# Patient Record
Sex: FEMALE | Race: WHITE | NOT HISPANIC OR LATINO | Employment: UNEMPLOYED | URBAN - METROPOLITAN AREA
[De-identification: names, ages, dates, MRNs, and addresses within clinical notes are randomized per-mention and may not be internally consistent; named-entity substitution may affect disease eponyms.]

---

## 2017-07-26 LAB
EXT 24 HR UR METANEPHRINE: ABNORMAL
EXT 24 HR UR NORMETANEPHRINE: ABNORMAL
EXT 24 HR UR NORMETANEPHRINE: ABNORMAL
EXT 25 HYDROXY VIT D2: ABNORMAL
EXT 25 HYDROXY VIT D3: ABNORMAL
EXT 5 HIAA 24 HR URINE: ABNORMAL
EXT 5 HIAA BLOOD: ABNORMAL
EXT ACTH: ABNORMAL
EXT AFP: ABNORMAL
EXT ALBUMIN: 3.9 G/DL (ref 3.5–5.7)
EXT ALKALINE PHOSPHATASE: 111 U/L (ref 34–104)
EXT ALT: 10 U/L (ref 7–52)
EXT AMYLASE: ABNORMAL
EXT ANTI ISLET CELL AB: ABNORMAL
EXT ANTI PARIETAL CELL AB: ABNORMAL
EXT ANTI THYROID AB: ABNORMAL
EXT AST: 17 U/L (ref 13–39)
EXT BILIRUBIN DIRECT: ABNORMAL MG/DL
EXT BILIRUBIN TOTAL: 0.5 MG/DL (ref 0.3–1)
EXT BK VIRUS DNA QN PCR: ABNORMAL
EXT BUN: 20 MG/DL (ref 7–25)
EXT C PEPTIDE: ABNORMAL
EXT CA 125: ABNORMAL
EXT CA 19-9: ABNORMAL
EXT CA 27-29: ABNORMAL
EXT CALCITONIN: ABNORMAL
EXT CALCIUM: 9.4 MG/DL (ref 8.6–10.3)
EXT CEA: ABNORMAL
EXT CHLORIDE: 97 MEQ/L (ref 98–107)
EXT CHOLESTEROL: ABNORMAL
EXT CHROMOGRANIN A: 11 NG/ML (ref 0–15)
EXT CO2: 30 MEQ/L (ref 21–31)
EXT CREATININE UA: ABNORMAL
EXT CREATININE: 1.02 MG/DL (ref 0.8–1.2)
EXT CYCLOSPORONE LEVEL: ABNORMAL
EXT DOPAMINE: ABNORMAL
EXT EBV DNA BY PCR: ABNORMAL
EXT EPINEPHRINE: ABNORMAL
EXT FOLATE: ABNORMAL
EXT FREE T3: ABNORMAL
EXT FREE T4: ABNORMAL
EXT FSH: ABNORMAL
EXT GASTRIN RELEASING PEPTIDE: ABNORMAL
EXT GASTRIN RELEASING PEPTIDE: ABNORMAL
EXT GASTRIN: ABNORMAL
EXT GGT: ABNORMAL
EXT GHRELIN: ABNORMAL
EXT GLUCAGON: ABNORMAL
EXT GLUCOSE: 249 MG/DL (ref 70–105)
EXT GROWTH HORMONE: ABNORMAL
EXT HCV RNA QUANT PCR: ABNORMAL
EXT HDL: ABNORMAL
EXT HEMATOCRIT: 39.3 % (ref 37–52)
EXT HEMOGLOBIN A1C: ABNORMAL
EXT HEMOGLOBIN: 12.8 G/DL (ref 12–18)
EXT HISTAMINE 24 HR URINE: ABNORMAL
EXT HISTAMINE: ABNORMAL
EXT IGF-1: ABNORMAL
EXT IMMUNKNOW (NON-STIMULATED): ABNORMAL
EXT IMMUNKNOW (STIMULATED): ABNORMAL
EXT INR: ABNORMAL
EXT INSULIN: ABNORMAL
EXT LANREOTIDE LEVEL: ABNORMAL
EXT LDH, TOTAL: ABNORMAL
EXT LDL CHOLESTEROL: ABNORMAL
EXT LIPASE: ABNORMAL
EXT MAGNESIUM: ABNORMAL
EXT METANEPHRINE FREE PLASMA: ABNORMAL
EXT MOTILIN: ABNORMAL
EXT NEUROKININ A CAMB: ABNORMAL
EXT NEUROKININ A ISI: ABNORMAL
EXT NEUROTENSIN: ABNORMAL
EXT NOREPINEPHRINE: ABNORMAL
EXT NORMETANEPHRINE: ABNORMAL
EXT NSE: ABNORMAL
EXT OCTREOTIDE LEVEL: ABNORMAL
EXT PANCREASTATIN CAMB: ABNORMAL
EXT PANCREASTATIN ISI: ABNORMAL
EXT PANCREATIC POLYPEPTIDE: ABNORMAL
EXT PHOSPHORUS: ABNORMAL
EXT PLATELETS: 220 K/UL (ref 150–500)
EXT POTASSIUM: 4.6 MEQ/L (ref 3.5–5.1)
EXT PROGRAF LEVEL: ABNORMAL
EXT PROLACTIN: ABNORMAL
EXT PROTEIN TOTAL: 7 G/DL (ref 6.4–8.9)
EXT PROTEIN UA: ABNORMAL
EXT PT: ABNORMAL
EXT PTH, INTACT: ABNORMAL
EXT PTT: ABNORMAL
EXT RAPAMUNE LEVEL: ABNORMAL
EXT SEROTONIN: ABNORMAL
EXT SODIUM: 136 MEQ/L (ref 136–145)
EXT SOMATOSTATIN: ABNORMAL
EXT SUBSTANCE P: ABNORMAL
EXT TRIGLYCERIDES: ABNORMAL
EXT TRYPTASE: ABNORMAL
EXT TSH: ABNORMAL
EXT URIC ACID: ABNORMAL
EXT URINE AMYLASE U/HR: ABNORMAL
EXT URINE AMYLASE U/L: ABNORMAL
EXT VASOACTIVE INTESTINAL POLYPEPTIDE: ABNORMAL
EXT VITAMIN B12: ABNORMAL
EXT VMA 24 HR URINE: ABNORMAL
EXT WBC: 4.4 K/UL (ref 4.8–10.8)
NEURON SPECIFIC ENOLASE: ABNORMAL

## 2017-08-21 LAB
EXT 24 HR UR METANEPHRINE: ABNORMAL
EXT 24 HR UR NORMETANEPHRINE: ABNORMAL
EXT 24 HR UR NORMETANEPHRINE: ABNORMAL
EXT 25 HYDROXY VIT D2: ABNORMAL
EXT 25 HYDROXY VIT D3: ABNORMAL
EXT 5 HIAA 24 HR URINE: ABNORMAL
EXT 5 HIAA BLOOD: ABNORMAL
EXT ACTH: ABNORMAL
EXT AFP: ABNORMAL
EXT ALBUMIN: 3.87 G/DL (ref 3.5–5.7)
EXT ALKALINE PHOSPHATASE: 94 U/L (ref 34–104)
EXT ALT: 7 U/L (ref 7–52)
EXT AMYLASE: ABNORMAL
EXT ANTI ISLET CELL AB: ABNORMAL
EXT ANTI PARIETAL CELL AB: ABNORMAL
EXT ANTI THYROID AB: ABNORMAL
EXT AST: 11 U/L (ref 13–39)
EXT BILIRUBIN DIRECT: ABNORMAL
EXT BILIRUBIN TOTAL: 0.4 MG/DL (ref 0.3–1)
EXT BK VIRUS DNA QN PCR: ABNORMAL
EXT BUN: ABNORMAL MG/DL (ref 7–25)
EXT C PEPTIDE: ABNORMAL
EXT CA 125: ABNORMAL
EXT CA 19-9: ABNORMAL
EXT CA 27-29: ABNORMAL
EXT CALCITONIN: ABNORMAL
EXT CALCIUM: 9.3 MG/DL (ref 8.6–10.3)
EXT CEA: ABNORMAL
EXT CHLORIDE: 103 MEQ/L (ref 98–107)
EXT CHOLESTEROL: ABNORMAL
EXT CHROMOGRANIN A: ABNORMAL
EXT CO2: 30 MEQ/L (ref 21–31)
EXT CREATININE UA: ABNORMAL
EXT CREATININE: 1.03 MG/DL (ref 0.6–1.2)
EXT CYCLOSPORONE LEVEL: ABNORMAL
EXT DOPAMINE: ABNORMAL
EXT EBV DNA BY PCR: ABNORMAL
EXT EPINEPHRINE: ABNORMAL
EXT FOLATE: ABNORMAL
EXT FREE T3: ABNORMAL
EXT FREE T4: ABNORMAL
EXT FSH: ABNORMAL
EXT GASTRIN RELEASING PEPTIDE: ABNORMAL
EXT GASTRIN RELEASING PEPTIDE: ABNORMAL
EXT GASTRIN: ABNORMAL
EXT GGT: ABNORMAL
EXT GHRELIN: ABNORMAL
EXT GLUCAGON: ABNORMAL
EXT GLUCOSE: 196 MG/DL (ref 70–105)
EXT GROWTH HORMONE: ABNORMAL
EXT HCV RNA QUANT PCR: ABNORMAL
EXT HDL: ABNORMAL
EXT HEMATOCRIT: 36.9 % (ref 37–52)
EXT HEMOGLOBIN A1C: ABNORMAL
EXT HEMOGLOBIN: 11.9 G/DL (ref 12–18)
EXT HISTAMINE 24 HR URINE: ABNORMAL
EXT HISTAMINE: ABNORMAL
EXT IGF-1: ABNORMAL
EXT IMMUNKNOW (NON-STIMULATED): ABNORMAL
EXT IMMUNKNOW (STIMULATED): ABNORMAL
EXT INR: ABNORMAL
EXT INSULIN: ABNORMAL
EXT LANREOTIDE LEVEL: ABNORMAL
EXT LDH, TOTAL: ABNORMAL
EXT LDL CHOLESTEROL: ABNORMAL
EXT LIPASE: ABNORMAL
EXT MAGNESIUM: ABNORMAL
EXT METANEPHRINE FREE PLASMA: ABNORMAL
EXT MOTILIN: ABNORMAL
EXT NEUROKININ A CAMB: ABNORMAL
EXT NEUROKININ A ISI: ABNORMAL
EXT NEUROTENSIN: ABNORMAL
EXT NOREPINEPHRINE: ABNORMAL
EXT NORMETANEPHRINE: ABNORMAL
EXT NSE: ABNORMAL
EXT OCTREOTIDE LEVEL: ABNORMAL
EXT PANCREASTATIN CAMB: ABNORMAL
EXT PANCREASTATIN ISI: ABNORMAL
EXT PANCREATIC POLYPEPTIDE: ABNORMAL
EXT PHOSPHORUS: ABNORMAL
EXT PLATELETS: 303 K/UL (ref 150–500)
EXT POTASSIUM: 4.5 MEQ/L (ref 3.5–5.1)
EXT PROGRAF LEVEL: ABNORMAL
EXT PROLACTIN: ABNORMAL
EXT PROTEIN TOTAL: 6.7 G/DL (ref 6.4–8.9)
EXT PROTEIN UA: ABNORMAL
EXT PT: ABNORMAL
EXT PTH, INTACT: ABNORMAL
EXT PTT: ABNORMAL
EXT RAPAMUNE LEVEL: ABNORMAL
EXT SEROTONIN: ABNORMAL
EXT SODIUM: 139 MEQ/L (ref 136–145)
EXT SOMATOSTATIN: ABNORMAL
EXT SUBSTANCE P: ABNORMAL
EXT TRIGLYCERIDES: ABNORMAL
EXT TRYPTASE: ABNORMAL
EXT TSH: ABNORMAL
EXT URIC ACID: ABNORMAL
EXT URINE AMYLASE U/HR: ABNORMAL
EXT URINE AMYLASE U/L: ABNORMAL
EXT VASOACTIVE INTESTINAL POLYPEPTIDE: ABNORMAL
EXT VITAMIN B12: ABNORMAL
EXT VMA 24 HR URINE: ABNORMAL
EXT WBC: 6.7 K/UL (ref 4.8–10.8)
NEURON SPECIFIC ENOLASE: ABNORMAL

## 2017-10-30 LAB
EXT 24 HR UR METANEPHRINE: ABNORMAL
EXT 24 HR UR NORMETANEPHRINE: ABNORMAL
EXT 24 HR UR NORMETANEPHRINE: ABNORMAL
EXT 25 HYDROXY VIT D2: ABNORMAL
EXT 25 HYDROXY VIT D3: ABNORMAL
EXT 5 HIAA 24 HR URINE: ABNORMAL
EXT 5 HIAA BLOOD: ABNORMAL
EXT ACTH: ABNORMAL
EXT AFP: ABNORMAL
EXT ALBUMIN: 3.5 G/DL (ref 3.5–5.7)
EXT ALKALINE PHOSPHATASE: 88 U/L (ref 34–104)
EXT ALT: 13 U/L (ref 7–52)
EXT AMYLASE: ABNORMAL
EXT ANTI ISLET CELL AB: ABNORMAL
EXT ANTI PARIETAL CELL AB: ABNORMAL
EXT ANTI THYROID AB: ABNORMAL
EXT AST: 15 U/L (ref 13–39)
EXT BILIRUBIN DIRECT: ABNORMAL
EXT BILIRUBIN TOTAL: 0.4 MG/DL (ref 0.3–1)
EXT BK VIRUS DNA QN PCR: ABNORMAL
EXT BUN: 16 MG/DL (ref 7–25)
EXT C PEPTIDE: ABNORMAL
EXT CA 125: ABNORMAL
EXT CA 19-9: ABNORMAL
EXT CA 27-29: ABNORMAL
EXT CALCITONIN: ABNORMAL
EXT CALCIUM: 8.8 MG/DL (ref 8.6–10.3)
EXT CEA: ABNORMAL
EXT CHLORIDE: 99 MEQ/L (ref 98–107)
EXT CHOLESTEROL: ABNORMAL
EXT CHROMOGRANIN A: ABNORMAL
EXT CO2: 31 MEQ/L (ref 21–31)
EXT CREATININE UA: ABNORMAL
EXT CREATININE: 0.96 MG/DL (ref 0.6–1.2)
EXT CYCLOSPORONE LEVEL: ABNORMAL
EXT DOPAMINE: ABNORMAL
EXT EBV DNA BY PCR: ABNORMAL
EXT EPINEPHRINE: ABNORMAL
EXT FOLATE: 17.4 NG/ML (ref 7–20)
EXT FREE T3: ABNORMAL
EXT FREE T4: ABNORMAL
EXT FSH: ABNORMAL
EXT GASTRIN RELEASING PEPTIDE: ABNORMAL
EXT GASTRIN RELEASING PEPTIDE: ABNORMAL
EXT GASTRIN: ABNORMAL
EXT GGT: ABNORMAL
EXT GHRELIN: ABNORMAL
EXT GLUCAGON: ABNORMAL
EXT GLUCOSE: 256 MG/DL (ref 70–105)
EXT GROWTH HORMONE: ABNORMAL
EXT HCV RNA QUANT PCR: ABNORMAL
EXT HDL: ABNORMAL
EXT HEMATOCRIT: 34.9 % (ref 37–52)
EXT HEMOGLOBIN A1C: ABNORMAL
EXT HEMOGLOBIN: 11 G/DL (ref 12–18)
EXT HISTAMINE 24 HR URINE: ABNORMAL
EXT HISTAMINE: ABNORMAL
EXT IGF-1: ABNORMAL
EXT IMMUNKNOW (NON-STIMULATED): ABNORMAL
EXT IMMUNKNOW (STIMULATED): ABNORMAL
EXT INR: ABNORMAL
EXT INSULIN: ABNORMAL
EXT LANREOTIDE LEVEL: ABNORMAL
EXT LDH, TOTAL: ABNORMAL
EXT LDL CHOLESTEROL: ABNORMAL
EXT LIPASE: ABNORMAL
EXT MAGNESIUM: ABNORMAL
EXT METANEPHRINE FREE PLASMA: ABNORMAL
EXT MOTILIN: ABNORMAL
EXT NEUROKININ A CAMB: ABNORMAL
EXT NEUROKININ A ISI: ABNORMAL
EXT NEUROTENSIN: ABNORMAL
EXT NOREPINEPHRINE: ABNORMAL
EXT NORMETANEPHRINE: ABNORMAL
EXT NSE: ABNORMAL
EXT OCTREOTIDE LEVEL: ABNORMAL
EXT PANCREASTATIN CAMB: ABNORMAL
EXT PANCREASTATIN ISI: ABNORMAL
EXT PANCREATIC POLYPEPTIDE: ABNORMAL
EXT PHOSPHORUS: ABNORMAL
EXT PLATELETS: 302 K/UL (ref 150–500)
EXT POTASSIUM: 4.3 MEQ/L (ref 3.5–5.1)
EXT PROGRAF LEVEL: ABNORMAL
EXT PROLACTIN: ABNORMAL
EXT PROTEIN TOTAL: 6.7 G/DL (ref 6.4–8.9)
EXT PROTEIN UA: ABNORMAL
EXT PT: ABNORMAL
EXT PTH, INTACT: ABNORMAL
EXT PTT: ABNORMAL
EXT RAPAMUNE LEVEL: ABNORMAL
EXT SEROTONIN: ABNORMAL
EXT SODIUM: 138 MG/DL (ref 136–145)
EXT SOMATOSTATIN: ABNORMAL
EXT SUBSTANCE P: ABNORMAL
EXT TRIGLYCERIDES: ABNORMAL
EXT TRYPTASE: ABNORMAL
EXT TSH: 0.63 MIU/ML (ref 0.35–4.94)
EXT URIC ACID: ABNORMAL
EXT URINE AMYLASE U/HR: ABNORMAL
EXT URINE AMYLASE U/L: ABNORMAL
EXT VASOACTIVE INTESTINAL POLYPEPTIDE: ABNORMAL
EXT VITAMIN B12: 661 PG/ML (ref 213–816)
EXT VMA 24 HR URINE: ABNORMAL
EXT WBC: 4.6 K/UL (ref 4.8–10.8)
NEURON SPECIFIC ENOLASE: ABNORMAL

## 2017-11-06 ENCOUNTER — TELEPHONE (OUTPATIENT)
Dept: NEUROLOGY | Facility: HOSPITAL | Age: 62
End: 2017-11-06

## 2017-11-06 DIAGNOSIS — C7A.8 NEUROENDOCRINE CARCINOMA OF SMALL BOWEL: Primary | ICD-10-CM

## 2017-11-06 RX ORDER — EVEROLIMUS 10 MG/1
10 TABLET ORAL DAILY
COMMUNITY
End: 2018-04-09

## 2017-11-06 RX ORDER — INSULIN DEGLUDEC 100 U/ML
15 INJECTION, SOLUTION SUBCUTANEOUS DAILY
Status: ON HOLD | COMMUNITY
End: 2019-04-16 | Stop reason: SDUPTHER

## 2017-11-06 RX ORDER — CYANOCOBALAMIN 1000 UG/ML
1000 INJECTION, SOLUTION INTRAMUSCULAR; SUBCUTANEOUS
COMMUNITY

## 2017-11-06 RX ORDER — DULOXETIN HYDROCHLORIDE 60 MG/1
60 CAPSULE, DELAYED RELEASE ORAL DAILY
Status: ON HOLD | COMMUNITY
End: 2019-04-16 | Stop reason: SDUPTHER

## 2017-11-06 RX ORDER — OMEPRAZOLE 40 MG/1
40 CAPSULE, DELAYED RELEASE ORAL
COMMUNITY

## 2017-11-06 RX ORDER — COLESEVELAM 180 1/1
1875 TABLET ORAL 2 TIMES DAILY WITH MEALS
COMMUNITY
End: 2018-04-09

## 2017-11-06 RX ORDER — ATORVASTATIN CALCIUM 40 MG/1
40 TABLET, FILM COATED ORAL DAILY
Status: ON HOLD | COMMUNITY
End: 2018-03-09 | Stop reason: CLARIF

## 2017-11-06 RX ORDER — CALCIUM CARBONATE 600 MG
600 TABLET ORAL ONCE
COMMUNITY

## 2017-11-06 RX ORDER — LOSARTAN POTASSIUM 50 MG/1
50 TABLET ORAL DAILY
Status: ON HOLD | COMMUNITY
End: 2018-03-27 | Stop reason: HOSPADM

## 2017-11-06 RX ORDER — CLONAZEPAM 0.5 MG/1
0.5 TABLET ORAL 2 TIMES DAILY PRN
Status: ON HOLD | COMMUNITY
End: 2019-04-16 | Stop reason: SDUPTHER

## 2017-11-06 RX ORDER — METOPROLOL SUCCINATE 100 MG/1
100 TABLET, EXTENDED RELEASE ORAL 2 TIMES DAILY
Status: ON HOLD | COMMUNITY
End: 2019-04-16 | Stop reason: SDUPTHER

## 2017-11-06 RX ORDER — CHOLECALCIFEROL (VITAMIN D3) 25 MCG
1000 TABLET ORAL DAILY
COMMUNITY

## 2017-11-06 RX ORDER — HYDROXYZINE PAMOATE 25 MG/1
25 CAPSULE ORAL 3 TIMES DAILY
Status: ON HOLD | COMMUNITY
End: 2019-04-16 | Stop reason: SDUPTHER

## 2017-11-06 RX ORDER — AMITRIPTYLINE HYDROCHLORIDE 50 MG/1
50 TABLET, FILM COATED ORAL NIGHTLY
Status: ON HOLD | COMMUNITY
End: 2019-04-16 | Stop reason: SDUPTHER

## 2017-11-06 RX ORDER — POTASSIUM CHLORIDE 1.5 G/1.58G
20 POWDER, FOR SOLUTION ORAL ONCE
Status: ON HOLD | COMMUNITY
End: 2019-04-16 | Stop reason: SDUPTHER

## 2017-11-06 RX ORDER — HYDROCODONE BITARTRATE AND ACETAMINOPHEN 7.5; 325 MG/1; MG/1
1 TABLET ORAL EVERY 6 HOURS PRN
COMMUNITY
End: 2017-12-13

## 2017-11-06 NOTE — TELEPHONE ENCOUNTER
New SB NET pt, ref from Dr Patel.  Pt dx in 1/2017, on lanreotide and afinitor.  Ref here for eval.  Spoke with pt, she requests that I contact  Her sister to coordinate testing.  Called Emily, pts sister, and left msg to return call.

## 2017-11-06 NOTE — LETTER
November 7, 2017    Kaylee Smither  No address on file.             [facility]  Tumor Program  [address]  [address]  [place] [address]  Phone: [phone]  Fax: [phone] Dear Ms. Ngo:    Thank you for your interest in our program. It was my pleasure speaking with you today about your upcoming appointment.     You have a scheduled appointment with Dr. Serjio Winkler on 12/13/17. Our office is located at :    [facility]  Medical Office [facility]  Neuroendocrine Tumor Clinic  [address]  [place], [address]    You are scheduled for a consultation only with the physicians unless otherwise planned. Plan to be here for your visit for 2-3 hrs. If flight arrangements are made, plan to make the return flight after 6 pm if possible. The [place] 10 minutes from [facility].    In preparation for your appointment, we ask that you gather the information below and fax them to us ASAP. This will enable us to review all pertinent information at the time of your visit so that recommendations can be made and a plan of care developed for you.     Please return forms along with all paper records via fax asap.    Please fax  1. Insurance cards (front and back)-enlarged if possible  2. Drivers licenses  3. Current medicine list  4. Name, address & phone # of your physician for correspondence  5. Authorization for Release of Information-complete and return to clinic  6. Medical Records-send as soon as you have them together (see guidelines below)    Lab Work: If requested, the special lab markers do require special tubes that have to be ordered by the ordering facility. The lab work should be within 3 months.. The labs take 2-3 weeks to get results so please get labs drawn asap. The name and phone # of the send out lab that does the special labs tests is on the order. You can have the labs drawn at [facility], a [facility], or  your doctors office (whichever works). If these lab tests need to be done, I will attach an Outpatient Order form. If you are doing your lab work at a facility other than [facility], call our office to notify us the date you have them drawn and the location and phone number of the lab for easy follow up.    Scans: Please mail copies of CD's of your last scans to the office asap. I would recommend sending them overnight with a tracking number in case of any problems. If you need updated scans, I will attach an Outpatient Order form.    Tissue Biopsy/Pathology: If you had a tissue biopsy or surgery, we will request to have your slides and/or tissue blocks sent to us to perform some special testing on them. Please provide us with a pathology report asa. This testing will be billed to your insurance company.     Operative or Procedure reports: All surgery or procedure reports related to your neuroendocrine tumor should be sent to us.    Insurance Company: You should contact your insurance company to inquire about your insurance coverage and benefits. Ask about co-payments and deductibles when seeing a specialist. Ask if this visit will be in Network or Out of Network. We may be able to work with you if this is out of network for you.    Lodging: Attached is lodging information from the [facility] and a list of local hotels. The [facility] is run by the [employer] and is free of charge. If you would like to stay at [facility], you must call my office and talk to [facility]. You will need to complete the application and send it to my office for a physician signature. We will forward it to the [facility] and they will check availability. If you wish to stay at the [facility], apply EARLY, they fill up quickly. You may contact the [facility] a week later to confirm your reservation and ask any questions regarding the facility. You  May only stay at the [facility] 24 hrs prior to your appointment and up to 24 hrs after your  appointment. (THIS CAN ONLY BE USED IF YOU LIVE MORE THAN 40 MILES FROM OUR FACILITY).    Call me if you have any questions, email is not the best way to communicate with our office.    We are looking forward to meeting and taking care of you. If you have any questions or concerns, please don't hesitate to call.     Sincerely,        Annie Garcia, RN, BSN  Nurse Manager, Neuroendocrine Tumor Program

## 2017-11-06 NOTE — LETTER
November 7, 2017        Martita Patel MD  [address]  [place] [address]             [facility]  [address]  [place] [address]  Phone: [phone]  Fax: [phone]   Patient: Kaylee Ngo   MR Number: [id number]   YOB: 1955   Date of Visit: 11/6/2017     Dear Dr. Patel,     We contacted Ms. Ngo regarding setting up an appointment for an evaluation at our center.  We scheduled an octreoscan, MRI, blood tumor markers and a pathology re read over the next couple of weeks.  We also scheduled an appointment with Dr. Serjio Winkler on 12/13/17 for recommendations.  We will forward you a copy of the clinic note after the visit.      Thank you for considering our program and for referring this patient.  If you have any questions, please do not hesitate to contact us.      Sincerely,      Annie Garcia RN

## 2017-11-06 NOTE — TELEPHONE ENCOUNTER
----- Message from Nkechi Aguirre RN sent at 11/6/2017 10:30 AM CST -----  Good morning,    We rec'd a fax referral for an endocrine dx for above listed pt. I am about to scan info into chart. The cover sheet indicates to contact Cathy at [phone] with appt.    Thanks,  Nkechi ANTOINE RN

## 2017-11-08 ENCOUNTER — TELEPHONE (OUTPATIENT)
Dept: NEUROLOGY | Facility: HOSPITAL | Age: 62
End: 2017-11-08

## 2017-11-08 NOTE — TELEPHONE ENCOUNTER
Returned call to patient and informed her that she would have to get the sedatives for the MRI from her local physician until after we had seen her as a patient.  She verbalized understanding.

## 2017-11-08 NOTE — TELEPHONE ENCOUNTER
----- Message from Vaishnavi Tejeda sent at 11/7/2017  8:29 AM CST -----  Contact: Maday with Dr.Young Nassar calling from Dr. Patel office would like to speak with you regarding pt    Maday call back number [phone]

## 2017-11-08 NOTE — TELEPHONE ENCOUNTER
New NET pt with SB carcinoid.  Pt currently on lanreotide and afinitor. Pt report weight loss from 240 lbs to 111 lbs over the last year. Pt had gastric bypass approx 10 yrs ago.  Pt having some abd pain and nausea and unable to eat everyday.

## 2017-11-08 NOTE — TELEPHONE ENCOUNTER
----- Message from Ale Sherwood sent at 11/7/2017 10:56 AM CST -----  Contact: Patient  EAW/NEW Patients sister, Elisabeth Espinosa is returning the nurses call in regards to setting up an appointment. Elisabeth's call back number is [phone].

## 2017-11-08 NOTE — TELEPHONE ENCOUNTER
----- Message from Ale Sherwood sent at 11/7/2017  4:01 PM CST -----  Contact: Patients sister Emily Espinosa  EAW/NEW- patients sister Emily Espinosa is returning the nurses call. Emily's call back number is  [phone].

## 2017-11-08 NOTE — TELEPHONE ENCOUNTER
Spoke with Maday. Informed of appointment and planned testing.  She will fax over PET scan, CT, bx report and echo.

## 2017-11-15 LAB
EXT 24 HR UR METANEPHRINE: ABNORMAL
EXT 24 HR UR NORMETANEPHRINE: ABNORMAL
EXT 24 HR UR NORMETANEPHRINE: ABNORMAL
EXT 25 HYDROXY VIT D2: ABNORMAL
EXT 25 HYDROXY VIT D3: ABNORMAL
EXT 5 HIAA 24 HR URINE: ABNORMAL
EXT 5 HIAA BLOOD: 9.4 NG/ML (ref 0–22)
EXT ACTH: ABNORMAL
EXT AFP: ABNORMAL
EXT ALBUMIN: ABNORMAL
EXT ALKALINE PHOSPHATASE: ABNORMAL
EXT ALT: ABNORMAL
EXT AMYLASE: ABNORMAL
EXT ANTI ISLET CELL AB: ABNORMAL
EXT ANTI PARIETAL CELL AB: ABNORMAL
EXT ANTI THYROID AB: ABNORMAL
EXT AST: ABNORMAL
EXT BILIRUBIN DIRECT: ABNORMAL MG/DL
EXT BILIRUBIN TOTAL: ABNORMAL
EXT BK VIRUS DNA QN PCR: ABNORMAL
EXT BUN: ABNORMAL
EXT C PEPTIDE: ABNORMAL
EXT CA 125: ABNORMAL
EXT CA 19-9: ABNORMAL
EXT CA 27-29: ABNORMAL
EXT CALCITONIN: 563 PG/ML (ref 0–5)
EXT CALCIUM: 8.7 MG/DL (ref 8.7–10.3)
EXT CEA: ABNORMAL
EXT CHLORIDE: ABNORMAL
EXT CHOLESTEROL: ABNORMAL
EXT CHROMOGRANIN A: 3 NMOL/L (ref 0–5)
EXT CO2: ABNORMAL
EXT CREATININE UA: ABNORMAL
EXT CREATININE: ABNORMAL MG/DL
EXT CYCLOSPORONE LEVEL: ABNORMAL
EXT DOPAMINE: ABNORMAL
EXT EBV DNA BY PCR: ABNORMAL
EXT EPINEPHRINE: ABNORMAL
EXT FOLATE: ABNORMAL
EXT FREE T3: ABNORMAL
EXT FREE T4: ABNORMAL
EXT FSH: ABNORMAL
EXT GASTRIN RELEASING PEPTIDE: ABNORMAL
EXT GASTRIN RELEASING PEPTIDE: ABNORMAL
EXT GASTRIN: 124 PG/ML (ref 0–115)
EXT GGT: ABNORMAL
EXT GHRELIN: ABNORMAL
EXT GLUCAGON: ABNORMAL
EXT GLUCOSE: ABNORMAL
EXT GROWTH HORMONE: ABNORMAL
EXT HCV RNA QUANT PCR: ABNORMAL
EXT HDL: ABNORMAL
EXT HEMATOCRIT: ABNORMAL
EXT HEMOGLOBIN A1C: ABNORMAL
EXT HEMOGLOBIN: ABNORMAL
EXT HISTAMINE 24 HR URINE: ABNORMAL
EXT HISTAMINE: ABNORMAL
EXT IGF-1: ABNORMAL
EXT IMMUNKNOW (NON-STIMULATED): ABNORMAL
EXT IMMUNKNOW (STIMULATED): ABNORMAL
EXT INR: ABNORMAL
EXT INSULIN: ABNORMAL
EXT LANREOTIDE LEVEL: ABNORMAL
EXT LDH, TOTAL: ABNORMAL
EXT LDL CHOLESTEROL: ABNORMAL
EXT LIPASE: ABNORMAL
EXT MAGNESIUM: ABNORMAL
EXT METANEPHRINE FREE PLASMA: ABNORMAL
EXT MOTILIN: ABNORMAL
EXT NEUROKININ A CAMB: ABNORMAL
EXT NEUROKININ A ISI: <10 PG/ML (ref 0–40)
EXT NEUROTENSIN: ABNORMAL
EXT NOREPINEPHRINE: ABNORMAL
EXT NORMETANEPHRINE: ABNORMAL
EXT NSE: ABNORMAL
EXT OCTREOTIDE LEVEL: ABNORMAL
EXT PANCREASTATIN CAMB: ABNORMAL
EXT PANCREASTATIN ISI: 686 PG/ML (ref 10–135)
EXT PANCREATIC POLYPEPTIDE: ABNORMAL
EXT PHOSPHORUS: ABNORMAL
EXT PLATELETS: ABNORMAL
EXT POTASSIUM: ABNORMAL
EXT PROGRAF LEVEL: ABNORMAL
EXT PROLACTIN: ABNORMAL
EXT PROTEIN TOTAL: ABNORMAL
EXT PROTEIN UA: ABNORMAL
EXT PT: ABNORMAL
EXT PTH, INTACT: ABNORMAL
EXT PTT: ABNORMAL
EXT RAPAMUNE LEVEL: ABNORMAL
EXT SEROTONIN: 9 NG/ML (ref 0–420)
EXT SODIUM: ABNORMAL MMOL/L
EXT SOMATOSTATIN: >225 PG/ML (ref 0–30)
EXT SUBSTANCE P: ABNORMAL
EXT TRIGLYCERIDES: ABNORMAL
EXT TRYPTASE: ABNORMAL
EXT TSH: ABNORMAL
EXT URIC ACID: ABNORMAL
EXT URINE AMYLASE U/HR: ABNORMAL
EXT URINE AMYLASE U/L: ABNORMAL
EXT VASOACTIVE INTESTINAL POLYPEPTIDE: ABNORMAL
EXT VITAMIN B12: ABNORMAL
EXT VMA 24 HR URINE: ABNORMAL
EXT WBC: ABNORMAL
NEURON SPECIFIC ENOLASE: ABNORMAL

## 2017-11-17 LAB

## 2017-11-22 ENCOUNTER — TELEPHONE (OUTPATIENT)
Dept: NEUROLOGY | Facility: HOSPITAL | Age: 62
End: 2017-11-22

## 2017-11-22 NOTE — TELEPHONE ENCOUNTER
----- Message from Serjio Winkler MD sent at 11/21/2017  7:29 AM CST -----  Repeat calcitonin and check normals

## 2017-11-22 NOTE — TELEPHONE ENCOUNTER
Called and notified pts sister/caregiver, Emily of increased calcitonin level. Will need repeat ASAP. If continues to be elevated, will need ENT referral per Dr. Winkler. Sister verbalizes understanding. Orders faxed to [phone] per sister request.

## 2017-11-27 LAB
EXT 24 HR UR METANEPHRINE: ABNORMAL
EXT 24 HR UR NORMETANEPHRINE: ABNORMAL
EXT 24 HR UR NORMETANEPHRINE: ABNORMAL
EXT 25 HYDROXY VIT D2: ABNORMAL
EXT 25 HYDROXY VIT D3: ABNORMAL
EXT 5 HIAA 24 HR URINE: ABNORMAL
EXT 5 HIAA BLOOD: ABNORMAL
EXT ACTH: ABNORMAL
EXT AFP: ABNORMAL
EXT ALBUMIN: ABNORMAL
EXT ALKALINE PHOSPHATASE: ABNORMAL
EXT ALT: ABNORMAL
EXT AMYLASE: ABNORMAL
EXT ANTI ISLET CELL AB: ABNORMAL
EXT ANTI PARIETAL CELL AB: ABNORMAL
EXT ANTI THYROID AB: ABNORMAL
EXT AST: ABNORMAL
EXT BILIRUBIN DIRECT: ABNORMAL MG/DL
EXT BILIRUBIN TOTAL: ABNORMAL
EXT BK VIRUS DNA QN PCR: ABNORMAL
EXT BUN: ABNORMAL
EXT C PEPTIDE: ABNORMAL
EXT CA 125: ABNORMAL
EXT CA 19-9: ABNORMAL
EXT CA 27-29: ABNORMAL
EXT CALCITONIN: 140 PG/ML (ref 0–5)
EXT CALCIUM: ABNORMAL
EXT CEA: ABNORMAL
EXT CHLORIDE: ABNORMAL
EXT CHOLESTEROL: ABNORMAL
EXT CHROMOGRANIN A: ABNORMAL
EXT CO2: ABNORMAL
EXT CREATININE UA: ABNORMAL
EXT CREATININE: ABNORMAL MG/DL
EXT CYCLOSPORONE LEVEL: ABNORMAL
EXT DOPAMINE: ABNORMAL
EXT EBV DNA BY PCR: ABNORMAL
EXT EPINEPHRINE: ABNORMAL
EXT FOLATE: ABNORMAL
EXT FREE T3: ABNORMAL
EXT FREE T4: ABNORMAL
EXT FSH: ABNORMAL
EXT GASTRIN RELEASING PEPTIDE: ABNORMAL
EXT GASTRIN RELEASING PEPTIDE: ABNORMAL
EXT GASTRIN: ABNORMAL
EXT GGT: ABNORMAL
EXT GHRELIN: ABNORMAL
EXT GLUCAGON: ABNORMAL
EXT GLUCOSE: ABNORMAL
EXT GROWTH HORMONE: ABNORMAL
EXT HCV RNA QUANT PCR: ABNORMAL
EXT HDL: ABNORMAL
EXT HEMATOCRIT: ABNORMAL
EXT HEMOGLOBIN A1C: ABNORMAL
EXT HEMOGLOBIN: ABNORMAL
EXT HISTAMINE 24 HR URINE: ABNORMAL
EXT HISTAMINE: ABNORMAL
EXT IGF-1: ABNORMAL
EXT IMMUNKNOW (NON-STIMULATED): ABNORMAL
EXT IMMUNKNOW (STIMULATED): ABNORMAL
EXT INR: ABNORMAL
EXT INSULIN: ABNORMAL
EXT LANREOTIDE LEVEL: ABNORMAL
EXT LDH, TOTAL: ABNORMAL
EXT LDL CHOLESTEROL: ABNORMAL
EXT LIPASE: ABNORMAL
EXT MAGNESIUM: ABNORMAL
EXT METANEPHRINE FREE PLASMA: ABNORMAL
EXT MOTILIN: ABNORMAL
EXT NEUROKININ A CAMB: ABNORMAL
EXT NEUROKININ A ISI: ABNORMAL
EXT NEUROTENSIN: ABNORMAL
EXT NOREPINEPHRINE: ABNORMAL
EXT NORMETANEPHRINE: ABNORMAL
EXT NSE: ABNORMAL
EXT OCTREOTIDE LEVEL: ABNORMAL
EXT PANCREASTATIN CAMB: ABNORMAL
EXT PANCREASTATIN ISI: ABNORMAL
EXT PANCREATIC POLYPEPTIDE: ABNORMAL
EXT PHOSPHORUS: ABNORMAL
EXT PLATELETS: ABNORMAL
EXT POTASSIUM: ABNORMAL
EXT PROGRAF LEVEL: ABNORMAL
EXT PROLACTIN: ABNORMAL
EXT PROTEIN TOTAL: ABNORMAL
EXT PROTEIN UA: ABNORMAL
EXT PT: ABNORMAL
EXT PTH, INTACT: ABNORMAL
EXT PTT: ABNORMAL
EXT RAPAMUNE LEVEL: ABNORMAL
EXT SEROTONIN: ABNORMAL
EXT SODIUM: ABNORMAL MMOL/L
EXT SOMATOSTATIN: ABNORMAL
EXT SUBSTANCE P: ABNORMAL
EXT TRIGLYCERIDES: ABNORMAL
EXT TRYPTASE: ABNORMAL
EXT TSH: ABNORMAL
EXT URIC ACID: ABNORMAL
EXT URINE AMYLASE U/HR: ABNORMAL
EXT URINE AMYLASE U/L: ABNORMAL
EXT VASOACTIVE INTESTINAL POLYPEPTIDE: ABNORMAL
EXT VITAMIN B12: ABNORMAL
EXT VMA 24 HR URINE: ABNORMAL
EXT WBC: ABNORMAL
NEURON SPECIFIC ENOLASE: ABNORMAL

## 2017-12-06 ENCOUNTER — TELEPHONE (OUTPATIENT)
Dept: NEUROLOGY | Facility: HOSPITAL | Age: 62
End: 2017-12-06

## 2017-12-06 NOTE — TELEPHONE ENCOUNTER
Spoke with pt, she will work on getting echo report.  She also verbalized that she send the cd of the ct scan with medical records.

## 2017-12-06 NOTE — TELEPHONE ENCOUNTER
----- Message from Emily Scott sent at 12/6/2017  4:05 PM CST -----  Contact: sister  EAW/New- Sister called returning nurses call. Call back number is [phone]

## 2017-12-06 NOTE — TELEPHONE ENCOUNTER
----- Message from Ale Sherwood sent at 12/5/2017 11:30 AM CST -----  Contact: Patient sister Emily Espinosa  EAW/NEW-Patient sister Emily Espinosa would like a call back to see if all information needed was received. Call back number is  [phone]

## 2017-12-06 NOTE — TELEPHONE ENCOUNTER
Returned call to patient;s sister after reviewing the records in the chart.  LVM that we had everything we needed but were missing an echo for 2017 we had the one from 8/2016. And the O scan is scheduled for December 11 and 12 and she sees Dr Winkler on Wednesday December 13.  Call us back if you still have questions.

## 2017-12-06 NOTE — TELEPHONE ENCOUNTER
----- Message from Emily Scott sent at 12/6/2017  3:09 PM CST -----  Contact: sister  EAW/NEW- sister was returning nurses call. Call back number is [phone]

## 2017-12-07 ENCOUNTER — TELEPHONE (OUTPATIENT)
Dept: NEUROLOGY | Facility: HOSPITAL | Age: 62
End: 2017-12-07

## 2017-12-07 DIAGNOSIS — E83.52 HYPERCALCEMIA: Primary | ICD-10-CM

## 2017-12-07 DIAGNOSIS — R79.89 OTHER SPECIFIED ABNORMAL FINDINGS OF BLOOD CHEMISTRY: ICD-10-CM

## 2017-12-07 NOTE — TELEPHONE ENCOUNTER
----- Message from Serjio Winkler MD sent at 12/7/2017  7:55 AM CST -----  Can we set up the cea and thyroid ultrasound before then?  ----- Message -----  From: Annie Garcia, RN  Sent: 12/6/2017   5:41 PM  To: Serjio Winkler MD    You will be seeing her next week.  I put it on the top of Snapshot.      ----- Message -----  From: Serjio Winkler MD  Sent: 11/30/2017   4:11 PM  To: [facility]ett Staff    She needs CEA and referral for thyroid ultrasound and FNA and referral to [facility] in [facility]

## 2017-12-11 ENCOUNTER — HOSPITAL ENCOUNTER (OUTPATIENT)
Dept: RADIOLOGY | Facility: HOSPITAL | Age: 62
Discharge: HOME | End: 2017-12-11
Attending: SURGERY
Payer: MEDICARE

## 2017-12-11 DIAGNOSIS — C7A.8 NEUROENDOCRINE CARCINOMA OF SMALL BOWEL: ICD-10-CM

## 2017-12-11 PROCEDURE — 78804 RP LOCLZJ TUM WHBDY 2+D IMG: CPT | Mod: 26,,, | Performed by: RADIOLOGY

## 2017-12-11 PROCEDURE — 74183 MRI ABD W/O CNTR FLWD CNTR: CPT | Mod: 26,,, | Performed by: RADIOLOGY

## 2017-12-11 PROCEDURE — 74183 MRI ABD W/O CNTR FLWD CNTR: CPT | Mod: TC

## 2017-12-11 PROCEDURE — 25500020 PHARM REV CODE 255: Performed by: SURGERY

## 2017-12-11 PROCEDURE — 78803 RP LOCLZJ TUM SPECT 1 AREA: CPT | Mod: TC

## 2017-12-11 PROCEDURE — A9585 GADOBUTROL INJECTION: HCPCS | Performed by: SURGERY

## 2017-12-11 PROCEDURE — 78803 RP LOCLZJ TUM SPECT 1 AREA: CPT | Mod: 26,,, | Performed by: RADIOLOGY

## 2017-12-11 RX ORDER — GADOBUTROL 604.72 MG/ML
10 INJECTION INTRAVENOUS
Status: COMPLETED | OUTPATIENT
Start: 2017-12-11 | End: 2017-12-11

## 2017-12-11 RX ADMIN — GADOBUTROL 10 ML: 604.72 INJECTION INTRAVENOUS at 04:12

## 2017-12-12 ENCOUNTER — HOSPITAL ENCOUNTER (OUTPATIENT)
Dept: RADIOLOGY | Facility: HOSPITAL | Age: 62
Discharge: HOME | End: 2017-12-12
Attending: SURGERY
Payer: MEDICARE

## 2017-12-12 DIAGNOSIS — C7A.8 NEUROENDOCRINE CARCINOMA OF SMALL BOWEL: ICD-10-CM

## 2017-12-12 DIAGNOSIS — E83.52 HYPERCALCEMIA: ICD-10-CM

## 2017-12-12 PROCEDURE — 78804 RP LOCLZJ TUM WHBDY 2+D IMG: CPT | Mod: TC

## 2017-12-12 PROCEDURE — 76536 US EXAM OF HEAD AND NECK: CPT | Mod: TC

## 2017-12-12 PROCEDURE — 78803 RP LOCLZJ TUM SPECT 1 AREA: CPT | Mod: TC

## 2017-12-12 PROCEDURE — 78803 RP LOCLZJ TUM SPECT 1 AREA: CPT | Mod: 26,,, | Performed by: RADIOLOGY

## 2017-12-12 PROCEDURE — A9572 INDIUM IN-111 PENTETREOTIDE: HCPCS

## 2017-12-12 PROCEDURE — 76536 US EXAM OF HEAD AND NECK: CPT | Mod: 26,,, | Performed by: RADIOLOGY

## 2017-12-13 ENCOUNTER — OFFICE VISIT (OUTPATIENT)
Dept: NEUROLOGY | Facility: HOSPITAL | Age: 62
End: 2017-12-13
Attending: SURGERY
Payer: MEDICARE

## 2017-12-13 VITALS
TEMPERATURE: 99 F | HEIGHT: 65 IN | BODY MASS INDEX: 19.72 KG/M2 | SYSTOLIC BLOOD PRESSURE: 157 MMHG | DIASTOLIC BLOOD PRESSURE: 73 MMHG | WEIGHT: 118.38 LBS | HEART RATE: 68 BPM

## 2017-12-13 VITALS
WEIGHT: 118.38 LBS | BODY MASS INDEX: 19.72 KG/M2 | HEIGHT: 65 IN | HEART RATE: 68 BPM | SYSTOLIC BLOOD PRESSURE: 157 MMHG | DIASTOLIC BLOOD PRESSURE: 73 MMHG | TEMPERATURE: 99 F

## 2017-12-13 DIAGNOSIS — C7B.8 NEUROENDOCRINE CARCINOMA METASTATIC TO INTRA-ABDOMINAL LYMPH NODE: ICD-10-CM

## 2017-12-13 DIAGNOSIS — C7A.00 MALIGNANT CARCINOID TUMOR OF UNKNOWN PRIMARY SITE: Primary | ICD-10-CM

## 2017-12-13 DIAGNOSIS — E04.1 THYROID NODULE: ICD-10-CM

## 2017-12-13 DIAGNOSIS — Z98.84 GASTRIC BYPASS STATUS FOR OBESITY: Primary | ICD-10-CM

## 2017-12-13 DIAGNOSIS — C7A.8 NEUROENDOCRINE CARCINOMA METASTATIC TO INTRA-ABDOMINAL LYMPH NODE: ICD-10-CM

## 2017-12-13 PROBLEM — C77.2 SECONDARY AND UNSPECIFIED MALIGNANT NEOPLASM OF INTRA-ABDOMINAL LYMPH NODES: Status: ACTIVE | Noted: 2017-12-13

## 2017-12-13 PROCEDURE — 99215 OFFICE O/P EST HI 40 MIN: CPT | Mod: 27 | Performed by: SURGERY

## 2017-12-13 PROCEDURE — 99214 OFFICE O/P EST MOD 30 MIN: CPT | Performed by: SURGERY

## 2017-12-13 RX ORDER — SCOLOPAMINE TRANSDERMAL SYSTEM 1 MG/1
PATCH, EXTENDED RELEASE TRANSDERMAL
Status: ON HOLD | COMMUNITY
Start: 2017-11-01 | End: 2018-03-27 | Stop reason: HOSPADM

## 2017-12-13 RX ORDER — LAMOTRIGINE 150 MG/1
TABLET ORAL
Status: ON HOLD | COMMUNITY
Start: 2017-11-14 | End: 2019-04-16 | Stop reason: SDUPTHER

## 2017-12-13 RX ORDER — BLOOD-GLUCOSE CONTROL, NORMAL
EACH MISCELLANEOUS
COMMUNITY
Start: 2017-10-17

## 2017-12-13 RX ORDER — PEN NEEDLE, DIABETIC 32GX 5/32"
NEEDLE, DISPOSABLE MISCELLANEOUS
COMMUNITY
Start: 2017-10-17

## 2017-12-13 RX ORDER — LANCING DEVICE
EACH MISCELLANEOUS
COMMUNITY
Start: 2017-10-17

## 2017-12-13 RX ORDER — TIZANIDINE 4 MG/1
TABLET ORAL
Status: ON HOLD | COMMUNITY
Start: 2017-09-11 | End: 2019-04-16 | Stop reason: SDUPTHER

## 2017-12-13 RX ORDER — BLOOD SUGAR DIAGNOSTIC
STRIP MISCELLANEOUS
COMMUNITY
Start: 2017-10-17

## 2017-12-13 RX ORDER — BLOOD GLUCOSE CONTROL HIGH,LOW
EACH MISCELLANEOUS
COMMUNITY
Start: 2017-10-17

## 2017-12-13 RX ORDER — ONDANSETRON HYDROCHLORIDE 8 MG/1
8 TABLET, FILM COATED ORAL EVERY 8 HOURS PRN
Status: ON HOLD | COMMUNITY
End: 2019-04-16 | Stop reason: SDUPTHER

## 2017-12-13 RX ORDER — DICLOFENAC POTASSIUM 50 MG/1
TABLET, FILM COATED ORAL
Status: ON HOLD | COMMUNITY
Start: 2017-09-11 | End: 2019-04-16 | Stop reason: SDUPTHER

## 2017-12-13 RX ORDER — GLUCAGON 1 MG
VIAL (EA) INJECTION
COMMUNITY
Start: 2017-11-14

## 2017-12-13 RX ORDER — DIPHENOXYLATE HYDROCHLORIDE AND ATROPINE SULFATE 2.5; .025 MG/1; MG/1
1 TABLET ORAL 4 TIMES DAILY PRN
COMMUNITY

## 2017-12-13 NOTE — LETTER
December 13, 2017      Martita Patel MD  [address]  [place] [address]           [facility]  [place] [address]  Phone: [phone]  Fax: [phone]          Patient: Kaylee Ngo   MR Number: [id number]   YOB: 1955   Date of Visit: 12/13/2017       Dear Dr. Martita Patel:    Thank you for referring Kaylee Ngo to me for evaluation. Attached you will find relevant portions of my assessment and plan of care.    If you have questions, please do not hesitate to call me. I look forward to following Kaylee Ngo along with you.    Sincerely,    Serjio Winkler MD    Enclosure  CC:  No Recipients    If you would like to receive this communication electronically, please contact [email or web address] or [phone] to request more information on [employer] access.    For providers and/or their staff who would like to refer a patient to [facility], please contact us through our one-stop-shop provider referral line, [facility] , at [phone].    If you feel you have received this communication in error or would no longer like to receive these types of communications, please e-mail [email or web address]

## 2017-12-13 NOTE — PATIENT INSTRUCTIONS
Labs every 3 months-orders given to patient    CT, MRI prior to returning to clinic-call [phone] to schedule    Echocardiogram yearly in July due July 2018    Return to clinic in 6 months-appointment scheduled    Referral sent to Dr Kermit Sandoval for evaluation for medullary carcinoma of the thyroid and elevated calcitonin level    Message sent to GI to contact you for the upper EUS    We will send your tissue to [facility] for diagnosis of primary tumor site    See Dr Levy today at 11:00

## 2017-12-13 NOTE — PROGRESS NOTES
"NOLANETS:  [place] [facility]  A collaboration between [facility] and [facility]    PATIENT: Kaylee Ngo  MRN: [id number]  DATE: 12/13/2017    Vitals:    12/13/17 0851   BP: (!) 157/73   Pulse: 68   Temp: 98.7 °F (37.1 °C)   TempSrc: Oral   Weight: 53.7 kg (118 lb 6.2 oz)   Height: 5' 5" (1.651 m)      Last 2 Weight Measurements:   Wt Readings from Last 2 Encounters:   12/13/17 53.7 kg (118 lb 6.2 oz)     BMI: Body mass index is 19.7 kg/m².    Karnofsky Score    Karnofsky Score:  60% - Requires Occasional Assistance but is Able to Care for Needs       Diagnosis:   1. Malignant carcinoid tumor of unknown primary site      Interval History: Ms. 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    Obsessive compulsive disorder     Sleep apnea        Past Surgical History:   Procedure Laterality Date    APPENDECTOMY      CHOLECYSTECTOMY      GASTRIC BYPASS      TOTAL KNEE ARTHROPLASTY Left        Review of patient's allergies indicates:   Allergen Reactions    Codeine     Contrast media      Oral and IV    Darvocet a500 [propoxyphene n-acetaminophen]     Epinephrine      Neuroendocrine Tumor patient      Erythromycin     Iodinated contrast- oral and iv dye     Pcn [penicillins]     Sulfa (sulfonamide antibiotics)        Current Outpatient Prescriptions   Medication Sig Dispense Refill    ACCU-CHEK SHU CONTROL SOLN Soln       ACCU-CHEK " "SHU PLUS METER [facility]       ACCU-CHEK SHU PLUS TEST STRP Strp       amitriptyline (ELAVIL) 50 MG tablet Take 50 mg by mouth every evening.      atorvastatin (LIPITOR) 40 MG tablet Take 40 mg by mouth once daily.      calcium carbonate (OS-BEST) 600 mg calcium (1,500 mg) Tab Take 600 mg by mouth once.      clonazePAM (KLONOPIN) 0.5 MG tablet Take 0.5 mg by mouth 2 (two) times daily as needed for Anxiety.      colesevelam (WELCHOL) 625 mg tablet Take 1,875 mg by mouth 2 (two) times daily with meals.      cyanocobalamin 1,000 mcg/mL injection 1,000 mcg every 28 days.      diclofenac (CATAFLAM) 50 MG tablet       diphenoxylate-atropine 2.5-0.025 mg (LOMOTIL) 2.5-0.025 mg per tablet Take 1 tablet by mouth 4 (four) times daily as needed for Diarrhea.      DULoxetine (CYMBALTA) 60 MG capsule Take 60 mg by mouth once daily.      everolimus (AFINITOR) 10 mg Tab Take 10 mg by mouth once daily.      GLUCAGON EMERGENCY KIT, HUMAN, 1 mg injection       hydrOXYzine pamoate (VISTARIL) 25 MG Cap Take 25 mg by mouth 3 (three) times daily.       insulin degludec (TRESIBA FLEXTOUCH U-100) 100 unit/mL (3 mL) InPn Inject 10 Units into the skin once daily.       lamoTRIgine (LAMICTAL) 150 MG Tab       lancets 30 gauge Misc       lancing device Misc       losartan (COZAAR) 50 MG tablet Take 50 mg by mouth once daily.      metoprolol succinate (TOPROL-XL) 100 MG 24 hr tablet Take 100 mg by mouth 2 (two) times daily.      multivitamin capsule Take 1 capsule by mouth once daily.      omeprazole (PRILOSEC) 40 MG capsule Take 40 mg by mouth 2 (two) times daily before meals.       ondansetron (ZOFRAN) 8 MG tablet Take 8 mg by mouth every 8 (eight) hours.      potassium chloride (KLOR-CON) 20 mEq Pack Take 20 mEq by mouth once.       scopolamine (TRANSDERM-SCOP) 1.3-1.5 mg (1 mg over 3 days)       SURE COMFORT PEN NEEDLE 32 gauge x 5/32" Ndle       tiZANidine (ZANAFLEX) 4 MG tablet       vitamin D 1000 units Tab Take " 1,000 Units by mouth once daily.       No current facility-administered medications for this visit.        Review of Systems     Number of Days per Week Number per Day   DIARRHEA 3 Over 5-6   BRISTOL STOOL SCALE RATING Type 7    FLUSHING 2-3 6----last for 30-60 minutes   WHEEZING 0      WEIGHT GAIN/LOSS 240  -----Now 117 today   ENERGY RATING (0-10) 8      Physical Exam deferred.     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 %      Laboratory Data:  Neuroendocrine Labs Latest Ref Rng & Units 12/11/2017 11/27/2017   EXT 5 HIAA BLOOD 0 - 22 ng/ml     EXT GASTRIN 0 - 115 pg/ml     EXT SEROTONIN 0 - 420 ng/ml     EXT CHROMOGRANIN A 0 - 5 nmol/l     EXT PANCREASTATIN RANJITH 10 - 135 pg/ml     EXT NEUROKININ A RANJITH 0 - 40.0 pg/ml     EXT FOLATE 7.00 - 20.00 ng/ml     EXT VITAMIN B12 213.00 - 816.00 pg/ml     EXT SOMATOSTATIN 0 - 30 pg/ml     EXT CALCITONIN 0.0 - 5.0 pg/ml  140.0 (A)   EXT GHRELIN pg/ml     EXT TSH 0.35 - 4.94 miu/ml     WBC 3.90 - 12.70 K/uL 6.58    EXT WBC 4.8 - 10.8 k/ul     HGB 12.0 - 16.0 g/dL 10.6 (L)    EXT HGB 12.0 - 18.0 g/dl     HCT 37.0 - 48.5 % 35.4 (L)    EXT HCT 37.0 - 52.0 %     PLATLETS 150 - 350 K/uL 370 (H)    EXT PLATLETS 150 - 500 k/ul     GLUCOSE 70 - 110 mg/dL 196 (H)    EXT GLUCOSE 70 - 105 mg/dl     BUN 8 - 23 mg/dL 7 (L)    EXT BUN 7 - 25 mg/dl     CREATININE 0.5 - 1.4 mg/dL 0.8    EXT CREATININE 0.60 - 1.20 mg/dl      - 145 mmol/L 140    EXT  - 145 mg/dl     K 3.5 - 5.1 mmol/L 4.1    EXT K 3.5 - 5.1 meq/l     CHLORIDE 95 - 110 mmol/L 103    EXT CHLORIDE 98 - 107 meq/l     CO2 23 - 29 mmol/L 31 (H)    EXT CO2 21.0 - 31.0 meq/l     CALCIUM 8.7 - 10.5 mg/dL 8.9    EXT CALCIUM 8.7 - 10.3 mg/dl     PROTEIN, TOTAL 6.0 - 8.4 g/dL 6.5    EXT PROTEIN, TOTAL 6.4 - 8.9 g/dl     ALBUMIN 3.5 - 5.2 g/dL 3.0 (L)    EXT ALBUMIN 3.5 - 5.7 g/dl     TOTAL BILIRUBIN  0.1 - 1.0 mg/dL 0.4    EXT TOTAL BILIRUBIN 0.3 - 1.0 mg/dl     ALK PHOSPHATASE 55 - 135 U/L 104    EXT ALK PHOSPHATASE 34 - 104 u/l     SGOT (AST) 10 - 40 U/L 12    EXT SGOT (AST) 13 - 39 u/l     SGPT (ALT) 10 - 44 U/L 11    EXT ALT 7 - 52 u/l     Weight        Neuroendocrine Labs Latest Ref Rng & Units 11/17/2017 11/15/2017   EXT 5 HIAA BLOOD 0 - 22 ng/ml  9.4   EXT GASTRIN 0 - 115 pg/ml  124 (A)   EXT SEROTONIN 0 - 420 ng/ml  9   EXT CHROMOGRANIN A 0 - 5 nmol/l  3   EXT PANCREASTATIN RANJITH 10 - 135 pg/ml  686 (A)   EXT NEUROKININ A RANJITH 0 - 40.0 pg/ml  <10   EXT FOLATE 7.00 - 20.00 ng/ml     EXT VITAMIN B12 213.00 - 816.00 pg/ml     EXT SOMATOSTATIN 0 - 30 pg/ml  >225   EXT CALCITONIN 0.0 - 5.0 pg/ml  563.0 (A)   EXT GHRELIN pg/ml 323    EXT TSH 0.35 - 4.94 miu/ml     WBC 3.90 - 12.70 K/uL     EXT WBC 4.8 - 10.8 k/ul     HGB 12.0 - 16.0 g/dL     EXT HGB 12.0 - 18.0 g/dl     HCT 37.0 - 48.5 %     EXT HCT 37.0 - 52.0 %     PLATLETS 150 - 350 K/uL     EXT PLATLETS 150 - 500 k/ul     GLUCOSE 70 - 110 mg/dL     EXT GLUCOSE 70 - 105 mg/dl     BUN 8 - 23 mg/dL     EXT BUN 7 - 25 mg/dl     CREATININE 0.5 - 1.4 mg/dL     EXT CREATININE 0.60 - 1.20 mg/dl      - 145 mmol/L     EXT  - 145 mg/dl     K 3.5 - 5.1 mmol/L     EXT K 3.5 - 5.1 meq/l     CHLORIDE 95 - 110 mmol/L     EXT CHLORIDE 98 - 107 meq/l     CO2 23 - 29 mmol/L     EXT CO2 21.0 - 31.0 meq/l     CALCIUM 8.7 - 10.5 mg/dL     EXT CALCIUM 8.7 - 10.3 mg/dl  8.7   PROTEIN, TOTAL 6.0 - 8.4 g/dL     EXT PROTEIN, TOTAL 6.4 - 8.9 g/dl     ALBUMIN 3.5 - 5.2 g/dL     EXT ALBUMIN 3.5 - 5.7 g/dl     TOTAL BILIRUBIN 0.1 - 1.0 mg/dL     EXT TOTAL BILIRUBIN 0.3 - 1.0 mg/dl     ALK PHOSPHATASE 55 - 135 U/L     EXT ALK PHOSPHATASE 34 - 104 u/l     SGOT (AST) 10 - 40 U/L     EXT SGOT (AST) 13 - 39 u/l     SGPT (ALT) 10 - 44 U/L     EXT ALT 7 - 52 u/l     Weight              Echo report from July 2017      Radiology Data:  Finding:Right lobe is normal in size measuring1.3 x  2.0 x 5.0 CM in AP by TV by CC dimensions respectively.     The left lobe is normal in size measuring1.5 x 1.9 x 5.3 CM in AP by TV by CC dimensions respectively.    The thyroid isthmus measures approximately 1-2 mm  in thickness.    There are numerous scattered variable sized heterogeneous nodules within the thyroid gland bilaterally large shallow right this complex solid and cystic components in the lower pole posteriorly measuring 1.0 x 0.9 x 1.4 CM which is overall indeterminate in clinical correlation and consideration for biopsy is advised.(tr4)      Additional lesions throughout the right lobe are primarily anechoic or subcentimeter in caliber and continued followup is advised specifically a solid and cystic lesion in the mid right thyroid measuring 8-9 mm    Multiple solid and cystic lesions at the left lobe of the thyroid largest in the mid left thyroid laterally which is solid with small anechoic foci measuring 0.9 x 0.6 x 5 .0 CM which is indeterminate (TR3)in followup is recommended   Impression      Numerous scattered nodules of the thyroid gland bilaterally    Largest is on the right with solid and cystic components measuring approximately 1.4 CM which is indeterminate and biopsy is advised    Additional lesions throughout the left lobe largest in the mid aspect also with solid and cystic components and serial followup recommended...         Findings:  Interpretation of this exam is limited by patient breathing motion artifacts.    Lower chest is unremarkable.    No suspicious liver lesion. In the right hepatic lobe there is a nonenhancing subcentimeter T2 hyperintense lesion, probably cyst or hemangioma.    The spleen, pancreas, and adrenals are unremarkable.    Bulky retroperitoneal adenopathy. For example left periaortic vito mass measures 3.1 x 2.0 cm. Aortocaval vito mass measures 2.7 x 1.5 cm.    Kidneys are unremarkable. Visualized bowel is unremarkable.    There are a couple of T2  hyperintense lesions in the lumbar spine, indeterminate on this limited exam.   Impression       1. 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    2. No suspicious liver lesion, but exam is limited.    3. A few indeterminate T2 hyperintense lumbar spine lesions, possibly benign hemangiomas but evaluation is limited. Recommend correlation with upcoming OctreoScan and CT scan for additional evaluation.    Limited exam by motion artifacts.     This exam has been activated in the EPIC notification system.         Impression:  1. 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       25 Minutes Face-to-Face; Counseling/Coordination of Care > 50 percent of Visit     Serjio Winkler MD, FACS  The [employer] Professor of Surgery, [facility]  [address]  [place]  [address]  Cell [phone]  ph. [phone]; [phone]  fax. [phone]  [email or web address]

## 2017-12-13 NOTE — LETTER
December 13, 2017        Martita Patel MD  [address]  [address]  [facility] [place] [address]       NOLANETS: [facility]  A collaboration between [facility] and [facility] [address]  [address]  [place] [address]  Phone: [phone]  Fax: [phone]        LUZ Prieto M.D., FACS  Al Yanes M.D.  Kermit Reddy M.D.   Noe Levy M.D., FACS  DO Serjio Nuñez M.D., FACS   Patient: Kaylee Ngo   MR Number: [id number]   YOB: 1955   Date of Visit: 12/13/2017     Dear Dr. Patel    Thank you for the kind referral of Kaylee Ngo. We saw this patient on 12/13/2017, and a copy of our clinic note is enclosed. We certainly appreciate the opportunity to participate in your patient's care.     If you have any questions or wish to discuss your patient further, please do not hesitate to contact us at [phone].       Kindest personal regards,          Serjio Winkler MD, FACS  The [employer] Professor of Surgery & Neurosciences  [facility], Dept. Of Surgery  [address], [address]  [place] [address] [phone]

## 2017-12-13 NOTE — PROGRESS NOTES
"NOLANETS:  [facility]  A collaboration between [facility] and [facility]      PATIENT: Kaylee Ngo  MRN: [id number]  DATE: 12/13/2017    Subjective:      Chief Complaint: Follow-up  referered by Dr. Winkler  S/p gastric bypass in 2009  3 yrs back, was found on floor with UTI and pneumonia. Was in ICU and hospital for a while. Developed nausea and vomiting. Weight loss from 240 lbs to 111 lbs during that hospitalization. Underwent cholecystectomy few months later. Nausea and vomiting did not improve.   Had hip surgery for fracture in jan 2017. During CT scan for fall underwent CT abd and detected lesion in retroperitoneum. Biopsy done showed NET    Vitals:   Vitals:    12/13/17 1055   BP: (!) 157/73   Pulse: 68   Temp: 98.7 °F (37.1 °C)   TempSrc: Oral   Weight: 53.7 kg (118 lb 6.2 oz)   Height: 5' 5" (1.651 m)        Karnofsky Score:     Diagnosis: No diagnosis found.     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    Allergies:  Review of patient's allergies indicates:   Allergen Reactions    Codeine     Contrast media      Oral and IV    Darvocet a500 [propoxyphene n-acetaminophen]     Epinephrine      Neuroendocrine Tumor patient   "    Erythromycin     Iodinated contrast- oral and iv dye     Pcn [penicillins]     Sulfa (sulfonamide antibiotics)        Medications:  Current Outpatient Prescriptions   Medication Sig Dispense Refill    ACCU-CHEK SHU CONTROL SOLN Soln       ACCU-CHEK SHU PLUS METER Misc       ACCU-CHEK SHU PLUS TEST STRP Strp       amitriptyline (ELAVIL) 50 MG tablet Take 50 mg by mouth every evening.      atorvastatin (LIPITOR) 40 MG tablet Take 40 mg by mouth once daily.      calcium carbonate (OS-BEST) 600 mg calcium (1,500 mg) Tab Take 600 mg by mouth once.      clonazePAM (KLONOPIN) 0.5 MG tablet Take 0.5 mg by mouth 2 (two) times daily as needed for Anxiety.      colesevelam (WELCHOL) 625 mg tablet Take 1,875 mg by mouth 2 (two) times daily with meals.      cyanocobalamin 1,000 mcg/mL injection 1,000 mcg every 28 days.      diclofenac (CATAFLAM) 50 MG tablet       diphenoxylate-atropine 2.5-0.025 mg (LOMOTIL) 2.5-0.025 mg per tablet Take 1 tablet by mouth 4 (four) times daily as needed for Diarrhea.      DULoxetine (CYMBALTA) 60 MG capsule Take 60 mg by mouth once daily.      everolimus (AFINITOR) 10 mg Tab Take 10 mg by mouth once daily.      GLUCAGON EMERGENCY KIT, HUMAN, 1 mg injection       hydrOXYzine pamoate (VISTARIL) 25 MG Cap Take 25 mg by mouth 3 (three) times daily.       insulin degludec (TRESIBA FLEXTOUCH U-100) 100 unit/mL (3 mL) InPn Inject 10 Units into the skin once daily.       lamoTRIgine (LAMICTAL) 150 MG Tab       lancets 30 gauge Misc       lancing device Misc       losartan (COZAAR) 50 MG tablet Take 50 mg by mouth once daily.      metoprolol succinate (TOPROL-XL) 100 MG 24 hr tablet Take 100 mg by mouth 2 (two) times daily.      multivitamin capsule Take 1 capsule by mouth once daily.      omeprazole (PRILOSEC) 40 MG capsule Take 40 mg by mouth 2 (two) times daily before meals.       ondansetron (ZOFRAN) 8 MG tablet Take 8 mg by mouth every 8 (eight) hours.       "potassium chloride (KLOR-CON) 20 mEq Pack Take 20 mEq by mouth once.       scopolamine (TRANSDERM-SCOP) 1.3-1.5 mg (1 mg over 3 days)       SURE COMFORT PEN NEEDLE 32 gauge x 5/32" Ndle       tiZANidine (ZANAFLEX) 4 MG tablet       vitamin D 1000 units Tab Take 1,000 Units by mouth once daily.       No current facility-administered medications for this visit.        Review of Systems   Objective:      Physical Exam   Assessment:       No diagnosis found.    Laboratory Data:      Impression:  Plan:            Number of Days per Week Number per Day   DIARRHEA 3 Over 5-6   BRISTOL STOOL SCALE RATING Type 7     FLUSHING 2-3 6----last for 30-60 minutes   WHEEZING 0        WEIGHT GAIN/LOSS 240  -----Now 117 today   ENERGY RATING (0-10) 8      Physical Exam deferred.      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 %        Laboratory Data:  Neuroendocrine Labs Latest Ref Rng & Units 12/11/2017 11/27/2017   EXT 5 HIAA BLOOD 0 - 22 ng/ml       EXT GASTRIN 0 - 115 pg/ml       EXT SEROTONIN 0 - 420 ng/ml       EXT CHROMOGRANIN A 0 - 5 nmol/l       EXT PANCREASTATIN RANJITH 10 - 135 pg/ml       EXT NEUROKININ A RANJITH 0 - 40.0 pg/ml       EXT FOLATE 7.00 - 20.00 ng/ml       EXT VITAMIN B12 213.00 - 816.00 pg/ml       EXT SOMATOSTATIN 0 - 30 pg/ml       EXT CALCITONIN 0.0 - 5.0 pg/ml   140.0 (A)   EXT GHRELIN pg/ml       EXT TSH 0.35 - 4.94 miu/ml       WBC 3.90 - 12.70 K/uL 6.58     EXT WBC 4.8 - 10.8 k/ul       HGB 12.0 - 16.0 g/dL 10.6 (L)     EXT HGB 12.0 - 18.0 g/dl       HCT 37.0 - 48.5 % 35.4 (L)     EXT HCT 37.0 - 52.0 %       PLATLETS 150 - 350 K/uL 370 (H)     EXT PLATLETS 150 - 500 k/ul       GLUCOSE 70 - 110 mg/dL 196 (H)     EXT GLUCOSE 70 - 105 mg/dl       BUN 8 - 23 mg/dL 7 (L)     EXT BUN 7 - 25 mg/dl       CREATININE 0.5 - 1.4 mg/dL 0.8     EXT CREATININE 0.60 - 1.20 mg/dl        - 145 " mmol/L 140     EXT  - 145 mg/dl       K 3.5 - 5.1 mmol/L 4.1     EXT K 3.5 - 5.1 meq/l       CHLORIDE 95 - 110 mmol/L 103     EXT CHLORIDE 98 - 107 meq/l       CO2 23 - 29 mmol/L 31 (H)     EXT CO2 21.0 - 31.0 meq/l       CALCIUM 8.7 - 10.5 mg/dL 8.9     EXT CALCIUM 8.7 - 10.3 mg/dl       PROTEIN, TOTAL 6.0 - 8.4 g/dL 6.5     EXT PROTEIN, TOTAL 6.4 - 8.9 g/dl       ALBUMIN 3.5 - 5.2 g/dL 3.0 (L)     EXT ALBUMIN 3.5 - 5.7 g/dl       TOTAL BILIRUBIN 0.1 - 1.0 mg/dL 0.4     EXT TOTAL BILIRUBIN 0.3 - 1.0 mg/dl       ALK PHOSPHATASE 55 - 135 U/L 104     EXT ALK PHOSPHATASE 34 - 104 u/l       SGOT (AST) 10 - 40 U/L 12     EXT SGOT (AST) 13 - 39 u/l       SGPT (ALT) 10 - 44 U/L 11     EXT ALT 7 - 52 u/l       Weight            Neuroendocrine Labs Latest Ref Rng & Units 11/17/2017 11/15/2017   EXT 5 HIAA BLOOD 0 - 22 ng/ml   9.4   EXT GASTRIN 0 - 115 pg/ml   124 (A)   EXT SEROTONIN 0 - 420 ng/ml   9   EXT CHROMOGRANIN A 0 - 5 nmol/l   3   EXT PANCREASTATIN RANJITH 10 - 135 pg/ml   686 (A)   EXT NEUROKININ A RANJITH 0 - 40.0 pg/ml   <10   EXT FOLATE 7.00 - 20.00 ng/ml       EXT VITAMIN B12 213.00 - 816.00 pg/ml       EXT SOMATOSTATIN 0 - 30 pg/ml   >225   EXT CALCITONIN 0.0 - 5.0 pg/ml   563.0 (A)   EXT GHRELIN pg/ml 323     EXT TSH 0.35 - 4.94 miu/ml       WBC 3.90 - 12.70 K/uL       EXT WBC 4.8 - 10.8 k/ul       HGB 12.0 - 16.0 g/dL       EXT HGB 12.0 - 18.0 g/dl       HCT 37.0 - 48.5 %       EXT HCT 37.0 - 52.0 %       PLATLETS 150 - 350 K/uL       EXT PLATLETS 150 - 500 k/ul       GLUCOSE 70 - 110 mg/dL       EXT GLUCOSE 70 - 105 mg/dl       BUN 8 - 23 mg/dL       EXT BUN 7 - 25 mg/dl       CREATININE 0.5 - 1.4 mg/dL       EXT CREATININE 0.60 - 1.20 mg/dl        - 145 mmol/L       EXT  - 145 mg/dl       K 3.5 - 5.1 mmol/L       EXT K 3.5 - 5.1 meq/l       CHLORIDE 95 - 110 mmol/L       EXT CHLORIDE 98 - 107 meq/l       CO2 23 - 29 mmol/L       EXT CO2 21.0 - 31.0 meq/l       CALCIUM 8.7 - 10.5 mg/dL       EXT  CALCIUM 8.7 - 10.3 mg/dl   8.7   PROTEIN, TOTAL 6.0 - 8.4 g/dL       EXT PROTEIN, TOTAL 6.4 - 8.9 g/dl       ALBUMIN 3.5 - 5.2 g/dL       EXT ALBUMIN 3.5 - 5.7 g/dl       TOTAL BILIRUBIN 0.1 - 1.0 mg/dL       EXT TOTAL BILIRUBIN 0.3 - 1.0 mg/dl       ALK PHOSPHATASE 55 - 135 U/L       EXT ALK PHOSPHATASE 34 - 104 u/l       SGOT (AST) 10 - 40 U/L       EXT SGOT (AST) 13 - 39 u/l       SGPT (ALT) 10 - 44 U/L       EXT ALT 7 - 52 u/l       Weight                     Echo report from July 2017       Radiology Data:  Finding:Right lobe is normal in size measuring1.3 x 2.0 x 5.0 CM in AP by TV by CC dimensions respectively.     The left lobe is normal in size measuring1.5 x 1.9 x 5.3 CM in AP by TV by CC dimensions respectively.    The thyroid isthmus measures approximately 1-2 mm  in thickness.    There are numerous scattered variable sized heterogeneous nodules within the thyroid gland bilaterally large shallow right this complex solid and cystic components in the lower pole posteriorly measuring 1.0 x 0.9 x 1.4 CM which is overall indeterminate in clinical correlation and consideration for biopsy is advised.(tr4)      Additional lesions throughout the right lobe are primarily anechoic or subcentimeter in caliber and continued followup is advised specifically a solid and cystic lesion in the mid right thyroid measuring 8-9 mm    Multiple solid and cystic lesions at the left lobe of the thyroid largest in the mid left thyroid laterally which is solid with small anechoic foci measuring 0.9 x 0.6 x 5 .0 CM which is indeterminate (TR3)in followup is recommended   Impression       Numerous scattered nodules of the thyroid gland bilaterally    Largest is on the right with solid and cystic components measuring approximately 1.4 CM which is indeterminate and biopsy is advised    Additional lesions throughout the left lobe largest in the mid aspect also with solid and cystic components and serial followup recommended...            Findings:  Interpretation of this exam is limited by patient breathing motion artifacts.    Lower chest is unremarkable.    No suspicious liver lesion. In the right hepatic lobe there is a nonenhancing subcentimeter T2 hyperintense lesion, probably cyst or hemangioma.    The spleen, pancreas, and adrenals are unremarkable.    Bulky retroperitoneal adenopathy. For example left periaortic vito mass measures 3.1 x 2.0 cm. Aortocaval vito mass measures 2.7 x 1.5 cm.    Kidneys are unremarkable. Visualized bowel is unremarkable.    There are a couple of T2 hyperintense lesions in the lumbar spine, indeterminate on this limited exam.   Impression        1. 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    2. No suspicious liver lesion, but exam is limited.    3. A few indeterminate T2 hyperintense lumbar spine lesions, possibly benign hemangiomas but evaluation is limited. Recommend correlation with upcoming OctreoScan and CT scan for additional evaluation.    Limited exam by motion artifacts.     This exam has been activated in the EPIC notification system.            Impression:    1.  Significant retroperitoneal adenopathy from neuroendocrine tumor.  Biopsy of mesenteric node is positive for neuroendocrine low-grade.  Primary is unknown. Biotheranostics pending.  Her serum biomarkers are not significantly elevated.  2.  Elevated calcium and calcitonin.  She could have a parathyroid hyperplasia from post gastric bypass status.  Her ultrasound of thyroid is consistent with multinodular goiter with some cystic structures.  Doubt thyroid to be etiology for elevated calcitonin.  She had a long duration of diarrhea nausea vomiting with neuroendocrine tumor which could be a reason for elevated calcitonin.  3.  She is post gastric bypass status.  She is not been followed up by the bariatric surgery center.  She could have significant nutritional deficiencies.  She could have vitamin deficiencies  causing her dysarthria motor incoordination and unstable gait.  We need to measure her vitamins and she needs nutritional supplementation even though she take some oral vitamin D and multivitamins.  4. Her echocardiogram looks good.  However has a concentric hypertrophy rule out cardiomyopathy.    5. I saw her MRI it has got a lot of motion artifacts.  Would require a good CT scan with contrast her previous CAT scans were not loaded   up.  Need to evaluate retroperitoneum see whether there is any celiac or SMA stenosis causing her nausea vomiting diarrhea consistent with abdominal angina  6.  She cannot have endoscopic ultrasound of pancreas as she is post-gastric bypass  7.  She has no anatomical J obstruction.  However as dysmotility which could be from retroperitoneal adenopathy causing pseudoobstruction back symptoms are mild absorption symptoms.  Needed good GI evaluation and absorption studies  8.  She is needs to keep of the oncology appointment next week with her oncologist and her monthly lanreotide injection  9.  Reviewed her labs her albumin is 3.0.  Prealbumin level is not known.  Needs to do an upper nutritional status before thinking of any procedure.  10.  We'll discuss of the tumor board and get back to her                  CAROL Levy MD, FACS   Associate Professor of Surgery, [facility]   Neuroendocrine Surgery, Hepatic/Pancreatic & General Surgery   [address]   [place] [address]   ph. [phone]; [phone]   fax. [phone]

## 2017-12-14 ENCOUNTER — TELEPHONE (OUTPATIENT)
Dept: NEUROLOGY | Facility: HOSPITAL | Age: 62
End: 2017-12-14

## 2017-12-14 NOTE — TELEPHONE ENCOUNTER
Called pts sister per request of Dr. Levy. Per Dr. Christine, pt not able to get EUS d/t history of gastric bypass. Would like for pt to overnight CT scan to our office to review in tumor board on Tuesday. Verbalizes understanding. F/u appt scheduled.

## 2017-12-19 ENCOUNTER — CONFERENCE (OUTPATIENT)
Dept: NEUROLOGY | Facility: HOSPITAL | Age: 62
End: 2017-12-19

## 2017-12-19 NOTE — TELEPHONE ENCOUNTER
[facility]      NEUROENDOCRINE TUMOR MULTIDISCIPLINARY TUMOR BOARD  _____________________________________________________________________    PRESENTER:   RUDY Ashton MD    REASON FOR PRESENTATION:  Treatment Plan and Scan Review, type of testing due to gastric bypass    ATTENDEES:   Surgery:              MD LUZ Haas MD T. Ramcharan, MD  Interventional Radiology - Jagdish Luque MD  Pathology - Nia Garcia MD  Oncology - Jeff Nicole DO, Jose Diggs MD  Gastroenterology - Not present   Nursing  Research    PATIENT STATUS:  Established Patient    TUMOR SITE (Primary & Mets):  See below    PATIENT SUMMARY:  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 Malignant carcinoid tumor of unknown primary site       Interval History: Ms. Ngo is here to evaluate a  NET --had biopsy in January 2017--retroperitoneal node with unknown primary- bio-Theranostics ordered but not done           BOARD RECOMMENDATIONS:     CT Scan not available. Only MRI with motion and negative O scan. She has bulky retroperitoneal lymph node enlargement.

## 2018-01-08 ENCOUNTER — HOSPITAL ENCOUNTER (OUTPATIENT)
Dept: RADIOLOGY | Facility: HOSPITAL | Age: 63
Discharge: HOME | End: 2018-01-08
Attending: SURGERY
Payer: MEDICARE

## 2018-01-08 ENCOUNTER — OFFICE VISIT (OUTPATIENT)
Dept: NEUROLOGY | Facility: HOSPITAL | Age: 63
End: 2018-01-08
Attending: INTERNAL MEDICINE
Payer: MEDICARE

## 2018-01-08 ENCOUNTER — OFFICE VISIT (OUTPATIENT)
Dept: NEUROLOGY | Facility: HOSPITAL | Age: 63
End: 2018-01-08
Attending: SURGERY
Payer: MEDICARE

## 2018-01-08 VITALS
BODY MASS INDEX: 18.55 KG/M2 | WEIGHT: 118.19 LBS | SYSTOLIC BLOOD PRESSURE: 136 MMHG | HEART RATE: 63 BPM | TEMPERATURE: 98 F | RESPIRATION RATE: 16 BRPM | HEIGHT: 67 IN | DIASTOLIC BLOOD PRESSURE: 75 MMHG

## 2018-01-08 VITALS
WEIGHT: 117.06 LBS | SYSTOLIC BLOOD PRESSURE: 152 MMHG | TEMPERATURE: 100 F | DIASTOLIC BLOOD PRESSURE: 82 MMHG | HEART RATE: 70 BPM | BODY MASS INDEX: 19.99 KG/M2 | HEIGHT: 64 IN

## 2018-01-08 DIAGNOSIS — C7A.00 CARCINOID TUMOR, MALIGNANT: Primary | ICD-10-CM

## 2018-01-08 DIAGNOSIS — R11.14 BILIOUS VOMITING WITH NAUSEA: Primary | ICD-10-CM

## 2018-01-08 DIAGNOSIS — C7A.00 CARCINOID TUMOR, MALIGNANT: ICD-10-CM

## 2018-01-08 DIAGNOSIS — C78.7 SECONDARY MALIGNANT NEOPLASM OF LIVER: ICD-10-CM

## 2018-01-08 PROCEDURE — 99215 OFFICE O/P EST HI 40 MIN: CPT | Mod: 25,27 | Performed by: INTERNAL MEDICINE

## 2018-01-08 PROCEDURE — 71250 CT THORAX DX C-: CPT | Mod: 26,,, | Performed by: RADIOLOGY

## 2018-01-08 PROCEDURE — 74176 CT ABD & PELVIS W/O CONTRAST: CPT | Mod: 26,,, | Performed by: RADIOLOGY

## 2018-01-08 PROCEDURE — 71250 CT THORAX DX C-: CPT | Mod: TC

## 2018-01-08 PROCEDURE — 74176 CT ABD & PELVIS W/O CONTRAST: CPT | Mod: TC

## 2018-01-08 PROCEDURE — 99215 OFFICE O/P EST HI 40 MIN: CPT | Performed by: SURGERY

## 2018-01-08 RX ORDER — METOCLOPRAMIDE HYDROCHLORIDE 5 MG/5ML
10 SOLUTION ORAL 3 TIMES DAILY
Qty: 420 ML | Refills: 0 | Status: SHIPPED | OUTPATIENT
Start: 2018-01-08 | End: 2018-01-22 | Stop reason: SDUPTHER

## 2018-01-08 RX ORDER — DOCUSATE SODIUM 100 MG/1
100 CAPSULE, LIQUID FILLED ORAL
Status: ON HOLD | COMMUNITY
End: 2018-03-27 | Stop reason: HOSPADM

## 2018-01-08 NOTE — PATIENT INSTRUCTIONS
You are scheduled for an EGD on ___Monday, January 29, 2018______________________________    You should eat light meals the day before the procedure and nothing to eat or drink after midnight the night before your procedure.    You will need to be at the 1st floor admission desk at the hospital on _endoscopy staff will contact with arrival time._______________________

## 2018-01-08 NOTE — PROGRESS NOTES
[facility]    CC: nausea    HPI 62 y.o. 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 She states the nausea and vomiting episodes come in waves, occasionally are associated with episodes of non-bloody watery diarrhea. Her symptoms have improved on Zofran and scopolamine patch.  Occasionally she feels nausea with meals. She had gastric bypass in 2009.     She reports a history of endoscopy in the past with EGD/Colonoscopy/VCE in April 2017 in [place], [place]. She does not report significant findings of VCE. She reports a history of BC powders. She takes ibuprofen regularly. Denies marijuana use. Denies dark tarry stools. She does endorse extremely poor appetite    PMH  Bipolar Disease  Depression  DMII  Gastric ulcer  HLD  Neuroendocrine Tumor  GERD  OSIRIS    PSH  Appendectomy   Cholecystectomy  Gastric Bypass  Knee Replacement  Hip arthroplasty    Social History  No smoking, no alcohol use, no illicit drug use    FH  Pancreatic cancer in mother at age 74  Colon cancer in Father age 64    Review of Systems  General ROS: negative for chills, fever or weight loss  Psychological ROS: negative for hallucination, depression or suicidal ideation  Ophthalmic ROS: negative for blurry vision, photophobia or eye pain  ENT ROS: negative for epistaxis, sore throat or rhinorrhea  Respiratory ROS: no cough, shortness of breath, or wheezing  Cardiovascular ROS: no chest pain or dyspnea on exertion  Gastrointestinal ROS: +nausea, vomiting, no abdominal pain, change in bowel habits, or black/ bloody stools  Genito-Urinary ROS: no dysuria, trouble voiding, or hematuria  Musculoskeletal ROS: negative for gait disturbance or muscular weakness  Neurological ROS: no syncope or seizures; no ataxia  Dermatological ROS:  "negative for pruritis, rash and jaundice    Physical Examination  /75 (BP Location: Left arm)   Pulse 63   Temp 98.3 °F (36.8 °C) (Oral)   Resp 16   Ht 5' 6.5" (1.689 m)   Wt 53.6 kg (118 lb 2.7 oz)   BMI 18.79 kg/m²   General appearance: alert, cooperative, no distress, thin, cachectic female, cooperative  HENT: bruising over left face, normocephalic, neck symmetrical, no nasal discharge   Eyes: conjunctivae/corneas clear, PERRL, EOM's intact  Lungs: clear to auscultation bilaterally, no dullness to percussion bilaterally  Heart: regular rate and rhythm without rub; no displacement of the PMI   Abdomen: soft, non-tender; bowel sounds normoactive; no organomegaly  Extremities: extremities symmetric; no clubbing, cyanosis, or edema  Integument: Skin color, texture, turgor normal; no rashes; hair distrubution normal  Neurologic: Alert and oriented X 3, normal strength, normal coordination and gait  Psychiatric: no pressured speech; normal affect; no evidence of impaired cognition     Labs:  H/H 10.6/35.4  Plt 370    Imaging:  MRI Abd:   1. 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  2. No suspicious liver lesion, but exam is limited.  3. A few indeterminate T2 hyperintense lumbar spine lesions, possibly benign hemangiomas but evaluation is limited. Recommend correlation with upcoming OctreoScan and CT scan for additional evaluation.    Additional history obtained from sister     Assessment:   62 y.o. 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 Patient has a history of chronic NSAID use and may have a component of anastomotic ulcer causing stenosis. She may also have decreased motility from bulky retroperitoneal LAD from NET (most likely). SBBO could also be a contributing cause and she may benefit from antibiotics in the future. She had " a gastric bypass surgery in 2009 but ate very well until ~18 months ago (likely due to progressive NET involvement of the mesentery.     Plan:     Nausea/Vomiting  -Trial of prokinetics with Reglan liquid 10 mg PO TID x 2 weeks (she is not a candidate for erythromycin due to allergy)  -Will plan EGD for evaluation of stenosis of anastomosis, and if present will plan for dilation at that time (low threshold for dilation). Will consider for PEG placement in the gastric pouch if a window for placement can be found and if there is no anastomotic stricture. Consent for PEG at endoscopy in addition to EGD and give Ancef   -Will ask patient if she has relief from Reglan on day of endoscopy  -Encouraged cessation of NSAIDs due to her history of gastric ulcers  -Continue scopolamine patch and Zofran as needed for nausea   - encourage oral intake of nutritional supplements   - consider a trial of megace in the future     Diarrhea - relatively mild and episodic  -Most likely related to underlying NET, but if persists will plan trial of Creon at next visit    This patient may be considered for debulking surgery in the future for NET with mesenteric involvement (but unknown primary)   A trial of xifaxan may be next 550mg tid x 21 days       Kermit Reddy MD   [address], [address]   [place] [address] [phone]

## 2018-01-08 NOTE — PROGRESS NOTES
"NOLANETS:  [facility]  A collaboration between [facility] and [facility]      PATIENT: Kaylee Ngo  MRN: [id number]  DATE: 1/8/2018    Subjective:      Chief Complaint: Follow-up (follow up from last visit)    Feeling same. Same extent of nausea, vomiting and diarrhea  Ankle swelling worse    Vitals:   Vitals:    01/08/18 1113   BP: (!) 152/82   Pulse: 70   Temp: 99.9 °F (37.7 °C)   TempSrc: Oral   Weight: 53.1 kg (117 lb 1 oz)   Height: 5' 4" (1.626 m)        Karnofsky Score:     Diagnosis: No diagnosis found.     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    Allergies:  Review of patient's allergies indicates:   Allergen Reactions    Codeine     Contrast media      Oral and IV    Darvocet a500 [propoxyphene n-acetaminophen]     Epinephrine      Neuroendocrine Tumor patient      Erythromycin     Iodinated contrast- oral and iv dye     Pcn [penicillins]     Sulfa (sulfonamide antibiotics)        Medications:  Current Outpatient Prescriptions   Medication Sig Dispense Refill    ACCU-CHEK SHU CONTROL SOLN Soln       ACCU-CHEK SHU PLUS METER Misc       ACCU-CHEK SHU PLUS TEST STRP Strp       amitriptyline (ELAVIL) 50 MG tablet " "Take 50 mg by mouth every evening.      atorvastatin (LIPITOR) 40 MG tablet Take 40 mg by mouth once daily.      BRAN/GUM/FIB/LATISHA/PSYL/KELP/PEC (FIBER 6 ORAL) Take by mouth once daily.      calcium carbonate (OS-BEST) 600 mg calcium (1,500 mg) Tab Take 600 mg by mouth once.      clonazePAM (KLONOPIN) 0.5 MG tablet Take 0.5 mg by mouth 2 (two) times daily as needed for Anxiety.      colesevelam (WELCHOL) 625 mg tablet Take 1,875 mg by mouth 2 (two) times daily with meals.      cyanocobalamin 1,000 mcg/mL injection 1,000 mcg every 28 days.      diclofenac (CATAFLAM) 50 MG tablet as needed.       diphenoxylate-atropine 2.5-0.025 mg (LOMOTIL) 2.5-0.025 mg per tablet Take 1 tablet by mouth 4 (four) times daily as needed for Diarrhea.      docusate sodium (COLACE) 100 MG capsule Take 100 mg by mouth as needed for Constipation.      DULoxetine (CYMBALTA) 60 MG capsule Take 60 mg by mouth once daily.      everolimus (AFINITOR) 10 mg Tab Take 10 mg by mouth once daily.      GLUCAGON EMERGENCY KIT, HUMAN, 1 mg injection       hydrOXYzine pamoate (VISTARIL) 25 MG Cap Take 25 mg by mouth 3 (three) times daily.       insulin degludec (TRESIBA FLEXTOUCH U-100) 100 unit/mL (3 mL) InPn Inject 10 Units into the skin once daily.       lamoTRIgine (LAMICTAL) 150 MG Tab       lancets 30 gauge Misc       lancing device Misc       losartan (COZAAR) 50 MG tablet Take 50 mg by mouth once daily.      metoprolol succinate (TOPROL-XL) 100 MG 24 hr tablet Take 100 mg by mouth 2 (two) times daily.      multivitamin capsule Take 1 capsule by mouth once daily.      omeprazole (PRILOSEC) 40 MG capsule Take 40 mg by mouth 2 (two) times daily before meals.       ondansetron (ZOFRAN) 8 MG tablet Take 8 mg by mouth every 8 (eight) hours.      potassium chloride (KLOR-CON) 20 mEq Pack Take 20 mEq by mouth once.       scopolamine (TRANSDERM-SCOP) 1.3-1.5 mg (1 mg over 3 days)       SURE COMFORT PEN NEEDLE 32 gauge x 5/32" Ndle    "    tiZANidine (ZANAFLEX) 4 MG tablet as needed.       vitamin D 1000 units Tab Take 1,000 Units by mouth once daily.       No current facility-administered medications for this visit.        Review of Systems   Constitutional: Positive for activity change, appetite change, fatigue and unexpected weight change. Negative for chills, diaphoresis and fever.   HENT: Positive for trouble swallowing. Negative for congestion, ear discharge, ear pain, facial swelling, hearing loss, mouth sores, nosebleeds, rhinorrhea, sinus pain, sinus pressure, sneezing and sore throat.    Eyes: Negative.    Respiratory: Negative.    Cardiovascular: Positive for leg swelling. Negative for chest pain and palpitations.   Gastrointestinal: Positive for diarrhea, nausea and vomiting. Negative for abdominal distention, abdominal pain, anal bleeding, blood in stool, constipation and rectal pain.   Endocrine: Negative.    Genitourinary: Negative.    Musculoskeletal: Positive for arthralgias, back pain and gait problem. Negative for myalgias, neck pain and neck stiffness.   Skin: Negative.    Allergic/Immunologic: Negative.    Neurological: Positive for dizziness, weakness and light-headedness. Negative for tremors, seizures, syncope, facial asymmetry, numbness and headaches.   Hematological: Negative.    Psychiatric/Behavioral: Negative.       Objective:      Physical Exam   Constitutional: No distress.   HENT:   Right Ear: External ear normal.   Left Ear: External ear normal.   Nose: Nose normal.   Mouth/Throat: Oropharynx is clear and moist.   Eyes: Conjunctivae are normal. Pupils are equal, round, and reactive to light. Left eye exhibits no discharge. No scleral icterus.   Neck: No JVD present. No thyromegaly present.   Cardiovascular: Normal rate and regular rhythm.    Pulmonary/Chest: Effort normal and breath sounds normal.   Abdominal: Soft. Bowel sounds are normal.   Musculoskeletal: Normal range of motion.   Bilateral moderate pedal  edema   Lymphadenopathy:     She has no cervical adenopathy.   Neurological: She is alert.   Skin: Skin is warm. She is not diaphoretic.      Assessment:       No diagnosis found.    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  With the symptoms of nausea vomiting diarrhea status post gastric bypass with weight loss.  Progression of pedal Edema  On Afinitor and monthly lanreotide    Biothernostics results are pending.  Her overall status is still the same.   Previously I discussed with Dr. Reddy about needing workup for her persistent nausea vomiting.  It could be a year from post gastric bypass scenario are from for malabsorption from the retroperitoneal neuroendocrine tumor causing a pseudoobstruction.    Regarding the bulky disease she's been on Afinitor and the size of the lesion apparently is increasing.  She does not appear to be a candidate first for debulking surgery at this point.  We will obtain a.m. ABG to see whether she would be a MIBG candidate if not she may require PRRT.  We will discuss of the tumor board once more reports are available  Plan:         1. GI evaluation whether nausea, vomiting, diarrhea is from post gastric bypass or from motility issue from mesenteric lymphadnopathy  2. Ct scan without contrast to assess retopertonral lymphadneopathy whether it is compresing IVC  3. Pending biotheranostics  4. MIBGscan    Spent a lot of time talking to her addressing all her issues and discussing the plan                CAROL Levy MD, FACS   Associate Professor of Surgery, [facility]   Neuroendocrine Surgery, Hepatic/Pancreatic & General Surgery   [address], [address]   [place] [address]   ph. [phone]; [phone]   fax. [phone]

## 2018-01-10 ENCOUNTER — TELEPHONE (OUTPATIENT)
Dept: NEUROLOGY | Facility: HOSPITAL | Age: 63
End: 2018-01-10

## 2018-01-10 NOTE — TELEPHONE ENCOUNTER
Emily, sister, notified original transmission failed on 1/8/19.  Reglan called in to [facility].  Emily acknowleged understanding.

## 2018-01-10 NOTE — TELEPHONE ENCOUNTER
----- Message from Ale Sherwood sent at 1/10/2018 12:46 PM CST -----  Contact: Patient  GI- Patient did not get prescription for Reglan called into [facility]. Please call medication into [phone]. Patient can be reached at [phone].

## 2018-01-10 NOTE — TELEPHONE ENCOUNTER
----- Message from Emily Scott sent at 1/10/2018  1:41 PM CST -----  Contact: sister  GI- The sister called and stated that [facility] did not get prescription. They would like to have it escribed again or call in again. Call back [phone] Emily

## 2018-01-10 NOTE — TELEPHONE ENCOUNTER
Pt notified Reglan escribed to [facility] on 1/8/18.  Pt instructed to contacted pharmacy and call back if there is a problem with the script.  Pt acknowledged understanding.

## 2018-01-22 ENCOUNTER — CONFERENCE (OUTPATIENT)
Dept: NEUROLOGY | Facility: HOSPITAL | Age: 63
End: 2018-01-22

## 2018-01-22 RX ORDER — METOCLOPRAMIDE HYDROCHLORIDE 5 MG/5ML
SOLUTION ORAL
Qty: 420 ML | Refills: 0 | Status: SHIPPED | OUTPATIENT
Start: 2018-01-22 | End: 2018-02-02 | Stop reason: SDUPTHER

## 2018-01-22 NOTE — TELEPHONE ENCOUNTER
[facility]      NEUROENDOCRINE TUMOR MULTIDISCIPLINARY TUMOR BOARD  _____________________________________________________________________    PRESENTER:   RUDY Ashton MD    REASON FOR PRESENTATION:  Treatment Plan, Scan Review and Pathology Review, unknown primary    ATTENDEES:   Surgery:              MD LUZ Haas, MD CAROL Levy MD  Interventional Radiology - Jagdish Luque MD  Pathology - Nia Garcia MD  Oncology - Jeff Nicole DO, Jose Diggs MD  Gastroenterology - Not present   Nursing  Research    PATIENT STATUS:  Established Patient    TUMOR SITE (Primary & Mets):  See below    PATIENT SUMMARY:  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 Malignant carcinoid tumor of unknown primary site       Interval History: Ms. 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 Seems that this was previously biopsied and sent for biotheranostics. For her ankle swelling, if unilateral, likely not related to IVC. I can see in clinic and discuss. Usually when evaluating for vessel compression we want studies with contrast (without contrast is not helpful at all). For the abdomen I'd rather get a triple phase abdomen and pelvis. She also needs eval of the legs with US to look for dvt and venous insufficiency and lymphosintigraphy to make sure she doesn't have lymphedema. Let me know if you want me to see her in clinic.  Persistant nausea, vomiting and diarrhea.       BOARD RECOMMENDATIONS:   Having a EGD on January 29, 2018  Not candidate for EUS due to gastric bypass  Send to ENT     Not a surgical candidate at this time  Waiting on biotheranostics at this time  Gastric emptying study  Follow up appointment with GI  Dr Levy to call patient to discuss plan

## 2018-01-23 ENCOUNTER — TELEPHONE (OUTPATIENT)
Dept: NEUROLOGY | Facility: HOSPITAL | Age: 63
End: 2018-01-23

## 2018-01-23 NOTE — TELEPHONE ENCOUNTER
----- Message from Ale Sherwood sent at 1/23/2018  2:27 PM CST -----  Contact: Win, with [facility]  ANDREW Walden, with [facility] would like the patient pathology report faxed to Dr Titus at [phone]. Win can be reached at [phone] ext [phone] if needed.

## 2018-01-23 NOTE — TELEPHONE ENCOUNTER
----- Message from Emily Scott sent at 1/23/2018  3:55 PM CST -----  Contact: [facility] Pathology Dept  EAW- The pathology dept would like a copy of the pathology report to be faxed to [phone], They stated that it was their slides that was reread.

## 2018-01-25 ENCOUNTER — TELEPHONE (OUTPATIENT)
Dept: NEUROLOGY | Facility: HOSPITAL | Age: 63
End: 2018-01-25

## 2018-01-25 DIAGNOSIS — R97.8 OTHER ABNORMAL TUMOR MARKERS: ICD-10-CM

## 2018-01-25 DIAGNOSIS — C80.1 GERM CELL TUMOR: Primary | ICD-10-CM

## 2018-01-25 NOTE — TELEPHONE ENCOUNTER
----- Message from Nanci Tejeda sent at 1/24/2018  2:37 PM CST -----  She is coming on 1-29-18 @ 9:00. She is aware and confirm the date and time    ----- Message -----  From: Nita Jolley LPN  Sent: 1/24/2018   1:53 PM  To: Nanci Tejeda    She is on the endo snap board for 10:30    ----- Message -----  From: Nanci Tejeda  Sent: 1/24/2018  12:34 PM  To: Nita Jolley LPN    What time is her procedure?    ----- Message -----  From: Nita Jolley LPN  Sent: 1/24/2018  11:11 AM  To: Tremaine Christine,   Dr. Christine spoke with Dr. Luque about this pt. She is having an endoscopy on Monday 1/29, and they would like to do a thyroid biopsy the same day. Can you please contact pts sister to arrange? Apparently due to transportation that is the only day they are able to do.     Thanks!  Whit

## 2018-01-25 NOTE — TELEPHONE ENCOUNTER
Called pts sister to inform that Dr. Christine would like pt to have labs as well the day of thyroid bx/endoscopy. Lab appt made. No answer. LVM with this information and to return call with any questions.

## 2018-01-25 NOTE — TELEPHONE ENCOUNTER
----- Message from Cam Ashton MD sent at 1/24/2018 10:39 AM CST -----  I talked to her sister.  She will do blood work on Monday here when she is here for endoscopy.  She will get thyroid biospy on Tuesday by dr. Luque

## 2018-01-29 ENCOUNTER — SURGERY (OUTPATIENT)
Age: 63
End: 2018-01-29

## 2018-01-29 ENCOUNTER — ANESTHESIA EVENT (OUTPATIENT)
Dept: SURGERY | Facility: HOSPITAL | Age: 63
End: 2018-01-29

## 2018-01-29 ENCOUNTER — ANESTHESIA EVENT (OUTPATIENT)
Dept: ENDOSCOPY | Facility: HOSPITAL | Age: 63
End: 2018-01-29

## 2018-01-29 ENCOUNTER — ANESTHESIA EVENT (OUTPATIENT)
Dept: ENDOSCOPY | Facility: HOSPITAL | Age: 63
End: 2018-01-29
Payer: MEDICARE

## 2018-01-29 ENCOUNTER — ANESTHESIA (OUTPATIENT)
Dept: ENDOSCOPY | Facility: HOSPITAL | Age: 63
End: 2018-01-29
Payer: MEDICARE

## 2018-01-29 ENCOUNTER — HOSPITAL ENCOUNTER (OUTPATIENT)
Facility: HOSPITAL | Age: 63
Discharge: HOME | End: 2018-01-29
Attending: SURGERY | Admitting: INTERNAL MEDICINE
Payer: MEDICARE

## 2018-01-29 VITALS
SYSTOLIC BLOOD PRESSURE: 190 MMHG | HEART RATE: 75 BPM | OXYGEN SATURATION: 98 % | DIASTOLIC BLOOD PRESSURE: 78 MMHG | RESPIRATION RATE: 17 BRPM | TEMPERATURE: 98 F

## 2018-01-29 DIAGNOSIS — C7A.00 MALIGNANT CARCINOID TUMOR OF UNKNOWN PRIMARY SITE: ICD-10-CM

## 2018-01-29 DIAGNOSIS — E04.1 THYROID NODULE: ICD-10-CM

## 2018-01-29 DIAGNOSIS — R11.14 BILIOUS VOMITING WITH NAUSEA: ICD-10-CM

## 2018-01-29 DIAGNOSIS — R11.2 NAUSEA AND VOMITING, INTRACTABILITY OF VOMITING NOT SPECIFIED, UNSPECIFIED VOMITING TYPE: Primary | ICD-10-CM

## 2018-01-29 PROBLEM — K31.1 ACQUIRED GASTRIC OUTLET STENOSIS: Status: ACTIVE | Noted: 2018-01-29

## 2018-01-29 PROCEDURE — 88172 CYTP DX EVAL FNA 1ST EA SITE: CPT | Performed by: PATHOLOGY

## 2018-01-29 PROCEDURE — 63600175 PHARM REV CODE 636 W HCPCS: Performed by: NURSE ANESTHETIST, CERTIFIED REGISTERED

## 2018-01-29 PROCEDURE — 25000003 PHARM REV CODE 250: Performed by: RADIOLOGY

## 2018-01-29 PROCEDURE — 88173 CYTOPATH EVAL FNA REPORT: CPT | Mod: 26,,, | Performed by: PATHOLOGY

## 2018-01-29 PROCEDURE — 25000003 PHARM REV CODE 250: Performed by: INTERNAL MEDICINE

## 2018-01-29 PROCEDURE — 88172 CYTP DX EVAL FNA 1ST EA SITE: CPT | Mod: 26,,, | Performed by: PATHOLOGY

## 2018-01-29 RX ORDER — PROPOFOL 10 MG/ML
VIAL (ML) INTRAVENOUS
Status: DISCONTINUED | OUTPATIENT
Start: 2018-01-29 | End: 2018-01-29

## 2018-01-29 RX ORDER — PROPOFOL 10 MG/ML
VIAL (ML) INTRAVENOUS CONTINUOUS PRN
Status: DISCONTINUED | OUTPATIENT
Start: 2018-01-29 | End: 2018-01-29

## 2018-01-29 RX ORDER — FENTANYL CITRATE 50 UG/ML
INJECTION, SOLUTION INTRAMUSCULAR; INTRAVENOUS
Status: DISCONTINUED | OUTPATIENT
Start: 2018-01-29 | End: 2018-01-29

## 2018-01-29 RX ORDER — METOPROLOL SUCCINATE 50 MG/1
100 TABLET, EXTENDED RELEASE ORAL ONCE
Status: COMPLETED | OUTPATIENT
Start: 2018-01-29 | End: 2018-01-29

## 2018-01-29 RX ORDER — LIDOCAINE HCL/PF 100 MG/5ML
SYRINGE (ML) INTRAVENOUS
Status: DISCONTINUED | OUTPATIENT
Start: 2018-01-29 | End: 2018-01-29

## 2018-01-29 RX ORDER — LOSARTAN POTASSIUM 50 MG/1
50 TABLET ORAL ONCE
Status: COMPLETED | OUTPATIENT
Start: 2018-01-29 | End: 2018-01-29

## 2018-01-29 RX ADMIN — FENTANYL CITRATE 50 MCG: 50 INJECTION, SOLUTION INTRAMUSCULAR; INTRAVENOUS at 01:01

## 2018-01-29 RX ADMIN — LIDOCAINE HYDROCHLORIDE 100 MG: 20 INJECTION, SOLUTION INTRAVENOUS at 01:01

## 2018-01-29 RX ADMIN — METOPROLOL SUCCINATE 100 MG: 50 TABLET, EXTENDED RELEASE ORAL at 09:01

## 2018-01-29 RX ADMIN — PROPOFOL 100 MG: 10 INJECTION, EMULSION INTRAVENOUS at 01:01

## 2018-01-29 RX ADMIN — PROPOFOL 150 MCG/KG/MIN: 10 INJECTION, EMULSION INTRAVENOUS at 01:01

## 2018-01-29 RX ADMIN — LOSARTAN POTASSIUM 50 MG: 50 TABLET, FILM COATED ORAL at 09:01

## 2018-01-29 RX ADMIN — FENTANYL CITRATE 50 MCG: 50 INJECTION, SOLUTION INTRAMUSCULAR; INTRAVENOUS at 02:01

## 2018-01-29 NOTE — PLAN OF CARE
Problem: Anxiety (Adult)  Goal: Identify Related Risk Factors and Signs and Symptoms  Related risk factors and signs and symptoms are identified upon initiation of Human Response Clinical Practice Guideline (CPG)  Outcome: Ongoing (interventions implemented as appropriate)  Procedure explained to patient by staff and MD. Reassured patient. Encouraged relaxation techniques. Will continue to monitor.

## 2018-01-29 NOTE — ANESTHESIA PREPROCEDURE EVALUATION
01/29/2018  Kaylee Ngo is a 62 y.o., female for EGD under MAC    Anesthesia Evaluation     I have reviewed the Nursing Notes.   I have reviewed the Medications.     Review of Systems  Cardiovascular:   Hypertension, poorly controlled hyperlipidemia   Hepatic/GI:   PUD, GERD    Neurological:   Headaches    Endocrine:   Diabetes unk thyroid d/o   Psych:   Psychiatric History (BPD) anxiety depression          Physical Exam  General:  Cachexia       Chest/Lungs:  Chest/Lungs Clear    Heart/Vascular:  Heart Findings: Normal            Anesthesia Plan  Type of Anesthesia, risks & benefits discussed:  Anesthesia Type:  MAC  Patient's Preference:   Intra-op Monitoring Plan:   Intra-op Monitoring Plan Comments:   Post Op Pain Control Plan:   Post Op Pain Control Plan Comments:   Induction:    Beta Blocker:  Patient is on a Beta-Blocker and has received one dose within the past 24 hours (No further documentation required).       Informed Consent: Patient understands risks and agrees with Anesthesia plan.  Questions answered. Anesthesia consent signed with patient.  ASA Score: 3     Day of Surgery Review of History & Physical:            Ready For Surgery From Anesthesia Perspective.

## 2018-01-29 NOTE — TRANSFER OF CARE
"Anesthesia Transfer of Care Note    Patient: Kaylee Ngo    Procedure(s) Performed: Procedure(s) (LRB):  ESOPHAGOGASTRODUODENOSCOPY (EGD) (N/A)    Patient location: GI    Anesthesia Type: MAC    Transport from OR: Transported from OR on room air with adequate spontaneous ventilation    Post pain: adequate analgesia    Post assessment: no apparent anesthetic complications and tolerated procedure well    Post vital signs: stable    Level of consciousness: awake, alert and oriented    Nausea/Vomiting: no nausea/vomiting    Complications: none    Transfer of care protocol was followed      Last vitals:   Visit Vitals  BP (!) 161/66 (BP Location: Right arm, Patient Position: Lying)   Pulse 71   Temp 37.1 °C (98.8 °F) (Oral)   Resp 14   Ht 5' 4.25" (1.632 m)   Wt 52.6 kg (116 lb)   SpO2 (!) 93%   Breastfeeding? No   BMI 19.76 kg/m²     "

## 2018-01-29 NOTE — ANESTHESIA POSTPROCEDURE EVALUATION
"Anesthesia Post Evaluation    Patient: Kaylee Ngo    Procedure(s) Performed: Procedure(s) (LRB):  ESOPHAGOGASTRODUODENOSCOPY (EGD) (N/A)    Final Anesthesia Type: MAC  Patient location during evaluation: GI PACU  Patient participation: Yes- Able to Participate  Level of consciousness: awake and alert  Post-procedure vital signs: reviewed and stable  Pain management: adequate  Airway patency: patent  PONV status at discharge: No PONV  Anesthetic complications: no      Cardiovascular status: blood pressure returned to baseline and hemodynamically stable  Respiratory status: unassisted, spontaneous ventilation and room air  Hydration status: euvolemic  Follow-up not needed.        Visit Vitals  BP (!) 161/66 (BP Location: Right arm, Patient Position: Lying)   Pulse 71   Temp 37.1 °C (98.8 °F) (Oral)   Resp 14   Ht 5' 4.25" (1.632 m)   Wt 52.6 kg (116 lb)   SpO2 (!) 93%   Breastfeeding? No   BMI 19.76 kg/m²       Pain/Geo Score: Pain Assessment Performed: Yes (1/29/2018  2:38 PM)  Presence of Pain: denies (1/29/2018  2:38 PM)  Pain Rating Prior to Med Admin: 0 (1/29/2018  8:55 AM)  Pain Rating Post Med Admin: 0 (1/29/2018  8:55 AM)  Geo Score: 10 (1/29/2018  2:38 PM)      "

## 2018-01-29 NOTE — PROCEDURES
Radiology Post-Procedure Note    Pre Op Diagnosis: Thyroid nodule    Post Op Diagnosis: same    Procedure: Ultrasound guided thyroid biopsy    Procedure performed by: Joshua Coley MD    Written Informed Consent Obtained: Yes    Specimen Removed: YES 4 x 25 gauge    Estimated Blood Loss: Minimal    Findings: Local anesthesia was used.    Fine needle aspiration was performed for evaluation of right sided thyroid nodule using ultrasound guidance.  Cytology was present during the examination to evaluate adequacy of the specimen which was sent to the laboratory for further analysis.    There were no complications and the patient tolerated the procedure well.  Please see Imaging report for further details.    Joshua Coley MD  Department of Radiology  Pager: [phone]

## 2018-01-29 NOTE — H&P
[facility]     CC: nausea     HPI 62 y.o. 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 She states the nausea and vomiting episodes come in waves, occasionally are associated with episodes of non-bloody watery diarrhea. Her symptoms have improved on Zofran and scopolamine patch.  Occasionally she feels nausea with meals. She had gastric bypass in 2009.      She reports a history of endoscopy in the past with EGD/Colonoscopy/VCE in April 2017 in [place], [place]. She does not report significant findings of VCE. She reports a history of BC powders. She takes ibuprofen regularly. Denies marijuana use. Denies dark tarry stools. She does endorse extremely poor appetite     PMH  Bipolar Disease  Depression  DMII  Gastric ulcer  HLD  Neuroendocrine Tumor  GERD  OSIRIS    PSH  Appendectomy   Cholecystectomy  Gastric Bypass  Knee Replacement  Hip arthroplasty     Social History  No smoking, no alcohol use, no illicit drug use     FH  Pancreatic cancer in mother at age 74  Colon cancer in Father age 64     Review of Systems  General ROS: negative for chills, fever or weight loss  Psychological ROS: negative for hallucination, depression or suicidal ideation  Ophthalmic ROS: negative for blurry vision, photophobia or eye pain  ENT ROS: negative for epistaxis, sore throat or rhinorrhea  Respiratory ROS: no cough, shortness of breath, or wheezing  Cardiovascular ROS: no chest pain or dyspnea on exertion  Gastrointestinal ROS: +nausea, vomiting, no abdominal pain, change in bowel habits, or black/ bloody stools  Genito-Urinary ROS: no dysuria, trouble voiding, or hematuria  Musculoskeletal ROS: negative for gait disturbance or muscular weakness  Neurological ROS: no syncope or seizures; no ataxia  Dermatological  "ROS: negative for pruritis, rash and jaundice     Physical Examination  /75 (BP Location: Left arm)   Pulse 63   Temp 98.3 °F (36.8 °C) (Oral)   Resp 16   Ht 5' 6.5" (1.689 m)   Wt 53.6 kg (118 lb 2.7 oz)   BMI 18.79 kg/m²   General appearance: alert, cooperative, no distress, thin, cachectic female, cooperative  HENT: bruising over left face, normocephalic, neck symmetrical, no nasal discharge   Eyes: conjunctivae/corneas clear, PERRL, EOM's intact  Lungs: clear to auscultation bilaterally, no dullness to percussion bilaterally  Heart: regular rate and rhythm without rub; no displacement of the PMI   Abdomen: soft, non-tender; bowel sounds normoactive; no organomegaly  Extremities: extremities symmetric; no clubbing, cyanosis, or edema  Integument: Skin color, texture, turgor normal; no rashes; hair distrubution normal  Neurologic: Alert and oriented X 3, normal strength, normal coordination and gait  Psychiatric: no pressured speech; normal affect; no evidence of impaired cognition      Labs:  H/H 10.6/35.4  Plt 370     Imaging:  MRI Abd:   1. 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  2. No suspicious liver lesion, but exam is limited.  3. A few indeterminate T2 hyperintense lumbar spine lesions, possibly benign hemangiomas but evaluation is limited. Recommend correlation with upcoming OctreoScan and CT scan for additional evaluation.     Additional history obtained from sister      Assessment:   62 y.o. 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 Patient has a history of chronic NSAID use and may have a component of anastomotic ulcer causing stenosis. She may also have decreased motility from bulky retroperitoneal LAD from NET (most likely). SBBO could also be a contributing cause and she may benefit from antibiotics in the " future. She had a gastric bypass surgery in 2009 but ate very well until ~18 months ago (likely due to progressive NET involvement of the mesentery.      Plan:      Nausea/Vomiting  -Trial of prokinetics with Reglan liquid 10 mg PO TID x 2 weeks (she is not a candidate for erythromycin due to allergy)  -Will plan EGD for evaluation of stenosis of anastomosis, and if present will plan for dilation at that time (low threshold for dilation). Will consider for PEG placement in the gastric pouch if a window for placement can be found and if there is no anastomotic stricture. Consent for PEG at endoscopy in addition to EGD and give Ancef   -Will ask patient if she has relief from Reglan on day of endoscopy  -Encouraged cessation of NSAIDs due to her history of gastric ulcers  -Continue scopolamine patch and Zofran as needed for nausea   - encourage oral intake of nutritional supplements   - consider a trial of megace in the future      Diarrhea - relatively mild and episodic  -Most likely related to underlying NET, but if persists will plan trial of Creon at next visit     This patient may be considered for debulking surgery in the future for NET with mesenteric involvement (but unknown primary)   A trial of xifaxan may be next 550mg tid x 21 days         Kermit Reddy MD   [address], [address]   [place] [address] [phone]

## 2018-01-29 NOTE — H&P
Radiology [facility] Procedure Note    Procedure Requested: Thyroid FNA    History of Present Illness:  Kaylee Ngo is a 62 y.o. female with history of thyroid nodules.  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    Obsessive compulsive disorder     Sleep apnea      Past Surgical History:   Procedure Laterality Date    APPENDECTOMY      CHOLECYSTECTOMY      GASTRIC BYPASS      TOTAL KNEE ARTHROPLASTY Left        Allergies:   Review of patient's allergies indicates:   Allergen Reactions    Codeine     Contrast media      Oral and IV    Darvocet a500 [propoxyphene n-acetaminophen]     Epinephrine      Neuroendocrine Tumor patient      Erythromycin     Iodinated contrast- oral and iv dye     Pcn [penicillins]     Sulfa (sulfonamide antibiotics)        Current Inpatient Meds:   Home Meds:   Prior to Admission medications    Medication Sig Start Date End Date Taking? Authorizing Provider   ACCU-CHEK SHU CONTROL SOLN Soln  10/17/17  Yes Historical Provider, MD   ACCU-CHEK SHU PLUS METER Misc  10/17/17  Yes Historical Provider, MD   ACCU-CHEK SHU PLUS TEST STRP Strp  10/17/17  Yes Historical Provider, MD   amitriptyline (ELAVIL) 50 MG tablet Take 50 mg by mouth every evening.   Yes Historical Provider, MD   atorvastatin (LIPITOR) 40 MG tablet Take 40 mg by mouth once daily.   Yes Historical Provider, MD   BRAN/GUM/FIB/LATISHA/PSYL/KELP/PEC (FIBER 6 ORAL) Take by mouth once daily.   Yes Historical Provider, MD   calcium carbonate (OS-BEST) 600 mg calcium (1,500 mg) Tab Take 600 mg by mouth once.   Yes Historical Provider, MD   clonazePAM (KLONOPIN) 0.5 MG tablet Take  0.5 mg by mouth 2 (two) times daily as needed for Anxiety.   Yes Historical Provider, MD   colesevelam (WELCHOL) 625 mg tablet Take 1,875 mg by mouth 2 (two) times daily with meals.   Yes Historical Provider, MD   cyanocobalamin 1,000 mcg/mL injection 1,000 mcg every 28 days.   Yes Historical Provider, MD   diclofenac (CATAFLAM) 50 MG tablet as needed.  9/11/17  Yes Historical Provider, MD   diphenoxylate-atropine 2.5-0.025 mg (LOMOTIL) 2.5-0.025 mg per tablet Take 1 tablet by mouth 4 (four) times daily as needed for Diarrhea.   Yes Historical Provider, MD   docusate sodium (COLACE) 100 MG capsule Take 100 mg by mouth as needed for Constipation.   Yes Historical Provider, MD   DULoxetine (CYMBALTA) 60 MG capsule Take 60 mg by mouth once daily.   Yes Historical Provider, MD   everolimus (AFINITOR) 10 mg Tab Take 10 mg by mouth once daily.   Yes Historical Provider, MD   GLUCAGON EMERGENCY KIT, HUMAN, 1 mg injection  11/14/17  Yes Historical Provider, MD   hydrOXYzine pamoate (VISTARIL) 25 MG Cap Take 25 mg by mouth 3 (three) times daily.    Yes Historical Provider, MD   insulin degludec (TRESIBA FLEXTOUCH U-100) 100 unit/mL (3 mL) InPn Inject 10 Units into the skin once daily.    Yes Historical Provider, MD   lamoTRIgine (LAMICTAL) 150 MG Tab  11/14/17  Yes Historical Provider, MD   lancets 30 gauge Misc  10/17/17  Yes Historical Provider, MD   lancing device Misc  10/17/17  Yes Historical Provider, MD   losartan (COZAAR) 50 MG tablet Take 50 mg by mouth once daily.   Yes Historical Provider, MD   metoclopramide HCl (REGLAN) 5 mg/5 mL Soln TAKE 10ML BY MOUTH THREE TIMES A DAY 1/22/18  Yes Kermit Reddy MD   metoprolol succinate (TOPROL-XL) 100 MG 24 hr tablet Take 100 mg by mouth 2 (two) times daily.   Yes Historical Provider, MD   multivitamin capsule Take 1 capsule by mouth once daily.   Yes Historical Provider, MD   omeprazole (PRILOSEC) 40 MG capsule Take 40 mg by mouth 2 (two) times daily before meals.     "Yes Historical Provider, MD   ondansetron (ZOFRAN) 8 MG tablet Take 8 mg by mouth every 8 (eight) hours.   Yes Historical Provider, MD   potassium chloride (KLOR-CON) 20 mEq Pack Take 20 mEq by mouth once.    Yes Historical Provider, MD   scopolamine (TRANSDERM-SCOP) 1.3-1.5 mg (1 mg over 3 days)  11/1/17  Yes Historical Provider, MD   tiZANidine (ZANAFLEX) 4 MG tablet as needed.  9/11/17  Yes Historical Provider, MD   vitamin D 1000 units Tab Take 1,000 Units by mouth once daily.   Yes Historical Provider, MD   SURE COMFORT PEN NEEDLE 32 gauge x 5/32" Ndle  10/17/17   Historical Provider, MD      Anticoagulants/Antiplatelets: no anticoagulation    Labs:  No results found for: INR    Lab Results   Component Value Date    WBC 6.58 12/11/2017    HGB 10.6 (L) 12/11/2017    HCT 35.4 (L) 12/11/2017    MCV 78 (L) 12/11/2017     (H) 12/11/2017      Lab Results   Component Value Date     (H) 12/11/2017     12/11/2017    K 4.1 12/11/2017     12/11/2017    CO2 31 (H) 12/11/2017    BUN 7 (L) 12/11/2017    CREATININE 0.8 12/11/2017    CALCIUM 8.9 12/11/2017    ALT 11 12/11/2017    AST 12 12/11/2017    ALBUMIN 3.0 (L) 12/11/2017       Objective:  Vitals: Temp: 98 °F (36.7 °C) (01/29/18 0830)  Pulse: 70 (01/29/18 0830)  Resp: 18 (01/29/18 0830)  BP: (!) 210/96 (01/29/18 0830)  SpO2: 98 % (01/29/18 0830)   PE:  Skin: no redness, tenderness, drainage    Plan: Thyroid FNA. Informed consent obtained. Local sedation.    Joshua Coley MD  Department of Radiology  Pager: [phone]  "

## 2018-01-30 NOTE — DISCHARGE SUMMARY
Radiology Discharge Summary      Hospital Course: No complications    Admit Date: 1/29/2018  Discharge Date: 01/30/2018     Instructions Given to Patient: Yes  Diet: Resume prior diet  Activity: activity as tolerated    Description of Condition on Discharge: Stable  Vital Signs (Most Recent): Temp: 98 °F (36.7 °C) (01/29/18 0830)  Pulse: 75 (01/29/18 1013)  Resp: 17 (01/29/18 1013)  BP: (!) 190/78 (01/29/18 1013)  SpO2: 98 % (01/29/18 1013)    Discharge Disposition: Home    Discharge Diagnosis:   Thyroid FNA     Follow-up:   Per referring physician    Joshua Coley MD  Department of Radiology  Pager: [phone]

## 2018-02-01 NOTE — TELEPHONE ENCOUNTER
Per Dr Levy...He already talked with the patient about the tumor board recommendations.  Awaiting reports from thryroid, scope and lab reports.

## 2018-02-04 RX ORDER — METOCLOPRAMIDE HYDROCHLORIDE 5 MG/5ML
SOLUTION ORAL
Qty: 420 ML | Refills: 2 | Status: SHIPPED | OUTPATIENT
Start: 2018-02-04 | End: 2018-07-16 | Stop reason: SDUPTHER

## 2018-02-12 ENCOUNTER — TELEPHONE (OUTPATIENT)
Dept: NEUROLOGY | Facility: HOSPITAL | Age: 63
End: 2018-02-12

## 2018-02-12 NOTE — TELEPHONE ENCOUNTER
----- Message from Colleen Fletcher sent at 2/12/2018 10:56 AM CST -----  Contact: Sister  DYLAN:  Patient's sister Emily Pickard [phone] - she is curious if the results from the thyroid biopsy were back yet . She is also concerned because Ms. Ngo's blood sugar was elevated last week up around 600, however she's not entirely sure that she was taking her insulin correctly.  But that still concerns her and she wanted to check with the doctor to see if there was concern.  Thank you  abc

## 2018-02-12 NOTE — TELEPHONE ENCOUNTER
Returned pts sister call to inform that Dr. Levy stated he would call them on Thursday. Discussed thyroid US biopsy. Awaiting Dr. Acharya call to discuss plan of care. Glucose has been out of control, will f/u  W/ diabetologist.

## 2018-02-26 ENCOUNTER — HOSPITAL ENCOUNTER (INPATIENT)
Facility: HOSPITAL | Age: 63
LOS: 1 days | Discharge: HOME | DRG: 982 | End: 2018-03-02
Attending: EMERGENCY MEDICINE | Admitting: SURGERY
Payer: MEDICARE

## 2018-02-26 ENCOUNTER — TELEPHONE (OUTPATIENT)
Dept: NEUROLOGY | Facility: HOSPITAL | Age: 63
End: 2018-02-26

## 2018-02-26 DIAGNOSIS — D49.89 NEOPLASM OF ABDOMEN: ICD-10-CM

## 2018-02-26 DIAGNOSIS — R11.2 NAUSEA, VOMITING AND DIARRHEA: ICD-10-CM

## 2018-02-26 DIAGNOSIS — D3A.00 CARCINOID TUMOR: ICD-10-CM

## 2018-02-26 DIAGNOSIS — M54.9 ACUTE LEFT-SIDED BACK PAIN, UNSPECIFIED BACK LOCATION: ICD-10-CM

## 2018-02-26 DIAGNOSIS — K31.1 ACQUIRED GASTRIC OUTLET STENOSIS: ICD-10-CM

## 2018-02-26 DIAGNOSIS — R11.2 NAUSEA AND VOMITING, INTRACTABILITY OF VOMITING NOT SPECIFIED, UNSPECIFIED VOMITING TYPE: ICD-10-CM

## 2018-02-26 DIAGNOSIS — C7A.00 MALIGNANT CARCINOID TUMOR OF UNKNOWN PRIMARY SITE: Primary | ICD-10-CM

## 2018-02-26 DIAGNOSIS — E46 MALNUTRITION COMPROMISING BODILY FUNCTION: ICD-10-CM

## 2018-02-26 DIAGNOSIS — R19.7 NAUSEA, VOMITING AND DIARRHEA: ICD-10-CM

## 2018-02-26 LAB
ALBUMIN SERPL BCP-MCNC: 3.3 G/DL
ALP SERPL-CCNC: 105 U/L
ALT SERPL W/O P-5'-P-CCNC: 12 U/L
ANION GAP SERPL CALC-SCNC: 12 MMOL/L
AST SERPL-CCNC: 16 U/L
BASOPHILS # BLD AUTO: 0.02 K/UL
BASOPHILS NFR BLD: 0.3 %
BILIRUB SERPL-MCNC: 0.4 MG/DL
BILIRUB UR QL STRIP: NEGATIVE
BUN SERPL-MCNC: 15 MG/DL
CALCIUM SERPL-MCNC: 9.6 MG/DL
CHLORIDE SERPL-SCNC: 102 MMOL/L
CLARITY UR: CLEAR
CO2 SERPL-SCNC: 25 MMOL/L
COLOR UR: YELLOW
CREAT SERPL-MCNC: 1 MG/DL
DIFFERENTIAL METHOD: ABNORMAL
EOSINOPHIL # BLD AUTO: 0 K/UL
EOSINOPHIL NFR BLD: 0.6 %
ERYTHROCYTE [DISTWIDTH] IN BLOOD BY AUTOMATED COUNT: 14.8 %
EST. GFR  (AFRICAN AMERICAN): >60 ML/MIN/1.73 M^2
EST. GFR  (NON AFRICAN AMERICAN): >60 ML/MIN/1.73 M^2
GLUCOSE SERPL-MCNC: 196 MG/DL
GLUCOSE UR QL STRIP: ABNORMAL
HCT VFR BLD AUTO: 35.5 %
HGB BLD-MCNC: 10.6 G/DL
HGB UR QL STRIP: NEGATIVE
KETONES UR QL STRIP: ABNORMAL
LEUKOCYTE ESTERASE UR QL STRIP: NEGATIVE
LYMPHOCYTES # BLD AUTO: 1.8 K/UL
LYMPHOCYTES NFR BLD: 28 %
MAGNESIUM SERPL-MCNC: 1.7 MG/DL
MCH RBC QN AUTO: 23.9 PG
MCHC RBC AUTO-ENTMCNC: 29.9 G/DL
MCV RBC AUTO: 80 FL
MONOCYTES # BLD AUTO: 0.5 K/UL
MONOCYTES NFR BLD: 8.1 %
NEUTROPHILS # BLD AUTO: 4 K/UL
NEUTROPHILS NFR BLD: 62.8 %
NITRITE UR QL STRIP: NEGATIVE
PH UR STRIP: 6 [PH] (ref 5–8)
PLATELET # BLD AUTO: 333 K/UL
PMV BLD AUTO: 9.1 FL
POTASSIUM SERPL-SCNC: 4.1 MMOL/L
PROT SERPL-MCNC: 6.7 G/DL
PROT UR QL STRIP: ABNORMAL
RBC # BLD AUTO: 4.44 M/UL
SODIUM SERPL-SCNC: 139 MMOL/L
SP GR UR STRIP: >=1.03 (ref 1–1.03)
URN SPEC COLLECT METH UR: ABNORMAL
UROBILINOGEN UR STRIP-ACNC: NEGATIVE EU/DL
WBC # BLD AUTO: 6.42 K/UL

## 2018-02-26 PROCEDURE — 25000003 PHARM REV CODE 250: Performed by: STUDENT IN AN ORGANIZED HEALTH CARE EDUCATION/TRAINING PROGRAM

## 2018-02-26 PROCEDURE — G0378 HOSPITAL OBSERVATION PER HR: HCPCS

## 2018-02-26 PROCEDURE — 25000003 PHARM REV CODE 250: Performed by: EMERGENCY MEDICINE

## 2018-02-26 PROCEDURE — 80053 COMPREHEN METABOLIC PANEL: CPT

## 2018-02-26 PROCEDURE — 85025 COMPLETE CBC W/AUTO DIFF WBC: CPT

## 2018-02-26 PROCEDURE — 81003 URINALYSIS AUTO W/O SCOPE: CPT

## 2018-02-26 PROCEDURE — 11000001 HC ACUTE MED/SURG PRIVATE ROOM

## 2018-02-26 PROCEDURE — 96374 THER/PROPH/DIAG INJ IV PUSH: CPT

## 2018-02-26 PROCEDURE — 99284 EMERGENCY DEPT VISIT MOD MDM: CPT | Mod: 25

## 2018-02-26 PROCEDURE — 63600175 PHARM REV CODE 636 W HCPCS: Performed by: EMERGENCY MEDICINE

## 2018-02-26 PROCEDURE — 83735 ASSAY OF MAGNESIUM: CPT

## 2018-02-26 RX ORDER — LOSARTAN POTASSIUM 50 MG/1
50 TABLET ORAL DAILY
Status: DISCONTINUED | OUTPATIENT
Start: 2018-02-27 | End: 2018-03-02 | Stop reason: HOSPADM

## 2018-02-26 RX ORDER — DEXTROSE MONOHYDRATE, SODIUM CHLORIDE, AND POTASSIUM CHLORIDE 50; 1.49; 4.5 G/1000ML; G/1000ML; G/1000ML
INJECTION, SOLUTION INTRAVENOUS CONTINUOUS
Status: DISCONTINUED | OUTPATIENT
Start: 2018-02-26 | End: 2018-03-01

## 2018-02-26 RX ORDER — ATORVASTATIN CALCIUM 40 MG/1
40 TABLET, FILM COATED ORAL DAILY
Status: DISCONTINUED | OUTPATIENT
Start: 2018-02-27 | End: 2018-03-02 | Stop reason: HOSPADM

## 2018-02-26 RX ORDER — METOPROLOL SUCCINATE 50 MG/1
100 TABLET, EXTENDED RELEASE ORAL 2 TIMES DAILY
Status: DISCONTINUED | OUTPATIENT
Start: 2018-02-26 | End: 2018-03-02 | Stop reason: HOSPADM

## 2018-02-26 RX ORDER — SODIUM CHLORIDE 9 MG/ML
INJECTION, SOLUTION INTRAVENOUS CONTINUOUS
Status: DISCONTINUED | OUTPATIENT
Start: 2018-02-26 | End: 2018-02-26

## 2018-02-26 RX ORDER — DULOXETIN HYDROCHLORIDE 30 MG/1
60 CAPSULE, DELAYED RELEASE ORAL 2 TIMES DAILY
Status: DISCONTINUED | OUTPATIENT
Start: 2018-02-27 | End: 2018-03-02 | Stop reason: HOSPADM

## 2018-02-26 RX ORDER — DULOXETIN HYDROCHLORIDE 30 MG/1
60 CAPSULE, DELAYED RELEASE ORAL DAILY
Status: DISCONTINUED | OUTPATIENT
Start: 2018-02-27 | End: 2018-02-26

## 2018-02-26 RX ORDER — CLONAZEPAM 0.5 MG/1
0.5 TABLET ORAL 2 TIMES DAILY PRN
Status: DISCONTINUED | OUTPATIENT
Start: 2018-02-27 | End: 2018-03-02 | Stop reason: HOSPADM

## 2018-02-26 RX ORDER — COLESEVELAM 180 1/1
1875 TABLET ORAL 2 TIMES DAILY WITH MEALS
Status: DISCONTINUED | OUTPATIENT
Start: 2018-02-27 | End: 2018-03-02 | Stop reason: HOSPADM

## 2018-02-26 RX ORDER — SODIUM CHLORIDE 0.9 % (FLUSH) 0.9 %
5 SYRINGE (ML) INJECTION
Status: DISCONTINUED | OUTPATIENT
Start: 2018-02-26 | End: 2018-03-02 | Stop reason: HOSPADM

## 2018-02-26 RX ORDER — CHOLECALCIFEROL (VITAMIN D3) 25 MCG
1000 TABLET ORAL DAILY
Status: DISCONTINUED | OUTPATIENT
Start: 2018-02-27 | End: 2018-03-02 | Stop reason: HOSPADM

## 2018-02-26 RX ORDER — HYDROMORPHONE HYDROCHLORIDE 2 MG/ML
1 INJECTION, SOLUTION INTRAMUSCULAR; INTRAVENOUS; SUBCUTANEOUS EVERY 4 HOURS PRN
Status: DISCONTINUED | OUTPATIENT
Start: 2018-02-26 | End: 2018-03-02 | Stop reason: HOSPADM

## 2018-02-26 RX ORDER — HYDROMORPHONE HYDROCHLORIDE 2 MG/ML
1 INJECTION, SOLUTION INTRAMUSCULAR; INTRAVENOUS; SUBCUTANEOUS
Status: COMPLETED | OUTPATIENT
Start: 2018-02-26 | End: 2018-02-26

## 2018-02-26 RX ORDER — AMITRIPTYLINE HYDROCHLORIDE 25 MG/1
50 TABLET, FILM COATED ORAL NIGHTLY
Status: DISCONTINUED | OUTPATIENT
Start: 2018-02-26 | End: 2018-03-02 | Stop reason: HOSPADM

## 2018-02-26 RX ORDER — PANTOPRAZOLE SODIUM 40 MG/1
40 TABLET, DELAYED RELEASE ORAL 2 TIMES DAILY
Status: DISCONTINUED | OUTPATIENT
Start: 2018-02-27 | End: 2018-03-02 | Stop reason: HOSPADM

## 2018-02-26 RX ORDER — ONDANSETRON 8 MG/1
8 TABLET, ORALLY DISINTEGRATING ORAL EVERY 8 HOURS PRN
Status: DISCONTINUED | OUTPATIENT
Start: 2018-02-26 | End: 2018-03-02 | Stop reason: HOSPADM

## 2018-02-26 RX ORDER — EVEROLIMUS 10 MG/1
10 TABLET ORAL DAILY
Status: DISCONTINUED | OUTPATIENT
Start: 2018-02-27 | End: 2018-03-02

## 2018-02-26 RX ORDER — HYDROMORPHONE HYDROCHLORIDE 2 MG/ML
1 INJECTION, SOLUTION INTRAMUSCULAR; INTRAVENOUS; SUBCUTANEOUS EVERY 4 HOURS PRN
Status: DISCONTINUED | OUTPATIENT
Start: 2018-02-26 | End: 2018-02-26

## 2018-02-26 RX ORDER — KETOROLAC TROMETHAMINE 30 MG/ML
15 INJECTION, SOLUTION INTRAMUSCULAR; INTRAVENOUS EVERY 6 HOURS PRN
Status: DISCONTINUED | OUTPATIENT
Start: 2018-02-26 | End: 2018-03-01

## 2018-02-26 RX ADMIN — SODIUM CHLORIDE: 0.9 INJECTION, SOLUTION INTRAVENOUS at 10:02

## 2018-02-26 RX ADMIN — METOPROLOL SUCCINATE 100 MG: 50 TABLET, EXTENDED RELEASE ORAL at 10:02

## 2018-02-26 RX ADMIN — HYDROMORPHONE HYDROCHLORIDE 1 MG: 2 INJECTION INTRAMUSCULAR; INTRAVENOUS; SUBCUTANEOUS at 06:02

## 2018-02-26 RX ADMIN — AMITRIPTYLINE HYDROCHLORIDE 50 MG: 25 TABLET, FILM COATED ORAL at 10:02

## 2018-02-26 NOTE — ED NOTES
2 pt identifiers for Kaylee Ngo verified and correct.    Pt presents to ED with c/o L lower back pain that started a few days ago after trying to cut bushes outside. Pt carcinoid pt of Dr Levy and states she has been N/V, diarrhea for several weeks and that she feels weak. Pt was unable to get in touch with his office over the weekend so they came in today for evaluation. Pt ambulatory without difficulty.

## 2018-02-26 NOTE — TELEPHONE ENCOUNTER
Received call from pts sister. Sister states that pt started to complain over the weekend of severe back pain. Dr. Patel had given pt some Morphine pills a while back and she has been taking those. Pt was trimming shrubs prior to pain beginning. Pt started to throw up Saturday and has continued to vomit until today. Vomiting x1 hour, will ease off and then begin the next day. Sister knows that pt has a tumor pressing on the kidneys and unsure if this is why. Advised to proceed to ER for evaluation. Sister verbalizes understanding.

## 2018-02-26 NOTE — TELEPHONE ENCOUNTER
----- Message from Ale Sherwood sent at 2/26/2018 11:54 AM CST -----  Contact: Patient  DYLAN- Patient is on her way to the Emergency room in [place]. They should be here in about 4 hours. If you need to reach Emily she is at [phone].

## 2018-02-27 ENCOUNTER — ANESTHESIA EVENT (OUTPATIENT)
Dept: SURGERY | Facility: HOSPITAL | Age: 63
DRG: 982 | End: 2018-02-27
Payer: MEDICARE

## 2018-02-27 LAB
ALBUMIN SERPL BCP-MCNC: 2.9 G/DL
ALP SERPL-CCNC: 96 U/L
ALT SERPL W/O P-5'-P-CCNC: 9 U/L
ANION GAP SERPL CALC-SCNC: 6 MMOL/L
AST SERPL-CCNC: 13 U/L
BASOPHILS # BLD AUTO: 0.02 K/UL
BASOPHILS NFR BLD: 0.3 %
BILIRUB SERPL-MCNC: 0.3 MG/DL
BUN SERPL-MCNC: 12 MG/DL
CALCIUM SERPL-MCNC: 8.9 MG/DL
CHLORIDE SERPL-SCNC: 103 MMOL/L
CO2 SERPL-SCNC: 30 MMOL/L
CREAT SERPL-MCNC: 0.8 MG/DL
DIFFERENTIAL METHOD: ABNORMAL
EOSINOPHIL # BLD AUTO: 0.1 K/UL
EOSINOPHIL NFR BLD: 1.2 %
ERYTHROCYTE [DISTWIDTH] IN BLOOD BY AUTOMATED COUNT: 14.9 %
EST. GFR  (AFRICAN AMERICAN): >60 ML/MIN/1.73 M^2
EST. GFR  (NON AFRICAN AMERICAN): >60 ML/MIN/1.73 M^2
GLUCOSE SERPL-MCNC: 210 MG/DL
HCT VFR BLD AUTO: 34.5 %
HGB BLD-MCNC: 10.5 G/DL
LYMPHOCYTES # BLD AUTO: 2.4 K/UL
LYMPHOCYTES NFR BLD: 40.4 %
MAGNESIUM SERPL-MCNC: 1.6 MG/DL
MCH RBC QN AUTO: 24.4 PG
MCHC RBC AUTO-ENTMCNC: 30.4 G/DL
MCV RBC AUTO: 80 FL
MONOCYTES # BLD AUTO: 0.5 K/UL
MONOCYTES NFR BLD: 7.7 %
NEUTROPHILS # BLD AUTO: 2.9 K/UL
NEUTROPHILS NFR BLD: 50.2 %
PHOSPHATE SERPL-MCNC: 3.5 MG/DL
PLATELET # BLD AUTO: 316 K/UL
PMV BLD AUTO: 8.8 FL
POCT GLUCOSE: 180 MG/DL (ref 70–110)
POCT GLUCOSE: 201 MG/DL (ref 70–110)
POCT GLUCOSE: 274 MG/DL (ref 70–110)
POCT GLUCOSE: 275 MG/DL (ref 70–110)
POCT GLUCOSE: 351 MG/DL (ref 70–110)
POCT GLUCOSE: 352 MG/DL (ref 70–110)
POCT GLUCOSE: 376 MG/DL (ref 70–110)
POTASSIUM SERPL-SCNC: 4.8 MMOL/L
PREALB SERPL-MCNC: 12 MG/DL
PROT SERPL-MCNC: 6 G/DL
RBC # BLD AUTO: 4.31 M/UL
SODIUM SERPL-SCNC: 139 MMOL/L
WBC # BLD AUTO: 5.81 K/UL

## 2018-02-27 PROCEDURE — 94761 N-INVAS EAR/PLS OXIMETRY MLT: CPT

## 2018-02-27 PROCEDURE — 84100 ASSAY OF PHOSPHORUS: CPT

## 2018-02-27 PROCEDURE — 36415 COLL VENOUS BLD VENIPUNCTURE: CPT

## 2018-02-27 PROCEDURE — 83735 ASSAY OF MAGNESIUM: CPT

## 2018-02-27 PROCEDURE — 11000001 HC ACUTE MED/SURG PRIVATE ROOM

## 2018-02-27 PROCEDURE — G0378 HOSPITAL OBSERVATION PER HR: HCPCS

## 2018-02-27 PROCEDURE — 63600175 PHARM REV CODE 636 W HCPCS: Performed by: STUDENT IN AN ORGANIZED HEALTH CARE EDUCATION/TRAINING PROGRAM

## 2018-02-27 PROCEDURE — 80053 COMPREHEN METABOLIC PANEL: CPT

## 2018-02-27 PROCEDURE — 84134 ASSAY OF PREALBUMIN: CPT

## 2018-02-27 PROCEDURE — 85025 COMPLETE CBC W/AUTO DIFF WBC: CPT

## 2018-02-27 PROCEDURE — 25000003 PHARM REV CODE 250: Performed by: STUDENT IN AN ORGANIZED HEALTH CARE EDUCATION/TRAINING PROGRAM

## 2018-02-27 RX ORDER — HYDROXYZINE PAMOATE 25 MG/1
25 CAPSULE ORAL 3 TIMES DAILY
Status: DISCONTINUED | OUTPATIENT
Start: 2018-02-27 | End: 2018-03-02 | Stop reason: HOSPADM

## 2018-02-27 RX ORDER — IBUPROFEN 200 MG
24 TABLET ORAL
Status: DISCONTINUED | OUTPATIENT
Start: 2018-02-27 | End: 2018-03-02 | Stop reason: HOSPADM

## 2018-02-27 RX ORDER — INSULIN ASPART 100 [IU]/ML
0-5 INJECTION, SOLUTION INTRAVENOUS; SUBCUTANEOUS
Status: DISCONTINUED | OUTPATIENT
Start: 2018-02-27 | End: 2018-03-02 | Stop reason: HOSPADM

## 2018-02-27 RX ORDER — FAMOTIDINE 10 MG/ML
20 INJECTION INTRAVENOUS ONCE AS NEEDED
Status: DISCONTINUED | OUTPATIENT
Start: 2018-02-27 | End: 2018-02-27

## 2018-02-27 RX ORDER — SCOLOPAMINE TRANSDERMAL SYSTEM 1 MG/1
1 PATCH, EXTENDED RELEASE TRANSDERMAL
Status: DISCONTINUED | OUTPATIENT
Start: 2018-02-27 | End: 2018-03-02 | Stop reason: HOSPADM

## 2018-02-27 RX ORDER — IBUPROFEN 200 MG
16 TABLET ORAL
Status: DISCONTINUED | OUTPATIENT
Start: 2018-02-27 | End: 2018-03-02 | Stop reason: HOSPADM

## 2018-02-27 RX ORDER — DIPHENHYDRAMINE HYDROCHLORIDE 50 MG/ML
25 INJECTION INTRAMUSCULAR; INTRAVENOUS ONCE
Status: DISCONTINUED | OUTPATIENT
Start: 2018-02-27 | End: 2018-02-27

## 2018-02-27 RX ORDER — GLUCAGON 1 MG
1 KIT INJECTION
Status: DISCONTINUED | OUTPATIENT
Start: 2018-02-27 | End: 2018-03-02 | Stop reason: HOSPADM

## 2018-02-27 RX ORDER — HYDRALAZINE HYDROCHLORIDE 20 MG/ML
10 INJECTION INTRAMUSCULAR; INTRAVENOUS EVERY 6 HOURS PRN
Status: DISCONTINUED | OUTPATIENT
Start: 2018-02-27 | End: 2018-03-02 | Stop reason: HOSPADM

## 2018-02-27 RX ORDER — DIPHENHYDRAMINE HCL 25 MG
50 CAPSULE ORAL ONCE
Status: COMPLETED | OUTPATIENT
Start: 2018-02-28 | End: 2018-02-28

## 2018-02-27 RX ADMIN — PANTOPRAZOLE SODIUM 40 MG: 40 TABLET, DELAYED RELEASE ORAL at 08:02

## 2018-02-27 RX ADMIN — DULOXETINE 60 MG: 30 CAPSULE, DELAYED RELEASE ORAL at 08:02

## 2018-02-27 RX ADMIN — INSULIN ASPART 5 UNITS: 100 INJECTION, SOLUTION INTRAVENOUS; SUBCUTANEOUS at 05:02

## 2018-02-27 RX ADMIN — DEXTROSE MONOHYDRATE, SODIUM CHLORIDE, AND POTASSIUM CHLORIDE: 50; 4.5; 1.49 INJECTION, SOLUTION INTRAVENOUS at 12:02

## 2018-02-27 RX ADMIN — LAMOTRIGINE 150 MG: 100 TABLET ORAL at 12:02

## 2018-02-27 RX ADMIN — EVEROLIMUS 10 MG: 10 TABLET ORAL at 09:02

## 2018-02-27 RX ADMIN — ONDANSETRON 8 MG: 8 TABLET, ORALLY DISINTEGRATING ORAL at 08:02

## 2018-02-27 RX ADMIN — ATORVASTATIN CALCIUM 40 MG: 40 TABLET, FILM COATED ORAL at 08:02

## 2018-02-27 RX ADMIN — DULOXETINE 60 MG: 30 CAPSULE, DELAYED RELEASE ORAL at 12:02

## 2018-02-27 RX ADMIN — COLESEVELAM HYDROCHLORIDE 1875 MG: 625 TABLET, FILM COATED ORAL at 07:02

## 2018-02-27 RX ADMIN — LAMOTRIGINE 150 MG: 100 TABLET ORAL at 08:02

## 2018-02-27 RX ADMIN — SCOPALAMINE 1 PATCH: 1 PATCH, EXTENDED RELEASE TRANSDERMAL at 11:02

## 2018-02-27 RX ADMIN — AMITRIPTYLINE HYDROCHLORIDE 50 MG: 25 TABLET, FILM COATED ORAL at 08:02

## 2018-02-27 RX ADMIN — HYDROCORTISONE SODIUM SUCCINATE 100 MG: 100 INJECTION, POWDER, FOR SOLUTION INTRAMUSCULAR; INTRAVENOUS at 12:02

## 2018-02-27 RX ADMIN — HYDROXYZINE PAMOATE 25 MG: 25 CAPSULE ORAL at 10:02

## 2018-02-27 RX ADMIN — PREDNISONE 50 MG: 10 TABLET ORAL at 08:02

## 2018-02-27 RX ADMIN — INSULIN DETEMIR 10 UNITS: 100 INJECTION, SOLUTION SUBCUTANEOUS at 08:02

## 2018-02-27 RX ADMIN — METOPROLOL SUCCINATE 100 MG: 50 TABLET, EXTENDED RELEASE ORAL at 08:02

## 2018-02-27 RX ADMIN — HYDROMORPHONE HYDROCHLORIDE 1 MG: 2 INJECTION INTRAMUSCULAR; INTRAVENOUS; SUBCUTANEOUS at 08:02

## 2018-02-27 RX ADMIN — INSULIN ASPART 1 UNITS: 100 INJECTION, SOLUTION INTRAVENOUS; SUBCUTANEOUS at 10:02

## 2018-02-27 RX ADMIN — DEXTROSE MONOHYDRATE, SODIUM CHLORIDE, AND POTASSIUM CHLORIDE: 50; 4.5; 1.49 INJECTION, SOLUTION INTRAVENOUS at 07:02

## 2018-02-27 RX ADMIN — COLESEVELAM HYDROCHLORIDE 1875 MG: 625 TABLET, FILM COATED ORAL at 04:02

## 2018-02-27 RX ADMIN — HYDROMORPHONE HYDROCHLORIDE 1 MG: 2 INJECTION INTRAMUSCULAR; INTRAVENOUS; SUBCUTANEOUS at 09:02

## 2018-02-27 RX ADMIN — DEXTROSE MONOHYDRATE, SODIUM CHLORIDE, AND POTASSIUM CHLORIDE: 50; 4.5; 1.49 INJECTION, SOLUTION INTRAVENOUS at 09:02

## 2018-02-27 RX ADMIN — HYDRALAZINE HYDROCHLORIDE 10 MG: 20 INJECTION INTRAMUSCULAR; INTRAVENOUS at 06:02

## 2018-02-27 RX ADMIN — HYDROMORPHONE HYDROCHLORIDE 1 MG: 2 INJECTION INTRAMUSCULAR; INTRAVENOUS; SUBCUTANEOUS at 05:02

## 2018-02-27 RX ADMIN — LOSARTAN POTASSIUM 50 MG: 50 TABLET, FILM COATED ORAL at 08:02

## 2018-02-27 RX ADMIN — HYDROXYZINE PAMOATE 25 MG: 25 CAPSULE ORAL at 03:02

## 2018-02-27 RX ADMIN — KETOROLAC TROMETHAMINE 15 MG: 30 INJECTION, SOLUTION INTRAMUSCULAR at 07:02

## 2018-02-27 RX ADMIN — VITAMIN D, TAB 1000IU (100/BT) 1000 UNITS: 25 TAB at 08:02

## 2018-02-27 RX ADMIN — HYDROMORPHONE HYDROCHLORIDE 1 MG: 2 INJECTION INTRAMUSCULAR; INTRAVENOUS; SUBCUTANEOUS at 12:02

## 2018-02-27 NOTE — PROGRESS NOTES
Notified Dr. Pham patient is an diabetic and she did not have blood glucose checks and insulin she takes at home ordered. Dr. Pham said she would check her chart and order medications and also check a blood glucose now.

## 2018-02-27 NOTE — PLAN OF CARE
Problem: Patient Care Overview  Goal: Plan of Care Review  Outcome: Ongoing (interventions implemented as appropriate)  IV fluids for hydration. Medicated for pain when requested, pain relief obtained. Medication for nausea and vomiting ordered. Monitor blood glucose.

## 2018-02-27 NOTE — ED PROVIDER NOTES
Encounter Date: 2/26/2018       History     Chief Complaint   Patient presents with    Back Pain     c/o left lower back pain since Friday night. Also c/o vomiting since Friday     Patient is a 62-year-old female with a history of neuroendocrine tumor who complains of severe pain to her left lower back.  This began 3 days ago after the patient said she trimmed bushes.  She also began vomiting after the onset of pain.  She has no known fevers but said she experienced chills.  No hematuria or dysuria.  No constipation.      The history is provided by the patient.     Review of patient's allergies indicates:   Allergen Reactions    Codeine     Contrast media      Oral and IV    Darvocet a500 [propoxyphene n-acetaminophen]     Epinephrine      Neuroendocrine Tumor patient      Erythromycin     Iodinated contrast- oral and iv dye     Pcn [penicillins]     Sulfa (sulfonamide antibiotics)      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 No pertinent family history.  Social History   Substance Use Topics    Smoking status: Never Smoker    Smokeless tobacco: Never Used    Alcohol use No     Review of Systems   Constitutional: Positive for chills.   Cardiovascular: Negative for chest pain.   Gastrointestinal: Positive for nausea and vomiting. Negative for abdominal pain, constipation and diarrhea.   Musculoskeletal: Positive for back pain. Negative for  neck pain.   Skin: Negative for color change.   Neurological: Negative for syncope.   All other systems reviewed and are negative.      Physical Exam     Initial Vitals [02/26/18 1644]   BP Pulse Resp Temp SpO2   (!) 178/82 86 20 98.7 °F (37.1 °C) 97 %      MAP       114         Physical Exam    Nursing note and vitals reviewed.  Constitutional: No distress.   HENT:   Head: Normocephalic and atraumatic.   Eyes: EOM are normal.   Neck: Neck supple.   Cardiovascular: Normal rate, regular rhythm and normal heart sounds.   Pulmonary/Chest: Breath sounds normal.   Abdominal: Soft. She exhibits no distension. There is no tenderness.   Musculoskeletal: Normal range of motion. She exhibits edema.   Tenderness of the left lower lumbar paraspinous muscle.   Neurological: She is alert.   Skin: Skin is warm and dry.   Psychiatric: Her behavior is normal. Thought content normal.         ED Course   Procedures  Labs Reviewed   URINALYSIS - Abnormal; Notable for the following:        Result Value    Specific Gravity, UA >=1.030 (*)     Protein, UA Trace (*)     Glucose, UA 1+ (*)     Ketones, UA Trace (*)     All other components within normal limits   CBC W/ AUTO DIFFERENTIAL - Abnormal; Notable for the following:     Hemoglobin 10.6 (*)     Hematocrit 35.5 (*)     MCV 80 (*)     MCH 23.9 (*)     MCHC 29.9 (*)     RDW 14.8 (*)     MPV 9.1 (*)     All other components within normal limits   COMPREHENSIVE METABOLIC PANEL - Abnormal; Notable for the following:     Glucose 196 (*)     Albumin 3.3 (*)     All other components within normal limits   MAGNESIUM             Medical Decision Making:   Clinical Tests:   Lab Tests: Ordered and Reviewed  ED Management:  62-year-old female with severe left lower back pain.  She has a history of carcinoid tumor and is followed by Dr. Levy.  She will be admitted for observation and further evaluation.                      Clinical Impression:   The primary encounter diagnosis was Acute  left-sided back pain, unspecified back location. A diagnosis of Carcinoid tumor was also pertinent to this visit.                           Darwin South MD  02/26/18 1922

## 2018-02-27 NOTE — PLAN OF CARE
Problem: Patient Care Overview  Goal: Plan of Care Review  Outcome: Ongoing (interventions implemented as appropriate)  Remain free from falls, bed alarm in use. Instructed patient to call for assistance to the bathroom.

## 2018-02-27 NOTE — H&P
"[facility]  History & Physical    SUBJECTIVE:     Chief Complaint/Reason for Admission: Hx of NET with intractable low back pain    History of Present Illness:  Patient is a 62 y.o. 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 Pt states that she has hx of back pain but this pain was more severe and did not resolve with NSAIDs or PO morphine.  Pain started after she trimmed and cut back some shrubs in her yard. Pain 8/10 left sided at worst and described as "just pain".  Somewhat better with pain medication and rest. Worse with movement or touch.   Pt states that n/v developed as pain was worsening. Denies numbness, tingling, saddle anesthesia or changes to bowel or bladder function.     Reports several episodes of non-bloody, non-bilious vomiting over the weekend. But was able to tolerate a hamburger on drive from [place] to [facility] and Tolerated diet in ED and with no more episode of vomiting.     On exam, patient reports left side low back pain but has no other complaints. Denies fever, chills, dysuria, n/v/d/c.     Of note.  - patient recently saw Dr. Reddy, GI, on 1/29/18 for persistent nausea/vomiting and intermittent diarrhea with recs to start reglan, c/w scopolamine patch and zofran, and plans for EGD with possible dilation /- PEG placement.   - Pt lives in [place]    PTA Medications   Medication Sig    clonazePAM (KLONOPIN) 0.5 MG tablet Take 0.5 mg by mouth 2 (two) times daily as needed for Anxiety.    diclofenac (CATAFLAM) 50 MG tablet as needed.     DULoxetine (CYMBALTA) 60 MG capsule Take 60 mg by mouth once daily.    ACCU-CHEK SHU CONTROL SOLN Soln     ACCU-CHEK SHU PLUS METER Misc     ACCU-CHEK SHU PLUS TEST STRP Strp     amitriptyline " "(ELAVIL) 50 MG tablet Take 50 mg by mouth every evening.    atorvastatin (LIPITOR) 40 MG tablet Take 40 mg by mouth once daily.    BRAN/GUM/FIB/LATISHA/PSYL/KELP/PEC (FIBER 6 ORAL) Take by mouth once daily.    calcium carbonate (OS-BEST) 600 mg calcium (1,500 mg) Tab Take 600 mg by mouth once.    colesevelam (WELCHOL) 625 mg tablet Take 1,875 mg by mouth 2 (two) times daily with meals.    cyanocobalamin 1,000 mcg/mL injection 1,000 mcg every 28 days.    diphenoxylate-atropine 2.5-0.025 mg (LOMOTIL) 2.5-0.025 mg per tablet Take 1 tablet by mouth 4 (four) times daily as needed for Diarrhea.    docusate sodium (COLACE) 100 MG capsule Take 100 mg by mouth as needed for Constipation.    everolimus (AFINITOR) 10 mg Tab Take 10 mg by mouth once daily.    GLUCAGON EMERGENCY KIT, HUMAN, 1 mg injection     hydrOXYzine pamoate (VISTARIL) 25 MG Cap Take 25 mg by mouth 3 (three) times daily.     insulin degludec (TRESIBA FLEXTOUCH U-100) 100 unit/mL (3 mL) InPn Inject 10 Units into the skin once daily.     lamoTRIgine (LAMICTAL) 150 MG Tab     lancets 30 gauge Misc     lancing device Misc     losartan (COZAAR) 50 MG tablet Take 50 mg by mouth once daily.    metoclopramide HCl (REGLAN) 5 mg/5 mL Soln TAKE 10 ML BY MOUTH THREE TIMES A DAY    metoprolol succinate (TOPROL-XL) 100 MG 24 hr tablet Take 100 mg by mouth 2 (two) times daily.    multivitamin capsule Take 1 capsule by mouth once daily.    omeprazole (PRILOSEC) 40 MG capsule Take 40 mg by mouth 2 (two) times daily before meals.     ondansetron (ZOFRAN) 8 MG tablet Take 8 mg by mouth every 8 (eight) hours.    potassium chloride (KLOR-CON) 20 mEq Pack Take 20 mEq by mouth once.     scopolamine (TRANSDERM-SCOP) 1.3-1.5 mg (1 mg over 3 days)     SURE COMFORT PEN NEEDLE 32 gauge x 5/32" Ndle     tiZANidine (ZANAFLEX) 4 MG tablet as needed.     vitamin D 1000 units Tab Take 1,000 Units by mouth once daily.       Review of patient's allergies indicates: "   Allergen Reactions    Codeine     Contrast media      Oral and IV    Darvocet a500 [propoxyphene n-acetaminophen]     Epinephrine      Neuroendocrine Tumor patient      Erythromycin     Iodinated contrast- oral and iv dye     Pcn [penicillins]     Sulfa (sulfonamide antibiotics)        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 No pertinent family history.  Social History   Substance Use Topics    Smoking status: Never Smoker    Smokeless tobacco: Never Used    Alcohol use No        Review of Systems:  Review of Systems   Constitutional: Negative for activity change, chills, fatigue and fever.   HENT: Negative for congestion, rhinorrhea and sore throat.    Eyes: Negative for visual disturbance.   Respiratory: Negative for cough, chest tightness and shortness of breath.    Cardiovascular: Negative for chest pain, palpitations and leg swelling.   Gastrointestinal: + n/v at home but since resolved   Genitourinary: Negative for dysuria and hematuria.   Musculoskeletal: + left low back pain   Skin: Negative for rash.   Neurological: Negative for dizziness, light-headedness and headaches.   Psychiatric/Behavioral:  Negative for agitation.    OBJECTIVE:     Vital Signs (Most Recent):  Temp: 97.3 °F (36.3 °C) (02/27/18 0446)  Pulse: 60 (02/27/18 0446)  Resp: 20 (02/27/18 0446)  BP: (!) 196/94 (02/27/18 0446)  SpO2: 96 % (02/26/18 2905)    Physical  Exam:  Constitutional:  Awake, alert and oriented x 3, NAD  HENT: ncat, mmm, perrla, eomi, neck normal rom and supple   Cardiovascular: RRR no mrg  Pulmonary/Chest: Effort normal, CTA b/l no wheezes, rales, rhonchi   Abdominal: Soft. +bs, ntnd  Musculoskeletal: Normal range of motion.  no edema. TTP left paraspinal muscles, no ttp over spinous process, negative CVA tenderness  Neurological:  AAOx3, no focal deficits noted, CN II-XII grossly intact,  normal reflexes.   Skin: Skin is warm and dry. not diaphoretic.   Psychiatric:  normal mood and affect.  behavior is normal. Judgment and thought content normal.   Nursing note and vitals reviewed.      Laboratory:  CBC    Recent Labs  Lab 02/26/18  1741 02/27/18  0506   WBC 6.42 5.81   RBC 4.44 4.31   HGB 10.6* 10.5*   HCT 35.5* 34.5*    316   MCV 80* 80*   MCH 23.9* 24.4*   MCHC 29.9* 30.4*     BMP    Recent Labs  Lab 02/26/18  1741 02/27/18  0505    139   K 4.1 4.8   CO2 25 30*    103   BUN 15 12   CREATININE 1.0 0.8   * 210*         Recent Labs  Lab 02/26/18  1741 02/27/18  0505   CALCIUM 9.6 8.9   MG 1.7 1.6   PHOS  --  3.5     LFT    Recent Labs  Lab 02/26/18  1741 02/27/18  0505   PROT 6.7 6.0   ALBUMIN 3.3* 2.9*   BILITOT 0.4 0.3   AST 16 13   ALKPHOS 105 96   ALT 12 9*     UA    Recent Labs  Lab 02/26/18  1708   COLORU Yellow   SPECGRAV >=1.030*   PHUR 6.0   PROTEINUA Trace*         Diagnostic Results:  - no new images.    ASSESSMENT/PLAN:   63 yo WF with extensive medical hx including hx of gastric bypass (2009) and NET presented with intractable low back pain and nausea/vomiting x 3 days.     - IVF  anti-emetics prn  diet as tolerated  - Pain regimen  Toradol and dilaudid prn  - will consider muscle relaxers like zanaflex if spasms develop or pain presists.   - Pt currently in middle of work up for NET with Dr. Levy, will review case in order to obtain any necessary labs/imagining while patient is in hospital as she lives  several hours away.     - hx of IDDM2  POCT glucose qid, SSI, glargine 10 units Qday, dm diet  - hx of HTN  c/w home metoprolol, hydralazine prn spb >180  - hx of bipolar c/w home medications     Plan: Will admit for observation to Surgery    Code: full  Diet: diabetic  Ppx: scd, protonix  Dispo: follow up n/v, low back pain. Review NET case for possible labs/images prior to discharge.     Case d/w staff, Dr. Levy.     Cata Pham D.O.  [facility] HO-2  02/27/2018

## 2018-02-27 NOTE — ANESTHESIA PREPROCEDURE EVALUATION
02/27/2018  Kaylee Ngo is a 62 y.o., female w/ a hx of Dm, GERD, HTN, and neuroendocrine carcinoma of the small bowel who is scheduled for gastrostomy tube under general.   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    I have reviewed the Nursing Notes.   I have reviewed the Medications.     Review of Systems  Anesthesia Hx:  No problems with previous Anesthesia Denies Hx of Anesthetic complications  History of prior surgery of interest to airway management or planning: Previous anesthesia: General, MAC  Denies Personal Hx of Anesthesia complications.   Hematology/Oncology:        Current/Recent Cancer. Oncology Comments: NET of small bowel   Cardiovascular:   Exercise tolerance: poor Hypertension, poorly controlled hyperlipidemia CHAVEZ States she is able to walk up two flights of stairs w/o stopping, but does describe exertional SOB   Pulmonary:   Sleep Apnea    Hepatic/GI:   PUD, GERD Chronic nausea/vomiting    s/p gastric bypass (2009)   Musculoskeletal:   Arthritis  Arthritis of the neck 2/2 fall from horse per patient    Neurological:   Headaches    Endocrine:   Diabetes unk thyroid d/o   Psych:   Psychiatric History (BPD) anxiety depression         Wt Readings from Last 3 Encounters:   02/26/18 53.1 kg (117 lb)   01/29/18 52.6 kg (116 lb)   01/08/18 53.6 kg (118 lb 2.7 oz)     Temp Readings from Last 3 Encounters:   02/27/18 35.8 °C (96.4 °F) (Oral)   01/29/18 36.8 °C (98.2 °F) (Oral)   01/29/18 36.7 °C (98 °F) (Oral)     BP Readings from Last 3 Encounters:   02/27/18 (!) 165/80   01/29/18 (!) 176/85   01/29/18 (!) 190/78     Pulse Readings from Last 3 Encounters:   02/27/18 64   01/29/18 68   01/29/18 75           Physical Exam  General:  Cachexia    Airway/Jaw/Neck:  Airway Findings: Mouth Opening: Normal Tongue: Normal  Mallampati: III  Improves to III with phonation.  TM Distance: Normal, at least 6 cm  Jaw/Neck Findings:  Neck ROM: Normal ROM      Dental:  Dental Findings: Periodontal disease, Mild   Chest/Lungs:  Chest/Lungs Clear    Heart/Vascular:  Heart Findings: Normal       Mental Status:  Mental Status Findings: Normal          Recent Labs  Lab 02/27/18  0506   WBC 5.81   RBC 4.31   HGB 10.5*   HCT 34.5*      MCV 80*   MCH 24.4*   MCHC 30.4*       Chemistry        Component Value Date/Time     02/27/2018 0505    K 4.8 02/27/2018 0505     02/27/2018 0505    CO2 30 (H) 02/27/2018 0505    BUN 12 02/27/2018 0505    CREATININE 0.8 02/27/2018 0505     (H) 02/27/2018 0505        Component Value Date/Time    CALCIUM 8.9 02/27/2018 0505    ALKPHOS 96 02/27/2018 0505    AST 13 02/27/2018 0505    ALT 9 (L) 02/27/2018 0505    BILITOT 0.3 02/27/2018 0505    ESTGFRAFRICA >60 02/27/2018 0505    EGFRNONAA >60 02/27/2018 0505        ECHO:  No ECHO on file    EKG:  No EKG on file      Anesthesia Plan  Type of Anesthesia, risks & benefits discussed:  Anesthesia Type:  general, MAC  Patient's Preference:   Intra-op Monitoring Plan:   Intra-op Monitoring Plan Comments:   Post Op Pain Control Plan:   Post Op Pain Control Plan Comments:   Induction:   IV  Beta Blocker:  Patient is on a Beta-Blocker and has received one dose within the past  24 hours (No further documentation required).       Informed Consent: Patient understands risks and agrees with Anesthesia plan.  Questions answered. Anesthesia consent signed with patient.  ASA Score: 3     Day of Surgery Review of History & Physical:    H&P update referred to the surgeon.     Anesthesia Plan Notes:           Ready For Surgery From Anesthesia Perspective.

## 2018-02-27 NOTE — PLAN OF CARE
TN met with pt   lives with sister Emily Pickard  [phone] and her adult son (pt's nephew)   they assist with transportation and adls as needed   in the past pt has had HH with [facility]     referral sent to [facility] in [place] and [facility] for enteral feeds per [facility] care   TN spoke with [facility] nurse on call [facility] services their area.       pt's physical address [address] [place]   [address]   pt has a st cane, bsc, rw and ttb.          02/27/18 0439   Discharge Assessment   Assessment Type Discharge Planning Assessment   Confirmed/corrected address and phone number on facesheet? Yes   Assessment information obtained from? Patient;Medical Record   Expected Length of Stay (days) 3   Communicated expected length of stay with patient/caregiver yes   Prior to hospitilization cognitive status: Alert/Oriented   Prior to hospitalization functional status: Independent   Current cognitive status: Alert/Oriented   Current Functional Status: Independent   Lives With sibling(s)   Able to Return to Prior Arrangements yes   Is patient able to care for self after discharge? Yes   Who are your caregiver(s) and their phone number(s)? (sister Emily Pickard   [phone])   Patient's perception of discharge disposition home or selfcare   Readmission Within The Last 30 Days no previous admission in last 30 days   Patient currently being followed by outpatient case management? No   Patient currently receives any other outside agency services? No   Equipment Currently Used at Home walker, rolling;bath bench;cane, straight;bedside commode   Do you have any problems affording any of your prescribed medications? No   Is the patient taking medications as prescribed? yes   Does the patient have transportation home? Yes   Transportation Available family or friend will provide   Does the patient receive services at the [facility]? No   Discharge Plan A Home;Home with family;Home Health  (has used [facility] in  the past. )   Patient/Family In Agreement With Plan yes

## 2018-02-27 NOTE — PROGRESS NOTES
Due to allergy to contrast, standard prep for CT with contrast is prednisone 50 mg 13 hours from CT, 7 hours from CT and then 1 hour from CT.  Benadryl to be given one hour before CT.  Clarified time with CT, stated they can take patient at 9 am tomorrow, someone will bring the oral contrast up at 7 am to the patient.  Order for medication will be placed.  Will continue to monitor.

## 2018-02-27 NOTE — PROGRESS NOTES
Notified Dr. Pham patient was in room 511 and that there are several medication she takes at home that were not ordered. Her sister was asking for them she assist the patient with her medications and I updated her home medication list in Epic. Dr. Pham said she would ok some of them but did not want her to take some of them tonight. She also said she would check the list and order what she wanted.

## 2018-02-27 NOTE — ED NOTES
Report received. Care assumed. Pt AAOx4. Respirations even and unlabored. Pt VSS. Pt reports pain medication is helping pain. Will continue to monitor.

## 2018-02-28 ENCOUNTER — SURGERY (OUTPATIENT)
Age: 63
End: 2018-02-28

## 2018-02-28 ENCOUNTER — ANESTHESIA (OUTPATIENT)
Dept: SURGERY | Facility: HOSPITAL | Age: 63
DRG: 982 | End: 2018-02-28
Payer: MEDICARE

## 2018-02-28 LAB
ALBUMIN SERPL BCP-MCNC: 3.3 G/DL
ALP SERPL-CCNC: 106 U/L
ALT SERPL W/O P-5'-P-CCNC: 10 U/L
ANION GAP SERPL CALC-SCNC: 9 MMOL/L
AST SERPL-CCNC: 14 U/L
BASOPHILS # BLD AUTO: 0 K/UL
BASOPHILS NFR BLD: 0 %
BILIRUB SERPL-MCNC: 0.4 MG/DL
BUN SERPL-MCNC: 11 MG/DL
CALCIUM SERPL-MCNC: 9.9 MG/DL
CHLORIDE SERPL-SCNC: 101 MMOL/L
CO2 SERPL-SCNC: 29 MMOL/L
CREAT SERPL-MCNC: 0.8 MG/DL
DIFFERENTIAL METHOD: ABNORMAL
EOSINOPHIL # BLD AUTO: 0 K/UL
EOSINOPHIL NFR BLD: 0 %
ERYTHROCYTE [DISTWIDTH] IN BLOOD BY AUTOMATED COUNT: 14.8 %
EST. GFR  (AFRICAN AMERICAN): >60 ML/MIN/1.73 M^2
EST. GFR  (NON AFRICAN AMERICAN): >60 ML/MIN/1.73 M^2
GLUCOSE SERPL-MCNC: 261 MG/DL
HCT VFR BLD AUTO: 38.3 %
HGB BLD-MCNC: 11.6 G/DL
LYMPHOCYTES # BLD AUTO: 0.8 K/UL
LYMPHOCYTES NFR BLD: 17.1 %
MAGNESIUM SERPL-MCNC: 1.7 MG/DL
MCH RBC QN AUTO: 24.3 PG
MCHC RBC AUTO-ENTMCNC: 30.3 G/DL
MCV RBC AUTO: 80 FL
MONOCYTES # BLD AUTO: 0 K/UL
MONOCYTES NFR BLD: 0.4 %
NEUTROPHILS # BLD AUTO: 3.8 K/UL
NEUTROPHILS NFR BLD: 82.5 %
PHOSPHATE SERPL-MCNC: 3.3 MG/DL
PLATELET # BLD AUTO: 387 K/UL
PMV BLD AUTO: 9.1 FL
POCT GLUCOSE: 251 MG/DL (ref 70–110)
POCT GLUCOSE: 294 MG/DL (ref 70–110)
POCT GLUCOSE: 313 MG/DL (ref 70–110)
POCT GLUCOSE: 319 MG/DL (ref 70–110)
POTASSIUM SERPL-SCNC: 4.8 MMOL/L
PROT SERPL-MCNC: 6.8 G/DL
RBC # BLD AUTO: 4.78 M/UL
SODIUM SERPL-SCNC: 139 MMOL/L
WBC # BLD AUTO: 4.57 K/UL

## 2018-02-28 PROCEDURE — C9290 INJ, BUPIVACAINE LIPOSOME: HCPCS | Performed by: SURGERY

## 2018-02-28 PROCEDURE — 36415 COLL VENOUS BLD VENIPUNCTURE: CPT

## 2018-02-28 PROCEDURE — 94761 N-INVAS EAR/PLS OXIMETRY MLT: CPT

## 2018-02-28 PROCEDURE — 63600175 PHARM REV CODE 636 W HCPCS: Performed by: ANESTHESIOLOGY

## 2018-02-28 PROCEDURE — 0FB04ZX EXCISION OF LIVER, PERCUTANEOUS ENDOSCOPIC APPROACH, DIAGNOSTIC: ICD-10-PCS | Performed by: SURGERY

## 2018-02-28 PROCEDURE — 63600175 PHARM REV CODE 636 W HCPCS: Performed by: STUDENT IN AN ORGANIZED HEALTH CARE EDUCATION/TRAINING PROGRAM

## 2018-02-28 PROCEDURE — 25000003 PHARM REV CODE 250: Performed by: STUDENT IN AN ORGANIZED HEALTH CARE EDUCATION/TRAINING PROGRAM

## 2018-02-28 PROCEDURE — 80053 COMPREHEN METABOLIC PANEL: CPT

## 2018-02-28 PROCEDURE — 63600175 PHARM REV CODE 636 W HCPCS: Performed by: NURSE ANESTHETIST, CERTIFIED REGISTERED

## 2018-02-28 PROCEDURE — 88307 TISSUE EXAM BY PATHOLOGIST: CPT | Mod: 26,,, | Performed by: PATHOLOGY

## 2018-02-28 PROCEDURE — 71000033 HC RECOVERY, INTIAL HOUR: Performed by: SURGERY

## 2018-02-28 PROCEDURE — 25000003 PHARM REV CODE 250: Performed by: SURGERY

## 2018-02-28 PROCEDURE — 71000039 HC RECOVERY, EACH ADD'L HOUR: Performed by: SURGERY

## 2018-02-28 PROCEDURE — 0DH64UZ INSERTION OF FEEDING DEVICE INTO STOMACH, PERCUTANEOUS ENDOSCOPIC APPROACH: ICD-10-PCS | Performed by: SURGERY

## 2018-02-28 PROCEDURE — 85025 COMPLETE CBC W/AUTO DIFF WBC: CPT

## 2018-02-28 PROCEDURE — 25000003 PHARM REV CODE 250: Performed by: NURSE ANESTHETIST, CERTIFIED REGISTERED

## 2018-02-28 PROCEDURE — 11000001 HC ACUTE MED/SURG PRIVATE ROOM

## 2018-02-28 PROCEDURE — 88307 TISSUE EXAM BY PATHOLOGIST: CPT | Performed by: PATHOLOGY

## 2018-02-28 PROCEDURE — 27201423 OPTIME MED/SURG SUP & DEVICES STERILE SUPPLY: Performed by: SURGERY

## 2018-02-28 PROCEDURE — 36000708 HC OR TIME LEV III 1ST 15 MIN: Performed by: SURGERY

## 2018-02-28 PROCEDURE — G0378 HOSPITAL OBSERVATION PER HR: HCPCS

## 2018-02-28 PROCEDURE — 37000009 HC ANESTHESIA EA ADD 15 MINS: Performed by: SURGERY

## 2018-02-28 PROCEDURE — 25500020 PHARM REV CODE 255: Performed by: SURGERY

## 2018-02-28 PROCEDURE — 36000709 HC OR TIME LEV III EA ADD 15 MIN: Performed by: SURGERY

## 2018-02-28 PROCEDURE — 84100 ASSAY OF PHOSPHORUS: CPT

## 2018-02-28 PROCEDURE — 88313 SPECIAL STAINS GROUP 2: CPT | Mod: 26,,, | Performed by: PATHOLOGY

## 2018-02-28 PROCEDURE — 83735 ASSAY OF MAGNESIUM: CPT

## 2018-02-28 PROCEDURE — S0077 INJECTION, CLINDAMYCIN PHOSP: HCPCS | Performed by: NURSE ANESTHETIST, CERTIFIED REGISTERED

## 2018-02-28 PROCEDURE — 37000008 HC ANESTHESIA 1ST 15 MINUTES: Performed by: SURGERY

## 2018-02-28 PROCEDURE — 63600175 PHARM REV CODE 636 W HCPCS: Performed by: SURGERY

## 2018-02-28 RX ORDER — HYDROMORPHONE HYDROCHLORIDE 2 MG/ML
0.5 INJECTION, SOLUTION INTRAMUSCULAR; INTRAVENOUS; SUBCUTANEOUS EVERY 5 MIN PRN
Status: DISCONTINUED | OUTPATIENT
Start: 2018-02-28 | End: 2018-02-28 | Stop reason: HOSPADM

## 2018-02-28 RX ORDER — PROPOFOL 10 MG/ML
VIAL (ML) INTRAVENOUS
Status: DISCONTINUED | OUTPATIENT
Start: 2018-02-28 | End: 2018-02-28

## 2018-02-28 RX ORDER — BUPIVACAINE HYDROCHLORIDE 2.5 MG/ML
INJECTION, SOLUTION EPIDURAL; INFILTRATION; INTRACAUDAL
Status: DISCONTINUED | OUTPATIENT
Start: 2018-02-28 | End: 2018-02-28 | Stop reason: HOSPADM

## 2018-02-28 RX ORDER — SODIUM CHLORIDE, SODIUM LACTATE, POTASSIUM CHLORIDE, CALCIUM CHLORIDE 600; 310; 30; 20 MG/100ML; MG/100ML; MG/100ML; MG/100ML
INJECTION, SOLUTION INTRAVENOUS CONTINUOUS PRN
Status: DISCONTINUED | OUTPATIENT
Start: 2018-02-28 | End: 2018-02-28

## 2018-02-28 RX ORDER — SODIUM CHLORIDE 0.9 % (FLUSH) 0.9 %
3 SYRINGE (ML) INJECTION
Status: DISCONTINUED | OUTPATIENT
Start: 2018-02-28 | End: 2018-02-28 | Stop reason: HOSPADM

## 2018-02-28 RX ORDER — FENTANYL CITRATE 50 UG/ML
INJECTION, SOLUTION INTRAMUSCULAR; INTRAVENOUS
Status: DISCONTINUED | OUTPATIENT
Start: 2018-02-28 | End: 2018-02-28

## 2018-02-28 RX ORDER — CLINDAMYCIN PHOSPHATE 900 MG/50ML
INJECTION, SOLUTION INTRAVENOUS
Status: DISCONTINUED | OUTPATIENT
Start: 2018-02-28 | End: 2018-02-28

## 2018-02-28 RX ORDER — ROCURONIUM BROMIDE 10 MG/ML
INJECTION, SOLUTION INTRAVENOUS
Status: DISCONTINUED | OUTPATIENT
Start: 2018-02-28 | End: 2018-02-28

## 2018-02-28 RX ORDER — ONDANSETRON 2 MG/ML
4 INJECTION INTRAMUSCULAR; INTRAVENOUS DAILY PRN
Status: DISCONTINUED | OUTPATIENT
Start: 2018-02-28 | End: 2018-02-28 | Stop reason: HOSPADM

## 2018-02-28 RX ORDER — ONDANSETRON 2 MG/ML
INJECTION INTRAMUSCULAR; INTRAVENOUS
Status: DISCONTINUED | OUTPATIENT
Start: 2018-02-28 | End: 2018-02-28

## 2018-02-28 RX ORDER — DIPHENHYDRAMINE HYDROCHLORIDE 50 MG/ML
12.5 INJECTION INTRAMUSCULAR; INTRAVENOUS EVERY 6 HOURS PRN
Status: DISCONTINUED | OUTPATIENT
Start: 2018-02-28 | End: 2018-02-28 | Stop reason: HOSPADM

## 2018-02-28 RX ORDER — LIDOCAINE HCL/PF 100 MG/5ML
SYRINGE (ML) INTRAVENOUS
Status: DISCONTINUED | OUTPATIENT
Start: 2018-02-28 | End: 2018-02-28

## 2018-02-28 RX ORDER — MIDAZOLAM HYDROCHLORIDE 1 MG/ML
INJECTION, SOLUTION INTRAMUSCULAR; INTRAVENOUS
Status: DISCONTINUED | OUTPATIENT
Start: 2018-02-28 | End: 2018-02-28

## 2018-02-28 RX ORDER — DIPHENHYDRAMINE HYDROCHLORIDE 50 MG/ML
INJECTION INTRAMUSCULAR; INTRAVENOUS
Status: DISCONTINUED | OUTPATIENT
Start: 2018-02-28 | End: 2018-02-28

## 2018-02-28 RX ORDER — SUCCINYLCHOLINE CHLORIDE 20 MG/ML
INJECTION INTRAMUSCULAR; INTRAVENOUS
Status: DISCONTINUED | OUTPATIENT
Start: 2018-02-28 | End: 2018-02-28

## 2018-02-28 RX ORDER — BUPIVACAINE HYDROCHLORIDE 5 MG/ML
INJECTION, SOLUTION PERINEURAL
Status: DISCONTINUED | OUTPATIENT
Start: 2018-02-28 | End: 2018-02-28 | Stop reason: HOSPADM

## 2018-02-28 RX ADMIN — VITAMIN D, TAB 1000IU (100/BT) 1000 UNITS: 25 TAB at 10:02

## 2018-02-28 RX ADMIN — BUPIVACAINE HYDROCHLORIDE 30 ML: 5 INJECTION, SOLUTION PERINEURAL at 01:02

## 2018-02-28 RX ADMIN — PANTOPRAZOLE SODIUM 40 MG: 40 TABLET, DELAYED RELEASE ORAL at 09:02

## 2018-02-28 RX ADMIN — FENTANYL CITRATE 50 MCG: 50 INJECTION, SOLUTION INTRAMUSCULAR; INTRAVENOUS at 02:02

## 2018-02-28 RX ADMIN — MIDAZOLAM 2 MG: 1 INJECTION INTRAMUSCULAR; INTRAVENOUS at 01:02

## 2018-02-28 RX ADMIN — LAMOTRIGINE 150 MG: 100 TABLET ORAL at 09:02

## 2018-02-28 RX ADMIN — INSULIN DETEMIR 10 UNITS: 100 INJECTION, SOLUTION SUBCUTANEOUS at 10:02

## 2018-02-28 RX ADMIN — DIPHENHYDRAMINE HYDROCHLORIDE 50 MG: 25 CAPSULE ORAL at 08:02

## 2018-02-28 RX ADMIN — BUPIVACAINE 20 ML: 13.3 INJECTION, SUSPENSION, LIPOSOMAL INFILTRATION at 03:02

## 2018-02-28 RX ADMIN — CLINDAMYCIN PHOSPHATE 900 MG: 18 INJECTION, SOLUTION INTRAVENOUS at 01:02

## 2018-02-28 RX ADMIN — IOHEXOL 75 ML: 350 INJECTION, SOLUTION INTRAVENOUS at 09:02

## 2018-02-28 RX ADMIN — LOSARTAN POTASSIUM 50 MG: 50 TABLET, FILM COATED ORAL at 10:02

## 2018-02-28 RX ADMIN — SUCCINYLCHOLINE CHLORIDE 100 MG: 20 INJECTION, SOLUTION INTRAMUSCULAR; INTRAVENOUS at 01:02

## 2018-02-28 RX ADMIN — FENTANYL CITRATE 50 MCG: 50 INJECTION, SOLUTION INTRAMUSCULAR; INTRAVENOUS at 03:02

## 2018-02-28 RX ADMIN — DIATRIZOATE MEGLUMINE AND DIATRIZOATE SODIUM 100 ML: 660; 100 LIQUID ORAL; RECTAL at 02:02

## 2018-02-28 RX ADMIN — HYDROMORPHONE HYDROCHLORIDE 1 MG: 2 INJECTION INTRAMUSCULAR; INTRAVENOUS; SUBCUTANEOUS at 06:02

## 2018-02-28 RX ADMIN — IOHEXOL 30 ML: 350 INJECTION, SOLUTION INTRAVENOUS at 07:02

## 2018-02-28 RX ADMIN — PREDNISONE 50 MG: 20 TABLET ORAL at 08:02

## 2018-02-28 RX ADMIN — DEXTROSE MONOHYDRATE, SODIUM CHLORIDE, AND POTASSIUM CHLORIDE: 50; 4.5; 1.49 INJECTION, SOLUTION INTRAVENOUS at 05:02

## 2018-02-28 RX ADMIN — METOPROLOL SUCCINATE 100 MG: 50 TABLET, EXTENDED RELEASE ORAL at 09:02

## 2018-02-28 RX ADMIN — HYDROCORTISONE SODIUM SUCCINATE 100 MG: 100 INJECTION, POWDER, FOR SOLUTION INTRAMUSCULAR; INTRAVENOUS at 02:02

## 2018-02-28 RX ADMIN — SODIUM CHLORIDE, SODIUM LACTATE, POTASSIUM CHLORIDE, AND CALCIUM CHLORIDE: .6; .31; .03; .02 INJECTION, SOLUTION INTRAVENOUS at 03:02

## 2018-02-28 RX ADMIN — FENTANYL CITRATE 100 MCG: 50 INJECTION, SOLUTION INTRAMUSCULAR; INTRAVENOUS at 01:02

## 2018-02-28 RX ADMIN — ROCURONIUM BROMIDE 5 MG: 10 INJECTION, SOLUTION INTRAVENOUS at 01:02

## 2018-02-28 RX ADMIN — INSULIN ASPART 1 UNITS: 100 INJECTION, SOLUTION INTRAVENOUS; SUBCUTANEOUS at 09:02

## 2018-02-28 RX ADMIN — PANTOPRAZOLE SODIUM 40 MG: 40 TABLET, DELAYED RELEASE ORAL at 10:02

## 2018-02-28 RX ADMIN — AMITRIPTYLINE HYDROCHLORIDE 50 MG: 25 TABLET, FILM COATED ORAL at 09:02

## 2018-02-28 RX ADMIN — FENTANYL CITRATE 50 MCG: 50 INJECTION, SOLUTION INTRAMUSCULAR; INTRAVENOUS at 01:02

## 2018-02-28 RX ADMIN — HYDROMORPHONE HYDROCHLORIDE 0.5 MG: 2 INJECTION INTRAMUSCULAR; INTRAVENOUS; SUBCUTANEOUS at 04:02

## 2018-02-28 RX ADMIN — KETOROLAC TROMETHAMINE 15 MG: 30 INJECTION, SOLUTION INTRAMUSCULAR at 02:02

## 2018-02-28 RX ADMIN — LIDOCAINE HYDROCHLORIDE 60 MG: 20 INJECTION, SOLUTION INTRAVENOUS at 01:02

## 2018-02-28 RX ADMIN — ATORVASTATIN CALCIUM 40 MG: 40 TABLET, FILM COATED ORAL at 09:02

## 2018-02-28 RX ADMIN — HYDRALAZINE HYDROCHLORIDE 10 MG: 20 INJECTION INTRAMUSCULAR; INTRAVENOUS at 03:02

## 2018-02-28 RX ADMIN — PREDNISONE 50 MG: 10 TABLET ORAL at 02:02

## 2018-02-28 RX ADMIN — INSULIN ASPART 3 UNITS: 100 INJECTION, SOLUTION INTRAVENOUS; SUBCUTANEOUS at 08:02

## 2018-02-28 RX ADMIN — HYDROXYZINE PAMOATE 25 MG: 25 CAPSULE ORAL at 05:02

## 2018-02-28 RX ADMIN — PROMETHAZINE HYDROCHLORIDE 6.25 MG: 25 INJECTION INTRAMUSCULAR; INTRAVENOUS at 08:02

## 2018-02-28 RX ADMIN — ONDANSETRON 8 MG: 8 TABLET, ORALLY DISINTEGRATING ORAL at 05:02

## 2018-02-28 RX ADMIN — SODIUM CHLORIDE, SODIUM LACTATE, POTASSIUM CHLORIDE, AND CALCIUM CHLORIDE: .6; .31; .03; .02 INJECTION, SOLUTION INTRAVENOUS at 01:02

## 2018-02-28 RX ADMIN — KETOROLAC TROMETHAMINE 15 MG: 30 INJECTION, SOLUTION INTRAMUSCULAR at 09:02

## 2018-02-28 RX ADMIN — PROPOFOL 100 MG: 10 INJECTION, EMULSION INTRAVENOUS at 01:02

## 2018-02-28 RX ADMIN — DEXTROSE MONOHYDRATE, SODIUM CHLORIDE, AND POTASSIUM CHLORIDE: 50; 4.5; 1.49 INJECTION, SOLUTION INTRAVENOUS at 09:02

## 2018-02-28 RX ADMIN — DULOXETINE 60 MG: 30 CAPSULE, DELAYED RELEASE ORAL at 10:02

## 2018-02-28 RX ADMIN — ONDANSETRON 4 MG: 2 INJECTION, SOLUTION INTRAMUSCULAR; INTRAVENOUS at 03:02

## 2018-02-28 RX ADMIN — PROPOFOL 50 MG: 10 INJECTION, EMULSION INTRAVENOUS at 02:02

## 2018-02-28 RX ADMIN — HYDROXYZINE PAMOATE 25 MG: 25 CAPSULE ORAL at 09:02

## 2018-02-28 RX ADMIN — ROCURONIUM BROMIDE 15 MG: 10 INJECTION, SOLUTION INTRAVENOUS at 01:02

## 2018-02-28 RX ADMIN — LAMOTRIGINE 150 MG: 100 TABLET ORAL at 10:02

## 2018-02-28 RX ADMIN — DULOXETINE 60 MG: 30 CAPSULE, DELAYED RELEASE ORAL at 09:02

## 2018-02-28 RX ADMIN — PROPOFOL 50 MG: 10 INJECTION, EMULSION INTRAVENOUS at 01:02

## 2018-02-28 RX ADMIN — BUPIVACAINE HYDROCHLORIDE 60 ML: 2.5 INJECTION, SOLUTION EPIDURAL; INFILTRATION; INTRACAUDAL; PERINEURAL at 03:02

## 2018-02-28 RX ADMIN — DIPHENHYDRAMINE HYDROCHLORIDE 25 MG: 50 INJECTION, SOLUTION INTRAMUSCULAR; INTRAVENOUS at 02:02

## 2018-02-28 NOTE — OP NOTE
Surgery Brief Operative Report    Date of Procedure: 2/28/2018    Surgeon: Dr. Levy    Resident: Dr. Newton    Preoperative Diagnosis: Small bowel NET of unknown origin; hx of Sandeep en-Y gastric bypass; failure to thrive    Postoperative Diagnosis: same    Procedure: Laparoscopic gastrostomy tube placement to gastric remnant and contrast study; Liver biopsy    Anesthesia: GETA    Findings: Gastrostomy tube successfully placed to gastric remnant confirmed on visualization w/ contrast study confirming contrast to the duodenum    Patient Condition: stable    Complications: none    Estimated Blood Loss: minimal    Specimen: liver biopsy    [id number]

## 2018-02-28 NOTE — ANESTHESIA POSTPROCEDURE EVALUATION
"Anesthesia Post Evaluation    Patient: Kaylee Ngo    Procedure(s) Performed: Procedure(s) (LRB):  INSERTION-TUBE-GASTROSTOMY-LAPAROSCOPIC  with gastrogram (N/A)  BIOPSY-LIVER    Final Anesthesia Type: general  Patient location during evaluation: PACU  Patient participation: Yes- Able to Participate  Level of consciousness: awake and alert  Post-procedure vital signs: reviewed and stable  Pain management: adequate  Airway patency: patent  PONV status at discharge: No PONV  Anesthetic complications: no      Cardiovascular status: hemodynamically stable  Respiratory status: spontaneous ventilation  Hydration status: euvolemic  Follow-up not needed.        Visit Vitals  BP (!) 152/69   Pulse 66   Temp 36.6 °C (97.9 °F) (Oral)   Resp 12   Ht 5' 5.5" (1.664 m)   Wt 53.1 kg (117 lb 1 oz)   SpO2 98%   Breastfeeding? No   BMI 19.18 kg/m²       Pain/Geo Score: Pain Assessment Performed: Yes (2/28/2018  3:00 AM)  Presence of Pain: denies (2/28/2018  3:00 AM)  Pain Rating Prior to Med Admin: 7 (2/28/2018  2:11 AM)  Pain Rating Post Med Admin: 3 (2/28/2018  3:00 AM)      "

## 2018-02-28 NOTE — ANESTHESIA RELEASE NOTE
"Anesthesia Release from PACU Note    Patient: Kaylee Ngo    Procedure(s) Performed: Procedure(s) (LRB):  INSERTION-TUBE-GASTROSTOMY-LAPAROSCOPIC  with gastrogram (N/A)  BIOPSY-LIVER    Anesthesia type: general    Post pain: Adequate analgesia    Post assessment: no apparent anesthetic complications    Last Vitals:   Visit Vitals  BP (!) 152/69   Pulse 66   Temp 36.6 °C (97.9 °F) (Oral)   Resp 12   Ht 5' 5.5" (1.664 m)   Wt 53.1 kg (117 lb 1 oz)   SpO2 98%   Breastfeeding? No   BMI 19.18 kg/m²       Post vital signs: stable    Level of consciousness: awake    Nausea/Vomiting: no nausea/no vomiting    Complications: none    Airway Patency: patent    Respiratory: unassisted, spontaneous ventilation    Cardiovascular: stable and blood pressure at baseline    Hydration: euvolemic  "

## 2018-02-28 NOTE — PROGRESS NOTES
per [facility] - pt accepted by [facility] -- Cuong to call TN to confirm.      Cuauhtemoc with [facility] awaiting orders for tube feedings --   pt is in surgery at this time.

## 2018-02-28 NOTE — PLAN OF CARE
Problem: Patient Care Overview  Goal: Plan of Care Review  Outcome: Ongoing (interventions implemented as appropriate)  Patient remains free from falls, bed alarm in use. Medicated for pain with good results. HS blood glucose 274, 1 unit SSI given as ordered. NPO after MN for CT with contrast and G tube placement today. Continuous IV fluids in progress. NO complaints of nausea or vomiting.

## 2018-02-28 NOTE — PROGRESS NOTES
Surgery Progress Note:     Overnight:  Patient reports some nausea/vomiting this AM. Abdominal and back pain well controlled. Good uop, ambulating to bathroom. NPO for CT and OR today.     Temp:  [94.6 °F (34.8 °C)-98 °F (36.7 °C)] 97.7 °F (36.5 °C)  Pulse:  [55-77] 77  Resp:  [16-21] 21  SpO2:  [96 %-99 %] 99 %  BP: (133-215)/() 215/118      Intake/Output Summary (Last 24 hours) at 02/28/18 0838  Last data filed at 02/28/18 0333   Gross per 24 hour   Intake             1910 ml   Output             1675 ml   Net              235 ml     Physical Exam   Constitutional:  Awake, alert and oriented x 3, NAD  HENT: ncat, mmm, perrla, eomi, neck normal rom and supple   Cardiovascular: RRR no mrg  Pulmonary/Chest: Effort normal, CTA b/l no wheezes, rales, rhonchi   Abdominal: Soft. +bs, ntnd  Musculoskeletal: Normal range of motion.  no edema, tenderness or deformity.   Neurological:  AAOx3, no focal deficits noted, CN II-XII grossly intact,  normal reflexes.   Skin: Skin is warm and dry. not diaphoretic.   Psychiatric:  normal mood and affect.  behavior is normal. Judgment and thought content normal.   Nursing note and vitals reviewed.    LABS  CBC    Recent Labs  Lab 02/26/18 1741 02/27/18  0506 02/28/18  0549   WBC 6.42 5.81 4.57   RBC 4.44 4.31 4.78   HGB 10.6* 10.5* 11.6*   HCT 35.5* 34.5* 38.3    316 387*   MCV 80* 80* 80*   MCH 23.9* 24.4* 24.3*   MCHC 29.9* 30.4* 30.3*     BMP    Recent Labs  Lab 02/26/18 1741 02/27/18  0505 02/28/18  0549    139 139   K 4.1 4.8 4.8   CO2 25 30* 29    103 101   BUN 15 12 11   CREATININE 1.0 0.8 0.8   * 210* 261*       Recent Labs  Lab 02/26/18 1741 02/27/18  0505 02/28/18  0549   CALCIUM 9.6 8.9 9.9   MG 1.7 1.6 1.7   PHOS  --  3.5 3.3     LFT    Recent Labs  Lab 02/26/18  1741 02/27/18  0505 02/28/18  0549   PROT 6.7 6.0 6.8   ALBUMIN 3.3* 2.9* 3.3*   BILITOT 0.4 0.3 0.4   AST 16 13 14   ALKPHOS 105 96 106   ALT 12 9* 10         A/P: 61 yo WF with  extensive medical hx including hx of gastric bypass (2009) and NET presented with intractable low back pain and nausea/vomiting.     - follow up CT ab/pelvis  - plan for OR today for PEG tube placement  - c/w IVF  - c/w anti-emetics prn  -  Pain regimen  Toradol and dilaudid prn    - hx of IDDM2  POCT glucose qid, SSI, glargine 10 units Qday  - hx of HTN  c/w home metoprolol, hydralazine prn spb >180  - hx of bipolar c/w home medications    Code: full  Diet: npo  Ppx: scd, protonix  Dispo:  Follow up CT ab/pelvis, Plan for OR today for peg tube     Case d/w staff, Dr. Levy.     Cata Pham D.O.  \[facility]\"" Family Medicine HO-2  02/28/2018

## 2018-02-28 NOTE — TRANSFER OF CARE
"Anesthesia Transfer of Care Note    Patient: Kaylee Ngo    Procedure(s) Performed: Procedure(s) (LRB):  INSERTION-TUBE-GASTROSTOMY-LAPAROSCOPIC  with gastrogram (N/A)  BIOPSY-LIVER    Patient location: PACU    Anesthesia Type: general    Transport from OR: Transported from OR on 6-10 L/min O2 by face mask with adequate spontaneous ventilation    Post pain: adequate analgesia    Post assessment: no apparent anesthetic complications and tolerated procedure well    Post vital signs: stable    Level of consciousness: awake, alert and oriented    Nausea/Vomiting: no nausea/vomiting    Complications: none    Transfer of care protocol was followed      Last vitals:   Visit Vitals  BP (!) 214/94 (Patient Position: Lying)   Pulse 60   Temp 36.6 °C (97.9 °F) (Oral)   Resp 18   Ht 5' 5.5" (1.664 m)   Wt 53.1 kg (117 lb 1 oz)   SpO2 97%   Breastfeeding? No   BMI 19.18 kg/m²     "

## 2018-03-01 PROBLEM — E46 MALNUTRITION COMPROMISING BODILY FUNCTION: Status: ACTIVE | Noted: 2018-03-01

## 2018-03-01 PROBLEM — C7B.8 SECONDARY NEUROENDOCRINE TUMOR OF DISTANT LYMPH NODES: Status: ACTIVE | Noted: 2018-03-01

## 2018-03-01 LAB
ALBUMIN SERPL BCP-MCNC: 2.8 G/DL
ALP SERPL-CCNC: 83 U/L
ALT SERPL W/O P-5'-P-CCNC: 12 U/L
ANION GAP SERPL CALC-SCNC: 7 MMOL/L
AST SERPL-CCNC: 14 U/L
BASOPHILS # BLD AUTO: 0 K/UL
BASOPHILS NFR BLD: 0 %
BILIRUB SERPL-MCNC: 0.5 MG/DL
BUN SERPL-MCNC: 15 MG/DL
CALCIUM SERPL-MCNC: 9.1 MG/DL
CHLORIDE SERPL-SCNC: 101 MMOL/L
CO2 SERPL-SCNC: 32 MMOL/L
CREAT SERPL-MCNC: 0.9 MG/DL
DIFFERENTIAL METHOD: ABNORMAL
EOSINOPHIL # BLD AUTO: 0 K/UL
EOSINOPHIL NFR BLD: 0 %
ERYTHROCYTE [DISTWIDTH] IN BLOOD BY AUTOMATED COUNT: 15.3 %
EST. GFR  (AFRICAN AMERICAN): >60 ML/MIN/1.73 M^2
EST. GFR  (NON AFRICAN AMERICAN): >60 ML/MIN/1.73 M^2
GLUCOSE SERPL-MCNC: 241 MG/DL
HCT VFR BLD AUTO: 34.5 %
HGB BLD-MCNC: 10.4 G/DL
LYMPHOCYTES # BLD AUTO: 0.6 K/UL
LYMPHOCYTES NFR BLD: 7.7 %
MAGNESIUM SERPL-MCNC: 1.4 MG/DL
MCH RBC QN AUTO: 24.2 PG
MCHC RBC AUTO-ENTMCNC: 30.1 G/DL
MCV RBC AUTO: 80 FL
MONOCYTES # BLD AUTO: 0.5 K/UL
MONOCYTES NFR BLD: 6 %
NEUTROPHILS # BLD AUTO: 7 K/UL
NEUTROPHILS NFR BLD: 86.2 %
PHOSPHATE SERPL-MCNC: 3.9 MG/DL
PLATELET # BLD AUTO: 329 K/UL
PMV BLD AUTO: 8.8 FL
POCT GLUCOSE: 192 MG/DL (ref 70–110)
POCT GLUCOSE: 216 MG/DL (ref 70–110)
POCT GLUCOSE: 231 MG/DL (ref 70–110)
POCT GLUCOSE: 262 MG/DL (ref 70–110)
POTASSIUM SERPL-SCNC: 4.7 MMOL/L
PROT SERPL-MCNC: 5.7 G/DL
RBC # BLD AUTO: 4.3 M/UL
SODIUM SERPL-SCNC: 140 MMOL/L
WBC # BLD AUTO: 8.17 K/UL

## 2018-03-01 PROCEDURE — 83735 ASSAY OF MAGNESIUM: CPT

## 2018-03-01 PROCEDURE — 27000221 HC OXYGEN, UP TO 24 HOURS

## 2018-03-01 PROCEDURE — 80053 COMPREHEN METABOLIC PANEL: CPT

## 2018-03-01 PROCEDURE — 11000001 HC ACUTE MED/SURG PRIVATE ROOM

## 2018-03-01 PROCEDURE — 84100 ASSAY OF PHOSPHORUS: CPT

## 2018-03-01 PROCEDURE — 63600175 PHARM REV CODE 636 W HCPCS: Performed by: SURGERY

## 2018-03-01 PROCEDURE — 85025 COMPLETE CBC W/AUTO DIFF WBC: CPT

## 2018-03-01 PROCEDURE — 94799 UNLISTED PULMONARY SVC/PX: CPT

## 2018-03-01 PROCEDURE — 25000003 PHARM REV CODE 250: Performed by: SURGERY

## 2018-03-01 PROCEDURE — 36415 COLL VENOUS BLD VENIPUNCTURE: CPT

## 2018-03-01 PROCEDURE — 63600175 PHARM REV CODE 636 W HCPCS: Performed by: STUDENT IN AN ORGANIZED HEALTH CARE EDUCATION/TRAINING PROGRAM

## 2018-03-01 PROCEDURE — 94761 N-INVAS EAR/PLS OXIMETRY MLT: CPT

## 2018-03-01 PROCEDURE — 25000003 PHARM REV CODE 250: Performed by: STUDENT IN AN ORGANIZED HEALTH CARE EDUCATION/TRAINING PROGRAM

## 2018-03-01 RX ORDER — ACETAMINOPHEN 10 MG/ML
1000 INJECTION, SOLUTION INTRAVENOUS EVERY 8 HOURS
Status: DISCONTINUED | OUTPATIENT
Start: 2018-03-01 | End: 2018-03-02 | Stop reason: HOSPADM

## 2018-03-01 RX ORDER — KETOROLAC TROMETHAMINE 30 MG/ML
15 INJECTION, SOLUTION INTRAMUSCULAR; INTRAVENOUS EVERY 6 HOURS
Status: DISCONTINUED | OUTPATIENT
Start: 2018-03-01 | End: 2018-03-02 | Stop reason: HOSPADM

## 2018-03-01 RX ADMIN — AMITRIPTYLINE HYDROCHLORIDE 50 MG: 25 TABLET, FILM COATED ORAL at 08:03

## 2018-03-01 RX ADMIN — DEXTROSE MONOHYDRATE, SODIUM CHLORIDE, AND POTASSIUM CHLORIDE 75 ML/HR: 50; 4.5; 1.49 INJECTION, SOLUTION INTRAVENOUS at 09:03

## 2018-03-01 RX ADMIN — METOPROLOL SUCCINATE 100 MG: 50 TABLET, EXTENDED RELEASE ORAL at 08:03

## 2018-03-01 RX ADMIN — INSULIN DETEMIR 10 UNITS: 100 INJECTION, SOLUTION SUBCUTANEOUS at 09:03

## 2018-03-01 RX ADMIN — LOSARTAN POTASSIUM 50 MG: 50 TABLET, FILM COATED ORAL at 09:03

## 2018-03-01 RX ADMIN — HYDROXYZINE PAMOATE 25 MG: 25 CAPSULE ORAL at 09:03

## 2018-03-01 RX ADMIN — METOPROLOL SUCCINATE 100 MG: 50 TABLET, EXTENDED RELEASE ORAL at 09:03

## 2018-03-01 RX ADMIN — COLESEVELAM HYDROCHLORIDE 1875 MG: 625 TABLET, FILM COATED ORAL at 06:03

## 2018-03-01 RX ADMIN — DULOXETINE 60 MG: 30 CAPSULE, DELAYED RELEASE ORAL at 09:03

## 2018-03-01 RX ADMIN — PROMETHAZINE HYDROCHLORIDE 6.25 MG: 25 INJECTION INTRAMUSCULAR; INTRAVENOUS at 12:03

## 2018-03-01 RX ADMIN — INSULIN ASPART 3 UNITS: 100 INJECTION, SOLUTION INTRAVENOUS; SUBCUTANEOUS at 05:03

## 2018-03-01 RX ADMIN — HYDROXYZINE PAMOATE 25 MG: 25 CAPSULE ORAL at 05:03

## 2018-03-01 RX ADMIN — INSULIN ASPART 2 UNITS: 100 INJECTION, SOLUTION INTRAVENOUS; SUBCUTANEOUS at 06:03

## 2018-03-01 RX ADMIN — ACETAMINOPHEN 1000 MG: 10 INJECTION, SOLUTION INTRAVENOUS at 03:03

## 2018-03-01 RX ADMIN — COLESEVELAM HYDROCHLORIDE 1875 MG: 625 TABLET, FILM COATED ORAL at 07:03

## 2018-03-01 RX ADMIN — KETOROLAC TROMETHAMINE 15 MG: 30 INJECTION, SOLUTION INTRAMUSCULAR at 07:03

## 2018-03-01 RX ADMIN — HYDROMORPHONE HYDROCHLORIDE 1 MG: 2 INJECTION INTRAMUSCULAR; INTRAVENOUS; SUBCUTANEOUS at 11:03

## 2018-03-01 RX ADMIN — PANTOPRAZOLE SODIUM 40 MG: 40 TABLET, DELAYED RELEASE ORAL at 08:03

## 2018-03-01 RX ADMIN — LAMOTRIGINE 150 MG: 100 TABLET ORAL at 09:03

## 2018-03-01 RX ADMIN — ACETAMINOPHEN 1000 MG: 10 INJECTION, SOLUTION INTRAVENOUS at 09:03

## 2018-03-01 RX ADMIN — PANTOPRAZOLE SODIUM 40 MG: 40 TABLET, DELAYED RELEASE ORAL at 09:03

## 2018-03-01 RX ADMIN — KETOROLAC TROMETHAMINE 15 MG: 30 INJECTION, SOLUTION INTRAMUSCULAR at 06:03

## 2018-03-01 RX ADMIN — LAMOTRIGINE 150 MG: 100 TABLET ORAL at 08:03

## 2018-03-01 RX ADMIN — DULOXETINE 60 MG: 30 CAPSULE, DELAYED RELEASE ORAL at 08:03

## 2018-03-01 RX ADMIN — INSULIN ASPART 2 UNITS: 100 INJECTION, SOLUTION INTRAVENOUS; SUBCUTANEOUS at 11:03

## 2018-03-01 RX ADMIN — ATORVASTATIN CALCIUM 40 MG: 40 TABLET, FILM COATED ORAL at 09:03

## 2018-03-01 RX ADMIN — VITAMIN D, TAB 1000IU (100/BT) 1000 UNITS: 25 TAB at 09:03

## 2018-03-01 RX ADMIN — HYDROMORPHONE HYDROCHLORIDE 1 MG: 2 INJECTION INTRAMUSCULAR; INTRAVENOUS; SUBCUTANEOUS at 12:03

## 2018-03-01 RX ADMIN — HYDROXYZINE PAMOATE 25 MG: 25 CAPSULE ORAL at 03:03

## 2018-03-01 NOTE — PLAN OF CARE
Problem: Patient Care Overview  Goal: Plan of Care Review  Outcome: Ongoing (interventions implemented as appropriate)  AAOX3. Bed remains low, locked and call bell within reach. Patient verbalized understanding to call for any needs or assistance. IV fluids discontinued. PRN pain medication administered per MD orders. Diet advanced to DM 1500 calorie diet. Will continue to monitor patient.

## 2018-03-01 NOTE — PLAN OF CARE
Problem: Patient Care Overview  Goal: Plan of Care Review  Outcome: Ongoing (interventions implemented as appropriate)  Nasal cannula in use at .5 lpm with O2 saturation 99%. Will continue to monitor.

## 2018-03-01 NOTE — OP NOTE
DATE OF PROCEDURE:  02/28/2018    PREOPERATIVE DIAGNOSES:  Neuroendocrine tumor of unknown origin, status post   gastric bypass with malnourishment with significant diabetic gastroparesis with   persistent nausea and vomiting.    POSTOPERATIVE DIAGNOSES:  Neuroendocrine tumor of unknown origin, status post   gastric bypass with malnourishment with significant diabetic gastroparesis with   persistent nausea and vomiting.  Possible liver fibrosis.    OPERATIVE PROCEDURES DONE:  1.  Laparoscopic remnant, gastric tube placement, 24-Armenian.  2.  Liver biopsy.    ANESTHESIA:  General endotracheal anesthesia.    SURGEON:  Niki Levy M.D.    SURGERY RESIDENT:  Cuong Newton.    SPECIMEN:  Liver.    The patient was stable and transferred to Recovery Room.    HISTORY AND INDICATION:  This is a 62-year-old female who is known to have a   neuroendocrine tumor of unknown origin, status post gastric bypass, was admitted   with nausea and vomiting.  The patient had a significant issue with nausea,   vomiting and has a persistent malnourishment.  She is referred to our center for   neuroendocrine tumor.  She has significant retroperitoneal adenopathy.  She   takes Afinitor.  She is getting a workup done.  She underwent an EGD, which   shows gastrojejunal anastomotic narrowing, which was dilated.  The patient does   not have any anatomical narrowing, but however, she continues to have   significant nausea, vomiting which is affecting her malnourishment.  Because of   malnourishment, she is not able to undergo the surgical treatment that she   needs.  About three days back, the patient came to the hospital with severe left   sided pain.  She continues to not tolerated diet completely.  Last time, during   the endoscopy, an attempt was made to do a feeding tube and because of the   small gastric remnant, the feeding tube could not be done.  Finally, I decided   to go ahead and do a G-tube placement in the remnant stomach.   This was   discussed with the family.  I went over with them about the risk and benefits   and consent was obtained.    FINDINGS:  The liver was found to have fibrosis.  There was no obvious disease   on the surface of the intestine.  I was able to visualize the afferent limb.    The remnant stomach looked normal.  There were no significant adhesions to the   anterior abdominal wall.    PROCEDURE IN DETAIL:  The patient was brought to the Operating Room, was placed   in supine position.  She was then sedated and intubated.  The abdomen was   prepped and draped in the usual sterile manner.  A time-out was done to verify   the ID of the patient and the procedure.  A small incision was made in the   supraumbilical area.  A Veress needle was inserted into the abdominal cavity and   CO2 was insufflated.  After adequate insufflation using Optiview trocar, a port   was introduced into the abdominal cavity under direct vision.  Under direct   vision, two more 5-mm ports were placed, one on the right, one on the left at   the mid clavicular line.  The abdomen was visualized.  There was no significant   adhesion to the anterior abdominal wall.  The liver looked fibrotic.  I was able   to appreciate the gastrojejunal afferent limb.  The remnant stomach looked   normal.  There was no gross abnormality on the surface.  There was no   significant ascites that was seen on the surface.    Using the 18-gauge automatic gun, a small stab incision was placed in the right   upper quadrant, through that core biopsies were obtained.  Following this, these   biopsy site was cauterized under direct vision.    I retracted the left lobe of the liver.  The remnant stomach was visualized and   I attempted to bring the stomach.  It would easily come to the anterior   abdominal wall.  I chose the location where the remnant stomach would easily   come to the anterior abdominal wall.  At that point, I made an incision of about   one point  centimeter on the abdominal wall, which was at the left upper   quadrant.  Incision was extended deep.  The fascia was incised.  Following this,   the peritoneum was finally incised.  The  remnant stomach was grasped using   [place] forceps.  Stay sutures were placed on all four sites.  A gastrotomy was   made.  A 24-Mongolian PEG replacement balloon kit was used.  A subcutaneous tunnel   was made by making another small stab incision just below this incision.  The   tip of the catheter was inserted into the stomach and the balloon was   insufflated.  Using the 2-0 silk stay sutures, the gastrotomy opening was   closed, and it was secured to the peritoneum.  Following this, I injected   Gastrografin into the stomach and KUB was obtained.  KUB showed peristalsis of   the dye into the duodenum.  After meticulous hemostasis, the fascia in the left   upper quadrant was closed using 0 Vicryl stitch.    Again, I inserted the camera into the abdominal cavity.  Meticulous hemostasis   was accomplished all over.  The stomach which was pulled up into the anterior   abdominal wall looked healthy and normal.  There was no any other anatomical   abnormality.  Meticulous hemostasis was accomplished.  The ports were all   withdrawn.  Marcaine and Exparel was injected at all the incisions.  Skin was   closed using 4-0 Monocryl.  The gastrostomy tube was secured to the skin using   2-0 nylon stitch.  Dermabond was applied all over the incision.  The patient was   stable, was extubated, taken to the Recovery Room in stable condition.          /gail [id number] octavio(s)        DEBI  dd: 02/28/2018 17:58:38 (CST)  td: 03/01/2018 02:05:25 (CST)  Doc ID   #[id number]  Job ID #[id number]    CC:

## 2018-03-01 NOTE — PROGRESS NOTES
NETs Surg Onc    NAEON   Some complaints of pain in abdomen and headaches  On clear liquid diet, no nausea or emesis   GT to gravity     AF, P 50-80s, -215/  NAD  RRR  Breathing unlabored  Abd soft, appropriate post-op tenderness  Extremities warm and well perfused     WBC 8  H/H 10.4/34.5    62F POD 1 s.p G-tube  - Will add toradol for pain control   - Start trickle TF (impact at 10cc/hr)   - Encourage OOB   - IS

## 2018-03-01 NOTE — PROGRESS NOTES
TN spoke with Cuauhtemoc with [facility]   pt being set up for home tube fdgs with home health --     TN to speak with [facility] to confirm that pt is current --     Tube feedings at 10 ml/hr - Impact     awaiting Dietary notes/MD order for feedings.     TN spoke with [place] -- nurse with [facility] ---she is aware of this pt and will accept the pt at discharge (prob Fri 3/2).

## 2018-03-01 NOTE — PLAN OF CARE
Problem: Patient Care Overview  Goal: Plan of Care Review  Outcome: Ongoing (interventions implemented as appropriate)  Patient resting in bed, AAOx4. IVF infusing as ordered. Medications administered as ordered. Patient complained of some pain and nausea overnight, PRN medications administered. Patient asleep on and off through the shift. G tube to gravity as ordered with brownish drainage. Incision sites remain CDI. Patient ambulated to bathroom with assistance, safety maintained. Encouraged to call with needs or concerns.

## 2018-03-02 VITALS
HEIGHT: 66 IN | WEIGHT: 125.69 LBS | HEART RATE: 76 BPM | OXYGEN SATURATION: 94 % | BODY MASS INDEX: 20.2 KG/M2 | TEMPERATURE: 98 F | DIASTOLIC BLOOD PRESSURE: 63 MMHG | RESPIRATION RATE: 16 BRPM | SYSTOLIC BLOOD PRESSURE: 135 MMHG

## 2018-03-02 PROBLEM — R11.2 NAUSEA AND VOMITING: Status: RESOLVED | Noted: 2018-01-29 | Resolved: 2018-03-02

## 2018-03-02 PROBLEM — D3A.00 CARCINOID TUMOR: Status: ACTIVE | Noted: 2017-12-13

## 2018-03-02 PROBLEM — M54.9 ACUTE LEFT-SIDED BACK PAIN: Status: RESOLVED | Noted: 2018-02-26 | Resolved: 2018-03-02

## 2018-03-02 PROBLEM — R11.14 BILIOUS VOMITING WITH NAUSEA: Status: RESOLVED | Noted: 2018-01-29 | Resolved: 2018-03-02

## 2018-03-02 PROBLEM — C77.2 SECONDARY AND UNSPECIFIED MALIGNANT NEOPLASM OF INTRA-ABDOMINAL LYMPH NODES: Status: RESOLVED | Noted: 2017-12-13 | Resolved: 2018-03-02

## 2018-03-02 LAB
ALBUMIN SERPL BCP-MCNC: 2.5 G/DL
ALP SERPL-CCNC: 86 U/L
ALT SERPL W/O P-5'-P-CCNC: 13 U/L
ANION GAP SERPL CALC-SCNC: 4 MMOL/L
AST SERPL-CCNC: 14 U/L
BASOPHILS # BLD AUTO: 0 K/UL
BASOPHILS NFR BLD: 0 %
BILIRUB SERPL-MCNC: 0.5 MG/DL
BUN SERPL-MCNC: 21 MG/DL
CALCIUM SERPL-MCNC: 9 MG/DL
CHLORIDE SERPL-SCNC: 102 MMOL/L
CO2 SERPL-SCNC: 32 MMOL/L
CREAT SERPL-MCNC: 0.8 MG/DL
DIFFERENTIAL METHOD: ABNORMAL
EOSINOPHIL # BLD AUTO: 0 K/UL
EOSINOPHIL NFR BLD: 0.4 %
ERYTHROCYTE [DISTWIDTH] IN BLOOD BY AUTOMATED COUNT: 15.6 %
EST. GFR  (AFRICAN AMERICAN): >60 ML/MIN/1.73 M^2
EST. GFR  (NON AFRICAN AMERICAN): >60 ML/MIN/1.73 M^2
GLUCOSE SERPL-MCNC: 119 MG/DL
HCT VFR BLD AUTO: 31.7 %
HGB BLD-MCNC: 9.5 G/DL
LYMPHOCYTES # BLD AUTO: 0.8 K/UL
LYMPHOCYTES NFR BLD: 12 %
MAGNESIUM SERPL-MCNC: 1.6 MG/DL
MCH RBC QN AUTO: 24.3 PG
MCHC RBC AUTO-ENTMCNC: 30 G/DL
MCV RBC AUTO: 81 FL
MONOCYTES # BLD AUTO: 0.2 K/UL
MONOCYTES NFR BLD: 3.1 %
NEUTROPHILS # BLD AUTO: 5.7 K/UL
NEUTROPHILS NFR BLD: 84.2 %
PHOSPHATE SERPL-MCNC: 2.7 MG/DL
PLATELET # BLD AUTO: 274 K/UL
PMV BLD AUTO: 9.2 FL
POCT GLUCOSE: 144 MG/DL (ref 70–110)
POCT GLUCOSE: 219 MG/DL (ref 70–110)
POCT GLUCOSE: 96 MG/DL (ref 70–110)
POTASSIUM SERPL-SCNC: 4.4 MMOL/L
PROT SERPL-MCNC: 5.5 G/DL
RBC # BLD AUTO: 3.91 M/UL
SODIUM SERPL-SCNC: 138 MMOL/L
WBC # BLD AUTO: 6.81 K/UL

## 2018-03-02 PROCEDURE — 36415 COLL VENOUS BLD VENIPUNCTURE: CPT

## 2018-03-02 PROCEDURE — 94799 UNLISTED PULMONARY SVC/PX: CPT

## 2018-03-02 PROCEDURE — 84100 ASSAY OF PHOSPHORUS: CPT

## 2018-03-02 PROCEDURE — 85025 COMPLETE CBC W/AUTO DIFF WBC: CPT

## 2018-03-02 PROCEDURE — 63600175 PHARM REV CODE 636 W HCPCS: Performed by: SURGERY

## 2018-03-02 PROCEDURE — 99900035 HC TECH TIME PER 15 MIN (STAT)

## 2018-03-02 PROCEDURE — 80053 COMPREHEN METABOLIC PANEL: CPT

## 2018-03-02 PROCEDURE — 94761 N-INVAS EAR/PLS OXIMETRY MLT: CPT

## 2018-03-02 PROCEDURE — 97802 MEDICAL NUTRITION INDIV IN: CPT

## 2018-03-02 PROCEDURE — 83735 ASSAY OF MAGNESIUM: CPT

## 2018-03-02 PROCEDURE — 25000003 PHARM REV CODE 250: Performed by: STUDENT IN AN ORGANIZED HEALTH CARE EDUCATION/TRAINING PROGRAM

## 2018-03-02 RX ORDER — TRAMADOL HYDROCHLORIDE 50 MG/1
50 TABLET ORAL EVERY 4 HOURS PRN
Qty: 30 TABLET | Refills: 0 | Status: SHIPPED | OUTPATIENT
Start: 2018-03-02 | End: 2018-03-12

## 2018-03-02 RX ORDER — POLYETHYLENE GLYCOL 3350 17 G/17G
17 POWDER, FOR SOLUTION ORAL DAILY
Qty: 5 PACKET | Refills: 0 | Status: ON HOLD | OUTPATIENT
Start: 2018-03-02 | End: 2018-03-11 | Stop reason: HOSPADM

## 2018-03-02 RX ORDER — METOCLOPRAMIDE HYDROCHLORIDE 5 MG/5ML
10 SOLUTION ORAL 3 TIMES DAILY
Qty: 473 ML | Refills: 0 | Status: ON HOLD | OUTPATIENT
Start: 2018-03-02 | End: 2018-03-27 | Stop reason: HOSPADM

## 2018-03-02 RX ADMIN — SCOPALAMINE 1 PATCH: 1 PATCH, EXTENDED RELEASE TRANSDERMAL at 01:03

## 2018-03-02 RX ADMIN — METOPROLOL SUCCINATE 100 MG: 50 TABLET, EXTENDED RELEASE ORAL at 10:03

## 2018-03-02 RX ADMIN — KETOROLAC TROMETHAMINE 15 MG: 30 INJECTION, SOLUTION INTRAMUSCULAR at 05:03

## 2018-03-02 RX ADMIN — ATORVASTATIN CALCIUM 40 MG: 40 TABLET, FILM COATED ORAL at 10:03

## 2018-03-02 RX ADMIN — DULOXETINE 60 MG: 30 CAPSULE, DELAYED RELEASE ORAL at 10:03

## 2018-03-02 RX ADMIN — HYDROXYZINE PAMOATE 25 MG: 25 CAPSULE ORAL at 01:03

## 2018-03-02 RX ADMIN — VITAMIN D, TAB 1000IU (100/BT) 1000 UNITS: 25 TAB at 10:03

## 2018-03-02 RX ADMIN — KETOROLAC TROMETHAMINE 15 MG: 30 INJECTION, SOLUTION INTRAMUSCULAR at 06:03

## 2018-03-02 RX ADMIN — KETOROLAC TROMETHAMINE 15 MG: 30 INJECTION, SOLUTION INTRAMUSCULAR at 01:03

## 2018-03-02 RX ADMIN — ACETAMINOPHEN 1000 MG: 10 INJECTION, SOLUTION INTRAVENOUS at 05:03

## 2018-03-02 RX ADMIN — KETOROLAC TROMETHAMINE 15 MG: 30 INJECTION, SOLUTION INTRAMUSCULAR at 12:03

## 2018-03-02 RX ADMIN — HYDROXYZINE PAMOATE 25 MG: 25 CAPSULE ORAL at 05:03

## 2018-03-02 RX ADMIN — LAMOTRIGINE 150 MG: 100 TABLET ORAL at 10:03

## 2018-03-02 RX ADMIN — LOSARTAN POTASSIUM 50 MG: 50 TABLET, FILM COATED ORAL at 10:03

## 2018-03-02 RX ADMIN — COLESEVELAM HYDROCHLORIDE 1875 MG: 625 TABLET, FILM COATED ORAL at 06:03

## 2018-03-02 RX ADMIN — COLESEVELAM HYDROCHLORIDE 1875 MG: 625 TABLET, FILM COATED ORAL at 10:03

## 2018-03-02 RX ADMIN — INSULIN DETEMIR 10 UNITS: 100 INJECTION, SOLUTION SUBCUTANEOUS at 10:03

## 2018-03-02 RX ADMIN — PANTOPRAZOLE SODIUM 40 MG: 40 TABLET, DELAYED RELEASE ORAL at 10:03

## 2018-03-02 NOTE — PROGRESS NOTES
[facility]  Adult Nutrition  Progress Note    SUMMARY     Recommendations    Recommendation/Intervention:   1. Encourage good intake at meals as needed.   2. Recommended changing TF to Diabetisource AC- 1 can bolus after each meal. Diabetisource AC- 1 can tid would provide 900 kcal & 45g protein    Goals:   Pt will tolerate TF. 2. Pt will consume at least 50% intake at meals  Nutrition Goal Status: new  Communication of RD Recs: reviewed with RN (Vitor)    Continuum of Care Plan  Referral to Outpatient Services:  (pt to d/c on 2000 ADA diet and bolus TF as needed)    Reason for Assessment  Reason for Assessment: new tube feeding  Diagnosis: cancer diagnosis/related complications  Relevent Medical History: neuroendocrine CA, chemo, HTN, DM, HLD, GERD, gastric ulcer, depression, bipolar d/o, gastric bypass, cholecystectomy      General Information Comments: Pt on 1500cal ADA diet. Pt with poor-fair intake at meals. PEG placed yesterday. Receiving TF of Impact Peptide 1.5 at 20ml/hr. RN reports pt tolerating TF. Glucose elevated.    Nutrition Prescription Ordered  Current Diet Order: 1500 pato ADA  Current Nutrition Support Formula Ordered: Impact Peptide 1.5  Current Nutrition Support Rate Ordered: 20 (ml)  Current Nutrition Support Frequency Ordered: ml/hr      Evaluation of Received Nutrients/Fluid Intake  Enteral Calories (kcal): 720  Enteral Protein (gm): 45  Enteral (Free Water) Fluid (mL): 370  % Kcal Needs: 36  % Protein Needs: 66  % Intake of Estimated Energy Needs: 25 - 50 %  % Meal Intake: 25%     Nutrition Risk Screen   Nutrition Risk Screen:tube feeding    Nutrition/Diet History  Food Preferences: no Sikh or cultural food prefs identified  Factors Affecting Nutritional Intake: decreased appetite     Labs/Tests/Procedures/Meds  Pertinent Labs Reviewed: reviewed  Pertinent Labs Comments: Glu 241H, Alb 2.8L  Pertinent Medications Reviewed: reviewed  Pertinent Medications Comments:  "tylenol, insulin, Vit D    Physical Findings  Overall Physical Appearance: weak  Tubes: gastrostomy tube  Oral/Mouth Cavity: WDL  Skin:  (Renny 18-abd incision)    Anthropometrics  Temp: 98.1 °F (36.7 °C)  Height: 5' 5.5" (166.4 cm)  Weight Method: Bed Scale  Weight: 57 kg (125 lb 10.6 oz)  Ideal Body Weight (IBW), Female: 127.5 lb  % Ideal Body Weight, Female (lb): 91.76 lb  BMI (Calculated): 19.2  BMI Grade: 18.5-24.9 - normal    Estimated/Assessed Needs  Weight Used For Calorie Calculations: 57 kg (125 lb 10.6 oz)   Energy Calorie Requirements (kcal): 1995  Energy Need Method: Kcal/kg (35 kcal/kg)  RMR ([place]-St. Jeor Equation): 1143.81  Weight Used For Protein Calculations: 57 kg (125 lb 10.6 oz)  Protein Requirements: 68g (1.2g/kg)  Fluid Need Method: RDA Method  RDA Method (mL): 1995     Assessment and Plan    Malnutrition compromising bodily function    Related to (etiology):   cancer    Signs and Symptoms (as evidenced by):   Alb 2.8L, TF, decreased po intake    Interventions/Recommendations (treatment strategy):  See recs    Nutrition Diagnosis Status:   New              Monitor and Evaluation  Food and Nutrient Intake: food and beverage intake  Food and Nutrient Adminstration: diet order  Physical Activity and Function: nutrition-related ADLs and IADLs  Anthropometric Measurements: weight  Biochemical Data, Medical Tests and Procedures: electrolyte and renal panel  Nutrition-Focused Physical Findings: overall appearance    Nutrition Risk  Level of Risk:  (2xweekly)    Nutrition Follow-Up  RD Follow-up?: Yes  "

## 2018-03-02 NOTE — PLAN OF CARE
Problem: Patient Care Overview  Goal: Plan of Care Review  Outcome: Ongoing (interventions implemented as appropriate)  Patient AAOx3. Bed remains low, locked and call bell within reach. Patient verbalized understanding to call for any needs or assistance. Tube feeding increased to 30 ml/hr. 0 residual throughout shift. No complaints of pain. Will continue to monitor patient.

## 2018-03-02 NOTE — PLAN OF CARE
Problem: Patient Care Overview  Goal: Plan of Care Review  Outcome: Ongoing (interventions implemented as appropriate)  Patient resting in bed, AAOx4. Medications administered as ordered. Patient complained of some pain overnight, relieved by scheduled medications. Tube feeding infusing at 20ml/hr. No residual noted, will continue to check q4h. Patient ambulated to bathroom with assistance, safety maintained. Encouraged to call with needs or concerns. Will continue to monitor.

## 2018-03-02 NOTE — ASSESSMENT & PLAN NOTE
Related to (etiology):   cancer    Signs and Symptoms (as evidenced by):   Alb 2.8L, TF, decreased po intake    Interventions/Recommendations (treatment strategy):  See recs    Nutrition Diagnosis Status:   New

## 2018-03-02 NOTE — PLAN OF CARE
Problem: Nutrition, Enteral (Adult)  Goal: Signs and Symptoms of Listed Potential Problems Will be Absent, Minimized or Managed (Nutrition, Enteral)  Signs and symptoms of listed potential problems will be absent, minimized or managed by discharge/transition of care (reference Nutrition, Enteral (Adult) CPG).  Outcome: Ongoing (interventions implemented as appropriate)  Recommendation/Intervention:   1. Encourage good intake at meals as needed.   2. Recommended changing TF to Diabetisource AC- 1 can bolus after each meal. Diabetisource AC- 1 can tid would provide 900 kcal & 45g protein    Goals:   Pt will tolerate TF. 2. Pt will consume at least 50% intake at meals  Nutrition Goal Status: new  Communication of RD Recs: reviewed with RN (Vitor)

## 2018-03-02 NOTE — PROGRESS NOTES
[facility]  General Surgery  Progress Note    Subjective:     Interval History:     Post-Op Info:  Procedure(s) (LRB):  INSERTION-TUBE-GASTROSTOMY-LAPAROSCOPIC  with gastrogram (N/A)  BIOPSY-LIVER   2 Days Post-Op      Medications:  Continuous Infusions:  Scheduled Meds:   acetaminophen  1,000 mg Intravenous Q8H    amitriptyline  50 mg Oral QHS    atorvastatin  40 mg Oral Daily    colesevelam  1,875 mg Oral BID WM    DULoxetine  60 mg Oral BID    everolimus (antineoplastic)  10 mg Oral Daily    hydrOXYzine pamoate  25 mg Oral TID    insulin detemir U-100  10 Units Subcutaneous Daily    ketorolac  15 mg Intravenous Q6H    lamoTRIgine  150 mg Oral BID    losartan  50 mg Oral Daily    metoprolol succinate  100 mg Oral BID    pantoprazole  40 mg Oral BID    scopolamine  1 patch Transdermal Q3 Days    vitamin D  1,000 Units Oral Daily     PRN Meds:clonazePAM, dextrose 50%, dextrose 50%, gastroview, glucagon (human recombinant), glucose, glucose, hydrALAZINE, HYDROmorphone, insulin aspart U-100, ondansetron, promethazine (PHENERGAN) IVPB, sodium chloride 0.9%     Objective:     Vital Signs (Most Recent):  Temp: 98.3 °F (36.8 °C) (03/02/18 1213)  Pulse: 72 (03/02/18 1213)  Resp: 16 (03/02/18 1213)  BP: 137/82 (03/02/18 1213)  SpO2: 95 % (03/02/18 1122) Vital Signs (24h Range):  Temp:  [97.5 °F (36.4 °C)-98.8 °F (37.1 °C)] 98.3 °F (36.8 °C)  Pulse:  [65-72] 72  Resp:  [15-19] 16  SpO2:  [95 %-98 %] 95 %  BP: (120-144)/(56-82) 137/82       Intake/Output Summary (Last 24 hours) at 03/02/18 1346  Last data filed at 03/02/18 1334   Gross per 24 hour   Intake              401 ml   Output             1550 ml   Net            -1149 ml       Physical Exam    Stable    Doing ok  Wound looks good  lopez tube feeding  Assessment/Plan:     Active Diagnoses:    Diagnosis Date Noted POA    PRINCIPAL PROBLEM:  Malignant carcinoid tumor of unknown primary site [C7A.00] 12/13/2017 Yes    Malnutrition  compromising bodily function [E46] 03/01/2018 Yes    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

## 2018-03-02 NOTE — DISCHARGE SUMMARY
Surgery Discharge Summary    Date of Admission: 2/26/18    Date of Discharge: 3/2/18    Staff: Cam    Admission Diagnosis: 1. History of neuroendocrine tumor; 2. History of Gastric Bypass; 3. Intractable Nausea, Emesis; 4. Low Back pain     Discharge Diagnosis:  1. History of neuroendocrine tumor; 2. History of Gastric Bypass; 3. Intractable Nausea, Emesis; 4. Low Back pain; 5. Severe malnutrition     Procedures: Laparoscopic Gastrostomy Tube, Cam, 2/28/18    Hospital Course:   62yr F w/ extensive medical hx including hx of gastric bypass (2009) and NET presented with intractable low back pain and nausea/vomiting x 3 days. She was initially admitted to the surgery service, started on IVF, anti-emetics and a diet. After careful evaluation, the choice was made to place a G tube into the gastric remnant. 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 She may be a candidate for resection of her tumor in the future once her tumor work-up is completed (The source of her primary tumor is not known at this time and she is undergoing extensive work-up by Dr. Levy.) However, in order to be a candidate for resection or other invasive treatments, she must improve her nutrition status. Of note, Ms. Ngo is also a diabetic and it is likely that she has a diabetic gastroparesis which is further confounding her clinical picture. She was taken to the [facility] 2/28 and had a lap G-tube placed without complication. A liver biopsy was also obtained. Post-op she was observed overnight and then slowly started on trickle tube feeds which she tolerated. The patient was cleared for discharge tolerating tube feeds at 30cc/hr. She will continue nightly tube feeds to supplement her PO intake.     Disposition: Discharge home    Discharge Activity: He heavy lifting; Do not engage in activities that will dislodge G-tube    Discharge Diet: Patient should adhere to a liquid and/or bland diet to prevent nausea.  Tube feeds with impact (continuous from 6pm to 8am at 30cc/hr)    Discharge Instructions: Change G-tube dressing prn     Discharge Medications: Reglan, tramadol prn, miralax prn     Follow Up: Dr. Levy 2 weeks     FIDENCIO Dodson MD (PGY2)

## 2018-03-02 NOTE — CONSULTS
Consult received . Full assessment completed this morning. See nutrition note for full details.    Recommendation/Intervention:   1. Encourage good intake at meals as needed.   2. Recommended changing TF to Diabetisource AC- 1 can bolus after each meal. Diabetisource AC- 1 can tid would provide 900 kcal & 45g protein     Goals:   1. Pt will tolerate TF.   2. Pt will consume at least 50% intake at meals  Nutrition Goal Status: new  Communication of RD Recs: reviewed with RN (Vitor)

## 2018-03-03 NOTE — PLAN OF CARE
TN met with pt prior to d/c   Cuauhtemoc with [facility] taught home enteral fdgs per J tube 30 ml/hr 6p-8am - Impact 1.5   will [facility] HH [facility] -- report given by Cuauhteomc to [facility] - hh orders sent and rec'd per [facility]     Future Appointments  Date Time Provider Department Center   3/19/2018 1:15 PM Cam Ashton MD [facility] TUMOR [facility]   7/9/2018 9:15 AM [facility] MRI1 [facility] MRI [facility]   7/9/2018 11:00 AM [facility] CT1 LIMIT 400 LBS [facility] CT SCAN [facility]   7/9/2018 12:45 PM CARDIOLOGY, ECHO [facility] CARD [facility]   7/10/2018 12:30 PM Serjio Winkler MD [facility] TUMOR [facility]     family to pick pt up -- Cuauhtemoc will teach sister       03/02/18 7787   Final Note   Assessment Type Final Discharge Note   Discharge Disposition Home-Health  (Enteral fdgs per [facility] per [facility] )   What phone number can be called within the next 1-3 days to see how you are doing after discharge? [phone]   Hospital Follow Up  Appt(s) scheduled? (pt lives out of town - will make own f/u apt )   Discharge plans and expectations educations in teach back method with documentation complete? Yes   [facility] Referral Info   Post Acute Recommendation No Care   Referral Type (home infusion, home health )   Facility Name ([facility]; [facility] )

## 2018-03-06 ENCOUNTER — TELEPHONE (OUTPATIENT)
Dept: NEUROLOGY | Facility: HOSPITAL | Age: 63
End: 2018-03-06

## 2018-03-06 ENCOUNTER — HOSPITAL ENCOUNTER (INPATIENT)
Facility: HOSPITAL | Age: 63
LOS: 6 days | Discharge: HOME HEALTH | DRG: 394 | End: 2018-03-13
Attending: SURGERY | Admitting: SURGERY
Payer: MEDICARE

## 2018-03-06 DIAGNOSIS — K94.23 GASTROSTOMY TUBE DYSFUNCTION: Primary | ICD-10-CM

## 2018-03-06 LAB
ALBUMIN SERPL BCP-MCNC: 2.8 G/DL
ALP SERPL-CCNC: 119 U/L
ALT SERPL W/O P-5'-P-CCNC: 16 U/L
ANION GAP SERPL CALC-SCNC: 8 MMOL/L
ANISOCYTOSIS BLD QL SMEAR: SLIGHT
AST SERPL-CCNC: 23 U/L
BASOPHILS # BLD AUTO: 0.02 K/UL
BASOPHILS NFR BLD: 0.4 %
BILIRUB SERPL-MCNC: 0.3 MG/DL
BUN SERPL-MCNC: 11 MG/DL
CALCIUM SERPL-MCNC: 9 MG/DL
CHLORIDE SERPL-SCNC: 99 MMOL/L
CO2 SERPL-SCNC: 30 MMOL/L
CREAT SERPL-MCNC: 0.8 MG/DL
DIFFERENTIAL METHOD: ABNORMAL
EOSINOPHIL # BLD AUTO: 0.1 K/UL
EOSINOPHIL NFR BLD: 2 %
ERYTHROCYTE [DISTWIDTH] IN BLOOD BY AUTOMATED COUNT: 15.1 %
EST. GFR  (AFRICAN AMERICAN): >60 ML/MIN/1.73 M^2
EST. GFR  (NON AFRICAN AMERICAN): >60 ML/MIN/1.73 M^2
GLUCOSE SERPL-MCNC: 231 MG/DL
HCT VFR BLD AUTO: 30.4 %
HGB BLD-MCNC: 9.1 G/DL
HYPOCHROMIA BLD QL SMEAR: ABNORMAL
INR PPP: 1
LYMPHOCYTES # BLD AUTO: 1.6 K/UL
LYMPHOCYTES NFR BLD: 34.7 %
MAGNESIUM SERPL-MCNC: 1.6 MG/DL
MCH RBC QN AUTO: 24.1 PG
MCHC RBC AUTO-ENTMCNC: 29.9 G/DL
MCV RBC AUTO: 81 FL
MONOCYTES # BLD AUTO: 0.7 K/UL
MONOCYTES NFR BLD: 16.3 %
NEUTROPHILS # BLD AUTO: 2.1 K/UL
NEUTROPHILS NFR BLD: 46.6 %
OVALOCYTES BLD QL SMEAR: ABNORMAL
PHOSPHATE SERPL-MCNC: 3.5 MG/DL
PLATELET # BLD AUTO: 438 K/UL
PLATELET BLD QL SMEAR: ABNORMAL
PMV BLD AUTO: 10.3 FL
POIKILOCYTOSIS BLD QL SMEAR: SLIGHT
POTASSIUM SERPL-SCNC: 3.8 MMOL/L
PROT SERPL-MCNC: 6.1 G/DL
PROTHROMBIN TIME: 10.1 SEC
RBC # BLD AUTO: 3.77 M/UL
SODIUM SERPL-SCNC: 137 MMOL/L
WBC # BLD AUTO: 4.53 K/UL

## 2018-03-06 PROCEDURE — 80053 COMPREHEN METABOLIC PANEL: CPT

## 2018-03-06 PROCEDURE — 85025 COMPLETE CBC W/AUTO DIFF WBC: CPT

## 2018-03-06 PROCEDURE — 83735 ASSAY OF MAGNESIUM: CPT

## 2018-03-06 PROCEDURE — 36415 COLL VENOUS BLD VENIPUNCTURE: CPT

## 2018-03-06 PROCEDURE — 84100 ASSAY OF PHOSPHORUS: CPT

## 2018-03-06 PROCEDURE — 25000003 PHARM REV CODE 250: Performed by: SURGERY

## 2018-03-06 PROCEDURE — 11000001 HC ACUTE MED/SURG PRIVATE ROOM

## 2018-03-06 PROCEDURE — 85610 PROTHROMBIN TIME: CPT

## 2018-03-06 RX ORDER — SODIUM CHLORIDE 0.9 % (FLUSH) 0.9 %
3 SYRINGE (ML) INJECTION
Status: DISCONTINUED | OUTPATIENT
Start: 2018-03-06 | End: 2018-03-13 | Stop reason: HOSPADM

## 2018-03-06 RX ORDER — ONDANSETRON 2 MG/ML
4 INJECTION INTRAMUSCULAR; INTRAVENOUS EVERY 12 HOURS PRN
Status: DISCONTINUED | OUTPATIENT
Start: 2018-03-06 | End: 2018-03-07

## 2018-03-06 RX ORDER — DEXTROSE MONOHYDRATE, SODIUM CHLORIDE, AND POTASSIUM CHLORIDE 50; 1.49; 4.5 G/1000ML; G/1000ML; G/1000ML
INJECTION, SOLUTION INTRAVENOUS CONTINUOUS
Status: DISCONTINUED | OUTPATIENT
Start: 2018-03-06 | End: 2018-03-09

## 2018-03-06 RX ORDER — DIPHENHYDRAMINE HYDROCHLORIDE 50 MG/ML
25 INJECTION INTRAMUSCULAR; INTRAVENOUS EVERY 4 HOURS PRN
Status: DISCONTINUED | OUTPATIENT
Start: 2018-03-06 | End: 2018-03-13 | Stop reason: HOSPADM

## 2018-03-06 RX ORDER — HYDROMORPHONE HYDROCHLORIDE 2 MG/ML
0.2 INJECTION, SOLUTION INTRAMUSCULAR; INTRAVENOUS; SUBCUTANEOUS EVERY 4 HOURS PRN
Status: DISCONTINUED | OUTPATIENT
Start: 2018-03-06 | End: 2018-03-07

## 2018-03-06 RX ADMIN — DEXTROSE MONOHYDRATE, SODIUM CHLORIDE, AND POTASSIUM CHLORIDE: 50; 4.5; 1.49 INJECTION, SOLUTION INTRAVENOUS at 10:03

## 2018-03-06 NOTE — TELEPHONE ENCOUNTER
----- Message from Ale Sherwood sent at 3/6/2018 10:18 AM CST -----  Contact: Patients sister, Emily  KSENIA Diaz, patients sister called to inform us that the patients feeding tube came out and they are headed to the  Emergency Room at [facility] in [facility] [place]. Emily can be reached at  [phone].

## 2018-03-07 LAB
ALBUMIN SERPL BCP-MCNC: 2.5 G/DL
ALP SERPL-CCNC: 107 U/L
ALT SERPL W/O P-5'-P-CCNC: 11 U/L
ANION GAP SERPL CALC-SCNC: 8 MMOL/L
ANISOCYTOSIS BLD QL SMEAR: SLIGHT
AST SERPL-CCNC: 13 U/L
BASOPHILS # BLD AUTO: ABNORMAL K/UL
BASOPHILS NFR BLD: 0 %
BILIRUB SERPL-MCNC: 0.2 MG/DL
BUN SERPL-MCNC: 8 MG/DL
CALCIUM SERPL-MCNC: 8.6 MG/DL
CHLORIDE SERPL-SCNC: 101 MMOL/L
CO2 SERPL-SCNC: 30 MMOL/L
CREAT SERPL-MCNC: 0.8 MG/DL
DIFFERENTIAL METHOD: ABNORMAL
EOSINOPHIL # BLD AUTO: ABNORMAL K/UL
EOSINOPHIL NFR BLD: 5 %
ERYTHROCYTE [DISTWIDTH] IN BLOOD BY AUTOMATED COUNT: 15.2 %
EST. GFR  (AFRICAN AMERICAN): >60 ML/MIN/1.73 M^2
EST. GFR  (NON AFRICAN AMERICAN): >60 ML/MIN/1.73 M^2
GLUCOSE SERPL-MCNC: 300 MG/DL
HCT VFR BLD AUTO: 30 %
HGB BLD-MCNC: 8.6 G/DL
HYPOCHROMIA BLD QL SMEAR: ABNORMAL
LYMPHOCYTES # BLD AUTO: ABNORMAL K/UL
LYMPHOCYTES NFR BLD: 30 %
MCH RBC QN AUTO: 23.6 PG
MCHC RBC AUTO-ENTMCNC: 28.7 G/DL
MCV RBC AUTO: 82 FL
MONOCYTES # BLD AUTO: ABNORMAL K/UL
MONOCYTES NFR BLD: 9 %
NEUTROPHILS NFR BLD: 52 %
NEUTS BAND NFR BLD MANUAL: 4 %
PLATELET # BLD AUTO: 365 K/UL
PLATELET BLD QL SMEAR: ABNORMAL
PMV BLD AUTO: 9.1 FL
POCT GLUCOSE: 148 MG/DL (ref 70–110)
POCT GLUCOSE: 187 MG/DL (ref 70–110)
POCT GLUCOSE: 215 MG/DL (ref 70–110)
POCT GLUCOSE: 248 MG/DL (ref 70–110)
POTASSIUM SERPL-SCNC: 4.5 MMOL/L
PROT SERPL-MCNC: 5.5 G/DL
RBC # BLD AUTO: 3.64 M/UL
SODIUM SERPL-SCNC: 139 MMOL/L
WBC # BLD AUTO: 4.39 K/UL

## 2018-03-07 PROCEDURE — 94761 N-INVAS EAR/PLS OXIMETRY MLT: CPT

## 2018-03-07 PROCEDURE — 94799 UNLISTED PULMONARY SVC/PX: CPT

## 2018-03-07 PROCEDURE — 63600175 PHARM REV CODE 636 W HCPCS: Performed by: RADIOLOGY

## 2018-03-07 PROCEDURE — 80053 COMPREHEN METABOLIC PANEL: CPT

## 2018-03-07 PROCEDURE — 85007 BL SMEAR W/DIFF WBC COUNT: CPT | Mod: NCS

## 2018-03-07 PROCEDURE — 99900035 HC TECH TIME PER 15 MIN (STAT)

## 2018-03-07 PROCEDURE — 63600175 PHARM REV CODE 636 W HCPCS: Performed by: STUDENT IN AN ORGANIZED HEALTH CARE EDUCATION/TRAINING PROGRAM

## 2018-03-07 PROCEDURE — 36415 COLL VENOUS BLD VENIPUNCTURE: CPT

## 2018-03-07 PROCEDURE — 25000003 PHARM REV CODE 250: Performed by: STUDENT IN AN ORGANIZED HEALTH CARE EDUCATION/TRAINING PROGRAM

## 2018-03-07 PROCEDURE — 25000003 PHARM REV CODE 250: Performed by: SURGERY

## 2018-03-07 PROCEDURE — 0DP6XUZ REMOVAL OF FEEDING DEVICE FROM STOMACH, EXTERNAL APPROACH: ICD-10-PCS | Performed by: RADIOLOGY

## 2018-03-07 PROCEDURE — 25000003 PHARM REV CODE 250: Performed by: RADIOLOGY

## 2018-03-07 PROCEDURE — 63600175 PHARM REV CODE 636 W HCPCS: Performed by: SURGERY

## 2018-03-07 PROCEDURE — 11000001 HC ACUTE MED/SURG PRIVATE ROOM

## 2018-03-07 PROCEDURE — 0DH63UZ INSERTION OF FEEDING DEVICE INTO STOMACH, PERCUTANEOUS APPROACH: ICD-10-PCS | Performed by: RADIOLOGY

## 2018-03-07 PROCEDURE — G0378 HOSPITAL OBSERVATION PER HR: HCPCS

## 2018-03-07 PROCEDURE — S0030 INJECTION, METRONIDAZOLE: HCPCS | Performed by: STUDENT IN AN ORGANIZED HEALTH CARE EDUCATION/TRAINING PROGRAM

## 2018-03-07 PROCEDURE — 63600175 PHARM REV CODE 636 W HCPCS: Mod: JG | Performed by: RADIOLOGY

## 2018-03-07 PROCEDURE — 85027 COMPLETE CBC AUTOMATED: CPT

## 2018-03-07 RX ORDER — DIPHENHYDRAMINE HYDROCHLORIDE 50 MG/ML
INJECTION INTRAMUSCULAR; INTRAVENOUS CODE/TRAUMA/SEDATION MEDICATION
Status: DISCONTINUED | OUTPATIENT
Start: 2018-03-07 | End: 2018-03-13 | Stop reason: HOSPADM

## 2018-03-07 RX ORDER — CHOLECALCIFEROL (VITAMIN D3) 25 MCG
1000 TABLET ORAL DAILY
Status: DISCONTINUED | OUTPATIENT
Start: 2018-03-08 | End: 2018-03-13 | Stop reason: HOSPADM

## 2018-03-07 RX ORDER — ONDANSETRON 2 MG/ML
4 INJECTION INTRAMUSCULAR; INTRAVENOUS EVERY 8 HOURS
Status: DISCONTINUED | OUTPATIENT
Start: 2018-03-07 | End: 2018-03-08

## 2018-03-07 RX ORDER — PANTOPRAZOLE SODIUM 40 MG/1
40 TABLET, DELAYED RELEASE ORAL DAILY
Status: DISCONTINUED | OUTPATIENT
Start: 2018-03-08 | End: 2018-03-13 | Stop reason: HOSPADM

## 2018-03-07 RX ORDER — AMITRIPTYLINE HYDROCHLORIDE 25 MG/1
50 TABLET, FILM COATED ORAL NIGHTLY
Status: DISCONTINUED | OUTPATIENT
Start: 2018-03-07 | End: 2018-03-13 | Stop reason: HOSPADM

## 2018-03-07 RX ORDER — CALCIUM CARBONATE 500(1250)
500 TABLET ORAL DAILY
Status: DISCONTINUED | OUTPATIENT
Start: 2018-03-08 | End: 2018-03-13 | Stop reason: HOSPADM

## 2018-03-07 RX ORDER — PROMETHAZINE HYDROCHLORIDE 25 MG/ML
12.5 INJECTION, SOLUTION INTRAMUSCULAR; INTRAVENOUS EVERY 6 HOURS PRN
Status: DISCONTINUED | OUTPATIENT
Start: 2018-03-07 | End: 2018-03-07

## 2018-03-07 RX ORDER — PROMETHAZINE HYDROCHLORIDE 25 MG/ML
INJECTION, SOLUTION INTRAMUSCULAR; INTRAVENOUS CODE/TRAUMA/SEDATION MEDICATION
Status: DISCONTINUED | OUTPATIENT
Start: 2018-03-07 | End: 2018-03-07

## 2018-03-07 RX ORDER — METOCLOPRAMIDE HYDROCHLORIDE 5 MG/5ML
10 SOLUTION ORAL 3 TIMES DAILY
Status: DISCONTINUED | OUTPATIENT
Start: 2018-03-07 | End: 2018-03-13 | Stop reason: HOSPADM

## 2018-03-07 RX ORDER — KETOROLAC TROMETHAMINE 30 MG/ML
15 INJECTION, SOLUTION INTRAMUSCULAR; INTRAVENOUS EVERY 6 HOURS
Status: COMPLETED | OUTPATIENT
Start: 2018-03-07 | End: 2018-03-09

## 2018-03-07 RX ORDER — ONDANSETRON 2 MG/ML
INJECTION INTRAMUSCULAR; INTRAVENOUS CODE/TRAUMA/SEDATION MEDICATION
Status: DISCONTINUED | OUTPATIENT
Start: 2018-03-07 | End: 2018-03-07

## 2018-03-07 RX ORDER — LOSARTAN POTASSIUM 50 MG/1
50 TABLET ORAL DAILY
Status: DISCONTINUED | OUTPATIENT
Start: 2018-03-08 | End: 2018-03-13 | Stop reason: HOSPADM

## 2018-03-07 RX ORDER — LIDOCAINE HYDROCHLORIDE 10 MG/ML
INJECTION INFILTRATION; PERINEURAL CODE/TRAUMA/SEDATION MEDICATION
Status: DISCONTINUED | OUTPATIENT
Start: 2018-03-07 | End: 2018-03-07

## 2018-03-07 RX ORDER — GLUCAGON 1 MG
KIT INJECTION CODE/TRAUMA/SEDATION MEDICATION
Status: DISCONTINUED | OUTPATIENT
Start: 2018-03-07 | End: 2018-03-13 | Stop reason: HOSPADM

## 2018-03-07 RX ORDER — METRONIDAZOLE 500 MG/100ML
500 INJECTION, SOLUTION INTRAVENOUS
Status: DISCONTINUED | OUTPATIENT
Start: 2018-03-07 | End: 2018-03-11

## 2018-03-07 RX ORDER — DULOXETIN HYDROCHLORIDE 30 MG/1
60 CAPSULE, DELAYED RELEASE ORAL DAILY
Status: DISCONTINUED | OUTPATIENT
Start: 2018-03-08 | End: 2018-03-13 | Stop reason: HOSPADM

## 2018-03-07 RX ORDER — CLONAZEPAM 0.5 MG/1
0.5 TABLET ORAL 2 TIMES DAILY
Status: DISCONTINUED | OUTPATIENT
Start: 2018-03-07 | End: 2018-03-13 | Stop reason: HOSPADM

## 2018-03-07 RX ORDER — GLUCAGON 1 MG
1 KIT INJECTION
Status: DISCONTINUED | OUTPATIENT
Start: 2018-03-07 | End: 2018-03-13 | Stop reason: HOSPADM

## 2018-03-07 RX ORDER — ATORVASTATIN CALCIUM 40 MG/1
40 TABLET, FILM COATED ORAL DAILY
Status: DISCONTINUED | OUTPATIENT
Start: 2018-03-08 | End: 2018-03-12

## 2018-03-07 RX ORDER — HYDRALAZINE HYDROCHLORIDE 20 MG/ML
10 INJECTION INTRAMUSCULAR; INTRAVENOUS EVERY 6 HOURS PRN
Status: DISCONTINUED | OUTPATIENT
Start: 2018-03-07 | End: 2018-03-13 | Stop reason: HOSPADM

## 2018-03-07 RX ORDER — SCOLOPAMINE TRANSDERMAL SYSTEM 1 MG/1
1 PATCH, EXTENDED RELEASE TRANSDERMAL
Status: DISCONTINUED | OUTPATIENT
Start: 2018-03-07 | End: 2018-03-07

## 2018-03-07 RX ORDER — FENTANYL CITRATE 50 UG/ML
INJECTION, SOLUTION INTRAMUSCULAR; INTRAVENOUS CODE/TRAUMA/SEDATION MEDICATION
Status: DISCONTINUED | OUTPATIENT
Start: 2018-03-07 | End: 2018-03-13 | Stop reason: HOSPADM

## 2018-03-07 RX ORDER — INSULIN ASPART 100 [IU]/ML
1-10 INJECTION, SOLUTION INTRAVENOUS; SUBCUTANEOUS EVERY 6 HOURS PRN
Status: DISCONTINUED | OUTPATIENT
Start: 2018-03-07 | End: 2018-03-09

## 2018-03-07 RX ORDER — MIDAZOLAM HYDROCHLORIDE 1 MG/ML
INJECTION INTRAMUSCULAR; INTRAVENOUS CODE/TRAUMA/SEDATION MEDICATION
Status: DISCONTINUED | OUTPATIENT
Start: 2018-03-07 | End: 2018-03-07

## 2018-03-07 RX ORDER — ACETAMINOPHEN 325 MG/1
650 TABLET ORAL EVERY 6 HOURS PRN
Status: DISCONTINUED | OUTPATIENT
Start: 2018-03-07 | End: 2018-03-13 | Stop reason: HOSPADM

## 2018-03-07 RX ORDER — CIPROFLOXACIN 2 MG/ML
400 INJECTION, SOLUTION INTRAVENOUS
Status: DISCONTINUED | OUTPATIENT
Start: 2018-03-07 | End: 2018-03-09

## 2018-03-07 RX ORDER — PROMETHAZINE HYDROCHLORIDE 25 MG/ML
12.5 INJECTION, SOLUTION INTRAMUSCULAR; INTRAVENOUS EVERY 8 HOURS
Status: DISCONTINUED | OUTPATIENT
Start: 2018-03-07 | End: 2018-03-10

## 2018-03-07 RX ORDER — COLESEVELAM 180 1/1
1875 TABLET ORAL 2 TIMES DAILY WITH MEALS
Status: DISCONTINUED | OUTPATIENT
Start: 2018-03-07 | End: 2018-03-13

## 2018-03-07 RX ORDER — SCOLOPAMINE TRANSDERMAL SYSTEM 1 MG/1
1 PATCH, EXTENDED RELEASE TRANSDERMAL
Status: DISCONTINUED | OUTPATIENT
Start: 2018-03-07 | End: 2018-03-13 | Stop reason: HOSPADM

## 2018-03-07 RX ORDER — METOPROLOL SUCCINATE 25 MG/1
100 TABLET, EXTENDED RELEASE ORAL 2 TIMES DAILY
Status: DISCONTINUED | OUTPATIENT
Start: 2018-03-07 | End: 2018-03-13 | Stop reason: HOSPADM

## 2018-03-07 RX ADMIN — ONDANSETRON 4 MG: 2 INJECTION, SOLUTION INTRAMUSCULAR; INTRAVENOUS at 10:03

## 2018-03-07 RX ADMIN — DIPHENHYDRAMINE HYDROCHLORIDE 50 MG: 50 INJECTION INTRAMUSCULAR; INTRAVENOUS at 11:03

## 2018-03-07 RX ADMIN — HYDROMORPHONE HYDROCHLORIDE 0.2 MG: 2 INJECTION INTRAMUSCULAR; INTRAVENOUS; SUBCUTANEOUS at 02:03

## 2018-03-07 RX ADMIN — LIDOCAINE HYDROCHLORIDE 10 ML: 10 INJECTION, SOLUTION INFILTRATION; PERINEURAL at 11:03

## 2018-03-07 RX ADMIN — PROMETHAZINE HYDROCHLORIDE 12.5 MG: 25 INJECTION INTRAMUSCULAR; INTRAVENOUS at 05:03

## 2018-03-07 RX ADMIN — INSULIN ASPART 4 UNITS: 100 INJECTION, SOLUTION INTRAVENOUS; SUBCUTANEOUS at 01:03

## 2018-03-07 RX ADMIN — HYDROMORPHONE HYDROCHLORIDE 0.2 MG: 2 INJECTION INTRAMUSCULAR; INTRAVENOUS; SUBCUTANEOUS at 04:03

## 2018-03-07 RX ADMIN — HYDRALAZINE HYDROCHLORIDE 10 MG: 20 INJECTION INTRAMUSCULAR; INTRAVENOUS at 01:03

## 2018-03-07 RX ADMIN — MIDAZOLAM HYDROCHLORIDE 1 MG: 1 INJECTION, SOLUTION INTRAMUSCULAR; INTRAVENOUS at 11:03

## 2018-03-07 RX ADMIN — KETOROLAC TROMETHAMINE 15 MG: 30 INJECTION, SOLUTION INTRAMUSCULAR at 05:03

## 2018-03-07 RX ADMIN — Medication 1000 MG: at 11:03

## 2018-03-07 RX ADMIN — ONDANSETRON 4 MG: 2 INJECTION, SOLUTION INTRAMUSCULAR; INTRAVENOUS at 12:03

## 2018-03-07 RX ADMIN — GLUCAGON HYDROCHLORIDE 1 MG: KIT at 11:03

## 2018-03-07 RX ADMIN — ONDANSETRON 4 MG: 2 INJECTION, SOLUTION INTRAMUSCULAR; INTRAVENOUS at 02:03

## 2018-03-07 RX ADMIN — HYDROMORPHONE HYDROCHLORIDE 0.2 MG: 2 INJECTION INTRAMUSCULAR; INTRAVENOUS; SUBCUTANEOUS at 09:03

## 2018-03-07 RX ADMIN — HYDRALAZINE HYDROCHLORIDE 10 MG: 20 INJECTION INTRAMUSCULAR; INTRAVENOUS at 08:03

## 2018-03-07 RX ADMIN — DEXTROSE MONOHYDRATE, SODIUM CHLORIDE, AND POTASSIUM CHLORIDE: 50; 4.5; 1.49 INJECTION, SOLUTION INTRAVENOUS at 09:03

## 2018-03-07 RX ADMIN — PROMETHAZINE HYDROCHLORIDE 25 MG: 25 INJECTION INTRAMUSCULAR; INTRAVENOUS at 11:03

## 2018-03-07 RX ADMIN — AMITRIPTYLINE HYDROCHLORIDE 50 MG: 25 TABLET, FILM COATED ORAL at 08:03

## 2018-03-07 RX ADMIN — ACETAMINOPHEN 650 MG: 325 TABLET ORAL at 08:03

## 2018-03-07 RX ADMIN — FENTANYL CITRATE 50 MCG: 50 INJECTION, SOLUTION INTRAMUSCULAR; INTRAVENOUS at 11:03

## 2018-03-07 RX ADMIN — METRONIDAZOLE 500 MG: 500 INJECTION, SOLUTION INTRAVENOUS at 05:03

## 2018-03-07 RX ADMIN — METOPROLOL SUCCINATE 100 MG: 25 TABLET, EXTENDED RELEASE ORAL at 08:03

## 2018-03-07 RX ADMIN — SCOPALAMINE 1 PATCH: 1 PATCH, EXTENDED RELEASE TRANSDERMAL at 06:03

## 2018-03-07 RX ADMIN — METOCLOPRAMIDE HYDROCHLORIDE 10 MG: 5 SOLUTION ORAL at 08:03

## 2018-03-07 RX ADMIN — ACETAMINOPHEN 650 MG: 325 TABLET ORAL at 01:03

## 2018-03-07 RX ADMIN — CIPROFLOXACIN 400 MG: 2 INJECTION, SOLUTION INTRAVENOUS at 10:03

## 2018-03-07 RX ADMIN — CLONAZEPAM 0.5 MG: 0.5 TABLET ORAL at 08:03

## 2018-03-07 RX ADMIN — PROMETHAZINE HYDROCHLORIDE 12.5 MG: 25 INJECTION INTRAMUSCULAR; INTRAVENOUS at 10:03

## 2018-03-07 NOTE — PT/OT/SLP PROGRESS
Physical Therapy  Evaluation Attempt    Patient Name:  Kaylee Ngo   MRN:  [id number]    Patient not seen today secondary to pt refusing at this time as she has been vomiting and does not want to move, reporting she feels she will start vomiting again. Will follow-up tomorrow.    Zeinab Zhang, PT   3/7/2018

## 2018-03-07 NOTE — CONSULTS
Inpatient Radiology Pre-procedure Note    History of Present Illness:  Kaylee Ngo is a 62 y.o. female who presents for G tube check with possible replacement through existing tract or new placement.  Admission H&P reviewed.  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 Authorizing Provider   ACCU-CHEK SHU CONTROL SOLN Soln  10/17/17   Historical Provider, MD   ACCU-CHEK SHU PLUS METER Misc  10/17/17   Historical Provider, MD   ACCU-CHEK SHU PLUS TEST STRP Strp  10/17/17   Historical Provider, MD   amitriptyline (ELAVIL) 50 MG tablet Take 50 mg by mouth every evening.    Historical Provider, MD   atorvastatin (LIPITOR) 40 MG tablet Take 40 mg by mouth once daily.    Historical Provider, MD   BRAN/GUM/FIB/LATISHA/PSYL/KELP/PEC (FIBER 6 ORAL) Take by mouth once daily.    Historical Provider, MD   calcium carbonate (OS-BEST) 600 mg calcium (1,500 mg) Tab Take 600 mg by mouth once.    Historical Provider, MD   clonazePAM (KLONOPIN) 0.5 MG tablet Take 0.5 mg by mouth 2 (two) times daily as needed for Anxiety.    Historical Provider, MD   colesevelam (WELCHOL) 625 mg tablet Take 1,875 mg by mouth 2 (two) times daily with meals.    Historical Provider, MD   cyanocobalamin 1,000 mcg/mL injection 1,000 mcg every 28 days.     Historical Provider, MD   diclofenac (CATAFLAM) 50 MG tablet as needed.  9/11/17   Historical Provider, MD   diphenoxylate-atropine 2.5-0.025 mg (LOMOTIL) 2.5-0.025 mg per tablet Take 1 tablet by mouth 4 (four) times daily as needed for Diarrhea.    Historical Provider, MD   docusate sodium (COLACE) 100 MG capsule Take 100 mg by mouth as needed for Constipation.    Historical Provider, MD   DULoxetine (CYMBALTA) 60 MG capsule Take 60 mg by mouth once daily.    Historical Provider, MD   everolimus (AFINITOR) 10 mg Tab Take 10 mg by mouth once daily.    Historical Provider, MD   GLUCAGON EMERGENCY KIT, HUMAN, 1 mg injection  11/14/17   Historical Provider, MD   hydrOXYzine pamoate (VISTARIL) 25 MG Cap Take 25 mg by mouth 3 (three) times daily.     Historical Provider, MD   insulin degludec (TRESIBA FLEXTOUCH U-100) 100 unit/mL (3 mL) InPn Inject 10 Units into the skin once daily.     Historical Provider, MD   lamoTRIgine (LAMICTAL) 150 MG Tab  11/14/17   Historical Provider, MD   lancets 30 gauge Misc  10/17/17   Historical Provider, MD   lancing device Misc  10/17/17   Historical Provider, MD   losartan (COZAAR) 50 MG tablet Take 50 mg by mouth once daily.    Historical Provider, MD   metoclopramide HCl (REGLAN) 5 mg/5 mL Soln TAKE 10 ML BY MOUTH THREE TIMES A DAY 2/4/18   Kermit Reddy MD   metoclopramide HCl (REGLAN) 5 mg/5 mL Soln Take 10 mLs (10 mg total) by mouth 3 (three) times daily. 3/2/18   Natali Dodson MD   metoprolol succinate (TOPROL-XL) 100 MG 24 hr tablet Take 100 mg by mouth 2 (two) times daily.    Historical Provider, MD   multivitamin capsule Take 1 capsule by mouth once daily.    Historical Provider, MD   omeprazole (PRILOSEC) 40 MG capsule Take 40 mg by mouth 2 (two) times daily before meals.     Historical Provider, MD   ondansetron (ZOFRAN) 8 MG tablet Take 8 mg by mouth every 8 (eight) hours.    Historical Provider, MD   polyethylene glycol (GLYCOLAX) 17 gram PwPk Take 17 g by mouth once  "daily. 3/2/18 3/7/18  Natali Dodson MD   potassium chloride (KLOR-CON) 20 mEq Pack Take 20 mEq by mouth once.     Historical Provider, MD   scopolamine (TRANSDERM-SCOP) 1.3-1.5 mg (1 mg over 3 days)  11/1/17   Historical Provider, MD   SURE COMFORT PEN NEEDLE 32 gauge x 5/32" Ndle  10/17/17   Historical Provider, MD   tiZANidine (ZANAFLEX) 4 MG tablet as needed.  9/11/17   Historical Provider, MD   traMADol (ULTRAM) 50 mg tablet Take 1 tablet (50 mg total) by mouth every 4 (four) hours as needed for Pain. 3/2/18 3/12/18  Natali Dodson MD   vitamin D 1000 units Tab Take 1,000 Units by mouth once daily.    Historical Provider, MD     Scheduled Meds:   Continuous Infusions:    dextrose 5 % and 0.45 % NaCl with KCl 20 mEq 75 mL/hr at 03/07/18 0902     PRN Meds:dextrose 50%, diphenhydrAMINE, glucagon (human recombinant), hydrALAZINE, HYDROmorphone, insulin aspart U-100, ondansetron, sodium chloride 0.9%  Anticoagulants/Antiplatelets: no anticoagulation    Allergies:   Review of patient's allergies indicates:   Allergen Reactions    Codeine     Contrast media      Oral and IV    Darvocet a500 [propoxyphene n-acetaminophen]     Epinephrine      Neuroendocrine Tumor patient      Erythromycin     Iodinated contrast- oral and iv dye     Pcn [penicillins]     Sulfa (sulfonamide antibiotics)      Sedation Hx: have not been any systemic reactions    Labs:    Recent Labs  Lab 03/06/18  2302   INR 1.0       Recent Labs  Lab 03/07/18  0455   WBC 4.39   HGB 8.6*   HCT 30.0*   MCV 82   *      Recent Labs  Lab 03/06/18 2059 03/07/18  0455   * 300*    139   K 3.8 4.5   CL 99 101   CO2 30* 30*   BUN 11 8   CREATININE 0.8 0.8   CALCIUM 9.0 8.6*   MG 1.6  --    ALT 16 11   AST 23 13   ALBUMIN 2.8* 2.5*   BILITOT 0.3 0.2         Vitals:  Temp: 97.5 °F (36.4 °C) (03/07/18 0711)  Pulse: 78 (03/07/18 0916)  Resp: 14 (03/07/18 0711)  BP: (!) 163/72 (03/07/18 0916)  SpO2: 96 % (03/07/18 0857)     Physical " Exam:  ASA: 3  Mallampati: 2    General: no acute distress  Mental Status: alert and oriented to person, place and time  HEENT: normocephalic, atraumatic  Chest: unlabored breathing  Heart: regular heart rate  Abdomen: nondistended  Extremity: moves all extremities    Plan: G tube check with possible replacement versus new placement. Informed consent obtained. Premedicate for contrast allergy  Sedation Plan: Moderate.    Joshua Coley MD  Department of Radiology  Pager: [phone]

## 2018-03-07 NOTE — H&P
"Surgery History and Physical    CC:  Dislodged G- tube    HPI:  62F s/p placement of laparoscopic G-tube into remnant stomach (patient w/ history of gastric bypass) on 2/28/18. She was discharged tolerating tube feeds POD 2. She notes that she awoke from sleep the evening of 3/5/18 "feeling wet." She had some mild stomach discomfort at that time. She was evaluated by her home health nurse who was concerned that her G-tube was dislodged. This was communicated to Dr. Levy and the decision was made to admit her to the hospital for G-tube evaluation and replacement if needed.     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 No pertinent family history.  Review of patient's allergies indicates:   Allergen Reactions    Codeine     Contrast media      Oral and IV    Darvocet a500 [propoxyphene n-acetaminophen]     Epinephrine      Neuroendocrine Tumor patient      Erythromycin     Iodinated contrast- oral and iv dye     Pcn [penicillins]     Sulfa (sulfonamide antibiotics)        Physical Exam:  Vitals:    03/07/18 0711   BP: (!) 173/81   Pulse: 70   Resp: 14   Temp: 97.5 °F " (36.4 °C)     Gen: NAD, AAOx3  HEENT: NCAT  CV: RRR  Pulm: CTA-B  Abd: Abdomen soft, mildly tender in right abdomen, no rebound, no guarding; G-tube sutured in place at level of skin   Skin: dry, intact  Mood: appropriate    Recent Labs      03/07/18   0455   WBC  4.39   HGB  8.6*   HCT  30.0*   PLT  365*   NA  139   K  4.5   BUN  8   CREATININE  0.8       Imaging:  XR chest: stool burden in colon, no free air     A/P:  62F w/ concern for dislodged G-tube, originally placed laparoscopically on 2/28/18  - Consult IR for G-tube evaluation and possible replacement     FIDENCIO Dodson MD (PGY2)

## 2018-03-07 NOTE — PROGRESS NOTES
.Pharmacy New Medication Education    Patient accepted medication education.    Pharmacy educated patient on name and purpose of medications and possible side effects, using the teach-back method.     D/C dextrose 5 % and 0.45 % NaCl with KCl 20 mEq infusion   D/C dextrose 50% injection 12.5 g   D/C diphenhydrAMINE injection 25 mg   D/C glucagon (human recombinant) injection 1 mg   D/C hydrALAZINE injection 10 mg   D/C hydromorphone (PF) injection 0.2 mg   D/C insulin aspart U-100 pen 1-10 Units   D/C ondansetron injection 4 mg   D/C sodium chloride 0.9% flush 3 mL       Learners of pharmacy medication education included:  Patient    Patient +/- learner response:  Verbalized Understanding, Teachback

## 2018-03-07 NOTE — PLAN OF CARE
Problem: Patient Care Overview  Goal: Plan of Care Review  Outcome: Ongoing (interventions implemented as appropriate)  Patient resting in bed, AAOx4. IVF infusing as ordered. No complaints of pain or discomfort. Peg tube site cleaned with wound cleanser and dressed. Patient has remained NPO through the shift, verbalizes understanding. Ambulated to bathroom with assistance, safety maintained. Asleep on and off through the shift. Encouraged to call with needs or concerns. Will continue to monitor.

## 2018-03-08 LAB
ANION GAP SERPL CALC-SCNC: 9 MMOL/L
BASOPHILS # BLD AUTO: 0.01 K/UL
BASOPHILS NFR BLD: 0.1 %
BUN SERPL-MCNC: 6 MG/DL
CALCIUM SERPL-MCNC: 8.8 MG/DL
CHLORIDE SERPL-SCNC: 97 MMOL/L
CO2 SERPL-SCNC: 29 MMOL/L
CREAT SERPL-MCNC: 0.7 MG/DL
DIFFERENTIAL METHOD: ABNORMAL
EOSINOPHIL # BLD AUTO: 0.1 K/UL
EOSINOPHIL NFR BLD: 0.6 %
ERYTHROCYTE [DISTWIDTH] IN BLOOD BY AUTOMATED COUNT: 15 %
EST. GFR  (AFRICAN AMERICAN): >60 ML/MIN/1.73 M^2
EST. GFR  (NON AFRICAN AMERICAN): >60 ML/MIN/1.73 M^2
GLUCOSE SERPL-MCNC: 202 MG/DL
HCT VFR BLD AUTO: 31.2 %
HGB BLD-MCNC: 9.4 G/DL
LYMPHOCYTES # BLD AUTO: 0.8 K/UL
LYMPHOCYTES NFR BLD: 9.7 %
MCH RBC QN AUTO: 24.3 PG
MCHC RBC AUTO-ENTMCNC: 30.1 G/DL
MCV RBC AUTO: 81 FL
MONOCYTES # BLD AUTO: 0.8 K/UL
MONOCYTES NFR BLD: 9.9 %
NEUTROPHILS # BLD AUTO: 6.5 K/UL
NEUTROPHILS NFR BLD: 79.1 %
PLATELET # BLD AUTO: 410 K/UL
PMV BLD AUTO: 9 FL
POCT GLUCOSE: 194 MG/DL (ref 70–110)
POCT GLUCOSE: 227 MG/DL (ref 70–110)
POCT GLUCOSE: 280 MG/DL (ref 70–110)
POCT GLUCOSE: 306 MG/DL (ref 70–110)
POTASSIUM SERPL-SCNC: 4.8 MMOL/L
RBC # BLD AUTO: 3.87 M/UL
SODIUM SERPL-SCNC: 135 MMOL/L
WBC # BLD AUTO: 8.25 K/UL

## 2018-03-08 PROCEDURE — 36415 COLL VENOUS BLD VENIPUNCTURE: CPT

## 2018-03-08 PROCEDURE — G8978 MOBILITY CURRENT STATUS: HCPCS | Mod: CK

## 2018-03-08 PROCEDURE — 25000003 PHARM REV CODE 250: Performed by: STUDENT IN AN ORGANIZED HEALTH CARE EDUCATION/TRAINING PROGRAM

## 2018-03-08 PROCEDURE — 94761 N-INVAS EAR/PLS OXIMETRY MLT: CPT

## 2018-03-08 PROCEDURE — G8979 MOBILITY GOAL STATUS: HCPCS | Mod: CJ

## 2018-03-08 PROCEDURE — 94799 UNLISTED PULMONARY SVC/PX: CPT

## 2018-03-08 PROCEDURE — 97116 GAIT TRAINING THERAPY: CPT

## 2018-03-08 PROCEDURE — 85025 COMPLETE CBC W/AUTO DIFF WBC: CPT

## 2018-03-08 PROCEDURE — 63600175 PHARM REV CODE 636 W HCPCS: Performed by: STUDENT IN AN ORGANIZED HEALTH CARE EDUCATION/TRAINING PROGRAM

## 2018-03-08 PROCEDURE — 11000001 HC ACUTE MED/SURG PRIVATE ROOM

## 2018-03-08 PROCEDURE — 63600175 PHARM REV CODE 636 W HCPCS: Performed by: SURGERY

## 2018-03-08 PROCEDURE — 80048 BASIC METABOLIC PNL TOTAL CA: CPT

## 2018-03-08 PROCEDURE — 25000003 PHARM REV CODE 250: Performed by: SURGERY

## 2018-03-08 PROCEDURE — 97161 PT EVAL LOW COMPLEX 20 MIN: CPT

## 2018-03-08 PROCEDURE — S0030 INJECTION, METRONIDAZOLE: HCPCS | Performed by: STUDENT IN AN ORGANIZED HEALTH CARE EDUCATION/TRAINING PROGRAM

## 2018-03-08 PROCEDURE — 97802 MEDICAL NUTRITION INDIV IN: CPT

## 2018-03-08 PROCEDURE — 99900035 HC TECH TIME PER 15 MIN (STAT)

## 2018-03-08 RX ORDER — CIPROFLOXACIN 2 MG/ML
INJECTION, SOLUTION INTRAVENOUS
Status: DISPENSED
Start: 2018-03-08 | End: 2018-03-09

## 2018-03-08 RX ORDER — HYDRALAZINE HYDROCHLORIDE 20 MG/ML
10 INJECTION INTRAMUSCULAR; INTRAVENOUS ONCE
Status: COMPLETED | OUTPATIENT
Start: 2018-03-08 | End: 2018-03-08

## 2018-03-08 RX ORDER — ONDANSETRON 2 MG/ML
4 INJECTION INTRAMUSCULAR; INTRAVENOUS EVERY 8 HOURS
Status: DISCONTINUED | OUTPATIENT
Start: 2018-03-08 | End: 2018-03-10

## 2018-03-08 RX ADMIN — AMITRIPTYLINE HYDROCHLORIDE 50 MG: 25 TABLET, FILM COATED ORAL at 08:03

## 2018-03-08 RX ADMIN — METOCLOPRAMIDE HYDROCHLORIDE 10 MG: 5 SOLUTION ORAL at 08:03

## 2018-03-08 RX ADMIN — METOPROLOL SUCCINATE 100 MG: 25 TABLET, EXTENDED RELEASE ORAL at 08:03

## 2018-03-08 RX ADMIN — CLONAZEPAM 0.5 MG: 0.5 TABLET ORAL at 08:03

## 2018-03-08 RX ADMIN — CIPROFLOXACIN 400 MG: 2 INJECTION, SOLUTION INTRAVENOUS at 09:03

## 2018-03-08 RX ADMIN — ACETAMINOPHEN 650 MG: 325 TABLET ORAL at 02:03

## 2018-03-08 RX ADMIN — ONDANSETRON 4 MG: 2 INJECTION INTRAMUSCULAR; INTRAVENOUS at 06:03

## 2018-03-08 RX ADMIN — LOSARTAN POTASSIUM 50 MG: 50 TABLET, FILM COATED ORAL at 08:03

## 2018-03-08 RX ADMIN — KETOROLAC TROMETHAMINE 15 MG: 30 INJECTION, SOLUTION INTRAMUSCULAR at 11:03

## 2018-03-08 RX ADMIN — PROMETHAZINE HYDROCHLORIDE 12.5 MG: 25 INJECTION INTRAMUSCULAR; INTRAVENOUS at 02:03

## 2018-03-08 RX ADMIN — DEXTROSE MONOHYDRATE, SODIUM CHLORIDE, AND POTASSIUM CHLORIDE: 50; 4.5; 1.49 INJECTION, SOLUTION INTRAVENOUS at 10:03

## 2018-03-08 RX ADMIN — CIPROFLOXACIN 400 MG: 2 INJECTION, SOLUTION INTRAVENOUS at 10:03

## 2018-03-08 RX ADMIN — VITAMIN D, TAB 1000IU (100/BT) 1000 UNITS: 25 TAB at 08:03

## 2018-03-08 RX ADMIN — ACETAMINOPHEN 650 MG: 325 TABLET ORAL at 08:03

## 2018-03-08 RX ADMIN — KETOROLAC TROMETHAMINE 15 MG: 30 INJECTION, SOLUTION INTRAMUSCULAR at 05:03

## 2018-03-08 RX ADMIN — ONDANSETRON 4 MG: 2 INJECTION, SOLUTION INTRAMUSCULAR; INTRAVENOUS at 05:03

## 2018-03-08 RX ADMIN — ONDANSETRON 4 MG: 2 INJECTION, SOLUTION INTRAMUSCULAR; INTRAVENOUS at 10:03

## 2018-03-08 RX ADMIN — METRONIDAZOLE 500 MG: 500 INJECTION, SOLUTION INTRAVENOUS at 05:03

## 2018-03-08 RX ADMIN — INSULIN ASPART 6 UNITS: 100 INJECTION, SOLUTION INTRAVENOUS; SUBCUTANEOUS at 05:03

## 2018-03-08 RX ADMIN — ONDANSETRON 4 MG: 2 INJECTION INTRAMUSCULAR; INTRAVENOUS at 08:03

## 2018-03-08 RX ADMIN — COLESEVELAM HYDROCHLORIDE 1875 MG: 625 TABLET, FILM COATED ORAL at 05:03

## 2018-03-08 RX ADMIN — INSULIN ASPART 8 UNITS: 100 INJECTION, SOLUTION INTRAVENOUS; SUBCUTANEOUS at 11:03

## 2018-03-08 RX ADMIN — METRONIDAZOLE 500 MG: 500 INJECTION, SOLUTION INTRAVENOUS at 02:03

## 2018-03-08 RX ADMIN — HYDRALAZINE HYDROCHLORIDE 10 MG: 20 INJECTION INTRAMUSCULAR; INTRAVENOUS at 12:03

## 2018-03-08 RX ADMIN — KETOROLAC TROMETHAMINE 15 MG: 30 INJECTION, SOLUTION INTRAMUSCULAR at 12:03

## 2018-03-08 RX ADMIN — PANTOPRAZOLE SODIUM 40 MG: 40 TABLET, DELAYED RELEASE ORAL at 08:03

## 2018-03-08 RX ADMIN — ATORVASTATIN CALCIUM 40 MG: 40 TABLET, FILM COATED ORAL at 08:03

## 2018-03-08 RX ADMIN — INSULIN ASPART 2 UNITS: 100 INJECTION, SOLUTION INTRAVENOUS; SUBCUTANEOUS at 08:03

## 2018-03-08 RX ADMIN — COLESEVELAM HYDROCHLORIDE 1875 MG: 625 TABLET, FILM COATED ORAL at 08:03

## 2018-03-08 RX ADMIN — METOCLOPRAMIDE HYDROCHLORIDE 10 MG: 5 SOLUTION ORAL at 05:03

## 2018-03-08 RX ADMIN — PROMETHAZINE HYDROCHLORIDE 12.5 MG: 25 INJECTION INTRAMUSCULAR; INTRAVENOUS at 09:03

## 2018-03-08 RX ADMIN — PROMETHAZINE HYDROCHLORIDE 12.5 MG: 25 INJECTION INTRAMUSCULAR; INTRAVENOUS at 05:03

## 2018-03-08 RX ADMIN — METRONIDAZOLE 500 MG: 500 INJECTION, SOLUTION INTRAVENOUS at 10:03

## 2018-03-08 RX ADMIN — DULOXETINE 60 MG: 30 CAPSULE, DELAYED RELEASE ORAL at 08:03

## 2018-03-08 RX ADMIN — LAMOTRIGINE 150 MG: 100 TABLET ORAL at 08:03

## 2018-03-08 NOTE — PROGRESS NOTES
"Patient complaing of HA. And "not feeling well' BP manually taken 142/84. Ice pack given for HA. Patient informed that tylenol is not due until 45 minutes from now. Verbalized understanding  "

## 2018-03-08 NOTE — PLAN OF CARE
Problem: Physical Therapy Goal  Goal: Physical Therapy Goal  Goals to be met by: 18     Patient will increase functional independence with mobility by performin. Sit to stand transfer with Modified [place]  2. Bed to chair transfer with Modified [place] using Rolling Walker  3. Gait  x 150 feet with Modified [place] using Rolling Walker.   4. Ascend/descend 4 stairs with bilateral Handrails Stand-by Assistance    Outcome: Ongoing (interventions implemented as appropriate)  PT evaluation completed with note to follow. Pt requested to have HH PT upon d/c as she was having HH prior to admission and would like to continue. No additional DME needs expected.

## 2018-03-08 NOTE — PROCEDURES
Radiology Post-Procedure Note    Pre Op Diagnosis: Poor nutrition, gastric bypass, NET  Post Op Diagnosis: Same    Procedure: G tube placement    Procedure performed by: Joshua Coley MD    Written Informed Consent Obtained: Yes  Specimen Removed: NO  Estimated Blood Loss: Minimal    Findings:   Preliminary CT demonstrated that the surgically placed G tube was no longer in the stomach. Attempts were made to access the stomach through the existing tract which was unsuccessful. There was an open wound at the previous site. Case discussed with referring surgeon who requested a new tube to be placed within the excluded stomach. A 4 Bel[place]ian catheter was advanced into the stomach which was inflated with air. Once the stomach was inflated with air, the excluded stomach was accessed with a 19 Fr needle. Position was confirmed with contrast. An amplatz wire was placed and the tract was serially dilated. An 18 Fr Cook entuit gastrostomy tube was then placed and the balloon was inflated and secure to the skin. Contrast was injected confirming position.    The tube was left to gravity drainage and the tube should not be used for atleast 24 hrs. Recommend wound care for previous site. Further management by surgery. Please see full dictated report for further details.       Patient tolerated procedure well.    Joshua Coley MD  Department of Radiology  Pager: [phone]

## 2018-03-08 NOTE — PLAN OF CARE
Problem: Patient Care Overview  Goal: Plan of Care Review  Outcome: Ongoing (interventions implemented as appropriate)  Pt received on room air with SpO2  99%. No distress noted and will continue to monitor.

## 2018-03-08 NOTE — PT/OT/SLP EVAL
Physical Therapy Evaluation and Treatment    Patient Name:  Kaylee Ngo   MRN:  [id number]    Recommendations:     Discharge Recommendations:  home health PT   Discharge Equipment Recommendations: none   Barriers to discharge: Decreased caregiver support    Assessment:     Kaylee Ngo is a 62 y.o. female admitted with a medical diagnosis of The encounter diagnosis was Gastrostomy tube dysfunction.  She presents with the following impairments/functional limitations:  weakness, impaired endurance, impaired self care skills, impaired functional mobilty, gait instability, impaired balance, pain. Pt ambulated 80 ft with RW and CGA and her primary complaint is nausea. Pt lives alone and was mod I at baseline with goals to return to mod I level.    Rehab Prognosis: Excellent; patient would benefit from acute skilled PT services to address these deficits and reach maximum level of function.      Recent Surgery: * No surgery found *      Plan:     During this hospitalization, patient to be seen 6 x/week to address the above listed problems via gait training, therapeutic activities, therapeutic exercises  · Plan of Care Expires:  04/08/18   Plan of Care Reviewed with: patient    Subjective     Communicated with CHANELLE Holly prior to session.  Patient found supine with HOB elevated upon PT entry to room, agreeable to evaluation.      Chief Complaint: nausea  Patient comments/goals: pt reports she is feeling nauseated, she was able to hold down her liquid lunch but feels nauseated at this time. Pt with no vomiting during session.  Pain/Comfort:  · Pain Rating 1:  (pt reports no pain at rest but with certain movements reports abdominal pain increases to 8-9/10)  · Location 1: abdomen  · Pain Addressed 1: Reposition, Distraction  · Pain Rating Post-Intervention 1: 0/10    Patients cultural, spiritual, Religion conflicts given the current situation: none reported    Living Environment:  Pt lives alone in a trailer with 4 BOSSMAN  with B rails and WIS with shower chair. Pt lives next door to her sister and brother-in-law, her sister works down the street and reported she can assist as needed upon d/c.  Prior to admission, patients level of function was mod I with rollator for mobility and ADLs, cooked, and was driving until recently when she gave her car to her niece. Pt was receiving HH PT prior to admission. Patient has the following equipment: rollator, shower chair, cane, straight.  DME owned (not currently used): none.  Upon discharge, patient will have assistance from -- intermittent daily assist from her sister but pt lives alone and pt's sister works (down the street) so pt will be alone for majority of the days.    Objective:     Patient found with: telemetry, bed alarm (g tube)     General Precautions: Standard, fall   Orthopedic Precautions:N/A   Braces: N/A     Exams:  · Cognitive Exam:  Patient is oriented to Person, Place, Time and Situation and follows 100% of 1-step commands   · Fine Motor Coordination:    · -       Intact  · Gross Motor Coordination:  WFL  · Postural Exam:  Patient presented with the following abnormalities:    · -       Rounded shoulders  · Sensation:    · -       Intact  · Skin Integrity/Edema:      · -       Skin integrity: G-tube connected to output bag  · -       Edema: None noted    · RLE ROM: WFL  · RLE Strength: WFL except hip flexion 4-/5  · LLE ROM: WFL  · LLE Strength: WFL except hip flexion 3+/5    Functional Mobility:  · Bed Mobility:     · Rolling Left:  modified independence  · Scooting: modified independence  · Supine to Sit: modified independence  · Sit to Supine: modified independence  · Transfers:     · Sit to Stand:  stand by assistance with rolling walker  · Gait: 80 ft with RW and SBA with cues for improved upright posture    AM-PAC 6 CLICK MOBILITY  Total Score:17     Therapeutic Activities and Exercises:  Pt requires slightly increased time for bed mobility but is able to complete  without assist or cues. Pt participated in 1 bout of gait and reported no increase in pain with gait. Pt reported that certain movements cause increase in pain in abdomen but did not complain of pain throughout session. Encouraged pt to sit up in chair but pt refused and wanting to return to bed.    Patient left left sidelying with all lines intact, call button in reach, bed alarm on and RN notified.    GOALS:    Physical Therapy Goals        Problem: Physical Therapy Goal    Goal Priority Disciplines Outcome Goal Variances Interventions   Physical Therapy Goal     PT/OT, PT Ongoing (interventions implemented as appropriate)     Description:  Goals to be met by: 18     Patient will increase functional independence with mobility by performin. Sit to stand transfer with Modified [place]  2. Bed to chair transfer with Modified [place] using Rolling Walker  3. Gait  x 150 feet with Modified [place] using Rolling Walker.   4. Ascend/descend 4 stairs with bilateral Handrails Stand-by Assistance                      History:     Past Medical History:   Diagnosis Date    Bipolar disease, chronic     Depression     Diabetes     Drug therapy     Lanreotide    Gastric ulcer     GERD (gastroesophageal reflux disease)     HTN (hypertension)     Hx antineoplastic chemotherapy 2017    [employer]    Hyperlipemia     Migraines     Neuroendocrine cancer 2017    Neuroendocrine carcinoma of small bowel 2017    Obsessive compulsive disorder     Sleep apnea        Past Surgical History:   Procedure Laterality Date    APPENDECTOMY      CHOLECYSTECTOMY      GASTRIC BYPASS      TOTAL KNEE ARTHROPLASTY Left        Clinical Decision Making:     History  Co-morbidities and personal factors that may impact the plan of care Examination  Body Structures and Functions, activity limitations and participation restrictions that may impact the plan of care Clinical Presentation   Decision Making/  Complexity Score   Co-morbidities:   [] Time since onset of injury / illness / exacerbation  [] Status of current condition  []Patient's cognitive status and safety concerns    [] Multiple Medical Problems (see med hx)  Personal Factors:   [] Patient's age  [] Prior Level of function   [x] Patient's home situation (environment and family support)  [] Patient's level of motivation  [] Expected progression of patient      HISTORY:(criteria)    [] 85331 - no personal factors/history    [x] 66055 - has 1-2 personal factor/comorbidity     [] 51210 - has >3 personal factor/comorbidity     Body Regions:  [] Objective examination findings  [] Head     []  Neck  [] Trunk   [] Upper Extremity  [] Lower Extremity    Body Systems:  [] For communication ability, affect, cognition, language, and learning style: the assessment of the ability to make needs known, consciousness, orientation (person, place, and time), expected emotional /behavioral responses, and learning preferences (eg, learning barriers, education  needs)  [x] For the neuromuscular system: a general assessment of gross coordinated movement (eg, balance, gait, locomotion, transfers, and transitions) and motor function  (motor control and motor learning)  [x] For the musculoskeletal system: the assessment of gross symmetry, gross range of motion, gross strength, height, and weight  [] For the integumentary system: the assessment of pliability(texture), presence of scar formation, skin color, and skin integrity  [] For cardiovascular/pulmonary system: the assessment of heart rate, respiratory rate, blood pressure, and edema     Activity limitations:    [] Patient's cognitive status and saf ety concerns          [] Status of current condition      [] Weight bearing restriction  [] Cardiopulmunary Restriction    Participation Restrictions:   [] Goals and goal agreement with the patient     [] Rehab potential (prognosis) and probable outcome      Examination of Body  System: (criteria)    [x] 24867 - addressing 1-2 elements    [] 49570 - addressing a total of 3 or more elements     [] 73790 -  Addressing a total of 4 or more elements         Clinical Presentation: (criteria)  Stable - 53364     On examination of body system using standardized tests and measures patient presents with 1-2 elements from any of the following: body structures and functions, activity limitations, and/or participation restrictions.  Leading to a clinical presentation that is considered stable and/or uncomplicated                              Clinical Decision Making  (Eval Complexity):  Low- 50106     Time Tracking:     PT Received On: 03/08/18  PT Start Time: 1535     PT Stop Time: 1600  PT Total Time (min): 25 min     Billable Minutes: Evaluation 15 and Gait Training 10      Zeinab Zhang, PT  03/08/2018

## 2018-03-08 NOTE — NURSING
Pt in NAD. Resting with eyes closed. Pt /84 HR: 65 MD Dodson informed and approved X1 dose of hydralazine early. Will complete and continue to monitor.

## 2018-03-08 NOTE — PROGRESS NOTES
Surgery Progress Note    Overnight Events:  Nausea and emesis overnight   Elevated blood pressures  UOP adequate    Physical Exam:  Temp:  [98.1 °F (36.7 °C)-99.1 °F (37.3 °C)] 98.1 °F (36.7 °C)  Pulse:  [] 68  Resp:  [13-20] 20  SpO2:  [95 %-100 %] 98 %  BP: (125-192)/() 168/77    Gen: NAD, AAOx3  CV: RRR  Pulm: unlabored breathing  Abd: Abd soft; no cellulitic changes at this time; Leakage from around drain site  Skin: several areas with oozing tube feeds   Mood: appropriate    I/O last 3 completed shifts:  In: 2053.8 [I.V.:1503.8; IV Piggyback:550]  Out: 2595 [Urine:2400; Drains:195]        Labs:  Recent Labs      03/06/18 2059 03/07/18   0455  03/08/18   0441   WBC  4.53  4.39   --    HGB  9.1*  8.6*   --    HCT  30.4*  30.0*   --    PLT  438*  365*   --    NA  137  139  135*   K  3.8  4.5  4.8   PHOS  3.5   --    --    MG  1.6   --    --    CALCIUM  9.0  8.6*  8.8   BUN  11  8  6*   CREATININE  0.8  0.8  0.7         Cultures:    A/P:  62F w/ concern for dislodged G-tube, originally placed laparoscopically on 2/28/18  G-tube became dislodged from remnant stomach with leakage into subcutaneous tissue   - Cipro, flagyl   - FLD at this time   - Supplemental IVF   - Scheduled anti-nausea meds  - Will follow closely for signs of cellulitis     FIDENCIO Dodson MD (PGY2)

## 2018-03-08 NOTE — PLAN OF CARE
pt known to [place]   here at [facility]  2/26-3/2/18  -- plcmt of G tube     sister Emily Pickard  [phone] and her adult son (pt's nephew)   they assist with transportation and adls as needed   in the past pt has had HH with [facility]      pt current with [facility] for hh;  CPP for enteral fdgs      pt's physical address [address] [place]   [address]   pt has a st cane, bsc, rw and ttb.       Future Appointments  Date Time Provider Department Center   3/19/2018 1:15 PM Cam Ashton MD [facility] TUMOR [facility]   7/9/2018 9:15 AM [facility] MRI1 [facility] MRI [facility]   7/9/2018 11:00 AM [facility] CT1 LIMIT 400 LBS [facility] CT SCAN [facility]   7/9/2018 12:45 PM CARDIOLOGY, ECHO [facility] CARD [facility]   7/10/2018 12:30 PM Serjio Winkler MD [facility] TUMOR [facility]          03/07/18 1842   Discharge Assessment   Assessment Type Discharge Planning Assessment   Confirmed/corrected address and phone number on facesheet? Yes   Assessment information obtained from? Patient;Medical Record   Expected Length of Stay (days) 3   Communicated expected length of stay with patient/caregiver yes   Prior to hospitilization cognitive status: Alert/Oriented   Prior to hospitalization functional status: Independent   Current cognitive status: Alert/Oriented   Current Functional Status: Independent   Lives With sibling(s)   Able to Return to Prior Arrangements yes   Is patient able to care for self after discharge? Yes   Who are your caregiver(s) and their phone number(s)? (sister Emily Pickard  [phone] )   Patient's perception of discharge disposition home or selfcare;home health   Readmission Within The Last 30 Days (2/26-3/2/18  )   Patient currently being followed by outpatient case management? No   Patient currently receives any other outside agency services? No   Equipment Currently Used at Home cane, straight   Do you have any problems affording any of your prescribed medications? No   Is the patient taking  medications as prescribed? yes   Does the patient have transportation home? Yes   Transportation Available family or friend will provide   Does the patient receive services at the [facility]? No   Discharge Plan A Home;Home with family;Home Health   Patient/Family In Agreement With Plan yes

## 2018-03-09 LAB
POCT GLUCOSE: 130 MG/DL (ref 70–110)
POCT GLUCOSE: 287 MG/DL (ref 70–110)
POCT GLUCOSE: 295 MG/DL (ref 70–110)
POCT GLUCOSE: 362 MG/DL (ref 70–110)
POCT GLUCOSE: 378 MG/DL (ref 70–110)
POCT GLUCOSE: 404 MG/DL (ref 70–110)

## 2018-03-09 PROCEDURE — 99900035 HC TECH TIME PER 15 MIN (STAT)

## 2018-03-09 PROCEDURE — 25000003 PHARM REV CODE 250: Performed by: STUDENT IN AN ORGANIZED HEALTH CARE EDUCATION/TRAINING PROGRAM

## 2018-03-09 PROCEDURE — 63600175 PHARM REV CODE 636 W HCPCS: Performed by: SURGERY

## 2018-03-09 PROCEDURE — 63600175 PHARM REV CODE 636 W HCPCS: Performed by: STUDENT IN AN ORGANIZED HEALTH CARE EDUCATION/TRAINING PROGRAM

## 2018-03-09 PROCEDURE — S0030 INJECTION, METRONIDAZOLE: HCPCS | Performed by: STUDENT IN AN ORGANIZED HEALTH CARE EDUCATION/TRAINING PROGRAM

## 2018-03-09 PROCEDURE — 25000003 PHARM REV CODE 250: Performed by: SURGERY

## 2018-03-09 PROCEDURE — 11000001 HC ACUTE MED/SURG PRIVATE ROOM

## 2018-03-09 PROCEDURE — 94761 N-INVAS EAR/PLS OXIMETRY MLT: CPT

## 2018-03-09 PROCEDURE — 97110 THERAPEUTIC EXERCISES: CPT

## 2018-03-09 PROCEDURE — 94799 UNLISTED PULMONARY SVC/PX: CPT

## 2018-03-09 PROCEDURE — 97530 THERAPEUTIC ACTIVITIES: CPT

## 2018-03-09 RX ORDER — CIPROFLOXACIN 500 MG/1
500 TABLET ORAL EVERY 12 HOURS
Status: DISCONTINUED | OUTPATIENT
Start: 2018-03-09 | End: 2018-03-09

## 2018-03-09 RX ORDER — CIPROFLOXACIN 500 MG/1
500 TABLET ORAL EVERY 12 HOURS
Status: DISCONTINUED | OUTPATIENT
Start: 2018-03-09 | End: 2018-03-12

## 2018-03-09 RX ORDER — INSULIN ASPART 100 [IU]/ML
1-10 INJECTION, SOLUTION INTRAVENOUS; SUBCUTANEOUS EVERY 4 HOURS PRN
Status: DISCONTINUED | OUTPATIENT
Start: 2018-03-09 | End: 2018-03-13 | Stop reason: HOSPADM

## 2018-03-09 RX ORDER — TRAMADOL HYDROCHLORIDE 50 MG/1
50 TABLET ORAL EVERY 6 HOURS PRN
Status: DISCONTINUED | OUTPATIENT
Start: 2018-03-09 | End: 2018-03-13 | Stop reason: HOSPADM

## 2018-03-09 RX ADMIN — PROMETHAZINE HYDROCHLORIDE 12.5 MG: 25 INJECTION INTRAMUSCULAR; INTRAVENOUS at 05:03

## 2018-03-09 RX ADMIN — TRAMADOL HYDROCHLORIDE 50 MG: 50 TABLET, COATED ORAL at 09:03

## 2018-03-09 RX ADMIN — CLONAZEPAM 0.5 MG: 0.5 TABLET ORAL at 09:03

## 2018-03-09 RX ADMIN — METRONIDAZOLE 500 MG: 500 INJECTION, SOLUTION INTRAVENOUS at 01:03

## 2018-03-09 RX ADMIN — VITAMIN D, TAB 1000IU (100/BT) 1000 UNITS: 25 TAB at 08:03

## 2018-03-09 RX ADMIN — LAMOTRIGINE 150 MG: 100 TABLET ORAL at 08:03

## 2018-03-09 RX ADMIN — INSULIN ASPART 6 UNITS: 100 INJECTION, SOLUTION INTRAVENOUS; SUBCUTANEOUS at 12:03

## 2018-03-09 RX ADMIN — CLONAZEPAM 0.5 MG: 0.5 TABLET ORAL at 08:03

## 2018-03-09 RX ADMIN — COLESEVELAM HYDROCHLORIDE 1875 MG: 625 TABLET, FILM COATED ORAL at 05:03

## 2018-03-09 RX ADMIN — METOCLOPRAMIDE HYDROCHLORIDE 10 MG: 5 SOLUTION ORAL at 09:03

## 2018-03-09 RX ADMIN — ONDANSETRON 4 MG: 2 INJECTION INTRAMUSCULAR; INTRAVENOUS at 02:03

## 2018-03-09 RX ADMIN — METRONIDAZOLE 500 MG: 500 INJECTION, SOLUTION INTRAVENOUS at 10:03

## 2018-03-09 RX ADMIN — PROMETHAZINE HYDROCHLORIDE 12.5 MG: 25 INJECTION INTRAMUSCULAR; INTRAVENOUS at 09:03

## 2018-03-09 RX ADMIN — ONDANSETRON 4 MG: 2 INJECTION INTRAMUSCULAR; INTRAVENOUS at 09:03

## 2018-03-09 RX ADMIN — ONDANSETRON 4 MG: 2 INJECTION INTRAMUSCULAR; INTRAVENOUS at 05:03

## 2018-03-09 RX ADMIN — INSULIN ASPART 6 UNITS: 100 INJECTION, SOLUTION INTRAVENOUS; SUBCUTANEOUS at 05:03

## 2018-03-09 RX ADMIN — COLESEVELAM HYDROCHLORIDE 1875 MG: 625 TABLET, FILM COATED ORAL at 08:03

## 2018-03-09 RX ADMIN — METOCLOPRAMIDE HYDROCHLORIDE 10 MG: 5 SOLUTION ORAL at 08:03

## 2018-03-09 RX ADMIN — LOSARTAN POTASSIUM 50 MG: 50 TABLET, FILM COATED ORAL at 08:03

## 2018-03-09 RX ADMIN — KETOROLAC TROMETHAMINE 15 MG: 30 INJECTION, SOLUTION INTRAMUSCULAR at 12:03

## 2018-03-09 RX ADMIN — METOCLOPRAMIDE HYDROCHLORIDE 10 MG: 5 SOLUTION ORAL at 02:03

## 2018-03-09 RX ADMIN — PROMETHAZINE HYDROCHLORIDE 12.5 MG: 25 INJECTION INTRAMUSCULAR; INTRAVENOUS at 02:03

## 2018-03-09 RX ADMIN — METOPROLOL SUCCINATE 100 MG: 25 TABLET, EXTENDED RELEASE ORAL at 08:03

## 2018-03-09 RX ADMIN — METOPROLOL SUCCINATE 100 MG: 25 TABLET, EXTENDED RELEASE ORAL at 09:03

## 2018-03-09 RX ADMIN — INSULIN ASPART 5 UNITS: 100 INJECTION, SOLUTION INTRAVENOUS; SUBCUTANEOUS at 11:03

## 2018-03-09 RX ADMIN — CALCIUM 500 MG: 500 TABLET ORAL at 08:03

## 2018-03-09 RX ADMIN — CIPROFLOXACIN HYDROCHLORIDE 500 MG: 500 TABLET, FILM COATED ORAL at 09:03

## 2018-03-09 RX ADMIN — CIPROFLOXACIN 400 MG: 2 INJECTION, SOLUTION INTRAVENOUS at 10:03

## 2018-03-09 RX ADMIN — PANTOPRAZOLE SODIUM 40 MG: 40 TABLET, DELAYED RELEASE ORAL at 08:03

## 2018-03-09 RX ADMIN — DULOXETINE 60 MG: 30 CAPSULE, DELAYED RELEASE ORAL at 08:03

## 2018-03-09 RX ADMIN — AMITRIPTYLINE HYDROCHLORIDE 50 MG: 25 TABLET, FILM COATED ORAL at 09:03

## 2018-03-09 RX ADMIN — ATORVASTATIN CALCIUM 40 MG: 40 TABLET, FILM COATED ORAL at 08:03

## 2018-03-09 RX ADMIN — METRONIDAZOLE 500 MG: 500 INJECTION, SOLUTION INTRAVENOUS at 05:03

## 2018-03-09 RX ADMIN — ACETAMINOPHEN 650 MG: 325 TABLET ORAL at 08:03

## 2018-03-09 NOTE — PLAN OF CARE
Problem: Physical Therapy Goal  Goal: Physical Therapy Goal  Goals to be met by: 18     Patient will increase functional independence with mobility by performin. Sit to stand transfer with Modified [place]  2. Bed to chair transfer with Modified [place] using Rolling Walker  3. Gait  x 150 feet with Modified [place] using Rolling Walker.   4. Ascend/descend 4 stairs with bilateral Handrails Stand-by Assistance     Outcome: Ongoing (interventions implemented as appropriate)  Progressing toward goals.   REC:  Home with

## 2018-03-09 NOTE — PLAN OF CARE
Problem: Nutrition, Imbalanced: Inadequate Oral Intake (Adult)  Goal: Improved Oral Intake  Patient will demonstrate the desired outcomes by discharge/transition of care.  Outcome: Ongoing (interventions implemented as appropriate)  Recommendation/Intervention:   1. Add ADA restrictions to diet.   2. Encourage good intake at meals.   3. Recommend calorie count to determine kcal intake and appropriate TF needed.    Goals:   Pt will consume at least 50% intake at meals  Nutrition Goal Status: new  Communication of RD Recs: reviewed with RN (Elayne)

## 2018-03-09 NOTE — PROGRESS NOTES
.Pharmacy New Medication Education    Patient accepted medication education.    Pharmacy educated patient on name and purpose of medications and possible side effects, using the teach-back method.     Current Inpatient Medication Orders   acetaminophen tablet 650 mg   amitriptyline tablet 50 mg   atorvastatin tablet 40 mg   calcium carbonate (OS-BEST) tablet 500 mg   * ciprofloxacin (CIPRO)400mg/200ml D5W IVPB (CIPRO) 400 mg/200 mL   ciprofloxacin (CIPRO)400mg/200ml D5W IVPB 400 mg   clonazePAM tablet 0.5 mg   colesevelam tablet 1,875 mg   dextrose 5 % and 0.45 % NaCl with KCl 20 mEq infusion   dextrose 50% injection 12.5 g   diphenhydrAMINE injection 25 mg   * diphenhydrAMINE injection   DULoxetine DR capsule 60 mg   * fentaNYL injection   glucagon (human recombinant) injection 1 mg   * glucagon (human recombinant) injection   hydrALAZINE injection 10 mg   insulin aspart U-100 pen 1-10 Units   lamoTRIgine tablet 150 mg   losartan tablet 50 mg   metoclopramide HCl 5 mg/5 mL solution 10 mg   metoprolol succinate (TOPROL-XL) 24 hr tablet 100 mg   metronidazole IVPB 500 mg   ondansetron injection 4 mg   pantoprazole EC tablet 40 mg   promethazine injection 12.5 mg   scopolamine 1.3-1.5 mg (1 mg over 3 days) 1 patch   sodium chloride 0.9% flush 3 mL   vitamin D 1000 units tablet 1,000 Units       Learners of pharmacy medication education included:  Patient    Patient +/- learner response:  Verbalized Understanding, Teachback

## 2018-03-09 NOTE — PROGRESS NOTES
[place] spoke with [place] with [facility] and [place] with [facility] - pt's HH agency   [facility]   p [phone];  f         both are aware that pt may d/c over the w/e with home tube fdgs.     Awaiting HH orders per MD

## 2018-03-09 NOTE — ASSESSMENT & PLAN NOTE
Contributing Nutrition Diagnosis  Inadequate energy intake    Related to (etiology):   Decreased appetite/CA    Signs and Symptoms (as evidenced by):   PEG tube/decreased po intake    Interventions/Recommendations (treatment strategy):  See recs    Nutrition Diagnosis Status:   New

## 2018-03-09 NOTE — PT/OT/SLP PROGRESS
Physical Therapy Treatment    Patient Name:  Kaylee Ngo   MRN:  [id number]    Recommendations:     Discharge Recommendations:  home with home health, home health PT   Discharge Equipment Recommendations: none   Barriers to discharge: Decreased caregiver support    Assessment:     Kaylee Ngo is a 62 y.o. female admitted with a medical diagnosis of <principal problem not specified>.  She presents with the following impairments/functional limitations:  weakness, impaired endurance, gait instability, impaired balance, impaired functional mobilty, pain .  Progressing toward goals    Rehab Prognosis:  good; patient would benefit from acute skilled PT services to address these deficits and reach maximum level of function.      Recent Surgery: * No surgery found *      Plan:     During this hospitalization, patient to be seen 6 x/week to address the above listed problems via gait training, therapeutic activities, therapeutic exercises  · Plan of Care Expires:  04/08/18   Plan of Care Reviewed with: patient    Subjective     Communicated with nursing prior to session.  Patient found in bed upon PT entry to room, agreeable to treatment.      Chief Complaint: abdominal pain   Patient comments/goals: get stronger  Pain/Comfort:  · Pain Rating 1: 3/10  · Location 1: abdomen  · Pain Addressed 1: Reposition, Distraction  · Pain Rating Post-Intervention 1: 3/10    Patients cultural, spiritual, Episcopalian conflicts given the current situation: none reported    Objective:     Patient found with: bed alarm, telemetry (g tube drain)     General Precautions: Standard, fall   Orthopedic Precautions:N/A   Braces: N/A     Functional Mobility:  · Bed Mobility:     · Supine to Sit: modified independence  · Sit to Supine: modified independence  · Transfers:     · Sit to Stand:  modified independence with rolling walker  · Gait: amb 110' x 2 with RW and SBA-very slow bety, decreased step and stride length  · Balance: fair  standing      AM-PAC 6 CLICK MOBILITY  Turning over in bed (including adjusting bedclothes, sheets and blankets)?: 3  Sitting down on and standing up from a chair with arms (e.g., wheelchair, bedside commode, etc.): 3  Moving from lying on back to sitting on the side of the bed?: 3  Moving to and from a bed to a chair (including a wheelchair)?: 3  Need to walk in hospital room?: 3  Climbing 3-5 steps with a railing?: 3  Total Score: 18       Therapeutic Activities and Exercises:   gait as above.  Pt performed B LE ex of hip flex, long arc quads and ankle pumps 2 sets of 10     Patient left HOB elevated with all lines intact, call button in reach, bed alarm on and nursing notified..    GOALS:    Physical Therapy Goals        Problem: Physical Therapy Goal    Goal Priority Disciplines Outcome Goal Variances Interventions   Physical Therapy Goal     PT/OT, PT Ongoing (interventions implemented as appropriate)     Description:  Goals to be met by: 18     Patient will increase functional independence with mobility by performin. Sit to stand transfer with Modified [place]  2. Bed to chair transfer with Modified [place] using Rolling Walker  3. Gait  x 150 feet with Modified [place] using Rolling Walker.   4. Ascend/descend 4 stairs with bilateral Handrails Stand-by Assistance                      Time Tracking:     PT Received On: 18  PT Start Time: 1321     PT Stop Time: 1347  PT Total Time (min): 26 min     Billable Minutes: Therapeutic Activity 17 and Therapeutic Exercise 9    Treatment Type: Treatment  PT/PTA: PT           Benita Miller, PT  2018

## 2018-03-09 NOTE — PLAN OF CARE
Problem: Patient Care Overview  Goal: Plan of Care Review  Outcome: Ongoing (interventions implemented as appropriate)  Pt in NAD. V/s stable AAOx4. PIV infusing D5 1/2 NS with 20 mEq of K at 50 mL/hr dressing CDI. Gtube in place taped to abdomen with dependent drainage brown/green. Previous incision below new gtube draining serosanguinous fluid. Pt reports nausea scheduled medications given. Pt ambulates to bathroom with assist. Encouraged to call for assistance verbalized understanding. Safety maintained bed in low locked position, SR up X2, bed alarm on, and call bell in reach. Will continue to monitor.

## 2018-03-09 NOTE — PLAN OF CARE
Problem: Patient Care Overview  Goal: Plan of Care Review  Outcome: Ongoing (interventions implemented as appropriate)  Pt AAOx4. NAD noted. comaplints of pain to the left upper abdomen  with movement with scheduled medication given.  IV fluids infusing per order. Peg tube with whitlock bag for dependent drainage with 50 cc of green liquid output .  Dressing changed due to drainage from site. Full liquid with good intake. Slight nausea with relief from scheduled .  poct glucose checked with insulin given. Pt assisted with turning. Safety maintained; will continue to monitor.

## 2018-03-09 NOTE — PROGRESS NOTES
"[facility]  Adult Nutrition  Progress Note    SUMMARY       Recommendations    Recommendation/Intervention:   1. Add ADA restrictions to diet.   2. Encourage good intake at meals.   3. Recommend calorie count to determine kcal intake and appropriate TF needed.    Goals:   Pt will consume at least 50% intake at meals  Nutrition Goal Status: new  Communication of RD Recs: reviewed with RN (Elayne)    Reason for Assessment  Reason for Assessment: identified at risk by screening criteria ([facility])  Diagnosis:  (dislodged G tube)  Relevant Medical History: neuroendocrine CA of small bowel, chemo, HTN, DM, HLD, GERD, gastric ulcer, bipolar disease, appendectomy, cholecysytectomy, gastric bypass  General Information Comments: Pt on Full Liquid diet. Pt with fair intake at meals. Pt asleep at visit. Discussed with RN. PEG replaced but not able to be used yet.    Nutrition Risk Screen  Nutrition Risk Screen: tube feeding or parenteral nutrition    Nutrition/Diet History  Food Preferences: no Adventist or cultural food prefs identified  Do you have any cultural, spiritual, Adventist conflicts, given your current situation?: none reported  Factors Affecting Nutritional Intake: decreased appetite    Anthropometrics  Temp: 98.9 °F (37.2 °C)  Height Method: Stated  Height: 5' 4" (162.6 cm)  Height (inches): 64 in  Weight Method: Bed Scale  Weight: 53 kg (116 lb 13.5 oz)  Weight (lb): 116.84 lb  Ideal Body Weight (IBW), Female: 120 lb  % Ideal Body Weight, Female (lb): 97.37 lb  BMI (Calculated): 20.1  BMI Grade: 18.5-24.9 - normal    Lab/Procedures/Meds  Pertinent Labs Reviewed: reviewed  Pertinent Labs Comments: Glu 300H, Ca 8.6L, Alb 2.5L  Pertinent Medications Reviewed: reviewed  Pertinent Medications Comments: 5%dex at 75    Physical Findings/Assessment  Overall Physical Appearance: weak  Tubes: gastrostomy tube  Oral/Mouth Cavity: WDL  Skin:  (Renny 18-surgical)    Estimated/Assessed Needs  Weight Used For " "Calorie Calculations: 53 kg (116 lb 13.5 oz)  Height: 5' 4" (162.6 cm)  Energy Calorie Requirements (kcal): 1855  Energy Need Method: Kcal/kg (35 kcal/kg)  RMR ([place]-St. Jeor Equation): 1080  Protein Requirements: 64-74g (1.2-1.4g/kg)  Weight Used For Protein Calculations: 53 kg (116 lb 13.5 oz)  0.6 gm Protein (gm): 31.87  Fluid Need Method: RDA Method  RDA Method (mL): 1855  CHO Requirement: 225g     Nutrition Prescription Ordered  Current Diet Order: Full Liquid    Evaluation of Received Nutrient/Fluid Intake  Other Calories (kcal): 306 (5% dex at 75)  Energy Calories Required: not meeting needs  Protein Required: not meeting needs  Fluid Required: meeting needs  Comments: LBM 3/2  % Intake of Estimated Energy Needs: 25 - 50 %  % Meal Intake: 25 - 50 %    Nutrition Risk  Level of Risk/Frequency of Follow-up:  (2xweekly)     Recommendations  Recommendation/Intervention: 1. Add ADA restrictions to diet. 2. Encourage good intake at meals. 3. Recommend calorie count to determine kcal intake and appropriate TF needed.  Goals: Pt will consume at least 50% intake at meals  Nutrition Goal Status: new  Communication of RD Recs: reviewed with RN (Elayne)    Assessment and Plan    Gastrostomy tube dysfunction    Contributing Nutrition Diagnosis  Inadequate energy intake    Related to (etiology):   Decreased appetite/CA    Signs and Symptoms (as evidenced by):   PEG tube/decreased po intake    Interventions/Recommendations (treatment strategy):  See recs    Nutrition Diagnosis Status:   New             Monitor and Eval  Food and Nutrient Intake: food and beverage intake  Food and Nutrient Adminstration: diet order  Physical Activity and Function: nutrition-related ADLs and IADLs  Anthropometric Measurements: weight  Biochemical Data, Medical Tests and Procedures: electrolyte and renal panel  Nutrition-Focused Physical Findings: overall appearance     Nutrition Follow-Up  RD Follow-up?: Yes    "

## 2018-03-10 LAB
POCT GLUCOSE: 140 MG/DL (ref 70–110)
POCT GLUCOSE: 141 MG/DL (ref 70–110)
POCT GLUCOSE: 208 MG/DL (ref 70–110)
POCT GLUCOSE: 265 MG/DL (ref 70–110)
POCT GLUCOSE: 303 MG/DL (ref 70–110)

## 2018-03-10 PROCEDURE — 97110 THERAPEUTIC EXERCISES: CPT

## 2018-03-10 PROCEDURE — 94761 N-INVAS EAR/PLS OXIMETRY MLT: CPT

## 2018-03-10 PROCEDURE — 11000001 HC ACUTE MED/SURG PRIVATE ROOM

## 2018-03-10 PROCEDURE — 25000003 PHARM REV CODE 250: Performed by: SURGERY

## 2018-03-10 PROCEDURE — 63600175 PHARM REV CODE 636 W HCPCS: Performed by: STUDENT IN AN ORGANIZED HEALTH CARE EDUCATION/TRAINING PROGRAM

## 2018-03-10 PROCEDURE — 63600175 PHARM REV CODE 636 W HCPCS: Performed by: SURGERY

## 2018-03-10 PROCEDURE — 97116 GAIT TRAINING THERAPY: CPT

## 2018-03-10 PROCEDURE — S0030 INJECTION, METRONIDAZOLE: HCPCS | Performed by: STUDENT IN AN ORGANIZED HEALTH CARE EDUCATION/TRAINING PROGRAM

## 2018-03-10 PROCEDURE — 94799 UNLISTED PULMONARY SVC/PX: CPT

## 2018-03-10 PROCEDURE — 25000003 PHARM REV CODE 250: Performed by: STUDENT IN AN ORGANIZED HEALTH CARE EDUCATION/TRAINING PROGRAM

## 2018-03-10 RX ORDER — ONDANSETRON 2 MG/ML
4 INJECTION INTRAMUSCULAR; INTRAVENOUS EVERY 8 HOURS PRN
Status: DISCONTINUED | OUTPATIENT
Start: 2018-03-10 | End: 2018-03-13 | Stop reason: HOSPADM

## 2018-03-10 RX ORDER — AMOXICILLIN 250 MG
1 CAPSULE ORAL 2 TIMES DAILY PRN
Status: DISCONTINUED | OUTPATIENT
Start: 2018-03-10 | End: 2018-03-13 | Stop reason: HOSPADM

## 2018-03-10 RX ORDER — PROMETHAZINE HYDROCHLORIDE 25 MG/ML
12.5 INJECTION, SOLUTION INTRAMUSCULAR; INTRAVENOUS EVERY 8 HOURS PRN
Status: DISCONTINUED | OUTPATIENT
Start: 2018-03-10 | End: 2018-03-13 | Stop reason: HOSPADM

## 2018-03-10 RX ADMIN — METOPROLOL SUCCINATE 100 MG: 25 TABLET, EXTENDED RELEASE ORAL at 08:03

## 2018-03-10 RX ADMIN — METRONIDAZOLE 500 MG: 500 INJECTION, SOLUTION INTRAVENOUS at 01:03

## 2018-03-10 RX ADMIN — CLONAZEPAM 0.5 MG: 0.5 TABLET ORAL at 08:03

## 2018-03-10 RX ADMIN — PANTOPRAZOLE SODIUM 40 MG: 40 TABLET, DELAYED RELEASE ORAL at 08:03

## 2018-03-10 RX ADMIN — METOCLOPRAMIDE HYDROCHLORIDE 10 MG: 5 SOLUTION ORAL at 08:03

## 2018-03-10 RX ADMIN — TRAMADOL HYDROCHLORIDE 50 MG: 50 TABLET, COATED ORAL at 09:03

## 2018-03-10 RX ADMIN — INSULIN ASPART 4 UNITS: 100 INJECTION, SOLUTION INTRAVENOUS; SUBCUTANEOUS at 08:03

## 2018-03-10 RX ADMIN — SCOPALAMINE 1 PATCH: 1 PATCH, EXTENDED RELEASE TRANSDERMAL at 06:03

## 2018-03-10 RX ADMIN — COLESEVELAM HYDROCHLORIDE 1875 MG: 625 TABLET, FILM COATED ORAL at 08:03

## 2018-03-10 RX ADMIN — METRONIDAZOLE 500 MG: 500 INJECTION, SOLUTION INTRAVENOUS at 09:03

## 2018-03-10 RX ADMIN — ACETAMINOPHEN 650 MG: 325 TABLET ORAL at 03:03

## 2018-03-10 RX ADMIN — AMITRIPTYLINE HYDROCHLORIDE 50 MG: 25 TABLET, FILM COATED ORAL at 08:03

## 2018-03-10 RX ADMIN — CIPROFLOXACIN HYDROCHLORIDE 500 MG: 500 TABLET, FILM COATED ORAL at 08:03

## 2018-03-10 RX ADMIN — DULOXETINE 60 MG: 30 CAPSULE, DELAYED RELEASE ORAL at 08:03

## 2018-03-10 RX ADMIN — ATORVASTATIN CALCIUM 40 MG: 40 TABLET, FILM COATED ORAL at 08:03

## 2018-03-10 RX ADMIN — PROMETHAZINE HYDROCHLORIDE 12.5 MG: 25 INJECTION INTRAMUSCULAR; INTRAVENOUS at 05:03

## 2018-03-10 RX ADMIN — CALCIUM 500 MG: 500 TABLET ORAL at 08:03

## 2018-03-10 RX ADMIN — METRONIDAZOLE 500 MG: 500 INJECTION, SOLUTION INTRAVENOUS at 05:03

## 2018-03-10 RX ADMIN — ONDANSETRON 4 MG: 2 INJECTION INTRAMUSCULAR; INTRAVENOUS at 05:03

## 2018-03-10 RX ADMIN — INSULIN ASPART 6 UNITS: 100 INJECTION, SOLUTION INTRAVENOUS; SUBCUTANEOUS at 12:03

## 2018-03-10 RX ADMIN — METOCLOPRAMIDE HYDROCHLORIDE 10 MG: 5 SOLUTION ORAL at 03:03

## 2018-03-10 RX ADMIN — LAMOTRIGINE 150 MG: 100 TABLET ORAL at 08:03

## 2018-03-10 RX ADMIN — LOSARTAN POTASSIUM 50 MG: 50 TABLET, FILM COATED ORAL at 08:03

## 2018-03-10 RX ADMIN — VITAMIN D, TAB 1000IU (100/BT) 1000 UNITS: 25 TAB at 08:03

## 2018-03-10 RX ADMIN — COLESEVELAM HYDROCHLORIDE 1875 MG: 625 TABLET, FILM COATED ORAL at 04:03

## 2018-03-10 NOTE — PROGRESS NOTES
Surgery Progress Note    Overnight Events:  NAEON   Nausea an vomiting resolved   Tolerating diet  No significant cellulitis of anterior abdominal wall   Some drainage of TFs from old G-tube site    Physical Exam:  Temp:  [96.4 °F (35.8 °C)-99.5 °F (37.5 °C)] 96.4 °F (35.8 °C)  Pulse:  [63-72] 63  Resp:  [11-18] 11  SpO2:  [95 %-99 %] 96 %  BP: (136-166)/(63-89) 139/72    Gen: NAD, AAOx3  CV: RRR  Pulm: unlabored breathing  Abd: Abd soft; no cellulitic changes at this time; Leakage from around drain site  Skin: several areas with oozing tube feeds   Mood: appropriate    I/O last 3 completed shifts:  In: 240 [P.O.:240]  Out: 2975 [Urine:2750; Drains:225]      Labs:  Recent Labs      03/08/18   0441   WBC  8.25   HGB  9.4*   HCT  31.2*   PLT  410*   NA  135*   K  4.8   CALCIUM  8.8   BUN  6*   CREATININE  0.7       A/P: 62F w/ dislodged G-tube, originally placed laparoscopically on 2/28/18, s/p new g-tube placement by IR  - discontinue TFs 2/2 leakage from old site- will hold TF for 1-2 weeks  - flush g-tube q shift  - Cipro, flagyl; Will change cipro to PO   - Soft diet PO   - Will follow closely for signs of cellulitis  - likely discharge tomorrow        Lisa Scott MD  HO-IV

## 2018-03-10 NOTE — PROGRESS NOTES
Yellow, bilious fluid is noted to be draining from pt's old peg tube site. MD called and notified. MD verbalized understanding, stated to apply and keep changing dressing PRN. Handed off to night nurse.

## 2018-03-10 NOTE — PLAN OF CARE
Problem: Patient Care Overview  Goal: Plan of Care Review  Outcome: Ongoing (interventions implemented as appropriate)  No distress noted. Will continue to monitor.

## 2018-03-10 NOTE — PLAN OF CARE
Problem: Patient Care Overview  Goal: Plan of Care Review  Outcome: Ongoing (interventions implemented as appropriate)  Patient on RA, no respiratory distress noted. Patient does fair with the Is. Will continue to monitor.

## 2018-03-10 NOTE — PT/OT/SLP PROGRESS
Physical Therapy Treatment    Patient Name:  Kaylee Ngo   MRN:  [id number]    Recommendations:     Discharge Recommendations:  home with home health, home health PT   Discharge Equipment Recommendations: none   Barriers to discharge: Decreased caregiver support    Assessment:     Kaylee Ngo is a 62 y.o. female admitted with a medical diagnosis of <principal problem not specified>.  She presents with the following impairments/functional limitations:  weakness, impaired endurance, gait instability, impaired balance, impaired functional mobilty, pain, decreased lower extremity function. Pt able to increase ambulation distance covered this session. Will continue PT to achieve all established goals in plan of care.    Rehab Prognosis:  good; patient would benefit from acute skilled PT services to address these deficits and reach maximum level of function.      Recent Surgery: * No surgery found *      Plan:     During this hospitalization, patient to be seen 6 x/week to address the above listed problems via gait training, therapeutic exercises  · Plan of Care Expires:  04/08/18   Plan of Care Reviewed with: patient    Subjective     Communicated with nurse prior to session.  Patient found supine upon PT entry to room, agreeable to treatment.      Chief Complaint: Feelings of nausea  Patient comments/goals: to go home  Pain/Comfort:  · Pain Rating 1:  (did not express pain. Expressed nausea)    Patients cultural, spiritual, Synagogue conflicts given the current situation: None stated to PTA    Objective:     Patient found with: bed alarm, telemetry     General Precautions: Standard, fall   Orthopedic Precautions:N/A   Braces: N/A     Functional Mobility:  · Bed Mobility:     · Rolling Left:  modified independence  · Supine to Sit: modified independence  · Sit to Supine: modified independence  · Transfers:     · Sit to Stand:  modified independence with rolling walker  · Gait: x 2 trials with RW and SBA. 1st  trial ~60 ft and 2nd trial ~140ft      AM-PAC 6 CLICK MOBILITY  Turning over in bed (including adjusting bedclothes, sheets and blankets)?: 3  Sitting down on and standing up from a chair with arms (e.g., wheelchair, bedside commode, etc.): 3  Moving from lying on back to sitting on the side of the bed?: 3  Moving to and from a bed to a chair (including a wheelchair)?: 3  Need to walk in hospital room?: 3  Climbing 3-5 steps with a railing?: 3  Total Score: 18       Therapeutic Activities and Exercises:   All transfers with assistance as documented above. Pt performed ambulation training by 2 trials both with RW and SBA. 1st trial ~60 ft and 2nd trial ~140ft. Seated therapeutic exercises BLE 1x 10 reps consisting of LAQ's, hip add/abd, heel/toe raises,and hip/knee flexion (marching).    Patient left supine with all lines intact, call button in reach, bed alarm on and nurse notified..    GOALS:    Physical Therapy Goals        Problem: Physical Therapy Goal    Goal Priority Disciplines Outcome Goal Variances Interventions   Physical Therapy Goal     PT/OT, PT Ongoing (interventions implemented as appropriate)     Description:  Goals to be met by: 18     Patient will increase functional independence with mobility by performin. Sit to stand transfer with Modified [place]  2. Bed to chair transfer with Modified [place] using Rolling Walker  3. Gait  x 150 feet with Modified [place] using Rolling Walker.   4. Ascend/descend 4 stairs with bilateral Handrails Stand-by Assistance                      Time Tracking:     PT Received On: 03/10/18  PT Start Time: 1137     PT Stop Time: 1210  PT Total Time (min): 33 min     Billable Minutes: Gait Training   18 and Therapeutic Exercise 15    Treatment Type: Treatment  PT/PTA: PTA     PTA Visit Number: 1     Heidy Rose, PTA  03/10/2018

## 2018-03-10 NOTE — PLAN OF CARE
Problem: Patient Care Overview  Goal: Plan of Care Review  Outcome: Ongoing (interventions implemented as appropriate)  AAOX3.  Respirations even and unlabored; breath sounds clear.  RA with saturation 95-99%.  C/o intermittent abd pain;  Tylenol 650mg po alternated with ultram 50mg po.  Pain decreased from 8/10 to 3/10.  Peg tube with peptide 1.5cal infusing at 20ml/hr; no leaking from tube insertion site.  Under peg tube site is small incision from previous peg that is oozing yellowish green bile.  Abd dressing applied at beginning of shift and changed once during shift.  Older lap sites closed with dermabond without redness, swelling, or drainage noted.  Swallowing medications without issues.  Ambulating to bathroom and back to bed with standby assist.  TEDS on.  Fall precaution sign on door.  Fall precaution armband and socks on.  Instructed to call for assistance. Will cont to monitor.

## 2018-03-10 NOTE — PLAN OF CARE
Problem: Physical Therapy Goal  Goal: Physical Therapy Goal  Goals to be met by: 18     Patient will increase functional independence with mobility by performin. Sit to stand transfer with Modified [place]  2. Bed to chair transfer with Modified [place] using Rolling Walker  3. Gait  x 150 feet with Modified [place] using Rolling Walker.   4. Ascend/descend 4 stairs with bilateral Handrails Stand-by Assistance     Outcome: Ongoing (interventions implemented as appropriate)       Pt continues to work and progress toward established goals.

## 2018-03-11 PROBLEM — K94.23 GASTROSTOMY TUBE DYSFUNCTION: Status: RESOLVED | Noted: 2018-03-06 | Resolved: 2018-03-11

## 2018-03-11 LAB
POCT GLUCOSE: 272 MG/DL (ref 70–110)
POCT GLUCOSE: 289 MG/DL (ref 70–110)
POCT GLUCOSE: 293 MG/DL (ref 70–110)
POCT GLUCOSE: 371 MG/DL (ref 70–110)

## 2018-03-11 PROCEDURE — 99900035 HC TECH TIME PER 15 MIN (STAT)

## 2018-03-11 PROCEDURE — 25000003 PHARM REV CODE 250: Performed by: SURGERY

## 2018-03-11 PROCEDURE — 94799 UNLISTED PULMONARY SVC/PX: CPT

## 2018-03-11 PROCEDURE — 11000001 HC ACUTE MED/SURG PRIVATE ROOM

## 2018-03-11 PROCEDURE — 25000003 PHARM REV CODE 250: Performed by: STUDENT IN AN ORGANIZED HEALTH CARE EDUCATION/TRAINING PROGRAM

## 2018-03-11 PROCEDURE — S0030 INJECTION, METRONIDAZOLE: HCPCS | Performed by: STUDENT IN AN ORGANIZED HEALTH CARE EDUCATION/TRAINING PROGRAM

## 2018-03-11 RX ORDER — METRONIDAZOLE 500 MG/1
500 TABLET ORAL EVERY 8 HOURS
Qty: 21 TABLET | Refills: 0 | Status: SHIPPED | OUTPATIENT
Start: 2018-03-11 | End: 2018-03-18

## 2018-03-11 RX ORDER — METRONIDAZOLE 500 MG/1
500 TABLET ORAL EVERY 8 HOURS
Status: DISCONTINUED | OUTPATIENT
Start: 2018-03-11 | End: 2018-03-12

## 2018-03-11 RX ORDER — CIPROFLOXACIN 500 MG/1
500 TABLET ORAL EVERY 12 HOURS
Qty: 14 TABLET | Refills: 0 | Status: SHIPPED | OUTPATIENT
Start: 2018-03-11 | End: 2018-03-18

## 2018-03-11 RX ADMIN — CLONAZEPAM 0.5 MG: 0.5 TABLET ORAL at 09:03

## 2018-03-11 RX ADMIN — INSULIN ASPART 5 UNITS: 100 INJECTION, SOLUTION INTRAVENOUS; SUBCUTANEOUS at 08:03

## 2018-03-11 RX ADMIN — AMITRIPTYLINE HYDROCHLORIDE 50 MG: 25 TABLET, FILM COATED ORAL at 08:03

## 2018-03-11 RX ADMIN — METRONIDAZOLE 500 MG: 500 INJECTION, SOLUTION INTRAVENOUS at 04:03

## 2018-03-11 RX ADMIN — METRONIDAZOLE 500 MG: 500 TABLET ORAL at 09:03

## 2018-03-11 RX ADMIN — METOPROLOL SUCCINATE 100 MG: 25 TABLET, EXTENDED RELEASE ORAL at 08:03

## 2018-03-11 RX ADMIN — CALCIUM 500 MG: 500 TABLET ORAL at 09:03

## 2018-03-11 RX ADMIN — CIPROFLOXACIN HYDROCHLORIDE 500 MG: 500 TABLET, FILM COATED ORAL at 09:03

## 2018-03-11 RX ADMIN — DULOXETINE 60 MG: 30 CAPSULE, DELAYED RELEASE ORAL at 09:03

## 2018-03-11 RX ADMIN — INSULIN ASPART 6 UNITS: 100 INJECTION, SOLUTION INTRAVENOUS; SUBCUTANEOUS at 11:03

## 2018-03-11 RX ADMIN — METOPROLOL SUCCINATE 100 MG: 25 TABLET, EXTENDED RELEASE ORAL at 09:03

## 2018-03-11 RX ADMIN — LAMOTRIGINE 150 MG: 100 TABLET ORAL at 09:03

## 2018-03-11 RX ADMIN — COLESEVELAM HYDROCHLORIDE 1875 MG: 625 TABLET, FILM COATED ORAL at 09:03

## 2018-03-11 RX ADMIN — COLESEVELAM HYDROCHLORIDE 1875 MG: 625 TABLET, FILM COATED ORAL at 05:03

## 2018-03-11 RX ADMIN — INSULIN ASPART 6 UNITS: 100 INJECTION, SOLUTION INTRAVENOUS; SUBCUTANEOUS at 05:03

## 2018-03-11 RX ADMIN — METOCLOPRAMIDE HYDROCHLORIDE 10 MG: 5 SOLUTION ORAL at 09:03

## 2018-03-11 RX ADMIN — PANTOPRAZOLE SODIUM 40 MG: 40 TABLET, DELAYED RELEASE ORAL at 09:03

## 2018-03-11 RX ADMIN — CLONAZEPAM 0.5 MG: 0.5 TABLET ORAL at 08:03

## 2018-03-11 RX ADMIN — ATORVASTATIN CALCIUM 40 MG: 40 TABLET, FILM COATED ORAL at 09:03

## 2018-03-11 RX ADMIN — VITAMIN D, TAB 1000IU (100/BT) 1000 UNITS: 25 TAB at 09:03

## 2018-03-11 RX ADMIN — METOCLOPRAMIDE HYDROCHLORIDE 10 MG: 5 SOLUTION ORAL at 05:03

## 2018-03-11 RX ADMIN — METRONIDAZOLE 500 MG: 500 TABLET ORAL at 01:03

## 2018-03-11 RX ADMIN — LOSARTAN POTASSIUM 50 MG: 50 TABLET, FILM COATED ORAL at 09:03

## 2018-03-11 RX ADMIN — CIPROFLOXACIN HYDROCHLORIDE 500 MG: 500 TABLET, FILM COATED ORAL at 08:03

## 2018-03-11 RX ADMIN — METOCLOPRAMIDE HYDROCHLORIDE 10 MG: 5 SOLUTION ORAL at 08:03

## 2018-03-11 NOTE — PLAN OF CARE
Problem: Patient Care Overview  Goal: Plan of Care Review  Outcome: Ongoing (interventions implemented as appropriate)  AAOX3.  Respirations even and unlabored; breath sounds clear.  RA with saturation 95-99%.  Denies pain, n/v, and sob.  Peg tube flushed with 20ml of NS without difficulty then clamped.  Dressing under current peg tube is clean, dry, and intact.  Older lap sites closed with dermabond without redness, swelling, or drainage noted.  Swallowing medications without issues.  Ambulating to bathroom and back to bed with standby assist.  TEDS on.  Fall precaution sign on door.  Fall precaution armband and socks on.  Instructed to call for assistance. Will cont to monitor.

## 2018-03-11 NOTE — DISCHARGE SUMMARY
[facility]  General Surgery  Discharge Summary      Patient Name: Kaylee Ngo  MRN: [id number]  Admission Date: 3/6/2018  Hospital Length of Stay: 4 days  Discharge Date and Time:  03/11/2018 10:03 AM  Attending Physician: Cam Ashton MD   Discharging Provider: Lisa Scott MD  Primary Care Provider: Robert Garcia MD     HPI: 62yoF s/p g-tube placement in remnant stomach (h/o gastric bypass) on 2/28/18 presented with drainage around g-tube and concern for dislodgement    * No surgery found *     Hospital Course: Patient admitted, underwent IR tube study- noted that g-tube had been pulled back into sub q. This was pulled and a new g-tube was placed by IR under fluoro. Patient did well post-op. She remained afebrile with normal WBC. She was continued on cipro/flagyl during stay due to concern for infection 2/2 some TF being infused into subq tissue prior to presentation to hospital. TF were started through new g-tube and this resulted in increased drainage from old g-tube site. Therefore TFs were discontinued. Continued local wound care. Patient was able to tolerate po diet. She was discharged home. PLan: flush g-tube BID at home, continue local wound care. Follow-up with Dr. Christine in 1-2 weeks, will likely restart TFs then. Continue one more week of cipro/flagyl    Consults:   Consults         Status Ordering Provider     Inpatient consult to Interventional Radiology  Once     Provider:  Joshua Coley MD    Completed NIKI AYOUB            Pending Diagnostic Studies:     Procedure Component Value Units Date/Time    IR Gastrostomy Tube [[id number]]     Order Status:  Sent Lab Status:  No result         Final Active Diagnoses:    Diagnosis Date Noted POA      Problems Resolved During this Admission:    Diagnosis Date Noted Date Resolved POA    PRINCIPAL PROBLEM:  Gastrostomy tube dysfunction [K94.23] 03/06/2018 03/11/2018 Yes      Discharged Condition: stable    Disposition:  Home or Self Care    Follow Up:  Follow-up Information     Please follow up.    Why:  [facility]           [facility]    Specialty:  Home Health Services  Why:  Infusion  Contact information:  [address] [place] [address]  [phone]             Cam Ashton MD On 3/19/2018.    Specialty:  General Surgery  Why:  1:15 pm - previously booked   Contact information:  [address]  [address]  [place] [address]  [phone]                 Patient Instructions:     Ambulatory referral to Home Health   Referral Priority: Routine Referral Type: Home Health   Referral Reason: Specialty Services Required    Requested Specialty: Home Health Services    Number of Visits Requested: 1      Ambulatory referral to Home Health   Referral Priority: Routine Referral Type: Home Health   Referral Reason: Specialty Services Required    Requested Specialty: Home Health Services    Number of Visits Requested: 1      Diet Adult Regular     Activity as tolerated     Sponge bath only until clinic visit     Notify your health care provider if you experience any of the following:  temperature >100.4     Notify your health care provider if you experience any of the following:  persistent nausea and vomiting or diarrhea     Notify your health care provider if you experience any of the following:  severe uncontrolled pain     Notify your health care provider if you experience any of the following:  redness, tenderness, or signs of infection (pain, swelling, redness, odor or green/yellow discharge around incision site)     Notify your health care provider if you experience any of the following:  difficulty breathing or increased cough     Change dressing (specify)   Order Comments: Dressing change: as needed for saturation. Replace with gauze and tape.  Flush g-tube twice a day with 20ml NS or sterile water.       Medications:  Reconciled Home Medications:   Current Discharge  Medication List      START taking these medications    Details   ciprofloxacin HCl (CIPRO) 500 MG tablet Take 1 tablet (500 mg total) by mouth every 12 (twelve) hours.  Qty: 14 tablet, Refills: 0      metroNIDAZOLE (FLAGYL) 500 MG tablet Take 1 tablet (500 mg total) by mouth every 8 (eight) hours.  Qty: 21 tablet, Refills: 0         CONTINUE these medications which have NOT CHANGED    Details   ACCU-CHEK SHU CONTROL SOLN Soln       ACCU-CHEK SHU PLUS METER Misc       ACCU-CHEK SHU PLUS TEST STRP Strp       amitriptyline (ELAVIL) 50 MG tablet Take 50 mg by mouth every evening.      calcium carbonate (OS-BEST) 600 mg calcium (1,500 mg) Tab Take 600 mg by mouth once.      clonazePAM (KLONOPIN) 0.5 MG tablet Take 0.5 mg by mouth 2 (two) times daily as needed for Anxiety.      colesevelam (WELCHOL) 625 mg tablet Take 1,875 mg by mouth 2 (two) times daily with meals.      cyanocobalamin 1,000 mcg/mL injection 1,000 mcg every 28 days.      diclofenac (CATAFLAM) 50 MG tablet as needed.       diphenoxylate-atropine 2.5-0.025 mg (LOMOTIL) 2.5-0.025 mg per tablet Take 1 tablet by mouth 4 (four) times daily as needed for Diarrhea.      docusate sodium (COLACE) 100 MG capsule Take 100 mg by mouth as needed for Constipation.      DULoxetine (CYMBALTA) 60 MG capsule Take 60 mg by mouth once daily.      everolimus (AFINITOR) 10 mg Tab Take 10 mg by mouth once daily.      GLUCAGON EMERGENCY KIT, HUMAN, 1 mg injection       hydrOXYzine pamoate (VISTARIL) 25 MG Cap Take 25 mg by mouth 3 (three) times daily.       insulin degludec (TRESIBA FLEXTOUCH U-100) 100 unit/mL (3 mL) InPn Inject 10 Units into the skin once daily.       lamoTRIgine (LAMICTAL) 150 MG Tab       lancets 30 gauge Misc       lancing device Misc       losartan (COZAAR) 50 MG tablet Take 50 mg by mouth once daily.      !! metoclopramide HCl (REGLAN) 5 mg/5 mL Soln TAKE 10 ML BY MOUTH THREE TIMES A DAY  Qty: 420 mL, Refills: 2      !! metoclopramide HCl (REGLAN) 5  "mg/5 mL Soln Take 10 mLs (10 mg total) by mouth 3 (three) times daily.  Qty: 473 mL, Refills: 0      metoprolol succinate (TOPROL-XL) 100 MG 24 hr tablet Take 100 mg by mouth 2 (two) times daily.      multivitamin capsule Take 1 capsule by mouth once daily.      omeprazole (PRILOSEC) 40 MG capsule Take 40 mg by mouth 2 (two) times daily before meals.       ondansetron (ZOFRAN) 8 MG tablet Take 8 mg by mouth every 8 (eight) hours.      potassium chloride (KLOR-CON) 20 mEq Pack Take 20 mEq by mouth once.       scopolamine (TRANSDERM-SCOP) 1.3-1.5 mg (1 mg over 3 days)       SURE COMFORT PEN NEEDLE 32 gauge x 5/32" Ndle       tiZANidine (ZANAFLEX) 4 MG tablet as needed.       traMADol (ULTRAM) 50 mg tablet Take 1 tablet (50 mg total) by mouth every 4 (four) hours as needed for Pain.  Qty: 30 tablet, Refills: 0      vitamin D 1000 units Tab Take 1,000 Units by mouth once daily.       !! - Potential duplicate medications found. Please discuss with provider.      STOP taking these medications       atorvastatin (LIPITOR) 40 MG tablet Comments:   Reason for Stopping:         BRAN/GUM/FIB/LATISHA/PSYL/KELP/PEC (FIBER 6 ORAL) Comments:   Reason for Stopping:         polyethylene glycol (GLYCOLAX) 17 gram PwPk Comments:   Reason for Stopping:               Lisa Scott MD  General Surgery  [facility]  "

## 2018-03-11 NOTE — PROGRESS NOTES
Pharmacist Intervention IV to PO Note    Kaylee Ngo is a 62 y.o. female being treated with IV medication metronidazole    Patient Data:    Vital Signs (Most Recent):  Temp: 98.1 °F (36.7 °C) (03/11/18 0510)  Pulse: 64 (03/11/18 0510)  Resp: (!) 1 (03/11/18 0510)  BP: 131/62 (03/11/18 0510)  SpO2: 97 % (03/11/18 0037)   Vital Signs (72h Range):  Temp:  [96.2 °F (35.7 °C)-99.5 °F (37.5 °C)]   Pulse:  [62-80]   Resp:  [1-19]   BP: (122-185)/(62-90)   SpO2:  [95 %-99 %]      CBC:    Recent Labs     Lab 03/06/18 2059 03/07/18  0455 03/08/18  0441   WBC 4.53 4.39 8.25   RBC 3.77* 3.64* 3.87*   HGB 9.1* 8.6* 9.4*   HCT 30.4* 30.0* 31.2*   * 365* 410*   MCV 81* 82 81*   MCH 24.1* 23.6* 24.3*   MCHC 29.9* 28.7* 30.1*     CMP:     Recent Labs     Lab 03/06/18 2059 03/07/18  0455 03/08/18  0441   * 300* 202*   CALCIUM 9.0 8.6* 8.8   ALBUMIN 2.8* 2.5* --    PROT 6.1 5.5* --     139 135*   K 3.8 4.5 4.8   CO2 30* 30* 29   CL 99 101 97   BUN 11 8 6*   CREATININE 0.8 0.8 0.7   ALKPHOS 119 107 --    ALT 16 11 --    AST 23 13 --    BILITOT 0.3 0.2 --        Dietary Orders:  Diet Orders            Diet diabetic [facility]; 2000 Calorie; Bariatric Regular: Diabetic starting at 03/10 0904    Dietary nutrition supplements [facility]; Boost Glucose Control - Any flavor starting at 03/08 0800            Based on the following criteria, this patient qualifies for intravenous to oral conversion:  [x] The patients gastrointestinal tract is functioning (tolerating medications via oral or enteral route for 24 hours and tolerating food or enteral feeds for 24 hours).  [x] The patient is hemodynamically stable for 24 hours (heart rate <100 beats per minute, systolic blood pressure >99 mm Hg, and respiratory rate <20 breaths per minute).  [x] The patient shows clinical improvement (afebrile for at least 24 hours and white blood cell count downtrending or normalized). Additionally, the patient must be  non-neutropenic (absolute neutrophil count >500 cells/mm3).  [x] For antimicrobials, the patient has received IV therapy for at least 24 hours.    IV medication Metronidazole 500 mg will be changed to oral medication metronidazole 500 mg    Pharmacist's Name: Annemarie Al  Pharmacist's Extension: [phone]

## 2018-03-11 NOTE — PLAN OF CARE
[place] sent HH orders to [facility], [place], [phone], fax [phone]. HH nurse will be out to eval pt on Monday 3/12/18    Martha Mancera [phone] contacted to inform that pt discharging and holding off on resumption of tube feeds. Pt to see Dr. Ferreira in 1 week to restart tube feeds.    Floor nurse, Ervin and pt informed    No DME ordered    Future Appointments  Date Time Provider Department Center   3/19/2018 1:15 PM Cam Ashton MD [facility] TUMOR [facility]   7/9/2018 9:15 AM [facility] MRI1 [facility] MRI [facility]   7/9/2018 11:00 AM [facility] CT1 LIMIT 400 LBS [facility] CT SCAN [facility]   7/9/2018 12:45 PM CARDIOLOGY, ECHO [facility] CARD [facility]   7/10/2018 12:30 PM Serjio Winkler MD [facility] TUMOR [facility]       Pt's nurse will go over medications/signs and symptoms prior to discharge       03/11/18 1100   Final Note   Assessment Type Final Discharge Note   Discharge Disposition Home-Health  ([facility])   What phone number can be called within the next 1-3 days to see how you are doing after discharge? [phone]   Hospital Follow Up  Appt(s) scheduled? Yes   Right Care Referral Info   Post Acute Recommendation Home-care  ([facility])     Vidya Saul, RN Transitional Navigator  [phone]

## 2018-03-12 LAB
ANION GAP SERPL CALC-SCNC: 8 MMOL/L
BASOPHILS # BLD AUTO: 0.04 K/UL
BASOPHILS NFR BLD: 0.3 %
BUN SERPL-MCNC: 9 MG/DL
CALCIUM SERPL-MCNC: 8.9 MG/DL
CHLORIDE SERPL-SCNC: 98 MMOL/L
CO2 SERPL-SCNC: 30 MMOL/L
CREAT SERPL-MCNC: 0.8 MG/DL
DIFFERENTIAL METHOD: ABNORMAL
EOSINOPHIL # BLD AUTO: 0.1 K/UL
EOSINOPHIL NFR BLD: 0.8 %
ERYTHROCYTE [DISTWIDTH] IN BLOOD BY AUTOMATED COUNT: 16.7 %
EST. GFR  (AFRICAN AMERICAN): >60 ML/MIN/1.73 M^2
EST. GFR  (NON AFRICAN AMERICAN): >60 ML/MIN/1.73 M^2
GLUCOSE SERPL-MCNC: 479 MG/DL
HCT VFR BLD AUTO: 35.5 %
HGB BLD-MCNC: 10.6 G/DL
LYMPHOCYTES # BLD AUTO: 1.4 K/UL
LYMPHOCYTES NFR BLD: 11 %
MCH RBC QN AUTO: 24.8 PG
MCHC RBC AUTO-ENTMCNC: 29.9 G/DL
MCV RBC AUTO: 83 FL
MONOCYTES # BLD AUTO: 0.5 K/UL
MONOCYTES NFR BLD: 3.7 %
NEUTROPHILS # BLD AUTO: 10.3 K/UL
NEUTROPHILS NFR BLD: 83.7 %
PLATELET # BLD AUTO: 517 K/UL
PMV BLD AUTO: 8.6 FL
POCT GLUCOSE: 210 MG/DL (ref 70–110)
POCT GLUCOSE: 314 MG/DL (ref 70–110)
POCT GLUCOSE: 348 MG/DL (ref 70–110)
POCT GLUCOSE: 385 MG/DL (ref 70–110)
POCT GLUCOSE: 430 MG/DL (ref 70–110)
POTASSIUM SERPL-SCNC: 5.3 MMOL/L
RBC # BLD AUTO: 4.27 M/UL
SODIUM SERPL-SCNC: 136 MMOL/L
WBC # BLD AUTO: 12.32 K/UL

## 2018-03-12 PROCEDURE — 25000003 PHARM REV CODE 250: Performed by: STUDENT IN AN ORGANIZED HEALTH CARE EDUCATION/TRAINING PROGRAM

## 2018-03-12 PROCEDURE — 97803 MED NUTRITION INDIV SUBSEQ: CPT

## 2018-03-12 PROCEDURE — 11000001 HC ACUTE MED/SURG PRIVATE ROOM

## 2018-03-12 PROCEDURE — 63600175 PHARM REV CODE 636 W HCPCS: Performed by: STUDENT IN AN ORGANIZED HEALTH CARE EDUCATION/TRAINING PROGRAM

## 2018-03-12 PROCEDURE — 99900035 HC TECH TIME PER 15 MIN (STAT)

## 2018-03-12 PROCEDURE — 94761 N-INVAS EAR/PLS OXIMETRY MLT: CPT

## 2018-03-12 PROCEDURE — 97110 THERAPEUTIC EXERCISES: CPT

## 2018-03-12 PROCEDURE — 97116 GAIT TRAINING THERAPY: CPT

## 2018-03-12 PROCEDURE — 94799 UNLISTED PULMONARY SVC/PX: CPT

## 2018-03-12 PROCEDURE — 80048 BASIC METABOLIC PNL TOTAL CA: CPT

## 2018-03-12 PROCEDURE — 85025 COMPLETE CBC W/AUTO DIFF WBC: CPT

## 2018-03-12 PROCEDURE — 63600175 PHARM REV CODE 636 W HCPCS: Performed by: SURGERY

## 2018-03-12 PROCEDURE — 36415 COLL VENOUS BLD VENIPUNCTURE: CPT

## 2018-03-12 PROCEDURE — 25000003 PHARM REV CODE 250: Performed by: SURGERY

## 2018-03-12 RX ORDER — SUMATRIPTAN SUCCINATE 25 MG/1
25 TABLET ORAL ONCE
Status: COMPLETED | OUTPATIENT
Start: 2018-03-12 | End: 2018-03-12

## 2018-03-12 RX ORDER — NYSTATIN AND TRIAMCINOLONE ACETONIDE 100000; 1 [USP'U]/G; MG/G
OINTMENT TOPICAL 2 TIMES DAILY
Status: DISCONTINUED | OUTPATIENT
Start: 2018-03-12 | End: 2018-03-13 | Stop reason: HOSPADM

## 2018-03-12 RX ORDER — FLUCONAZOLE 40 MG/ML
200 POWDER, FOR SUSPENSION ORAL DAILY
Status: DISCONTINUED | OUTPATIENT
Start: 2018-03-12 | End: 2018-03-13 | Stop reason: HOSPADM

## 2018-03-12 RX ADMIN — HYDRALAZINE HYDROCHLORIDE 10 MG: 20 INJECTION INTRAMUSCULAR; INTRAVENOUS at 05:03

## 2018-03-12 RX ADMIN — METOCLOPRAMIDE HYDROCHLORIDE 10 MG: 5 SOLUTION ORAL at 08:03

## 2018-03-12 RX ADMIN — INSULIN ASPART 4 UNITS: 100 INJECTION, SOLUTION INTRAVENOUS; SUBCUTANEOUS at 10:03

## 2018-03-12 RX ADMIN — VITAMIN D, TAB 1000IU (100/BT) 1000 UNITS: 25 TAB at 08:03

## 2018-03-12 RX ADMIN — ACETAMINOPHEN 650 MG: 325 TABLET ORAL at 06:03

## 2018-03-12 RX ADMIN — FLUCONAZOLE 200 MG: 40 POWDER, FOR SUSPENSION ORAL at 03:03

## 2018-03-12 RX ADMIN — NYSTATIN AND TRIAMCINOLONE ACETONIDE: 100000; 1 OINTMENT TOPICAL at 09:03

## 2018-03-12 RX ADMIN — METRONIDAZOLE 500 MG: 500 TABLET ORAL at 05:03

## 2018-03-12 RX ADMIN — CLONAZEPAM 0.5 MG: 0.5 TABLET ORAL at 08:03

## 2018-03-12 RX ADMIN — DULOXETINE 60 MG: 30 CAPSULE, DELAYED RELEASE ORAL at 08:03

## 2018-03-12 RX ADMIN — COLESEVELAM HYDROCHLORIDE 1875 MG: 625 TABLET, FILM COATED ORAL at 08:03

## 2018-03-12 RX ADMIN — ATORVASTATIN CALCIUM 40 MG: 40 TABLET, FILM COATED ORAL at 08:03

## 2018-03-12 RX ADMIN — INSULIN ASPART 4 UNITS: 100 INJECTION, SOLUTION INTRAVENOUS; SUBCUTANEOUS at 05:03

## 2018-03-12 RX ADMIN — METOPROLOL SUCCINATE 100 MG: 25 TABLET, EXTENDED RELEASE ORAL at 08:03

## 2018-03-12 RX ADMIN — CLONAZEPAM 0.5 MG: 0.5 TABLET ORAL at 10:03

## 2018-03-12 RX ADMIN — CIPROFLOXACIN HYDROCHLORIDE 500 MG: 500 TABLET, FILM COATED ORAL at 08:03

## 2018-03-12 RX ADMIN — SUMATRIPTAN SUCCINATE 25 MG: 25 TABLET ORAL at 10:03

## 2018-03-12 RX ADMIN — CALCIUM 500 MG: 500 TABLET ORAL at 08:03

## 2018-03-12 RX ADMIN — METOCLOPRAMIDE HYDROCHLORIDE 10 MG: 5 SOLUTION ORAL at 09:03

## 2018-03-12 RX ADMIN — METOPROLOL SUCCINATE 100 MG: 25 TABLET, EXTENDED RELEASE ORAL at 09:03

## 2018-03-12 RX ADMIN — PROMETHAZINE HYDROCHLORIDE 12.5 MG: 25 INJECTION INTRAMUSCULAR; INTRAVENOUS at 10:03

## 2018-03-12 RX ADMIN — PIPERACILLIN SODIUM AND TAZOBACTAM SODIUM 4.5 G: 4; .5 INJECTION, POWDER, LYOPHILIZED, FOR SOLUTION INTRAVENOUS at 09:03

## 2018-03-12 RX ADMIN — METOCLOPRAMIDE HYDROCHLORIDE 10 MG: 5 SOLUTION ORAL at 03:03

## 2018-03-12 RX ADMIN — INSULIN ASPART 8 UNITS: 100 INJECTION, SOLUTION INTRAVENOUS; SUBCUTANEOUS at 05:03

## 2018-03-12 RX ADMIN — INSULIN ASPART 10 UNITS: 100 INJECTION, SOLUTION INTRAVENOUS; SUBCUTANEOUS at 11:03

## 2018-03-12 RX ADMIN — ONDANSETRON 4 MG: 2 INJECTION INTRAMUSCULAR; INTRAVENOUS at 07:03

## 2018-03-12 RX ADMIN — LOSARTAN POTASSIUM 50 MG: 50 TABLET, FILM COATED ORAL at 08:03

## 2018-03-12 RX ADMIN — AMITRIPTYLINE HYDROCHLORIDE 50 MG: 25 TABLET, FILM COATED ORAL at 09:03

## 2018-03-12 RX ADMIN — LAMOTRIGINE 150 MG: 100 TABLET ORAL at 08:03

## 2018-03-12 RX ADMIN — PIPERACILLIN SODIUM AND TAZOBACTAM SODIUM 4.5 G: 4; .5 INJECTION, POWDER, LYOPHILIZED, FOR SOLUTION INTRAVENOUS at 05:03

## 2018-03-12 RX ADMIN — PANTOPRAZOLE SODIUM 40 MG: 40 TABLET, DELAYED RELEASE ORAL at 08:03

## 2018-03-12 RX ADMIN — COLESEVELAM HYDROCHLORIDE 1875 MG: 625 TABLET, FILM COATED ORAL at 05:03

## 2018-03-12 NOTE — PROGRESS NOTES
Pt dressing soaked  with yellow purulent dressing from old pegs tube. Skin aroughd and below peg tube site  with patches of red raised skin warm to touch. Pt also with a temperate of 100.4. Dr. Christine notified with orders to hold discharge and continue to monitor.

## 2018-03-12 NOTE — PLAN OF CARE
Problem: Patient Care Overview  Goal: Plan of Care Review  Outcome: Ongoing (interventions implemented as appropriate)  Pt in NAD. V/s stable. AAOx4. Scheduled medications given per MAR. PIV saline locked flushed dressing CDI. RUQ gtube in place. RUQ laparoscopic puncture from previous gtube pink/inflammed draining green/yellow drainage dressing changes performed per MD order. Pt denies n/v and pain. Pt ambulates with assistance. Encouraged to call for assistance verbalized understanding. Safety maintained bed in low locked position, SR up X2, bed alarm on, and call bell in reach. Will continue to monitor.

## 2018-03-12 NOTE — PLAN OF CARE
Problem: Patient Care Overview  Goal: Plan of Care Review  Outcome: Ongoing (interventions implemented as appropriate)  Recommendation/Intervention:   1. Continue to encourage good intake at meals.   2. Monitor need for ADA diet    Goals:   Pt will consume at least 50% intake at meals  Nutrition Goal Status: progressing towards goal  Communication of RD Recs: reviewed with RN (Trudi)

## 2018-03-12 NOTE — PROGRESS NOTES
.Pharmacy New Medication Education    Patient accepted medication education.    Pharmacy educated patient on name and purpose of medications and possible side effects, using the teach-back method.     Current Inpatient Medication Orders   acetaminophen tablet 650 mg   amitriptyline tablet 50 mg   atorvastatin tablet 40 mg   calcium carbonate (OS-BEST) tablet 500 mg   ciprofloxacin HCl tablet 500 mg   clonazePAM tablet 0.5 mg   colesevelam tablet 1,875 mg   dextrose 50% injection 12.5 g   diphenhydrAMINE injection 25 mg   * diphenhydrAMINE injection   DULoxetine DR capsule 60 mg   * fentaNYL injection   glucagon (human recombinant) injection 1 mg   * glucagon (human recombinant) injection   hydrALAZINE injection 10 mg   insulin aspart U-100 pen 1-10 Units   lamoTRIgine tablet 150 mg   losartan tablet 50 mg   metoclopramide HCl 5 mg/5 mL solution 10 mg   metoprolol succinate (TOPROL-XL) 24 hr tablet 100 mg   metroNIDAZOLE tablet 500 mg   ondansetron injection 4 mg   pantoprazole EC tablet 40 mg   promethazine injection 12.5 mg   scopolamine 1.3-1.5 mg (1 mg over 3 days) 1 patch   senna-docusate 8.6-50 mg per tablet 1 tablet   sodium chloride 0.9% flush 3 mL   traMADol tablet 50 mg   vitamin D 1000 units tablet 1,000 Units       Learners of pharmacy medication education included:  Patient    Patient +/- learner response:  Verbalized Understanding, Teachback

## 2018-03-12 NOTE — PROGRESS NOTES
" [facility]  Adult Nutrition  Progress Note    SUMMARY       Recommendations    Recommendation/Intervention:   1. Continue to encourage good intake at meals.   2. Monitor need for ADA diet    Goals:   Pt will consume at least 50% intake at meals  Nutrition Goal Status: progressing towards goal  Communication of RD Recs: reviewed with RN (Trudi)    Reason for Assessment  Reason for Assessment: RD follow-up  Diagnosis:  (dislodged G tube)  Relevant Medical History: neuroendocrine CA of small bowel, chemo, HTN, DM, HLD, GERD, gastric ulcer, bipolar disease, appendectomy, cholecysytectomy, gastric bypass  General Information Comments: Pt on Regular diet with Boost Glucose Control. Reports good intake at meals. Pt site still leaking so TF remains on hold.     Nutrition Risk Screen  Nutrition Risk Screen: tube feeding or parenteral nutrition    Nutrition/Diet History  Food Preferences: no Rastafarian or cultural food prefs identified  Do you have any cultural, spiritual, Rastafarian conflicts, given your current situation?: None stated to PTA  Factors Affecting Nutritional Intake: decreased appetite    Anthropometrics  Temp: 98.7 °F (37.1 °C)  Height Method: Stated  Height: 5' 4" (162.6 cm)  Height (inches): 64 in  Weight Method: Bed Scale  Weight: 54.4 kg (119 lb 14.9 oz)  Weight (lb): 119.93 lb  Ideal Body Weight (IBW), Female: 120 lb  % Ideal Body Weight, Female (lb): 97.37 lb  BMI (Calculated): 20.1  BMI Grade: 18.5-24.9 - normal    Lab/Procedures/Meds  Pertinent Labs Reviewed: reviewed  Pertinent Labs Comments: K 5.3H, Glu 479H  Pertinent Medications Reviewed: reviewed  Pertinent Medications Comments: Reglan, Vit D    Physical Findings/Assessment  Overall Physical Appearance: weak  Tubes: gastrostomy tube  Oral/Mouth Cavity: WDL  Skin:  (Renny 18-surgical)    Estimated/Assessed Needs  Weight Used For Calorie Calculations: 53 kg (116 lb 13.5 oz)  Height: 5' 4" (162.6 cm)  Energy Calorie Requirements " (kcal): 1855  Energy Need Method: Kcal/kg (35 kcal/kg)  RMR ([place]-St. Jeor Equation): 1080  Protein Requirements: 64-74g (1.2-1.4g/kg)  Weight Used For Protein Calculations: 53 kg (116 lb 13.5 oz)  Fluid Need Method: RDA Method  RDA Method (mL): 1855  CHO Requirement: 225g     Nutrition Prescription Ordered  Current Diet Order: Regular  Oral Nutrition Supplement: Boost Glucose Control    Evaluation of Received Nutrient/Fluid Intake  Other Calories (kcal): 306 (5% dex at 75)  Energy Calories Required: meeting needs  Protein Required: meeting needs  Fluid Required: meeting needs  Comments: LBM 3/11  % Intake of Estimated Energy Needs: 75 - 100 %  % Meal Intake: 75 - 100 %    Nutrition Risk  Level of Risk/Frequency of Follow-up:  (2xweekly)     Assessment and Plan    * Gastrostomy tube dysfunction-resolved as of 3/11/2018    Contributing Nutrition Diagnosis  Inadequate energy intake    Related to (etiology):   Decreased appetite/CA    Signs and Symptoms (as evidenced by):   PEG tube/decreased po intake    Interventions/Recommendations (treatment strategy):  See recs    Nutrition Diagnosis Status:   New             Monitor and Evaluation  Food and Nutrient Intake: food and beverage intake  Food and Nutrient Adminstration: diet order  Physical Activity and Function: nutrition-related ADLs and IADLs  Anthropometric Measurements: weight  Biochemical Data, Medical Tests and Procedures: electrolyte and renal panel  Nutrition-Focused Physical Findings: overall appearance     Nutrition Follow-Up  RD Follow-up?: Yes

## 2018-03-12 NOTE — PROGRESS NOTES
Surgery Progress Note    Overnight Events:  Patient with worsening abdominal wall erythema  On PO Cipro, flagyl   Tolerating diabetic diet; some nausea, no emesis   TF temporarily held     Physical Exam:  Temp:  [98 °F (36.7 °C)-100.4 °F (38 °C)] 98 °F (36.7 °C)  Pulse:  [58-76] 67  Resp:  [17-20] 17  SpO2:  [95 %-98 %] 97 %  BP: (138-184)/(63-81) 172/76    Gen: NAD, AAOx3  CV: RRR  Pulm: unlabored breathing  Abd: abd soft; erythema around G-tube site; tube feeds expressed from prior abdominal incisions   Mood: appropriate    I/O last 3 completed shifts:  In: 1585 [P.O.:1345; NG/GT:40; IV Piggyback:200]  Out: 1200 [Urine:1200]      Current Medications:  Cipro, Flagyl     Labs:  No results for input(s): WBC, HGB, HCT, PLT, NA, K, PHOS, MG, CALCIUM, BUN, CREATININE in the last 72 hours.         A/P: 62F w/ dislodged G-tube, originally placed laparoscopically on 2/28/18, s/p new g-tube placement by IR  - Tube feeds discontinued yesterday 2/2 leakage from old site- will hold TF for 1-2 weeks  - Flush G-tube Q shift   - On cipro/flagyl; Remains in house for worsening abd wall cellulitis   - Regular diabetic diet       FIDENCIO Dodson MD (PGY2)

## 2018-03-12 NOTE — PLAN OF CARE
Problem: Physical Therapy Goal  Goal: Physical Therapy Goal  Goals to be met by: 18     Patient will increase functional independence with mobility by performin. Sit to stand transfer with Modified [place]  2. Bed to chair transfer with Modified [place] using Rolling Walker  3. Gait  x 150 feet with Modified [place] using Rolling Walker.   4. Ascend/descend 4 stairs with bilateral Handrails Stand-by Assistance     Outcome: Ongoing (interventions implemented as appropriate)  Continue working toward goals

## 2018-03-12 NOTE — PT/OT/SLP PROGRESS
Physical Therapy Treatment    Patient Name:  Kaylee Ngo   MRN:  [id number]    Recommendations:     Discharge Recommendations:  home health PT, home with home health   Discharge Equipment Recommendations: none   Barriers to discharge: Decreased caregiver support    Assessment:     Kaylee Ngo is a 62 y.o. female admitted with a medical diagnosis of Gastrostomy tube dysfunction.  She presents with the following impairments/functional limitations:  weakness, impaired endurance, impaired self care skills, impaired functional mobilty, gait instability, impaired balance, decreased lower extremity function.  PT ambulated with and without RW today, and is safe and more maile with RW use at this time..    Rehab Prognosis:  good; patient would benefit from acute skilled PT services to address these deficits and reach maximum level of function.      Recent Surgery: * No surgery found *      Plan:     During this hospitalization, patient to be seen 6 x/week to address the above listed problems via gait training, therapeutic exercises  · Plan of Care Expires:  04/08/18   Plan of Care Reviewed with: patient    Subjective     Communicated with RN (Trudi) prior to session.  Patient found supine upon PT entry to room, agreeable to treatment.        Patient comments/goals: Pt agreed to tx.  Nsg reports pt being nauseous earlier and gave nausea medication.  Pt stated that she was feeling better.  Pain/Comfort:  · Pain Rating 1: 0/10  · Pain Rating Post-Intervention 1: 0/10    Patients cultural, spiritual, Congregation conflicts given the current situation: None stated to PTA    Objective:     Patient found with: bed alarm, telemetry     General Precautions: Standard, fall   Orthopedic Precautions:N/A   Braces:       Functional Mobility:  · Bed Mobility:     · Scooting: independence  · Supine to Sit: independence  · Sit to Supine: independence  · Transfers:     · Sit to Stand:  modified independence with rolling walker and  vc's for hand placement  · Gait: 200ft with RW and CGA assist.  Pt had one bout of unsteadiness, but was able to self correct with RW use and CGA.  Pt ambulated 50ft without A.D. and HHA(CGA.Taiwo) with 1 bout mild LOB and Taiwo to recover.    · Balance: sitting good, standing with RW good-, gait with RW fair and without RW fair/fair-      AM-PAC 6 CLICK MOBILITY  Turning over in bed (including adjusting bedclothes, sheets and blankets)?: 3  Sitting down on and standing up from a chair with arms (e.g., wheelchair, bedside commode, etc.): 3  Moving from lying on back to sitting on the side of the bed?: 3  Moving to and from a bed to a chair (including a wheelchair)?: 3  Need to walk in hospital room?: 3  Climbing 3-5 steps with a railing?: 3  Total Score: 18       Therapeutic Activities and Exercises:   Seatd BLE therex: AP, LAQ, hip ABD/ADD, glut sets, and standing marching in place with HHA(Taiwo) x 15 reps.      Patient left supine with all lines intact, call button in reach, bed alarm on and RN notified..    GOALS:    Physical Therapy Goals        Problem: Physical Therapy Goal    Goal Priority Disciplines Outcome Goal Variances Interventions   Physical Therapy Goal     PT/OT, PT Ongoing (interventions implemented as appropriate)     Description:  Goals to be met by: 18     Patient will increase functional independence with mobility by performin. Sit to stand transfer with Modified [place]  2. Bed to chair transfer with Modified [place] using Rolling Walker  3. Gait  x 150 feet with Modified [place] using Rolling Walker.   4. Ascend/descend 4 stairs with bilateral Handrails Stand-by Assistance                      Time Tracking:     PT Received On: 18  PT Start Time: 1155     PT Stop Time: 1120  PT Total Time (min): 1405 min     Billable Minutes: Gait Training 17 and Therapeutic Exercise 8    Treatment Type: Treatment  PT/PTA: PTA     PTA Visit Number: 2     Amy Barriga  PTA  03/12/2018

## 2018-03-13 ENCOUNTER — CONFERENCE (OUTPATIENT)
Dept: NEUROLOGY | Facility: HOSPITAL | Age: 63
End: 2018-03-13

## 2018-03-13 VITALS
SYSTOLIC BLOOD PRESSURE: 159 MMHG | HEIGHT: 64 IN | WEIGHT: 119.94 LBS | OXYGEN SATURATION: 98 % | HEART RATE: 74 BPM | RESPIRATION RATE: 16 BRPM | BODY MASS INDEX: 20.47 KG/M2 | DIASTOLIC BLOOD PRESSURE: 75 MMHG | TEMPERATURE: 97 F

## 2018-03-13 LAB
POCT GLUCOSE: 265 MG/DL (ref 70–110)
POCT GLUCOSE: 365 MG/DL (ref 70–110)

## 2018-03-13 PROCEDURE — 97116 GAIT TRAINING THERAPY: CPT

## 2018-03-13 PROCEDURE — 63600175 PHARM REV CODE 636 W HCPCS: Performed by: STUDENT IN AN ORGANIZED HEALTH CARE EDUCATION/TRAINING PROGRAM

## 2018-03-13 PROCEDURE — 25000003 PHARM REV CODE 250: Performed by: SURGERY

## 2018-03-13 PROCEDURE — 97110 THERAPEUTIC EXERCISES: CPT

## 2018-03-13 PROCEDURE — 94761 N-INVAS EAR/PLS OXIMETRY MLT: CPT

## 2018-03-13 PROCEDURE — 25000003 PHARM REV CODE 250: Performed by: STUDENT IN AN ORGANIZED HEALTH CARE EDUCATION/TRAINING PROGRAM

## 2018-03-13 PROCEDURE — 63600175 PHARM REV CODE 636 W HCPCS: Performed by: SURGERY

## 2018-03-13 RX ORDER — NYSTATIN AND TRIAMCINOLONE ACETONIDE 100000; 1 [USP'U]/G; MG/G
OINTMENT TOPICAL 2 TIMES DAILY
Qty: 30 G | Refills: 1 | Status: ON HOLD | OUTPATIENT
Start: 2018-03-13 | End: 2018-03-27 | Stop reason: HOSPADM

## 2018-03-13 RX ORDER — AMOXICILLIN AND CLAVULANATE POTASSIUM 875; 125 MG/1; MG/1
1 TABLET, FILM COATED ORAL EVERY 12 HOURS
Status: DISCONTINUED | OUTPATIENT
Start: 2018-03-13 | End: 2018-03-13 | Stop reason: HOSPADM

## 2018-03-13 RX ORDER — FLUCONAZOLE 40 MG/ML
200 POWDER, FOR SUSPENSION ORAL DAILY
Qty: 100 ML | Refills: 0 | Status: SHIPPED | OUTPATIENT
Start: 2018-03-14 | End: 2018-03-28

## 2018-03-13 RX ORDER — AMOXICILLIN AND CLAVULANATE POTASSIUM 875; 125 MG/1; MG/1
1 TABLET, FILM COATED ORAL EVERY 12 HOURS
Qty: 28 TABLET | Refills: 0 | Status: ON HOLD | OUTPATIENT
Start: 2018-03-13 | End: 2018-03-27 | Stop reason: HOSPADM

## 2018-03-13 RX ORDER — ENOXAPARIN SODIUM 100 MG/ML
40 INJECTION SUBCUTANEOUS EVERY 24 HOURS
Status: DISCONTINUED | OUTPATIENT
Start: 2018-03-13 | End: 2018-03-13 | Stop reason: HOSPADM

## 2018-03-13 RX ADMIN — SCOPALAMINE 1 PATCH: 1 PATCH, EXTENDED RELEASE TRANSDERMAL at 04:03

## 2018-03-13 RX ADMIN — PANTOPRAZOLE SODIUM 40 MG: 40 TABLET, DELAYED RELEASE ORAL at 10:03

## 2018-03-13 RX ADMIN — COLESEVELAM HYDROCHLORIDE 1875 MG: 625 TABLET, FILM COATED ORAL at 08:03

## 2018-03-13 RX ADMIN — AMOXICILLIN AND CLAVULANATE POTASSIUM 1 TABLET: 875; 125 TABLET, FILM COATED ORAL at 10:03

## 2018-03-13 RX ADMIN — CALCIUM 500 MG: 500 TABLET ORAL at 10:03

## 2018-03-13 RX ADMIN — VITAMIN D, TAB 1000IU (100/BT) 1000 UNITS: 25 TAB at 10:03

## 2018-03-13 RX ADMIN — TRAMADOL HYDROCHLORIDE 50 MG: 50 TABLET, COATED ORAL at 02:03

## 2018-03-13 RX ADMIN — DULOXETINE 60 MG: 30 CAPSULE, DELAYED RELEASE ORAL at 10:03

## 2018-03-13 RX ADMIN — METOCLOPRAMIDE HYDROCHLORIDE 10 MG: 5 SOLUTION ORAL at 10:03

## 2018-03-13 RX ADMIN — HYDRALAZINE HYDROCHLORIDE 10 MG: 20 INJECTION INTRAMUSCULAR; INTRAVENOUS at 01:03

## 2018-03-13 RX ADMIN — LAMOTRIGINE 150 MG: 100 TABLET ORAL at 10:03

## 2018-03-13 RX ADMIN — PIPERACILLIN SODIUM AND TAZOBACTAM SODIUM 4.5 G: 4; .5 INJECTION, POWDER, LYOPHILIZED, FOR SOLUTION INTRAVENOUS at 02:03

## 2018-03-13 RX ADMIN — ONDANSETRON 4 MG: 2 INJECTION INTRAMUSCULAR; INTRAVENOUS at 12:03

## 2018-03-13 RX ADMIN — METOCLOPRAMIDE HYDROCHLORIDE 10 MG: 5 SOLUTION ORAL at 04:03

## 2018-03-13 RX ADMIN — METOPROLOL SUCCINATE 100 MG: 25 TABLET, EXTENDED RELEASE ORAL at 10:03

## 2018-03-13 RX ADMIN — CLONAZEPAM 0.5 MG: 0.5 TABLET ORAL at 10:03

## 2018-03-13 RX ADMIN — LOSARTAN POTASSIUM 50 MG: 50 TABLET, FILM COATED ORAL at 10:03

## 2018-03-13 RX ADMIN — INSULIN ASPART 10 UNITS: 100 INJECTION, SOLUTION INTRAVENOUS; SUBCUTANEOUS at 01:03

## 2018-03-13 RX ADMIN — FLUCONAZOLE 200 MG: 40 POWDER, FOR SUSPENSION ORAL at 02:03

## 2018-03-13 RX ADMIN — INSULIN ASPART 6 UNITS: 100 INJECTION, SOLUTION INTRAVENOUS; SUBCUTANEOUS at 06:03

## 2018-03-13 RX ADMIN — NYSTATIN AND TRIAMCINOLONE ACETONIDE: 100000; 1 OINTMENT TOPICAL at 10:03

## 2018-03-13 NOTE — PROGRESS NOTES
.Pharmacy New Medication Education    Patient accepted medication education.    Pharmacy educated patient on name and purpose of medications and possible side effects, using the teach-back method.     Medication   ciprofloxacin HCl (CIPRO) 500 MG tablet   Take 1 tablet (500 mg total) by mouth every 12 (twelve) hours.   Quantity: 14 tablet Refills: 0   Start date: 3/11/2018 End date: 3/18/2018      metroNIDAZOLE (FLAGYL) 500 MG tablet   Take 1 tablet (500 mg total) by mouth every 8 (eight) hours.   Quantity: 21 tablet Refills: 0   Start date: 3/11/2018 End date: 3/18/2018             Learners of pharmacy medication education included:  Patient    Patient +/- learner response:  Verbalized Understanding, Teachback

## 2018-03-13 NOTE — PLAN OF CARE
Problem: Patient Care Overview  Goal: Plan of Care Review  Outcome: Ongoing (interventions implemented as appropriate)  Patient remains free from falls, bed alarm in use.IV antibiotics given as ordered. Medicated for pain with good results. No nausea or vomiting over night.  Blood glucose 265 at HS 3 units insulin given and 275 at am 6 units given per SSI. Dressing changed to abdomen as ordered.

## 2018-03-13 NOTE — PHYSICIAN QUERY
"PT Name: Kaylee Ngo  MR #: [id number]    Physician Query Form - Nutrition Clarification     Vesna Rivers RN, CCDS  Contact Info: [phone] [email or web address]      This form is a permanent document in the medical record.     Query Date: March 13, 2018    By submitting this query, we are merely seeking further clarification of documentation.. Please utilize your independent clinical judgment when addressing the question(s) below.    The Medical record contains the following:   Indicators  Supporting Clinical Findings Location in Medical Record   x % of Estimated Energy Intake over a time frame from p.o., TF, or TPN Energy Calories Required: not meeting needs  Protein Required: not meeting needs  Fluid Required: meeting needs  Comments: LBM 3/2  % Intake of Estimated Energy Needs: 25 - 50 %  % Meal Intake: 25 - 50 %    % Intake of Estimated Energy Needs: 75 - 100 %  % Meal Intake: 75 - 100 % RD PN 3/8                RD PN 3/12    Weight Status over a time frame      Subcutaneous Fat and/or Muscle Loss      Fluid Accumulation or Edema      Reduced  Strength Overall Physical Appearance: weak RD PN 3/8   x Wt / BMI / Usual Body Weight Height: 5' 4" (162.6 cm)  Height (inches): 64 in  Weight Method: Bed Scale  Weight: 53 kg (116 lb 13.5 oz)  Weight (lb): 116.84 lb  Ideal Body Weight (IBW), Female: 120 lb  % Ideal Body Weight, Female (lb): 97.37 lb  BMI (Calculated): 20.1  BMI Grade: 18.5-24.9 - normal RD PN 3/8    Delayed Wound Healing / Failure to Thrive     x Acute or Chronic Illness Gastrostomy tube dysfunction   Contributing Nutrition Diagnosis : Inadequate energy intake   Related to (etiology): Decreased appetite/CA   Signs and Symptoms (as evidenced by): PEG tube/decreased po intake     · Dislodged G- tube originally placed laparoscopically on 2/28/18  · admit her to the hospital for G-tube evaluation and replacement if needed.   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 Add ADA restrictions to diet.   2. Encourage good intake at meals.   3. Recommend calorie count to determine kcal intake and appropriate TF needed.    Level of Risk/Frequency of Follow-up:  (2xweekly)     RD PN 3/8   x Other Factors Affecting Nutritional Intake: decreased appetite  Overall Physical Appearance: weak  Tubes: gastrostomy tube   RD PN 3/8     AND / ASPEN Clinical Characteristics (October 2011)  A minimum of two characteristics is recommended for diagnosing either moderate or severe malnutrition   Mild Malnutrition Moderate Malnutrition Severe Malnutrition   Energy Intake from p.o., TF or TPN. < 75% intake of estimated energy needs for less than 7 days < 75% intake of estimated energy needs for greater than 7 days < 50% intake of estimated energy needs for > 5 days   Weight Loss 1-2% in 1 month  5% in 3 months  7.5% in 6 months  10% in 1 year 1-2 % in 1 week  5% in 1 month  7.5% in 3 months  10% in 6 months  20% in 1 year > 2% in 1 week  > 5% in 1 month  > 7.5% in 3 months  > 10% in 6 months  > 20% in 1 year   Physical Findings     None *Mild subcutaneous fat and/or muscle loss  *Mild fluid accumulation  *Stage II decubitus  *Surgical wound or non-healing wound *Mod/severe subcutaneous fat and/or muscle loss  *Mod/severe fluid accumulation  *Stage III or IV decubitus  *Non-healing surgical wound      Provider, please specify diagnosis or diagnoses associated with above clinical findings.    [ ] Mild Protein-Calorie Malnutrition  [xxx ] Moderate Protein-Calorie Malnutrition  [ ] Other Nutritional Diagnosis (please specify): ____________________________________  [ ] Other: ________________________________  [ ] Clinically Undetermined    Please document in your progress notes daily for the duration of treatment until resolved and include in your discharge summary.

## 2018-03-13 NOTE — DISCHARGE SUMMARY
"  [facility]  General Surgery  Discharge Summary      Patient Name: Kaylee Ngo  MRN: [id number]  Admission Date: 3/6/2018  Hospital Length of Stay: 6 days  Discharge Date and Time:  03/13/2018 3:33 PM  Attending Physician: Cam Ashton MD   Discharging Provider: Natali Ayoub MD  Primary Care Provider: Robert Garcia MD     HPI  :62F s/p placement of laparoscopic G-tube into remnant stomach (patient w/ history of gastric bypass) on 2/28/18. She was discharged tolerating tube feeds POD 2. She notes that she awoke from sleep the evening of 3/5/18 "feeling wet." She had some mild stomach discomfort at that time. She was evaluated by her home health nurse who was concerned that her G-tube was dislodged. This was communicated to Dr. Levy and the decision was made to admit her to the hospital for G-tube evaluation and replacement if needed.     Hospital Course: The patient was admitted to the hospital. She was taken to radiology for a G-tube study, which showed that the G-tube had slipped out of the remnant stomach. Feeds were leaking into the subcutaneous tissue, but not intra-abdominally. The tube was re-positioned by Ir. The patient was started on antibiotics and oral feeds only. She continued to leak tube feeds from the prior gastrostomy site and hence the choice was made to not re-initiate G-tube feeds. She developed some worsening abdominal erythema and anti-microbial coverage was broadened to include yeast. She was also started on a nystatin ointment. She improved and was discharged on augmentin, fluconazle, and nystatin ointment.     Consults:   Consults         Status Ordering Provider     Inpatient consult to Interventional Radiology  Once     Provider:  Joshua Coley MD    Completed NATALI AYOUB          Significant Diagnostic Studies: Radiology: G-tube study and IR replacement of gastrostomy tube     Pending Diagnostic Studies:     Procedure Component Value Units " Date/Time    IR Gastrostomy Tube [[id number]]     Order Status:  Sent Lab Status:  No result         Final Active Diagnoses:    Diagnosis Date Noted POA      Problems Resolved During this Admission:    Diagnosis Date Noted Date Resolved POA    PRINCIPAL PROBLEM:  Gastrostomy tube dysfunction [K94.23] 03/06/2018 03/11/2018 Yes      Discharged Condition: good    Disposition: Home or Self Care    Follow Up:  Follow-up Information     Please follow up.    Why:  [facility], [phone]           [facility].    Specialty:  Home Health Services  Why:  Tube Feed Infusions to resume after next appointment with Dr. Levy, he will discuss with patient  Contact information:  [address] [place] [address]  [phone]             Cam Ashton MD On 3/19/2018.    Specialty:  General Surgery  Why:  1:15 pm - previously booked   Contact information:  [address]  [address]  [place] [address]  [phone]                 Patient Instructions:     Ambulatory referral to Home Health   Referral Priority: Routine Referral Type: Home Health   Referral Reason: Specialty Services Required    Requested Specialty: Home Health Services    Number of Visits Requested: 1      Ambulatory referral to Home Health   Referral Priority: Routine Referral Type: Home Health   Referral Reason: Specialty Services Required    Requested Specialty: Home Health Services    Number of Visits Requested: 1      Diet Adult Regular     Diet Adult Regular     Activity as tolerated     Sponge bath only until clinic visit     Notify your health care provider if you experience any of the following:  temperature >100.4     Notify your health care provider if you experience any of the following:  persistent nausea and vomiting or diarrhea     Notify your health care provider if you experience any of the following:  severe uncontrolled pain     Notify your health care provider if you experience any  of the following:  redness, tenderness, or signs of infection (pain, swelling, redness, odor or green/yellow discharge around incision site)     Notify your health care provider if you experience any of the following:  difficulty breathing or increased cough     Change dressing (specify)   Order Comments: Dressing change: as needed for saturation. Replace with gauze and tape.  Flush g-tube twice a day with 20ml NS or sterile water.     Activity as tolerated     Change dressing (specify)   Order Comments: Dressing change twice a day, and as needed for saturation. Apply ointment BID with dressing changes.       Medications:  Reconciled Home Medications:   Current Discharge Medication List      START taking these medications    Details   amoxicillin-clavulanate 875-125mg (AUGMENTIN) 875-125 mg per tablet Take 1 tablet by mouth every 12 (twelve) hours.  Qty: 28 tablet, Refills: 0      ciprofloxacin HCl (CIPRO) 500 MG tablet Take 1 tablet (500 mg total) by mouth every 12 (twelve) hours.  Qty: 14 tablet, Refills: 0      fluconazole 40 mg/ml (DIFLUCAN) 40 mg/mL suspension Take 5 mLs (200 mg total) by mouth once daily.  Qty: 100 mL, Refills: 0      metroNIDAZOLE (FLAGYL) 500 MG tablet Take 1 tablet (500 mg total) by mouth every 8 (eight) hours.  Qty: 21 tablet, Refills: 0      nystatin-triamcinolone (MYCOLOG) ointment Apply topically 2 (two) times daily. Apply to wound BID  Qty: 30 g, Refills: 1         CONTINUE these medications which have NOT CHANGED    Details   ACCU-CHEK SHU CONTROL SOLN Soln       [facility]       ACCU-CHEK SHU PLUS TEST STRP Strp       amitriptyline (ELAVIL) 50 MG tablet Take 50 mg by mouth every evening.      calcium carbonate (OS-BEST) 600 mg calcium (1,500 mg) Tab Take 600 mg by mouth once.      clonazePAM (KLONOPIN) 0.5 MG tablet Take 0.5 mg by mouth 2 (two) times daily as needed for Anxiety.      colesevelam (WELCHOL) 625 mg tablet Take 1,875 mg by mouth 2 (two) times  "daily with meals.      cyanocobalamin 1,000 mcg/mL injection 1,000 mcg every 28 days.      diclofenac (CATAFLAM) 50 MG tablet as needed.       diphenoxylate-atropine 2.5-0.025 mg (LOMOTIL) 2.5-0.025 mg per tablet Take 1 tablet by mouth 4 (four) times daily as needed for Diarrhea.      docusate sodium (COLACE) 100 MG capsule Take 100 mg by mouth as needed for Constipation.      DULoxetine (CYMBALTA) 60 MG capsule Take 60 mg by mouth once daily.      everolimus (AFINITOR) 10 mg Tab Take 10 mg by mouth once daily.      GLUCAGON EMERGENCY KIT, HUMAN, 1 mg injection       hydrOXYzine pamoate (VISTARIL) 25 MG Cap Take 25 mg by mouth 3 (three) times daily.       insulin degludec (TRESIBA FLEXTOUCH U-100) 100 unit/mL (3 mL) InPn Inject 10 Units into the skin once daily.       lamoTRIgine (LAMICTAL) 150 MG Tab       lancets 30 gauge Misc       lancing device Misc       losartan (COZAAR) 50 MG tablet Take 50 mg by mouth once daily.      !! metoclopramide HCl (REGLAN) 5 mg/5 mL Soln TAKE 10 ML BY MOUTH THREE TIMES A DAY  Qty: 420 mL, Refills: 2      !! metoclopramide HCl (REGLAN) 5 mg/5 mL Soln Take 10 mLs (10 mg total) by mouth 3 (three) times daily.  Qty: 473 mL, Refills: 0      metoprolol succinate (TOPROL-XL) 100 MG 24 hr tablet Take 100 mg by mouth 2 (two) times daily.      multivitamin capsule Take 1 capsule by mouth once daily.      omeprazole (PRILOSEC) 40 MG capsule Take 40 mg by mouth 2 (two) times daily before meals.       ondansetron (ZOFRAN) 8 MG tablet Take 8 mg by mouth every 8 (eight) hours.      potassium chloride (KLOR-CON) 20 mEq Pack Take 20 mEq by mouth once.       scopolamine (TRANSDERM-SCOP) 1.3-1.5 mg (1 mg over 3 days)       SURE COMFORT PEN NEEDLE 32 gauge x 5/32" Ndle       tiZANidine (ZANAFLEX) 4 MG tablet as needed.       vitamin D 1000 units Tab Take 1,000 Units by mouth once daily.       !! - Potential duplicate medications found. Please discuss with provider.      STOP taking these medications "       traMADol (ULTRAM) 50 mg tablet Comments:   Reason for Stopping:         BRAN/GUM/FIB/LATISHA/PSYL/KELP/PEC (FIBER 6 ORAL) Comments:   Reason for Stopping:         polyethylene glycol (GLYCOLAX) 17 gram PwPk Comments:   Reason for Stopping:               Natali Dodson MD  General Surgery  [facility]

## 2018-03-13 NOTE — PROGRESS NOTES
Patient complains of a headache, said she gets migraines and was given Tylenol before shift change but no relief given. Request something for a migraine headache such as Esgic or Imitrex. Orders given for Imitrex PO 25 mg once.

## 2018-03-13 NOTE — PT/OT/SLP PROGRESS
Physical Therapy Treatment    Patient Name:  Kaylee Ngo   MRN:  [id number]    Recommendations:     Discharge Recommendations:  home health PT, home with home health   Discharge Equipment Recommendations: none   Barriers to discharge: Decreased caregiver support    Assessment:     Kaylee Ngo is a 62 y.o. female admitted with a medical diagnosis of Gastrostomy tube dysfunction.  She presents with the following impairments/functional limitations:  weakness, impaired endurance, impaired functional mobilty, gait instability, impaired balance, decreased lower extremity function.  Pt ambulated with PTA without A.D. To work on improving balance.  PTA told pt that she needs to use a RW for gait for increased safety.  Pt would continue to benefit from P.T. to address impairments listed above.    Rehab Prognosis:  good; patient would benefit from acute skilled PT services to address these deficits and reach maximum level of function.      Recent Surgery: * No surgery found *      Plan:     During this hospitalization, patient to be seen 6 x/week to address the above listed problems via gait training, therapeutic exercises  · Plan of Care Expires:  04/08/18   Plan of Care Reviewed with: patient, sibling    Subjective     Communicated with RN (Antonia) prior to session.  Patient found sitting EOB with nsg upon PT entry to room, agreeable to treatment.        Patient comments/goals: Pt agreed to tx.  Waiting to be discharged.  Pain/Comfort:  · Pain Rating 1: 0/10  · Pain Rating Post-Intervention 1: 0/10    Patients cultural, spiritual, Orthodox conflicts given the current situation: None stated to PTA    Objective:     Patient found with: telemetry     General Precautions: Standard, fall   Orthopedic Precautions:N/A   Braces:       Functional Mobility:  · Transfers:     · Sit to Stand:  stand by assistance and contact guard assistance with no AD  · Gait: 200ft x 2 without A.D. and CGA.  Pt ambulates with decreased  bety and surefooted with decreased heel strike.  Occasional unsteadiness without LOB.   · Balance: sitting Good+, standing without A.D. good-/fair+, gait without A.D. fair  Stairs:  Pt ascended and descended 6 steps with one hand rail with CGA vc's.  Good safety awareness with pt taking her time.        AM-PAC 6 CLICK MOBILITY  Turning over in bed (including adjusting bedclothes, sheets and blankets)?: 4  Sitting down on and standing up from a chair with arms (e.g., wheelchair, bedside commode, etc.): 3  Moving from lying on back to sitting on the side of the bed?: 4  Moving to and from a bed to a chair (including a wheelchair)?: 3  Need to walk in hospital room?: 3  Climbing 3-5 steps with a railing?: 3  Total Score: 20       Therapeutic Activities and Exercises:   Seated BLE therex: Ap, LAQ x 15 reps.  Standing therex: marching in place and mini squats x 15 reps with HHA(CGA/Taiwo)    Patient left up in chair with all lines intact, call button in reach and RN notified..    GOALS:    Physical Therapy Goals        Problem: Physical Therapy Goal    Goal Priority Disciplines Outcome Goal Variances Interventions   Physical Therapy Goal     PT/OT, PT Ongoing (interventions implemented as appropriate)     Description:  Goals to be met by: 18     Patient will increase functional independence with mobility by performin. Sit to stand transfer with Modified [place]  2. Bed to chair transfer with Modified [place] using Rolling Walker  3. Gait  x 150 feet with Modified [place] using Rolling Walker.   4. Ascend/descend 4 stairs with bilateral Handrails Stand-by Assistance                      Time Tracking:     PT Received On: 18  PT Start Time: 1445     PT Stop Time: 1510  PT Total Time (min): 25 min     Billable Minutes: Gait Training 16 and Therapeutic Exercise 9    Treatment Type: Treatment  PT/PTA: PTA     PTA Visit Number: 3     Amy Barriga PTA  2018

## 2018-03-13 NOTE — PLAN OF CARE
Problem: Physical Therapy Goal  Goal: Physical Therapy Goal  Goals to be met by: 18     Patient will increase functional independence with mobility by performin. Sit to stand transfer with Modified [place]  2. Bed to chair transfer with Modified [place] using Rolling Walker  3. Gait  x 150 feet with Modified [place] using Rolling Walker.   4. Ascend/descend 4 stairs with bilateral Handrails Stand-by Assistance     Outcome: Ongoing (interventions implemented as appropriate)  Continue working toward goals.

## 2018-03-13 NOTE — TELEPHONE ENCOUNTER
[facility]      NEUROENDOCRINE TUMOR MULTIDISCIPLINARY TUMOR BOARD  _____________________________________________________________________    PRESENTER:   RUDY Ashton MD    REASON FOR PRESENTATION:  Treatment Plan and Pathology Review    ATTENDEES:   Surgery:              MD LUZ Haas MD T. Ramcharan, MD  Interventional Radiology - Jagdish Luque MD  Pathology - Nia Garcia MD  Oncology - Jeff Nicole DO, Jose Diggs MD  Gastroenterology - Not present   Nursing  Research    PATIENT STATUS:  Established Patient    TUMOR SITE (Primary & Mets):  See below    PATIENT SUMMARY:  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 This was not evident on priors as they were done without contrast. Pt also has multiple enlarged lymph nodes in the retroperitoneum which appear similar to prior.  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

## 2018-03-13 NOTE — PHYSICIAN QUERY
PT Name: Kaylee Ngo  MR #: [id number]     Physician Query Form - Documentation Clarification      Vesna Rivers RN, CCDS  Contact Info: [phone] [email or web address]      This form is a permanent document in the medical record.     Query Date: March 13, 2018    By submitting this query, we are merely seeking further clarification of documentation. Please utilize your independent clinical judgment when addressing the question(s) below.    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    Obsessive compulsive disorder      Sleep apnea       H&P   Accu-check strips & meter  Amitriptyline (Elavil)  Atorvastatin (Lipitor)  Clonazepam (Klonopin)  Colesevelcam (Welchol)  DULoxetine (CYMBALTA)  everolimus (AFINITOR)  insulin degludec (TRESIBA FLEXTOUCH U-100) = 10 units daily  losartan (COZAAR)  metoclopramide HCl (REGLAN)   metoprolol succinate (TOPROL-XL)  omeprazole (PRILOSEC)   tiZANidine (ZANAFLEX)    Home Meds List on IR CN 3/7   MAR:  Insulin Sliding Scale   amitriptyline tablet 50 mg  Dose 50 mg  :  Oral  :  Nightly  clonazePAM tablet 0.5 mg  :  Dose 0.5 mg  :  Oral  :  2 times daily    DULoxetine DR capsule 60 mg  :  Dose 60 mg  :  Oral  :  Daily    fluconazole 40 mg/ml suspension 200 mg   Oral  :  Daily    lamoTRIgine tablet 150 mg    Oral  :  Daily    losartan tablet 50 mg  :  Oral  :  Daily  metoclopramide HCl 5 mg/5 mL solution 10 mg  Oral  :  3 times daily  metoprolol succinate (TOPROL-XL) 24 hr  Dose 100 mg;Oral; 2x day  pantoprazole EC tablet 40 mg  :  Dose 40 mg  :  Oral  :  Daily    scopolamine 1.3-1.5 mg (1 mg over 3 days) 1 patch  Every 3 days   MAR                                                                            Doctor, Please specify diagnosis or diagnoses associated with above clinical findings.     Please specify diagnosis for above medications being treated this admission.     Provider Use Only                                                                                                                               [ xxx ] Clinically undetermined

## 2018-03-13 NOTE — PROGRESS NOTES
Follow-Up       Follow-up With  Details  Why  Contact Info       [facility], [phone]     [facility] [place]    Tube Feed Infusions to resume after next appointment with Dr. Levy, he will discuss with patient  [address] [place] [address]  [phone]   Cam Ashton MD  On 3/26/2018  1:00 pm -  [address]  [address]  [place] [address]  [phone]

## 2018-03-13 NOTE — PROGRESS NOTES
Surgery Progress Note        Overnight Events:  NAEON   1 episode of emesis yesterday; continues to have nausea but no further emesis   On diabetic PO diet at this time  Skin rash improving;    Physical Exam:  Temp:  [97.6 °F (36.4 °C)-98.8 °F (37.1 °C)] 97.7 °F (36.5 °C)  Pulse:  [67-75] 70  Resp:  [16-18] 18  SpO2:  [95 %-97 %] 97 %  BP: (137-201)/(52-88) 138/87    Gen: NAD, AAOx3  CV: RRR  Pulm: unlabored breathing  Abd: abd soft; erythema around G-tube site; tube feeds expressed from prior abdominal incisions   Mood: appropriate      I/O last 3 completed shifts:  In: 530 [P.O.:160; I.V.:50; NG/GT:20; IV Piggyback:300]  Out: 525 [Urine:400; Emesis/NG output:125]            Labs:  Recent Labs      03/12/18   0959   WBC  12.32   HGB  10.6*   HCT  35.5*   PLT  517*   NA  136   K  5.3*   CALCIUM  8.9   BUN  9   CREATININE  0.8         A/P: 62F w/ dislodged G-tube, originally placed laparoscopically on 2/28/18, s/p new g-tube placement by IR  - Continue treatment for skin infection; Will d/c zosyn and start augmentin; PO diflucan  - Dressing changes Q 6hrs   - Will hold TF for 1-2 weeks  - Flush G-tube Q shift   - Regular diabetic diet    - Likely home soon   - Ppx: luis Dodson MD (PGY2)

## 2018-03-13 NOTE — DISCHARGE INSTRUCTIONS
Do not begin tube feedings until lower incision is healed   Follow guidance from home health agency regarding restart of feeding or as directed per dr clarke  .  HH with [facility] and Tube fdg with CPP as she had prior to admission   The patient will resume TF 2 weeks after discharge- IMPACT 1.5 (or equivalent)  @ 30cc/hr from 8pm to 6am    Dr clarke will see you in dr calero office when you return to see dr calero -----------on march 26-------don't forget to remind office personnel of this     Irrigate feeding tube daily    Patient needs home health for assistance with abdominal wall wound care (application of nystatin powder 2xday  change overlying dressing- gauze and abd pad 4 times daily)

## 2018-03-14 ENCOUNTER — TELEPHONE (OUTPATIENT)
Dept: NEUROLOGY | Facility: HOSPITAL | Age: 63
End: 2018-03-14

## 2018-03-14 NOTE — TELEPHONE ENCOUNTER
Appointment made Per Annie Garcia, patient's sister, who takes care of appts is OK with that date.

## 2018-03-14 NOTE — TELEPHONE ENCOUNTER
----- Message from Vesna Tsai sent at 3/13/2018  4:18 PM CDT -----  RR- This is a new patient referred to Dr. Nicole by Dr. Christine and Tumor board however I can not schedule this patient until May. This patient will need a Tuesday appointment and we will need to call Dr. Christine in on that day as well per Dr. Christine. Please contact the patient at   [phone] and Emilyanna perera sister will help coordinate.

## 2018-03-14 NOTE — PLAN OF CARE
Problem: Patient Care Overview  Goal: Plan of Care Review  Outcome: Ongoing (interventions implemented as appropriate)  Iv access has been removed  For discharge   Prescriptions have been filled and brought to the bed side   Home health arrangements by case management arranged.   Pt and family experienced with tube feeding and feeding site care.   Instructions given to cont. Former peg site care as directed  4x a day and as needed and demonstrated today .  apply antifungal ointment only twice a day to site.   Medications reviewed  Teachback method used  No additional questions posed    Awaiting transport

## 2018-03-14 NOTE — PT/OT/SLP DISCHARGE
Physical Therapy Discharge Summary    Name: Kaylee Ngo  MRN: [id number]   Principal Problem: Gastrostomy tube dysfunction     Patient Discharged from acute Physical Therapy on 3/13/2018.  Please refer to prior PT noted date on 3/13/2018 for functional status.     Assessment:     Patient appropriate for care in another setting.    Objective:     GOALS:    Physical Therapy Goals        Problem: Physical Therapy Goal    Goal Priority Disciplines Outcome Goal Variances Interventions   Physical Therapy Goal     PT/OT, PT Ongoing (interventions implemented as appropriate)     Description:  Goals to be met by: 18     Patient will increase functional independence with mobility by performin. Sit to stand transfer with Modified [place]  2. Bed to chair transfer with Modified [place] using Rolling Walker  3. Gait  x 150 feet with Modified [place] using Rolling Walker.   4. Ascend/descend 4 stairs with bilateral Handrails Stand-by Assistance                      Reasons for Discontinuation of Therapy Services  Transfer to alternate level of care.      Plan:     Patient Discharged to: Home with Home Health Service.    Kermit Zambrano, PT  3/14/2018

## 2018-03-14 NOTE — TELEPHONE ENCOUNTER
Per Dr. Levy.  Patient will need follow up with Dr. Nicole to discuss systemic therapy.  Not a candidate for surgery. Tiana made with Dr. Nicole. Spoke with pts sister, They will keep tiana with Dr. Levy and see how she does next week. If doing ok, they will cancel.

## 2018-03-16 ENCOUNTER — TELEPHONE (OUTPATIENT)
Dept: NEUROLOGY | Facility: HOSPITAL | Age: 63
End: 2018-03-16

## 2018-03-16 NOTE — TELEPHONE ENCOUNTER
----- Message from Ann Garcia sent at 3/16/2018 12:35 PM CDT -----  Contact: PT's Sister  Sister is calling regarding appointment on 4/2 and needs to get it moved on up sooner if possible because that day isn't a good day because they have family coming in from [facility].  PT needs it to be a Monday or Tuesday  OR if it can't be moved can it be later in the day after company leaves    Callback: [phone] (sister - Emily)

## 2018-03-20 ENCOUNTER — TELEPHONE (OUTPATIENT)
Dept: NEUROLOGY | Facility: HOSPITAL | Age: 63
End: 2018-03-20

## 2018-03-20 NOTE — TELEPHONE ENCOUNTER
----- Message from Vesna Tsai sent at 3/20/2018 10:37 AM CDT -----  EAW- Patients sister Emily Espinosa is calling because the patients blood sugar has been giving her trouble and they are not sure if it is the tumor pressing on the kidney. Please call back to assist at [phone].

## 2018-03-20 NOTE — TELEPHONE ENCOUNTER
Spoke with pts sister.  She reports that when her blood sugar goes high, her back starts hurting.  She is wondering if this has anything to do with the tumor pressing on her kidney causing problems.  Informed her that when she was in the hospital her kidney function was normal.  Discussed her blood sugar and why it is going high. She has an endocrinologist who manages her blood sugar.  Instructed her to contact him to inquire about this and ways to keep it more controlled.  She reports non compliance with insulins due to conative reasons.  She lives alone and her sister is doing what she can to help her manage her health. Inquired about Home Health.  She reports they do come to the house but have not come in the last two days. Instructed to contact us back if health changes. She verbalized understanding.

## 2018-03-22 ENCOUNTER — HOSPITAL ENCOUNTER (INPATIENT)
Facility: HOSPITAL | Age: 63
LOS: 5 days | Discharge: HOME HEALTH | DRG: 641 | End: 2018-03-27
Attending: EMERGENCY MEDICINE | Admitting: SURGERY
Payer: MEDICARE

## 2018-03-22 ENCOUNTER — TELEPHONE (OUTPATIENT)
Dept: NEUROLOGY | Facility: HOSPITAL | Age: 63
End: 2018-03-22

## 2018-03-22 DIAGNOSIS — R19.7 NAUSEA VOMITING AND DIARRHEA: Primary | ICD-10-CM

## 2018-03-22 DIAGNOSIS — M54.5 ACUTE BILATERAL LOW BACK PAIN, WITH SCIATICA PRESENCE UNSPECIFIED: ICD-10-CM

## 2018-03-22 DIAGNOSIS — R11.2 NAUSEA VOMITING AND DIARRHEA: Primary | ICD-10-CM

## 2018-03-22 DIAGNOSIS — G43.709 CHRONIC MIGRAINE WITHOUT AURA WITHOUT STATUS MIGRAINOSUS, NOT INTRACTABLE: ICD-10-CM

## 2018-03-22 DIAGNOSIS — I10 ESSENTIAL HYPERTENSION: ICD-10-CM

## 2018-03-22 DIAGNOSIS — C7A.00 MALIGNANT CARCINOID TUMOR OF UNKNOWN PRIMARY SITE: ICD-10-CM

## 2018-03-22 DIAGNOSIS — E43 SEVERE PROTEIN-CALORIE MALNUTRITION: ICD-10-CM

## 2018-03-22 LAB
ALBUMIN SERPL BCP-MCNC: 3.6 G/DL
ALP SERPL-CCNC: 169 U/L
ALT SERPL W/O P-5'-P-CCNC: 32 U/L
ANION GAP SERPL CALC-SCNC: 10 MMOL/L
AST SERPL-CCNC: 31 U/L
BASOPHILS # BLD AUTO: 0.02 K/UL
BASOPHILS NFR BLD: 0.3 %
BILIRUB SERPL-MCNC: 0.2 MG/DL
BILIRUB UR QL STRIP: NEGATIVE
BUN SERPL-MCNC: 11 MG/DL
CALCIUM SERPL-MCNC: 10 MG/DL
CHLORIDE SERPL-SCNC: 100 MMOL/L
CLARITY UR: CLEAR
CO2 SERPL-SCNC: 30 MMOL/L
COLOR UR: YELLOW
CREAT SERPL-MCNC: 0.9 MG/DL
DIFFERENTIAL METHOD: ABNORMAL
EOSINOPHIL # BLD AUTO: 0.2 K/UL
EOSINOPHIL NFR BLD: 2.9 %
ERYTHROCYTE [DISTWIDTH] IN BLOOD BY AUTOMATED COUNT: 20.9 %
EST. GFR  (AFRICAN AMERICAN): >60 ML/MIN/1.73 M^2
EST. GFR  (NON AFRICAN AMERICAN): >60 ML/MIN/1.73 M^2
GLUCOSE SERPL-MCNC: 249 MG/DL (ref 70–110)
GLUCOSE SERPL-MCNC: 280 MG/DL
GLUCOSE UR QL STRIP: ABNORMAL
HCT VFR BLD AUTO: 38.4 %
HGB BLD-MCNC: 11.6 G/DL
HGB UR QL STRIP: NEGATIVE
KETONES UR QL STRIP: NEGATIVE
LEUKOCYTE ESTERASE UR QL STRIP: NEGATIVE
LIPASE SERPL-CCNC: 24 U/L
LYMPHOCYTES # BLD AUTO: 2.2 K/UL
LYMPHOCYTES NFR BLD: 28.6 %
MCH RBC QN AUTO: 26.3 PG
MCHC RBC AUTO-ENTMCNC: 30.2 G/DL
MCV RBC AUTO: 87 FL
MONOCYTES # BLD AUTO: 0.7 K/UL
MONOCYTES NFR BLD: 9.5 %
NEUTROPHILS # BLD AUTO: 4.5 K/UL
NEUTROPHILS NFR BLD: 58.6 %
NITRITE UR QL STRIP: NEGATIVE
PH UR STRIP: 5 [PH] (ref 5–8)
PLATELET # BLD AUTO: 439 K/UL
PMV BLD AUTO: 9.1 FL
POTASSIUM SERPL-SCNC: 4.3 MMOL/L
PROT SERPL-MCNC: 7.9 G/DL
PROT UR QL STRIP: ABNORMAL
RBC # BLD AUTO: 4.41 M/UL
SODIUM SERPL-SCNC: 140 MMOL/L
SP GR UR STRIP: >=1.03 (ref 1–1.03)
URN SPEC COLLECT METH UR: ABNORMAL
UROBILINOGEN UR STRIP-ACNC: NEGATIVE EU/DL
WBC # BLD AUTO: 7.68 K/UL

## 2018-03-22 PROCEDURE — 25000003 PHARM REV CODE 250: Performed by: EMERGENCY MEDICINE

## 2018-03-22 PROCEDURE — 12000002 HC ACUTE/MED SURGE SEMI-PRIVATE ROOM

## 2018-03-22 PROCEDURE — 80053 COMPREHEN METABOLIC PANEL: CPT

## 2018-03-22 PROCEDURE — 96375 TX/PRO/DX INJ NEW DRUG ADDON: CPT

## 2018-03-22 PROCEDURE — 99284 EMERGENCY DEPT VISIT MOD MDM: CPT | Mod: 25

## 2018-03-22 PROCEDURE — 81003 URINALYSIS AUTO W/O SCOPE: CPT

## 2018-03-22 PROCEDURE — 85025 COMPLETE CBC W/AUTO DIFF WBC: CPT

## 2018-03-22 PROCEDURE — 96374 THER/PROPH/DIAG INJ IV PUSH: CPT

## 2018-03-22 PROCEDURE — 96361 HYDRATE IV INFUSION ADD-ON: CPT

## 2018-03-22 PROCEDURE — 83690 ASSAY OF LIPASE: CPT

## 2018-03-22 PROCEDURE — 63600175 PHARM REV CODE 636 W HCPCS: Performed by: EMERGENCY MEDICINE

## 2018-03-22 RX ORDER — FENTANYL CITRATE 50 UG/ML
50 INJECTION, SOLUTION INTRAMUSCULAR; INTRAVENOUS
Status: COMPLETED | OUTPATIENT
Start: 2018-03-22 | End: 2018-03-22

## 2018-03-22 RX ORDER — ONDANSETRON 2 MG/ML
4 INJECTION INTRAMUSCULAR; INTRAVENOUS
Status: COMPLETED | OUTPATIENT
Start: 2018-03-22 | End: 2018-03-22

## 2018-03-22 RX ADMIN — ONDANSETRON 4 MG: 2 INJECTION INTRAMUSCULAR; INTRAVENOUS at 11:03

## 2018-03-22 RX ADMIN — SODIUM CHLORIDE 1000 ML: 0.9 INJECTION, SOLUTION INTRAVENOUS at 11:03

## 2018-03-22 RX ADMIN — FENTANYL CITRATE 50 MCG: 50 INJECTION, SOLUTION INTRAMUSCULAR; INTRAVENOUS at 11:03

## 2018-03-22 NOTE — TELEPHONE ENCOUNTER
Received call from pts  nurse. Pt unable to hold down any food or liquids x several days. Went to ER in [place] and was sent home and told she had a back sprain due to vomiting, but was not offered any fluids, sent home with [facility]. Pt has not started tube feedings yet, only doing clear water flushes twice per day and cant even hold those down. Advised for pt to proceed to ER here at [facility], RN to notify pt, Dr. Levy notified.

## 2018-03-23 PROBLEM — R11.2 NAUSEA VOMITING AND DIARRHEA: Status: ACTIVE | Noted: 2018-03-23

## 2018-03-23 PROBLEM — R19.7 NAUSEA VOMITING AND DIARRHEA: Status: ACTIVE | Noted: 2018-03-23

## 2018-03-23 LAB
ALBUMIN SERPL BCP-MCNC: 2.9 G/DL
ALP SERPL-CCNC: 130 U/L
ALT SERPL W/O P-5'-P-CCNC: 22 U/L
ANION GAP SERPL CALC-SCNC: 6 MMOL/L
AST SERPL-CCNC: 16 U/L
BASOPHILS # BLD AUTO: 0.02 K/UL
BASOPHILS NFR BLD: 0.3 %
BILIRUB SERPL-MCNC: 0.2 MG/DL
BUN SERPL-MCNC: 8 MG/DL
CALCIUM SERPL-MCNC: 8.7 MG/DL
CHLORIDE SERPL-SCNC: 103 MMOL/L
CO2 SERPL-SCNC: 30 MMOL/L
CREAT SERPL-MCNC: 0.7 MG/DL
DIFFERENTIAL METHOD: ABNORMAL
EOSINOPHIL # BLD AUTO: 0.2 K/UL
EOSINOPHIL NFR BLD: 3 %
ERYTHROCYTE [DISTWIDTH] IN BLOOD BY AUTOMATED COUNT: 20.7 %
EST. GFR  (AFRICAN AMERICAN): >60 ML/MIN/1.73 M^2
EST. GFR  (NON AFRICAN AMERICAN): >60 ML/MIN/1.73 M^2
GLUCOSE SERPL-MCNC: 242 MG/DL
HCT VFR BLD AUTO: 33.4 %
HGB BLD-MCNC: 10 G/DL
LYMPHOCYTES # BLD AUTO: 2 K/UL
LYMPHOCYTES NFR BLD: 29.7 %
MCH RBC QN AUTO: 25.8 PG
MCHC RBC AUTO-ENTMCNC: 29.9 G/DL
MCV RBC AUTO: 86 FL
MONOCYTES # BLD AUTO: 0.6 K/UL
MONOCYTES NFR BLD: 9.6 %
NEUTROPHILS # BLD AUTO: 3.8 K/UL
NEUTROPHILS NFR BLD: 57.2 %
PLATELET # BLD AUTO: 401 K/UL
PMV BLD AUTO: 9.1 FL
POCT GLUCOSE: 192 MG/DL (ref 70–110)
POCT GLUCOSE: 205 MG/DL (ref 70–110)
POTASSIUM SERPL-SCNC: 3.5 MMOL/L
PROT SERPL-MCNC: 6 G/DL
RBC # BLD AUTO: 3.88 M/UL
SODIUM SERPL-SCNC: 139 MMOL/L
WBC # BLD AUTO: 6.64 K/UL

## 2018-03-23 PROCEDURE — 94761 N-INVAS EAR/PLS OXIMETRY MLT: CPT

## 2018-03-23 PROCEDURE — 25000003 PHARM REV CODE 250: Performed by: SURGERY

## 2018-03-23 PROCEDURE — G0378 HOSPITAL OBSERVATION PER HR: HCPCS

## 2018-03-23 PROCEDURE — 25000003 PHARM REV CODE 250: Performed by: EMERGENCY MEDICINE

## 2018-03-23 PROCEDURE — 85025 COMPLETE CBC W/AUTO DIFF WBC: CPT

## 2018-03-23 PROCEDURE — 36415 COLL VENOUS BLD VENIPUNCTURE: CPT

## 2018-03-23 PROCEDURE — 80053 COMPREHEN METABOLIC PANEL: CPT

## 2018-03-23 PROCEDURE — 63600175 PHARM REV CODE 636 W HCPCS: Performed by: SURGERY

## 2018-03-23 PROCEDURE — 11000001 HC ACUTE MED/SURG PRIVATE ROOM

## 2018-03-23 PROCEDURE — 63600175 PHARM REV CODE 636 W HCPCS: Performed by: EMERGENCY MEDICINE

## 2018-03-23 RX ORDER — KETOROLAC TROMETHAMINE 30 MG/ML
10 INJECTION, SOLUTION INTRAMUSCULAR; INTRAVENOUS EVERY 6 HOURS PRN
Status: DISPENSED | OUTPATIENT
Start: 2018-03-23 | End: 2018-03-26

## 2018-03-23 RX ORDER — HYDRALAZINE HYDROCHLORIDE 20 MG/ML
10 INJECTION INTRAMUSCULAR; INTRAVENOUS EVERY 6 HOURS PRN
Status: DISCONTINUED | OUTPATIENT
Start: 2018-03-23 | End: 2018-03-27 | Stop reason: HOSPADM

## 2018-03-23 RX ORDER — HYDRALAZINE HYDROCHLORIDE 20 MG/ML
10 INJECTION INTRAMUSCULAR; INTRAVENOUS EVERY 6 HOURS PRN
Status: DISCONTINUED | OUTPATIENT
Start: 2018-03-23 | End: 2018-03-24

## 2018-03-23 RX ORDER — SODIUM CHLORIDE 9 MG/ML
INJECTION, SOLUTION INTRAVENOUS CONTINUOUS
Status: DISCONTINUED | OUTPATIENT
Start: 2018-03-23 | End: 2018-03-24

## 2018-03-23 RX ORDER — HYDROXYZINE PAMOATE 25 MG/1
25 CAPSULE ORAL 3 TIMES DAILY
Status: DISCONTINUED | OUTPATIENT
Start: 2018-03-23 | End: 2018-03-27 | Stop reason: HOSPADM

## 2018-03-23 RX ORDER — GLUCAGON 1 MG
1 KIT INJECTION
Status: DISCONTINUED | OUTPATIENT
Start: 2018-03-23 | End: 2018-03-27 | Stop reason: HOSPADM

## 2018-03-23 RX ORDER — DULOXETIN HYDROCHLORIDE 30 MG/1
60 CAPSULE, DELAYED RELEASE ORAL DAILY
Status: DISCONTINUED | OUTPATIENT
Start: 2018-03-23 | End: 2018-03-27 | Stop reason: HOSPADM

## 2018-03-23 RX ORDER — IBUPROFEN 600 MG/1
600 TABLET ORAL EVERY 6 HOURS PRN
Status: DISCONTINUED | OUTPATIENT
Start: 2018-03-23 | End: 2018-03-27 | Stop reason: HOSPADM

## 2018-03-23 RX ORDER — METOPROLOL SUCCINATE 25 MG/1
100 TABLET, EXTENDED RELEASE ORAL 2 TIMES DAILY
Status: DISCONTINUED | OUTPATIENT
Start: 2018-03-23 | End: 2018-03-27 | Stop reason: HOSPADM

## 2018-03-23 RX ORDER — DOCUSATE SODIUM 100 MG/1
100 CAPSULE, LIQUID FILLED ORAL 2 TIMES DAILY
Status: DISCONTINUED | OUTPATIENT
Start: 2018-03-23 | End: 2018-03-27 | Stop reason: HOSPADM

## 2018-03-23 RX ORDER — SCOLOPAMINE TRANSDERMAL SYSTEM 1 MG/1
1 PATCH, EXTENDED RELEASE TRANSDERMAL
Status: DISCONTINUED | OUTPATIENT
Start: 2018-03-23 | End: 2018-03-27 | Stop reason: HOSPADM

## 2018-03-23 RX ORDER — CLONAZEPAM 0.5 MG/1
0.5 TABLET ORAL 2 TIMES DAILY PRN
Status: DISCONTINUED | OUTPATIENT
Start: 2018-03-23 | End: 2018-03-27 | Stop reason: HOSPADM

## 2018-03-23 RX ORDER — AMITRIPTYLINE HYDROCHLORIDE 25 MG/1
50 TABLET, FILM COATED ORAL NIGHTLY
Status: DISCONTINUED | OUTPATIENT
Start: 2018-03-23 | End: 2018-03-27 | Stop reason: HOSPADM

## 2018-03-23 RX ORDER — LOSARTAN POTASSIUM 50 MG/1
50 TABLET ORAL DAILY
Status: DISCONTINUED | OUTPATIENT
Start: 2018-03-23 | End: 2018-03-26

## 2018-03-23 RX ORDER — POLYETHYLENE GLYCOL 3350 17 G/17G
17 POWDER, FOR SOLUTION ORAL DAILY
Status: DISCONTINUED | OUTPATIENT
Start: 2018-03-23 | End: 2018-03-27 | Stop reason: HOSPADM

## 2018-03-23 RX ORDER — DOCUSATE SODIUM 100 MG/1
100 CAPSULE, LIQUID FILLED ORAL
Status: DISCONTINUED | OUTPATIENT
Start: 2018-03-23 | End: 2018-03-27 | Stop reason: HOSPADM

## 2018-03-23 RX ORDER — INSULIN ASPART 100 [IU]/ML
1-10 INJECTION, SOLUTION INTRAVENOUS; SUBCUTANEOUS EVERY 6 HOURS PRN
Status: DISCONTINUED | OUTPATIENT
Start: 2018-03-23 | End: 2018-03-27 | Stop reason: HOSPADM

## 2018-03-23 RX ORDER — ONDANSETRON 2 MG/ML
4 INJECTION INTRAMUSCULAR; INTRAVENOUS EVERY 6 HOURS
Status: DISCONTINUED | OUTPATIENT
Start: 2018-03-23 | End: 2018-03-27 | Stop reason: HOSPADM

## 2018-03-23 RX ORDER — METOCLOPRAMIDE HYDROCHLORIDE 5 MG/5ML
10 SOLUTION ORAL 3 TIMES DAILY
Status: DISCONTINUED | OUTPATIENT
Start: 2018-03-23 | End: 2018-03-27 | Stop reason: HOSPADM

## 2018-03-23 RX ADMIN — SODIUM CHLORIDE: 0.9 INJECTION, SOLUTION INTRAVENOUS at 11:03

## 2018-03-23 RX ADMIN — AMITRIPTYLINE HYDROCHLORIDE 50 MG: 25 TABLET, FILM COATED ORAL at 09:03

## 2018-03-23 RX ADMIN — METOPROLOL SUCCINATE 100 MG: 25 TABLET, EXTENDED RELEASE ORAL at 09:03

## 2018-03-23 RX ADMIN — HYDROXYZINE PAMOATE 25 MG: 25 CAPSULE ORAL at 09:03

## 2018-03-23 RX ADMIN — PROMETHAZINE HYDROCHLORIDE 6.25 MG: 25 INJECTION INTRAMUSCULAR; INTRAVENOUS at 09:03

## 2018-03-23 RX ADMIN — METOPROLOL SUCCINATE 100 MG: 25 TABLET, EXTENDED RELEASE ORAL at 08:03

## 2018-03-23 RX ADMIN — ONDANSETRON 4 MG: 2 INJECTION INTRAMUSCULAR; INTRAVENOUS at 11:03

## 2018-03-23 RX ADMIN — INSULIN ASPART 4 UNITS: 100 INJECTION, SOLUTION INTRAVENOUS; SUBCUTANEOUS at 11:03

## 2018-03-23 RX ADMIN — IBUPROFEN 600 MG: 600 TABLET, FILM COATED ORAL at 09:03

## 2018-03-23 RX ADMIN — ONDANSETRON 4 MG: 2 INJECTION INTRAMUSCULAR; INTRAVENOUS at 05:03

## 2018-03-23 RX ADMIN — SCOPALAMINE 1 PATCH: 1 PATCH, EXTENDED RELEASE TRANSDERMAL at 11:03

## 2018-03-23 RX ADMIN — METOCLOPRAMIDE HYDROCHLORIDE 10 MG: 5 SOLUTION ORAL at 09:03

## 2018-03-23 RX ADMIN — KETOROLAC TROMETHAMINE 10 MG: 30 INJECTION, SOLUTION INTRAMUSCULAR at 08:03

## 2018-03-23 RX ADMIN — SODIUM CHLORIDE: 0.9 INJECTION, SOLUTION INTRAVENOUS at 03:03

## 2018-03-23 RX ADMIN — DULOXETINE 60 MG: 30 CAPSULE, DELAYED RELEASE ORAL at 08:03

## 2018-03-23 RX ADMIN — KETOROLAC TROMETHAMINE 10 MG: 30 INJECTION, SOLUTION INTRAMUSCULAR at 03:03

## 2018-03-23 RX ADMIN — CLONAZEPAM 0.5 MG: 0.5 TABLET ORAL at 03:03

## 2018-03-23 RX ADMIN — METOCLOPRAMIDE HYDROCHLORIDE 10 MG: 5 SOLUTION ORAL at 02:03

## 2018-03-23 RX ADMIN — HYDROXYZINE PAMOATE 25 MG: 25 CAPSULE ORAL at 02:03

## 2018-03-23 RX ADMIN — DOCUSATE SODIUM 100 MG: 100 CAPSULE, LIQUID FILLED ORAL at 11:03

## 2018-03-23 RX ADMIN — LOSARTAN POTASSIUM 50 MG: 50 TABLET, FILM COATED ORAL at 11:03

## 2018-03-23 RX ADMIN — SODIUM CHLORIDE 1000 ML: 0.9 INJECTION, SOLUTION INTRAVENOUS at 12:03

## 2018-03-23 RX ADMIN — SODIUM CHLORIDE: 0.9 INJECTION, SOLUTION INTRAVENOUS at 07:03

## 2018-03-23 RX ADMIN — HYDRALAZINE HYDROCHLORIDE 10 MG: 20 INJECTION INTRAMUSCULAR; INTRAVENOUS at 03:03

## 2018-03-23 NOTE — PLAN OF CARE
Problem: Patient Care Overview  Goal: Plan of Care Review  Outcome: Ongoing (interventions implemented as appropriate)  Patient medicated for pain with good results. Remains free from falls, bed alarm in use. Medicated for nausea as ordered. IV fluids in progress, remains NPO as ordered.

## 2018-03-23 NOTE — ED NOTES
Sister, Elisabeth [phone], reports she is leaving for the night but will call tomorrow to check on pt. Reports if any additional information is needed by staff number is provided.

## 2018-03-23 NOTE — ED NOTES
Pt. given urine specimen cup with towellet for collection of urinalysis with verbal instructions. Pt. verbalizes understanding. Ambulates with cane

## 2018-03-23 NOTE — H&P
"Surgery History and Physical    CC: back pain, nausea and vomiting    HPI: 62yoF well-known to surgical service, with metastatic carcinoid on everolimus s/p recent admit for g-tube dislodgement, which was replaced by IR, presents with couple day history of po intolerance and back pain. Also reports her home health nurse had noted she had lost weight recently. Denies fever/chills. +BMs. Wound from previous admit healing well per patient. Denies other issues.    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 No pertinent family history.  Review of patient's allergies indicates:   Allergen Reactions    Codeine     Contrast media      Oral and IV    Darvocet a500 [propoxyphene n-acetaminophen]     Epinephrine      Neuroendocrine Tumor patient      Erythromycin     Iodinated contrast- oral and iv dye     Iodine and iodide containing products     Morphine     Sulfa (sulfonamide antibiotics)     Erythromycin base Rash    Pcn [penicillins] Rash     "It looks like measles."       Physical Exam:  Vitals:    03/23/18 0800   BP: (!) 185/91   Pulse: 81   Resp: 18   Temp: 98.2 °F (36.8 °C) "     Gen: NAD, AAOx3  HEENT: NCAT, EOMI, MMM  CV: RRR  Pulm: CTA-B  Abd: soft, ND, NTTP, old g-tube site healing well, with no signs of infection, new g-tube in good position.  Ext: 2+DP, MAEW  Skin: dry, intact  Mood: appropriate    Recent Labs      03/23/18   0535   WBC  6.64   HGB  10.0*   HCT  33.4*   PLT  401*   NA  139   K  3.5   BUN  8   CREATININE  0.7       Imaging:  CT abd/pelvis:   Limited exam secondary to lack of IV contrast.  Recommend follow-up exam with IV contrast unless contraindicated.  Worsening bulky retroperitoneal, mesenteric, and periportal lymphadenopathy worrisome for metastatic disease.  Generalized mesenteric edema and small volume abdominopelvic free fluid.    A/P: 62yoF well-known to surgical service, with metastatic carcinoid on everolimus s/p recent admit for g-tube dislodgement, which was replaced by IR, now with po intolerance and weight loss  - admit to surgery service  - IVF  - npo for now  - SSI  - restart home BP meds  - start trickle TFs via NGT today    Lisa Scott MD  HO-IV

## 2018-03-23 NOTE — ED PROVIDER NOTES
"Encounter Date: 3/22/2018       History     Chief Complaint   Patient presents with    Nausea     patient advised to come to ED by home health nurse who spoke to Dr. Levy. reports n.v. today and significant weight loss in the last week. denies fever, chills. pt. appears chronically ill.      62F s/p gastric bypass, with PEG tube and neuroendocrine tumor, presents with nausea, vomiting and diarrhea.  She is keeping very little down.  She states she vomits or has diarrhea any time she eats.  She has associated lower abdominal pain and lower back pain.  Her home health nurse weighed her today and was concerned about her weight loss, so she called Dr. Christine and pt was instructed to go to ER for evaluation.  Pt lives near [place], [place].  She was admitted earlier this month for PEG tube placement which fell out once then got infected.            Review of patient's allergies indicates:   Allergen Reactions    Codeine     Contrast media      Oral and IV    Darvocet a500 [propoxyphene n-acetaminophen]     Epinephrine      Neuroendocrine Tumor patient      Erythromycin     Iodinated contrast- oral and iv dye     Iodine and iodide containing products     Morphine     Sulfa (sulfonamide antibiotics)     Erythromycin base Rash    Pcn [penicillins] Rash     "It looks like measles."     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 No pertinent family " history.  Social History   Substance Use Topics    Smoking status: Never Smoker    Smokeless tobacco: Never Used    Alcohol use No     Review of Systems   Constitutional: Positive for unexpected weight change. Negative for fever.   Gastrointestinal: Positive for abdominal pain, diarrhea, nausea and vomiting.   Musculoskeletal: Positive for back pain.   All other systems reviewed and are negative.      Physical Exam     Initial Vitals [03/22/18 2116]   BP Pulse Resp Temp SpO2   (!) 175/86 79 18 99.9 °F (37.7 °C) 97 %      MAP       115.67         Physical Exam    Nursing note and vitals reviewed.  Constitutional: She is cooperative.   Very thin, chronically ill appearing female in no acute distress   HENT:   Head: Normocephalic and atraumatic.   Mouth/Throat: Mucous membranes are dry.   Eyes: Conjunctivae are normal.   Neck: Normal range of motion.   Cardiovascular: Normal rate, regular rhythm and normal heart sounds.   No murmur heard.  Pulmonary/Chest: Breath sounds normal. She has no wheezes. She has no rhonchi. She has no rales.   Abdominal: Soft. She exhibits no distension. Bowel sounds are increased. There is no tenderness.   PEG tube in place with no surrounding erythema   Musculoskeletal: Normal range of motion.   Neurological: She is alert and oriented to person, place, and time.   Skin: Skin is warm and dry. There is pallor.   Psychiatric: She has a normal mood and affect. Her behavior is normal.         ED Course   Procedures  Labs Reviewed   CBC W/ AUTO DIFFERENTIAL - Abnormal; Notable for the following:        Result Value    Hemoglobin 11.6 (*)     MCH 26.3 (*)     MCHC 30.2 (*)     RDW 20.9 (*)     Platelets 439 (*)     MPV 9.1 (*)     All other components within normal limits   COMPREHENSIVE METABOLIC PANEL - Abnormal; Notable for the following:     CO2 30 (*)     Glucose 280 (*)     Alkaline Phosphatase 169 (*)     All other components within normal limits   URINALYSIS - Abnormal; Notable for the  following:     Specific Gravity, UA >=1.030 (*)     Protein, UA Trace (*)     Glucose, UA 2+ (*)     All other components within normal limits   POCT GLUCOSE MONITORING CONTINUOUS - Abnormal; Notable for the following:     POC Glucose 249 (*)     All other components within normal limits   LIPASE             Medical Decision Making:   History:   I obtained history from: someone other than patient.       <> Summary of History: Family member  Clinical Tests:   Lab Tests: Ordered and Reviewed  Radiological Study: Ordered and Reviewed  ED Management:  62-year-old female neuroendocrine patient with recent PEG tube placement presents with nausea vomiting and diarrhea, and weight loss noted by the home health nurse.  Workup in the ER today included CT labs and CT scan of her abdomen and pelvis.  Labs were remarkable for glucose of 280, which the family states is good for her.  She was given IV fluids.  CT of the abdomen and pelvis did not show any acute abdominal emergency or infection.  I spoke with Dr. Mcdermott on call for Dr. Dowell regarding this patient.  She will be admitted to the hospital on IV fluids with antiemetics and pain control.  Other:   I have discussed this case with another health care provider.                      Clinical Impression:   The encounter diagnosis was Nausea vomiting and diarrhea.                           Melanie Malik MD  03/23/18 0104

## 2018-03-23 NOTE — PLAN OF CARE
TN met with pt   known to TN      sister Emily Pickard  [phone] and her adult son (pt's nephew)   they assist with transportation and adls as needed   in the past pt has had HH with [facility]      pt current with [facility] for hh;  CPP for enteral fdgs      pmh mets carcinoid;   recent admit for g tube dislodgement and replacement - not currently on enteral fdgs.       03/23/18 1731   Discharge Assessment   Assessment Type Discharge Planning Assessment   Confirmed/corrected address and phone number on facesheet? Yes   Assessment information obtained from? Patient;Medical Record   Expected Length of Stay (days) 2   Communicated expected length of stay with patient/caregiver yes   Prior to hospitilization cognitive status: Alert/Oriented   Prior to hospitalization functional status: Assistive Equipment   Current cognitive status: Alert/Oriented   Current Functional Status: Assistive Equipment   Lives With sibling(s)   Able to Return to Prior Arrangements yes   Is patient able to care for self after discharge? Unable to determine at this time (comments)   Patient's perception of discharge disposition home health  (with [facility] )   Readmission Within The Last 30 Days (3/6-13/18  [facility];  2/26-3/2  [facility])   Patient currently being followed by outpatient case management? No   Patient currently receives any other outside agency services? No   Equipment Currently Used at Home cane, straight;bedside commode;walker, rolling;bath bench   Do you have any problems affording any of your prescribed medications? No   Is the patient taking medications as prescribed? no   Does the patient have transportation home? Yes   Transportation Available family or friend will provide   Does the patient receive services at the [facility]? No   Discharge Plan A Home;Home with family;Home Health   Patient/Family In Agreement With Plan yes     pt's physical address [address]  [place], [place]   [address]   pt has a st cane, bsc,  rw and ttb.       here at [facility] 2/26-3/2/18;  3/6-13/18

## 2018-03-23 NOTE — ED TRIAGE NOTES
Pt presents to ED with C/O NV, pt states home nurse came to home and noted that pt had lost 8lb in one week, with n/v/d and L sided lower back pain that radiates to the lower R side. Pt  Also c/o ha pt states pain 10/10 at this time pt states she did take all prescribed medication today.

## 2018-03-24 LAB
ALBUMIN SERPL BCP-MCNC: 3.1 G/DL
ALP SERPL-CCNC: 137 U/L
ALT SERPL W/O P-5'-P-CCNC: 20 U/L
ANION GAP SERPL CALC-SCNC: 7 MMOL/L
AST SERPL-CCNC: 19 U/L
BASOPHILS # BLD AUTO: 0.04 K/UL
BASOPHILS NFR BLD: 0.6 %
BILIRUB SERPL-MCNC: 0.4 MG/DL
BUN SERPL-MCNC: 6 MG/DL
CALCIUM SERPL-MCNC: 9.2 MG/DL
CHLORIDE SERPL-SCNC: 103 MMOL/L
CO2 SERPL-SCNC: 30 MMOL/L
CREAT SERPL-MCNC: 0.6 MG/DL
DIFFERENTIAL METHOD: ABNORMAL
EOSINOPHIL # BLD AUTO: 0.3 K/UL
EOSINOPHIL NFR BLD: 4.1 %
ERYTHROCYTE [DISTWIDTH] IN BLOOD BY AUTOMATED COUNT: 20.4 %
EST. GFR  (AFRICAN AMERICAN): >60 ML/MIN/1.73 M^2
EST. GFR  (NON AFRICAN AMERICAN): >60 ML/MIN/1.73 M^2
GLUCOSE SERPL-MCNC: 147 MG/DL
HCT VFR BLD AUTO: 37.3 %
HGB BLD-MCNC: 11.4 G/DL
LYMPHOCYTES # BLD AUTO: 2 K/UL
LYMPHOCYTES NFR BLD: 29.1 %
MCH RBC QN AUTO: 26.2 PG
MCHC RBC AUTO-ENTMCNC: 30.6 G/DL
MCV RBC AUTO: 86 FL
MONOCYTES # BLD AUTO: 0.6 K/UL
MONOCYTES NFR BLD: 9.3 %
NEUTROPHILS # BLD AUTO: 3.9 K/UL
NEUTROPHILS NFR BLD: 56.8 %
PLATELET # BLD AUTO: 435 K/UL
PMV BLD AUTO: 9.5 FL
POCT GLUCOSE: 148 MG/DL (ref 70–110)
POCT GLUCOSE: 183 MG/DL (ref 70–110)
POCT GLUCOSE: 343 MG/DL (ref 70–110)
POCT GLUCOSE: 363 MG/DL (ref 70–110)
POTASSIUM SERPL-SCNC: 3.2 MMOL/L
PROT SERPL-MCNC: 6.4 G/DL
RBC # BLD AUTO: 4.35 M/UL
SODIUM SERPL-SCNC: 140 MMOL/L
WBC # BLD AUTO: 6.85 K/UL

## 2018-03-24 PROCEDURE — 63600175 PHARM REV CODE 636 W HCPCS: Performed by: SURGERY

## 2018-03-24 PROCEDURE — 11000001 HC ACUTE MED/SURG PRIVATE ROOM

## 2018-03-24 PROCEDURE — 94761 N-INVAS EAR/PLS OXIMETRY MLT: CPT

## 2018-03-24 PROCEDURE — 80053 COMPREHEN METABOLIC PANEL: CPT

## 2018-03-24 PROCEDURE — G0378 HOSPITAL OBSERVATION PER HR: HCPCS

## 2018-03-24 PROCEDURE — 36415 COLL VENOUS BLD VENIPUNCTURE: CPT

## 2018-03-24 PROCEDURE — 25000003 PHARM REV CODE 250: Performed by: EMERGENCY MEDICINE

## 2018-03-24 PROCEDURE — 25000003 PHARM REV CODE 250: Performed by: SURGERY

## 2018-03-24 PROCEDURE — 63600175 PHARM REV CODE 636 W HCPCS: Performed by: EMERGENCY MEDICINE

## 2018-03-24 PROCEDURE — 85025 COMPLETE CBC W/AUTO DIFF WBC: CPT

## 2018-03-24 RX ORDER — HYDRALAZINE HYDROCHLORIDE 20 MG/ML
10 INJECTION INTRAMUSCULAR; INTRAVENOUS ONCE
Status: COMPLETED | OUTPATIENT
Start: 2018-03-24 | End: 2018-03-24

## 2018-03-24 RX ORDER — KETOROLAC TROMETHAMINE 10 MG/1
10 TABLET, FILM COATED ORAL EVERY 6 HOURS PRN
Status: DISCONTINUED | OUTPATIENT
Start: 2018-03-24 | End: 2018-03-27 | Stop reason: HOSPADM

## 2018-03-24 RX ORDER — HYDRALAZINE HYDROCHLORIDE 20 MG/ML
10 INJECTION INTRAMUSCULAR; INTRAVENOUS EVERY 4 HOURS PRN
Status: DISCONTINUED | OUTPATIENT
Start: 2018-03-24 | End: 2018-03-27 | Stop reason: HOSPADM

## 2018-03-24 RX ORDER — ONDANSETRON 2 MG/ML
4 INJECTION INTRAMUSCULAR; INTRAVENOUS ONCE
Status: COMPLETED | OUTPATIENT
Start: 2018-03-24 | End: 2018-03-24

## 2018-03-24 RX ADMIN — METOCLOPRAMIDE HYDROCHLORIDE 10 MG: 5 SOLUTION ORAL at 09:03

## 2018-03-24 RX ADMIN — ONDANSETRON 4 MG: 2 INJECTION, SOLUTION INTRAMUSCULAR; INTRAVENOUS at 09:03

## 2018-03-24 RX ADMIN — SODIUM CHLORIDE: 0.9 INJECTION, SOLUTION INTRAVENOUS at 09:03

## 2018-03-24 RX ADMIN — IBUPROFEN 600 MG: 600 TABLET, FILM COATED ORAL at 03:03

## 2018-03-24 RX ADMIN — INSULIN ASPART 10 UNITS: 100 INJECTION, SOLUTION INTRAVENOUS; SUBCUTANEOUS at 11:03

## 2018-03-24 RX ADMIN — HYDROXYZINE PAMOATE 25 MG: 25 CAPSULE ORAL at 02:03

## 2018-03-24 RX ADMIN — METOPROLOL SUCCINATE 100 MG: 25 TABLET, EXTENDED RELEASE ORAL at 09:03

## 2018-03-24 RX ADMIN — SODIUM CHLORIDE: 0.9 INJECTION, SOLUTION INTRAVENOUS at 03:03

## 2018-03-24 RX ADMIN — ONDANSETRON 4 MG: 2 INJECTION INTRAMUSCULAR; INTRAVENOUS at 12:03

## 2018-03-24 RX ADMIN — DOCUSATE SODIUM 100 MG: 100 CAPSULE, LIQUID FILLED ORAL at 09:03

## 2018-03-24 RX ADMIN — KETOROLAC TROMETHAMINE 10 MG: 30 INJECTION, SOLUTION INTRAMUSCULAR at 03:03

## 2018-03-24 RX ADMIN — METOCLOPRAMIDE HYDROCHLORIDE 10 MG: 5 SOLUTION ORAL at 02:03

## 2018-03-24 RX ADMIN — AMITRIPTYLINE HYDROCHLORIDE 50 MG: 25 TABLET, FILM COATED ORAL at 09:03

## 2018-03-24 RX ADMIN — ONDANSETRON 4 MG: 2 INJECTION INTRAMUSCULAR; INTRAVENOUS at 01:03

## 2018-03-24 RX ADMIN — IBUPROFEN 600 MG: 600 TABLET, FILM COATED ORAL at 09:03

## 2018-03-24 RX ADMIN — HYDRALAZINE HYDROCHLORIDE 10 MG: 20 INJECTION INTRAMUSCULAR; INTRAVENOUS at 12:03

## 2018-03-24 RX ADMIN — PROMETHAZINE HYDROCHLORIDE 6.25 MG: 25 INJECTION INTRAMUSCULAR; INTRAVENOUS at 09:03

## 2018-03-24 RX ADMIN — ONDANSETRON 4 MG: 2 INJECTION INTRAMUSCULAR; INTRAVENOUS at 11:03

## 2018-03-24 RX ADMIN — HYDROXYZINE PAMOATE 25 MG: 25 CAPSULE ORAL at 09:03

## 2018-03-24 RX ADMIN — KETOROLAC TROMETHAMINE 10 MG: 30 INJECTION, SOLUTION INTRAMUSCULAR at 12:03

## 2018-03-24 RX ADMIN — LOSARTAN POTASSIUM 50 MG: 50 TABLET, FILM COATED ORAL at 09:03

## 2018-03-24 RX ADMIN — HYDRALAZINE HYDROCHLORIDE 10 MG: 20 INJECTION INTRAMUSCULAR; INTRAVENOUS at 01:03

## 2018-03-24 RX ADMIN — KETOROLAC TROMETHAMINE 10 MG: 30 INJECTION, SOLUTION INTRAMUSCULAR at 06:03

## 2018-03-24 RX ADMIN — DULOXETINE 60 MG: 30 CAPSULE, DELAYED RELEASE ORAL at 09:03

## 2018-03-24 RX ADMIN — ONDANSETRON 4 MG: 2 INJECTION INTRAMUSCULAR; INTRAVENOUS at 05:03

## 2018-03-24 RX ADMIN — PROMETHAZINE HYDROCHLORIDE 6.25 MG: 25 INJECTION INTRAMUSCULAR; INTRAVENOUS at 08:03

## 2018-03-24 RX ADMIN — KETOROLAC TROMETHAMINE 10 MG: 30 INJECTION, SOLUTION INTRAMUSCULAR at 11:03

## 2018-03-24 RX ADMIN — INSULIN ASPART 5 UNITS: 100 INJECTION, SOLUTION INTRAVENOUS; SUBCUTANEOUS at 08:03

## 2018-03-24 RX ADMIN — CLONAZEPAM 0.5 MG: 0.5 TABLET ORAL at 10:03

## 2018-03-24 RX ADMIN — HYDRALAZINE HYDROCHLORIDE 10 MG: 20 INJECTION INTRAMUSCULAR; INTRAVENOUS at 09:03

## 2018-03-24 RX ADMIN — HYDRALAZINE HYDROCHLORIDE 10 MG: 20 INJECTION INTRAMUSCULAR; INTRAVENOUS at 06:03

## 2018-03-24 NOTE — PROGRESS NOTES
Dr. robles notified of blood pressure of 188/88. Orders to give  PRn hydralizine and to change order to every 4 hours.

## 2018-03-24 NOTE — PLAN OF CARE
Problem: Patient Care Overview  Goal: Plan of Care Review  Outcome: Ongoing (interventions implemented as appropriate)  Pt AAOx4. NAD noted. complaints of headache with ibuprofen and Toradol given with slight relief.  complaints of nausea with phenergan and scheduled zofran given with relief.   Tube feeds @20 with a goal rate of 60. Mechanical soft diet with good appetite. POCT glucose check with sliding scale given per order. Pt oob to bathroom. Pt without episode of diarrhea.  Bed alarm sounding. Will continue to monitor.

## 2018-03-24 NOTE — PROGRESS NOTES
Surgery Progress Note    Overnight Events:  NAEON.  Tolerating TF at 10 cc/hr  Tolerating some po without N/V  Afebrile.  +OOB  Reports bilateral lower back pain    Physical Exam:  Temp:  [96.4 °F (35.8 °C)-97.7 °F (36.5 °C)] 96.4 °F (35.8 °C)  Pulse:  [59-71] 60  Resp:  [11-18] 12  SpO2:  [96 %-98 %] 98 %  BP: (129-193)/(78-97) 129/78    Gen: NAD, AAOx3  CV: RRR  Pulm: unlabored breathing  Abd: soft, ND, NTTP  2+DP  Skin: dry/intact  Mood: appropriate    I/O last 3 completed shifts:  In: 2482.9 [P.O.:130; I.V.:322.9; NG/GT:30; IV Piggyback:2000]  Out: 2500 [Urine:2500]        Labs:  Recent Labs      03/23/18   0535  03/24/18   0443   WBC  6.64  6.85   HGB  10.0*  11.4*   HCT  33.4*  37.3   PLT  401*  435*   NA  139  140   K  3.5  3.2*   CALCIUM  8.7  9.2   BUN  8  6*   CREATININE  0.7  0.6     A/P: 62yoF well-known to surgical service, with metastatic carcinoid on everolimus s/p recent admit for g-tube dislodgement, which was replaced by IR, now with po intolerance and weight loss    - increase TF  - consult Nutrition for recs  - d/c IVF  - XR spine  - home pending goal TFs and home TFs    Lisa Scott MD  HO-IV

## 2018-03-25 PROBLEM — E43 SEVERE PROTEIN-CALORIE MALNUTRITION: Status: ACTIVE | Noted: 2018-03-25

## 2018-03-25 LAB
ALBUMIN SERPL BCP-MCNC: 3.4 G/DL
ALP SERPL-CCNC: 144 U/L
ALT SERPL W/O P-5'-P-CCNC: 22 U/L
ANION GAP SERPL CALC-SCNC: 10 MMOL/L
AST SERPL-CCNC: 19 U/L
BASOPHILS # BLD AUTO: 0.02 K/UL
BASOPHILS NFR BLD: 0.3 %
BILIRUB SERPL-MCNC: 0.4 MG/DL
BUN SERPL-MCNC: 13 MG/DL
CALCIUM SERPL-MCNC: 9.7 MG/DL
CHLORIDE SERPL-SCNC: 100 MMOL/L
CO2 SERPL-SCNC: 29 MMOL/L
CREAT SERPL-MCNC: 0.7 MG/DL
DIFFERENTIAL METHOD: ABNORMAL
EOSINOPHIL # BLD AUTO: 0.2 K/UL
EOSINOPHIL NFR BLD: 2.8 %
ERYTHROCYTE [DISTWIDTH] IN BLOOD BY AUTOMATED COUNT: 20.5 %
EST. GFR  (AFRICAN AMERICAN): >60 ML/MIN/1.73 M^2
EST. GFR  (NON AFRICAN AMERICAN): >60 ML/MIN/1.73 M^2
GLUCOSE SERPL-MCNC: 165 MG/DL
HCT VFR BLD AUTO: 38.4 %
HGB BLD-MCNC: 11.7 G/DL
LYMPHOCYTES # BLD AUTO: 1.5 K/UL
LYMPHOCYTES NFR BLD: 20 %
MCH RBC QN AUTO: 26 PG
MCHC RBC AUTO-ENTMCNC: 30.5 G/DL
MCV RBC AUTO: 85 FL
MONOCYTES # BLD AUTO: 0.7 K/UL
MONOCYTES NFR BLD: 8.8 %
NEUTROPHILS # BLD AUTO: 5.1 K/UL
NEUTROPHILS NFR BLD: 68 %
PLATELET # BLD AUTO: 444 K/UL
PMV BLD AUTO: 9.5 FL
POCT GLUCOSE: 163 MG/DL (ref 70–110)
POCT GLUCOSE: 254 MG/DL (ref 70–110)
POCT GLUCOSE: 256 MG/DL (ref 70–110)
POCT GLUCOSE: 271 MG/DL (ref 70–110)
POTASSIUM SERPL-SCNC: 3.2 MMOL/L
PROT SERPL-MCNC: 6.8 G/DL
RBC # BLD AUTO: 4.5 M/UL
SODIUM SERPL-SCNC: 139 MMOL/L
WBC # BLD AUTO: 7.46 K/UL

## 2018-03-25 PROCEDURE — 11000001 HC ACUTE MED/SURG PRIVATE ROOM

## 2018-03-25 PROCEDURE — 63600175 PHARM REV CODE 636 W HCPCS: Performed by: EMERGENCY MEDICINE

## 2018-03-25 PROCEDURE — 25000003 PHARM REV CODE 250: Performed by: SURGERY

## 2018-03-25 PROCEDURE — 63600175 PHARM REV CODE 636 W HCPCS: Performed by: SURGERY

## 2018-03-25 PROCEDURE — 94761 N-INVAS EAR/PLS OXIMETRY MLT: CPT

## 2018-03-25 PROCEDURE — 80053 COMPREHEN METABOLIC PANEL: CPT

## 2018-03-25 PROCEDURE — G0378 HOSPITAL OBSERVATION PER HR: HCPCS

## 2018-03-25 PROCEDURE — 85025 COMPLETE CBC W/AUTO DIFF WBC: CPT

## 2018-03-25 PROCEDURE — 25000003 PHARM REV CODE 250: Performed by: EMERGENCY MEDICINE

## 2018-03-25 PROCEDURE — 36415 COLL VENOUS BLD VENIPUNCTURE: CPT

## 2018-03-25 RX ORDER — HYDRALAZINE HYDROCHLORIDE 20 MG/ML
10 INJECTION INTRAMUSCULAR; INTRAVENOUS ONCE
Status: COMPLETED | OUTPATIENT
Start: 2018-03-25 | End: 2018-03-25

## 2018-03-25 RX ORDER — CLONAZEPAM 0.5 MG/1
0.5 TABLET ORAL 2 TIMES DAILY
Status: DISCONTINUED | OUTPATIENT
Start: 2018-03-25 | End: 2018-03-27 | Stop reason: HOSPADM

## 2018-03-25 RX ORDER — ACETAMINOPHEN 325 MG/1
650 TABLET ORAL EVERY 6 HOURS PRN
Status: DISCONTINUED | OUTPATIENT
Start: 2018-03-25 | End: 2018-03-27 | Stop reason: HOSPADM

## 2018-03-25 RX ORDER — POTASSIUM CHLORIDE 7.45 MG/ML
10 INJECTION INTRAVENOUS
Status: COMPLETED | OUTPATIENT
Start: 2018-03-25 | End: 2018-03-25

## 2018-03-25 RX ORDER — CLONIDINE HYDROCHLORIDE 0.1 MG/1
0.1 TABLET ORAL 2 TIMES DAILY
Status: DISCONTINUED | OUTPATIENT
Start: 2018-03-25 | End: 2018-03-25

## 2018-03-25 RX ORDER — TIZANIDINE 4 MG/1
4 TABLET ORAL EVERY 8 HOURS PRN
Status: DISCONTINUED | OUTPATIENT
Start: 2018-03-25 | End: 2018-03-27 | Stop reason: HOSPADM

## 2018-03-25 RX ORDER — POLYETHYLENE GLYCOL 3350 17 G/17G
17 POWDER, FOR SOLUTION ORAL ONCE
Status: COMPLETED | OUTPATIENT
Start: 2018-03-25 | End: 2018-03-25

## 2018-03-25 RX ORDER — SUMATRIPTAN SUCCINATE 25 MG/1
100 TABLET ORAL 2 TIMES DAILY PRN
Status: DISCONTINUED | OUTPATIENT
Start: 2018-03-25 | End: 2018-03-27 | Stop reason: HOSPADM

## 2018-03-25 RX ORDER — DICLOFENAC SODIUM 75 MG/1
75 TABLET, DELAYED RELEASE ORAL EVERY 12 HOURS PRN
Status: DISCONTINUED | OUTPATIENT
Start: 2018-03-25 | End: 2018-03-27 | Stop reason: HOSPADM

## 2018-03-25 RX ORDER — BUTALBITAL, ACETAMINOPHEN AND CAFFEINE 50; 325; 40 MG/1; MG/1; MG/1
1 TABLET ORAL EVERY 6 HOURS PRN
Status: DISCONTINUED | OUTPATIENT
Start: 2018-03-25 | End: 2018-03-27 | Stop reason: HOSPADM

## 2018-03-25 RX ADMIN — DULOXETINE 60 MG: 30 CAPSULE, DELAYED RELEASE ORAL at 09:03

## 2018-03-25 RX ADMIN — METOPROLOL SUCCINATE 100 MG: 25 TABLET, EXTENDED RELEASE ORAL at 09:03

## 2018-03-25 RX ADMIN — ONDANSETRON 4 MG: 2 INJECTION INTRAMUSCULAR; INTRAVENOUS at 12:03

## 2018-03-25 RX ADMIN — LOSARTAN POTASSIUM 50 MG: 50 TABLET, FILM COATED ORAL at 09:03

## 2018-03-25 RX ADMIN — POLYETHYLENE GLYCOL 3350 17 G: 17 POWDER, FOR SOLUTION ORAL at 09:03

## 2018-03-25 RX ADMIN — ONDANSETRON 4 MG: 2 INJECTION INTRAMUSCULAR; INTRAVENOUS at 05:03

## 2018-03-25 RX ADMIN — INSULIN ASPART 6 UNITS: 100 INJECTION, SOLUTION INTRAVENOUS; SUBCUTANEOUS at 05:03

## 2018-03-25 RX ADMIN — INSULIN ASPART 6 UNITS: 100 INJECTION, SOLUTION INTRAVENOUS; SUBCUTANEOUS at 12:03

## 2018-03-25 RX ADMIN — METOCLOPRAMIDE HYDROCHLORIDE 10 MG: 5 SOLUTION ORAL at 09:03

## 2018-03-25 RX ADMIN — CLONAZEPAM 0.5 MG: 0.5 TABLET ORAL at 01:03

## 2018-03-25 RX ADMIN — INSULIN ASPART 2 UNITS: 100 INJECTION, SOLUTION INTRAVENOUS; SUBCUTANEOUS at 05:03

## 2018-03-25 RX ADMIN — METOCLOPRAMIDE HYDROCHLORIDE 10 MG: 5 SOLUTION ORAL at 02:03

## 2018-03-25 RX ADMIN — POTASSIUM CHLORIDE 10 MEQ: 10 INJECTION, SOLUTION INTRAVENOUS at 12:03

## 2018-03-25 RX ADMIN — HYDRALAZINE HYDROCHLORIDE 10 MG: 20 INJECTION INTRAMUSCULAR; INTRAVENOUS at 05:03

## 2018-03-25 RX ADMIN — HYDRALAZINE HYDROCHLORIDE 10 MG: 20 INJECTION INTRAMUSCULAR; INTRAVENOUS at 02:03

## 2018-03-25 RX ADMIN — AMITRIPTYLINE HYDROCHLORIDE 50 MG: 25 TABLET, FILM COATED ORAL at 09:03

## 2018-03-25 RX ADMIN — PROMETHAZINE HYDROCHLORIDE 6.25 MG: 25 INJECTION INTRAMUSCULAR; INTRAVENOUS at 09:03

## 2018-03-25 RX ADMIN — KETOROLAC TROMETHAMINE 10 MG: 30 INJECTION, SOLUTION INTRAMUSCULAR at 05:03

## 2018-03-25 RX ADMIN — POTASSIUM CHLORIDE 10 MEQ: 10 INJECTION, SOLUTION INTRAVENOUS at 02:03

## 2018-03-25 RX ADMIN — CLONIDINE HYDROCHLORIDE 0.1 MG: 0.1 TABLET ORAL at 02:03

## 2018-03-25 RX ADMIN — SUMATRIPTAN SUCCINATE 100 MG: 25 TABLET ORAL at 01:03

## 2018-03-25 RX ADMIN — SUMATRIPTAN SUCCINATE 100 MG: 25 TABLET ORAL at 10:03

## 2018-03-25 RX ADMIN — HYDROXYZINE PAMOATE 25 MG: 25 CAPSULE ORAL at 02:03

## 2018-03-25 RX ADMIN — HYDROXYZINE PAMOATE 25 MG: 25 CAPSULE ORAL at 09:03

## 2018-03-25 RX ADMIN — BUTALBITAL, ACETAMINOPHEN, AND CAFFEINE 1 TABLET: 50; 325; 40 TABLET ORAL at 06:03

## 2018-03-25 RX ADMIN — POTASSIUM CHLORIDE 10 MEQ: 10 INJECTION, SOLUTION INTRAVENOUS at 04:03

## 2018-03-25 RX ADMIN — CLONAZEPAM 0.5 MG: 0.5 TABLET ORAL at 09:03

## 2018-03-25 RX ADMIN — DICLOFENAC SODIUM 75 MG: 75 TABLET, DELAYED RELEASE ORAL at 02:03

## 2018-03-25 RX ADMIN — HYDRALAZINE HYDROCHLORIDE 10 MG: 20 INJECTION INTRAMUSCULAR; INTRAVENOUS at 01:03

## 2018-03-25 RX ADMIN — ONDANSETRON 4 MG: 2 INJECTION INTRAMUSCULAR; INTRAVENOUS at 11:03

## 2018-03-25 RX ADMIN — DOCUSATE SODIUM 100 MG: 100 CAPSULE, LIQUID FILLED ORAL at 09:03

## 2018-03-25 RX ADMIN — ACETAMINOPHEN 650 MG: 325 TABLET ORAL at 12:03

## 2018-03-25 NOTE — PROGRESS NOTES
Dr. Scott notified of patient's continued pain after receiving toradol and ibuprofen; new order received.

## 2018-03-25 NOTE — NURSING
Dr. Scott notified of blood pressure of 198/88 manual 1 hour after hydralazine give. Order to give 10mg of hydralazine iv now and clonidine 0.1 mg now. Md also notified of pt complaints of feeling full after increasing peg tube feed to 50cc/hr.

## 2018-03-25 NOTE — PLAN OF CARE
Problem: Patient Care Overview  Goal: Plan of Care Review  Outcome: Ongoing (interventions implemented as appropriate)  AAOX3.  Respirations even and unlabored; breath sounds clear.  RA with saturation 98%.  Denies sob.  C/o headache; alternated ibuprofen, toradol, and diclofenac and pain subsided to 3/10.  Dry heaving; received phenergan 6.25mg iv and an extra dose of zofran 4mg iv along with scheduled zofran 4mg and reglan 10mg po.  Nausea relieved.  B/p 190s/90s; administered extra dose of hydralazine 10mg and scheduled lopressor 100mg po with B/p decreasing to 130s/70s.  Peg tube with diabetasource infusing at 20ml/hr without residual; increased to 30ml/hr after nausea resolved.  Fall precaution sign on door; fall precaution and allergy armband on.  TEDS on with fall precaution socks.  Bed alarm on.  Instructed to call for assistance.  Will cont to monitor.

## 2018-03-25 NOTE — PLAN OF CARE
Problem: Patient Care Overview  Goal: Plan of Care Review  Outcome: Ongoing (interventions implemented as appropriate)  Pt AAOx4. NAD noted. complaints of headache with PRN medication given with slight relief. complaints of nausea with phenergan and scheduled zofran given with  Slight relief.   Tube feeds @40 with a goal rate of 60. Pt with complaints of stomach feeling tight; stomach rounded but soft with slight tenderness with touch. Mechanical soft diet with poor appetite due to nausea. POCT glucose check with sliding scale given per order. Pt oob to bathroom. Bed alarm sounding. Will continue to monitor.

## 2018-03-25 NOTE — CONSULTS
"  [facility]  Adult Nutrition  Consult Note    SUMMARY     Recommendations  Recommendation/Intervention: 1.) Continue with Glucerna 1.5 @ goal rate 60 mls/hr continuous providing 2160 kcals/day, 118 g protein/day, 191 g CHO/day, and 1092 mls water/day. Hold TF x4 hrs for residuals >250 mls. Flush 180 mls free water Q4 hrs or per MD  2.) Continue with Mechanical soft diet per SLP.   Goals: 1.) patient will meet >80% of EEN while admitted 2.) patient will tolerate TF at goal rate  Nutrition Goal Status: new  Communication of RD Recs: other (comment) (care plan)    1. Nausea vomiting and diarrhea    2. Malignant carcinoid tumor of unknown primary site    3. Severe protein-calorie malnutrition    4. 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 Peg presenet. RD consulted for TF.   Nutrition Discharge Planning: TF above.     Nutrition Risk Screen  Nutrition Risk Screen: tube feeding or parenteral nutrition    Nutrition/Diet History  Food Preferences: ashley  Do you have any cultural, spiritual, Mu-ism conflicts, given your current situation?: n/a  Food Allergies: other (see comments) (Iodine )    Anthropometrics  Temp: 97.6 °F (36.4 °C)  Height Method: Stated  Height: 5' 4.5"  Height (inches): 64.5 in  Weight Method: Bed Scale  Weight: 51.1 kg (112 lb 10.5 oz)  Weight (lb): 112.66 lb  Ideal Body Weight (IBW), " "Female: 122.5 lb  % Ideal Body Weight, Female (lb): 91.97 lb  BMI (Calculated): 19.1  BMI Grade: 18.5-24.9 - normal  Usual Body Weight (UBW), kg:  (ashley)        Lab/Procedures/Meds  Pertinent Labs Reviewed: reviewed  BMP  Lab Results   Component Value Date     03/25/2018    K 3.2 (L) 03/25/2018     03/25/2018    CO2 29 03/25/2018    BUN 13 03/25/2018    CREATININE 0.7 03/25/2018    CALCIUM 9.7 03/25/2018    ANIONGAP 10 03/25/2018    ESTGFRAFRICA >60 03/25/2018    EGFRNONAA >60 03/25/2018     Lab Results   Component Value Date    ALBUMIN 3.4 (L) 03/25/2018     Lab Results   Component Value Date    CALCIUM 9.7 03/25/2018    PHOS 3.5 03/06/2018       Recent Labs  Lab 03/25/18  0529   POCTGLUCOSE 163*       Pertinent Medications Reviewed: reviewed  Scheduled Meds:   amitriptyline  50 mg Oral QHS    docusate sodium  100 mg Oral BID    DULoxetine  60 mg Oral Daily    hydrOXYzine pamoate  25 mg Oral TID    losartan  50 mg Oral Daily    metoclopramide HCl  10 mg Oral TID    metoprolol succinate  100 mg Oral BID    ondansetron  4 mg Intravenous Q6H    polyethylene glycol  17 g Oral Daily    scopolamine  1 patch Transdermal Q3 Days         Physical Findings/Assessment  Overall Physical Appearance:  (ashley)  Tubes: gastrostomy tube  Skin: intact (zandra score 20)    Estimated/Assessed Needs  Weight Used For Calorie Calculations: 51.1 kg (112 lb 10.5 oz)  Height: 5' 4.5"  Energy Calorie Requirements (kcal): 5168-3437  Energy Need Method: Kcal/kg  RMR ([place]-St. Jeor Equation): 1068.94  Weight Used For Protein Calculations: 51.1 kg (112 lb 10.5 oz)  1.2 gm Protein (gm): 61.45 - 2.0 gm Protein (gm): 102.41  Fluid Need Method: RDA Method (or per MD)  RDA Method (mL): 1800   Grams of CHO per day: 250 g      Nutrition Prescription Ordered  Current Diet Order: Mechanical soft diet  Current Nutrition Support Formula Ordered: Glucerna 1.5  Current Nutrition Support Rate Ordered: 60 (ml)  Current Nutrition Support " Frequency Ordered: continuous  Oral Nutrition Supplement: Optisource TID     Evaluation of Received Nutrient/Fluid Intake  Enteral Calories (kcal): 2160  Enteral Protein (gm): 118  Enteral (Free Water) Fluid (mL): 1092  Energy Calories Required: meeting needs  Protein Required: meeting needs  % Intake of Estimated Energy Needs: 75 - 100 %  % Meal Intake: 0 - 25 %    Nutrition Risk  Level of Risk/Frequency of Follow-up:  (x2 weekly)     Assessment and Plan  Nutrition Problem  Inadequate energy intake    Related to (etiology):   Difficulty swallowing    Signs and Symptoms (as evidenced by):   Mechanical soft diet, requiring peg    Interventions/Recommendations (treatment strategy):  Continue with TF    Nutrition Diagnosis Status:   New         Monitor and Evaluation  Food and Nutrient Intake: energy intake, enteral nutrition intake  Food and Nutrient Adminstration: diet order, enteral and parenteral nutrition administration  Anthropometric Measurements: weight, weight change, body mass index  Biochemical Data, Medical Tests and Procedures: electrolyte and renal panel, glucose/endocrine profile, lipid profile  Nutrition-Focused Physical Findings: overall appearance     Nutrition Follow-Up  RD Follow-up?: Yes    Nutrition discharge planning: unable to determine at this time

## 2018-03-25 NOTE — PLAN OF CARE
Problem: Nutrition, Enteral (Adult)  Goal: Signs and Symptoms of Listed Potential Problems Will be Absent, Minimized or Managed (Nutrition, Enteral)  Signs and symptoms of listed potential problems will be absent, minimized or managed by discharge/transition of care (reference Nutrition, Enteral (Adult) CPG).  Outcome: Ongoing (interventions implemented as appropriate)  Recommendations  Recommendation/Intervention: 1.) Continue with Glucerna 1.5 @ goal rate 60 mls/hr continuous providing 2160 kcals/day, 118 g protein/day, 191 g CHO/day, and 1092 mls water/day. Hold TF x4 hrs for residuals >250 mls. Flush 180 mls free water Q4 hrs or per MD  2.) Continue with Mechanical soft diet per SLP.   Goals: 1.) patient will meet >80% of EEN while admitted 2.) patient will tolerate TF at goal rate  Nutrition Goal Status: new  Communication of JENA Recs: other (comment) (care plan)

## 2018-03-25 NOTE — PROGRESS NOTES
Surgery Progress Note    Overnight Events:  NAEON.  Tolerating TF at 40 cc/hr  +severe headache overnight with nausa  Afebrile.  +OOB    Physical Exam:  Temp:  [96.1 °F (35.6 °C)-97.9 °F (36.6 °C)] 97.6 °F (36.4 °C)  Pulse:  [60-80] 69  Resp:  [11-20] 18  SpO2:  [96 %-98 %] 96 %  BP: (129-197)/() 166/92    Gen: NAD, AAOx3  CV: RRR  Pulm: unlabored breathing  Abd: soft, ND, NTTP  2+DP  Skin: dry/intact  Mood: appropriate    I/O last 3 completed shifts:  In: 4672.5 [P.O.:200; I.V.:4122.5; NG/GT:300; IV Piggyback:50]  Out: 2300 [Urine:2300]      Labs:  Recent Labs      03/24/18   0443  03/25/18   0530   WBC  6.85  7.46   HGB  11.4*  11.7*   HCT  37.3  38.4   PLT  435*  444*   NA  140  139   K  3.2*  3.2*   CALCIUM  9.2  9.7   BUN  6*  13   CREATININE  0.6  0.7       Radiology:  Lumbosacral XR:   Lumbar spine alignment appears within normal limits.  Lumbar vertebral heights appear relatively well maintained without definite acute fracture.  There is multilevel degenerative discogenic change with prominent lower lumbar facet arthropathy, most pronounced at L4-L5 and L5-S1.  There is diffuse gaseous bowel distention.  Cholecystectomy clips project over the upper abdomen.  There is a gastrostomy tube present.    A/P: 62yoF well-known to surgical service, with metastatic carcinoid on everolimus s/p recent admit for g-tube dislodgement, which was replaced by IR, now with po intolerance and weight loss    - increase TF  - follow up Nutrition for recs  - replace K  - tylenol for headache, if does not resolve then sumatriptan  - home pending goal TFs and home TFs    Lisa Scott MD  HO-IV

## 2018-03-26 PROBLEM — G43.709 CHRONIC MIGRAINE WITHOUT AURA WITHOUT STATUS MIGRAINOSUS, NOT INTRACTABLE: Status: ACTIVE | Noted: 2018-03-26

## 2018-03-26 PROBLEM — I10 ESSENTIAL HYPERTENSION: Status: ACTIVE | Noted: 2018-03-26

## 2018-03-26 LAB
ALBUMIN SERPL BCP-MCNC: 3.1 G/DL
ALP SERPL-CCNC: 129 U/L
ALT SERPL W/O P-5'-P-CCNC: 19 U/L
ANION GAP SERPL CALC-SCNC: 8 MMOL/L
AST SERPL-CCNC: 18 U/L
BASOPHILS # BLD AUTO: 0.03 K/UL
BASOPHILS NFR BLD: 0.5 %
BILIRUB SERPL-MCNC: 0.3 MG/DL
BUN SERPL-MCNC: 11 MG/DL
CALCIUM SERPL-MCNC: 9 MG/DL
CHLORIDE SERPL-SCNC: 102 MMOL/L
CO2 SERPL-SCNC: 29 MMOL/L
CREAT SERPL-MCNC: 0.7 MG/DL
DIFFERENTIAL METHOD: ABNORMAL
EOSINOPHIL # BLD AUTO: 0.2 K/UL
EOSINOPHIL NFR BLD: 3.8 %
ERYTHROCYTE [DISTWIDTH] IN BLOOD BY AUTOMATED COUNT: 20.9 %
EST. GFR  (AFRICAN AMERICAN): >60 ML/MIN/1.73 M^2
EST. GFR  (NON AFRICAN AMERICAN): >60 ML/MIN/1.73 M^2
GLUCOSE SERPL-MCNC: 274 MG/DL
HCT VFR BLD AUTO: 37.8 %
HGB BLD-MCNC: 11.4 G/DL
LYMPHOCYTES # BLD AUTO: 2.1 K/UL
LYMPHOCYTES NFR BLD: 33.4 %
MCH RBC QN AUTO: 26.1 PG
MCHC RBC AUTO-ENTMCNC: 30.2 G/DL
MCV RBC AUTO: 87 FL
MONOCYTES # BLD AUTO: 0.5 K/UL
MONOCYTES NFR BLD: 7.7 %
NEUTROPHILS # BLD AUTO: 3.4 K/UL
NEUTROPHILS NFR BLD: 54.4 %
PLATELET # BLD AUTO: 393 K/UL
PMV BLD AUTO: 9.5 FL
POCT GLUCOSE: 219 MG/DL (ref 70–110)
POCT GLUCOSE: 230 MG/DL (ref 70–110)
POCT GLUCOSE: 244 MG/DL (ref 70–110)
POCT GLUCOSE: 251 MG/DL (ref 70–110)
POCT GLUCOSE: 261 MG/DL (ref 70–110)
POTASSIUM SERPL-SCNC: 3.9 MMOL/L
PROT SERPL-MCNC: 6.1 G/DL
RBC # BLD AUTO: 4.36 M/UL
SODIUM SERPL-SCNC: 139 MMOL/L
WBC # BLD AUTO: 6.26 K/UL

## 2018-03-26 PROCEDURE — 11000001 HC ACUTE MED/SURG PRIVATE ROOM

## 2018-03-26 PROCEDURE — 36415 COLL VENOUS BLD VENIPUNCTURE: CPT

## 2018-03-26 PROCEDURE — 25000003 PHARM REV CODE 250: Performed by: SURGERY

## 2018-03-26 PROCEDURE — 63600175 PHARM REV CODE 636 W HCPCS: Performed by: SURGERY

## 2018-03-26 PROCEDURE — 80053 COMPREHEN METABOLIC PANEL: CPT

## 2018-03-26 PROCEDURE — 85025 COMPLETE CBC W/AUTO DIFF WBC: CPT

## 2018-03-26 PROCEDURE — 63600175 PHARM REV CODE 636 W HCPCS: Performed by: EMERGENCY MEDICINE

## 2018-03-26 PROCEDURE — 94761 N-INVAS EAR/PLS OXIMETRY MLT: CPT

## 2018-03-26 RX ORDER — LOSARTAN POTASSIUM 50 MG/1
100 TABLET ORAL DAILY
Status: DISCONTINUED | OUTPATIENT
Start: 2018-03-27 | End: 2018-03-27 | Stop reason: HOSPADM

## 2018-03-26 RX ADMIN — AMITRIPTYLINE HYDROCHLORIDE 50 MG: 25 TABLET, FILM COATED ORAL at 09:03

## 2018-03-26 RX ADMIN — METOCLOPRAMIDE HYDROCHLORIDE 10 MG: 5 SOLUTION ORAL at 09:03

## 2018-03-26 RX ADMIN — TIZANIDINE 4 MG: 4 TABLET ORAL at 11:03

## 2018-03-26 RX ADMIN — CLONAZEPAM 0.5 MG: 0.5 TABLET ORAL at 11:03

## 2018-03-26 RX ADMIN — INSULIN ASPART 6 UNITS: 100 INJECTION, SOLUTION INTRAVENOUS; SUBCUTANEOUS at 01:03

## 2018-03-26 RX ADMIN — HYDRALAZINE HYDROCHLORIDE 10 MG: 20 INJECTION INTRAMUSCULAR; INTRAVENOUS at 01:03

## 2018-03-26 RX ADMIN — ONDANSETRON 4 MG: 2 INJECTION INTRAMUSCULAR; INTRAVENOUS at 05:03

## 2018-03-26 RX ADMIN — HYDROXYZINE PAMOATE 25 MG: 25 CAPSULE ORAL at 04:03

## 2018-03-26 RX ADMIN — CLONAZEPAM 0.5 MG: 0.5 TABLET ORAL at 09:03

## 2018-03-26 RX ADMIN — INSULIN ASPART 2 UNITS: 100 INJECTION, SOLUTION INTRAVENOUS; SUBCUTANEOUS at 12:03

## 2018-03-26 RX ADMIN — HYDROXYZINE PAMOATE 25 MG: 25 CAPSULE ORAL at 09:03

## 2018-03-26 RX ADMIN — METOCLOPRAMIDE HYDROCHLORIDE 10 MG: 5 SOLUTION ORAL at 04:03

## 2018-03-26 RX ADMIN — METOPROLOL SUCCINATE 100 MG: 25 TABLET, EXTENDED RELEASE ORAL at 09:03

## 2018-03-26 RX ADMIN — ONDANSETRON 4 MG: 2 INJECTION INTRAMUSCULAR; INTRAVENOUS at 01:03

## 2018-03-26 RX ADMIN — POLYETHYLENE GLYCOL 3350 17 G: 17 POWDER, FOR SOLUTION ORAL at 11:03

## 2018-03-26 RX ADMIN — SCOPALAMINE 1 PATCH: 1 PATCH, EXTENDED RELEASE TRANSDERMAL at 01:03

## 2018-03-26 RX ADMIN — TIZANIDINE 4 MG: 4 TABLET ORAL at 09:03

## 2018-03-26 RX ADMIN — LOSARTAN POTASSIUM 50 MG: 50 TABLET, FILM COATED ORAL at 11:03

## 2018-03-26 RX ADMIN — DOCUSATE SODIUM 100 MG: 100 CAPSULE, LIQUID FILLED ORAL at 09:03

## 2018-03-26 RX ADMIN — METOPROLOL SUCCINATE 100 MG: 25 TABLET, EXTENDED RELEASE ORAL at 11:03

## 2018-03-26 RX ADMIN — HYDROXYZINE PAMOATE 25 MG: 25 CAPSULE ORAL at 11:03

## 2018-03-26 RX ADMIN — INSULIN ASPART 6 UNITS: 100 INJECTION, SOLUTION INTRAVENOUS; SUBCUTANEOUS at 05:03

## 2018-03-26 RX ADMIN — ONDANSETRON 4 MG: 2 INJECTION INTRAMUSCULAR; INTRAVENOUS at 06:03

## 2018-03-26 RX ADMIN — KETOROLAC TROMETHAMINE 10 MG: 10 TABLET, FILM COATED ORAL at 09:03

## 2018-03-26 RX ADMIN — DULOXETINE 60 MG: 30 CAPSULE, DELAYED RELEASE ORAL at 11:03

## 2018-03-26 RX ADMIN — METOCLOPRAMIDE HYDROCHLORIDE 10 MG: 5 SOLUTION ORAL at 11:03

## 2018-03-26 RX ADMIN — KETOROLAC TROMETHAMINE 10 MG: 10 TABLET, FILM COATED ORAL at 11:03

## 2018-03-26 RX ADMIN — INSULIN ASPART 4 UNITS: 100 INJECTION, SOLUTION INTRAVENOUS; SUBCUTANEOUS at 06:03

## 2018-03-26 NOTE — PROGRESS NOTES
Surgery Progress Note    Overnight Events:  NAEON.  Tolerating TF at 40 cc/hr.  Some abd discomfort yesterday- XR shows constipation  Patient has been refusing colace due to fear of diarrhea  +headache improved, BP mildly improved  Afebrile.  Not OOB yesterday    Physical Exam:  Temp:  [96.4 °F (35.8 °C)-97.7 °F (36.5 °C)] 97.3 °F (36.3 °C)  Pulse:  [61-69] 62  Resp:  [13-20] 20  SpO2:  [96 %-99 %] 96 %  BP: (141-198)/(73-90) 170/81    Gen: NAD, AAOx3  CV: RRR  Pulm: unlabored breathing  Abd: soft, ND, NTTP  2+DP  Skin: dry/intact  Mood: appropriate    I/O last 3 completed shifts:  In: 1070 [P.O.:100; NG/GT:570; IV Piggyback:400]  Out: 1400 [Urine:1400]      Labs:  Recent Labs      03/25/18   0530  03/26/18   0505   WBC  7.46  6.26   HGB  11.7*  11.4*   HCT  38.4  37.8   PLT  444*  393*   NA  139  139   K  3.2*  3.9   CALCIUM  9.7  9.0   BUN  13  11   CREATININE  0.7  0.7       Imaging:   XR: 1. Large amount of stool throughout the colon, suggesting constipation.    A/P: 62yoF well-known to surgical service, with metastatic carcinoid on everolimus s/p recent admit for g-tube dislodgement, which was replaced by IR, now with po intolerance and weight loss     - increase TF to goal  - follow up Nutrition for recs  - f/u internal medicine consult for BP and migraine recommendations  - home pending goal TFs and home TFs      Lisa Scott MD  HO-IV

## 2018-03-26 NOTE — PROGRESS NOTES
TN spoke with MD -   pt will need home tube feeds at d/c   TN left VM message with Cuauhtemoc, opened chart for [facility] and sent referral per [facility] to [facility].

## 2018-03-26 NOTE — NURSING
Dr. Scott notified of x-ray results.  Tube feeds can be restarted if pt tolerated. Hold tube feed if pt complaint of gi upset. Notifiy MD of tube feed progress in the am.

## 2018-03-26 NOTE — CONSULTS
U Internal Medicine Consult - Resident Note    Primary Team: [facility]  Attending Physician: GAUDENCIO Prieto  Consultant Attending:  DANNIELLE Prieto  Consultant Resident: Jessa    Date of Admit: 3/22/2018    Reason for Consult     Blood Pressure and Migraine Mgmt    Subjective:      History of Present Illness:  Kaylee Ngo is a 62 y.o. 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 The patient presented to [facility] on 3/22/2018 with a primary complaint of Nausea (patient advised to come to ED by home health nurse who spoke to Dr. Levy. reports n.v. today and significant weight loss in the last week. denies fever, chills. pt. appears chronically ill. )    Pt with hx metastatic carcinoid, HTN, chronic migraines presents to ED c/o intractable nausea and emesis, recently admitted for GT dislogement.  During hospitalization, pt remains with elevated BP and chronic HA.  Pt is currently on home regimen with little response.  [facility] medicine has been consulted for BP mgmt and migraine evaluation. Denies chest pain, palpitations, dizziness, fever, chills.    Past Medical History:  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    Obsessive compulsive disorder     Sleep apnea        Past Surgical History:  Past Surgical History:  "  Procedure Laterality Date    APPENDECTOMY      CHOLECYSTECTOMY      GASTRIC BYPASS      TOTAL KNEE ARTHROPLASTY Left        Allergies:  Review of patient's allergies indicates:   Allergen Reactions    Codeine     Contrast media      Oral and IV    Darvocet a500 [propoxyphene n-acetaminophen]     Epinephrine      Neuroendocrine Tumor patient      Erythromycin     Iodinated contrast- oral and iv dye     Iodine and iodide containing products     Morphine     Sulfa (sulfonamide antibiotics)     Erythromycin base Rash    Pcn [penicillins] Rash     "It looks like measles."       Home Medications:  Prior to Admission medications    Medication Sig Start Date End Date Taking? Authorizing Provider   ACCU-CHEK SHU CONTROL SOLN Soln  10/17/17   Historical Provider, MD   ACCU-CHEK SHU PLUS METER Misc  10/17/17   Historical Provider, MD   ACCU-CHEK SHU PLUS TEST STRP Strp  10/17/17   Historical Provider, MD   amitriptyline (ELAVIL) 50 MG tablet Take 50 mg by mouth every evening.    Historical Provider, MD   amoxicillin-clavulanate 875-125mg (AUGMENTIN) 875-125 mg per tablet Take 1 tablet by mouth every 12 (twelve) hours. 3/13/18 3/27/18  Lisa Scott MD   calcium carbonate (OS-BEST) 600 mg calcium (1,500 mg) Tab Take 600 mg by mouth once.    Historical Provider, MD   clonazePAM (KLONOPIN) 0.5 MG tablet Take 0.5 mg by mouth 2 (two) times daily as needed for Anxiety.    Historical Provider, MD   colesevelam (WELCHOL) 625 mg tablet Take 1,875 mg by mouth 2 (two) times daily with meals.    Historical Provider, MD   cyanocobalamin 1,000 mcg/mL injection 1,000 mcg every 28 days.    Historical Provider, MD   diclofenac (CATAFLAM) 50 MG tablet as needed.  9/11/17   Historical Provider, MD   diphenoxylate-atropine 2.5-0.025 mg (LOMOTIL) 2.5-0.025 mg per tablet Take 1 tablet by mouth 4 (four) times daily as needed for Diarrhea.    Historical Provider, MD   docusate sodium (COLACE) 100 MG capsule Take 100 mg by " mouth as needed for Constipation.    Historical Provider, MD   DULoxetine (CYMBALTA) 60 MG capsule Take 60 mg by mouth once daily.    Historical Provider, MD   everolimus (AFINITOR) 10 mg Tab Take 10 mg by mouth once daily.    Historical Provider, MD   fluconazole 40 mg/ml (DIFLUCAN) 40 mg/mL suspension Take 5 mLs (200 mg total) by mouth once daily. 3/14/18 3/28/18  Lisa Scott MD   GLUCAGON EMERGENCY KIT, HUMAN, 1 mg injection  11/14/17   Historical Provider, MD   hydrOXYzine pamoate (VISTARIL) 25 MG Cap Take 25 mg by mouth 3 (three) times daily.     Historical Provider, MD   insulin degludec (TRESIBA FLEXTOUCH U-100) 100 unit/mL (3 mL) InPn Inject 10 Units into the skin once daily.     Historical Provider, MD   lamoTRIgine (LAMICTAL) 150 MG Tab  11/14/17   Historical Provider, MD   lancets 30 gauge Misc  10/17/17   Historical Provider, MD   lancing device Misc  10/17/17   Historical Provider, MD   losartan (COZAAR) 50 MG tablet Take 50 mg by mouth once daily.    Historical Provider, MD   metoclopramide HCl (REGLAN) 5 mg/5 mL Soln TAKE 10 ML BY MOUTH THREE TIMES A DAY 2/4/18   Kermit Reddy MD   metoclopramide HCl (REGLAN) 5 mg/5 mL Soln Take 10 mLs (10 mg total) by mouth 3 (three) times daily. 3/2/18   Natali Dodson MD   metoprolol succinate (TOPROL-XL) 100 MG 24 hr tablet Take 100 mg by mouth 2 (two) times daily.    Historical Provider, MD   multivitamin capsule Take 1 capsule by mouth once daily.    Historical Provider, MD   nystatin-triamcinolone (MYCOLOG) ointment Apply topically 2 (two) times daily. Apply to wound BID 3/13/18   Lisa Scott MD   omeprazole (PRILOSEC) 40 MG capsule Take 40 mg by mouth 2 (two) times daily before meals.     Historical Provider, MD   ondansetron (ZOFRAN) 8 MG tablet Take 8 mg by mouth every 8 (eight) hours.    Historical Provider, MD   potassium chloride (KLOR-CON) 20 mEq Pack Take 20 mEq by mouth once.     Historical Provider, MD   scopolamine (TRANSDERM-SCOP)  "1.3-1.5 mg (1 mg over 3 days)  17   Historical Provider, MD   SURE COMFORT PEN NEEDLE 32 gauge x " Ndle  10/17/17   Historical Provider, MD   tiZANidine (ZANAFLEX) 4 MG tablet as needed.  17   Historical Provider, MD   vitamin D 1000 units Tab Take 1,000 Units by mouth once daily.    Historical Provider, MD       Family History:  History reviewed. No pertinent family history.    Social History:  Social History   Substance Use Topics    Smoking status: Never Smoker    Smokeless tobacco: Never Used    Alcohol use No       Review of Systems:  No numbness, vision changes, focal weakness, fevers, chills. Pertinent positives and negatives are listed in HPI.  All other systems are reviewed and are negative.     Objective:   Last 24 Hour Vital Signs:  BP  Min: 141/79  Max: 192/88  Temp  Av.9 °F (36.1 °C)  Min: 96.4 °F (35.8 °C)  Max: 97.3 °F (36.3 °C)  Pulse  Av.2  Min: 61  Max: 69  Resp  Av.4  Min: 18  Max: 20  SpO2  Av.8 %  Min: 96 %  Max: 99 %  Body mass index is 19.04 kg/m².  I/O last 3 completed shifts:  In: 1070 [P.O.:100; NG/GT:570; IV Piggyback:400]  Out: 1400 [Urine:1400]    Physical Examination:  General: Asleep in bed but easily arouseable  HENT:  NCAT; anicteric sclera; OP clear with MMM  Cardio:  Regular rate and rhythm with normal S1 and S2; no murmurs  Resp:  CTAB; respirations unlabored; no wheezes, crackles or rhonchi  Abdom: Soft, NTND with normoactive bowel sounds.  GT in place  Extrem: Warm and dry with no edema  Pulses: 2+ and symmetric distally  Skin:  No rashes or lesions  Neuro:  AAOx3; cooperative and pleasant with no focal deficits      Laboratory:  Most Recent Data:  CBC: Lab Results   Component Value Date    WBC 6.26 2018    HGB 11.4 (L) 2018    HCT 37.8 2018     (H) 2018    MCV 87 2018    RDW 20.9 (H) 2018     BMP: Lab Results   Component Value Date     2018    K 3.9 2018     2018    CO2 " 29 03/26/2018    BUN 11 03/26/2018    CREATININE 0.7 03/26/2018     (H) 03/26/2018    CALCIUM 9.0 03/26/2018    MG 1.6 03/06/2018    PHOS 3.5 03/06/2018     LFTs: Lab Results   Component Value Date    PROT 6.1 03/26/2018    ALBUMIN 3.1 (L) 03/26/2018    BILITOT 0.3 03/26/2018    AST 18 03/26/2018    ALKPHOS 129 03/26/2018    ALT 19 03/26/2018       Microbiology Data:  - NONE    Other Results:  EKG (my interpretation):   - NO NEW ECG    Radiology:  - NO NEW IMAGING       Assessment:     Kaylee Ngo is a 62 y.o. female with:     Plan:     Metastatic Carcinoid with recent GT Dislodgement:  Essential HTN:  - mgmt and pain control per primary    Essential HTN  - reports hx uncontrolled HTN, previously on multi-med regimen  - now on Toprol XL 100mg and Losartan 50mg after BP under control  - takes BP at home ~4x/d.  -130 at home on current regimen  - -170 inpatient on above regimen  - Recommend increasing home dose ARB to 100 daily  - Can consider changing Toprol XL to Coreg, as this has more antihypertensive effects    Hx Chronic Migraines:  - follows with neurologist in [place] regularly  - reports being on Duloxetine at home with relatively good control  - Lamictal also listed as home med which is sometimes used off-label for PPx but pt unsure of exact purpose  - HTN may be contributing to HA as well.  Recommendations as above  - Agree with continuation of home Cymbalta.  Recommend Fiorecet PRN for breakthrough pending BP control      Code Status:     FULL    Khanh Germain  [facility] Internal Medicine HO-III  [facility] Medicine Service    [facility] Medicine Hospitalist Pager numbers:   [facility] Hospitalist Medicine Team A (Nilam/Ida): [phone]  [facility] Hospitalist Medicine Team B (Radha/Connie):  [phone]

## 2018-03-26 NOTE — PLAN OF CARE
Problem: Patient Care Overview  Goal: Plan of Care Review  Outcome: Ongoing (interventions implemented as appropriate)  AAOX3.  Respirations even and unlabored; breath sounds clear.  RA with saturation 98%.  Denies n/v and sob.  C/o headache; given imitrex with pain decreasing from 10 to 7/10.  B/p 145-192/77-90; received hydralazine 10mg once.  Peg tube feeds held overnight d/t patient not feeling well; restarted diabetasource infusing at 40ml/hr this morning and tolerating well.  Fall precaution sign on door; fall precaution and allergy armband on.  TEDS on with fall precaution socks.  Bed alarm on.  Instructed to call for assistance.  Will cont to monitor.

## 2018-03-26 NOTE — PROGRESS NOTES
TN met with pt - updated on d/c plans:    to d/c to home with  - [facility] and TF per HAN Posadas is working on this case  -- resumption of care.

## 2018-03-27 VITALS
DIASTOLIC BLOOD PRESSURE: 83 MMHG | HEIGHT: 65 IN | BODY MASS INDEX: 18.77 KG/M2 | OXYGEN SATURATION: 97 % | RESPIRATION RATE: 16 BRPM | WEIGHT: 112.63 LBS | TEMPERATURE: 97 F | HEART RATE: 68 BPM | SYSTOLIC BLOOD PRESSURE: 174 MMHG

## 2018-03-27 PROBLEM — R11.2 NAUSEA VOMITING AND DIARRHEA: Status: RESOLVED | Noted: 2018-03-23 | Resolved: 2018-03-27

## 2018-03-27 PROBLEM — R19.7 NAUSEA VOMITING AND DIARRHEA: Status: RESOLVED | Noted: 2018-03-23 | Resolved: 2018-03-27

## 2018-03-27 LAB
ALBUMIN SERPL BCP-MCNC: 2.9 G/DL
ALP SERPL-CCNC: 131 U/L
ALT SERPL W/O P-5'-P-CCNC: 19 U/L
ANION GAP SERPL CALC-SCNC: 6 MMOL/L
AST SERPL-CCNC: 21 U/L
BASOPHILS # BLD AUTO: 0.04 K/UL
BASOPHILS NFR BLD: 0.7 %
BILIRUB SERPL-MCNC: 0.2 MG/DL
BUN SERPL-MCNC: 23 MG/DL
CALCIUM SERPL-MCNC: 9 MG/DL
CHLORIDE SERPL-SCNC: 101 MMOL/L
CO2 SERPL-SCNC: 31 MMOL/L
CREAT SERPL-MCNC: 0.8 MG/DL
DIFFERENTIAL METHOD: ABNORMAL
EOSINOPHIL # BLD AUTO: 0.2 K/UL
EOSINOPHIL NFR BLD: 2.8 %
ERYTHROCYTE [DISTWIDTH] IN BLOOD BY AUTOMATED COUNT: 20.2 %
EST. GFR  (AFRICAN AMERICAN): >60 ML/MIN/1.73 M^2
EST. GFR  (NON AFRICAN AMERICAN): >60 ML/MIN/1.73 M^2
GLUCOSE SERPL-MCNC: 216 MG/DL
HCT VFR BLD AUTO: 34.6 %
HGB BLD-MCNC: 10.4 G/DL
LYMPHOCYTES # BLD AUTO: 2.1 K/UL
LYMPHOCYTES NFR BLD: 34.4 %
MCH RBC QN AUTO: 25.9 PG
MCHC RBC AUTO-ENTMCNC: 30.1 G/DL
MCV RBC AUTO: 86 FL
MONOCYTES # BLD AUTO: 0.4 K/UL
MONOCYTES NFR BLD: 6.4 %
NEUTROPHILS # BLD AUTO: 3.4 K/UL
NEUTROPHILS NFR BLD: 55.5 %
PLATELET # BLD AUTO: 325 K/UL
PMV BLD AUTO: 9.5 FL
POCT GLUCOSE: 153 MG/DL (ref 70–110)
POCT GLUCOSE: 186 MG/DL (ref 70–110)
POCT GLUCOSE: 390 MG/DL (ref 70–110)
POTASSIUM SERPL-SCNC: 4 MMOL/L
PROT SERPL-MCNC: 5.7 G/DL
RBC # BLD AUTO: 4.02 M/UL
SODIUM SERPL-SCNC: 138 MMOL/L
WBC # BLD AUTO: 6.05 K/UL

## 2018-03-27 PROCEDURE — 94761 N-INVAS EAR/PLS OXIMETRY MLT: CPT

## 2018-03-27 PROCEDURE — 63600175 PHARM REV CODE 636 W HCPCS: Performed by: SURGERY

## 2018-03-27 PROCEDURE — 25000003 PHARM REV CODE 250: Performed by: STUDENT IN AN ORGANIZED HEALTH CARE EDUCATION/TRAINING PROGRAM

## 2018-03-27 PROCEDURE — 80053 COMPREHEN METABOLIC PANEL: CPT

## 2018-03-27 PROCEDURE — 85025 COMPLETE CBC W/AUTO DIFF WBC: CPT

## 2018-03-27 PROCEDURE — 36415 COLL VENOUS BLD VENIPUNCTURE: CPT

## 2018-03-27 PROCEDURE — 25000003 PHARM REV CODE 250: Performed by: SURGERY

## 2018-03-27 PROCEDURE — 63600175 PHARM REV CODE 636 W HCPCS: Performed by: EMERGENCY MEDICINE

## 2018-03-27 RX ORDER — LOSARTAN POTASSIUM 100 MG/1
100 TABLET ORAL DAILY
Qty: 90 TABLET | Refills: 3 | Status: ON HOLD | OUTPATIENT
Start: 2018-03-28 | End: 2019-03-28

## 2018-03-27 RX ADMIN — METOCLOPRAMIDE HYDROCHLORIDE 10 MG: 5 SOLUTION ORAL at 09:03

## 2018-03-27 RX ADMIN — DULOXETINE 60 MG: 30 CAPSULE, DELAYED RELEASE ORAL at 09:03

## 2018-03-27 RX ADMIN — LOSARTAN POTASSIUM 100 MG: 50 TABLET, FILM COATED ORAL at 09:03

## 2018-03-27 RX ADMIN — INSULIN ASPART 10 UNITS: 100 INJECTION, SOLUTION INTRAVENOUS; SUBCUTANEOUS at 12:03

## 2018-03-27 RX ADMIN — ONDANSETRON 4 MG: 2 INJECTION INTRAMUSCULAR; INTRAVENOUS at 05:03

## 2018-03-27 RX ADMIN — KETOROLAC TROMETHAMINE 10 MG: 10 TABLET, FILM COATED ORAL at 05:03

## 2018-03-27 RX ADMIN — INSULIN ASPART 2 UNITS: 100 INJECTION, SOLUTION INTRAVENOUS; SUBCUTANEOUS at 05:03

## 2018-03-27 RX ADMIN — METOPROLOL SUCCINATE 100 MG: 25 TABLET, EXTENDED RELEASE ORAL at 09:03

## 2018-03-27 RX ADMIN — BUTALBITAL, ACETAMINOPHEN, AND CAFFEINE 1 TABLET: 50; 325; 40 TABLET ORAL at 01:03

## 2018-03-27 RX ADMIN — CLONAZEPAM 0.5 MG: 0.5 TABLET ORAL at 09:03

## 2018-03-27 RX ADMIN — ONDANSETRON 4 MG: 2 INJECTION INTRAMUSCULAR; INTRAVENOUS at 12:03

## 2018-03-27 RX ADMIN — METOCLOPRAMIDE HYDROCHLORIDE 10 MG: 5 SOLUTION ORAL at 03:03

## 2018-03-27 RX ADMIN — HYDROXYZINE PAMOATE 25 MG: 25 CAPSULE ORAL at 09:03

## 2018-03-27 RX ADMIN — HYDROXYZINE PAMOATE 25 MG: 25 CAPSULE ORAL at 03:03

## 2018-03-27 RX ADMIN — DOCUSATE SODIUM 100 MG: 100 CAPSULE, LIQUID FILLED ORAL at 09:03

## 2018-03-27 RX ADMIN — HYDRALAZINE HYDROCHLORIDE 10 MG: 20 INJECTION INTRAMUSCULAR; INTRAVENOUS at 05:03

## 2018-03-27 NOTE — PLAN OF CARE
Problem: Patient Care Overview  Goal: Plan of Care Review  Outcome: Ongoing (interventions implemented as appropriate)  Safety maintained this shift  Blood pressure improved by 4 pm following oral morning medication intake   Headache improved after taking ketorolac and tizanidine    Tolerating tube feeding without any complaint of nausea    Current feeding at 50 per hour    Reported two episode of diarrhea  Did take miralax but refused colace

## 2018-03-27 NOTE — PLAN OF CARE
TN met with pt prior to d/c   sister en route to  pt     pt set up with JAISON - kye Hutchins with [facility] in [place]  - 1st visit 3/28/ Wed;       Cuau[facility]emoc with [facility] to speak with pt - tube feedings / delivery will resume.       Future Appointments  Date Time Provider Department Center   4/9/2018 8:00 AM Jeff Nicole DO, FACP [facility]        03/27/18 5068   Final Note   Assessment Type Final Discharge Note   Discharge Disposition Home-Health  ([facility]; [facility] )   What phone number can be called within the next 1-3 days to see how you are doing after discharge? [phone]   Hospital Follow Up  Appt(s) scheduled? Yes   Discharge plans and expectations educations in teach back method with documentation complete? Yes   [facility] Referral Info   Post Acute Recommendation Home-care   Referral Type (home care )   Facility Name ([facility]; [facility] )

## 2018-03-27 NOTE — PROGRESS NOTES
pt set up with JAISON fishman per Cuong with [facility] in [place]  - 1st visit 3/28/ Wed;      Cuauhtemoc with [facility] to speak with pt - tube feedings / delivery will resume.      Future Appointments  Date Time Provider Department Center   4/9/2018 8:00 AM Jeff Nicole DO, FACP [facility]

## 2018-03-27 NOTE — DISCHARGE SUMMARY
[facility]  General Surgery  Discharge Summary      Patient Name: Kaylee Ngo  MRN: [id number]  Admission Date: 3/22/2018  Hospital Length of Stay: 1 days  Discharge Date and Time:  03/27/2018 6:10 PM  Attending Physician: Cam Ashton MD   Discharging Provider: Natali Ayoub MD  Primary Care Provider: Robert Garcia MD     HPI: 62yoF well-known to surgical service, with metastatic carcinoid on everolimus s/p recent admit for g-tube dislodgement, which was replaced by IR, presents with couple day history of po intolerance and back pain. Also reports her home health nurse had noted she had lost weight recently. Denies fever/chills. +BMs. Wound from previous admit healing well per patient. Denies other issues.    * No surgery found *     Hospital Course: The patient was admitted to the surgery service, placed NPO. Trickle tube feeds were started. Nutrition was consulted to assist with defining an ideal tube feeding regimen for the patient. The medical doctors were also consulted to assist with management of the patient's persistent HTN. Glucerna Tube feeds were started and gradually increased to the patient's goal rate of 60cc/hr with good tolerance. The medicine service recommended increasing the patient's losartan dose to 100mg daily. Also, they suggested that if this does not result in adequate blood pressure control, it may be advisable to switch the patient's home metoprolol to Coreg. We will defer this decision to the patient's PCP.     Consults:   Consults         Status Ordering Provider     Inpatient consult to Internal Medicine-[facility]  Once     Provider:  Maco Demarco MD    Completed TERESA BEAL     Inpatient consult to Registered Dietitian/Nutritionist  Once     Provider:  (Not yet assigned)    Completed TERESA BEAL     Inpatient consult to Registered Dietitian/Nutritionist  Once     Provider:  (Not yet assigned)    Completed NATALI AYOUB     Inpatient consult  to Social Work  Once     Provider:  (Not yet assigned)    TERESA Kwok          Significant Diagnostic Studies: Radiology: X-Ray: KUB: X-Ray Abdomen AP 1 View (KUB):   Results for orders placed or performed during the hospital encounter of 03/22/18   X-Ray Abdomen AP 1 View    Narrative    EXAMINATION:  XR ABDOMEN AP 1 VIEW    CLINICAL HISTORY:  abdominal distention; Nausea with vomiting, unspecified    TECHNIQUE:  Single AP View of the abdomen was performed.    COMPARISON:  03/06/2018    FINDINGS:  Single-view supine.    Air and stool is seen within the large bowel, and projected over the rectum noting large amount of stool throughout the colon.  No focally dilated small bowel loops.  No definite large volume free air.  No pneumatosis.  Postsurgical changes overlie the left upper quadrant and right upper quadrant.  No definite calcification to suggest nephrolithiasis.  Degenerative changes are noted throughout the osseous structures.  Postsurgical change noted of the left femoral neck.  Gastrostomy tube noted.      Impression    1. Large amount of stool throughout the colon, suggesting constipation.      Electronically signed by: Hubert Sotomayor MD  Date:    03/25/2018  Time:    18:22       Pending Diagnostic Studies:     None        Final Active Diagnoses:    Diagnosis Date Noted POA    PRINCIPAL PROBLEM:  Severe protein-calorie malnutrition [E43] 03/25/2018 Yes    Acute bilateral low back pain [M54.5]  Unknown    Essential hypertension [I10] 03/26/2018 Yes    Chronic migraine without aura without status migrainosus, not intractable [G43.709] 03/26/2018 Yes      Problems Resolved During this Admission:    Diagnosis Date Noted Date Resolved POA    Nausea vomiting and diarrhea [R11.2, R19.7] 03/23/2018 03/27/2018 Yes      Discharged Condition: good    Disposition: Home or Self Care    Follow Up:  Follow-up Information     Please follow up.    Why:  [facility] , Home Health  Ph: [phone]             [facility].    Specialty:  Home Health Services  Why:  Infusion /  tube feeding   Contact information:  [address] [place] [address]  [phone]             Jeff Nicole DO, [facility] On 4/9/2018.    Specialty:  Hematology and Oncology  Why:  8:00 am -- previously booked     Contact information:  [address]  [address]  [place] [address]  [phone]             Cam Ashton MD In 3 weeks.    Specialty:  General Surgery  Contact information:  [address]  [address]  [place] [address]  [phone]                 Patient Instructions:     Ambulatory referral to Home Health   Referral Priority: Routine Referral Type: Home Health   Referral Reason: Specialty Services Required    Requested Specialty: Home Health Services    Number of Visits Requested: 1      Activity as tolerated     Notify your health care provider if you experience any of the following:  temperature >100.4     Notify your health care provider if you experience any of the following:  persistent nausea and vomiting or diarrhea     Notify your health care provider if you experience any of the following:  severe uncontrolled pain     Notify your health care provider if you experience any of the following:  redness, tenderness, or signs of infection (pain, swelling, redness, odor or green/yellow discharge around incision site)     No dressing needed   Order Comments: No need to place gauze around G-tube site.     Tube Feedings/Formulas   Scheduling Instructions: Patient to take tube feeds from 6pm to 8am daily at 60cc/hr. Flush with 30cc of water 3 times daily   Order Specific Question Answer Comments   Select Adult Formula: Other Glucerna 1.5   Route: Gastrostomy    Formula Rate (mL/hr): 60        Medications:  Reconciled Home Medications:      Medication List      CHANGE how you take these medications    losartan 100 MG tablet  Commonly known as:  COZAAR  Take 1 tablet (100 mg total) by mouth  "once daily.  Start taking on:  3/28/2018  What changed:  · medication strength  · how much to take     metoclopramide HCl 5 mg/5 mL Soln  Commonly known as:  REGLAN  TAKE 10 ML BY MOUTH THREE TIMES A DAY  What changed:  Another medication with the same name was removed. Continue taking this medication, and follow the directions you see here.        CONTINUE taking these medications    ACCU-CHEK SHU CONTROL SOLN Soln  Generic drug:  blood glucose control high,low     ACCU-CHEK SHU PLUS METER Misc  Generic drug:  blood-glucose meter     ACCU-CHEK SHU PLUS TEST STRP Strp  Generic drug:  blood sugar diagnostic     AFINITOR 10 mg Tab  Generic drug:  everolimus (antineoplastic)     amitriptyline 50 MG tablet  Commonly known as:  ELAVIL     calcium carbonate 600 mg calcium (1,500 mg) Tab  Commonly known as:  OS-BEST     clonazePAM 0.5 MG tablet  Commonly known as:  KLONOPIN     colesevelam 625 mg tablet  Commonly known as:  WELCHOL     cyanocobalamin 1,000 mcg/mL injection     diclofenac 50 MG tablet  Commonly known as:  CATAFLAM     DULoxetine 60 MG capsule  Commonly known as:  CYMBALTA     fluconazole 40 mg/ml 40 mg/mL suspension  Commonly known as:  DIFLUCAN  Take 5 mLs (200 mg total) by mouth once daily.     GLUCAGON EMERGENCY KIT (HUMAN) 1 mg Kit  Generic drug:  glucagon (human recombinant)     lamoTRIgine 150 MG Tab  Commonly known as:  LAMICTAL     lancets 30 gauge Misc     lancing device Misc     LOMOTIL 2.5-0.025 mg per tablet  Generic drug:  diphenoxylate-atropine 2.5-0.025 mg     metoprolol succinate 100 MG 24 hr tablet  Commonly known as:  TOPROL-XL     multivitamin capsule     omeprazole 40 MG capsule  Commonly known as:  PRILOSEC     ondansetron 8 MG tablet  Commonly known as:  ZOFRAN     potassium chloride 20 mEq Pack  Commonly known as:  KLOR-CON     SURE COMFORT PEN NEEDLE 32 gauge x 5/32" Ndle  Generic drug:  pen needle, diabetic     tiZANidine 4 MG tablet  Commonly known as:  ZANAFLEX     TRESIBA " FLEXTOUCH U-100 100 unit/mL (3 mL) Inpn  Generic drug:  insulin degludec     VISTARIL 25 MG Cap  Generic drug:  hydrOXYzine pamoate     vitamin D 1000 units Tab        STOP taking these medications    amoxicillin-clavulanate 875-125mg 875-125 mg per tablet  Commonly known as:  AUGMENTIN     docusate sodium 100 MG capsule  Commonly known as:  COLACE     nystatin-triamcinolone ointment  Commonly known as:  MYCOLOG     scopolamine 1.3-1.5 mg (1 mg over 3 days)  Commonly known as:  TRANSDERM-SCOP           Where to Get Your Medications      You can get these medications from any pharmacy    Bring a paper prescription for each of these medications  · losartan 100 MG tablet         Natali Dodson MD  General Surgery  [facility]

## 2018-03-27 NOTE — ASSESSMENT & PLAN NOTE
Related to (etiology):   NET Cancer    Signs and Symptoms (as evidenced by):   Moderate to severe loss of Lean body mass (clavicle, upper arm area, patellar)  Moderate loss of fat mass (upper arm area, ribs)    Interventions/Recommendations (treatment strategy):    1. Note: Diabetisource sent for glucerna due to availability (1.2 kcal/ml vs 1.5 kcal/ml formula). Diabetisource more than adequate with current po intake.  -TF providing 100% of baseline weight gain needs; po intake adequate to promote weight gain (75% of meals)  -current regimen = 6 cans/day bolus or current rate as continuous. Fluid flushes: 140 ml q 6 hrs with Diabetisource or 180 ml q 4 hrs with Glucerna.  - If po intake remains at 75% of meals, can decrease TF rate to 30 ml/hr (3 cans/day via bolus). Fluid flushes of 95 ml q 8 hrs for Diabetisource; 140 ml q 6 hrs with Glucerna.   2. Monitor weight weekly and adjust TF accordingly       Nutrition Diagnosis Status:   New

## 2018-03-27 NOTE — PROGRESS NOTES
Surgery Progress Note    Overnight Events:  NAEON.  Tolerating TF at 60 cc/hr.  Some abd discomfort yesterday- XR shows constipation  Patient has been refusing colace due to fear of diarrhea  +headache improved, BP mildly improved  Afebrile.  Not OOB yesterday    Physical Exam:  Temp:  [97.7 °F (36.5 °C)-98.8 °F (37.1 °C)] 98.4 °F (36.9 °C)  Pulse:  [53-68] 62  Resp:  [16-18] 16  SpO2:  [96 %-97 %] 97 %  BP: (119-185)/(56-88) 185/86    Gen: NAD, AAOx3  CV: RRR  Pulm: unlabored breathing  Abd: soft, ND, NTTP  2+DP  Skin: dry/intact  Mood: appropriate    I/O last 3 completed shifts:  In: 2180 [P.O.:910; NG/GT:1270]  Out: 1050 [Urine:1050]      Labs:  Recent Labs      03/26/18   0505  03/27/18   0351   WBC  6.26  6.05   HGB  11.4*  10.4*   HCT  37.8  34.6*   PLT  393*  325   NA  139  138   K  3.9  4.0   CALCIUM  9.0  9.0   BUN  11  23   CREATININE  0.7  0.8       Imaging:   XR: 1. Large amount of stool throughout the colon, suggesting constipation.    A/P: 6    Felling well  Will dc home  Night time tube feeding  To see PCp for BP    decrerase tf for diarrhea

## 2018-03-27 NOTE — PROGRESS NOTES
.Pharmacy New Medication Education    Patient accepted medication education.    Pharmacy educated patient on name and purpose of medications and possible side effects, using the teach-back method.     Current Inpatient Medication Orders   acetaminophen tablet 650 mg   amitriptyline tablet 50 mg   butalbital-acetaminophen-caffeine -40 mg per tablet 1 tablet   clonazePAM tablet 0.5 mg   clonazePAM tablet 0.5 mg   dextrose 50% injection 12.5 g   diclofenac EC tablet 75 mg   docusate sodium capsule 100 mg   docusate sodium capsule 100 mg   DULoxetine DR capsule 60 mg   glucagon (human recombinant) injection 1 mg   hydrALAZINE injection 10 mg   hydrALAZINE injection 10 mg   hydrOXYzine pamoate capsule 25 mg   ibuprofen tablet 600 mg   insulin aspart U-100 pen 1-10 Units   ketorolac tablet 10 mg   losartan tablet 100 mg   metoclopramide HCl 5 mg/5 mL solution 10 mg   metoprolol succinate (TOPROL-XL) 24 hr tablet 100 mg   ondansetron injection 4 mg   polyethylene glycol packet 17 g   promethazine (PHENERGAN) 6.25 mg in dextrose 5 % 50 mL IVPB   scopolamine 1.3-1.5 mg (1 mg over 3 days) 1 patch   sumatriptan tablet 100 mg   tiZANidine tablet 4 mg       Learners of pharmacy medication education included:  Patient    Patient +/- learner response:  Verbalized Understanding, Teachback

## 2018-03-27 NOTE — PLAN OF CARE
Recommendations     Recommendation/Intervention:   1. Note: Diabetisource sent for glucerna due to availability (1.2 kcal/ml vs 1.5 kcal/ml formula). Diabetisource more than adequate with current po intake.  -TF providing 100% of baseline weight gain needs; po intake adequate to promote weight gain (75% of meals)  -current regimen = 6 cans/day bolus or current rate as continuous. Fluid flushes: 140 ml q 6 hrs with Diabetisource or 180 ml q 4 hrs with Glucerna.  - If po intake remains at 75% of meals, can decrease TF rate to 30 ml/hr (3 cans/day via bolus). Fluid flushes of 95 ml q 8 hrs for Diabetisource; 140 ml q 6 hrs with Glucerna.   2. Monitor weight weekly and adjust TF accordingly   3. RD to monitor     Goals: 1.) patient will meet >80% of EEN while admitted 2.) patient will tolerate TF at goal rate  Nutrition Goal Status: goal met  Communication of RD Recs: reviewed with RN (plan of care)     D/C planning: TF to promote weight gain

## 2018-03-27 NOTE — PROGRESS NOTES
U Internal Medicine Resident HO-III Progress Note    Subjective:      Headache has resolved and she is no longer nauseated     Objective:   Last 24 Hour Vital Signs:  BP  Min: 119/56  Max: 183/80  Temp  Av.9 °F (36.6 °C)  Min: 97.3 °F (36.3 °C)  Max: 98.7 °F (37.1 °C)  Pulse  Av.9  Min: 53  Max: 68  Resp  Av  Min: 16  Max: 20  SpO2  Av %  Min: 95 %  Max: 97 %  I/O last 3 completed shifts:  In: 1310 [P.O.:610; NG/GT:700]  Out: 500 [Urine:500]    Physical Examination:  General: Alert and awake in NAD  HENT:  NCAT; anicteric sclera with EOMi; OP clear with MMM  Cardio:  Regular rate and rhythm with normal S1 and S2; no murmurs  Resp:  CTAB with normal effort; no wheezes, crackles or rhonchi  Abdom: Soft, NTND with normoactive bowel sounds  Extrem: WWP with no edema  Pulses: 2+ and symmetric distally  Neuro:  AAOx3; cooperative and pleasant with no focal deficits      Laboratory:  Laboratory Data Reviewed: yes  Pertinent Findings:  Lab Results   Component Value Date    WBC 6.05 2018    HGB 10.4 (L) 2018    HCT 34.6 (L) 2018    MCV 86 2018     2018     Lab Results   Component Value Date    CREATININE 0.8 2018    BUN 23 2018     2018    K 4.0 2018     2018    CO2 31 (H) 2018     Lab Results   Component Value Date    ALT 19 2018    AST 21 2018    ALKPHOS 131 2018    BILITOT 0.2 2018       Microbiology Data Reviewed: yes  Pertinent Findings:  - NONE    Other Results:  EKG (my interpretation):   - NO NEW ECG    Radiology Data Reviewed: yes  Pertinent Findings:  - NO NEW IMAGING    Current Medications:     Infusions:       Scheduled:   amitriptyline  50 mg Oral QHS    clonazePAM  0.5 mg Oral BID    docusate sodium  100 mg Oral BID    DULoxetine  60 mg Oral Daily    hydrOXYzine pamoate  25 mg Oral TID    losartan  100 mg Oral Daily    metoclopramide HCl  10 mg Oral TID    metoprolol  succinate  100 mg Oral BID    ondansetron  4 mg Intravenous Q6H    polyethylene glycol  17 g Oral Daily    scopolamine  1 patch Transdermal Q3 Days        PRN:  acetaminophen, butalbital-acetaminophen-caffeine -40 mg, clonazePAM, dextrose 50%, diclofenac, docusate sodium, glucagon (human recombinant), hydrALAZINE, hydrALAZINE, ibuprofen, insulin aspart U-100, ketorolac, promethazine (PHENERGAN) IVPB, sumatriptan, tiZANidine      Assessment:     Kaylee Ngo is a 62 y.o.female with     Plan:     Metastatic Carcinoid with recent GT Dislodgement:  Essential HTN:  - mgmt and pain control per primary     Essential HTN  - reports hx uncontrolled HTN, previously on multi-med regimen  - now on Toprol XL 100mg and Losartan 50mg after BP under control  - takes BP at home ~4x/d.  -130 at home on current regimen  - -170 inpatient on above regimen.  Now ~140  - continue increased dose ARB  - Can consider changing Toprol XL to Coreg, as this has more antihypertensive effects     Hx Chronic Migraines:  - follows with neurologist in [place] regularly  - reports being on Duloxetine at home with relatively good control  - Lamictal also listed as home med which is sometimes used off-label for PPx but pt unsure of exact purpose  - HTN may be contributing to HA as well.  Recommendations as above  - Agree with continuation of home Cymbalta.  Recommend Fiorecet PRN for breakthrough pending BP control    No further recommendations. Thank you for allowing us to participate in the care of this patient. Contact us with any questions or concerns     .  Khanh Germain  [facility] Internal Medicine HO-III  [facility] Medicine Service Team A    [facility] Medicine Hospitalist Pager numbers:   [facility] Hospitalist Medicine Team A (Nilam/Ida): [phone]  [facility] Hospitalist Medicine Team B (Radha/Connie):  [phone]

## 2018-03-27 NOTE — NURSING
Tolerating tube feeding  No complaints of nausea  Tube feeding residual zero    Advanced feeding to 50 per hour

## 2018-03-27 NOTE — PROGRESS NOTES
Notified Dr. Dodson request to change blood glucose checks to AC & HS since she is getting PEG tube feeding continuous and has a P.O. Diet.  wants to keep her with blood glucose checks every 6 hours since she has a poor P.O. Intake.

## 2018-03-27 NOTE — CONSULTS
"  [facility]  Adult Nutrition  Consult Note    SUMMARY     Recommendations    Recommendation/Intervention:   1. Note: Diabetisource sent for glucerna due to availability (1.2 kcal/ml vs 1.5 kcal/ml formula). Diabetisource more than adequate with current po intake.  -TF providing 100% of baseline weight gain needs; po intake adequate to promote weight gain (75% of meals)  -current regimen = 6 cans/day bolus or current rate as continuous. Fluid flushes: 140 ml q 6 hrs with Diabetisource or 180 ml q 4 hrs with Glucerna.  - If po intake remains at 75% of meals, can decrease TF rate to 30 ml/hr (3 cans/day via bolus). Fluid flushes of 95 ml q 8 hrs for Diabetisource; 140 ml q 6 hrs with Glucerna.   2. Monitor weight weekly and adjust TF accordingly   3. RD to monitor    Goals: 1.) patient will meet >80% of EEN while admitted 2.) patient will tolerate TF at goal rate  Nutrition Goal Status: goal met  Communication of RD Recs: reviewed with RN (plan of care)    D/C planning: TF to promote weight gain    Reason for Assessment    Reason for Assessment: consult, RD follow-up  Relevant Medical History:  (DM, HLD, Ca, HTN)    General Information Comments: Patient receiving TF at goal rate without issue at this time. Also eating 75% of meals. Patient states she was not utilizing PEG PTA.  Patient presents with moderate to severe loss of LBM (clavicle, upper arm area, patellar); moderate loss of fat mass (upper arm area, ribs). Plan to continue TF at home to assist with weight gain.    Nutrition Risk Screen    Nutrition Risk Screen: tube feeding or parenteral nutrition    Nutrition/Diet History    Food Preferences: Denies cultural, Yazidi food preferences.   Do you have any cultural, spiritual, Yazidi conflicts, given your current situation?: n/a  Food Allergies: other (see comments) (Iodine )    Anthropometrics    Temp: 97.7 °F (36.5 °C)  Height Method: Stated  Height: 5' 4.5" (163.8 cm)  Height " "(inches): 64.5 in  Weight Method: Bed Scale  Weight: 51.1 kg (112 lb 10.5 oz)  Weight (lb): 112.66 lb  Ideal Body Weight (IBW), Female: 122.5 lb  % Ideal Body Weight, Female (lb): 91.97 lb  BMI (Calculated): 19.1  BMI Grade: 18.5-24.9 - normal  Usual Body Weight (UBW), kg:  (ashley)    Lab/Procedures/Meds    Pertinent Labs Reviewed: reviewed  Pertinent Labs Comments: [id number]  Pertinent Medications Reviewed: reviewed  Pertinent Medications Comments: reglan    Physical Findings/Assessment    Overall Physical Appearance: loss of muscle mass, loss of subcutaneous fat  Tubes: gastrostomy tube  Oral/Mouth Cavity: WDL  Skin: intact (zandra score 20)    Estimated/Assessed Needs    Weight Used For Calorie Calculations: 51.1 kg (112 lb 10.5 oz)  Height: 5' 4.5" (163.8 cm)  Energy Calorie Requirements (kcal): 2558-1678  Energy Need Method: Kcal/kg    RMR ([place]-St. Jeor Equation): 1068.94  Protein Requirements: 60-80g  Weight Used For Protein Calculations: 51.1 kg (112 lb 10.5 oz)    Fluid Need Method: RDA Method (or per MD)  RDA Method (mL): 1800       Nutrition Prescription Ordered    Current Diet Order: Mechanical soft diet  Current Nutrition Support Formula Ordered:  (Diabetisource being sent; Glucerna not available)  Current Nutrition Support Rate Ordered: 60 (ml)  Current Nutrition Support Frequency Ordered: continuous  Oral Nutrition Supplement: Optisource TID     Evaluation of Received Nutrient/Fluid Intake    Enteral Calories (kcal): 1728  Enteral Protein (gm): 86  Enteral (Free Water) Fluid (mL): 1178  I/O: 1280/1180  Energy Calories Required: meeting needs  Protein Required: meeting needs  Fluid Required: meeting needs  Comments: LBM: 3/26  Tolerance: tolerating  % Intake of Estimated Energy Needs: 100%  % Meal Intake: 75%    Nutrition Risk    Level of Risk/Frequency of Follow-up:  (x2 weekly)     Assessment and Plan    Nutrition Dx: Increased need for kcal/protein r/t malnutrition as evidenced by: Moderate to " severe loss of lean body mass; moderate loss of fat mass  Nutrition Dx Status: new       Monitor and Evaluation    Food and Nutrient Intake: energy intake, enteral nutrition intake  Food and Nutrient Adminstration: diet order, enteral and parenteral nutrition administration  Anthropometric Measurements: weight, weight change, body mass index  Biochemical Data, Medical Tests and Procedures: electrolyte and renal panel, glucose/endocrine profile, lipid profile  Nutrition-Focused Physical Findings: overall appearance     Nutrition Follow-Up    RD Follow-up?: Yes

## 2018-03-28 ENCOUNTER — NURSE TRIAGE (OUTPATIENT)
Dept: ADMINISTRATIVE | Facility: CLINIC | Age: 63
End: 2018-03-28

## 2018-03-28 NOTE — TELEPHONE ENCOUNTER
Reason for Disposition   Health Information question, no triage required and triager able to answer question    Protocols used: ST INFORMATION ONLY CALL-A-AH    Pt sister states pt has a feeding tube. Had a few problems where she required a dressing and could not shower at that time. Sister states prior to that she was able ot shower. Calling to find out if she can shower. Per last d/c instructions no drsg needed, advised pt sister if she was told she could shower previously that would be fine. Please call pt with any further care advice.

## 2018-03-28 NOTE — PROGRESS NOTES
Patient is AAOx3, NAD noted, sister at the bedside.  VSS.  Reviewed discharge instructions with patient and sister.  Verbalized understanding.  IV removed.  Safety has been maintained.

## 2018-03-29 ENCOUNTER — TELEPHONE (OUTPATIENT)
Dept: NEUROLOGY | Facility: HOSPITAL | Age: 63
End: 2018-03-29

## 2018-03-29 NOTE — TELEPHONE ENCOUNTER
Returned Allison call. New orders needed as pt says the ones that were sent were different. Clarified w/ MD and will send orders.

## 2018-03-29 NOTE — TELEPHONE ENCOUNTER
----- Message from Colleen Fletcher sent at 3/29/2018 11:30 AM CDT -----  Contact: Yany with [employer]  DYLAN:  Requesting new orders be faxed - there were some changes since patient was discharged from hospital.  Fax number is [phone].  Phone number to Yany is [phone].  Thank you  abc

## 2018-03-29 NOTE — TELEPHONE ENCOUNTER
Emily, sister, Triage Nurse, confirmed okay for pt to shower. Pt is doing fine and has been seen by home health.

## 2018-03-29 NOTE — TELEPHONE ENCOUNTER
Received call from pt sister, informed that Dr. Levy signed and faxed over new tube feeding orders today, dietician w/ [facility] to monitor weight weekly and adjust tube feed accordingly. Sister verbalizes understanding.

## 2018-04-09 ENCOUNTER — HOSPITAL ENCOUNTER (OUTPATIENT)
Facility: HOSPITAL | Age: 63
Discharge: HOME | End: 2018-04-09
Attending: SURGERY | Admitting: SURGERY
Payer: MEDICARE

## 2018-04-09 ENCOUNTER — TELEPHONE (OUTPATIENT)
Dept: NEUROLOGY | Facility: HOSPITAL | Age: 63
End: 2018-04-09

## 2018-04-09 ENCOUNTER — OFFICE VISIT (OUTPATIENT)
Dept: NEUROLOGY | Facility: HOSPITAL | Age: 63
End: 2018-04-09
Attending: SURGERY
Payer: MEDICARE

## 2018-04-09 ENCOUNTER — INITIAL CONSULT (OUTPATIENT)
Dept: HEMATOLOGY/ONCOLOGY | Facility: CLINIC | Age: 63
End: 2018-04-09
Payer: MEDICARE

## 2018-04-09 VITALS
HEART RATE: 58 BPM | DIASTOLIC BLOOD PRESSURE: 86 MMHG | BODY MASS INDEX: 20.2 KG/M2 | HEIGHT: 65 IN | TEMPERATURE: 98 F | SYSTOLIC BLOOD PRESSURE: 161 MMHG | WEIGHT: 121.25 LBS

## 2018-04-09 VITALS
OXYGEN SATURATION: 100 % | HEIGHT: 65 IN | TEMPERATURE: 98 F | WEIGHT: 121 LBS | HEART RATE: 68 BPM | RESPIRATION RATE: 17 BRPM | DIASTOLIC BLOOD PRESSURE: 86 MMHG | BODY MASS INDEX: 20.16 KG/M2 | SYSTOLIC BLOOD PRESSURE: 197 MMHG

## 2018-04-09 VITALS
RESPIRATION RATE: 16 BRPM | TEMPERATURE: 98 F | WEIGHT: 121.06 LBS | OXYGEN SATURATION: 95 % | SYSTOLIC BLOOD PRESSURE: 193 MMHG | HEART RATE: 60 BPM | HEIGHT: 66 IN | DIASTOLIC BLOOD PRESSURE: 94 MMHG | BODY MASS INDEX: 19.46 KG/M2

## 2018-04-09 DIAGNOSIS — Z09 POSTOP CHECK: Primary | ICD-10-CM

## 2018-04-09 DIAGNOSIS — Z09 POSTOP CHECK: ICD-10-CM

## 2018-04-09 DIAGNOSIS — C7B.8 SECONDARY NEUROENDOCRINE TUMOR OF DISTANT LYMPH NODES: ICD-10-CM

## 2018-04-09 DIAGNOSIS — D3A.8 NEUROENDOCRINE TUMOR: ICD-10-CM

## 2018-04-09 DIAGNOSIS — K94.23 GASTROSTOMY TUBE DYSFUNCTION: ICD-10-CM

## 2018-04-09 DIAGNOSIS — C7A.00 MALIGNANT CARCINOID TUMOR OF UNKNOWN PRIMARY SITE: Primary | ICD-10-CM

## 2018-04-09 PROCEDURE — 3080F DIAST BP >= 90 MM HG: CPT | Mod: CPTII,S$GLB,, | Performed by: INTERNAL MEDICINE

## 2018-04-09 PROCEDURE — 99205 OFFICE O/P NEW HI 60 MIN: CPT | Mod: S$GLB,,, | Performed by: INTERNAL MEDICINE

## 2018-04-09 PROCEDURE — 63600175 PHARM REV CODE 636 W HCPCS: Performed by: SURGERY

## 2018-04-09 PROCEDURE — 99215 OFFICE O/P EST HI 40 MIN: CPT | Performed by: SURGERY

## 2018-04-09 PROCEDURE — 25500020 PHARM REV CODE 255: Performed by: SURGERY

## 2018-04-09 PROCEDURE — 99999 PR PBB SHADOW E&M-EST. PATIENT-LVL III: CPT | Mod: PBBFAC,,, | Performed by: INTERNAL MEDICINE

## 2018-04-09 PROCEDURE — 25000003 PHARM REV CODE 250: Performed by: RADIOLOGY

## 2018-04-09 PROCEDURE — 63600175 PHARM REV CODE 636 W HCPCS: Performed by: RADIOLOGY

## 2018-04-09 PROCEDURE — 3077F SYST BP >= 140 MM HG: CPT | Mod: CPTII,S$GLB,, | Performed by: INTERNAL MEDICINE

## 2018-04-09 RX ORDER — FENTANYL CITRATE 50 UG/ML
INJECTION, SOLUTION INTRAMUSCULAR; INTRAVENOUS CODE/TRAUMA/SEDATION MEDICATION
Status: COMPLETED | OUTPATIENT
Start: 2018-04-09 | End: 2018-04-09

## 2018-04-09 RX ORDER — SODIUM CHLORIDE 9 MG/ML
INJECTION, SOLUTION INTRAVENOUS CONTINUOUS
Status: DISCONTINUED | OUTPATIENT
Start: 2018-04-09 | End: 2018-04-10 | Stop reason: HOSPADM

## 2018-04-09 RX ORDER — LIDOCAINE HYDROCHLORIDE 10 MG/ML
INJECTION INFILTRATION; PERINEURAL CODE/TRAUMA/SEDATION MEDICATION
Status: COMPLETED | OUTPATIENT
Start: 2018-04-09 | End: 2018-04-09

## 2018-04-09 RX ORDER — MIDAZOLAM HYDROCHLORIDE 1 MG/ML
INJECTION INTRAMUSCULAR; INTRAVENOUS CODE/TRAUMA/SEDATION MEDICATION
Status: COMPLETED | OUTPATIENT
Start: 2018-04-09 | End: 2018-04-09

## 2018-04-09 RX ORDER — METHYLPREDNISOLONE SOD SUCC 125 MG
125 VIAL (EA) INJECTION ONCE
Status: COMPLETED | OUTPATIENT
Start: 2018-04-09 | End: 2018-04-09

## 2018-04-09 RX ORDER — DIPHENHYDRAMINE HYDROCHLORIDE 50 MG/ML
INJECTION INTRAMUSCULAR; INTRAVENOUS CODE/TRAUMA/SEDATION MEDICATION
Status: COMPLETED | OUTPATIENT
Start: 2018-04-09 | End: 2018-04-09

## 2018-04-09 RX ADMIN — FENTANYL CITRATE 50 MCG: 50 INJECTION, SOLUTION INTRAMUSCULAR; INTRAVENOUS at 03:04

## 2018-04-09 RX ADMIN — LIDOCAINE HYDROCHLORIDE 5 ML: 10 INJECTION, SOLUTION INFILTRATION; PERINEURAL at 03:04

## 2018-04-09 RX ADMIN — DIPHENHYDRAMINE HYDROCHLORIDE 50 MG: 50 INJECTION, SOLUTION INTRAMUSCULAR; INTRAVENOUS at 02:04

## 2018-04-09 RX ADMIN — MIDAZOLAM HYDROCHLORIDE 1 MG: 1 INJECTION, SOLUTION INTRAMUSCULAR; INTRAVENOUS at 03:04

## 2018-04-09 RX ADMIN — METHYLPREDNISOLONE SODIUM SUCCINATE 125 MG: 125 INJECTION, POWDER, FOR SOLUTION INTRAMUSCULAR; INTRAVENOUS at 03:04

## 2018-04-09 RX ADMIN — IOHEXOL 20 ML: 350 INJECTION, SOLUTION INTRAVENOUS at 04:04

## 2018-04-09 NOTE — Clinical Note
Needs a gallium PET (Epic gave me the red box when I tried to order) See Dr. Reddy if possible today for ongoing nausea and vomiting Tumor board following PET: Re PRRT/cytoreduction? See me in 4 weeks in [place]

## 2018-04-09 NOTE — H&P
Radiology History & Physical      SUBJECTIVE:     Chief Complaint: Dislodged J tube.    History of Present Illness:  Kaylee Ngo is a 62 y.o. female who presents for replacement of dislodged J tube.  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 Authorizing Provider   amitriptyline (ELAVIL) 50 MG tablet Take 50 mg by mouth every evening.   Yes Historical Provider, MD   calcium carbonate (OS-BEST) 600 mg calcium (1,500 mg) Tab Take 600 mg by mouth once.   Yes Historical Provider, MD   clonazePAM (KLONOPIN) 0.5 MG tablet Take 0.5 mg by mouth 2 (two) times daily as needed for Anxiety.   Yes Historical Provider, MD   cyanocobalamin 1,000 mcg/mL injection 1,000 mcg every 28 days.   Yes Historical Provider, MD   diclofenac (CATAFLAM) 50 MG tablet as needed.  9/11/17  Yes Historical Provider, MD   diphenoxylate-atropine 2.5-0.025 mg (LOMOTIL) 2.5-0.025 mg per tablet Take 1 tablet by mouth 4 (four) times daily as needed for Diarrhea.   Yes Historical Provider, MD   DULoxetine (CYMBALTA) 60 MG capsule Take 60 mg by mouth once daily.   Yes Historical Provider, MD   hydrOXYzine pamoate (VISTARIL) 25 MG Cap Take 25 mg by mouth 3 (three) times daily.    Yes Historical Provider, MD   lamoTRIgine (LAMICTAL) 150 MG Tab  11/14/17  Yes Historical Provider, MD  "  metoclopramide HCl (REGLAN) 5 mg/5 mL Soln TAKE 10 ML BY MOUTH THREE TIMES A DAY 2/4/18  Yes Kermit Reddy MD   metoprolol succinate (TOPROL-XL) 100 MG 24 hr tablet Take 100 mg by mouth 2 (two) times daily.   Yes Historical Provider, MD   multivitamin capsule Take 1 capsule by mouth once daily.   Yes Historical Provider, MD   omeprazole (PRILOSEC) 40 MG capsule Take 40 mg by mouth 2 (two) times daily before meals.    Yes Historical Provider, MD   potassium chloride (KLOR-CON) 20 mEq Pack Take 20 mEq by mouth once.    Yes Historical Provider, MD   tiZANidine (ZANAFLEX) 4 MG tablet as needed.  9/11/17  Yes Historical Provider, MD   vitamin D 1000 units Tab Take 1,000 Units by mouth once daily.   Yes Historical Provider, MD   ACCU-CHEK SHU CONTROL SOLN Soln  10/17/17   Historical Provider, MD   ACCU-CHEK SHU PLUS METER Misc  10/17/17   Historical Provider, MD   ACCU-CHEK SHU PLUS TEST STRP Strp  10/17/17   Historical Provider, MD   GLUCAGON EMERGENCY KIT, HUMAN, 1 mg injection  11/14/17   Historical Provider, MD   insulin degludec (TRESIBA FLEXTOUCH U-100) 100 unit/mL (3 mL) InPn Inject 15 Units into the skin once daily.     Historical Provider, MD   lancets 30 gauge Misc  10/17/17   Historical Provider, MD   lancing device Misc  10/17/17   Historical Provider, MD   losartan (COZAAR) 100 MG tablet Take 1 tablet (100 mg total) by mouth once daily. 3/28/18 3/28/19  Natali Dodson MD   ondansetron (ZOFRAN) 8 MG tablet Take 8 mg by mouth every 8 (eight) hours as needed.     Historical Provider, MD   SURE COMFORT PEN NEEDLE 32 gauge x 5/32" Ndle  10/17/17   Historical Provider, MD   colesevelam (WELCHOL) 625 mg tablet Take 1,875 mg by mouth 2 (two) times daily with meals.  4/9/18  Historical Provider, MD   everolimus (AFINITOR) 10 mg Tab Take 10 mg by mouth once daily.  4/9/18  Historical Provider, MD     Anticoagulants/Antiplatelets: no anticoagulation    Allergies:   Review of patient's allergies indicates: " "  Allergen Reactions    Codeine     Contrast media      Oral and IV    Darvocet a500 [propoxyphene n-acetaminophen]     Epinephrine      Neuroendocrine Tumor patient      Erythromycin     Iodinated contrast- oral and iv dye     Iodine and iodide containing products     Morphine     Sulfa (sulfonamide antibiotics)     Erythromycin base Rash    Pcn [penicillins] Rash     "It looks like measles."     Sedation History:  no adverse reactions    Review of Systems:   Hematological: no known coagulopathies  Respiratory: no shortness of breath  Cardiovascular: no chest pain  Gastrointestinal: no abdominal pain  Genito-Urinary: no dysuria  Musculoskeletal: negative  Neurological: no TIA or stroke symptoms         OBJECTIVE:     Vital Signs (Most Recent)  Temp: 98.2 °F (36.8 °C) (04/09/18 1252)  Pulse: 64 (04/09/18 1252)  Resp: 17 (04/09/18 1252)  BP: (!) 192/88 (04/09/18 1252)    Physical Exam:  ASA: 3  Mallampati: 2    General: no acute distress  Mental Status: alert and oriented to person, place and time  HEENT: normocephalic, atraumatic  Chest: unlabored breathing  Heart: regular heart rate  Abdomen: nondistended  Extremity: moves all extremities    Laboratory  Lab Results   Component Value Date    INR 1.0 03/06/2018       Lab Results   Component Value Date    WBC 6.05 03/27/2018    HGB 10.4 (L) 03/27/2018    HCT 34.6 (L) 03/27/2018    MCV 86 03/27/2018     03/27/2018      Lab Results   Component Value Date     (H) 03/27/2018     03/27/2018    K 4.0 03/27/2018     03/27/2018    CO2 31 (H) 03/27/2018    BUN 23 03/27/2018    CREATININE 0.8 03/27/2018    CALCIUM 9.0 03/27/2018    MG 1.6 03/06/2018    ALT 19 03/27/2018    AST 21 03/27/2018    ALBUMIN 2.9 (L) 03/27/2018    BILITOT 0.2 03/27/2018       ASSESSMENT/PLAN:     Sedation Plan: Mild moderate  Patient will undergo replacement of J tube. J tube was dislodged this past Saturday and the tract may have sealed.     Jagdish Luque, " M.D.  Diagnostic and Interventional Radiologist  Department of Radiology  Pager: [phone]

## 2018-04-09 NOTE — DISCHARGE SUMMARY
Radiology Discharge Summary      Hospital Course: No complications    Admit Date: 4/9/2018  Discharge Date: 04/09/2018     Instructions Given to Patient: Yes  Diet: Resume prior diet  Activity: activity as tolerated and no driving for today    Description of Condition on Discharge: Stable  Vital Signs (Most Recent): Temp: 97.6 °F (36.4 °C) (04/09/18 1540)  Pulse: 68 (04/09/18 1540)  Resp: 17 (04/09/18 1540)  BP: (!) 197/86 (04/09/18 1540)  SpO2: 100 % (04/09/18 1530)    Discharge Disposition: Home    Discharge Diagnosis: Malnutrition. Follow up as scheduled.    Jagdish Luque M.D.  Diagnostic and Interventional Radiologist  Department of Radiology  Pager: [phone]

## 2018-04-09 NOTE — DISCHARGE INSTRUCTIONS
Discharge Instructions: Caring for Your Gastrostomy Tube (G-Tube)  You have been discharged with a gastrostomy tube, or G-tube. The G-tube was inserted through your belly (abdominal) wall and into your stomach. The tube will provide you with food, fluids, and medicine. Your G-tube may move in and out slightly. If the tube comes out all the way in the first few weeks after placement, dont put it back in. Call your healthcare provider right away. Dont wait until the next day. This is important because the G-tube tract through the skin may close very quickly, often within 24 hours. After the first few weeks, if the tube comes out, ask your provider how to get a spare tube. Ask your provider how to replace it at home.   General guidelines for use  · Wash your hands thoroughly with soap and warm water before starting your feeding.  · During the feeding and for 1 hour after, sit in a chair or sit up in bed.  · Before feeding begins, check to see that your stomach is empty. You will need a syringe for the following steps:   ¨ Insert the tip of an empty syringe into the end of the G-tube.  ¨ Pull back on the syringe to withdraw contents of the stomach.  ¨ Dont begin the feeding if more than 100 mL remains from the previous feeding.  · Clean the area around the tube with mild soap and water.  · Pat the area dry during bathing and as needed.  · Clean the area more often if it gets wet or is leaking some discharge (weeping).  · Keep the disk (flange) a few millimeters off the skin. This should leave just enough room for a gauze sponge. Pulling the flange too tightly can damage the skin. But leaving the flange too loose leads to leaking around the G-tube. Your healthcare team will go over these guidelines before you leave the hospital.     The name of my feeding supplement/formula is:  ___________________________________________     Amount per feeding:   ___________________________________________     Times per  day:  ___________________________________________     Amount of water used to flush tube:  ___________________________________________   Gravity feeding method  · Fill the feeding bag with the prescribed amount of formula. Run the fluid to the end of the tube to clear out any air. Clamp the tube.  · Connect the end of the feeding bag tubing to the G-tube.  · Hang the bag at least 18 inches above the level of your G-tube.  · Open the clamp and allow the formula to flow into the G-tube.  · Follow with the prescribed amount of water.  · After each feeding, rinse the bag and tubing. Every 24 hours, wash the bag and tubing with soapy water and rinse thoroughly.  Pump feeding method  · Fill the feeding bag with the prescribed amount of formula. Run the fluid to the end of the tube to clear out any air. Clamp the tube.  · Connect the end of the feeding bag tubing to the G-tube. Set the pump rate of flow to the prescribed rate per hour.  · Open the clamp on the tubing; press the start button on your pump.  · When feeding is complete, disconnect the feeding set.  · Connect the tip of an empty syringe to the feeding tube and slowly push in the prescribed amount of water.  · After each feeding, rinse the bag and tubing. Every 24 hours, wash the bag and tubing with soapy water and rinse thoroughly.  Syringe feeding method  · Remove the plunger from a syringe and connect the syringe to the G-tube.  · Hold the syringe upright and pour the formula into the syringe.  · Refill the syringe as the formula reaches the bottom of the syringe.  · Repeat the process until the prescribed amount of formula is given.  · Follow the feeding with the prescribed amount of water.  · After each feeding, rinse the bag and tubing. Every 24 hours, wash the bag and tubing with soapy water and rinse thoroughly.  When to call your provider  Call your healthcare provider right away if you have any of the following:  · The tube comes out   · The tube is  blocked  · Vomiting  · Fever above 100.4°F (38.0°C)  · Diarrhea that lasts more than 2 days  · Signs of infection (redness, swelling, or warmth at the tube site)  · Drainage from the tube site   Date Last Reviewed: 8/1/2016  © 7562-3565 [employer]. [address] [place] [address]. All rights reserved. This information is not intended as a substitute for professional medical care. Always follow your healthcare professional's instructions.

## 2018-04-09 NOTE — PROGRESS NOTES
S/p feeding tube coming out    Discussed with Dr. calero    Feeding tube to be repositioned by IR    OTHERWISE STABLE

## 2018-04-09 NOTE — PROGRESS NOTES
PATIENT: Kaylee Ngo  MRN: [id number]  DATE: 4/9/2018      Diagnosis:   1. Malignant carcinoid tumor of unknown primary site    2. Secondary neuroendocrine tumor of distant lymph nodes    3. Neuroendocrine tumor        Chief Complaint: Consult      Oncologic History:      Oncologic History Neuroendocrine tumor unknown primary diagnosed 1/2017   Metastatic disease to intra-abdominal lymph nodes at presentation     Oncologic Treatment Lanreotide 1/2017  Afinitor 6/2017 - 3/2017 (Discontinue due to surgical procedure)    Pathology Well differentiated neuroendocrine tumor, mitoses 1/10HPF, Ki-67 3.4%          Subjective:    Interval History: Ms. Ngo is a 62 y.o. female who is seen as an initial visit for a neuroendocrine tumor of unknown primary.  Her history dates to 1/2017 when she was undergoing surgery for a left hip fracture.  Following this she complained of abdominal pain and a CT was performed indicating extensive retroperitoneal lymphadenopathy.  A possible small bowel obstruction.  A biopsy was performed which should revealed a well-differentiated neuroendocrine tumor with mitoses 1/10 per HPF and Ki-67 of 3.4%.  Octreotide scan was negative.  She was started on Lanreotide in 1/2017.  Because of increased growth she was started on Afinitor in 6/2017.  In 3/2017 she underwent PEG tube placement due to ongoing weight loss and malnutrition and Afinitor was discontinued.  Additionally, at that time it was noted that she had ongoing progression of her lymphadenopathy.    She states that she has ongoing problems with nausea, vomiting along with alternating diarrhea and constipation.  She denies abdominal pain but does admit to some intermittent back pain.  She states that she had a PEG tube dislodged over the weekend and this has been out for the last 2 days and she is planning on having this put back in today.  She is anxious about this.  Since she had the PEG tube in place she states that she has gained  "weight.  She is not very active but is working with physical therapy and trying to become more active.  She has no other new complaints.    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 She has never used smokeless tobacco. She reports that she does not drink alcohol or use drugs.    Allergies:  Review of patient's allergies indicates:   Allergen Reactions    Codeine     Contrast media      Oral and IV    Darvocet a500 [propoxyphene n-acetaminophen]     Epinephrine      Neuroendocrine Tumor patient      Erythromycin     Iodinated contrast- oral and iv dye     Iodine and iodide containing products     Morphine     Sulfa (sulfonamide antibiotics)     Erythromycin base Rash    Pcn [penicillins] Rash     "It looks like measles."       Medications:  Current Outpatient Prescriptions   Medication Sig Dispense Refill    ACCU-CHEK SHU CONTROL SOLN Soln       ACCU-CHEK SHU PLUS METER Misc       ACCU-CHEK SHU PLUS TEST STRP Strp       amitriptyline (ELAVIL) 50 MG tablet Take 50 mg by mouth every evening.      calcium carbonate (OS-BEST) 600 mg calcium (1,500 mg) Tab Take 600 mg by mouth once.      " "clonazePAM (KLONOPIN) 0.5 MG tablet Take 0.5 mg by mouth 2 (two) times daily as needed for Anxiety.      colesevelam (WELCHOL) 625 mg tablet Take 1,875 mg by mouth 2 (two) times daily with meals.      cyanocobalamin 1,000 mcg/mL injection 1,000 mcg every 28 days.      diclofenac (CATAFLAM) 50 MG tablet as needed.       diphenoxylate-atropine 2.5-0.025 mg (LOMOTIL) 2.5-0.025 mg per tablet Take 1 tablet by mouth 4 (four) times daily as needed for Diarrhea.      DULoxetine (CYMBALTA) 60 MG capsule Take 60 mg by mouth once daily.      everolimus (AFINITOR) 10 mg Tab Take 10 mg by mouth once daily.      GLUCAGON EMERGENCY KIT, HUMAN, 1 mg injection       hydrOXYzine pamoate (VISTARIL) 25 MG Cap Take 25 mg by mouth 3 (three) times daily.       insulin degludec (TRESIBA FLEXTOUCH U-100) 100 unit/mL (3 mL) InPn Inject 10 Units into the skin once daily.       lamoTRIgine (LAMICTAL) 150 MG Tab       lancets 30 gauge Misc       lancing device Misc       losartan (COZAAR) 100 MG tablet Take 1 tablet (100 mg total) by mouth once daily. 90 tablet 3    metoclopramide HCl (REGLAN) 5 mg/5 mL Soln TAKE 10 ML BY MOUTH THREE TIMES A  mL 2    metoprolol succinate (TOPROL-XL) 100 MG 24 hr tablet Take 100 mg by mouth 2 (two) times daily.      multivitamin capsule Take 1 capsule by mouth once daily.      omeprazole (PRILOSEC) 40 MG capsule Take 40 mg by mouth 2 (two) times daily before meals.       ondansetron (ZOFRAN) 8 MG tablet Take 8 mg by mouth every 8 (eight) hours.      potassium chloride (KLOR-CON) 20 mEq Pack Take 20 mEq by mouth once.       SURE COMFORT PEN NEEDLE 32 gauge x 5/32" Ndle       tiZANidine (ZANAFLEX) 4 MG tablet as needed.       vitamin D 1000 units Tab Take 1,000 Units by mouth once daily.       No current facility-administered medications for this visit.        Review of Systems   Constitutional: Positive for unexpected weight change. Negative for chills and fever.   HENT: Negative for " "congestion, hearing loss and nosebleeds.    Eyes: Negative for visual disturbance.   Respiratory: Negative for cough and shortness of breath.    Cardiovascular: Negative for chest pain and palpitations.   Gastrointestinal: Positive for constipation, diarrhea, nausea and vomiting. Negative for abdominal pain and blood in stool.   Genitourinary: Negative for dysuria.   Musculoskeletal: Positive for back pain. Negative for gait problem.   Skin: Negative for color change and rash.   Neurological: Negative for dizziness, weakness and headaches.   Hematological: Negative for adenopathy. Does not bruise/bleed easily.   Psychiatric/Behavioral: Negative for confusion. The patient is nervous/anxious.        ECOG Performance Status:   ECOG SCORE    2 - Capable of all selfcare but unable to carry out any work activities, active > 50% of hours         Objective:      Vitals:   Vitals:    04/09/18 0820   BP: (!) 193/94   BP Location: Right arm   Patient Position: Sitting   BP Method: Large (Automatic)   Pulse: 60   Resp: 16   Temp: 98.2 °F (36.8 °C)   TempSrc: Oral   SpO2: 95%   Weight: 54.9 kg (121 lb 0.5 oz)   Height: 5' 5.5" (1.664 m)     BMI: Body mass index is 19.83 kg/m².    Physical Exam   Constitutional: She is oriented to person, place, and time. She appears well-developed and well-nourished. No distress.   HENT:   Head: Normocephalic.   Mouth/Throat: No oropharyngeal exudate.   Eyes: EOM are normal. No scleral icterus.   Neck: Neck supple. No tracheal deviation present. No thyromegaly present.   Cardiovascular: Normal rate and regular rhythm.    Pulmonary/Chest: Effort normal and breath sounds normal. No respiratory distress. She has no wheezes. She has no rales.   Abdominal: Soft. She exhibits no distension and no mass. There is tenderness. There is no rebound and no guarding.   Glucose monitor noted, PEG tube site dressed   Musculoskeletal: Normal range of motion. She exhibits no edema.   Lymphadenopathy:     She has " no cervical adenopathy.   Neurological: She is alert and oriented to person, place, and time. No cranial nerve deficit.   Skin: Skin is warm and dry.   Psychiatric: She has a normal mood and affect.       Laboratory Data:  No visits with results within 1 Week(s) from this visit.   Latest known visit with results is:   Admission on 03/22/2018, Discharged on 03/27/2018   No results displayed because visit has over 200 results.        FINAL PATHOLOGIC DIAGNOSIS  #1 MESENTERIC LYMPH NODE, BIOPSY (REVIEW OF 7 OUTSIDE SLIDES LABELED ):  Well-differentiated neuroendocrine tumor, G2  -- Mitoses: less than 1 per 10hpf  -- Necrosis: Not identified  -- Ki-67: Positive; 3.4% cells staining  -- Chromogranin: Positive (intensity 3+; 80% cells)  -- Synaptophysin: Positive (intensity 2+; 90% cells)  -- CDX2: Positive  -- CK7: Positive  -- CK20: Negative  #2 MESENTERIC NODE, FINE-NEEDLE ASPIRATION (REVIEW OF 3 OUTSIDE SLIDES LABELED N17-09):  Positive for malignant cells.  Comment: No blocks or unstained slides are received, and additional immunostains cannot be performed    Imaging:   CT 3/22/18  EXAMINATION:  CT ABDOMEN PELVIS WITHOUT CONTRAST    CLINICAL HISTORY:  Abdominal pain, unspecified;    TECHNIQUE:  Low dose axial images, sagittal and coronal reformations were obtained from the lung bases to the pubic symphysis.  Oral contrast was not administered.    COMPARISON:  03/07/2018.    FINDINGS:  Evaluation of the solid abdominal organs and bowel is limited in the absence of IV contrast.    Lung bases are clear.  Heart size is normal.    Liver is unremarkable.  Gallbladder is surgically absent.  Spleen is normal in size.  There are postoperative changes involving the stomach.  Percutaneous gastrostomy tube is present within the the gastric lumen.  Kidneys and adrenal glands appear stable, including a punctate nonobstructing right renal stone.    There is extensive lymphadenopathy identified in the upper abdomen, noting  interval progression when compared with the prior exam.  Prominent bulky retroperitoneal lymphadenopathy is also present.  There is nonspecific peripancreatic and mesenteric fat stranding, similar in appearance to the prior exam.    No small bowel obstruction.  Small bowel anastomosis is noted in the left lower quadrant.  Small amount of abdominal pelvic free fluid.  Lack of IV contrast and presence of mesenteric edema and small volume ascites limits sensitivity for detection of bowel inflammation.  No pneumoperitoneum is identified.    Degenerative changes are present in the thoracolumbar spine.  No aggressive osseous lesions noted.  There is a stable appearance of a left hip prosthesis.   Impression       Limited exam secondary to lack of IV contrast.  Recommend follow-up exam with IV contrast unless contraindicated.    Worsening bulky retroperitoneal, mesenteric, and periportal lymphadenopathy worrisome for metastatic disease.    Generalized mesenteric edema and small volume abdominopelvic free fluid.    Prior cholecystectomy, gastric bypass, and percutaneous gastrostomy.           Assessment:       1. Malignant carcinoid tumor of unknown primary site    2. Secondary neuroendocrine tumor of distant lymph nodes    3. Neuroendocrine tumor           Plan:     I've had a long conversation with Mrs. Ngo today concerning her disease.  She has a neuroendocrine tumor of unknown primary with bulky lymphadenopathy which is likely causing some of her GI symptoms.  She has been on Lanreotide and also Afinitor and most recent scans in 3/2018 had indicated progressive disease.  Additionally scans from 2017 had also indicated progressive disease.  At this point I would not consider putting her back on Afinitor.  She had a negative octreotide scan and would recommend we check a gallium-68 PET/CT which may open doors for her to get peptide receptor radionuclide therapy.  If this gallium-68 PET/CT is negative then I would  consider an I-123 MIBG scan.  Additionally, CAPTEM may also be an option.  I would like her to continue to try to improve her malnutrition and she is meeting with surgery today to discuss replacing her PEG tube.  Additionally, I would like her to follow back up with GI as she still has ongoing nausea and vomiting.  Ideally, surgical debulking may be the best option but her nutritional status needs to improve.  I will plan to see her back in another month following images and will discuss her case in our multidisciplinary neuroendocrine tumor board after her gallium PET.  Multiple questions were answered and she is agreeable with this plan.      Jeff Nicole DO, FACP  Hematology & Oncology  [facility] [place] [address]  ph. [phone]  Fax. [phone]    60 minutes were spent in coordination of patient's care, record review and counseling.  More than 50% of the time was face-to-face.

## 2018-04-09 NOTE — LETTER
April 9, 2018      Cam Ashton MD  [address]  [address]  [place] [address]           [facility] Hematology Oncology  [facility]  [place] [address]  Phone: [phone]          Patient: Kaylee Ngo   MR Number: [id number]   YOB: 1955   Date of Visit: 4/9/2018       Dear Dr. Cam Ashton:    Thank you for referring Kaylee Ngo to me for evaluation. Attached you will find relevant portions of my assessment and plan of care.    If you have questions, please do not hesitate to call me. I look forward to following Kaylee Ngo along with you.    Sincerely,    Jeff Nicole DO, FACP    Enclosure  CC:  Martita Patel MD    If you would like to receive this communication electronically, please contact [email or web address] or [phone] to request more information on [facility] Link access.    For providers and/or their staff who would like to refer a patient to [facility], please contact us through our one-stop-shop provider referral line, [facility], [place], at [phone].    If you feel you have received this communication in error or would no longer like to receive these types of communications, please e-mail [email or web address]

## 2018-04-09 NOTE — PROCEDURES
Radiology Post-Procedure Note    Pre Op Diagnosis: Dislodged G tube from stomach remnant    Post Op Diagnosis: Same    Procedure: G tube replacement through tract    Procedure performed by: Rebel ROCHE, Jagdish    Written Informed Consent Obtained: Yes  Specimen Removed: NO  Estimated Blood Loss: Minimal    Findings:   Via existing tract, a micropuncture inner introducer sheath and wire were used to gain access to stomach remnant. The tract was upsized and an 18 Fr balloon retention gastrostomy was placed and secure with sutures. No complications. OK to use tube.    Patient tolerated procedure well.    Jagdish Luque M.D.  Diagnostic and Interventional Radiologist  Department of Radiology  Pager: [phone]

## 2018-04-09 NOTE — TELEPHONE ENCOUNTER
Emily, sister, notified pt scheduled to see Dr. Reddy today, at 130pm.  Emily acknowledged understanding.

## 2018-04-09 NOTE — PATIENT INSTRUCTIONS
Proceed to 1st floor admission desk for G-tube replacement then return to clinic to see Dr. Reddy and Dr. Levy     No change in treatment until nutrition is better, we will re-evaluate in 1 month when you return to clinic

## 2018-04-10 ENCOUNTER — TELEPHONE (OUTPATIENT)
Dept: NEUROLOGY | Facility: HOSPITAL | Age: 63
End: 2018-04-10

## 2018-04-10 DIAGNOSIS — C7A.1 MALIGNANT POORLY DIFFERENTIATED NEUROENDOCRINE CARCINOMA: Primary | ICD-10-CM

## 2018-05-01 ENCOUNTER — HOSPITAL ENCOUNTER (OUTPATIENT)
Dept: RADIOLOGY | Facility: HOSPITAL | Age: 63
Discharge: HOME | End: 2018-05-01
Attending: INTERNAL MEDICINE
Payer: MEDICARE

## 2018-05-01 ENCOUNTER — TELEPHONE (OUTPATIENT)
Dept: NEUROLOGY | Facility: HOSPITAL | Age: 63
End: 2018-05-01

## 2018-05-01 DIAGNOSIS — C7A.1 MALIGNANT POORLY DIFFERENTIATED NEUROENDOCRINE CARCINOMA: ICD-10-CM

## 2018-05-01 LAB — POCT GLUCOSE: 84 MG/DL (ref 70–110)

## 2018-05-01 PROCEDURE — 78815 PET IMAGE W/CT SKULL-THIGH: CPT | Mod: TC,PI

## 2018-05-01 PROCEDURE — A9587 GALLIUM GA-68: HCPCS | Mod: TB

## 2018-05-01 PROCEDURE — 78815 PET IMAGE W/CT SKULL-THIGH: CPT | Mod: 26,PS,, | Performed by: RADIOLOGY

## 2018-05-03 ENCOUNTER — TELEPHONE (OUTPATIENT)
Dept: NEUROLOGY | Facility: HOSPITAL | Age: 63
End: 2018-05-03

## 2018-05-03 NOTE — TELEPHONE ENCOUNTER
----- Message from Nikko Hudson sent at 5/3/2018 10:34 AM CDT -----  Contact: Pt      ----- Message -----  From: Xiomara Farias  Sent: 5/2/2018   2:29 PM  To: Nikko Hudson    Pt sister called to change appt time on 5/8 and would like a early appt   Callback#[phone]  Thank You  CHRISTIAN Farias

## 2018-05-07 NOTE — TELEPHONE ENCOUNTER
[facility]      NEUROENDOCRINE TUMOR MULTIDISCIPLINARY TUMOR BOARD  _____________________________________________________________________    PRESENTER:   Jeff Nicole DO    REASON FOR PRESENTATION:  Treatment Plan and Scan Review--review GA68    ATTENDEES:   Surgery:              MD LUZ Haas MD T. 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 This is not like most NETs we see. Would you biopsy other retroperitoneal lymph nodes be worth it to see if there is active disease? Not sure if she had prior treatment. Not sure if she would be a candidate for PRRT.      Liver looked cirrhotic during liver biopsy per Dr. 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   Biotheranostics showed probability of Germ Cell, Germ cell work up negative   Has peg tube   On Lanreotide, Receives at home  Primary tumor still unknown   Calcitionin elevated, had thyroid biopsy that was  negative       BOARD RECOMMENDATIONS:   Pre-albumin  Dietary Consult for weight gain   MIBG scan and evaluate for possible treatment (has to be able to provide self care)  Needs markers--needs repeat calcitionin   Appointment today w/ Dr. Nicole   Needs surgical consult after nutrition improved--continue tube feeds

## 2018-05-08 ENCOUNTER — TELEPHONE (OUTPATIENT)
Dept: NEUROLOGY | Facility: HOSPITAL | Age: 63
End: 2018-05-08

## 2018-05-08 ENCOUNTER — OFFICE VISIT (OUTPATIENT)
Dept: NEUROLOGY | Facility: HOSPITAL | Age: 63
End: 2018-05-08
Attending: INTERNAL MEDICINE
Payer: MEDICARE

## 2018-05-08 ENCOUNTER — CONFERENCE (OUTPATIENT)
Dept: NEUROLOGY | Facility: HOSPITAL | Age: 63
End: 2018-05-08

## 2018-05-08 VITALS
SYSTOLIC BLOOD PRESSURE: 142 MMHG | WEIGHT: 126 LBS | DIASTOLIC BLOOD PRESSURE: 76 MMHG | TEMPERATURE: 100 F | BODY MASS INDEX: 20.99 KG/M2 | HEIGHT: 65 IN | HEART RATE: 68 BPM

## 2018-05-08 DIAGNOSIS — C7A.00 MALIGNANT CARCINOID TUMOR OF UNKNOWN PRIMARY SITE: Primary | ICD-10-CM

## 2018-05-08 DIAGNOSIS — C7B.8 SECONDARY NEUROENDOCRINE TUMOR OF DISTANT LYMPH NODES: ICD-10-CM

## 2018-05-08 DIAGNOSIS — E46 MALNUTRITION COMPROMISING BODILY FUNCTION: ICD-10-CM

## 2018-05-08 PROCEDURE — 3078F DIAST BP <80 MM HG: CPT | Mod: CPTII,,, | Performed by: INTERNAL MEDICINE

## 2018-05-08 PROCEDURE — 99214 OFFICE O/P EST MOD 30 MIN: CPT | Mod: ,,, | Performed by: INTERNAL MEDICINE

## 2018-05-08 PROCEDURE — 3008F BODY MASS INDEX DOCD: CPT | Mod: CPTII,,, | Performed by: INTERNAL MEDICINE

## 2018-05-08 PROCEDURE — 99215 OFFICE O/P EST HI 40 MIN: CPT | Performed by: INTERNAL MEDICINE

## 2018-05-08 PROCEDURE — 3077F SYST BP >= 140 MM HG: CPT | Mod: CPTII,,, | Performed by: INTERNAL MEDICINE

## 2018-05-08 NOTE — PROGRESS NOTES
PATIENT: Kayele Ngo  MRN: [id number]  DATE: 5/8/2018      Diagnosis:   1. Malignant carcinoid tumor of unknown primary site    2. Secondary neuroendocrine tumor of distant lymph nodes    3. Malnutrition compromising bodily function        Chief Complaint: Follow-up (follow up after Ga68 and tumor board)      Oncologic History:      Oncologic History Neuroendocrine tumor unknown primary diagnosed 1/2017   Metastatic disease to intra-abdominal lymph nodes at presentation     Oncologic Treatment Lanreotide 1/2017  Afinitor 6/2017 - 3/2017 (Discontinue due to surgical procedure)    Pathology Well differentiated neuroendocrine tumor, mitoses 1/10HPF, Ki-67 3.4%          Subjective:    Interval History: Ms. Ngo is a 62 y.o. female who is seen in follow-up for a neuroendocrine tumor of unknown primary.  Since I had seen her last she was hospitalized and was seen by surgery and GI.  She ended up having a liver biopsy done which did show fibrosis.  She states that she has issues controlling her blood sugar and routinely runs over 200 and up to 600 periodically.  She has not seen endocrinology recently.  She also complains of some ongoing low back pain when her blood sugars increased.  She has no other new complaints.    Her history dates to 1/2017 when she was undergoing surgery for a left hip fracture.  Following this she complained of abdominal pain and a CT was performed indicating extensive retroperitoneal lymphadenopathy.  A possible small bowel obstruction.  A biopsy was performed which should revealed a well-differentiated neuroendocrine tumor with mitoses 1/10 per HPF and Ki-67 of 3.4%.  Octreotide scan was negative.  She was started on Lanreotide in 1/2017.  Because of increased growth she was started on Afinitor in 6/2017.  In 3/2017 she underwent PEG tube placement due to ongoing weight loss and malnutrition and Afinitor was discontinued.  Additionally, at that time it was noted that she had ongoing  "progression of her lymphadenopathy.      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 She has never used smokeless tobacco. She reports that she does not drink alcohol or use drugs.    Allergies:  Review of patient's allergies indicates:   Allergen Reactions    Codeine     Contrast media      Oral and IV    Darvocet a500 [propoxyphene n-acetaminophen]     Epinephrine      Neuroendocrine Tumor patient      Erythromycin     Iodinated contrast- oral and iv dye     Iodine and iodide containing products     Morphine     Sulfa (sulfonamide antibiotics)     Erythromycin base Rash    Pcn [penicillins] Rash     "It looks like measles."       Medications:  Current Outpatient Prescriptions   Medication Sig Dispense Refill    ACCU-CHEK SHU CONTROL SOLN Soln       ACCU-CHEK SHU PLUS METER Misc       ACCU-CHEK SHU PLUS TEST STRP Strp       amitriptyline (ELAVIL) 50 MG tablet Take 50 mg by mouth every evening.      calcium carbonate (OS-BEST) 600 mg calcium (1,500 mg) Tab Take 600 mg by mouth once.      clonazePAM (KLONOPIN) 0.5 MG tablet Take 0.5 mg by mouth 2 (two) times daily as needed for " "Anxiety.      cyanocobalamin 1,000 mcg/mL injection 1,000 mcg every 28 days.      diclofenac (CATAFLAM) 50 MG tablet as needed.       diphenoxylate-atropine 2.5-0.025 mg (LOMOTIL) 2.5-0.025 mg per tablet Take 1 tablet by mouth 4 (four) times daily as needed for Diarrhea.      DULoxetine (CYMBALTA) 60 MG capsule Take 60 mg by mouth once daily.      GLUCAGON EMERGENCY KIT, HUMAN, 1 mg injection       hydrOXYzine pamoate (VISTARIL) 25 MG Cap Take 25 mg by mouth 3 (three) times daily.       insulin degludec (TRESIBA FLEXTOUCH U-100) 100 unit/mL (3 mL) InPn Inject 15 Units into the skin once daily.       lamoTRIgine (LAMICTAL) 150 MG Tab       lancets 30 gauge Misc       lancing device Misc       lanreotide (SOMATULINE DEPOT) 60 mg/0.2 mL Syrg inject 0.2 milliliter by subcutaneous route once a month      losartan (COZAAR) 100 MG tablet Take 1 tablet (100 mg total) by mouth once daily. 90 tablet 3    metoclopramide HCl (REGLAN) 5 mg/5 mL Soln TAKE 10 ML BY MOUTH THREE TIMES A  mL 2    metoprolol succinate (TOPROL-XL) 100 MG 24 hr tablet Take 100 mg by mouth 2 (two) times daily.      multivitamin capsule Take 1 capsule by mouth once daily.      omeprazole (PRILOSEC) 40 MG capsule Take 40 mg by mouth 2 (two) times daily before meals.       ondansetron (ZOFRAN) 8 MG tablet Take 8 mg by mouth every 8 (eight) hours as needed.       potassium chloride (KLOR-CON) 20 mEq Pack Take 20 mEq by mouth once.       SURE COMFORT PEN NEEDLE 32 gauge x 5/32" Ndle       tiZANidine (ZANAFLEX) 4 MG tablet as needed.       vitamin D 1000 units Tab Take 1,000 Units by mouth once daily.       No current facility-administered medications for this visit.        Review of Systems   Constitutional: Negative for chills, fever and unexpected weight change.   HENT: Negative for congestion, hearing loss and nosebleeds.    Eyes: Negative for visual disturbance.   Respiratory: Negative for cough and shortness of breath.  " "  Cardiovascular: Negative for chest pain and palpitations.   Gastrointestinal: Positive for constipation and diarrhea. Negative for abdominal pain, blood in stool, nausea and vomiting.   Genitourinary: Negative for dysuria.   Musculoskeletal: Positive for back pain. Negative for gait problem.   Skin: Negative for color change and rash.   Neurological: Negative for dizziness, weakness and headaches.   Hematological: Negative for adenopathy. Does not bruise/bleed easily.   Psychiatric/Behavioral: Negative for confusion. The patient is nervous/anxious.        ECOG Performance Status: 1  ECOG SCORE           Objective:      Vitals:   Vitals:    05/08/18 1536   BP: (!) 142/76   Pulse: 68   Temp: 100.2 °F (37.9 °C)   TempSrc: Oral   Weight: 57.2 kg (125 lb 15.9 oz)   Height: 5' 5" (1.651 m)     BMI: Body mass index is 20.97 kg/m².    Physical Exam   Constitutional: She is oriented to person, place, and time. She appears well-developed and well-nourished. No distress.   HENT:   Head: Normocephalic.   Mouth/Throat: No oropharyngeal exudate.   Eyes: EOM are normal. No scleral icterus.   Neck: Neck supple. No tracheal deviation present. No thyromegaly present.   Cardiovascular: Normal rate and regular rhythm.    Pulmonary/Chest: Effort normal and breath sounds normal. No respiratory distress. She has no wheezes. She has no rales.   Abdominal: Soft. She exhibits no distension and no mass. There is tenderness. There is no rebound and no guarding.   Glucose monitor noted, PEG tube site dressed   Musculoskeletal: Normal range of motion. She exhibits no edema.   Lymphadenopathy:     She has no cervical adenopathy.   Neurological: She is alert and oriented to person, place, and time. No cranial nerve deficit.   Skin: Skin is warm and dry.   Psychiatric: She has a normal mood and affect.       Laboratory Data:  No visits with results within 1 Week(s) from this visit.   Latest known visit with results is:   Hospital Outpatient Visit " on 05/01/2018   Component Date Value Ref Range Status    POCT Glucose 05/01/2018 84  70 - 110 mg/dL Final     FINAL PATHOLOGIC DIAGNOSIS  #1 MESENTERIC LYMPH NODE, BIOPSY (REVIEW OF 7 OUTSIDE SLIDES LABELED ):  Well-differentiated neuroendocrine tumor, G2  -- Mitoses: less than 1 per 10hpf  -- Necrosis: Not identified  -- Ki-67: Positive; 3.4% cells staining  -- Chromogranin: Positive (intensity 3+; 80% cells)  -- Synaptophysin: Positive (intensity 2+; 90% cells)  -- CDX2: Positive  -- CK7: Positive  -- CK20: Negative  #2 MESENTERIC NODE, FINE-NEEDLE ASPIRATION (REVIEW OF 3 OUTSIDE SLIDES LABELED N17-74):  Positive for malignant cells.  Comment: No blocks or unstained slides are received, and additional immunostains cannot be performed    Imaging:   CT 3/22/18  EXAMINATION:  CT ABDOMEN PELVIS WITHOUT CONTRAST    CLINICAL HISTORY:  Abdominal pain, unspecified;    TECHNIQUE:  Low dose axial images, sagittal and coronal reformations were obtained from the lung bases to the pubic symphysis.  Oral contrast was not administered.    COMPARISON:  03/07/2018.    FINDINGS:  Evaluation of the solid abdominal organs and bowel is limited in the absence of IV contrast.    Lung bases are clear.  Heart size is normal.    Liver is unremarkable.  Gallbladder is surgically absent.  Spleen is normal in size.  There are postoperative changes involving the stomach.  Percutaneous gastrostomy tube is present within the the gastric lumen.  Kidneys and adrenal glands appear stable, including a punctate nonobstructing right renal stone.    There is extensive lymphadenopathy identified in the upper abdomen, noting interval progression when compared with the prior exam.  Prominent bulky retroperitoneal lymphadenopathy is also present.  There is nonspecific peripancreatic and mesenteric fat stranding, similar in appearance to the prior exam.    No small bowel obstruction.  Small bowel anastomosis is noted in the left lower quadrant.   Small amount of abdominal pelvic free fluid.  Lack of IV contrast and presence of mesenteric edema and small volume ascites limits sensitivity for detection of bowel inflammation.  No pneumoperitoneum is identified.    Degenerative changes are present in the thoracolumbar spine.  No aggressive osseous lesions noted.  There is a stable appearance of a left hip prosthesis.   Impression       Limited exam secondary to lack of IV contrast.  Recommend follow-up exam with IV contrast unless contraindicated.    Worsening bulky retroperitoneal, mesenteric, and periportal lymphadenopathy worrisome for metastatic disease.    Generalized mesenteric edema and small volume abdominopelvic free fluid.    Prior cholecystectomy, gastric bypass, and percutaneous gastrostomy.           Assessment:       1. Malignant carcinoid tumor of unknown primary site    2. Secondary neuroendocrine tumor of distant lymph nodes    3. Malnutrition compromising bodily function           Plan:     Mrs. Ngo was discussed in tumor board this morning and her images were reviewed.  She does not light up on her gallium-68 PET/CT and therefore would not be a candidate for PRRT.  Cytoreduction may be possible, however, the concerning factors are her liver fibrosis and also her malnutrition.  We will check tumor markers today and also a prealbumin and I have encouraged that she try to improve her nutritional status prior to any potential surgeries.  I will plan to see her back in another month and also have her see surgery at that time.  I have also encouraged to follow back up with endocrinology as her blood sugars are extremely elevated and erratic.  All questions were answered and she is agreeable with this plan.      Jeff Nicole DO, FACP  Hematology & Oncology  [address] [place] [address]  ph. [phone]  Fax. [phone]    25 minutes were spent in coordination of patient's care, record review and counseling.  More than 50%  of the time was face-to-face.

## 2018-05-08 NOTE — PATIENT INSTRUCTIONS
Follow up with Dr. Nicole and Dr. Levy on June 25th (will see if Dr. Christine can see you on a Tuesday.

## 2018-05-08 NOTE — LETTER
May 8, 2018        Cam Ashton MD  [address]  [address]  [place] [address]             [facility]  [address]  [place] [address]  Phone: [phone]  Fax: [phone]   Patient: Kaylee Ngo   MR Number: [id number]   YOB: 1955   Date of Visit: 5/8/2018       Dear Dr. Ashton:    Thank you for referring Kaylee Ngo to me for evaluation. Attached you will find relevant portions of my assessment and plan of care.    If you have questions, please do not hesitate to call me. I look forward to following Kaylee Ngo along with you.    Sincerely,      Jeff Nicole DO, FACP            CC  Martita Patel MD    Enclosure

## 2018-05-09 LAB
EXT 24 HR UR METANEPHRINE: ABNORMAL
EXT 24 HR UR NORMETANEPHRINE: ABNORMAL
EXT 24 HR UR NORMETANEPHRINE: ABNORMAL
EXT 25 HYDROXY VIT D2: ABNORMAL
EXT 25 HYDROXY VIT D3: ABNORMAL
EXT 5 HIAA 24 HR URINE: ABNORMAL
EXT 5 HIAA BLOOD: 10 NG/ML (ref 0–22)
EXT ACTH: ABNORMAL
EXT AFP: ABNORMAL
EXT ALBUMIN: ABNORMAL
EXT ALKALINE PHOSPHATASE: ABNORMAL
EXT ALT: ABNORMAL
EXT AMYLASE: ABNORMAL
EXT ANTI ISLET CELL AB: ABNORMAL
EXT ANTI PARIETAL CELL AB: ABNORMAL
EXT ANTI THYROID AB: ABNORMAL
EXT AST: ABNORMAL
EXT BILIRUBIN DIRECT: ABNORMAL MG/DL
EXT BILIRUBIN TOTAL: ABNORMAL
EXT BK VIRUS DNA QN PCR: ABNORMAL
EXT BUN: ABNORMAL
EXT C PEPTIDE: ABNORMAL
EXT CA 125: ABNORMAL
EXT CA 19-9: ABNORMAL
EXT CA 27-29: ABNORMAL
EXT CALCITONIN: 317 PG/ML (ref 0–5)
EXT CALCIUM: ABNORMAL
EXT CEA: 3.5 NG/ML (ref 0–4.7)
EXT CHLORIDE: ABNORMAL
EXT CHOLESTEROL: ABNORMAL
EXT CHROMOGRANIN A: 2 NMOL/L (ref 0–5)
EXT CO2: ABNORMAL
EXT CREATININE UA: ABNORMAL
EXT CREATININE: ABNORMAL MG/DL
EXT CYCLOSPORONE LEVEL: ABNORMAL
EXT DOPAMINE: ABNORMAL
EXT EBV DNA BY PCR: ABNORMAL
EXT EPINEPHRINE: ABNORMAL
EXT FOLATE: ABNORMAL
EXT FREE T3: ABNORMAL
EXT FREE T4: ABNORMAL
EXT FSH: ABNORMAL
EXT GASTRIN RELEASING PEPTIDE: ABNORMAL
EXT GASTRIN RELEASING PEPTIDE: ABNORMAL
EXT GASTRIN: ABNORMAL
EXT GGT: ABNORMAL
EXT GHRELIN: ABNORMAL
EXT GLUCAGON: ABNORMAL
EXT GLUCOSE: ABNORMAL
EXT GROWTH HORMONE: ABNORMAL
EXT HCV RNA QUANT PCR: ABNORMAL
EXT HDL: ABNORMAL
EXT HEMATOCRIT: ABNORMAL
EXT HEMOGLOBIN A1C: ABNORMAL
EXT HEMOGLOBIN: ABNORMAL
EXT HISTAMINE 24 HR URINE: ABNORMAL
EXT HISTAMINE: ABNORMAL
EXT IGF-1: ABNORMAL
EXT IMMUNKNOW (NON-STIMULATED): ABNORMAL
EXT IMMUNKNOW (STIMULATED): ABNORMAL
EXT INR: ABNORMAL
EXT INSULIN: ABNORMAL
EXT LANREOTIDE LEVEL: ABNORMAL
EXT LDH, TOTAL: ABNORMAL
EXT LDL CHOLESTEROL: ABNORMAL
EXT LIPASE: ABNORMAL
EXT MAGNESIUM: ABNORMAL
EXT METANEPHRINE FREE PLASMA: ABNORMAL
EXT MOTILIN: ABNORMAL
EXT NEUROKININ A CAMB: ABNORMAL
EXT NEUROKININ A ISI: >10 PG/ML (ref 0–40)
EXT NEUROTENSIN: ABNORMAL
EXT NOREPINEPHRINE: ABNORMAL
EXT NORMETANEPHRINE: ABNORMAL
EXT NSE: ABNORMAL
EXT OCTREOTIDE LEVEL: ABNORMAL
EXT PANCREASTATIN CAMB: ABNORMAL
EXT PANCREASTATIN ISI: 386 PG/ML (ref 10–135)
EXT PANCREATIC POLYPEPTIDE: ABNORMAL
EXT PHOSPHORUS: ABNORMAL
EXT PLATELETS: ABNORMAL
EXT POTASSIUM: ABNORMAL
EXT PROGRAF LEVEL: ABNORMAL
EXT PROLACTIN: ABNORMAL
EXT PROTEIN TOTAL: ABNORMAL
EXT PROTEIN UA: ABNORMAL
EXT PT: ABNORMAL
EXT PTH, INTACT: ABNORMAL
EXT PTT: ABNORMAL
EXT RAPAMUNE LEVEL: ABNORMAL
EXT SEROTONIN: 8 NG/ML (ref 0–420)
EXT SODIUM: ABNORMAL MMOL/L
EXT SOMATOSTATIN: ABNORMAL
EXT SUBSTANCE P: ABNORMAL
EXT TRIGLYCERIDES: ABNORMAL
EXT TRYPTASE: ABNORMAL
EXT TSH: ABNORMAL
EXT URIC ACID: ABNORMAL
EXT URINE AMYLASE U/HR: ABNORMAL
EXT URINE AMYLASE U/L: ABNORMAL
EXT VASOACTIVE INTESTINAL POLYPEPTIDE: ABNORMAL
EXT VITAMIN B12: ABNORMAL
EXT VMA 24 HR URINE: ABNORMAL
EXT WBC: ABNORMAL
NEURON SPECIFIC ENOLASE: ABNORMAL

## 2018-05-28 ENCOUNTER — TELEPHONE (OUTPATIENT)
Dept: NEUROLOGY | Facility: HOSPITAL | Age: 63
End: 2018-05-28

## 2018-05-28 NOTE — TELEPHONE ENCOUNTER
----- Message from Jeff Nicole DO, FACP sent at 5/24/2018  9:40 AM CDT -----  Let's just start with an ultrasound.    ----- Message -----  From: Ira Logan RN  Sent: 5/23/2018   4:54 PM  To: Annie Garcia RN, Jeff Nicole DO, FACP    Would you like her to see ENT or just have her have an U/S of her thyroid.     ----- Message -----  From: Annie Garcia RN  Sent: 5/23/2018   3:00 PM  To: Ira Logan RN    dont have to be Dr. Sandoval.  It is Dr. Nicole's pt.  Send to md of his choice.  Suzanne  ----- Message -----  From: Ira Logan RN  Sent: 5/23/2018  11:00 AM  To: Annie Garcia RN    How do I set someone up at [facility]?  Is that doctor at [facility], or do you have a number for him?    ----- Message -----  From: Annie Garcia RN  Sent: 5/23/2018  10:51 AM  To: Ira Logan, CHANELLE    [facility],  Just want to confirm you will take care of this.  Thanks  Suzanne  ----- Message -----  From: Jeff Nicole DO, FACP  Sent: 5/23/2018   8:32 AM  To: Annie Garcia RN, Ira Logan, RN    Agree that we start with thyroid u/s.  If there are nodules then I would recommend biopsy.    ----- Message -----  From: Annie Garcia RN  Sent: 5/22/2018   3:45 PM  To: Jeff Nicole DO, FACP, #    Dr. Nicole,  This is your patient.  Please advise.    Suzanne  ----- Message -----  From: Serjio Winkler MD  Sent: 5/11/2018   2:23 PM  To: Annie Garcia RN    Increased calcitonin-- get thyroid ultrasound and bx   get CEA

## 2018-05-28 NOTE — TELEPHONE ENCOUNTER
Spoke with patient's sister, will have thyroid U/S at her local clinic.  Will send orders for the U/S to Dr Gibson for results to be ready prior to her seeing Dr. Nicole again in late June.

## 2018-06-01 ENCOUNTER — TELEPHONE (OUTPATIENT)
Dept: NEUROLOGY | Facility: HOSPITAL | Age: 63
End: 2018-06-01

## 2018-06-12 ENCOUNTER — TELEPHONE (OUTPATIENT)
Dept: NEUROLOGY | Facility: HOSPITAL | Age: 63
End: 2018-06-12

## 2018-06-12 NOTE — TELEPHONE ENCOUNTER
Patient had thyroid U/S due to elevated calcitonin per tumor board recs.  Her sister states she had this done with biopsy in December and they found her U/S suspicious and want to do another biopsy.  She wants to know if this is necessary, and if it is, is it necessary to have prior to her seeing DR. Nicole because they can't schedule it until after that visit.

## 2018-06-12 NOTE — TELEPHONE ENCOUNTER
----- Message from Emily Scott sent at 6/12/2018  9:57 AM CDT -----  Contact: sister  RR- Sister called needing information regarding her sister's thyroid  Biopsy. Patient just had a biopsy of thyroid not so long ago.Sister needs clarification. [phone]

## 2018-06-13 NOTE — TELEPHONE ENCOUNTER
Spoke to patient and let her know that Dr. Nicole agrees with the biopsy and that we can move the appointment back if needed.  She said she would call back to let me if she can get the biopsy done earlier then already scheduled.

## 2018-06-21 ENCOUNTER — TELEPHONE (OUTPATIENT)
Dept: NEUROLOGY | Facility: HOSPITAL | Age: 63
End: 2018-06-21

## 2018-06-21 NOTE — TELEPHONE ENCOUNTER
Rescheduled appt for patient for after she has her thyroid biopsy.  Her sister asked if we could get Dr. Christine to see her the same day. I will talk to his nurse to see if this is possible.

## 2018-06-21 NOTE — TELEPHONE ENCOUNTER
----- Message from Emilyanna Scott sent at 6/21/2018  2:55 PM CDT -----  Contact: Sister  RR- Patient's sister called regarding her biopsy that she needed to get before her appt with Dr Nicole..but unfortunately it couldn't be booked until July2. Please advise  [phone]

## 2018-07-09 PROBLEM — Z09 POSTOP CHECK: Status: RESOLVED | Noted: 2018-04-09 | Resolved: 2018-07-09

## 2018-07-13 ENCOUNTER — OFFICE VISIT (OUTPATIENT)
Dept: NEUROLOGY | Facility: HOSPITAL | Age: 63
End: 2018-07-13
Attending: SURGERY
Payer: MEDICARE

## 2018-07-13 ENCOUNTER — OFFICE VISIT (OUTPATIENT)
Dept: NEUROLOGY | Facility: HOSPITAL | Age: 63
End: 2018-07-13
Attending: INTERNAL MEDICINE
Payer: MEDICARE

## 2018-07-13 ENCOUNTER — TELEPHONE (OUTPATIENT)
Dept: NEUROLOGY | Facility: HOSPITAL | Age: 63
End: 2018-07-13

## 2018-07-13 VITALS
HEIGHT: 66 IN | DIASTOLIC BLOOD PRESSURE: 82 MMHG | SYSTOLIC BLOOD PRESSURE: 165 MMHG | HEIGHT: 65 IN | HEART RATE: 60 BPM | SYSTOLIC BLOOD PRESSURE: 165 MMHG | WEIGHT: 124.25 LBS | BODY MASS INDEX: 20.7 KG/M2 | TEMPERATURE: 99 F | BODY MASS INDEX: 19.97 KG/M2 | WEIGHT: 124.25 LBS | DIASTOLIC BLOOD PRESSURE: 82 MMHG | TEMPERATURE: 99 F | HEART RATE: 60 BPM

## 2018-07-13 DIAGNOSIS — E46 MALNUTRITION COMPROMISING BODILY FUNCTION: ICD-10-CM

## 2018-07-13 DIAGNOSIS — C7A.00 MALIGNANT CARCINOID TUMOR OF UNKNOWN PRIMARY SITE: Primary | ICD-10-CM

## 2018-07-13 DIAGNOSIS — C7B.8 SECONDARY NEUROENDOCRINE TUMOR OF DISTANT LYMPH NODES: ICD-10-CM

## 2018-07-13 PROCEDURE — 3077F SYST BP >= 140 MM HG: CPT | Mod: CPTII,,, | Performed by: INTERNAL MEDICINE

## 2018-07-13 PROCEDURE — 99213 OFFICE O/P EST LOW 20 MIN: CPT | Mod: 27 | Performed by: SURGERY

## 2018-07-13 PROCEDURE — 3079F DIAST BP 80-89 MM HG: CPT | Mod: CPTII,,, | Performed by: INTERNAL MEDICINE

## 2018-07-13 PROCEDURE — 99215 OFFICE O/P EST HI 40 MIN: CPT | Performed by: INTERNAL MEDICINE

## 2018-07-13 PROCEDURE — 99214 OFFICE O/P EST MOD 30 MIN: CPT | Mod: ,,, | Performed by: INTERNAL MEDICINE

## 2018-07-13 PROCEDURE — 3008F BODY MASS INDEX DOCD: CPT | Mod: CPTII,,, | Performed by: INTERNAL MEDICINE

## 2018-07-13 RX ORDER — AMLODIPINE BESYLATE 5 MG/1
5 TABLET ORAL DAILY
Status: ON HOLD | COMMUNITY
Start: 2018-05-15 | End: 2019-04-16 | Stop reason: SDUPTHER

## 2018-07-13 NOTE — PROGRESS NOTES
NOLANETS:  [facility]  A collaboration between [facility] and [facility]      PATIENT: Kaylee Ngo  MRN: [id number]  DATE: 7/13/2018    Subjective:      Chief Complaint: Follow-up (follow up)          1. Malignant carcinoid tumor of unknown primary site    2. Secondary neuroendocrine tumor of distant lymph nodes    3. Malnutrition compromising bodily function          Chief Complaint: Follow-up (Follow Up Visit )        Oncologic History:       Oncologic History Neuroendocrine tumor unknown primary diagnosed 1/2017   Metastatic disease to intra-abdominal lymph nodes at presentation     Oncologic Treatment Lanreotide 1/2017  Afinitor 6/2017 - 3/2017 (Discontinue due to surgical procedure)    Pathology Well differentiated neuroendocrine tumor, mitoses 1/10HPF, Ki-67 3.4%            Subjective:    Interval History: Ms. Ngo is a 62 y.o. female who is seen in follow-up for a neuroendocrine tumor of unknown primary.  She states that she has some intermittent nausea and vomiting over the last several days.  Prior to this she was doing better.  She has been gaining weight.  She recently started managing her own medications and has fallen several times.  No other new complaints.     Her history dates to 1/2017 when she was undergoing surgery for a left hip fracture.  Following this she complained of abdominal pain and a CT was performed indicating extensive retroperitoneal lymphadenopathy.  A possible small bowel obstruction.  A biopsy was performed which should revealed a well-differentiated neuroendocrine tumor with mitoses 1/10 per HPF and Ki-67 of 3.4%.  Octreotide scan was negative.  She was started on Lanreotide in 1/2017.  Because of increased growth she was started on Afinitor in 6/2017.  In 3/2017 she underwent PEG tube placement due to ongoing weight loss and malnutrition and Afinitor was discontinued.   "Additionally, at that time it was noted that she had ongoing progression of her lymphadenopathy.        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 She has never used smokeless tobacco. She reports that she does not drink alcohol or use drugs.     Allergies:        Review of patient's allergies indicates:   Allergen Reactions    Codeine      Contrast media         Oral and IV    Darvocet a500 [propoxyphene n-acetaminophen]      Epinephrine         Neuroendocrine Tumor patient       Erythromycin      Iodinated contrast- oral and iv dye      Iodine and iodide containing products      Morphine      Sulfa (sulfonamide antibiotics)      Erythromycin base Rash    Pcn [penicillins] Rash       "It looks like measles."         Medications:  Current Medications          Current Outpatient Prescriptions   Medication Sig Dispense Refill    ACCU-CHEK SHU CONTROL SOLN Soln          ACCU-CHEK SHU PLUS METER Misc          ACCU-CHEK SHU PLUS TEST STRP Strp          amitriptyline (ELAVIL) 50 MG tablet Take 50 mg by mouth every " "evening.        calcium carbonate (OS-BEST) 600 mg calcium (1,500 mg) Tab Take 600 mg by mouth once.        clonazePAM (KLONOPIN) 0.5 MG tablet Take 0.5 mg by mouth 2 (two) times daily as needed for Anxiety.        cyanocobalamin 1,000 mcg/mL injection 1,000 mcg every 28 days.        diclofenac (CATAFLAM) 50 MG tablet as needed.         diphenoxylate-atropine 2.5-0.025 mg (LOMOTIL) 2.5-0.025 mg per tablet Take 1 tablet by mouth 4 (four) times daily as needed for Diarrhea.        DULoxetine (CYMBALTA) 60 MG capsule Take 60 mg by mouth once daily.        GLUCAGON EMERGENCY KIT, HUMAN, 1 mg injection          hydrOXYzine pamoate (VISTARIL) 25 MG Cap Take 25 mg by mouth 3 (three) times daily.         insulin degludec (TRESIBA FLEXTOUCH U-100) 100 unit/mL (3 mL) InPn Inject 15 Units into the skin once daily.         lamoTRIgine (LAMICTAL) 150 MG Tab          lancets 30 gauge Misc          lancing device Misc          lanreotide (SOMATULINE DEPOT) 60 mg/0.2 mL Syrg inject 0.2 milliliter by subcutaneous route once a month        losartan (COZAAR) 100 MG tablet Take 1 tablet (100 mg total) by mouth once daily. 90 tablet 3    metoclopramide HCl (REGLAN) 5 mg/5 mL Soln TAKE 10 ML BY MOUTH THREE TIMES A  mL 2    metoprolol succinate (TOPROL-XL) 100 MG 24 hr tablet Take 100 mg by mouth 2 (two) times daily.        multivitamin capsule Take 1 capsule by mouth once daily.        omeprazole (PRILOSEC) 40 MG capsule Take 40 mg by mouth 2 (two) times daily before meals.         ondansetron (ZOFRAN) 8 MG tablet Take 8 mg by mouth every 8 (eight) hours as needed.         potassium chloride (KLOR-CON) 20 mEq Pack Take 20 mEq by mouth once.         SURE COMFORT PEN NEEDLE 32 gauge x 5/32" Ndle          tiZANidine (ZANAFLEX) 4 MG tablet as needed.         vitamin D 1000 units Tab Take 1,000 Units by mouth once daily.          No current facility-administered medications for this visit.             Review of " "Systems   Constitutional: Negative for chills, fever and unexpected weight change.   HENT: Negative for congestion, hearing loss and nosebleeds.    Eyes: Negative for visual disturbance.   Respiratory: Negative for cough and shortness of breath.    Cardiovascular: Negative for chest pain and palpitations.   Gastrointestinal: Positive for constipation, diarrhea and nausea. Negative for abdominal pain, blood in stool and vomiting.   Genitourinary: Negative for dysuria.   Musculoskeletal: Positive for back pain. Negative for gait problem.   Skin: Negative for color change and rash.   Neurological: Negative for dizziness, weakness and headaches.   Hematological: Negative for adenopathy. Does not bruise/bleed easily.   Psychiatric/Behavioral: Negative for confusion. The patient is nervous/anxious.          ECOG Performance Status: 1  ECOG SCORE              Objective:   Vitals:   Vitals       Vitals:     07/13/18 1305   BP: (!) 165/82   BP Location: Right arm   Pulse: 60   Temp: 98.5 °F (36.9 °C)   TempSrc: Oral   Weight: 56.4 kg (124 lb 3.7 oz)   Height: 5' 5.5" (1.664 m)         BMI: Body mass index is 20.36 kg/m².     Physical Exam   Constitutional: She is oriented to person, place, and time. She appears well-developed and well-nourished. No distress.   HENT:   Head: Normocephalic.   Mouth/Throat: No oropharyngeal exudate.   Eyes: EOM are normal. No scleral icterus.   Neck: Neck supple. No tracheal deviation present. No thyromegaly present.   Cardiovascular: Normal rate and regular rhythm.    Pulmonary/Chest: Effort normal and breath sounds normal. No respiratory distress. She has no wheezes. She has no rales.   Abdominal: Soft. She exhibits no distension and no mass. There is tenderness. There is no rebound and no guarding.   Glucose monitor noted, PEG tube site dressed   Musculoskeletal: Normal range of motion. She exhibits no edema.   Lymphadenopathy:     She has no cervical adenopathy.   Neurological: She is alert " and oriented to person, place, and time. No cranial nerve deficit.   Skin: Skin is warm and dry.   Psychiatric: She has a normal mood and affect.         Laboratory Data:          Lab Visit on 07/13/2018   Component Date Value Ref Range Status    WBC 07/13/2018 6.10  3.90 - 12.70 K/uL Final    RBC 07/13/2018 4.50  4.00 - 5.40 M/uL Final    Hemoglobin 07/13/2018 11.8* 12.0 - 16.0 g/dL Final    Hematocrit 07/13/2018 39.2  37.0 - 48.5 % Final    MCV 07/13/2018 87  82 - 98 fL Final    MCH 07/13/2018 26.2* 27.0 - 31.0 pg Final    MCHC 07/13/2018 30.1* 32.0 - 36.0 g/dL Final    RDW 07/13/2018 15.0* 11.5 - 14.5 % Final    Platelets 07/13/2018 388* 150 - 350 K/uL Final    MPV 07/13/2018 9.3  9.2 - 12.9 fL Final    Gran # (ANC) 07/13/2018 3.1  1.8 - 7.7 K/uL Final     Comment: The ANC is based on a white cell differential from an   automated cell counter. It has not been microscopically   reviewed for the presence of abnormal cells. Clinical   correlation is required.       Sodium 07/13/2018 139  136 - 145 mmol/L Final    Potassium 07/13/2018 5.1  3.5 - 5.1 mmol/L Final    Chloride 07/13/2018 103  95 - 110 mmol/L Final    CO2 07/13/2018 30* 23 - 29 mmol/L Final    Glucose 07/13/2018 204* 70 - 110 mg/dL Final    BUN, Bld 07/13/2018 14  8 - 23 mg/dL Final    Creatinine 07/13/2018 0.9  0.5 - 1.4 mg/dL Final    Calcium 07/13/2018 9.7  8.7 - 10.5 mg/dL Final    Total Protein 07/13/2018 7.1  6.0 - 8.4 g/dL Final    Albumin 07/13/2018 3.5  3.5 - 5.2 g/dL Final    Total Bilirubin 07/13/2018 0.6  0.1 - 1.0 mg/dL Final     Comment: For infants and newborns, interpretation of results should be based  on gestational age, weight and in agreement with clinical  observations.  Premature Infant recommended reference ranges:  Up to 24 hours.............<8.0 mg/dL  Up to 48 hours............<12.0 mg/dL  3-5 days..................<15.0 mg/dL  6-29 days.................<15.0 mg/dL       Alkaline Phosphatase 07/13/2018  147* 55 - 135 U/L Final    AST 07/13/2018 17  10 - 40 U/L Final    ALT 07/13/2018 17  10 - 44 U/L Final    Anion Gap 07/13/2018 6* 8 - 16 mmol/L Final    eGFR if African American 07/13/2018 >60  >60 mL/min/1.73 m^2 Final    eGFR if non African American 07/13/2018 >60  >60 mL/min/1.73 m^2 Final     Comment: Calculation used to obtain the estimated glomerular filtration  rate (eGFR) is the CKD-EPI equation.        Prealbumin 07/13/2018 17* 20 - 43 mg/dL Final      FINAL PATHOLOGIC DIAGNOSIS  #1 MESENTERIC LYMPH NODE, BIOPSY (REVIEW OF 7 OUTSIDE SLIDES LABELED ):  Well-differentiated neuroendocrine tumor, G2  -- Mitoses: less than 1 per 10hpf  -- Necrosis: Not identified  -- Ki-67: Positive; 3.4% cells staining  -- Chromogranin: Positive (intensity 3+; 80% cells)  -- Synaptophysin: Positive (intensity 2+; 90% cells)  -- CDX2: Positive  -- CK7: Positive  -- CK20: Negative  #2 MESENTERIC NODE, FINE-NEEDLE ASPIRATION (REVIEW OF 3 OUTSIDE SLIDES LABELED N17-74):  Positive for malignant cells.  Comment: No blocks or unstained slides are received, and additional immunostains cannot be performed     Imaging:   CT 3/22/18  EXAMINATION:  CT ABDOMEN PELVIS WITHOUT CONTRAST    CLINICAL HISTORY:  Abdominal pain, unspecified;    TECHNIQUE:  Low dose axial images, sagittal and coronal reformations were obtained from the lung bases to the pubic symphysis.  Oral contrast was not administered.    COMPARISON:  03/07/2018.    FINDINGS:  Evaluation of the solid abdominal organs and bowel is limited in the absence of IV contrast.    Lung bases are clear.  Heart size is normal.    Liver is unremarkable.  Gallbladder is surgically absent.  Spleen is normal in size.  There are postoperative changes involving the stomach.  Percutaneous gastrostomy tube is present within the the gastric lumen.  Kidneys and adrenal glands appear stable, including a punctate nonobstructing right renal stone.    There is extensive lymphadenopathy  "identified in the upper abdomen, noting interval progression when compared with the prior exam.  Prominent bulky retroperitoneal lymphadenopathy is also present.  There is nonspecific peripancreatic and mesenteric fat stranding, similar in appearance to the prior exam.    No small bowel obstruction.  Small bowel anastomosis is noted in the left lower quadrant.  Small amount of abdominal pelvic free fluid.  Lack of IV contrast and presence of mesenteric edema and small volume ascites limits sensitivity for detection of bowel inflammation.  No pneumoperitoneum is identified.    Degenerative changes are present in the thoracolumbar spine.  No aggressive osseous lesions noted.  There is a stable appearance of a left hip prosthesis.   Impression        Limited exam secondary to lack of IV contrast.  Recommend follow-up exam with IV contrast unless contraindicated.    Worsening bulky retroperitoneal, mesenteric, and periportal lymphadenopathy worrisome for metastatic disease.    Generalized mesenteric edema and small volume abdominopelvic free fluid.    Prior cholecystectomy, gastric bypass, and percutaneous gastrostomy.               Assessment:       1. Malignant carcinoid tumor of unknown primary site    2. Secondary neuroendocrine tumor of distant lymph nodes    3. Malnutrition compromising bodily function          Plan:      From a nutritional standpoint she appears to be improving.  She is meeting with Dr. Levy and was previously felt that she may benefit from debulking.  She will continue to follow with Endocrinology for her blood sugars.  Follow back up in 6 weeks with repeat labs including a pre-albumin.       Jeff Nicole DO, FACP  Hematology & Oncology  [facility] [place] [address]  ph. [phone]      Vitals:   Vitals:    07/13/18 1342   BP: (!) 165/82   Pulse: 60   Temp: 98.5 °F (36.9 °C)   TempSrc: Oral   Weight: 56.4 kg (124 lb 3.7 oz)   Height: 5' 5" (1.651 m)        Karnofsky " "Score:     Diagnosis: No diagnosis found.     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    Neuroendocrine tumor 4/9/2018    Obsessive compulsive disorder     Sleep apnea        Past Surgical History:  Past Surgical History:   Procedure Laterality Date    APPENDECTOMY      CHOLECYSTECTOMY      GASTRIC BYPASS      TOTAL KNEE ARTHROPLASTY Left        Family History:  Family History   Problem Relation Age of Onset    Cancer Mother         pancreas    Cancer Father         colon       Allergies:  Review of patient's allergies indicates:   Allergen Reactions    Acetaminophen     Codeine Hives    Contrast media      Oral and IV    Darvocet a500 [propoxyphene n-acetaminophen]     Epinephrine      Neuroendocrine Tumor patient      Iodinated contrast- oral and iv dye     Iodine and iodide containing products     Morphine     Propoxyphene napsylate     Sulfa (sulfonamide antibiotics)     Erythromycin Rash    Erythromycin base Rash    Pcn [penicillins] Rash     "It looks like measles."       Medications:  Current Outpatient Prescriptions   Medication Sig Dispense Refill    ACCU-CHEK SHU CONTROL SOLN Soln       ACCU-CHEK SHU PLUS METER Misc       ACCU-CHEK SHU PLUS TEST STRP Strp       amitriptyline (ELAVIL) 50 MG tablet Take 50 mg by mouth every evening.      amLODIPine (NORVASC) 5 MG tablet Take 2.5 mg by mouth.      calcium carbonate (OS-BEST) 600 mg calcium (1,500 mg) Tab Take 600 mg by mouth once.      clonazePAM (KLONOPIN) 0.5 MG tablet Take 0.5 mg by mouth 2 " "(two) times daily as needed for Anxiety.      cyanocobalamin 1,000 mcg/mL injection 1,000 mcg every 28 days.      diclofenac (CATAFLAM) 50 MG tablet as needed.       diphenoxylate-atropine 2.5-0.025 mg (LOMOTIL) 2.5-0.025 mg per tablet Take 1 tablet by mouth 4 (four) times daily as needed for Diarrhea.      DULoxetine (CYMBALTA) 60 MG capsule Take 60 mg by mouth once daily.      GLUCAGON EMERGENCY KIT, HUMAN, 1 mg injection       hydrOXYzine pamoate (VISTARIL) 25 MG Cap Take 25 mg by mouth 3 (three) times daily.       insulin degludec (TRESIBA FLEXTOUCH U-100) 100 unit/mL (3 mL) InPn Inject 15 Units into the skin once daily.       lamoTRIgine (LAMICTAL) 150 MG Tab       lancets 30 gauge Misc       lancing device Misc       lanreotide (SOMATULINE DEPOT) 60 mg/0.2 mL Syrg inject 0.2 milliliter by subcutaneous route once a month      losartan (COZAAR) 100 MG tablet Take 1 tablet (100 mg total) by mouth once daily. 90 tablet 3    metoclopramide HCl (REGLAN) 5 mg/5 mL Soln TAKE 10 ML BY MOUTH THREE TIMES A DAY (Patient taking differently: TAKE 5 ML BY MOUTH THREE TIMES A DAY) 420 mL 2    metoprolol succinate (TOPROL-XL) 100 MG 24 hr tablet Take 100 mg by mouth 2 (two) times daily.      multivitamin capsule Take 1 capsule by mouth once daily.      omeprazole (PRILOSEC) 40 MG capsule Take 40 mg by mouth 2 (two) times daily before meals.       ondansetron (ZOFRAN) 8 MG tablet Take 8 mg by mouth every 8 (eight) hours as needed.       potassium chloride (KLOR-CON) 20 mEq Pack Take 20 mEq by mouth once.       SURE COMFORT PEN NEEDLE 32 gauge x 5/32" Ndle       tiZANidine (ZANAFLEX) 4 MG tablet as needed.       vitamin D 1000 units Tab Take 1,000 Units by mouth once daily.       No current facility-administered medications for this visit.        Review of Systems   Constitutional: Positive for fatigue. Negative for activity change, appetite change, chills, diaphoresis, fever and unexpected weight change. "   HENT: Negative for facial swelling, hearing loss, mouth sores, nosebleeds, tinnitus, trouble swallowing and voice change.    Eyes: Negative for photophobia and discharge.   Respiratory: Negative for apnea, cough, shortness of breath and wheezing.    Cardiovascular: Positive for leg swelling. Negative for palpitations.   Gastrointestinal: Positive for diarrhea, nausea and vomiting. Negative for abdominal distention, abdominal pain and anal bleeding.   Endocrine: Negative.    Genitourinary: Negative.    Musculoskeletal: Negative.    Allergic/Immunologic: Negative.    Neurological: Negative for seizures and weakness.   Psychiatric/Behavioral: Negative.       Objective:      Physical Exam   Constitutional: She is oriented to person, place, and time. No distress.   HENT:   Head: Normocephalic.   Right Ear: External ear normal.   Eyes: Conjunctivae are normal. Pupils are equal, round, and reactive to light.   Neck: Normal range of motion. Neck supple.   Cardiovascular: Normal rate.    Pulmonary/Chest: Effort normal and breath sounds normal.   Abdominal: Soft. Bowel sounds are normal. There is no rebound and no guarding. No hernia.   PEG tube looks good    Ab soft   Neurological: She is alert and oriented to person, place, and time.   Skin: Skin is warm. She is not diaphoretic.   Psychiatric: She has a normal mood and affect. Her behavior is normal.      Assessment:       No diagnosis found.    Laboratory Data:   Results for NAOMI NAJERA JOSHUA (MRN [id number]) as of 7/13/2018 13:56   Ref. Range 5/1/2018 14:36 5/1/2018 15:53 5/9/2018 00:00 5/9/2018 00:00 7/13/2018 12:00   WBC Latest Ref Range: 3.90 - 12.70 K/uL     6.10   RBC Latest Ref Range: 4.00 - 5.40 M/uL     4.50   Hemoglobin Latest Ref Range: 12.0 - 16.0 g/dL     11.8 (L)   Hematocrit Latest Ref Range: 37.0 - 48.5 %     39.2   MCV Latest Ref Range: 82 - 98 fL     87   MCH Latest Ref Range: 27.0 - 31.0 pg     26.2 (L)   MCHC Latest Ref Range: 32.0 - 36.0 g/dL     30.1  (L)   RDW Latest Ref Range: 11.5 - 14.5 %     15.0 (H)   Platelets Latest Ref Range: 150 - 350 K/uL     388 (H)   MPV Latest Ref Range: 9.2 - 12.9 fL     9.3   Gran # (ANC) Latest Ref Range: 1.8 - 7.7 K/uL     3.1   Sodium Latest Ref Range: 136 - 145 mmol/L     139   Potassium Latest Ref Range: 3.5 - 5.1 mmol/L     5.1   Chloride Latest Ref Range: 95 - 110 mmol/L     103   CO2 Latest Ref Range: 23 - 29 mmol/L     30 (H)   Anion Gap Latest Ref Range: 8 - 16 mmol/L     6 (L)   BUN, Bld Latest Ref Range: 8 - 23 mg/dL     14   Creatinine Latest Ref Range: 0.5 - 1.4 mg/dL     0.9   eGFR if non African American Latest Ref Range: >60 mL/min/1.73 m^2     >60   eGFR if  Latest Ref Range: >60 mL/min/1.73 m^2     >60   Glucose Latest Ref Range: 70 - 110 mg/dL     204 (H)   Calcium Latest Ref Range: 8.7 - 10.5 mg/dL     9.7   Alkaline Phosphatase Latest Ref Range: 55 - 135 U/L     147 (H)   Total Protein Latest Ref Range: 6.0 - 8.4 g/dL     7.1   Albumin Latest Ref Range: 3.5 - 5.2 g/dL     3.5   Prealbumin Latest Ref Range: 20 - 43 mg/dL     17 (L)   Total Bilirubin Latest Ref Range: 0.1 - 1.0 mg/dL     0.6   AST Latest Ref Range: 10 - 40 U/L     17   ALT Latest Ref Range: 10 - 44 U/L     17   POCT Glucose Latest Ref Range: 70 - 110 mg/dL 84       ext 24 hr ur metanephrine Unknown   See Comments     ext 24 hr ur normetanephrine Unknown   See Comments See Comments    EXT 25 HYDROXY VIT D2 Unknown   See Comments     EXT 25 HYDROXY VIT D3 Unknown   See Comments     EXT 5 HIAA 24 HR URINE Unknown   See Comments     EXT 5 HIAA BLOOD Latest Ref Range: 0 - 22 ng/ml   10     EXT ACTH Unknown   See Comments     EXT AFP Unknown   See Comments     EXT Albumin Unknown   See Comments     EXT Alkaline Phosphatase Unknown   See Comments     EXT ALT Unknown   See Comments     EXT Amylase Unknown   See Comments     EXT ANTI ISLET CELL AB Unknown   See Comments     EXT ANTI PARIETAL CELL AB Unknown   See Comments     EXT ANTI  THYROID AB Unknown   See Comments     EXT AST Unknown   See Comments     EXT Bilirubin Direct Latest Units: mg/dL   See Comments     EXT BilirubiN Total Unknown   See Comments     EXT BK Virus DNA QN PCR Unknown   See Comments     EXT BUN Unknown   See Comments     EXT C PEPTIDE Unknown   See Comments     EXT  Unknown   See Comments     EXT CA 19-9 Unknown   See Comments     EXT CA 27-29 Unknown   See Comments     EXT CALCITONIN Latest Ref Range: 0.0 - 5.0 pg/ml   317.0 (A)     EXT Calcium Unknown   See Comments     EXT CEA Latest Ref Range: 0.0 - 4.7 ng/ml   3.5     EXT Chloride Unknown   See Comments     EXT Cholesterol Unknown   See Comments     EXT CHROMOGRANIN A Latest Ref Range: 0 - 5 nmol/l   2     EXT CO2 Unknown   See Comments     EXT Creatinine Latest Units: mg/dL   See Comments     EXT CREATININE UA Unknown   See Comments     EXT CYCLOSPORONE LEVEL Unknown   See Comments     ext dopamine Unknown   See Comments     EXT EBV DNA by PCR Unknown   See Comments     ext epinephrine Unknown   See Comments     EXT Folate Unknown   See Comments     EXT FREE T3 Unknown   See Comments     EXT FREE T4 Unknown   See Comments     EXT FSH Unknown   See Comments     EXT GASTRIN Unknown   See Comments     EXT GASTRIN RELEASING PEPTIDE Unknown   See Comments See Comments    EXT GGT Unknown   See Comments     EXT GHRELIN Unknown   See Comments     EXT GLUCAGON Unknown   See Comments     EXT Glucose Unknown   See Comments     EXT GROWTH HORMONE Unknown   See Comments     ext hcv rna quant pcr Unknown   See Comments     EXT HDL Unknown   See Comments     EXT Hematocrit Unknown   See Comments     EXT Hemoglobin Unknown   See Comments     EXT Hemoglobin A1C Unknown   See Comments     EXT HISTAMINE Unknown   See Comments     EXT HISTAMINE 24 HR URINE Unknown   See Comments     EXT IGF-1 Unknown   See Comments     EXT Immunknow (non-stimulated) Unknown   See Comments     EXT Immunknow (stimulated) Unknown   See Comments      EXT INR Unknown   See Comments     EXT INSULIN Unknown   See Comments     EXT LANREOTIDE LEVEL Unknown   See Comments     EXT LDH, Total Unknown   See Comments     EXT LDL Cholesterol Unknown   See Comments     EXT Lipase Unknown   See Comments     EXT Magnesium Unknown   See Comments     ext metanephrine free plasma Unknown   See Comments     EXT MOTILIN Unknown   See Comments     EXT NEUROKININ A CAMB Unknown   See Comments     EXT NEUROKININ A RANJITH Latest Ref Range: 0 - 40.0 pg/ml   >10.0     EXT NEUROTENSIN Unknown   See Comments     EXT NOREPINEPHRINE Unknown   See Comments     ext normetanephrine Unknown   See Comments     EXT NSE Unknown   See Comments     EXT OCTREOTIDE LEVEL Unknown   See Comments     EXT PANCREASTATIN CAMB Unknown   See Comments     EXT PANCREASTATIN RANJITH Latest Ref Range: 10 - 135 pg/ml   386 (A)     EXT PANCREATIC POLYPEPTIDE Unknown   See Comments     EXT Phosphorus Unknown   See Comments     EXT Platelets Unknown   See Comments     EXT Potassium Unknown   See Comments     EXT PROGRAF LEVEL Unknown   See Comments     EXT PROLACTIN Unknown   See Comments     EXT Protein total Unknown   See Comments     EXT Protein UA Unknown   See Comments     EXT PT Unknown   See Comments     EXT PTH, Intact Unknown   See Comments     EXT PTT Unknown   See Comments     EXT RAPAMUNE LEVEL Unknown   See Comments     EXT SEROTONIN Latest Ref Range: 0 - 420 ng/ml   8     EXT Sodium Latest Units: mmol/L   See Comments     EXT SOMATOSTATIN Unknown   See Comments     EXT SUBSTANCE P Unknown   See Comments     EXT Triglycerides Unknown   See Comments     EXT TRYPTASE Unknown   See Comments     EXT TSH Unknown   See Comments     EXT Uric Acid Unknown   See Comments     EXT URINE AMYLASE U/HR Unknown   See Comments     EXT URINE AMYLASE U/L Unknown   See Comments     EXT VASOACTIVE INTESTINAL POLYPEPTIDE Unknown   See Comments     EXT VITAMIN B12 Unknown   See Comments     EXT VMA 24 HR URINE Unknown   See  Comments     EXT WBC Unknown   See Comments     Neuron Specific Enolase Unknown   See Comments     NM PET 68GA DOTATATE WHOLE BODY Unknown  Rpt          Impression:   Plan:       F/u 3 months    Good nutrotion  Nutrition supplemetation  acticvity                  CAROL Levy MD, FACS   Associate Professor of Surgery, [facility]   Neuroendocrine Surgery, Hepatic/Pancreatic & General Surgery   [address], [address]   [place] [address]   ph. [phone]; [phone]   fax. [phone]

## 2018-07-13 NOTE — PROGRESS NOTES
PATIENT: Kaylee Ngo  MRN: [id number]  DATE: 7/13/2018      Diagnosis:   1. Malignant carcinoid tumor of unknown primary site    2. Secondary neuroendocrine tumor of distant lymph nodes    3. Malnutrition compromising bodily function        Chief Complaint: Follow-up (Follow Up Visit )      Oncologic History:      Oncologic History Neuroendocrine tumor unknown primary diagnosed 1/2017   Metastatic disease to intra-abdominal lymph nodes at presentation     Oncologic Treatment Lanreotide 1/2017  Afinitor 6/2017 - 3/2017 (Discontinue due to surgical procedure)    Pathology Well differentiated neuroendocrine tumor, mitoses 1/10HPF, Ki-67 3.4%          Subjective:    Interval History: Ms. Ngo is a 62 y.o. female who is seen in follow-up for a neuroendocrine tumor of unknown primary.  She states that she has some intermittent nausea and vomiting over the last several days.  Prior to this she was doing better.  She has been gaining weight.  She recently started managing her own medications and has fallen several times.  No other new complaints.    Her history dates to 1/2017 when she was undergoing surgery for a left hip fracture.  Following this she complained of abdominal pain and a CT was performed indicating extensive retroperitoneal lymphadenopathy.  A possible small bowel obstruction.  A biopsy was performed which should revealed a well-differentiated neuroendocrine tumor with mitoses 1/10 per HPF and Ki-67 of 3.4%.  Octreotide scan was negative.  She was started on Lanreotide in 1/2017.  Because of increased growth she was started on Afinitor in 6/2017.  In 3/2017 she underwent PEG tube placement due to ongoing weight loss and malnutrition and Afinitor was discontinued.  Additionally, at that time it was noted that she had ongoing progression of her lymphadenopathy.      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 She has never used smokeless tobacco. She reports that she does not drink alcohol or use drugs.    Allergies:  Review of patient's allergies indicates:   Allergen Reactions    Codeine     Contrast media      Oral and IV    Darvocet a500 [propoxyphene n-acetaminophen]     Epinephrine      Neuroendocrine Tumor patient      Erythromycin     Iodinated contrast- oral and iv dye     Iodine and iodide containing products     Morphine     Sulfa (sulfonamide antibiotics)     Erythromycin base Rash    Pcn [penicillins] Rash     "It looks like measles."       Medications:  Current Outpatient Prescriptions   Medication Sig Dispense Refill    ACCU-CHEK SHU CONTROL SOLN Soln       ACCU-CHEK SHU PLUS METER Misc       ACCU-CHEK SHU PLUS TEST STRP Strp       amitriptyline (ELAVIL) 50 MG tablet Take 50 mg by mouth every evening.      calcium carbonate (OS-BEST) 600 mg calcium (1,500 mg) Tab Take 600 mg by mouth once.      clonazePAM (KLONOPIN) 0.5 MG tablet Take 0.5 mg by mouth 2 (two) times daily as needed for Anxiety.      cyanocobalamin 1,000 mcg/mL injection 1,000 mcg every 28 days.      diclofenac (CATAFLAM) 50 MG tablet as needed.       diphenoxylate-atropine " "2.5-0.025 mg (LOMOTIL) 2.5-0.025 mg per tablet Take 1 tablet by mouth 4 (four) times daily as needed for Diarrhea.      DULoxetine (CYMBALTA) 60 MG capsule Take 60 mg by mouth once daily.      GLUCAGON EMERGENCY KIT, HUMAN, 1 mg injection       hydrOXYzine pamoate (VISTARIL) 25 MG Cap Take 25 mg by mouth 3 (three) times daily.       insulin degludec (TRESIBA FLEXTOUCH U-100) 100 unit/mL (3 mL) InPn Inject 15 Units into the skin once daily.       lamoTRIgine (LAMICTAL) 150 MG Tab       lancets 30 gauge Misc       lancing device Misc       lanreotide (SOMATULINE DEPOT) 60 mg/0.2 mL Syrg inject 0.2 milliliter by subcutaneous route once a month      losartan (COZAAR) 100 MG tablet Take 1 tablet (100 mg total) by mouth once daily. 90 tablet 3    metoclopramide HCl (REGLAN) 5 mg/5 mL Soln TAKE 10 ML BY MOUTH THREE TIMES A  mL 2    metoprolol succinate (TOPROL-XL) 100 MG 24 hr tablet Take 100 mg by mouth 2 (two) times daily.      multivitamin capsule Take 1 capsule by mouth once daily.      omeprazole (PRILOSEC) 40 MG capsule Take 40 mg by mouth 2 (two) times daily before meals.       ondansetron (ZOFRAN) 8 MG tablet Take 8 mg by mouth every 8 (eight) hours as needed.       potassium chloride (KLOR-CON) 20 mEq Pack Take 20 mEq by mouth once.       SURE COMFORT PEN NEEDLE 32 gauge x 5/32" Ndle       tiZANidine (ZANAFLEX) 4 MG tablet as needed.       vitamin D 1000 units Tab Take 1,000 Units by mouth once daily.       No current facility-administered medications for this visit.        Review of Systems   Constitutional: Negative for chills, fever and unexpected weight change.   HENT: Negative for congestion, hearing loss and nosebleeds.    Eyes: Negative for visual disturbance.   Respiratory: Negative for cough and shortness of breath.    Cardiovascular: Negative for chest pain and palpitations.   Gastrointestinal: Positive for constipation, diarrhea and nausea. Negative for abdominal pain, blood in " "stool and vomiting.   Genitourinary: Negative for dysuria.   Musculoskeletal: Positive for back pain. Negative for gait problem.   Skin: Negative for color change and rash.   Neurological: Negative for dizziness, weakness and headaches.   Hematological: Negative for adenopathy. Does not bruise/bleed easily.   Psychiatric/Behavioral: Negative for confusion. The patient is nervous/anxious.        ECOG Performance Status: 1  ECOG SCORE           Objective:      Vitals:   Vitals:    07/13/18 1305   BP: (!) 165/82   BP Location: Right arm   Pulse: 60   Temp: 98.5 °F (36.9 °C)   TempSrc: Oral   Weight: 56.4 kg (124 lb 3.7 oz)   Height: 5' 5.5" (1.664 m)     BMI: Body mass index is 20.36 kg/m².    Physical Exam   Constitutional: She is oriented to person, place, and time. She appears well-developed and well-nourished. No distress.   HENT:   Head: Normocephalic.   Mouth/Throat: No oropharyngeal exudate.   Eyes: EOM are normal. No scleral icterus.   Neck: Neck supple. No tracheal deviation present. No thyromegaly present.   Cardiovascular: Normal rate and regular rhythm.    Pulmonary/Chest: Effort normal and breath sounds normal. No respiratory distress. She has no wheezes. She has no rales.   Abdominal: Soft. She exhibits no distension and no mass. There is tenderness. There is no rebound and no guarding.   Glucose monitor noted, PEG tube site dressed   Musculoskeletal: Normal range of motion. She exhibits no edema.   Lymphadenopathy:     She has no cervical adenopathy.   Neurological: She is alert and oriented to person, place, and time. No cranial nerve deficit.   Skin: Skin is warm and dry.   Psychiatric: She has a normal mood and affect.       Laboratory Data:  Lab Visit on 07/13/2018   Component Date Value Ref Range Status    WBC 07/13/2018 6.10  3.90 - 12.70 K/uL Final    RBC 07/13/2018 4.50  4.00 - 5.40 M/uL Final    Hemoglobin 07/13/2018 11.8* 12.0 - 16.0 g/dL Final    Hematocrit 07/13/2018 39.2  37.0 - 48.5 % " Final    MCV 07/13/2018 87  82 - 98 fL Final    MCH 07/13/2018 26.2* 27.0 - 31.0 pg Final    MCHC 07/13/2018 30.1* 32.0 - 36.0 g/dL Final    RDW 07/13/2018 15.0* 11.5 - 14.5 % Final    Platelets 07/13/2018 388* 150 - 350 K/uL Final    MPV 07/13/2018 9.3  9.2 - 12.9 fL Final    Gran # (ANC) 07/13/2018 3.1  1.8 - 7.7 K/uL Final    Comment: The ANC is based on a white cell differential from an   automated cell counter. It has not been microscopically   reviewed for the presence of abnormal cells. Clinical   correlation is required.      Sodium 07/13/2018 139  136 - 145 mmol/L Final    Potassium 07/13/2018 5.1  3.5 - 5.1 mmol/L Final    Chloride 07/13/2018 103  95 - 110 mmol/L Final    CO2 07/13/2018 30* 23 - 29 mmol/L Final    Glucose 07/13/2018 204* 70 - 110 mg/dL Final    BUN, Bld 07/13/2018 14  8 - 23 mg/dL Final    Creatinine 07/13/2018 0.9  0.5 - 1.4 mg/dL Final    Calcium 07/13/2018 9.7  8.7 - 10.5 mg/dL Final    Total Protein 07/13/2018 7.1  6.0 - 8.4 g/dL Final    Albumin 07/13/2018 3.5  3.5 - 5.2 g/dL Final    Total Bilirubin 07/13/2018 0.6  0.1 - 1.0 mg/dL Final    Comment: For infants and newborns, interpretation of results should be based  on gestational age, weight and in agreement with clinical  observations.  Premature Infant recommended reference ranges:  Up to 24 hours.............<8.0 mg/dL  Up to 48 hours............<12.0 mg/dL  3-5 days..................<15.0 mg/dL  6-29 days.................<15.0 mg/dL      Alkaline Phosphatase 07/13/2018 147* 55 - 135 U/L Final    AST 07/13/2018 17  10 - 40 U/L Final    ALT 07/13/2018 17  10 - 44 U/L Final    Anion Gap 07/13/2018 6* 8 - 16 mmol/L Final    eGFR if African American 07/13/2018 >60  >60 mL/min/1.73 m^2 Final    eGFR if non African American 07/13/2018 >60  >60 mL/min/1.73 m^2 Final    Comment: Calculation used to obtain the estimated glomerular filtration  rate (eGFR) is the CKD-EPI equation.       Prealbumin 07/13/2018 17* 20  - 43 mg/dL Final     FINAL PATHOLOGIC DIAGNOSIS  #1 MESENTERIC LYMPH NODE, BIOPSY (REVIEW OF 7 OUTSIDE SLIDES LABELED ):  Well-differentiated neuroendocrine tumor, G2  -- Mitoses: less than 1 per 10hpf  -- Necrosis: Not identified  -- Ki-67: Positive; 3.4% cells staining  -- Chromogranin: Positive (intensity 3+; 80% cells)  -- Synaptophysin: Positive (intensity 2+; 90% cells)  -- CDX2: Positive  -- CK7: Positive  -- CK20: Negative  #2 MESENTERIC NODE, FINE-NEEDLE ASPIRATION (REVIEW OF 3 OUTSIDE SLIDES LABELED N17-08):  Positive for malignant cells.  Comment: No blocks or unstained slides are received, and additional immunostains cannot be performed    Imaging:   CT 3/22/18  EXAMINATION:  CT ABDOMEN PELVIS WITHOUT CONTRAST    CLINICAL HISTORY:  Abdominal pain, unspecified;    TECHNIQUE:  Low dose axial images, sagittal and coronal reformations were obtained from the lung bases to the pubic symphysis.  Oral contrast was not administered.    COMPARISON:  03/07/2018.    FINDINGS:  Evaluation of the solid abdominal organs and bowel is limited in the absence of IV contrast.    Lung bases are clear.  Heart size is normal.    Liver is unremarkable.  Gallbladder is surgically absent.  Spleen is normal in size.  There are postoperative changes involving the stomach.  Percutaneous gastrostomy tube is present within the the gastric lumen.  Kidneys and adrenal glands appear stable, including a punctate nonobstructing right renal stone.    There is extensive lymphadenopathy identified in the upper abdomen, noting interval progression when compared with the prior exam.  Prominent bulky retroperitoneal lymphadenopathy is also present.  There is nonspecific peripancreatic and mesenteric fat stranding, similar in appearance to the prior exam.    No small bowel obstruction.  Small bowel anastomosis is noted in the left lower quadrant.  Small amount of abdominal pelvic free fluid.  Lack of IV contrast and presence of mesenteric  edema and small volume ascites limits sensitivity for detection of bowel inflammation.  No pneumoperitoneum is identified.    Degenerative changes are present in the thoracolumbar spine.  No aggressive osseous lesions noted.  There is a stable appearance of a left hip prosthesis.   Impression       Limited exam secondary to lack of IV contrast.  Recommend follow-up exam with IV contrast unless contraindicated.    Worsening bulky retroperitoneal, mesenteric, and periportal lymphadenopathy worrisome for metastatic disease.    Generalized mesenteric edema and small volume abdominopelvic free fluid.    Prior cholecystectomy, gastric bypass, and percutaneous gastrostomy.           Assessment:       1. Malignant carcinoid tumor of unknown primary site    2. Secondary neuroendocrine tumor of distant lymph nodes    3. Malnutrition compromising bodily function           Plan:     From a nutritional standpoint she appears to be improving.  She is meeting with Dr. Levy and was previously felt that she may benefit from debulking.  She will continue to follow with Endocrinology for her blood sugars.  Follow back up in 6 weeks with repeat labs including a pre-albumin.      Jeff Nicole DO, FACP  Hematology & Oncology  [facility] [place] [address]  ph. [phone]  Fax. [phone]    25 minutes were spent in coordination of patient's care, record review and counseling.  More than 50% of the time was face-to-face.

## 2018-07-16 RX ORDER — METOCLOPRAMIDE HYDROCHLORIDE 5 MG/5ML
SOLUTION ORAL
Qty: 420 ML | Refills: 1 | Status: SHIPPED | OUTPATIENT
Start: 2018-07-16

## 2018-08-10 DIAGNOSIS — D3A.8 NEUROENDOCRINE TUMOR: Primary | ICD-10-CM

## 2018-08-10 RX ORDER — SUCRALFATE 1 G/10ML
1 SUSPENSION ORAL 4 TIMES DAILY
Qty: 420 ML | Refills: 1 | Status: SHIPPED | OUTPATIENT
Start: 2018-08-10 | End: 2018-09-17

## 2018-08-10 NOTE — TELEPHONE ENCOUNTER
"----- Message from Emily Scott sent at 8/10/2018  2:38 PM CDT -----  Contact: patient  RR- Patient called regarding her feeding tube< its making her very sick. Home health nurse recommended "carafate". Patient would like to speak to nurse regarding. [phone]  "

## 2018-08-16 ENCOUNTER — TELEPHONE (OUTPATIENT)
Dept: NEUROLOGY | Facility: HOSPITAL | Age: 63
End: 2018-08-16

## 2018-08-16 NOTE — TELEPHONE ENCOUNTER
----- Message from Vaishnavi Tejeda sent at 8/16/2018  2:22 PM CDT -----  Contact: Kristy with home health  Kristy calling stating that nausea and vomiting has gotten worse. Pt is not doing well with tube feedings either      Kristy call back number [phone]

## 2018-08-24 ENCOUNTER — HOSPITAL ENCOUNTER (OUTPATIENT)
Dept: RADIOLOGY | Facility: HOSPITAL | Age: 63
Discharge: HOME | End: 2018-08-24
Attending: INTERNAL MEDICINE
Payer: MEDICARE

## 2018-08-24 ENCOUNTER — OFFICE VISIT (OUTPATIENT)
Dept: NEUROLOGY | Facility: HOSPITAL | Age: 63
End: 2018-08-24
Attending: INTERNAL MEDICINE
Payer: MEDICARE

## 2018-08-24 VITALS
RESPIRATION RATE: 14 BRPM | BODY MASS INDEX: 21.14 KG/M2 | SYSTOLIC BLOOD PRESSURE: 109 MMHG | HEIGHT: 65 IN | HEART RATE: 54 BPM | TEMPERATURE: 98 F | DIASTOLIC BLOOD PRESSURE: 64 MMHG | WEIGHT: 126.88 LBS

## 2018-08-24 DIAGNOSIS — K56.609 SMALL BOWEL OBSTRUCTION: Primary | ICD-10-CM

## 2018-08-24 DIAGNOSIS — C7B.8 SECONDARY NEUROENDOCRINE TUMOR OF DISTANT LYMPH NODES: ICD-10-CM

## 2018-08-24 DIAGNOSIS — K56.609 SMALL BOWEL OBSTRUCTION: ICD-10-CM

## 2018-08-24 DIAGNOSIS — R11.0 NAUSEA: ICD-10-CM

## 2018-08-24 DIAGNOSIS — C7A.00 MALIGNANT CARCINOID TUMOR OF UNKNOWN PRIMARY SITE: Primary | ICD-10-CM

## 2018-08-24 PROCEDURE — 3008F BODY MASS INDEX DOCD: CPT | Mod: CPTII,,, | Performed by: INTERNAL MEDICINE

## 2018-08-24 PROCEDURE — 25500020 PHARM REV CODE 255: Performed by: INTERNAL MEDICINE

## 2018-08-24 PROCEDURE — 71260 CT THORAX DX C+: CPT | Mod: 26,,, | Performed by: RADIOLOGY

## 2018-08-24 PROCEDURE — 99214 OFFICE O/P EST MOD 30 MIN: CPT | Mod: ,,, | Performed by: INTERNAL MEDICINE

## 2018-08-24 PROCEDURE — 74178 CT ABD&PLV WO CNTR FLWD CNTR: CPT | Mod: TC

## 2018-08-24 PROCEDURE — 3078F DIAST BP <80 MM HG: CPT | Mod: CPTII,,, | Performed by: INTERNAL MEDICINE

## 2018-08-24 PROCEDURE — 3074F SYST BP LT 130 MM HG: CPT | Mod: CPTII,,, | Performed by: INTERNAL MEDICINE

## 2018-08-24 PROCEDURE — 99213 OFFICE O/P EST LOW 20 MIN: CPT | Performed by: INTERNAL MEDICINE

## 2018-08-24 PROCEDURE — 74178 CT ABD&PLV WO CNTR FLWD CNTR: CPT | Mod: 26,,, | Performed by: RADIOLOGY

## 2018-08-24 RX ORDER — PROMETHAZINE HYDROCHLORIDE 25 MG/1
25 TABLET ORAL EVERY 6 HOURS PRN
Qty: 60 TABLET | Refills: 1 | Status: ON HOLD | OUTPATIENT
Start: 2018-08-24 | End: 2019-04-16 | Stop reason: SDUPTHER

## 2018-08-24 RX ORDER — PREDNISONE 50 MG/1
50 TABLET ORAL 2 TIMES DAILY
Qty: 10 TABLET | Refills: 0 | OUTPATIENT
Start: 2018-08-24 | End: 2019-08-24

## 2018-08-24 RX ORDER — PROMETHAZINE HYDROCHLORIDE 25 MG/1
25 TABLET ORAL
Status: ON HOLD | COMMUNITY
End: 2019-02-26 | Stop reason: CLARIF

## 2018-08-24 RX ADMIN — IOHEXOL 30 ML: 350 INJECTION, SOLUTION INTRAVENOUS at 02:08

## 2018-08-24 RX ADMIN — IOHEXOL 75 ML: 350 INJECTION, SOLUTION INTRAVENOUS at 04:08

## 2018-08-24 NOTE — TELEPHONE ENCOUNTER
----- Message from Emily Scott sent at 8/24/2018  3:31 PM CDT -----  Contact: Susy RICH- [facility] called with a question regarding dexamethasone. Call back number [phone] . 00 to pharmacy

## 2018-08-24 NOTE — TELEPHONE ENCOUNTER
"Patient is having no issues with contrast, she does not "taste copper" nor is she having any itchiness.  She has decided she doesn't ant steroids.     "

## 2018-08-24 NOTE — PROGRESS NOTES
PATIENT: Kaylee Ngo  MRN: [id number]  DATE: 8/24/2018      Diagnosis:   1. Malignant carcinoid tumor of unknown primary site    2. Secondary neuroendocrine tumor of distant lymph nodes    3. Nausea        Chief Complaint: Follow-up (6 week follow up visit)      Oncologic History:      Oncologic History Neuroendocrine tumor unknown primary diagnosed 1/2017   Metastatic disease to intra-abdominal lymph nodes at presentation     Oncologic Treatment Lanreotide 1/2017  Afinitor 6/2017 - 3/2017 (Discontinue due to surgical procedure)    Pathology Well differentiated neuroendocrine tumor, mitoses 1/10HPF, Ki-67 3.4%          Subjective:    Interval History: Ms. Ngo is a 63 y.o. female who is seen in follow-up for a neuroendocrine tumor of unknown primary.  She states that over the last 2-3 weeks she has had increased nausea and vomiting nearly daily.  She has some ongoing abdominal pain.  She is eating more and using her tube less.  No other new complaints.    Her history dates to 1/2017 when she was undergoing surgery for a left hip fracture.  Following this she complained of abdominal pain and a CT was performed indicating extensive retroperitoneal lymphadenopathy.  A possible small bowel obstruction.  A biopsy was performed which should revealed a well-differentiated neuroendocrine tumor with mitoses 1/10 per HPF and Ki-67 of 3.4%.  Octreotide scan was negative.  She was started on Lanreotide in 1/2017.  Because of increased growth she was started on Afinitor in 6/2017.  In 3/2017 she underwent PEG tube placement due to ongoing weight loss and malnutrition and Afinitor was discontinued.  Additionally, at that time it was noted that she had ongoing progression of her lymphadenopathy.      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 she has never used smokeless tobacco. She reports that she does not drink alcohol or use drugs.    Allergies:  Review of patient's allergies indicates:   Allergen Reactions    Codeine     Contrast media      Oral and IV    Darvocet a500 [propoxyphene n-acetaminophen]     Epinephrine      Neuroendocrine Tumor patient      Erythromycin     Iodinated contrast- oral and iv dye     Iodine and iodide containing products     Morphine     Sulfa (sulfonamide antibiotics)     Erythromycin base Rash    Pcn [penicillins] Rash     "It looks like measles."       Medications:  Current Outpatient Medications   Medication Sig Dispense Refill    ACCU-CHEK SHU CONTROL SOLN Soln       ACCU-CHEK SHU PLUS METER Misc       ACCU-CHEK SHU PLUS TEST STRP Strp       amitriptyline (ELAVIL) 50 MG tablet Take 50 mg by mouth every evening.      amLODIPine (NORVASC) 5 MG tablet Take 2.5 mg by mouth.      calcium carbonate (OS-BEST) 600 mg calcium (1,500 mg) Tab Take 600 mg by mouth once.      cyanocobalamin 1,000 mcg/mL injection 1,000 mcg every 28 days.      diclofenac (CATAFLAM) 50 MG tablet as needed.       diphenoxylate-atropine 2.5-0.025 mg (LOMOTIL) 2.5-0.025 mg per tablet Take 1 tablet by mouth 4 (four) times daily as needed for Diarrhea.   " "   DULoxetine (CYMBALTA) 60 MG capsule Take 60 mg by mouth once daily.      GLUCAGON EMERGENCY KIT, HUMAN, 1 mg injection       hydrOXYzine pamoate (VISTARIL) 25 MG Cap Take 25 mg by mouth 3 (three) times daily.       insulin degludec (TRESIBA FLEXTOUCH U-100) 100 unit/mL (3 mL) InPn Inject 15 Units into the skin once daily.       lamoTRIgine (LAMICTAL) 150 MG Tab       lancets 30 gauge Misc       lancing device Misc       lanreotide (SOMATULINE DEPOT) 60 mg/0.2 mL Syrg inject 0.2 milliliter by subcutaneous route once a month      losartan (COZAAR) 100 MG tablet Take 1 tablet (100 mg total) by mouth once daily. 90 tablet 3    metoclopramide HCl (REGLAN) 5 mg/5 mL Soln TAKE 10ML BY MOUTH THREE TIMES A  mL 1    metoprolol succinate (TOPROL-XL) 100 MG 24 hr tablet Take 100 mg by mouth 2 (two) times daily.      multivitamin capsule Take 1 capsule by mouth once daily.      omeprazole (PRILOSEC) 40 MG capsule Take 40 mg by mouth 2 (two) times daily before meals.       ondansetron (ZOFRAN) 8 MG tablet Take 8 mg by mouth every 8 (eight) hours as needed.       potassium chloride (KLOR-CON) 20 mEq Pack Take 20 mEq by mouth once.       promethazine (PHENERGAN) 25 MG tablet Take 25 mg by mouth.      sucralfate (CARAFATE) 100 mg/mL suspension Take 10 mLs (1 g total) by mouth 4 (four) times daily. 420 mL 1    SURE COMFORT PEN NEEDLE 32 gauge x 5/32" Ndle       tiZANidine (ZANAFLEX) 4 MG tablet as needed.       vitamin D 1000 units Tab Take 1,000 Units by mouth once daily.      clonazePAM (KLONOPIN) 0.5 MG tablet Take 0.5 mg by mouth 2 (two) times daily as needed for Anxiety.       No current facility-administered medications for this visit.        Review of Systems   Constitutional: Negative for chills, fever and unexpected weight change.   HENT: Negative for congestion, hearing loss and nosebleeds.    Eyes: Negative for visual disturbance.   Respiratory: Negative for cough and shortness of breath.  " "  Cardiovascular: Negative for chest pain and palpitations.   Gastrointestinal: Positive for constipation, nausea and vomiting. Negative for abdominal pain, blood in stool and diarrhea.   Genitourinary: Negative for dysuria.   Musculoskeletal: Positive for back pain. Negative for gait problem.   Skin: Negative for color change and rash.   Neurological: Negative for dizziness, weakness and headaches.   Hematological: Negative for adenopathy. Does not bruise/bleed easily.   Psychiatric/Behavioral: Negative for confusion. The patient is nervous/anxious.        ECOG Performance Status: 1  ECOG SCORE           Objective:      Vitals:   Vitals:    08/24/18 1309   BP: 109/64   BP Location: Right arm   Pulse: (!) 54   Resp: 14   Temp: 98.4 °F (36.9 °C)   TempSrc: Oral   Weight: 57.6 kg (126 lb 14 oz)   Height: 5' 5" (1.651 m)     BMI: Body mass index is 21.11 kg/m².    Physical Exam   Constitutional: She is oriented to person, place, and time. She appears well-developed and well-nourished. No distress.   HENT:   Head: Normocephalic.   Mouth/Throat: No oropharyngeal exudate.   Eyes: EOM are normal. No scleral icterus.   Neck: Neck supple. No tracheal deviation present. No thyromegaly present.   Cardiovascular: Normal rate and regular rhythm.   Pulmonary/Chest: Effort normal and breath sounds normal. No respiratory distress. She has no wheezes. She has no rales.   Abdominal: Soft. She exhibits no distension and no mass. There is tenderness. There is no rebound and no guarding.   Glucose monitor noted, PEG tube site dressed   Musculoskeletal: Normal range of motion. She exhibits no edema.   Lymphadenopathy:     She has no cervical adenopathy.   Neurological: She is alert and oriented to person, place, and time. No cranial nerve deficit.   Skin: Skin is warm and dry.   Psychiatric: She has a normal mood and affect.       Laboratory Data:  Lab Visit on 08/24/2018   Component Date Value Ref Range Status    WBC 08/24/2018 6.65  " 3.90 - 12.70 K/uL Final    RBC 08/24/2018 4.11  4.00 - 5.40 M/uL Final    Hemoglobin 08/24/2018 11.7* 12.0 - 16.0 g/dL Final    Hematocrit 08/24/2018 38.5  37.0 - 48.5 % Final    MCV 08/24/2018 94  82 - 98 fL Final    MCH 08/24/2018 28.5  27.0 - 31.0 pg Final    MCHC 08/24/2018 30.4* 32.0 - 36.0 g/dL Final    RDW 08/24/2018 14.6* 11.5 - 14.5 % Final    Platelets 08/24/2018 424* 150 - 350 K/uL Final    MPV 08/24/2018 9.2  9.2 - 12.9 fL Final    Gran # (ANC) 08/24/2018 3.3  1.8 - 7.7 K/uL Final    Comment: The ANC is based on a white cell differential from an   automated cell counter. It has not been microscopically   reviewed for the presence of abnormal cells. Clinical   correlation is required.      Sodium 08/24/2018 139  136 - 145 mmol/L Final    Potassium 08/24/2018 4.5  3.5 - 5.1 mmol/L Final    Chloride 08/24/2018 103  95 - 110 mmol/L Final    CO2 08/24/2018 30* 23 - 29 mmol/L Final    Glucose 08/24/2018 162* 70 - 110 mg/dL Final    BUN, Bld 08/24/2018 15  8 - 23 mg/dL Final    Creatinine 08/24/2018 1.1  0.5 - 1.4 mg/dL Final    Calcium 08/24/2018 8.5* 8.7 - 10.5 mg/dL Final    Total Protein 08/24/2018 6.9  6.0 - 8.4 g/dL Final    Albumin 08/24/2018 3.7  3.5 - 5.2 g/dL Final    Total Bilirubin 08/24/2018 0.6  0.1 - 1.0 mg/dL Final    Comment: For infants and newborns, interpretation of results should be based  on gestational age, weight and in agreement with clinical  observations.  Premature Infant recommended reference ranges:  Up to 24 hours.............<8.0 mg/dL  Up to 48 hours............<12.0 mg/dL  3-5 days..................<15.0 mg/dL  6-29 days.................<15.0 mg/dL      Alkaline Phosphatase 08/24/2018 114  55 - 135 U/L Final    AST 08/24/2018 21  10 - 40 U/L Final    ALT 08/24/2018 18  10 - 44 U/L Final    Anion Gap 08/24/2018 6* 8 - 16 mmol/L Final    eGFR if  08/24/2018 >60  >60 mL/min/1.73 m^2 Final    eGFR if non African American 08/24/2018 54* >60  mL/min/1.73 m^2 Final    Comment: Calculation used to obtain the estimated glomerular filtration  rate (eGFR) is the CKD-EPI equation.       Prealbumin 08/24/2018 18* 20 - 43 mg/dL Final     FINAL PATHOLOGIC DIAGNOSIS  #1 MESENTERIC LYMPH NODE, BIOPSY (REVIEW OF 7 OUTSIDE SLIDES LABELED ):  Well-differentiated neuroendocrine tumor, G2  -- Mitoses: less than 1 per 10hpf  -- Necrosis: Not identified  -- Ki-67: Positive; 3.4% cells staining  -- Chromogranin: Positive (intensity 3+; 80% cells)  -- Synaptophysin: Positive (intensity 2+; 90% cells)  -- CDX2: Positive  -- CK7: Positive  -- CK20: Negative  #2 MESENTERIC NODE, FINE-NEEDLE ASPIRATION (REVIEW OF 3 OUTSIDE SLIDES LABELED [id number]):  Positive for malignant cells.  Comment: No blocks or unstained slides are received, and additional immunostains cannot be performed    Imaging:   CT 3/22/18  EXAMINATION:  CT ABDOMEN PELVIS WITHOUT CONTRAST    CLINICAL HISTORY:  Abdominal pain, unspecified;    TECHNIQUE:  Low dose axial images, sagittal and coronal reformations were obtained from the lung bases to the pubic symphysis.  Oral contrast was not administered.    COMPARISON:  03/07/2018.    FINDINGS:  Evaluation of the solid abdominal organs and bowel is limited in the absence of IV contrast.    Lung bases are clear.  Heart size is normal.    Liver is unremarkable.  Gallbladder is surgically absent.  Spleen is normal in size.  There are postoperative changes involving the stomach.  Percutaneous gastrostomy tube is present within the the gastric lumen.  Kidneys and adrenal glands appear stable, including a punctate nonobstructing right renal stone.    There is extensive lymphadenopathy identified in the upper abdomen, noting interval progression when compared with the prior exam.  Prominent bulky retroperitoneal lymphadenopathy is also present.  There is nonspecific peripancreatic and mesenteric fat stranding, similar in appearance to the prior exam.    No small  bowel obstruction.  Small bowel anastomosis is noted in the left lower quadrant.  Small amount of abdominal pelvic free fluid.  Lack of IV contrast and presence of mesenteric edema and small volume ascites limits sensitivity for detection of bowel inflammation.  No pneumoperitoneum is identified.    Degenerative changes are present in the thoracolumbar spine.  No aggressive osseous lesions noted.  There is a stable appearance of a left hip prosthesis.   Impression       Limited exam secondary to lack of IV contrast.  Recommend follow-up exam with IV contrast unless contraindicated.    Worsening bulky retroperitoneal, mesenteric, and periportal lymphadenopathy worrisome for metastatic disease.    Generalized mesenteric edema and small volume abdominopelvic free fluid.    Prior cholecystectomy, gastric bypass, and percutaneous gastrostomy.           Assessment:       1. Malignant carcinoid tumor of unknown primary site    2. Secondary neuroendocrine tumor of distant lymph nodes    3. Nausea           Plan:     Mrs. Ngo is having worsening nausea and vomiting over the course of the last several weeks and I am concerned about a possible bowel obstruction.  I will send her for scans today.  She does listed an allergy to contrast, however, she states that her allergy consists a copper like taste in her mouth which resolves with Benadryl.  She has improved from a nutritional standpoint in may be a candidate for cytoreduction at some point.  For her nausea I have written for [facility].  We will follow up on the results of the CT scans.  All questions were answered and she is agreeable with this plan.      Jeff Nicole DO, FACP  Hematology & Oncology  [facility] [place] [address]  ph. [phone]  Fax. [phone]    25 minutes were spent in coordination of patient's care, record review and counseling.  More than 50% of the time was face-to-face.

## 2018-08-27 ENCOUNTER — TELEPHONE (OUTPATIENT)
Dept: HEMATOLOGY/ONCOLOGY | Facility: CLINIC | Age: 63
End: 2018-08-27

## 2018-08-27 NOTE — TELEPHONE ENCOUNTER
Gave patient message of: no obstruction. We will review in tumor board to discuss timing for surgery

## 2018-08-27 NOTE — TELEPHONE ENCOUNTER
----- Message from Ale Sherwood sent at 8/27/2018 12:59 PM CDT -----  Contact: Patients sister, Emily  RR- Emily, patients sister, would like to know CT results and has other questions.  Call Emily at [phone].

## 2018-08-27 NOTE — TELEPHONE ENCOUNTER
----- Message from Ale Sherwood sent at 8/27/2018  8:39 AM CDT -----  Contact: Susy with [facility]  RR- Susy with [facility] needs Clarification on patients Dexamethasone  Call back number is [phone]

## 2018-08-27 NOTE — TELEPHONE ENCOUNTER
----- Message from Jeff Nicole DO, FACP sent at 8/27/2018  8:23 AM CDT -----  Let's review scan in tumor board.  ? Cytoreduction timing

## 2018-09-04 ENCOUNTER — CONFERENCE (OUTPATIENT)
Dept: NEUROLOGY | Facility: HOSPITAL | Age: 63
End: 2018-09-04

## 2018-09-04 ENCOUNTER — TELEPHONE (OUTPATIENT)
Dept: NEUROLOGY | Facility: HOSPITAL | Age: 63
End: 2018-09-04

## 2018-09-04 NOTE — TELEPHONE ENCOUNTER
----- Message from Emily Tyler sent at 9/4/2018  1:22 PM CDT -----  Contact: sister Emily  RR- sister called to let us know patient's chest is hurting. And is throwing up. They would like to know what to do. Call back number [phone]

## 2018-09-04 NOTE — TELEPHONE ENCOUNTER
"Advised sister to bring patient to the ER.  She stated her glucose and BP are high, her PCP will see her tomorrow, she is refusing to take her to the hospital because she is a "hypochondriac".  Reinforced the need to listen to her signs.  She states if she gets worse she will bring her.     Made appt for surgery f/u per tumor board recs with Dr. Christine.     "

## 2018-09-04 NOTE — TELEPHONE ENCOUNTER
[facility]      NEUROENDOCRINE TUMOR MULTIDISCIPLINARY TUMOR BOARD  _____________________________________________________________________    PRESENTER:   Jeff Nicole DO    REASON FOR PRESENTATION:  Treatment Plan, Scan Review and Surgical Evaluation    ATTENDEES:   Surgery:              MD LUZ Haas MD T. Ramcharan, MD  Interventional Radiology - Jagdish uLque MD  Pathology - Nia Garcia MD  Oncology - Jeff Nicole DO, Jose Diggs MD  Gastroenterology - Not present   Nursing  Research    PATIENT STATUS:  Established Patient    TUMOR SITE (Primary & Mets):  See below    PATIENT SUMMARY:  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  Scan review did not show this. Disease appears to have progressed.  Ga 68 PET/CT scan neg.       BOARD RECOMMENDATIONS:     Surgery eval- Tiana with Dr. Levy  Not candidate for PRRT- raquel diaz

## 2018-09-17 ENCOUNTER — CONFERENCE (OUTPATIENT)
Dept: NEUROLOGY | Facility: HOSPITAL | Age: 63
End: 2018-09-17

## 2018-09-17 ENCOUNTER — HOSPITAL ENCOUNTER (OUTPATIENT)
Dept: RADIOLOGY | Facility: HOSPITAL | Age: 63
Discharge: HOME | End: 2018-09-17
Attending: SURGERY
Payer: MEDICARE

## 2018-09-17 ENCOUNTER — OFFICE VISIT (OUTPATIENT)
Dept: NEUROLOGY | Facility: HOSPITAL | Age: 63
End: 2018-09-17
Attending: SURGERY
Payer: MEDICARE

## 2018-09-17 VITALS
BODY MASS INDEX: 20.57 KG/M2 | HEART RATE: 64 BPM | DIASTOLIC BLOOD PRESSURE: 82 MMHG | TEMPERATURE: 98 F | SYSTOLIC BLOOD PRESSURE: 141 MMHG | WEIGHT: 123.44 LBS | HEIGHT: 65 IN

## 2018-09-17 DIAGNOSIS — E46 MALNUTRITION, UNSPECIFIED TYPE: ICD-10-CM

## 2018-09-17 DIAGNOSIS — R10.84 GENERALIZED ABDOMINAL PAIN: ICD-10-CM

## 2018-09-17 DIAGNOSIS — C7A.00 MALIGNANT CARCINOID TUMOR OF UNKNOWN PRIMARY SITE: ICD-10-CM

## 2018-09-17 DIAGNOSIS — C7A.00 MALIGNANT CARCINOID TUMOR OF UNKNOWN PRIMARY SITE: Primary | ICD-10-CM

## 2018-09-17 PROCEDURE — 74176 CT ABD & PELVIS W/O CONTRAST: CPT | Mod: 26,,, | Performed by: RADIOLOGY

## 2018-09-17 PROCEDURE — 99215 OFFICE O/P EST HI 40 MIN: CPT | Mod: 25 | Performed by: SURGERY

## 2018-09-17 PROCEDURE — 74176 CT ABD & PELVIS W/O CONTRAST: CPT | Mod: TC

## 2018-09-17 RX ORDER — INSULIN ASPART 100 [IU]/ML
INJECTION, SOLUTION INTRAVENOUS; SUBCUTANEOUS
Status: ON HOLD | COMMUNITY
Start: 2018-09-06 | End: 2019-02-26 | Stop reason: HOSPADM

## 2018-09-17 NOTE — TELEPHONE ENCOUNTER
[facility]      NEUROENDOCRINE TUMOR MULTIDISCIPLINARY TUMOR BOARD  _____________________________________________________________________    PRESENTER:   RUDY Ashton MD    REASON FOR PRESENTATION:  Treatment Plan and Scan Review    ATTENDEES:   Surgery:              MD LUZ Haas MD T. Ramcharan, MD  Interventional Radiology - Jagdish Luque MD  Pathology -   Oncology - Jeff Nicole DO, Jose Diggs MD  Gastroenterology - Not present   Nursing  Research    PATIENT STATUS:  Established Patient    TUMOR SITE (Primary & Mets):  See below    PATIENT SUMMARY:  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    Neuroendocrine tumor 4/9/2018    Obsessive compulsive disorder     Sleep apnea        Past Surgical History:   Procedure Laterality Date    APPENDECTOMY      BIOPSY-LIVER  2/28/2018    [facility]    CHOLECYSTECTOMY      ESOPHAGOGASTRODUODENOSCOPY (EGD) N/A 1/29/2018    Per[facility] Kermit Reddy [facility] at [facility] ENDO    GASTRIC BYPASS      INSERTION-TUBE-GASTROSTOMY-LAPAROSCOPIC  with gastrogram N/A 2/28/2018    [facility] at [facility] OR    TOTAL KNEE ARTHROPLASTY Left      ________________________________________________________________    DISCUSSION:    Extensive vito disease, mostly the abd but also chest, is only mildly tracer avid. G-tube in good position as of 9/17/18. O scan negative for tumor.  Gallium weakly positive.  Not a surgical candidate due to nutrition. Surgery may be an option if  she improves nutritionally and reverse gastric bypass. Scans are stable.      BOARD RECOMMENDATIONS:   Consult dietician  Follow up 6 weeks with prealbumin and other labs  Appointment with Dr Nicole in 6 months with CT  Appointment with Dr Levy in 3 months   Continue with SSA

## 2018-09-17 NOTE — PATIENT INSTRUCTIONS
Proceed to 1st floor outpatient diagnostic center for CT scan   We will discuss you in tumor board

## 2018-09-17 NOTE — PROGRESS NOTES
"NOLANETS:  [facility]  A collaboration between [facility] and [facility]      PATIENT: Kaylee Ngo  MRN: [id number]  DATE: 9/17/2018    Subjective:      Chief Complaint: Follow-up (per tumor board/discuss surgery)    She was recently discussed at the tumor board.  She was seen by Dr. Nicole she is here to see me with she would be considered for surgical debulking.  The last 2-3 weeks she has been having nausea intermittently with vomiting.  She had about a year once for constipation.  She is not taking anymore tube feedings as it makes her nauseous.  Eating is variable  Still  fatigued    Vitals:   Vitals:    09/17/18 1411   BP: (!) 141/82   Pulse: 64   Temp: 97.7 °F (36.5 °C)   TempSrc: Oral   Weight: 56 kg (123 lb 7.3 oz)   Height: 5' 5" (1.651 m)        Karnofsky Score:     Diagnosis: No diagnosis found.     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    Neuroendocrine tumor 4/9/2018    Obsessive compulsive disorder     Sleep apnea        Past Surgical History:  Past Surgical History:   Procedure Laterality Date    APPENDECTOMY      BIOPSY-LIVER  2/28/2018    Performed by [facility] at [facility] OR    CHOLECYSTECTOMY      ESOPHAGOGASTRODUODENOSCOPY (EGD) N/A 1/29/2018    Performed by Kermit Reddy MD at [facility] ENDO    GASTRIC BYPASS      INSERTION-TUBE-GASTROSTOMY-LAPAROSCOPIC  with gastrogram N/A 2/28/2018    Performed by Cam Ashton MD " "at [facility] OR    TOTAL KNEE ARTHROPLASTY Left        Family History:  Family History   Problem Relation Age of Onset    Cancer Mother         pancreas    Cancer Father         colon       Allergies:  Review of patient's allergies indicates:   Allergen Reactions    Acetaminophen     Calcium carbonate Nausea And Vomiting     The liquid form    Codeine Hives    Contrast media      Oral and IV    Darvocet a500 [propoxyphene n-acetaminophen]     Epinephrine      Neuroendocrine Tumor patient    Neuroendocrine Tumor patient    Morphine     Propoxyphene napsylate     Sulfa (sulfonamide antibiotics)     Erythromycin Rash    Erythromycin base Rash    Iodinated contrast- oral and iv dye Rash and Other (See Comments)     Copper taste in mouth-Benadryl all that is needed for scans  Copper taste in mouth-Benadryl all that is needed for scans    Pcn [penicillins] Rash     "It looks like measles."       Medications:  Current Outpatient Medications   Medication Sig Dispense Refill    ACCU-CHEK SHU CONTROL SOLN Soln       ACCU-CHEK SHU PLUS METER Misc       ACCU-CHEK SHU PLUS TEST STRP Strp       amitriptyline (ELAVIL) 50 MG tablet Take 50 mg by mouth every evening.      amLODIPine (NORVASC) 5 MG tablet Take 2.5 mg by mouth.      calcium carbonate (OS-BEST) 600 mg calcium (1,500 mg) Tab Take 600 mg by mouth once.      cyanocobalamin 1,000 mcg/mL injection 1,000 mcg every 28 days.      diclofenac (CATAFLAM) 50 MG tablet as needed.       diphenoxylate-atropine 2.5-0.025 mg (LOMOTIL) 2.5-0.025 mg per tablet Take 1 tablet by mouth 4 (four) times daily as needed for Diarrhea.      DULoxetine (CYMBALTA) 60 MG capsule Take 60 mg by mouth once daily.      GLUCAGON EMERGENCY KIT, HUMAN, 1 mg injection       hydrOXYzine pamoate (VISTARIL) 25 MG Cap Take 25 mg by mouth 3 (three) times daily.       insulin aspart U-100 (NOVOLOG FLEXPEN U-100 INSULIN) 100 unit/mL InPn pen 2 units bid prn      insulin degludec " "(TRESIBA FLEXTOUCH U-100) 100 unit/mL (3 mL) InPn Inject 15 Units into the skin once daily.       lamoTRIgine (LAMICTAL) 150 MG Tab       lancets 30 gauge Misc       lancing device Misc       lanreotide (SOMATULINE DEPOT) 60 mg/0.2 mL Syrg inject 0.2 milliliter by subcutaneous route once a month      losartan (COZAAR) 100 MG tablet Take 1 tablet (100 mg total) by mouth once daily. 90 tablet 3    metoclopramide HCl (REGLAN) 5 mg/5 mL Soln TAKE 10ML BY MOUTH THREE TIMES A  mL 1    metoprolol succinate (TOPROL-XL) 100 MG 24 hr tablet Take 100 mg by mouth 2 (two) times daily.      multivitamin capsule Take 1 capsule by mouth once daily.      omeprazole (PRILOSEC) 40 MG capsule Take 40 mg by mouth 2 (two) times daily before meals.       ondansetron (ZOFRAN) 8 MG tablet Take 8 mg by mouth every 8 (eight) hours as needed.       potassium chloride (KLOR-CON) 20 mEq Pack Take 20 mEq by mouth once.       promethazine (PHENERGAN) 25 MG tablet Take 25 mg by mouth.      promethazine (PHENERGAN) 25 MG tablet Take 1 tablet (25 mg total) by mouth every 6 (six) hours as needed for Nausea. 60 tablet 1    SURE COMFORT PEN NEEDLE 32 gauge x 5/32" Ndle       vitamin D 1000 units Tab Take 1,000 Units by mouth once daily.      clonazePAM (KLONOPIN) 0.5 MG tablet Take 0.5 mg by mouth 2 (two) times daily as needed for Anxiety.      dexamethasone 1.5 mg (27 tabs) DsPk Take 1.5 mg by mouth once daily. Tapering dose as directed 27 tablet 0    tiZANidine (ZANAFLEX) 4 MG tablet as needed.        No current facility-administered medications for this visit.        Review of Systems   Constitutional: Positive for activity change, appetite change, fatigue and unexpected weight change. Negative for diaphoresis and fever.   HENT: Negative for congestion, dental problem, drooling, ear discharge, ear pain, facial swelling, hearing loss, mouth sores, postnasal drip, sinus pressure, sneezing, sore throat and tinnitus.    Eyes: " Negative for pain, discharge, redness and itching.   Respiratory: Negative for cough, chest tightness, shortness of breath, wheezing and stridor.    Cardiovascular: Negative for chest pain, palpitations and leg swelling.   Gastrointestinal: Positive for abdominal distention, nausea and vomiting. Negative for blood in stool and diarrhea.   Endocrine: Negative.    Genitourinary: Negative.    Musculoskeletal: Positive for back pain.   Skin: Negative.    Allergic/Immunologic: Negative.    Neurological: Positive for syncope. Negative for tremors, facial asymmetry, weakness, light-headedness and numbness.   Hematological: Negative.    Psychiatric/Behavioral: Negative.       Objective:      Physical Exam   Constitutional: She is oriented to person, place, and time. No distress.   Poorly nourished  Thin built   HENT:   Head: Normocephalic and atraumatic.   Right Ear: External ear normal.   Left Ear: External ear normal.   Eyes: Conjunctivae and EOM are normal. Pupils are equal, round, and reactive to light.   Neck: Normal range of motion.   Cardiovascular: Normal rate and regular rhythm.   Pulmonary/Chest: Effort normal and breath sounds normal.   Abdominal: Soft. Bowel sounds are normal.   Tube from the upper abdomen has a granulation tissue where it exits the skin with some discharge around   Musculoskeletal: Normal range of motion.   Neurological: She is alert and oriented to person, place, and time.   Skin: Skin is warm and dry. She is not diaphoretic.   Psychiatric: She has a normal mood and affect. Her behavior is normal.      Assessment:       No diagnosis found.    Laboratory Data:   Results for DANIA NAOMI D (MRN [id number]) as of 9/17/2018 17:11   Ref. Range 11/17/2017 00:00 11/27/2017 00:00 11/27/2017 00:00 5/9/2018 00:00 5/9/2018 00:00   ext 24 hr ur metanephrine Unknown  See Comments  See Comments    ext 24 hr ur normetanephrine Unknown  See Comments See Comments See Comments See Comments   EXT 25 HYDROXY VIT  D2 Unknown  See Comments  See Comments    EXT 25 HYDROXY VIT D3 Unknown  See Comments  See Comments    EXT 5 HIAA 24 HR URINE Unknown  See Comments  See Comments    EXT 5 HIAA BLOOD Latest Ref Range: 0 - 22 ng/ml  See Comments  10    EXT ACTH Unknown  See Comments  See Comments    EXT AFP Unknown  See Comments  See Comments    EXT Albumin Unknown  See Comments  See Comments    EXT Alkaline Phosphatase Unknown  See Comments  See Comments    EXT ALT Unknown  See Comments  See Comments    EXT Amylase Unknown  See Comments  See Comments    EXT ANTI ISLET CELL AB Unknown  See Comments  See Comments    EXT ANTI PARIETAL CELL AB Unknown  See Comments  See Comments    EXT ANTI THYROID AB Unknown  See Comments  See Comments    EXT AST Unknown  See Comments  See Comments    EXT Bilirubin Direct Latest Units: mg/dL  See Comments  See Comments    EXT BilirubiN Total Unknown  See Comments  See Comments    EXT BK Virus DNA QN PCR Unknown  See Comments  See Comments    EXT BUN Unknown  See Comments  See Comments    EXT C PEPTIDE Unknown  See Comments  See Comments    EXT  Unknown  See Comments  See Comments    EXT CA 19-9 Unknown  See Comments  See Comments    EXT CA 27-29 Unknown  See Comments  See Comments    EXT CALCITONIN Latest Ref Range: 0.0 - 5.0 pg/ml  140.0 (A)  317.0 (A)    EXT Calcium Unknown  See Comments  See Comments    EXT CEA Latest Ref Range: 0.0 - 4.7 ng/ml  See Comments  3.5    EXT Chloride Unknown  See Comments  See Comments    EXT Cholesterol Unknown  See Comments  See Comments    EXT CHROMOGRANIN A Latest Ref Range: 0 - 5 nmol/l  See Comments  2    EXT CO2 Unknown  See Comments  See Comments    EXT Creatinine Latest Units: mg/dL  See Comments  See Comments    EXT CREATININE UA Unknown  See Comments  See Comments    EXT CYCLOSPORONE LEVEL Unknown  See Comments  See Comments    ext dopamine Unknown  See Comments  See Comments    EXT EBV DNA by PCR Unknown  See Comments  See Comments    ext epinephrine  Unknown  See Comments  See Comments    EXT Folate Unknown  See Comments  See Comments    EXT FREE T3 Unknown  See Comments  See Comments    EXT FREE T4 Unknown  See Comments  See Comments    EXT FSH Unknown  See Comments  See Comments    EXT GASTRIN Unknown  See Comments  See Comments    EXT GASTRIN RELEASING PEPTIDE Unknown  See Comments See Comments See Comments See Comments   EXT GGT Unknown  See Comments  See Comments    EXT GHRELIN Unknown 323 See Comments  See Comments    EXT GLUCAGON Unknown  See Comments  See Comments    EXT Glucose Unknown  See Comments  See Comments    EXT GROWTH HORMONE Unknown  See Comments  See Comments    ext hcv rna quant pcr Unknown  See Comments  See Comments    EXT HDL Unknown  See Comments  See Comments    EXT Hematocrit Unknown  See Comments  See Comments    EXT Hemoglobin Unknown  See Comments  See Comments    EXT Hemoglobin A1C Unknown  See Comments  See Comments    EXT HISTAMINE Unknown  See Comments  See Comments    EXT HISTAMINE 24 HR URINE Unknown  See Comments  See Comments    EXT IGF-1 Unknown  See Comments  See Comments    EXT Immunknow (non-stimulated) Unknown  See Comments  See Comments    EXT Immunknow (stimulated) Unknown  See Comments  See Comments    EXT INR Unknown  See Comments  See Comments    EXT INSULIN Unknown  See Comments  See Comments    EXT LANREOTIDE LEVEL Unknown  See Comments  See Comments    EXT LDH, Total Unknown  See Comments  See Comments    EXT LDL Cholesterol Unknown  See Comments  See Comments    EXT Lipase Unknown  See Comments  See Comments    EXT Magnesium Unknown  See Comments  See Comments    ext metanephrine free plasma Unknown  See Comments  See Comments    EXT MOTILIN Unknown  See Comments  See Comments    EXT NEUROKININ A CAMB Unknown  See Comments  See Comments    EXT NEUROKININ A RANJITH Latest Ref Range: 0 - 40.0 pg/ml  See Comments  >10.0    EXT NEUROTENSIN Unknown  See Comments  See Comments    EXT NOREPINEPHRINE Unknown  See Comments   See Comments    ext normetanephrine Unknown  See Comments  See Comments    EXT NSE Unknown  See Comments  See Comments    EXT OCTREOTIDE LEVEL Unknown  See Comments  See Comments    EXT PANCREASTATIN CAMB Unknown  See Comments  See Comments    EXT PANCREASTATIN RANJITH Latest Ref Range: 10 - 135 pg/ml  See Comments  386 (A)    EXT PANCREATIC POLYPEPTIDE Unknown  See Comments  See Comments    EXT Phosphorus Unknown  See Comments  See Comments    EXT Platelets Unknown  See Comments  See Comments    EXT Potassium Unknown  See Comments  See Comments    EXT PROGRAF LEVEL Unknown  See Comments  See Comments    EXT PROLACTIN Unknown  See Comments  See Comments    EXT Protein total Unknown  See Comments  See Comments    EXT Protein UA Unknown  See Comments  See Comments    EXT PT Unknown  See Comments  See Comments    EXT PTH, Intact Unknown  See Comments  See Comments    EXT PTT Unknown  See Comments  See Comments    EXT RAPAMUNE LEVEL Unknown  See Comments  See Comments    EXT SEROTONIN Latest Ref Range: 0 - 420 ng/ml  See Comments  8    EXT Sodium Latest Units: mmol/L  See Comments  See Comments    EXT SOMATOSTATIN Unknown  See Comments  See Comments    EXT SUBSTANCE P Unknown  See Comments  See Comments    EXT Triglycerides Unknown  See Comments  See Comments    EXT TRYPTASE Unknown  See Comments  See Comments    EXT TSH Unknown  See Comments  See Comments    EXT Uric Acid Unknown  See Comments  See Comments    EXT URINE AMYLASE U/HR Unknown  See Comments  See Comments    EXT URINE AMYLASE U/L Unknown  See Comments  See Comments    EXT VASOACTIVE INTESTINAL POLYPEPTIDE Unknown  See Comments  See Comments    EXT VITAMIN B12 Unknown  See Comments  See Comments    EXT VMA 24 HR URINE Unknown  See Comments  See Comments    EXT WBC Unknown  See Comments  See Comments      None   Dx:  Malignant poorly differentiated neuro...   Details     Reading Physician Reading Date Result Priority   Delano Valenzuela III, MD 5/1/2018        Narrative     EXAMINATION:  NM PET 68GA DOTATATE WHOLE BODY    CLINICAL HISTORY:  Malignant poorly differentiated neuroendocrine tumors    FINDINGS:  Patient was administered 5.4 millicuries of gallium 68 octreotide intravenously.  Is there are bilateral hilar somatostatin receptor lymph nodes and a left internal mammary chain positive lymph node.  SUV max left hilar index lesion 3.83.  There is bulky para-aortic retroperitoneal mesenteric and abdominal adenopathy at the level of the kidneys SUV max 5.25.  There is physiologic bowel GI and  activity.  There is physiologic liver and spleen activity.  There is an azygos fissure and lobe variant.  Lungs are clear.  No bone lesions are seen.      Impression       See above    Abdomen and chest lymphadenopathy somatostatin receptor positive consistent with neuroendocrine tumor metastases.      Electronically signed by: Delano Valenzuela MD  Date: 05/01/2018  Time: 16:06             Last Resulted: 05/01/18 16:06 Order Details View Encounter Lab and Collection Details Routing Result History            Other Results from 5/1/2018     POCT glucose   Order: [id number]     Status:  Final result   Visible to patient:  Yes (Patient Portal)   Next appt:  None    Ref Range & Units 4mo ago   POCT Glucose 70 - 110 mg/dL 84    Resulting Agency  [facility]         Specimen Collected: 05/01/18 14:36 Last Resulted: 05/01/18 14:40 Lab Flowsheet Order Details View Encounter Lab and Collection Details Routing Result History            External Result Report     External Result Report   Narrative     EXAMINATION:  NM PET 68GA DOTATATE WHOLE BODY    CLINICAL HISTORY:  Malignant poorly differentiated neuroendocrine tumors    FINDINGS:  Patient was administered 5.4 millicuries of gallium 68 octreotide intravenously.  Is there are bilateral hilar somatostatin receptor lymph nodes and a left internal mammary chain positive lymph node.  SUV max left hilar index lesion 3.83.  There is bulky para-aortic  retroperitoneal mesenteric and abdominal adenopathy at the level of the kidneys SUV max 5.25.  There is physiologic bowel GI and  activity.  There is physiologic liver and spleen activity.  There is an azygos fissure and lobe variant.  Lungs are clear.  No bone lesions are seen.   Impression       See above    Abdomen and chest lymphadenopathy somatostatin receptor positive consistent with neuroendocrine tumor metastases.      Electronically signed by: Delano Skinner MD  Date: 05/01/2018  Time: 16:06    Encounter     View Encounter          Signed by     Signed Credentials Date/Time  Phone Pager   DELANO SKINNER III, MD 5/01/2018 16:06 [phone]   Reviewed By     Jeff Nicole DO, FACP on 5/2/2018 14:43   Exam Details     Performed Procedure Technologist Supporting Staff Performing Physician   NM PET Ga68 Dotatate Whole Body Marie Ignacio, RT        Appointment Date/Status Modality Department    5/1/2018     Completed [facility] PET CT LIMIT 500 LBS [facility] PET CT       Begin Exam End Exam  End Exam Questionnaires   5/1/2018  2:31 PM 5/1/2018  3:53 PM  IMAGING END ALL        Reviewed By List     Jeff Nicole DO, FACP on 8/27/2018 08:23   CT Chest Abdomen Pelvis W W/O Contrast (XPD)   Order: [id number]   Status:  Final result   Visible to patient:  Yes (Patient Portal) Next appt:  None Dx:  Small bowel obstruction   Details     Reading Physician Reading Date Result Priority   Norman Arnold MD 8/24/2018       Narrative     EXAMINATION:  CT CHEST ABDOMEN PELVIS W W/O CONTRAST (XPD)    CLINICAL HISTORY:  r/o SBO;Unspecified intestinal obstruction, unspecified as to partial versus complete obstruction    TECHNIQUE:  Low-dose CT of the chest abdomen and pelvis with contrast was acquired helically from the lung apices through the ischial tuberosities status post administration of 75 cc of Omnipaque 350.  Axial and reformatted images were reviewed.    Low dose axial, sagittal and coronal reformations  were performed from the thoracic inlet to the pubic symphysis following the IV administration of 75 mL of Omnipaque 350.  The chest images are with  contrast and the abdomen images are with and without contrast.  30 mL of oral Omnipaque was given.    COMPARISON:  Gallium 60 a PET scan as well as CT abdomen and pelvis dated 03/22/2018 CT chest abdomen and pelvis 01/08/2018    FINDINGS:  CHEST    There is a 3 mm pulmonary nodule in the right upper lobe (series 3, image 10) which was seen on the prior exam however appears to be slightly more prominent since the January 2018 examination.  Additional 2 mm pulmonary nodule in the left lower lobe (series 3, image 31) is identified.  There is mild pleural based nodularity in the left lung base.  Additional scattered small 2-3 cm pulmonary nodules are noted throughout the lungs.    The aorta is unremarkable in appearance. There is no pericardial effusion.  No enlarged mediastinal, hilar or axillary lymph nodes are identified.    ABDOMEN    Liver/gallbladder/biliary: The liver demonstrates no focal abnormality. The patient is status post cholecystectomyno biliary ductal dilation.    Pancreas: The pancreas is unremarkable in appearance.    Spleen: The spleen is not enlarged.    Adrenals: Unremarkable    Kidneys: The kidneys are equally perfused and demonstrate no solid masses. No nephrolithiasis.    Bowel/Mesentery: There is no evidence of bowel obstruction.   There is a percutaneous gastrostomy tube again noted.    There is again noted bulky lymphadenopathy in the upper abdomen as well as extensive retroperitoneal lymphadenopathy.  Although this exam is difficult to compare with the prior exam due the lack of intravenous contrast on the prior imaging this appears to be worse since the prior exam.    Retroperitoneum: No adenopathy.The aorta demonstrates a normal caliber.    PELVIS    Genitourinary/Reproductive organs: Unremarkable    Adenopathy: None    Free Fluid: No free  fluid    [place] Structures/Soft tissues: Stable sclerotic foci in the pelvis in L4 vertebral body is noted likely representing bony islands.      Impression       No CT evidence of small bowel obstruction    Extensive upper abdominal and retroperitoneal lymphadenopathy which when compared to the prior noncontrast exam examination appears to be slightly more prominent    Multiple subcentimeter pulmonary nodules are seen some of which appear to be more prominent since the January 2018 examination; these are a represent infectious or inflammatory processes however metastatic disease cannot be excluded and attention should be paid to the chest on follow up examinations      Electronically signed by: Norman Arnold MD  Date: 08/24/2018  Time: 18:12             Last Resulted: 08/24/18 18:12 Order Details View Encounter Lab and Collection Details Routing Result History            External Result Report     External Result Report   Narrative     EXAMINATION:  CT CHEST ABDOMEN PELVIS W W/O CONTRAST (XPD)    CLINICAL HISTORY:  r/o SBO;Unspecified intestinal obstruction, unspecified as to partial versus complete obstruction    TECHNIQUE:  Low-dose CT of the chest abdomen and pelvis with contrast was acquired helically from the lung apices through the ischial tuberosities status post administration of 75 cc of Omnipaque 350.  Axial and reformatted images were reviewed.    Low dose axial, sagittal and coronal reformations were performed from the thoracic inlet to the pubic symphysis following the IV administration of 75 mL of Omnipaque 350.  The chest images are with  contrast and the abdomen images are with and without contrast.  30 mL of oral Omnipaque was given.    COMPARISON:  Gallium 60 a PET scan as well as CT abdomen and pelvis dated 03/22/2018 CT chest abdomen and pelvis 01/08/2018    FINDINGS:  CHEST    There is a 3 mm pulmonary nodule in the right upper lobe (series 3, image 10) which was seen on the prior exam  however appears to be slightly more prominent since the January 2018 examination.  Additional 2 mm pulmonary nodule in the left lower lobe (series 3, image 31) is identified.  There is mild pleural based nodularity in the left lung base.  Additional scattered small 2-3 cm pulmonary nodules are noted throughout the lungs.    The aorta is unremarkable in appearance. There is no pericardial effusion.  No enlarged mediastinal, hilar or axillary lymph nodes are identified.    ABDOMEN    Liver/gallbladder/biliary: The liver demonstrates no focal abnormality. The patient is status post cholecystectomyno biliary ductal dilation.    Pancreas: The pancreas is unremarkable in appearance.    Spleen: The spleen is not enlarged.    Adrenals: Unremarkable    Kidneys: The kidneys are equally perfused and demonstrate no solid masses. No nephrolithiasis.    Bowel/Mesentery: There is no evidence of bowel obstruction.   There is a percutaneous gastrostomy tube again noted.    There is again noted bulky lymphadenopathy in the upper abdomen as well as extensive retroperitoneal lymphadenopathy.  Although this exam is difficult to compare with the prior exam due the lack of intravenous contrast on the prior imaging this appears to be worse since the prior exam.    Retroperitoneum: No adenopathy.The aorta demonstrates a normal caliber.    PELVIS    Genitourinary/Reproductive organs: Unremarkable    Adenopathy: None    Free Fluid: No free fluid    [place]se[place] Structures/Soft tissues: Stable sclerotic foci in the pelvis in L4 vertebral body is noted likely representing bony islands.   Impression       No CT evidence of small bowel obstruction    Extensive upper abdominal and retroperitoneal lymphadenopathy which when compared to the prior noncontrast exam examination appears to be slightly more prominent    Multiple subcentimeter pulmonary nodules are seen some of which appear to be more prominent since the January 2018 examination; these are a  represent infectious or inflammatory processes however metastatic disease cannot be excluded and attention should be paid to the chest on follow up examinations      Electronically signed by: Norman Arnold MD  Date: 08/24/2018  Time: 18:12    Encounter     View Encounter          Signed by     Signed Credentials Date/Time  Phone Pager   NORMAN ARNOLD MD 8/24/2018 18:12 [phone]    Reviewed By     Jeff Nicole DO, FACP on 8/27/2018 08:23   Exam Details     Performed Procedure Technologist Supporting Staff Performing Physician   CT Chest Abdomen Pelvis W W/O Contrast (XPD) Mychal Christian        Appointment Date/Status Modality Department    8/24/2018     Completed [facility] CT2 LIMIT 400 LBS [facility] CT SCAN       Begin Exam End Exam  End Exam Questionnaires   8/24/2018  4:32 PM 8/24/2018  4:48 PM  IMAGING END ALL      Reason For Exam   Priority: STAT   r/o SBO   Dx: Small bowel obstruction [K56.609 (ICD-10-CM)]   Order Report      Order Details       Impression:  1.  Neuroendocrine tumor of unknown origin with a significant retroperitoneal and mesenteric lymphadenopathy.  Will be series of CT scans show worsening of the disease.  She is on Bassett tied. She was started on Afinitor and that the response was not satisfactory.  She was placed on tube feeding to get a nutrition up so that she can tolerate surgery however her nutrition has been for her prealbumin is always on 16 . To make things worse, she has been having nausea I intermittently and has lost about 5 lb in the last 2-3 weeks time.    2. Status post gastric bypass causing nutritional deficiencies  3.  Diabetes with poor peristalsis.  May be responsible for her nausea  4.  Has alopecia may be from nutrition deficiencies    Her O-scan has been negative however the gallium scan is positive therefore she could be a candidate for PRRT.   I spoke to her and her sister who accompanied her for a very long time.  I told them my concern is her nutrition  is not great for her to tolerate any major surgery. Her mortality and morbidity will be pretty high if she undergoes surgery at this point.     I spoke to them for more than 45 about all possible option.      The plan would be to proceed with PRRT.  During the last 2 about since she was OctreoScan negative she was not thought to be a candidate.  However had gallium scan shows the tumor has good uptake.  So she could be a candidate for gallium scan will discuss at the tumor board.    It is even though she is not a surgical candidate if by chance the nutrition improves surgery could always be a option unless disease is significantly worse.  I strongly advised her to concentrate on nutrition.  To workup her nausea do a CT scan noncontrast to make sure that feeding tube is in the stomach.  Instead of getting bolus feeding I recommended that she she does continue is low rate tube feeding.    Doctor about dressing changes to the abdomen around the feeding tube.  Plan:          assess the gastric tube position   This is with the tumor board again to be considered for PRRT  Continuous tube feeeding rather than bolus tube feedings        CAROL Levy MD, FACS   Associate Professor of Surgery, [facility]   Neuroendocrine Surgery, Hepatic/Pancreatic & General Surgery   [address]   [place] [address]   ph. [phone]; [phone]   fax. [phone]

## 2018-09-18 ENCOUNTER — TELEPHONE (OUTPATIENT)
Dept: NEUROLOGY | Facility: HOSPITAL | Age: 63
End: 2018-09-18

## 2018-09-18 NOTE — TELEPHONE ENCOUNTER
Referring Physician: Cam Ashton MD    Reason for Call: Nutrition Consult for surgical intervention identified in Tumor Board. Pt has a PEG.    Pertinent Social History: Patient is independent with ADL. Her sister lives near by if issues arise.     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    Neuroendocrine tumor 4/9/2018    Obsessive compulsive disorder     Sleep apnea        Previous Surgical History:  Past Surgical History:   Procedure Laterality Date    APPENDECTOMY      BIOPSY-LIVER  2/28/2018    [facility]    CHOLECYSTECTOMY      ESOPHAGOGASTRODUODENOSCOPY (EGD) N/A 1/29/2018    [facility] by Kermit Reddy [facility] at [facility] ENDO    GASTRIC BYPASS      INSERTION-TUBE-GASTROSTOMY-LAPAROSCOPIC  with gastrogram N/A 2/28/2018    [facility] at [facility] OR    TOTAL KNEE ARTHROPLASTY Left        Medication:    ACCU-CHEK SHU CONTROL SOLN Soln, , Disp: , Rfl:     ACCU-CHEK SHU PLUS METER Misc, , Disp: , Rfl:     ACCU-CHEK SHU PLUS TEST STRP Strp, , Disp: , Rfl:     amitriptyline (ELAVIL) 50 MG tablet, Take 50 mg by mouth every evening., Disp: , Rfl:     amLODIPine (NORVASC) 5 MG tablet, Take 2.5 mg by mouth., Disp: , Rfl:     clonazePAM (KLONOPIN) 0.5 MG tablet, Take 0.5 mg by mouth 2 (two) times daily as needed for Anxiety., Disp: , Rfl:     dexamethasone 1.5 mg (27 tabs) DsPk, Take 1.5 mg by mouth once daily. Tapering dose as directed, Disp: 27 tablet, Rfl: 0    diclofenac (CATAFLAM) 50 MG tablet, as needed. , Disp: ,  "Rfl:     diphenoxylate-atropine 2.5-0.025 mg (LOMOTIL) 2.5-0.025 mg per tablet, Take 1 tablet by mouth 4 (four) times daily as needed for Diarrhea., Disp: , Rfl:     DULoxetine (CYMBALTA) 60 MG capsule, Take 60 mg by mouth once daily., Disp: , Rfl:     GLUCAGON EMERGENCY KIT, HUMAN, 1 mg injection, , Disp: , Rfl:     hydrOXYzine pamoate (VISTARIL) 25 MG Cap, Take 25 mg by mouth 3 (three) times daily. , Disp: , Rfl:     insulin aspart U-100 (NOVOLOG FLEXPEN U-100 INSULIN) 100 unit/mL InPn pen, 2 units bid prn, Disp: , Rfl:     insulin degludec (TRESIBA FLEXTOUCH U-100) 100 unit/mL (3 mL) InPn, Inject 15 Units into the skin once daily. , Disp: , Rfl:     lamoTRIgine (LAMICTAL) 150 MG Tab, , Disp: , Rfl:     lancets 30 gauge Misc, , Disp: , Rfl:     lancing device Misc, , Disp: , Rfl:     lanreotide (SOMATULINE DEPOT) 60 mg/0.2 mL Syrg, inject 0.2 milliliter by subcutaneous route once a month, Disp: , Rfl:     losartan (COZAAR) 100 MG tablet, Take 1 tablet (100 mg total) by mouth once daily., Disp: 90 tablet, Rfl: 3    metoclopramide HCl (REGLAN) 5 mg/5 mL Soln, TAKE 10ML BY MOUTH THREE TIMES A DAY, Disp: 420 mL, Rfl: 1    metoprolol succinate (TOPROL-XL) 100 MG 24 hr tablet, Take 100 mg by mouth 2 (two) times daily., Disp: , Rfl:     omeprazole (PRILOSEC) 40 MG capsule, Take 40 mg by mouth 2 (two) times daily before meals. , Disp: , Rfl:     ondansetron (ZOFRAN) 8 MG tablet, Take 8 mg by mouth every 8 (eight) hours as needed. , Disp: , Rfl:     potassium chloride (KLOR-CON) 20 mEq Pack, Take 20 mEq by mouth once. , Disp: , Rfl:     promethazine (PHENERGAN) 25 MG tablet, Take 25 mg by mouth., Disp: , Rfl:     promethazine (PHENERGAN) 25 MG tablet, Take 1 tablet (25 mg total) by mouth every 6 (six) hours as needed for Nausea., Disp: 60 tablet, Rfl: 1    SURE COMFORT PEN NEEDLE 32 gauge x 5/32" Ndobdulio, , Disp: , Rfl:     tiZANidine (ZANAFLEX) 4 MG tablet, as needed. , Disp: , Rfl: " "      Vitamin/Supplements/Herbs:     calcium carbonate (OS-BEST) 600 mg calcium (1,500 mg) Tab, Take 600 mg by mouth once., Disp: , Rfl:     cyanocobalamin 1,000 mcg/mL injection, 1,000 mcg every 28 days., Disp: , Rfl:     multivitamin capsule, Take 1 capsule by mouth once daily., Disp: , Rfl:     vitamin D 1000 units Tab, Take 1,000 Units by mouth once daily., Disp: , Rfl:        Allergies:  Review of patient's allergies indicates:   Allergen Reactions    Acetaminophen     Calcium carbonate Nausea And Vomiting     The liquid form    Codeine Hives    Contrast media      Oral and IV    Darvocet a500 [propoxyphene n-acetaminophen]     Epinephrine      Neuroendocrine Tumor patient    Morphine     Propoxyphene napsylate     Sulfa (sulfonamide antibiotics)     Erythromycin Rash    Erythromycin base Rash    Iodinated contrast- oral and iv dye Rash and Other (See Comments)     Copper taste in mouth-Benadryl all that is needed for scans    Pcn [penicillins] Rash     "It looks like measles."       Labs: reviewed.     : 1955  Age: 63 y.o.    Anthropometrics  Weight: 124 lbs  Height: 65 in    BMI: 20.5 Normal   IBW: 125 lbs        Weight History:  Weight history reflects weight gain of ~10 lbs from January until 2018 but recently noted to have had a 3 lbs weight loss.     Wt Readings from Last 12 Encounters:   18 56 kg (123 lb 7.3 oz)   18 57.6 kg (126 lb 14 oz)   18 56.4 kg (124 lb 3.7 oz)   18 56.4 kg (124 lb 3.7 oz)   18 57.2 kg (125 lb 15.9 oz)   18 54.9 kg (121 lb)   18 55 kg (121 lb 4.1 oz)   18 54.9 kg (121 lb 0.5 oz)   18 51.1 kg (112 lb 10.5 oz)   18 54.4 kg (119 lb 14.9 oz)   18 57 kg (125 lb 10.6 oz)   18 52.6 kg (116 lb)       Estimated Nutrition Needs:   Energy Needs:  2016 (1120 MSJ x 1.8  PAL) for weight gain  Protein Needs:  56 to 67 (1.0-1.2 gm Protein/kg)  Fluid Needs:  1680 (30 mL/kg)    Nutrition " Focused Physical Findings: Pt not seen in clinic.      Nutrition History    Meal Pattern: eats several small meals per day.     Food Intolerance: Pt reports nausea and abdominal pain when administering TF. She attempts to administer Glucerna 1.5 TF via pump at 60 mL/hr but has to stop after 15 to 30 minutes due to bloating and pain. She rarely administers 60 mL/hr.  Pt denies diagnosis of gastroparesis.     Meal Preparation: Pt is able to prepare small meals or family provides meals for her  Dining Out: n/a    Dentition: Difficulty chewing or swallowing? no  Exercise: Patient is not able to participate in exercise program.     Motivation to Change: Expect good compliance with nutrition recommendations. Pt voices willingness to comply with changes for improved health and surgical intervention.    Nutrition Diagnosis  Nutrition Problem  Altered GI Function    Related to (etiology):   Neuroendocrine Tumor of unknown primary site  Gastric By Pass surgery with need for PEG to improve nutrition  Cholecystectomy    Signs and Symptoms (as evidenced by):   nausea and abdominal pain when administering TF resulting in weight loss    Nutrition Diagnosis Status:   New      Recommendations/Interventions/Goals:  1. REC to change TF to Impact Peptide 1.5. Goal is 6 cans a day to provide 2160 calories, 135 gm Pro, 1108 mL water.  2. Discussed slow progression of TF rate. Start with 2 cans a day at 30 mL x 16 hrs from 8a to 12p. If patient able to ASHANTI,may INC to 3 cans a day at 45 mL x 16 hrs. RD to monitor patient weekly for TF rate adjustments.   3. New TF order faxed to [facility]. No equivalent substitutions allowed. Pt to receive only Impact Peptide 1.5.    4. Discussed oral intake goal of >1000 calories a day to supplement calorie and protein needs until tube feed rate is tolerated at goal of 90 mL x 16 hrs.   5. Discussed need to monitor blood glucose levels closely after formula change.  Pt takes long acting insulin during day  and uses fast acting insulin at meal times. Pt has a glucose monitoring device. AIC level unknown.   6. Will discuss medication for bloating with Dr Christine.       Consultation Time:45 minutes.    Follow Up:1 months.     Note routed to Dr Christine.

## 2018-09-18 NOTE — TELEPHONE ENCOUNTER
----- Message from Emily Scott sent at 9/18/2018 10:04 AM CDT -----  Contact: sister Emily Pickard  DYLAN- sister called to see what the tumor results from the ct regarding her feeding tube. Call back number [phone]

## 2018-09-19 NOTE — TELEPHONE ENCOUNTER
Returned call and spoke to patient's sister, Emily.  Reviewed TB recommendations.  Informed her that the Dietician, Tash Coronado would be calling her this afternoon with recommendations to improve her nutritional status.  Reviewed the placement of the g tube and the CT scan that she had on September 17 after her visit with Dr Christine.  Appointment made with Dr Christine and will ask if he would like labs or any scans before the appointment.

## 2018-09-19 NOTE — TELEPHONE ENCOUNTER
----- Message from Emilyanna Scott sent at 9/19/2018  8:26 AM CDT -----  Contact: sister Emily Pickard   DYLAN- sister Emily Pickard called to find out the results of ct and the results of tumor board. Call back number [phone]  ----- Message -----  From: Emily Scott  Sent: 9/18/2018  10:04 AM  To: Cam Ashton Staff    DYLAN- sister called to see what the tumor results from the ct regarding her feeding tube. Call back number [phone]

## 2018-09-21 ENCOUNTER — TELEPHONE (OUTPATIENT)
Dept: NEUROLOGY | Facility: HOSPITAL | Age: 63
End: 2018-09-21

## 2018-09-28 ENCOUNTER — TELEPHONE (OUTPATIENT)
Dept: NEUROLOGY | Facility: HOSPITAL | Age: 63
End: 2018-09-28

## 2018-09-28 NOTE — TELEPHONE ENCOUNTER
----- Message from Tracie M. Weil-Cavalier, RD sent at 9/26/2018  4:08 PM CDT -----  Call patient on Friday for TF Tolerance    Follow up call made to speak with Emily Melly, pt's sister regarding tolerance to tube feed recommendations. Pt reported good tolerance to Impact Peptide 1.5 tube feed while in the hospital and after discharge home. Pt stated she has 12 cartons of Impact Peptide 1.5 at her home. Her formula was changed to Glucerna 1.5 after another hospitalization which she reports causes abd pain and nausea so she was not administering much through out the day. Discussed the option, with patient and sister, to trial Impact Peptide 1.5 480 mL a day (2 cans) set pump rate to 30 mL for 16 hrs to provide 720 calories, 46 gm Pro, 360 mL water. Pt is to consume ~ 1000 calories each day until TF is able to advance. She is to monitor her BGL closely since Impact Peptide 1.5 is not a CHO restricted formula such as Glucerna 1.5. Per discussion with Emily, pt did tolerate 2 cans of Impact Peptide 1.5 yesterday. Emily reports +nausea but no emesis. Pt took phenergan for nausea and gas x tablets for bloating. Will call again on Friday, October 5, 2018 to discuss tolerance and ability to increase to 3 cans per day. Emily verbalizes understanding. Asked Emily to obtain weekly weight if able to monitor gains and losses.

## 2018-10-05 ENCOUNTER — TELEPHONE (OUTPATIENT)
Dept: NUTRITION | Facility: HOSPITAL | Age: 63
End: 2018-10-05

## 2018-10-05 NOTE — TELEPHONE ENCOUNTER
Patient reports good tolerance to Impact Peptide 1.5 at 30 mL x 16 hrs. She denies nausea and vomiting. REC to increase tube feed to 3 cans a day and adjust rate to 45 mL x 16 hrs. Pt agreeable to increase. Pt reports weight on home scale of 125 lbs. Denies weight loss.    Wt Readings from Last 3 Encounters:   09/17/18 56 kg (123 lb 7.3 oz)   08/24/18 57.6 kg (126 lb 14 oz)   07/13/18 56.4 kg (124 lb 3.7 oz)   ]

## 2018-10-19 ENCOUNTER — TELEPHONE (OUTPATIENT)
Dept: NEUROLOGY | Facility: HOSPITAL | Age: 63
End: 2018-10-19

## 2018-10-19 NOTE — TELEPHONE ENCOUNTER
Referring Physician: Cam Ashton MD    Reason for Call: Nutrition Consult for TF Adjustments    Pertinent Social History: Patient lives alone but lives close to her sister who is available to assist with needs.      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    Neuroendocrine tumor 4/9/2018    Obsessive compulsive disorder     Sleep apnea        Previous Surgical History:  Past Surgical History:   Procedure Laterality Date    APPENDECTOMY      BIOPSY-LIVER  2/28/2018    [facility] at [facility]    CHOLECYSTECTOMY      ESOPHAGOGASTRODUODENOSCOPY (EGD) N/A 1/29/2018    [facility]formed by Kermit Reddy [facility] at [facility] ENDO    GASTRIC BYPASS      INSERTION-TUBE-GASTROSTOMY-LAPAROSCOPIC  with gastrogram N/A 2/28/2018    [facility] at [facility]    TOTAL KNEE ARTHROPLASTY Left        Medication:  Current Outpatient Medications:     ACCU-CHEK SHU CONTROL SOLN Soln, , Disp: , Rfl:     ACCU-CHEK SHU PLUS METER Misc, , Disp: , Rfl:     ACCU-CHEK SHU PLUS TEST STRP Strp, , Disp: , Rfl:     amitriptyline (ELAVIL) 50 MG tablet, Take 50 mg by mouth every evening., Disp: , Rfl:     amLODIPine (NORVASC) 5 MG tablet, Take 2.5 mg by mouth., Disp: , Rfl:     calcium carbonate (OS-BEST) 600 mg calcium (1,500 mg) Tab, Take 600 mg by mouth once., Disp: , Rfl:     clonazePAM (KLONOPIN) 0.5 MG tablet, Take 0.5 mg by mouth 2 (two) times daily as needed for Anxiety., Disp: , Rfl:     dexamethasone 1.5 mg (27 tabs) DsPk, Take 1.5 mg by mouth once daily.  Tapering dose as directed, Disp: 27 tablet, Rfl: 0    diclofenac (CATAFLAM) 50 MG tablet, as needed. , Disp: , Rfl:     diphenoxylate-atropine 2.5-0.025 mg (LOMOTIL) 2.5-0.025 mg per tablet, Take 1 tablet by mouth 4 (four) times daily as needed for Diarrhea., Disp: , Rfl:     DULoxetine (CYMBALTA) 60 MG capsule, Take 60 mg by mouth once daily., Disp: , Rfl:     GLUCAGON EMERGENCY KIT, HUMAN, 1 mg injection, , Disp: , Rfl:     hydrOXYzine pamoate (VISTARIL) 25 MG Cap, Take 25 mg by mouth 3 (three) times daily. , Disp: , Rfl:     insulin aspart U-100 (NOVOLOG FLEXPEN U-100 INSULIN) 100 unit/mL InPn pen, 2 units bid prn, Disp: , Rfl:     insulin degludec (TRESIBA FLEXTOUCH U-100) 100 unit/mL (3 mL) InPn, Inject 15 Units into the skin once daily. , Disp: , Rfl:     lamoTRIgine (LAMICTAL) 150 MG Tab, , Disp: , Rfl:     lancets 30 gauge Misc, , Disp: , Rfl:     lancing device Misc, , Disp: , Rfl:     lanreotide (SOMATULINE DEPOT) 60 mg/0.2 mL Syrg, inject 0.2 milliliter by subcutaneous route once a month, Disp: , Rfl:     losartan (COZAAR) 100 MG tablet, Take 1 tablet (100 mg total) by mouth once daily., Disp: 90 tablet, Rfl: 3    metoclopramide HCl (REGLAN) 5 mg/5 mL Soln, TAKE 10ML BY MOUTH THREE TIMES A DAY, Disp: 420 mL, Rfl: 1    metoprolol succinate (TOPROL-XL) 100 MG 24 hr tablet, Take 100 mg by mouth 2 (two) times daily., Disp: , Rfl:     omeprazole (PRILOSEC) 40 MG capsule, Take 40 mg by mouth 2 (two) times daily before meals. , Disp: , Rfl:     ondansetron (ZOFRAN) 8 MG tablet, Take 8 mg by mouth every 8 (eight) hours as needed. , Disp: , Rfl:     potassium chloride (KLOR-CON) 20 mEq Pack, Take 20 mEq by mouth once. , Disp: , Rfl:     promethazine (PHENERGAN) 25 MG tablet, Take 25 mg by mouth., Disp: , Rfl:     promethazine (PHENERGAN) 25 MG tablet, Take 1 tablet (25 mg total) by mouth every 6 (six) hours as needed for Nausea., Disp: 60 tablet, Rfl: 1    SURE COMFORT PEN NEEDLE 32 gauge x  "" Ndobdulio, , Disp: , Rfl:     tiZANidine (ZANAFLEX) 4 MG tablet, as needed. , Disp: , Rfl:     Vitamin/Supplements/Herbs:    vitamin D 1000 units Tab, Take 1,000 Units by mouth once daily., Disp: , Rfl:     cyanocobalamin 1,000 mcg/mL injection, 1,000 mcg every 28 days., Disp: , Rfl:     multivitamin capsule, Take 1 capsule by mouth once daily., Disp: , Rfl:        Allergies:  Review of patient's allergies indicates:   Allergen Reactions    Acetaminophen     Calcium carbonate Nausea And Vomiting     The liquid form    Codeine Hives    Contrast media      Oral and IV    Darvocet a500 [propoxyphene n-acetaminophen]     Epinephrine      Neuroendocrine Tumor patient    Morphine     Propoxyphene napsylate     Sulfa (sulfonamide antibiotics)     Erythromycin Rash    Erythromycin base Rash    Iodinated contrast- oral and iv dye Rash and Other (See Comments)     Copper taste in mouth-Benadryl all that is needed for scans    Pcn [penicillins] Rash     "It looks like measles."       Labs: no recent labs available for review    : 1955  Age: 63 y.o.    Anthropometrics  Weight: 56 kg (123 lb 7.3 oz)  Height: 5' 5" (1.651 m).    Estimated body mass index is 20.54 kg/m²     BMI Weight Status   Below 18.5 Underweight   18.6-24.9 Normal/Healthy   25.0-29.9 Overweight   30.0 & Above Obese     Ideal Weight Range for Your Height: 5'5" = 114 - 149 lbs    Weight History:  Wt Readings from Last 12 Encounters:   18 56 kg (123 lb 7.3 oz)   18 57.6 kg (126 lb 14 oz)   18 56.4 kg (124 lb 3.7 oz)   18 56.4 kg (124 lb 3.7 oz)   18 57.2 kg (125 lb 15.9 oz)   18 54.9 kg (121 lb)   18 55 kg (121 lb 4.1 oz)   18 54.9 kg (121 lb 0.5 oz)   18 51.1 kg (112 lb 10.5 oz)   18 54.4 kg (119 lb 14.9 oz)   18 57 kg (125 lb 10.6 oz)   18 52.6 kg (116 lb)   ]    Estimated Nutrition Needs:   Energy Needs: 1800 (1120 MSJ x 1.6 PAL)  Protein Needs: 67 (1.2 gm " Protein/kg)  Fluid Needs:  1800 (1 mL/calorie)    Nutrition Focused Physical Findings: Telephone Consult    Gastrointestinal Habits: Unknown     Nutrition History    Meal Pattern: 3 meals per day. Soft Foods   Beverage Intake: Water    Food Intolerance:     Meal Preparation: Patient prepares quick soft food but Also on Impact Peptide 1.5 TF, taking in 3 cans per day  Dining Out: none    Dentition: Difficulty chewing or swallowing? yes  Exercise: Walking    Motivation to Change: Patient reports need to comply with TF regimen for weight gain and improved nutritional status.     Nutrition Diagnosis  Nutrition Problem  Altered GI Function    Related to (etiology):   Status post gastric bypass causing nutritional deficiencies  Diabetes with poor peristalsis. May be responsible for her nausea. Patient reports recent ER visit due to vomiting and nausea with need for fluids. No updated records available in Epic to review.   Neuroendocrine tumor of unknown origin with a significant retroperitoneal and mesenteric lymphadenopathy. On Lanreotide injections.     Signs and Symptoms (as evidenced by):   Intermittent nausea and vomiting related to poor peristalsis    Nutrition Diagnosis Status:   New      Recommendations/Interventions/Goals:  1. Continue with Impact Peptide TF via pump at 60 mL/hr x 12 hrs (3 cans per day) which provides 1080 calories, 67.5 gm Pro, 554 mL water. Assisted patient with TF rate and Volume adjustments on pump.   2. Encouraged patient to increase TF to 4 cans a day but she feels she would not ASHANTI that currently due to a recent ER visit for dehydration and vomiting  3. Written information mailed and discussed with patient on GI soft diet.    4. Calorie goal of 1800 discussed for weight gain.   5. Discussed calling infusion company to order Bola Pump Back pack to aid in mobility and ambulation   6. RD number provided for questions. Will follow up weekly.       Routed to Dr Christine    Consultation Time:30  minutes.    Follow Up:2 months or on next follow up visit.

## 2018-10-22 RX ORDER — METOCLOPRAMIDE HYDROCHLORIDE 5 MG/5ML
SOLUTION ORAL
Qty: 420 ML | Refills: 1 | Status: ON HOLD | OUTPATIENT
Start: 2018-10-22 | End: 2019-02-26 | Stop reason: CLARIF

## 2018-10-23 NOTE — TELEPHONE ENCOUNTER
Returned pts sister call. Lab orders faxed per Emily request. Tube feeding doing well. Requesting scopalamine patch. Advised may get flu shot per MD.

## 2018-10-23 NOTE — TELEPHONE ENCOUNTER
----- Message from Colleen Fletcher sent at 10/23/2018 11:00 AM CDT -----  Contact: Patient's sister  DYLAN:  Patient's sister Emily called to get some information and is requesting a nurse call back.    1.  Stated that Dr. Christine wanted her to get more blood tests done, they do not have any orders and I did not see any in her chart.    2.  May she get her flu shot?    3.  She had an ER visit last week - She started throwing up and get scared.  Her sister/caregiver was out of town and she called the ambulance.  She takes phenergan every 6 hours now. She was released from the ER in 2 to 3 hours.    Emily may be reached at [phone].  Thank you  abc

## 2018-10-25 RX ORDER — SCOLOPAMINE TRANSDERMAL SYSTEM 1 MG/1
1 PATCH, EXTENDED RELEASE TRANSDERMAL
Qty: 10 PATCH | Refills: 2 | Status: ON HOLD | OUTPATIENT
Start: 2018-10-25 | End: 2019-04-16 | Stop reason: SDUPTHER

## 2018-11-02 ENCOUNTER — TELEPHONE (OUTPATIENT)
Dept: NEUROLOGY | Facility: HOSPITAL | Age: 63
End: 2018-11-02

## 2018-11-02 NOTE — TELEPHONE ENCOUNTER
Follow up Call for Tube feed tolerance:    Patient is taking in 3 cans of Impact Peptide a day providing 1080 calories and 68 gm Protein.   She is refusing to INC to 4 cans a day due to fear of intolerance. Discussed gradual intake of 1/2 can per day to increase calorie and protein intake. Patient verbalizes she will try. REC to increase volume to 840 mL @ 60 mL for 14 hrs.     Patient reports CBW is 128 lbs which reflects a weight gain of ~ 5 lbs.    Wt Readings from Last 6 Encounters:   09/17/18 56 kg (123 lb 7.3 oz)   08/24/18 57.6 kg (126 lb 14 oz)   07/13/18 56.4 kg (124 lb 3.7 oz)   07/13/18 56.4 kg (124 lb 3.7 oz)   05/08/18 57.2 kg (125 lb 15.9 oz)   04/09/18 54.9 kg (121 lb)     She is eating minimally. Small soft food. She recently ate liver and turnip greens with good tolerance. Drinking water.   Written information mailed to patient on Soft foods.  Follow up call in 2 weeks.

## 2018-11-05 LAB
CRP SERPL-MCNC: 6.2 MG/DL (ref 0–4.9)
EXT 24 HR UR METANEPHRINE: ABNORMAL
EXT 24 HR UR NORMETANEPHRINE: ABNORMAL
EXT 24 HR UR NORMETANEPHRINE: ABNORMAL
EXT 25 HYDROXY VIT D2: ABNORMAL
EXT 25 HYDROXY VIT D3: ABNORMAL
EXT 5 HIAA 24 HR URINE: ABNORMAL
EXT 5 HIAA BLOOD: 11 PG/ML (ref 0–22)
EXT ACTH: ABNORMAL
EXT AFP: ABNORMAL
EXT ALBUMIN: 3.8 G/DL (ref 3.6–4.8)
EXT ALKALINE PHOSPHATASE: 97 IU/L (ref 39–117)
EXT ALT: 38 IU/L (ref 0–32)
EXT AMYLASE: ABNORMAL
EXT ANTI ISLET CELL AB: ABNORMAL
EXT ANTI PARIETAL CELL AB: ABNORMAL
EXT ANTI THYROID AB: ABNORMAL
EXT AST: 21 IU/L (ref 0–40)
EXT BILIRUBIN DIRECT: ABNORMAL MG/DL
EXT BILIRUBIN TOTAL: 0.2 MG/DL (ref 0–1.2)
EXT BK VIRUS DNA QN PCR: ABNORMAL
EXT BUN: 19 MG/DL (ref 8–27)
EXT C PEPTIDE: ABNORMAL
EXT CA 125: ABNORMAL
EXT CA 19-9: ABNORMAL
EXT CA 27-29: ABNORMAL
EXT CALCITONIN: ABNORMAL
EXT CALCIUM: 8.3 MG/DL (ref 8.7–10.3)
EXT CEA: ABNORMAL
EXT CHLORIDE: 103 MMOL/L (ref 96–106)
EXT CHOLESTEROL: ABNORMAL
EXT CHROMOGRANIN A: 4 NMOL/L (ref 0–5)
EXT CO2: 25 MMOL/L (ref 20–29)
EXT CREATININE UA: ABNORMAL
EXT CREATININE: 0.67 MG/DL (ref 0.57–1)
EXT CYCLOSPORONE LEVEL: ABNORMAL
EXT DOPAMINE: ABNORMAL
EXT EBV DNA BY PCR: ABNORMAL
EXT EPINEPHRINE: ABNORMAL
EXT FOLATE: ABNORMAL
EXT FREE T3: ABNORMAL
EXT FREE T4: ABNORMAL
EXT FSH: ABNORMAL
EXT GASTRIN RELEASING PEPTIDE: ABNORMAL
EXT GASTRIN RELEASING PEPTIDE: ABNORMAL
EXT GASTRIN: ABNORMAL
EXT GGT: ABNORMAL
EXT GHRELIN: ABNORMAL
EXT GLUCAGON: ABNORMAL
EXT GLUCOSE: 235 MG/DL (ref 65–99)
EXT GROWTH HORMONE: ABNORMAL
EXT HCV RNA QUANT PCR: ABNORMAL
EXT HDL: ABNORMAL
EXT HEMATOCRIT: 32.9 % (ref 34–46.6)
EXT HEMOGLOBIN A1C: ABNORMAL
EXT HEMOGLOBIN: 10.3 G/DL (ref 11.1–15.9)
EXT HISTAMINE 24 HR URINE: ABNORMAL
EXT HISTAMINE: ABNORMAL
EXT IGF-1: ABNORMAL
EXT IMMUNKNOW (NON-STIMULATED): ABNORMAL
EXT IMMUNKNOW (STIMULATED): ABNORMAL
EXT INR: ABNORMAL
EXT INSULIN: ABNORMAL
EXT LANREOTIDE LEVEL: ABNORMAL
EXT LDH, TOTAL: ABNORMAL
EXT LDL CHOLESTEROL: ABNORMAL
EXT LIPASE: ABNORMAL
EXT MAGNESIUM: ABNORMAL
EXT METANEPHRINE FREE PLASMA: ABNORMAL
EXT MOTILIN: ABNORMAL
EXT NEUROKININ A CAMB: ABNORMAL
EXT NEUROKININ A ISI: <10 PG/ML (ref 0–40)
EXT NEUROTENSIN: ABNORMAL
EXT NOREPINEPHRINE: ABNORMAL
EXT NORMETANEPHRINE: ABNORMAL
EXT NSE: ABNORMAL
EXT OCTREOTIDE LEVEL: ABNORMAL
EXT PANCREASTATIN CAMB: ABNORMAL
EXT PANCREASTATIN ISI: 663 PG/ML (ref 10–135)
EXT PANCREATIC POLYPEPTIDE: ABNORMAL
EXT PHOSPHORUS: ABNORMAL
EXT PLATELETS: 348 X10E3/UL (ref 150–379)
EXT POTASSIUM: 4.9 MMOL/L (ref 3.5–5.2)
EXT PROGRAF LEVEL: ABNORMAL
EXT PROLACTIN: ABNORMAL
EXT PROTEIN TOTAL: 6 G/DL (ref 6–8.5)
EXT PROTEIN UA: ABNORMAL
EXT PT: ABNORMAL
EXT PTH, INTACT: ABNORMAL
EXT PTT: ABNORMAL
EXT RAPAMUNE LEVEL: ABNORMAL
EXT SEROTONIN: 9 NG/ML (ref 0–420)
EXT SODIUM: 143 MMOL/L (ref 134–144)
EXT SOMATOSTATIN: ABNORMAL
EXT SUBSTANCE P: ABNORMAL
EXT TRIGLYCERIDES: ABNORMAL
EXT TRYPTASE: ABNORMAL
EXT TSH: ABNORMAL
EXT URIC ACID: ABNORMAL
EXT URINE AMYLASE U/HR: ABNORMAL
EXT URINE AMYLASE U/L: ABNORMAL
EXT VASOACTIVE INTESTINAL POLYPEPTIDE: ABNORMAL
EXT VITAMIN B12: ABNORMAL
EXT VMA 24 HR URINE: ABNORMAL
EXT WBC: 5.9 (ref 3.4–10.8)
NEURON SPECIFIC ENOLASE: ABNORMAL
PREALB SERPL-MCNC: 20 MG/DL (ref 10–36)

## 2018-12-05 DIAGNOSIS — E46 MALNUTRITION, UNSPECIFIED TYPE: ICD-10-CM

## 2018-12-05 DIAGNOSIS — E13.8 OTHER SPECIFIED DIABETES MELLITUS WITH COMPLICATION, WITH LONG-TERM CURRENT USE OF INSULIN: Primary | ICD-10-CM

## 2018-12-05 DIAGNOSIS — Z79.4 OTHER SPECIFIED DIABETES MELLITUS WITH COMPLICATION, WITH LONG-TERM CURRENT USE OF INSULIN: Primary | ICD-10-CM

## 2018-12-05 DIAGNOSIS — C7A.00 CARCINOID TUMOR, NONARGENTAFFIN, MALIGNANT: ICD-10-CM

## 2018-12-06 ENCOUNTER — OFFICE VISIT (OUTPATIENT)
Dept: NEUROLOGY | Facility: HOSPITAL | Age: 63
End: 2018-12-06
Attending: SURGERY
Payer: MEDICARE

## 2018-12-06 VITALS
DIASTOLIC BLOOD PRESSURE: 76 MMHG | BODY MASS INDEX: 22.68 KG/M2 | WEIGHT: 136.13 LBS | SYSTOLIC BLOOD PRESSURE: 121 MMHG | HEIGHT: 65 IN | TEMPERATURE: 98 F | HEART RATE: 86 BPM

## 2018-12-06 DIAGNOSIS — C7A.012 MALIGNANT CARCINOID TUMOR OF ILEUM: Primary | ICD-10-CM

## 2018-12-06 PROCEDURE — 99215 OFFICE O/P EST HI 40 MIN: CPT | Performed by: SURGERY

## 2018-12-06 NOTE — PROGRESS NOTES
"NOLANETS:  [facility]  A collaboration between [facility] and [facility]      PATIENT: Kaylee Ngo  MRN: [id number]  DATE: 12/8/2018    Subjective:      Chief Complaint: Follow-up (possible surgical eval)  she is here for follow up evaluation  Doing well  Has gained weight and feeling good  Has occasional episodes of nausea and diarrhea    intermittently feeds thru G tube    Vitals:   Vitals:    12/06/18 1449   BP: 121/76   Pulse: 86   Temp: 98.4 °F (36.9 °C)   TempSrc: Oral   Weight: 61.7 kg (136 lb 2.1 oz)   Height: 5' 5" (1.651 m)        Karnofsky Score:     Diagnosis:   1. 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    Neuroendocrine tumor 4/9/2018    Obsessive compulsive disorder     Sleep apnea        Past Surgical History:  Past Surgical History:   Procedure Laterality Date    APPENDECTOMY      BIOPSY-LIVER  2/28/2018    [facility]formed by [facility] at [facility] OR    CHOLECYSTECTOMY      ESOPHAGOGASTRODUODENOSCOPY (EGD) N/A 1/29/2018    Performed by Kermit Reddy MD at [facility] ENDO    GASTRIC BYPASS      INSERTION-TUBE-GASTROSTOMY-LAPAROSCOPIC  with gastrogram N/A 2/28/2018    [facility]formed by [facility] at [facility] OR    TOTAL KNEE ARTHROPLASTY Left        Family History:  Family History   Problem Relation Age of Onset    Cancer Mother         pancreas    Cancer Father         colon " "      Allergies:  Review of patient's allergies indicates:   Allergen Reactions    Acetaminophen     Calcium carbonate Nausea And Vomiting     The liquid form    Codeine Hives    Contrast media      Oral and IV    Darvocet a500 [propoxyphene n-acetaminophen]     Epinephrine      Neuroendocrine Tumor patient    Neuroendocrine Tumor patient    Morphine     Propoxyphene napsylate     Sulfa (sulfonamide antibiotics)     Erythromycin Rash    Erythromycin base Rash    Iodinated contrast- oral and iv dye Rash and Other (See Comments)     Copper taste in mouth-Benadryl all that is needed for scans  Copper taste in mouth-Benadryl all that is needed for scans    Pcn [penicillins] Rash     "It looks like measles."       Medications:  Current Outpatient Medications   Medication Sig Dispense Refill    ACCU-CHEK SHU CONTROL SOLN Soln       ACCU-CHEK SUH PLUS METER Misc       ACCU-CHEK SHU PLUS TEST STRP Strp       amitriptyline (ELAVIL) 50 MG tablet Take 50 mg by mouth every evening.      amLODIPine (NORVASC) 5 MG tablet Take 2.5 mg by mouth.      calcium carbonate (OS-BEST) 600 mg calcium (1,500 mg) Tab Take 600 mg by mouth once.      clonazePAM (KLONOPIN) 0.5 MG tablet Take 0.5 mg by mouth 2 (two) times daily as needed for Anxiety.      cyanocobalamin 1,000 mcg/mL injection 1,000 mcg every 28 days.      diphenoxylate-atropine 2.5-0.025 mg (LOMOTIL) 2.5-0.025 mg per tablet Take 1 tablet by mouth 4 (four) times daily as needed for Diarrhea.      DULoxetine (CYMBALTA) 60 MG capsule Take 60 mg by mouth once daily.      FLUARIX QUAD 7074-2804, PF, 60 mcg (15 mcg x 4)/0.5 mL Syrg vaccine       GLUCAGON EMERGENCY KIT, HUMAN, 1 mg injection       hydrOXYzine pamoate (VISTARIL) 25 MG Cap Take 25 mg by mouth 3 (three) times daily.       insulin aspart U-100 (NOVOLOG FLEXPEN U-100 INSULIN) 100 unit/mL InPn pen 2 units bid prn      insulin degludec (TRESIBA FLEXTOUCH U-100) 100 unit/mL (3 mL) InPn Inject 15 " "Units into the skin once daily.       lamoTRIgine (LAMICTAL) 150 MG Tab       lancets 30 gauge Misc       lancing device Misc       lanreotide (SOMATULINE DEPOT) 60 mg/0.2 mL Syrg inject 0.2 milliliter by subcutaneous route once a month      losartan (COZAAR) 100 MG tablet Take 1 tablet (100 mg total) by mouth once daily. 90 tablet 3    metoclopramide HCl (REGLAN) 5 mg/5 mL Soln TAKE 10ML BY MOUTH THREE TIMES A  mL 1    metoclopramide HCl (REGLAN) 5 mg/5 mL Soln TAKE 10ML BY MOUTH THREE TIMES A  mL 1    metoprolol succinate (TOPROL-XL) 100 MG 24 hr tablet Take 100 mg by mouth 2 (two) times daily.      multivitamin capsule Take 1 capsule by mouth once daily.      omeprazole (PRILOSEC) 40 MG capsule Take 40 mg by mouth 2 (two) times daily before meals.       ondansetron (ZOFRAN) 8 MG tablet Take 8 mg by mouth every 8 (eight) hours as needed.       potassium chloride (KLOR-CON) 20 mEq Pack Take 20 mEq by mouth once.       promethazine (PHENERGAN) 25 MG tablet Take 25 mg by mouth.      SURE COMFORT PEN NEEDLE 32 gauge x 5/32" Ndle       tiZANidine (ZANAFLEX) 4 MG tablet as needed.       vitamin D 1000 units Tab Take 1,000 Units by mouth once daily.      dexamethasone 1.5 mg (27 tabs) DsPk Take 1.5 mg by mouth once daily. Tapering dose as directed 27 tablet 0    diclofenac (CATAFLAM) 50 MG tablet as needed.       promethazine (PHENERGAN) 25 MG tablet Take 1 tablet (25 mg total) by mouth every 6 (six) hours as needed for Nausea. 60 tablet 1    scopolamine (TRANSDERM-SCOP) 1.3-1.5 mg (1 mg over 3 days) Place 1 patch onto the skin Every 3 (three) days. 10 patch 2     No current facility-administered medications for this visit.        Review of Systems   Constitutional: Positive for fatigue. Negative for appetite change, chills, diaphoresis, fever and unexpected weight change.   HENT: Negative for dental problem, ear discharge, hearing loss, mouth sores, nosebleeds, postnasal drip, " rhinorrhea, sinus pressure, sinus pain, sneezing, sore throat, trouble swallowing and voice change.    Eyes: Negative for photophobia, pain, redness, itching and visual disturbance.   Respiratory: Negative for cough, choking, chest tightness, shortness of breath, wheezing and stridor.    Cardiovascular: Negative for chest pain, palpitations and leg swelling.   Gastrointestinal: Positive for abdominal distention, diarrhea and nausea.   Endocrine: Negative.    Genitourinary: Negative.    Musculoskeletal: Positive for arthralgias and back pain. Negative for gait problem and neck stiffness.   Skin: Negative.    Allergic/Immunologic: Negative.    Neurological: Negative for dizziness, tremors, facial asymmetry, speech difficulty, light-headedness, numbness and headaches.   Hematological: Negative.    Psychiatric/Behavioral: Negative.       Objective:      Physical Exam   Constitutional: No distress.   HENT:   Right Ear: External ear normal.   Left Ear: External ear normal.   Eyes: Conjunctivae and EOM are normal. Pupils are equal, round, and reactive to light.   Neck: Normal range of motion.   Cardiovascular: Normal rate and regular rhythm.   Pulmonary/Chest: Effort normal and breath sounds normal.   Abdominal: Soft. Bowel sounds are normal. She exhibits no mass. There is no tenderness. There is no rebound and no guarding. No hernia.   G tube site looks ok. Crusting from fluid leak   Musculoskeletal: Normal range of motion.   Neurological: She is alert.   Skin: She is not diaphoretic.   Psychiatric: She has a normal mood and affect. Her behavior is normal.      Assessment:       1. Malignant carcinoid tumor of ileum        Laboratory Data:   Results for NAOMI NAJERA (MRN [id number]) as of 12/8/2018 08:01   Ref. Range 8/24/2018 12:06 8/24/2018 16:48 9/17/2018 16:46 11/5/2018 00:00 12/6/2018 13:38   WBC Latest Ref Range: 3.90 - 12.70 K/uL 6.65    7.55   RBC Latest Ref Range: 4.00 - 5.40 M/uL 4.11    3.88 (L)   Hemoglobin  Latest Ref Range: 12.0 - 16.0 g/dL 11.7 (L)    11.0 (L)   Hematocrit Latest Ref Range: 37.0 - 48.5 % 38.5    35.6 (L)   MCV Latest Ref Range: 82 - 98 fL 94    92   MCH Latest Ref Range: 27.0 - 31.0 pg 28.5    28.4   MCHC Latest Ref Range: 32.0 - 36.0 g/dL 30.4 (L)    30.9 (L)   RDW Latest Ref Range: 11.5 - 14.5 % 14.6 (H)    13.6   Platelets Latest Ref Range: 150 - 350 K/uL 424 (H)    408 (H)   MPV Latest Ref Range: 9.2 - 12.9 fL 9.2    9.0 (L)   Gran% Latest Ref Range: 38.0 - 73.0 %     69.9   Gran # (ANC) Latest Ref Range: 1.8 - 7.7 K/uL 3.3    5.3   Lymph% Latest Ref Range: 18.0 - 48.0 %     22.8   Lymph # Latest Ref Range: 1.0 - 4.8 K/uL     1.7   Mono% Latest Ref Range: 4.0 - 15.0 %     5.6   Mono # Latest Ref Range: 0.3 - 1.0 K/uL     0.4   Eosinophil% Latest Ref Range: 0.0 - 8.0 %     1.1   Eos # Latest Ref Range: 0.0 - 0.5 K/uL     0.1   Basophil% Latest Ref Range: 0.0 - 1.9 %     0.3   Baso # Latest Ref Range: 0.00 - 0.20 K/uL     0.02   Differential Method Unknown     Automated   Sodium Latest Ref Range: 136 - 145 mmol/L 139    140   Potassium Latest Ref Range: 3.5 - 5.1 mmol/L 4.5    3.4 (L)   Chloride Latest Ref Range: 95 - 110 mmol/L 103    102   CO2 Latest Ref Range: 23 - 29 mmol/L 30 (H)    27   Anion Gap Latest Ref Range: 8 - 16 mmol/L 6 (L)    11   BUN, Bld Latest Ref Range: 8 - 23 mg/dL 15    27 (H)   Creatinine Latest Ref Range: 0.5 - 1.4 mg/dL 1.1    1.1   eGFR if non African American Latest Ref Range: >60 mL/min/1.73 m^2 54 (A)    54 (A)   eGFR if African American Latest Ref Range: >60 mL/min/1.73 m^2 >60    >60   Glucose Latest Ref Range: 70 - 110 mg/dL 162 (H)    156 (H)   Calcium Latest Ref Range: 8.7 - 10.5 mg/dL 8.5 (L)    9.3   Alkaline Phosphatase Latest Ref Range: 55 - 135 U/L 114    123   Total Protein Latest Ref Range: 6.0 - 8.4 g/dL 6.9    6.8   Albumin Latest Ref Range: 3.5 - 5.2 g/dL 3.7    3.3 (L)   Prealbumin Latest Ref Range: 20 - 43 mg/dL 18 (L)   20 18 (L)   Total Bilirubin  Latest Ref Range: 0.1 - 1.0 mg/dL 0.6    0.3   AST Latest Ref Range: 10 - 40 U/L 21    21   ALT Latest Ref Range: 10 - 44 U/L 18    19   CRP Latest Ref Range: 0.0 - 8.2 mg/L    6.2 (A) 7.7   Hemoglobin A1C Latest Ref Range: 4.0 - 5.6 %     6.1 (H)   Estimated Avg Glucose Latest Ref Range: 68 - 131 mg/dL     128   EXT 5 HIAA BLOOD Latest Ref Range: 0 - 22 pg/ml    11    EXT Albumin Latest Ref Range: 3.6 - 4.8 g/dl    3.8    EXT Alkaline Phosphatase Latest Ref Range: 39 - 117 iu/l    97    EXT ALT Latest Ref Range: 0 - 32 iu/l    38 (A)    EXT AST Latest Ref Range: 0 - 40 iu/l    21    EXT BilirubiN Total Latest Ref Range: 0.0 - 1.2 mg/dl    0.2    EXT BUN Latest Ref Range: 8 - 27 mg/dl    19    EXT Calcium Latest Ref Range: 8.7 - 10.3 mg/dl    8.3 (A)    EXT Chloride Latest Ref Range: 96 - 106 mmol/l    103    EXT CHROMOGRANIN A Latest Ref Range: 0 - 5 nmol/l    4    EXT CO2 Latest Ref Range: 20 - 29 mmol/l    25    EXT Creatinine Latest Ref Range: 0.57 - 1.00 mg/dl    0.67    EXT Glucose Latest Ref Range: 65 - 99 mg/dl    235 (A)    EXT Hematocrit Latest Ref Range: 34.0 - 46.6 %    32.9 (A)    EXT Hemoglobin Latest Ref Range: 11.1 - 15.9 g/dl    10.3 (A)    EXT NEUROKININ A RANJITH Latest Ref Range: 0 - 40.0 pg/ml    <10.0    EXT PANCREASTATIN RANJITH Latest Ref Range: 10 - 135 pg/ml    663 (A)    EXT Platelets Latest Ref Range: 150 - 379 x10e3/ul    348    EXT Potassium Latest Ref Range: 3.5 - 5.2 mmol/l    4.9    EXT Protein total Latest Ref Range: 6.0 - 8.5 g/dl    6.0    EXT SEROTONIN Latest Ref Range: 0 - 420 ng/ml    9    EXT Sodium Latest Ref Range: 134 - 144 mmol/l    143    EXT WBC Latest Ref Range: 3.4 - 10.8 x0e3/ul    5.9    CT ABDOMEN PELVIS WITHOUT CONTRAST Unknown   Rpt     CT CHEST ABDOMEN PELVIS W W/O CONTRAST (XPD) Unknown  Rpt        Cam Ashton MD on 9/17/2018 19:03   CT Abdomen Pelvis  Without Contrast   Order: [id number]   Status:  Final result   Visible to patient:  Yes (Patient Portal) Next  appt:  01/03/2019 at 02:00 PM in Neuroendocrine (Cam Ashton MD) Dx:  Generalized abdominal pain; Malignant...   Details     Reading Physician Reading Date Result Priority   Arnold Carr MD 9/17/2018       Narrative     EXAMINATION:  CT ABDOMEN PELVIS WITHOUT CONTRAST    CLINICAL HISTORY:  check g-tube; Malignant carcinoid tumor of unspecified site    TECHNIQUE:  Low dose axial images, sagittal and coronal reformations were obtained from the lung bases to the pubic symphysis.  Oral contrast was not administered.    COMPARISON:  08/24/2018    FINDINGS:  Lung bases are clear.  No pericardial or pleural effusion.    Noncontrast evaluation of the liver is unremarkable.  Gallbladder is absent.  Pancreas, spleen, adrenal glands, and kidneys are grossly unremarkable, noting noncontrast evaluation.  No hydronephrosis.    Postsurgical changes of gastric bypass are noted.  Status post gastrostomy placement with contrast filled balloon in the lumen of the remnant stomach, which is normally apposed to the abdominal wall.  No evidence for bowel obstruction.    There is extensive retroperitoneal/mesenteric lymphadenopathy similar to prior study and difficult to assess without intravenous contrast.  Abdominal aorta is normal caliber.  No ascites.    Uterus and adnexa are unremarkable.    Left hip hardware noted.  Regional osseous structures demonstrate no aggressive appearing lytic or blastic lesions.      Impression       1. Gastrostomy positioned within the remnant stomach which is normally apposed to the abdominal wall.  2. Examination is otherwise not significantly changed from recent prior study.      Electronically signed by: Arnold Carr MD  Date: 09/17/2018  Time: 17:36             Last Resulted: 09/17/18 17:36 Order Details View Encounter Lab and Collection Details Routing Result History            External Result Report     External Result Report   Narrative     EXAMINATION:  CT ABDOMEN PELVIS WITHOUT  CONTRAST    CLINICAL HISTORY:  check g-tube; Malignant carcinoid tumor of unspecified site    TECHNIQUE:  Low dose axial images, sagittal and coronal reformations were obtained from the lung bases to the pubic symphysis.  Oral contrast was not administered.    COMPARISON:  08/24/2018    FINDINGS:  Lung bases are clear.  No pericardial or pleural effusion.    Noncontrast evaluation of the liver is unremarkable.  Gallbladder is absent.  Pancreas, spleen, adrenal glands, and kidneys are grossly unremarkable, noting noncontrast evaluation.  No hydronephrosis.    Postsurgical changes of gastric bypass are noted.  Status post gastrostomy placement with contrast filled balloon in the lumen of the remnant stomach, which is normally apposed to the abdominal wall.  No evidence for bowel obstruction.    There is extensive retroperitoneal/mesenteric lymphadenopathy similar to prior study and difficult to assess without intravenous contrast.  Abdominal aorta is normal caliber.  No ascites.    Uterus and adnexa are unremarkable.    Left hip hardware noted.  Regional osseous structures demonstrate no aggressive appearing lytic or blastic lesions.   Impression       1. Gastrostomy positioned within the remnant stomach which is normally apposed to the abdominal wall.  2. Examination is otherwise not significantly changed from recent prior study.      Electronically signed by: Sandi Carr MD  Date: 09/17/2018  Time: 17:36    Encounter     View Encounter          Signed by     Signed Credentials Date/Time  Phone Pager   SANDI CARR MD 9/17/2018 17:36 [phone]   Reviewed By     Cam Ashton MD on 9/17/2018 19:03   Exam Details     Performed Procedure Technologist Supporting Staff Performing Physician   CT Abdomen Pelvis  Without Contrast Lashaun Burns        Appointment Date/Status Modality Department    9/17/2018     Completed [facility] CT1 LIMIT 400 LBS [facility] CT SCAN       Begin Exam End Exam  End Exam  Questionnaires   9/17/2018  4:37 PM 9/17/2018  4:46 PM  IMAGING END ALL      Reason For Exam   Priority: Routine   check g-tube   Dx: Malignant carcinoid tumor of unknown primary site [C7A.00 (ICD-10-CM)]; Malnutrition, unspecified type [E46 (ICD-10-CM)]; Generalized abdominal pain [R10.84 (ICD-10-CM)]   Order Report      Order Details   HEALTH SYSTEM                                                NEUROENDOCRINE TUMOR MULTIDISCIPLINARY TUMOR BOARD  _____________________________________________________________________     PRESENTER:   RUDY Ashton MD     REASON FOR PRESENTATION:  Treatment Plan and Scan Review     ATTENDEES:   Surgery:              MD LUZ Haas MD T. Ramcharan, MD  Interventional Radiology - Jagdish Luque MD  Pathology -   Oncology - Jeff Nicole DO, Jose Diggs MD  Gastroenterology - Not present   Nursing  Research     PATIENT STATUS:  Established Patient     TUMOR SITE (Primary & Mets):  See below     PATIENT SUMMARY:       Past Medical History:   Diagnosis Date    Bipolar disease, chronic      Depression      Diabetes      Drug therapy       Lanreotide    Gastric ulcer      GERD (gastroesophageal reflux disease)      HTN (hypertension)      Hx antineoplastic chemotherapy 06/2017     [employer]initor    Hyperlipemia      Malnutrition      Migraines      Nausea 8/24/2018    Neuroendocrine cancer 01/2017    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    Neuroendocrine tumor 4/9/2018    Obsessive compulsive disorder      Sleep apnea                 Past Surgical History:   Procedure Laterality Date    APPENDECTOMY        BIOPSY-LIVER   2/28/2018     [facility] OR    CHOLECYSTECTOMY        ESOPHAGOGASTRODUODENOSCOPY (EGD) N/A 1/29/2018     Performed by Kermit Reddy MD at [facility] ENDO    GASTRIC BYPASS        INSERTION-TUBE-GASTROSTOMY-LAPAROSCOPIC  with gastrogram N/A 2/28/2018      [facility]    TOTAL KNEE ARTHROPLASTY Left        ________________________________________________________________     DISCUSSION:     Extensive vito disease, mostly the abd but also chest, is only mildly tracer avid. G-tube in good position as of 9/17/18. O scan negative for tumor.  Gallium weakly positive.  Not a surgical candidate due to nutrition. Surgery may be an option if she improves nutritionally and reverse gastric bypass. Scans are stable.        BOARD RECOMMENDATIONS:   Consult dietician  Follow up 6 weeks with prealbumin and other labs  Appointment with Dr Nicole in 6 months with CT  Appointment with Dr Levy in 3 months   Continue with SSA                                                                     Electronically signed by Jacquelyn Perez RN at     Impression: 63 year old female with carcinoid tumour of mesentery and retroperitoneums/p gastric bypass with gastroparesis with manutrtion is here for f/u  She mainly has disease in abdomen. Not candidate for PRRT    Has gained weight. Dietician is in touch with regards to instructions for feeding    Feeling better overall    Sister was with her during the office visit    [place]umour board recommendations informed to pt     Will await one more month , see her in January and plan on surgery  Plan:       Continue nutritional supplementation  F/u in January to see whether she would be ready for surgery                  CAROL Levy MD, FACS   Associate Professor of Surgery, [facility]   Neuroendocrine Surgery, Hepatic/Pancreatic & General Surgery   [address], [address]   [place] [address]   ph. [phone]; [phone]   fax. [phone]

## 2019-01-03 ENCOUNTER — OFFICE VISIT (OUTPATIENT)
Dept: NEUROLOGY | Facility: HOSPITAL | Age: 64
End: 2019-01-03
Attending: SURGERY
Payer: MEDICARE

## 2019-01-03 VITALS
HEART RATE: 60 BPM | HEIGHT: 65 IN | BODY MASS INDEX: 22.17 KG/M2 | DIASTOLIC BLOOD PRESSURE: 77 MMHG | TEMPERATURE: 99 F | SYSTOLIC BLOOD PRESSURE: 125 MMHG | WEIGHT: 133.06 LBS

## 2019-01-03 DIAGNOSIS — C7A.012 MALIGNANT CARCINOID TUMOR OF ILEUM: Primary | ICD-10-CM

## 2019-01-03 PROCEDURE — 99215 OFFICE O/P EST HI 40 MIN: CPT | Performed by: SURGERY

## 2019-01-03 RX ORDER — ONDANSETRON 2 MG/ML
8 INJECTION INTRAMUSCULAR; INTRAVENOUS
Status: CANCELLED | OUTPATIENT
Start: 2019-01-03

## 2019-01-03 RX ORDER — PREGABALIN 75 MG/1
75 CAPSULE ORAL
Status: CANCELLED | OUTPATIENT
Start: 2019-01-03

## 2019-01-03 RX ORDER — FAMOTIDINE 10 MG/ML
20 INJECTION INTRAVENOUS
Status: CANCELLED | OUTPATIENT
Start: 2019-01-03

## 2019-01-03 RX ORDER — ENOXAPARIN SODIUM 100 MG/ML
30 INJECTION SUBCUTANEOUS
Status: CANCELLED | OUTPATIENT
Start: 2019-01-03

## 2019-01-03 RX ORDER — ACETAMINOPHEN 10 MG/ML
1000 INJECTION, SOLUTION INTRAVENOUS ONCE
Status: CANCELLED | OUTPATIENT
Start: 2019-01-03 | End: 2019-01-04

## 2019-01-03 RX ORDER — KETOROLAC TROMETHAMINE 15 MG/ML
15 INJECTION, SOLUTION INTRAMUSCULAR; INTRAVENOUS
Status: CANCELLED | OUTPATIENT
Start: 2019-01-03 | End: 2019-01-06

## 2019-01-10 ENCOUNTER — TELEPHONE (OUTPATIENT)
Dept: NEUROLOGY | Facility: HOSPITAL | Age: 64
End: 2019-01-10

## 2019-01-10 NOTE — TELEPHONE ENCOUNTER
Returned pts sister call. Confirmed OR date, preop instructions sent, need prealbumin 1 week prior to surgery. Emily verbalizes understanding.

## 2019-01-10 NOTE — TELEPHONE ENCOUNTER
----- Message from Ale Sherwood sent at 1/10/2019 10:05 AM CST -----  Contact: Patients sister  DYLAN- Patients sisterElisabeth is returning a missed call. Call back number is [phone].

## 2019-01-10 NOTE — TELEPHONE ENCOUNTER
----- Message from Cam Ashton MD sent at 1/3/2019  9:04 PM CST -----  Surgery on feb 1  Can you alsop inform dietician omar    She needs assessment of labs prealbmin

## 2019-01-16 ENCOUNTER — TELEPHONE (OUTPATIENT)
Dept: NEUROLOGY | Facility: HOSPITAL | Age: 64
End: 2019-01-16

## 2019-01-16 NOTE — TELEPHONE ENCOUNTER
----- Message from Vesna Tsai sent at 1/16/2019 11:32 AM CST -----  DYLAN-Patients sister is calling in reference to surgery that is supposed to be scheduled. Please call back to assist at [phone].

## 2019-01-16 NOTE — TELEPHONE ENCOUNTER
Returned pts sister call. Discussed surgery date change, appts rescheduled, preop instructions reinforced. Sister verbalizes understanding.

## 2019-01-24 ENCOUNTER — ANESTHESIA EVENT (OUTPATIENT)
Dept: SURGERY | Facility: HOSPITAL | Age: 64
DRG: 826 | End: 2019-01-24
Payer: MEDICARE

## 2019-01-24 ENCOUNTER — OFFICE VISIT (OUTPATIENT)
Dept: NEUROLOGY | Facility: HOSPITAL | Age: 64
DRG: 826 | End: 2019-01-24
Attending: SURGERY
Payer: MEDICARE

## 2019-01-24 ENCOUNTER — HOSPITAL ENCOUNTER (OUTPATIENT)
Dept: PREADMISSION TESTING | Facility: HOSPITAL | Age: 64
Discharge: HOME | DRG: 826 | End: 2019-01-24
Attending: SURGERY
Payer: MEDICARE

## 2019-01-24 ENCOUNTER — CLINICAL SUPPORT (OUTPATIENT)
Dept: LAB | Facility: HOSPITAL | Age: 64
DRG: 826 | End: 2019-01-24
Attending: SURGERY
Payer: MEDICARE

## 2019-01-24 ENCOUNTER — HOSPITAL ENCOUNTER (OUTPATIENT)
Dept: RADIOLOGY | Facility: HOSPITAL | Age: 64
Discharge: HOME | DRG: 826 | End: 2019-01-24
Attending: SURGERY
Payer: MEDICARE

## 2019-01-24 VITALS
TEMPERATURE: 98 F | SYSTOLIC BLOOD PRESSURE: 170 MMHG | WEIGHT: 135.56 LBS | OXYGEN SATURATION: 99 % | HEART RATE: 86 BPM | BODY MASS INDEX: 22.59 KG/M2 | HEIGHT: 65 IN | RESPIRATION RATE: 20 BRPM | DIASTOLIC BLOOD PRESSURE: 85 MMHG

## 2019-01-24 VITALS
DIASTOLIC BLOOD PRESSURE: 94 MMHG | HEIGHT: 65 IN | SYSTOLIC BLOOD PRESSURE: 175 MMHG | HEART RATE: 86 BPM | TEMPERATURE: 99 F | BODY MASS INDEX: 22.77 KG/M2 | WEIGHT: 136.69 LBS

## 2019-01-24 DIAGNOSIS — Z01.818 PRE-OP TESTING: ICD-10-CM

## 2019-01-24 DIAGNOSIS — Z01.818 PRE-OP TESTING: Primary | ICD-10-CM

## 2019-01-24 DIAGNOSIS — Z98.84 STATUS POST GASTRIC BYPASS FOR OBESITY: ICD-10-CM

## 2019-01-24 DIAGNOSIS — C7A.00 MALIGNANT CARCINOID TUMOR OF UNKNOWN PRIMARY SITE: Primary | ICD-10-CM

## 2019-01-24 DIAGNOSIS — C7A.012 MALIGNANT CARCINOID TUMOR OF ILEUM: ICD-10-CM

## 2019-01-24 DIAGNOSIS — K90.9 INTESTINAL MALABSORPTION, UNSPECIFIED TYPE: ICD-10-CM

## 2019-01-24 DIAGNOSIS — K92.89 GASTROINTESTINAL DYSMOTILITY: ICD-10-CM

## 2019-01-24 PROCEDURE — 93005 ELECTROCARDIOGRAM TRACING: CPT

## 2019-01-24 PROCEDURE — 71046 X-RAY EXAM CHEST 2 VIEWS: CPT | Mod: 26,,, | Performed by: RADIOLOGY

## 2019-01-24 PROCEDURE — 71046 XR CHEST PA AND LATERAL PRE-OP: ICD-10-PCS | Mod: 26,,, | Performed by: RADIOLOGY

## 2019-01-24 PROCEDURE — 99215 OFFICE O/P EST HI 40 MIN: CPT | Mod: 25 | Performed by: SURGERY

## 2019-01-24 PROCEDURE — 71046 X-RAY EXAM CHEST 2 VIEWS: CPT | Mod: TC,FY

## 2019-01-24 RX ORDER — FAMOTIDINE 10 MG/ML
20 INJECTION INTRAVENOUS
Status: CANCELLED | OUTPATIENT
Start: 2019-01-24

## 2019-01-24 RX ORDER — ENOXAPARIN SODIUM 100 MG/ML
30 INJECTION SUBCUTANEOUS
Status: CANCELLED | OUTPATIENT
Start: 2019-01-24

## 2019-01-24 RX ORDER — SODIUM CHLORIDE, SODIUM LACTATE, POTASSIUM CHLORIDE, CALCIUM CHLORIDE 600; 310; 30; 20 MG/100ML; MG/100ML; MG/100ML; MG/100ML
INJECTION, SOLUTION INTRAVENOUS CONTINUOUS
Status: CANCELLED | OUTPATIENT
Start: 2019-01-24

## 2019-01-24 RX ORDER — ONDANSETRON 2 MG/ML
8 INJECTION INTRAMUSCULAR; INTRAVENOUS
Status: CANCELLED | OUTPATIENT
Start: 2019-01-24

## 2019-01-24 RX ORDER — PREGABALIN 75 MG/1
75 CAPSULE ORAL
Status: CANCELLED | OUTPATIENT
Start: 2019-01-24

## 2019-01-24 RX ORDER — ACETAMINOPHEN 10 MG/ML
1000 INJECTION, SOLUTION INTRAVENOUS ONCE
Status: CANCELLED | OUTPATIENT
Start: 2019-01-24 | End: 2019-01-25

## 2019-01-24 RX ORDER — KETOROLAC TROMETHAMINE 15 MG/ML
15 INJECTION, SOLUTION INTRAMUSCULAR; INTRAVENOUS
Status: CANCELLED | OUTPATIENT
Start: 2019-01-24 | End: 2019-01-27

## 2019-01-24 RX ORDER — LIDOCAINE HYDROCHLORIDE 10 MG/ML
1 INJECTION, SOLUTION EPIDURAL; INFILTRATION; INTRACAUDAL; PERINEURAL ONCE
Status: CANCELLED | OUTPATIENT
Start: 2019-01-24 | End: 2019-01-24

## 2019-01-24 NOTE — PROGRESS NOTES
"NOLANETS:  [facility]  A collaboration between [facility] and [facility]      PATIENT: Kaylee Ngo  MRN: [id number]  DATE: 1/24/2019    Subjective:      Chief Complaint: Follow-up (pre op)  doing ok still with diarrhea    Having nausea responding to phenargan        Vitals:   Vitals:    01/24/19 1221   BP: (!) 175/94   BP Location: Right arm   Patient Position: Sitting   BP Method: Medium (Automatic)   Pulse: 86   Temp: 99 °F (37.2 °C)   TempSrc: Oral   Weight: 62 kg (136 lb 11 oz)   Height: 5' 5" (1.651 m)        Karnofsky Score:   Karnofsky Score    Karnofsky Score:  90% - Able to Carry on Normal Activity: Minor Symptoms of Disease         Diagnosis: No diagnosis found.     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    Neuroendocrine tumor 4/9/2018    Obsessive compulsive disorder     Sleep apnea        Past Surgical History:  Past Surgical History:   Procedure Laterality Date    APPENDECTOMY      BIOPSY-LIVER  2/28/2018    [facility]    CHOLECYSTECTOMY      ESOPHAGOGASTRODUODENOSCOPY (EGD) N/A 1/29/2018    Per[facility] by Kermit Reddy [facility] at [facility] ENDO    GASTRIC BYPASS      INSERTION-TUBE-GASTROSTOMY-LAPAROSCOPIC  with gastrogram N/A 2/28/2018    [facility] at [facility] OR    TOTAL KNEE ARTHROPLASTY Left        Family History:  Family History   Problem Relation Age of Onset    Cancer Mother         " "pancreas    Cancer Father         colon       Allergies:  Review of patient's allergies indicates:   Allergen Reactions    Calcium carbonate Nausea And Vomiting     The liquid form    Codeine Hives    Contrast media      Oral and IV    Darvocet a500 [propoxyphene n-acetaminophen]     Epinephrine      Neuroendocrine Tumor patient    Neuroendocrine Tumor patient    Morphine     Propoxyphene napsylate     Sulfa (sulfonamide antibiotics)     Erythromycin Rash    Erythromycin base Rash    Iodinated contrast- oral and iv dye Rash and Other (See Comments)     Copper taste in mouth-Benadryl all that is needed for scans  Copper taste in mouth-Benadryl all that is needed for scans    Pcn [penicillins] Rash     "It looks like measles."       Medications:  Current Outpatient Medications   Medication Sig Dispense Refill    ACCU-CHEK SHU CONTROL SOLN Soln       ACCU-CHEK SHU PLUS METER Misc       ACCU-CHEK SHU PLUS TEST STRP Strp       amitriptyline (ELAVIL) 50 MG tablet Take 50 mg by mouth every evening.      amLODIPine (NORVASC) 5 MG tablet Take 5 mg by mouth once daily.       clonazePAM (KLONOPIN) 0.5 MG tablet Take 0.5 mg by mouth 2 (two) times daily as needed for Anxiety.      cyanocobalamin 1,000 mcg/mL injection 1,000 mcg every 28 days.      diclofenac (CATAFLAM) 50 MG tablet as needed.       diphenoxylate-atropine 2.5-0.025 mg (LOMOTIL) 2.5-0.025 mg per tablet Take 1 tablet by mouth 4 (four) times daily as needed for Diarrhea.      DULoxetine (CYMBALTA) 60 MG capsule Take 60 mg by mouth once daily.      GLUCAGON EMERGENCY KIT, HUMAN, 1 mg injection       hydrOXYzine pamoate (VISTARIL) 25 MG Cap Take 25 mg by mouth 3 (three) times daily.       insulin aspart U-100 (NOVOLOG FLEXPEN U-100 INSULIN) 100 unit/mL InPn pen 2 units bid prn      insulin degludec (TRESIBA FLEXTOUCH U-100) 100 unit/mL (3 mL) InPn Inject 15 Units into the skin once daily.       lamoTRIgine (LAMICTAL) 150 MG Tab       " "lancets 30 gauge Misc       lancing device Misc       lanreotide (SOMATULINE DEPOT) 60 mg/0.2 mL Syrg inject 0.2 milliliter by subcutaneous route once a month      losartan (COZAAR) 100 MG tablet Take 1 tablet (100 mg total) by mouth once daily. 90 tablet 3    metoclopramide HCl (REGLAN) 5 mg/5 mL Soln TAKE 10ML BY MOUTH THREE TIMES A  mL 1    metoclopramide HCl (REGLAN) 5 mg/5 mL Soln TAKE 10ML BY MOUTH THREE TIMES A  mL 1    metoprolol succinate (TOPROL-XL) 100 MG 24 hr tablet Take 100 mg by mouth 2 (two) times daily.      multivitamin capsule Take 1 capsule by mouth once daily.      omeprazole (PRILOSEC) 40 MG capsule Take 40 mg by mouth 2 (two) times daily before meals.       ondansetron (ZOFRAN) 8 MG tablet Take 8 mg by mouth every 8 (eight) hours as needed.       potassium chloride (KLOR-CON) 20 mEq Pack Take 20 mEq by mouth once.       promethazine (PHENERGAN) 25 MG tablet Take 25 mg by mouth.      promethazine (PHENERGAN) 25 MG tablet Take 1 tablet (25 mg total) by mouth every 6 (six) hours as needed for Nausea. 60 tablet 1    SURE COMFORT PEN NEEDLE 32 gauge x 5/32" Ndle       vitamin D 1000 units Tab Take 1,000 Units by mouth once daily.      calcium carbonate (OS-BEST) 600 mg calcium (1,500 mg) Tab Take 600 mg by mouth once.      chlorhexidine (HIBICLENS) 4 % external liquid wash surgical area twice daily up to three days prior to surgery  and morning of surgery 118 mL 0    dexamethasone 1.5 mg (27 tabs) DsPk Take 1.5 mg by mouth once daily. Tapering dose as directed 27 tablet 0    FLUARIX QUAD [id number], PF, 60 mcg (15 mcg x 4)/0.5 mL Syrg vaccine       scopolamine (TRANSDERM-SCOP) 1.3-1.5 mg (1 mg over 3 days) Place 1 patch onto the skin Every 3 (three) days. 10 patch 2    tiZANidine (ZANAFLEX) 4 MG tablet as needed.        No current facility-administered medications for this visit.        Review of Systems   Constitutional: Positive for appetite change and fatigue. " Negative for activity change, chills, fever and unexpected weight change.   HENT: Negative for congestion, dental problem, drooling, ear discharge, ear pain, facial swelling, hearing loss, nosebleeds, postnasal drip, rhinorrhea, sinus pressure and sinus pain.    Eyes: Negative for photophobia, pain, discharge, redness, itching and visual disturbance.   Respiratory: Negative for apnea, cough, choking, chest tightness, wheezing and stridor.    Cardiovascular: Negative for chest pain, palpitations and leg swelling.   Gastrointestinal: Positive for diarrhea, nausea and vomiting. Negative for abdominal distention, abdominal pain, anal bleeding, blood in stool, constipation and rectal pain.   Endocrine: Negative for heat intolerance, polydipsia, polyphagia and polyuria.   Genitourinary: Negative.    Musculoskeletal: Positive for arthralgias, joint swelling and myalgias. Negative for neck stiffness.   Skin: Negative.    Allergic/Immunologic: Negative.    Neurological: Negative for tremors, seizures, syncope, facial asymmetry, speech difficulty, weakness, light-headedness, numbness and headaches.   Hematological: Negative.    Psychiatric/Behavioral: Negative.  Negative for suicidal ideas.      Objective:      Physical Exam   Constitutional: She is oriented to person, place, and time. No distress.   HENT:   Head: Normocephalic and atraumatic.   Right Ear: External ear normal.   Left Ear: External ear normal.   Mouth/Throat: Oropharynx is clear and moist.   Eyes: Conjunctivae and EOM are normal. Pupils are equal, round, and reactive to light.   Neck: Normal range of motion.   Cardiovascular: Normal rate and regular rhythm.   Pulmonary/Chest: Effort normal and breath sounds normal.   Abdominal: Soft. Bowel sounds are normal. She exhibits no distension and no mass. There is no tenderness. There is no rebound and no guarding. No hernia.   g tube present site erythematous   Musculoskeletal: Normal range of motion.   Neurological:  She is alert and oriented to person, place, and time.   Skin: Skin is warm. She is not diaphoretic.   Psychiatric: She has a normal mood and affect. Her behavior is normal.      Assessment:       No diagnosis found.    Laboratory Data:   Results for NAOMI NAJERA (MRN [id number]) as of 1/24/2019 15:03   Ref. Range 9/17/2018 16:46 11/5/2018 00:00 12/6/2018 13:38   WBC Latest Ref Range: 3.90 - 12.70 K/uL   7.55   RBC Latest Ref Range: 4.00 - 5.40 M/uL   3.88 (L)   Hemoglobin Latest Ref Range: 12.0 - 16.0 g/dL   11.0 (L)   Hematocrit Latest Ref Range: 37.0 - 48.5 %   35.6 (L)   MCV Latest Ref Range: 82 - 98 fL   92   MCH Latest Ref Range: 27.0 - 31.0 pg   28.4   MCHC Latest Ref Range: 32.0 - 36.0 g/dL   30.9 (L)   RDW Latest Ref Range: 11.5 - 14.5 %   13.6   Platelets Latest Ref Range: 150 - 350 K/uL   408 (H)   MPV Latest Ref Range: 9.2 - 12.9 fL   9.0 (L)   Gran% Latest Ref Range: 38.0 - 73.0 %   69.9   Gran # (ANC) Latest Ref Range: 1.8 - 7.7 K/uL   5.3   Lymph% Latest Ref Range: 18.0 - 48.0 %   22.8   Lymph # Latest Ref Range: 1.0 - 4.8 K/uL   1.7   Mono% Latest Ref Range: 4.0 - 15.0 %   5.6   Mono # Latest Ref Range: 0.3 - 1.0 K/uL   0.4   Eosinophil% Latest Ref Range: 0.0 - 8.0 %   1.1   Eos # Latest Ref Range: 0.0 - 0.5 K/uL   0.1   Basophil% Latest Ref Range: 0.0 - 1.9 %   0.3   Baso # Latest Ref Range: 0.00 - 0.20 K/uL   0.02   Differential Method Unknown   Automated   Sodium Latest Ref Range: 136 - 145 mmol/L   140   Potassium Latest Ref Range: 3.5 - 5.1 mmol/L   3.4 (L)   Chloride Latest Ref Range: 95 - 110 mmol/L   102   CO2 Latest Ref Range: 23 - 29 mmol/L   27   Anion Gap Latest Ref Range: 8 - 16 mmol/L   11   BUN, Bld Latest Ref Range: 8 - 23 mg/dL   27 (H)   Creatinine Latest Ref Range: 0.5 - 1.4 mg/dL   1.1   eGFR if non African American Latest Ref Range: >60 mL/min/1.73 m^2   54 (A)   eGFR if African American Latest Ref Range: >60 mL/min/1.73 m^2   >60   Glucose Latest Ref Range: 70 - 110 mg/dL   156  (H)   Calcium Latest Ref Range: 8.7 - 10.5 mg/dL   9.3   Alkaline Phosphatase Latest Ref Range: 55 - 135 U/L   123   Total Protein Latest Ref Range: 6.0 - 8.4 g/dL   6.8   Albumin Latest Ref Range: 3.5 - 5.2 g/dL   3.3 (L)   Prealbumin Latest Ref Range: 20 - 43 mg/dL  20 18 (L)   Total Bilirubin Latest Ref Range: 0.1 - 1.0 mg/dL   0.3   AST Latest Ref Range: 10 - 40 U/L   21   ALT Latest Ref Range: 10 - 44 U/L   19   CRP Latest Ref Range: 0.0 - 8.2 mg/L  6.2 (A) 7.7   Hemoglobin A1C External Latest Ref Range: 4.0 - 5.6 %   6.1 (H)   Estimated Avg Glucose Latest Ref Range: 68 - 131 mg/dL   128   EXT 5 HIAA BLOOD Latest Ref Range: 0 - 22 pg/ml  11    EXT Albumin Latest Ref Range: 3.6 - 4.8 g/dl  3.8    EXT Alkaline Phosphatase Latest Ref Range: 39 - 117 iu/l  97    EXT ALT Latest Ref Range: 0 - 32 iu/l  38 (A)    EXT AST Latest Ref Range: 0 - 40 iu/l  21    EXT BilirubiN Total Latest Ref Range: 0.0 - 1.2 mg/dl  0.2    EXT BUN Latest Ref Range: 8 - 27 mg/dl  19    EXT Calcium Latest Ref Range: 8.7 - 10.3 mg/dl  8.3 (A)    EXT Chloride Latest Ref Range: 96 - 106 mmol/l  103    EXT CHROMOGRANIN A Latest Ref Range: 0 - 5 nmol/l  4    EXT CO2 Latest Ref Range: 20 - 29 mmol/l  25    EXT Creatinine Latest Ref Range: 0.57 - 1.00 mg/dl  0.67    EXT Glucose Latest Ref Range: 65 - 99 mg/dl  235 (A)    EXT Hematocrit Latest Ref Range: 34.0 - 46.6 %  32.9 (A)    EXT Hemoglobin Latest Ref Range: 11.1 - 15.9 g/dl  10.3 (A)    EXT NEUROKININ A RANJITH Latest Ref Range: 0 - 40.0 pg/ml  <10.0    EXT PANCREASTATIN RANJITH Latest Ref Range: 10 - 135 pg/ml  663 (A)    EXT Platelets Latest Ref Range: 150 - 379 x10e3/ul  348    EXT Potassium Latest Ref Range: 3.5 - 5.2 mmol/l  4.9    EXT Protein total Latest Ref Range: 6.0 - 8.5 g/dl  6.0    EXT SEROTONIN Latest Ref Range: 0 - 420 ng/ml  9    EXT Sodium Latest Ref Range: 134 - 144 mmol/l  143    EXT WBC Latest Ref Range: 3.4 - 10.8 x0e3/ul  5.9    CT ABDOMEN PELVIS WITHOUT CONTRAST Unknown Rpt          Impression:  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  I have been trying to manage her condition conservatively.  Attempted G-tube placement.  She also has a tumor mainly in the retroperitoneum and mesentery her liver is free of tumor and her tumor is less gallium sensitive and therefore she would not be a good PRRT candidate..  High-grade is a low-grade and therefore she could not be a candidate for chemotherapy and also she would not tolerate well because of a persistent nausea and intermittent diarrhea.    I emphasized the importance of nutrition placed around 2 feeding her nutrition has improved although not significantly.    She was accompanied by her sister.  I talked to her about the high risk nature of the surgery I emphasized the risks and benefits of the case such as bleeding infection DVT PE MI stroke prolonged stay in the ICU sarcopenia muscle mass ventilator dependence leaks requiring re-exploration is.    She has significant malabsorption from gastric bypass.  She does not need any adverse bypass at this point I am planning to reverse the bypass if it is possible.  The plan is to explore the abdomen resect the primary and plan on lymphatic lymph node resection.  If possible will reverse the gastric bypass and anvil and need a feeding tube postop.  Next    At had a long discussion any and stressed the importance of safety of the surgery. There is a possibility of about in the procedure if it is not conducive are just proceed with the debulking with or without the reversal of gastric bypass.    She and her sister knows the iris nature and I emphasized several times that she did undergo the surgery. I she definitely wanted to go with the surgery and and as she is not able to resume her normal life because of the persistent nausea vomiting abdominal pain.  Her G-tube will be revised a new one May be placed  Plan:         NPO after midnight  Ex lap  tomorrow  Oral antibiotics by mouth  Mechanical bowel prep through the G-tube    Spent 40 min talking to her about surgery risks involved benefits and risk and alternatives of surgery              CAROL Levy MD, FACS   Associate Professor of Surgery, [facility]   Neuroendocrine Surgery, Hepatic/Pancreatic & General Surgery   [address], [address]   [place] [address]   ph. [phone]; [phone]   fax. [phone]

## 2019-01-24 NOTE — ANESTHESIA PREPROCEDURE EVALUATION
01/24/2019  Kaylee Ngo is a 63 y.o., female is scheduled for exploratory laparotomy, reversal of gastric bypass, lymphadenectomy, and excision of liver on 1/25/19.     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    Neuroendocrine tumor 4/9/2018    Obsessive compulsive disorder     Sleep apnea      Past Surgical History:   Procedure Laterality Date    APPENDECTOMY      BIOPSY-LIVER  2/28/2018    [facility]    CHOLECYSTECTOMY      ESOPHAGOGASTRODUODENOSCOPY (EGD) N/A 1/29/2018    [facility] ENDO    GASTRIC BYPASS      INSERTION-TUBE-GASTROSTOMY-LAPAROSCOPIC  with gastrogram N/A 2/28/2018    [facility] OR    TOTAL KNEE ARTHROPLASTY Left        Anesthesia Evaluation    I have reviewed the Patient Summary Reports.    I have reviewed the Nursing Notes.   I have reviewed the Medications.     Review of Systems  Anesthesia Hx:  No problems with previous Anesthesia  History of prior surgery of interest to airway management or planning: gastric bypass. Previous anesthesia: General, MAC   Social:  Non-Smoker, No Alcohol Use    Hematology/Oncology:        Current/Recent Cancer. Oncology Comments: NET of small bowel   Cardiovascular:   Exercise tolerance: poor Hypertension, poorly controlled hyperlipidemia States she is able to walk up two flights of stairs w/o stopping   Pulmonary:   Sleep Apnea (Diagnosed prior to gastric  bypass and weight loss)    Hepatic/GI:   GERD, well controlled    Musculoskeletal:   Arthritis     Neurological:   Headaches Denies Seizures.    Endocrine:   Diabetes    Psych:   Psychiatric History (BPD) anxiety depression            Physical Exam  General:  Cachexia    Airway/Jaw/Neck:  Airway Findings: Mouth Opening: Normal Tongue: Normal  Mallampati: III  Improves to III with phonation.  TM Distance: Normal, at least 6 cm  Jaw/Neck Findings:  Neck ROM: Normal ROM      Dental:  Dental Findings: Periodontal disease, Severe   Chest/Lungs:  Chest/Lungs Findings: Clear to auscultation, Normal Respiratory Rate     Heart/Vascular:  Heart Findings: Rate: Normal  Rhythm: Regular Rhythm  Sounds: Normal  Heart murmur: negative       Mental Status:  Mental Status Findings:  Cooperative, Alert and Oriented             Anesthesia Plan  Type of Anesthesia, risks & benefits discussed:  Anesthesia Type:  general  Patient's Preference:   Intra-op Monitoring Plan:   Intra-op Monitoring Plan Comments:   Post Op Pain Control Plan:   Post Op Pain Control Plan Comments:   Induction:   IV  Beta Blocker:         Informed Consent: Patient understands risks and agrees with Anesthesia plan.  Questions answered.   ASA Score: 3     Day of Surgery Review of History & Physical:        Anesthesia Plan Notes: Anesthesia consent to be signed prior to procedure on 1/25/19            Ready For Surgery From Anesthesia Perspective.

## 2019-01-24 NOTE — PATIENT INSTRUCTIONS
Patient Instructions    Name:      Take a Hibiclens shower twice a day for 3 days prior to surgery, including the morning of surgery.   Gargle with Listerine twice a day for 2 days prior to surgery, including the morning of surgery.      Today   Appointment with Anesthesia   Pre [place] for Hospital Admission - Go to 1st floor of the hospital-admitting desk. You will do paper work, get blood drawn,  get x-rays and see Anesthesia at this time.     Today   CLEAR LIQUIDS all day   Start bowel prep  Ducolax Laxative tablets - 2 tablets at 12 noon  Magnesium Citrate - 1 bottle at 2 pm   Do not eat or drink anything after midnight.   Antibiotics sent to outpatient pharmacy                  Tomorrow   Hospital Admission for surgery.  Report to 2nd floor, Same Day Surgery desk at 5:30am   Surgery is scheduled for 7:00am        [facility] [address]  [place]  [address]  [phone]      INFORMATION REGARDING YOUR PROCEDURE      We look forward to serving you and your family and appreciate that you have chosen [facility] for your healthcare needs. Before, during, and after your surgery, you will be cared for by skilled medical professionals. Our surgeons, anesthesiologists, nurses,  and other healthcare professionals will work with you and your family to ensure a safe, smooth and comfortable surgery and recovery.    In order to best meet your pre-admission needs, your surgeon or ordering physicians office will contact our Scheduling Office at [phone] and schedule a Pre-Procedure Appointment.  This should preferably be done 72 hours or greater before your scheduled procedure date.     During your pre-procedure visit your insurance will be verified for your procedure. You will meet with a Registered Nurse and an Anesthesiologist or Nurse Anesthetist. If tests are required, they will be performed during your visit. Please allow about one and a half hours for this  visit.      You will need to bring the following information or items with you to your Pre-Procedure Appointment:    1.  Picture Identification  2.  Insurance Card  3.  Current list of medications to include name and dosage  4.  List of allergies  5.  Orders and any other forms your doctor has given to you  6.  Copies of lab results performed at other facilities. This may include blood work, EKGs or chest xrays.   These results can be faxed to [phone].    The Pre-Op Center Registration Desk is located on the first floor of the hospital ([address] [place] [address]) in the [facility].  The Pre-Operative Center is located on the second floor of the hospital. Someone will walk you from the registration area to the Pre-Operative Center.    Free parking is located in the front of the hospital and medical office building and is easily accessed from [address] and [address]. When you arrive, please check in at the main information desk of the hospital.    Please call Outpatient Surgery at [phone] after 12:00pm on the day before your procedure for your arrival time and updated procedure time.      Pre-Op Bathing Instructions    Before surgery, you can play an important role in your own health.    Because skin is not sterile, we need to be sure that your skin is as free of germs as possible. By following the instructions below, you can reduce the number of germs on your skin before surgery.    IMPORTANT: You will need to shower with a special soap called Hibiclens*, available at any pharmacy.  If you are allergic to Chlorhexidine (the antiseptic in Hibiclens), use an antibacterial soap such as Dial Soap for your preoperative shower.  You will shower with Hibiclens both the night before your surgery and the morning of your surgery.  Do not use Hibiclens on the head, face or genitals to avoid injury to those areas.    STEP #1: THE NIGHT BEFORE YOUR SURGERY     1. Do not shave the area of your body  where your surgery will be performed.  2. Shower and wash your hair and body as usual with your normal soap and shampoo.  3. Rinse your hair and body thoroughly after you shower to remove all soap residue.  4. With your hand, apply one packet of Hibiclens soap to the surgical site.   5. Wash the site gently for five (5) minutes. Do not scrub your skin too hard.   6. Do not wash with your regular soap after Hibiclens is used.  7. Rinse your body thoroughly.  8. Pat yourself dry with a clean, soft towel.  9. Do not use lotion, cream, or powder.  10. Wear clean clothes.    STEP #2: THE MORNING OF YOUR SURGERY     1. Repeat Step #1.    * Not to be used by people allergic to Chlorhexidine.

## 2019-01-24 NOTE — H&P (VIEW-ONLY)
"NOLANETS:  [facility]  A collaboration between [facility] and [facility]      PATIENT: Kaylee Ngo  MRN: [id number]  DATE: 1/24/2019    Subjective:      Chief Complaint: Follow-up (pre op)  doing ok still with diarrhea    Having nausea responding to phenargan        Vitals:   Vitals:    01/24/19 1221   BP: (!) 175/94   BP Location: Right arm   Patient Position: Sitting   BP Method: Medium (Automatic)   Pulse: 86   Temp: 99 °F (37.2 °C)   TempSrc: Oral   Weight: 62 kg (136 lb 11 oz)   Height: 5' 5" (1.651 m)        Karnofsky Score:   Karnofsky Score    Karnofsky Score:  90% - Able to Carry on Normal Activity: Minor Symptoms of Disease         Diagnosis: No diagnosis found.     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    Neuroendocrine tumor 4/9/2018    Obsessive compulsive disorder     Sleep apnea        Past Surgical History:  Past Surgical History:   Procedure Laterality Date    APPENDECTOMY      BIOPSY-LIVER  2/28/2018    Performed by Cam Ashton [facility] at [facility] OR    CHOLECYSTECTOMY      ESOPHAGOGASTRODUODENOSCOPY (EGD) N/A 1/29/2018    Performed by Kermit Reddy MD at [facility] ENDO    GASTRIC BYPASS      INSERTION-TUBE-GASTROSTOMY-LAPAROSCOPIC  with gastrogram N/A 2/28/2018    Performed by Cam [facility]hton [facility] at [facility] OR    TOTAL KNEE ARTHROPLASTY Left        Family History:  Family History   Problem Relation Age of Onset    Cancer Mother         " "pancreas    Cancer Father         colon       Allergies:  Review of patient's allergies indicates:   Allergen Reactions    Calcium carbonate Nausea And Vomiting     The liquid form    Codeine Hives    Contrast media      Oral and IV    Darvocet a500 [propoxyphene n-acetaminophen]     Epinephrine      Neuroendocrine Tumor patient    Neuroendocrine Tumor patient    Morphine     Propoxyphene napsylate     Sulfa (sulfonamide antibiotics)     Erythromycin Rash    Erythromycin base Rash    Iodinated contrast- oral and iv dye Rash and Other (See Comments)     Copper taste in mouth-Benadryl all that is needed for scans  Copper taste in mouth-Benadryl all that is needed for scans    Pcn [penicillins] Rash     "It looks like measles."       Medications:  Current Outpatient Medications   Medication Sig Dispense Refill    ACCU-CHEK SHU CONTROL SOLN Soln       ACCU-CHEK SHU PLUS METER Misc       ACCU-CHEK SHU PLUS TEST STRP Strp       amitriptyline (ELAVIL) 50 MG tablet Take 50 mg by mouth every evening.      amLODIPine (NORVASC) 5 MG tablet Take 5 mg by mouth once daily.       clonazePAM (KLONOPIN) 0.5 MG tablet Take 0.5 mg by mouth 2 (two) times daily as needed for Anxiety.      cyanocobalamin 1,000 mcg/mL injection 1,000 mcg every 28 days.      diclofenac (CATAFLAM) 50 MG tablet as needed.       diphenoxylate-atropine 2.5-0.025 mg (LOMOTIL) 2.5-0.025 mg per tablet Take 1 tablet by mouth 4 (four) times daily as needed for Diarrhea.      DULoxetine (CYMBALTA) 60 MG capsule Take 60 mg by mouth once daily.      GLUCAGON EMERGENCY KIT, HUMAN, 1 mg injection       hydrOXYzine pamoate (VISTARIL) 25 MG Cap Take 25 mg by mouth 3 (three) times daily.       insulin aspart U-100 (NOVOLOG FLEXPEN U-100 INSULIN) 100 unit/mL InPn pen 2 units bid prn      insulin degludec (TRESIBA FLEXTOUCH U-100) 100 unit/mL (3 mL) InPn Inject 15 Units into the skin once daily.       lamoTRIgine (LAMICTAL) 150 MG Tab       " "lancets 30 gauge Misc       lancing device Misc       lanreotide (SOMATULINE DEPOT) 60 mg/0.2 mL Syrg inject 0.2 milliliter by subcutaneous route once a month      losartan (COZAAR) 100 MG tablet Take 1 tablet (100 mg total) by mouth once daily. 90 tablet 3    metoclopramide HCl (REGLAN) 5 mg/5 mL Soln TAKE 10ML BY MOUTH THREE TIMES A  mL 1    metoclopramide HCl (REGLAN) 5 mg/5 mL Soln TAKE 10ML BY MOUTH THREE TIMES A  mL 1    metoprolol succinate (TOPROL-XL) 100 MG 24 hr tablet Take 100 mg by mouth 2 (two) times daily.      multivitamin capsule Take 1 capsule by mouth once daily.      omeprazole (PRILOSEC) 40 MG capsule Take 40 mg by mouth 2 (two) times daily before meals.       ondansetron (ZOFRAN) 8 MG tablet Take 8 mg by mouth every 8 (eight) hours as needed.       potassium chloride (KLOR-CON) 20 mEq Pack Take 20 mEq by mouth once.       promethazine (PHENERGAN) 25 MG tablet Take 25 mg by mouth.      promethazine (PHENERGAN) 25 MG tablet Take 1 tablet (25 mg total) by mouth every 6 (six) hours as needed for Nausea. 60 tablet 1    SURE COMFORT PEN NEEDLE 32 gauge x 5/32" Ndle       vitamin D 1000 units Tab Take 1,000 Units by mouth once daily.      calcium carbonate (OS-BEST) 600 mg calcium (1,500 mg) Tab Take 600 mg by mouth once.      chlorhexidine (HIBICLENS) 4 % external liquid wash surgical area twice daily up to three days prior to surgery  and morning of surgery 118 mL 0    dexamethasone 1.5 mg (27 tabs) DsPk Take 1.5 mg by mouth once daily. Tapering dose as directed 27 tablet 0    FLUARIX QUAD 8360-6468, PF, 60 mcg (15 mcg x 4)/0.5 mL Syrg vaccine       scopolamine (TRANSDERM-SCOP) 1.3-1.5 mg (1 mg over 3 days) Place 1 patch onto the skin Every 3 (three) days. 10 patch 2    tiZANidine (ZANAFLEX) 4 MG tablet as needed.        No current facility-administered medications for this visit.        Review of Systems   Constitutional: Positive for appetite change and fatigue. " Negative for activity change, chills, fever and unexpected weight change.   HENT: Negative for congestion, dental problem, drooling, ear discharge, ear pain, facial swelling, hearing loss, nosebleeds, postnasal drip, rhinorrhea, sinus pressure and sinus pain.    Eyes: Negative for photophobia, pain, discharge, redness, itching and visual disturbance.   Respiratory: Negative for apnea, cough, choking, chest tightness, wheezing and stridor.    Cardiovascular: Negative for chest pain, palpitations and leg swelling.   Gastrointestinal: Positive for diarrhea, nausea and vomiting. Negative for abdominal distention, abdominal pain, anal bleeding, blood in stool, constipation and rectal pain.   Endocrine: Negative for heat intolerance, polydipsia, polyphagia and polyuria.   Genitourinary: Negative.    Musculoskeletal: Positive for arthralgias, joint swelling and myalgias. Negative for neck stiffness.   Skin: Negative.    Allergic/Immunologic: Negative.    Neurological: Negative for tremors, seizures, syncope, facial asymmetry, speech difficulty, weakness, light-headedness, numbness and headaches.   Hematological: Negative.    Psychiatric/Behavioral: Negative.  Negative for suicidal ideas.      Objective:      Physical Exam   Constitutional: She is oriented to person, place, and time. No distress.   HENT:   Head: Normocephalic and atraumatic.   Right Ear: External ear normal.   Left Ear: External ear normal.   Mouth/Throat: Oropharynx is clear and moist.   Eyes: Conjunctivae and EOM are normal. Pupils are equal, round, and reactive to light.   Neck: Normal range of motion.   Cardiovascular: Normal rate and regular rhythm.   Pulmonary/Chest: Effort normal and breath sounds normal.   Abdominal: Soft. Bowel sounds are normal. She exhibits no distension and no mass. There is no tenderness. There is no rebound and no guarding. No hernia.   g tube present site erythematous   Musculoskeletal: Normal range of motion.   Neurological:  She is alert and oriented to person, place, and time.   Skin: Skin is warm. She is not diaphoretic.   Psychiatric: She has a normal mood and affect. Her behavior is normal.      Assessment:       No diagnosis found.    Laboratory Data:   Results for NAOMI NAJERA (MRN [id number]) as of 1/24/2019 15:03   Ref. Range 9/17/2018 16:46 11/5/2018 00:00 12/6/2018 13:38   WBC Latest Ref Range: 3.90 - 12.70 K/uL   7.55   RBC Latest Ref Range: 4.00 - 5.40 M/uL   3.88 (L)   Hemoglobin Latest Ref Range: 12.0 - 16.0 g/dL   11.0 (L)   Hematocrit Latest Ref Range: 37.0 - 48.5 %   35.6 (L)   MCV Latest Ref Range: 82 - 98 fL   92   MCH Latest Ref Range: 27.0 - 31.0 pg   28.4   MCHC Latest Ref Range: 32.0 - 36.0 g/dL   30.9 (L)   RDW Latest Ref Range: 11.5 - 14.5 %   13.6   Platelets Latest Ref Range: 150 - 350 K/uL   408 (H)   MPV Latest Ref Range: 9.2 - 12.9 fL   9.0 (L)   Gran% Latest Ref Range: 38.0 - 73.0 %   69.9   Gran # (ANC) Latest Ref Range: 1.8 - 7.7 K/uL   5.3   Lymph% Latest Ref Range: 18.0 - 48.0 %   22.8   Lymph # Latest Ref Range: 1.0 - 4.8 K/uL   1.7   Mono% Latest Ref Range: 4.0 - 15.0 %   5.6   Mono # Latest Ref Range: 0.3 - 1.0 K/uL   0.4   Eosinophil% Latest Ref Range: 0.0 - 8.0 %   1.1   Eos # Latest Ref Range: 0.0 - 0.5 K/uL   0.1   Basophil% Latest Ref Range: 0.0 - 1.9 %   0.3   Baso # Latest Ref Range: 0.00 - 0.20 K/uL   0.02   Differential Method Unknown   Automated   Sodium Latest Ref Range: 136 - 145 mmol/L   140   Potassium Latest Ref Range: 3.5 - 5.1 mmol/L   3.4 (L)   Chloride Latest Ref Range: 95 - 110 mmol/L   102   CO2 Latest Ref Range: 23 - 29 mmol/L   27   Anion Gap Latest Ref Range: 8 - 16 mmol/L   11   BUN, Bld Latest Ref Range: 8 - 23 mg/dL   27 (H)   Creatinine Latest Ref Range: 0.5 - 1.4 mg/dL   1.1   eGFR if non African American Latest Ref Range: >60 mL/min/1.73 m^2   54 (A)   eGFR if African American Latest Ref Range: >60 mL/min/1.73 m^2   >60   Glucose Latest Ref Range: 70 - 110 mg/dL   156  (H)   Calcium Latest Ref Range: 8.7 - 10.5 mg/dL   9.3   Alkaline Phosphatase Latest Ref Range: 55 - 135 U/L   123   Total Protein Latest Ref Range: 6.0 - 8.4 g/dL   6.8   Albumin Latest Ref Range: 3.5 - 5.2 g/dL   3.3 (L)   Prealbumin Latest Ref Range: 20 - 43 mg/dL  20 18 (L)   Total Bilirubin Latest Ref Range: 0.1 - 1.0 mg/dL   0.3   AST Latest Ref Range: 10 - 40 U/L   21   ALT Latest Ref Range: 10 - 44 U/L   19   CRP Latest Ref Range: 0.0 - 8.2 mg/L  6.2 (A) 7.7   Hemoglobin A1C External Latest Ref Range: 4.0 - 5.6 %   6.1 (H)   Estimated Avg Glucose Latest Ref Range: 68 - 131 mg/dL   128   EXT 5 HIAA BLOOD Latest Ref Range: 0 - 22 pg/ml  11    EXT Albumin Latest Ref Range: 3.6 - 4.8 g/dl  3.8    EXT Alkaline Phosphatase Latest Ref Range: 39 - 117 iu/l  97    EXT ALT Latest Ref Range: 0 - 32 iu/l  38 (A)    EXT AST Latest Ref Range: 0 - 40 iu/l  21    EXT BilirubiN Total Latest Ref Range: 0.0 - 1.2 mg/dl  0.2    EXT BUN Latest Ref Range: 8 - 27 mg/dl  19    EXT Calcium Latest Ref Range: 8.7 - 10.3 mg/dl  8.3 (A)    EXT Chloride Latest Ref Range: 96 - 106 mmol/l  103    EXT CHROMOGRANIN A Latest Ref Range: 0 - 5 nmol/l  4    EXT CO2 Latest Ref Range: 20 - 29 mmol/l  25    EXT Creatinine Latest Ref Range: 0.57 - 1.00 mg/dl  0.67    EXT Glucose Latest Ref Range: 65 - 99 mg/dl  235 (A)    EXT Hematocrit Latest Ref Range: 34.0 - 46.6 %  32.9 (A)    EXT Hemoglobin Latest Ref Range: 11.1 - 15.9 g/dl  10.3 (A)    EXT NEUROKININ A RANJITH Latest Ref Range: 0 - 40.0 pg/ml  <10.0    EXT PANCREASTATIN RANJITH Latest Ref Range: 10 - 135 pg/ml  663 (A)    EXT Platelets Latest Ref Range: 150 - 379 x10e3/ul  348    EXT Potassium Latest Ref Range: 3.5 - 5.2 mmol/l  4.9    EXT Protein total Latest Ref Range: 6.0 - 8.5 g/dl  6.0    EXT SEROTONIN Latest Ref Range: 0 - 420 ng/ml  9    EXT Sodium Latest Ref Range: 134 - 144 mmol/l  143    EXT WBC Latest Ref Range: 3.4 - 10.8 x0e3/ul  5.9    CT ABDOMEN PELVIS WITHOUT CONTRAST Unknown Rpt          Impression:  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  I have been trying to manage her condition conservatively.  Attempted G-tube placement.  She also has a tumor mainly in the retroperitoneum and mesentery her liver is free of tumor and her tumor is less gallium sensitive and therefore she would not be a good PRRT candidate..  High-grade is a low-grade and therefore she could not be a candidate for chemotherapy and also she would not tolerate well because of a persistent nausea and intermittent diarrhea.    I emphasized the importance of nutrition placed around 2 feeding her nutrition has improved although not significantly.    She was accompanied by her sister.  I talked to her about the high risk nature of the surgery I emphasized the risks and benefits of the case such as bleeding infection DVT PE MI stroke prolonged stay in the ICU sarcopenia muscle mass ventilator dependence leaks requiring re-exploration is.    She has significant malabsorption from gastric bypass.  She does not need any adverse bypass at this point I am planning to reverse the bypass if it is possible.  The plan is to explore the abdomen resect the primary and plan on lymphatic lymph node resection.  If possible will reverse the gastric bypass and anvil and need a feeding tube postop.  Next    At had a long discussion any and stressed the importance of safety of the surgery. There is a possibility of about in the procedure if it is not conducive are just proceed with the debulking with or without the reversal of gastric bypass.    She and her sister knows the iris nature and I emphasized several times that she did undergo the surgery. I she definitely wanted to go with the surgery and and as she is not able to resume her normal life because of the persistent nausea vomiting abdominal pain.  Her G-tube will be revised a new one May be placed  Plan:         NPO after midnight  Ex lap  tomorrow  Oral antibiotics by mouth  Mechanical bowel prep through the G-tube    Spent 40 min talking to her about surgery risks involved benefits and risk and alternatives of surgery              CAROL Levy MD, FACS   Associate Professor of Surgery, [facility]   Neuroendocrine Surgery, Hepatic/Pancreatic & General Surgery   [address], [address]   [place] [address]   ph. [phone]; [phone]   fax. [phone]

## 2019-01-24 NOTE — DISCHARGE INSTRUCTIONS
· Arrive on  1/25/2019 at  0530  · Report to the 2nd floor Procedural Check In Room .   · Nothing to eat or drink after midnight the night before your procedure.  · Take the *** medications the morning of surgery with a small sip of water.  Metoprolol and Losartan                                                       · Please remove all body piercings and leave all your jewelery and valuables at home .  · Please remove your contact lenses.   · Wear loose comfortable clothing.  · You will not be able to drive that day, please make arrangement for transportation to and from your procedure.  · You cannot be alone for 24 hours after anesthesia. Make arrangements as needed.  · Shower the night before your procedure and the morning of your procedure with Hibiclens antibacterial solution.  · No lotions, creams or powders  · Do not shave 3 days prior to procedure  · Report any signs or symptoms of an infection to your surgeon. Examples: urinary (bladder) infection, upper respiratory infection, skin boils.   · Practice good hand washing before, during, and after procedure.   · Stop Aspirin, Ibuprofen, Advil, Motrin, Aleve, Nuprin, Naprosyn (all NSAID Medication) or any other blood thinners 5 days before your procedure unless directed by your physician.  Also hold all over the counter vitamins and herbal supplements for 5 days prior to your procedure.  · You may take Tylenol or Acetaminophen up the day of surgery for any pain.        I have read or had read and explained to me, and understand the above information.    Additional comments or instructions:  For additional questions call [phone]        Pre-Op Bathing Instructions    Before surgery, you can play an important role in your own health.    Because skin is not sterile, we need to be sure that your skin is as free of germs as possible. By following the instructions below, you can reduce the number of germs on your skin before surgery.    IMPORTANT: You will need to  shower with a special soap called Hibiclens*, available at any pharmacy, over the counter usually in the first aid isle.  If you are allergic to Chlorhexidine (the antiseptic in Hibiclens), use an antibacterial soap such as Dial Soap for your preoperative showers.  You will shower with Hibiclens the night before and the morning of surgery. Both the night before your surgery and the morning of your surgery see steps 2 and 3.   Do not use Hibiclens on the head, face or genitals to avoid injury to those areas.    STEP #1  1.  Shower with Hibiclens solution night before and the morning of surgery.      STEP #2: THE NIGHT BEFORE YOUR SURGERY     1. Do not shave the area of your body where your surgery will be performed.  2. Wash your hair as usual with your normal  Shampoo. Wash body shoulder to toes with your normal soap.  3. Squeeze Hibiclens into hand apply to surgical site.   4. Wash the site gently for five (5) minutes. Do not scrub your skin too hard.   5. Do not wash with your regular soap after Hibiclens is used.  6. Rinse your body thoroughly.  7. Pat yourself dry with a clean, soft towel.  8. Do not use lotion, cream, or powder.  9. Wear clean clothes.    STEP #3: THE MORNING OF YOUR SURGERY     1. Repeat Step #2.    * Not to be used by people allergic to Chlorhexidine.          Anesthesia: General Anesthesia  Youre due to have surgery. During surgery, youll be given medication called anesthesia. (It is also called anesthetic.) This will keep you comfortable and pain-free. Your anesthesia provider will use general anesthesia. This sheet tells you more about it.  What is general anesthesia?    General anesthesia puts you into a state like deep sleep. It goes into the bloodstream (IV anesthetics), into the lungs (gas anesthetics), or both. You feel nothing during the procedure. You will not remember it. During the procedure, the anesthesia provider monitors you continuously. He or she checks your heart rate and  rhythm, blood pressure, breathing, and blood oxygen.  · IV Anesthetics. IV anesthetics are given through an IV line in your arm. Theyre often given first. This is so you are asleep before a gas anesthetic is started. Some kinds of IV anesthetics relieve pain. Others relax you. Your doctor will decide which kind is best in your case.  · Gas Anesthetics. Gas anesthetics are breathed into the lungs. They are often used to keep you asleep. They can be given through a facemask or a tube placed in your larynx or trachea (breathing tube).  · If you have a facemask, your anesthesia provider will most likely place it over your nose and mouth while youre still awake. Youll breathe oxygen through the mask as your IV anesthetic is started. Gas anesthetic may be added through the mask.  · If you have a tube in the larynx or trachea, it will be inserted into your throat after youre asleep.  Anesthesia tools and medications  You will likely have:  · IV anesthetics. These are put into an IV line into your bloodstream.  · Gas anesthetics. You breathe these anesthetics into your lungs, where they pass into your bloodstream.  · Pulse oximeter. This is a small clip that is attached to the end of your finger. This measures your blood oxygen level.  · Electrocardiography leads (electrodes). These are small sticky pads that are placed on your chest. They record your heart rate and rhythm.  · Blood pressure cuff. This reads your blood pressure.    Anesthesia safety  · Follow all instructions you are given for how long not to eat or drink before your procedure.  · Be sure your doctor knows what medications and drugs you take. This includes over-the-counter medications, herbs, supplements, alcohol or other drugs. You will be asked when those were last taken.  · Have an adult family member or friend drive you home after the procedure.  · For the first 24 hours after your surgery:  · Do not drive or use heavy equipment.  · Have a trusted  family member or spouse make important decisions or sign documents.  · Avoid alcohol.  · Have a responsible adult stay with you. He or she can watch for problems and help keep you safe.  © 0627-0075 [employer]. [address] [place] [address], [place] [address]. All rights reserved. This information is not intended as a substitute for professional medical care. Always follow your healthcare professional's instructions.

## 2019-01-25 ENCOUNTER — ANESTHESIA (OUTPATIENT)
Dept: SURGERY | Facility: HOSPITAL | Age: 64
DRG: 826 | End: 2019-01-25
Payer: MEDICARE

## 2019-01-25 ENCOUNTER — HOSPITAL ENCOUNTER (INPATIENT)
Facility: HOSPITAL | Age: 64
LOS: 32 days | Discharge: NURSING, REHAB OR LONG-TERM CARE | DRG: 826 | End: 2019-02-26
Attending: SURGERY | Admitting: SURGERY
Payer: MEDICARE

## 2019-01-25 DIAGNOSIS — R13.10 DYSPHAGIA, UNSPECIFIED TYPE: ICD-10-CM

## 2019-01-25 DIAGNOSIS — J96.01 ACUTE HYPOXEMIC RESPIRATORY FAILURE: ICD-10-CM

## 2019-01-25 DIAGNOSIS — C7A.098 MALIGNANT CARCINOID TUMOR OF PANCREAS: ICD-10-CM

## 2019-01-25 DIAGNOSIS — K74.60 HEPATIC CIRRHOSIS, UNSPECIFIED HEPATIC CIRRHOSIS TYPE, UNSPECIFIED WHETHER ASCITES PRESENT: ICD-10-CM

## 2019-01-25 DIAGNOSIS — F31.5 BIPOLAR DISORDER, CURRENT EPISODE DEPRESSED, SEVERE, WITH PSYCHOTIC FEATURES: ICD-10-CM

## 2019-01-25 DIAGNOSIS — Z98.84 S/P GASTRIC BYPASS: ICD-10-CM

## 2019-01-25 DIAGNOSIS — C7A.00 MALIGNANT CARCINOID TUMOR OF UNKNOWN PRIMARY SITE: ICD-10-CM

## 2019-01-25 DIAGNOSIS — R07.9 CHEST PAIN: ICD-10-CM

## 2019-01-25 DIAGNOSIS — C7A.012 MALIGNANT CARCINOID TUMOR OF ILEUM: ICD-10-CM

## 2019-01-25 DIAGNOSIS — C7B.01 SECONDARY CARCINOID TUMOR OF DISTANT LYMPH NODES: ICD-10-CM

## 2019-01-25 DIAGNOSIS — I49.9 CARDIAC RHYTHM DISORDER OR DISTURBANCE OR CHANGE: ICD-10-CM

## 2019-01-25 DIAGNOSIS — C7B.8 SECONDARY NEUROENDOCRINE TUMOR OF DISTANT LYMPH NODES: Primary | ICD-10-CM

## 2019-01-25 DIAGNOSIS — D3A.098 PRIMARY PANCREATIC CARCINOID TUMOR: ICD-10-CM

## 2019-01-25 DIAGNOSIS — R00.0 SINUS TACHYCARDIA: ICD-10-CM

## 2019-01-25 DIAGNOSIS — E44.0 MODERATE MALNUTRITION: ICD-10-CM

## 2019-01-25 DIAGNOSIS — K90.9 INTESTINAL MALABSORPTION, UNSPECIFIED TYPE: ICD-10-CM

## 2019-01-25 LAB
ANION GAP SERPL CALC-SCNC: 12 MMOL/L
ANION GAP SERPL CALC-SCNC: 8 MMOL/L
BASOPHILS # BLD AUTO: 0.01 K/UL
BASOPHILS # BLD AUTO: 0.01 K/UL
BASOPHILS NFR BLD: 0.1 %
BASOPHILS NFR BLD: 0.2 %
BUN SERPL-MCNC: 12 MG/DL
BUN SERPL-MCNC: 8 MG/DL
CALCIUM SERPL-MCNC: 6.9 MG/DL
CALCIUM SERPL-MCNC: 7.2 MG/DL
CHLORIDE SERPL-SCNC: 107 MMOL/L
CHLORIDE SERPL-SCNC: 108 MMOL/L
CO2 SERPL-SCNC: 21 MMOL/L
CO2 SERPL-SCNC: 25 MMOL/L
CREAT SERPL-MCNC: 0.7 MG/DL
CREAT SERPL-MCNC: 0.8 MG/DL
DIFFERENTIAL METHOD: ABNORMAL
DIFFERENTIAL METHOD: ABNORMAL
EOSINOPHIL # BLD AUTO: 0 K/UL
EOSINOPHIL # BLD AUTO: 0 K/UL
EOSINOPHIL NFR BLD: 0 %
EOSINOPHIL NFR BLD: 0 %
ERYTHROCYTE [DISTWIDTH] IN BLOOD BY AUTOMATED COUNT: 13.4 %
ERYTHROCYTE [DISTWIDTH] IN BLOOD BY AUTOMATED COUNT: 13.6 %
EST. GFR  (AFRICAN AMERICAN): >60 ML/MIN/1.73 M^2
EST. GFR  (AFRICAN AMERICAN): >60 ML/MIN/1.73 M^2
EST. GFR  (NON AFRICAN AMERICAN): >60 ML/MIN/1.73 M^2
EST. GFR  (NON AFRICAN AMERICAN): >60 ML/MIN/1.73 M^2
GLUCOSE SERPL-MCNC: 148 MG/DL
GLUCOSE SERPL-MCNC: 149 MG/DL
GLUCOSE SERPL-MCNC: 178 MG/DL (ref 70–110)
GLUCOSE SERPL-MCNC: 185 MG/DL (ref 70–110)
HCO3 UR-SCNC: 21.9 MMOL/L (ref 24–28)
HCO3 UR-SCNC: 22.9 MMOL/L (ref 24–28)
HCT VFR BLD AUTO: 34 %
HCT VFR BLD AUTO: 37 %
HCT VFR BLD CALC: 31 %PCV (ref 36–54)
HCT VFR BLD CALC: 32 %PCV (ref 36–54)
HGB BLD-MCNC: 11 G/DL
HGB BLD-MCNC: 11 G/DL
HGB BLD-MCNC: 11.1 G/DL
HGB BLD-MCNC: 11.9 G/DL
LYMPHOCYTES # BLD AUTO: 0.6 K/UL
LYMPHOCYTES # BLD AUTO: 0.7 K/UL
LYMPHOCYTES NFR BLD: 10.9 %
LYMPHOCYTES NFR BLD: 6.6 %
MCH RBC QN AUTO: 28.5 PG
MCH RBC QN AUTO: 28.5 PG
MCHC RBC AUTO-ENTMCNC: 32.2 G/DL
MCHC RBC AUTO-ENTMCNC: 32.6 G/DL
MCV RBC AUTO: 87 FL
MCV RBC AUTO: 89 FL
MONOCYTES # BLD AUTO: 0.3 K/UL
MONOCYTES # BLD AUTO: 0.3 K/UL
MONOCYTES NFR BLD: 3.3 %
MONOCYTES NFR BLD: 4.3 %
NEUTROPHILS # BLD AUTO: 5.3 K/UL
NEUTROPHILS # BLD AUTO: 7.6 K/UL
NEUTROPHILS NFR BLD: 84.4 %
NEUTROPHILS NFR BLD: 89.8 %
PCO2 BLDA: 30 MMHG (ref 35–45)
PCO2 BLDA: 32.1 MMHG (ref 35–45)
PH SMN: 7.44 [PH] (ref 7.35–7.45)
PH SMN: 7.49 [PH] (ref 7.35–7.45)
PLATELET # BLD AUTO: 296 K/UL
PLATELET # BLD AUTO: 327 K/UL
PMV BLD AUTO: 9 FL
PMV BLD AUTO: 9.3 FL
PO2 BLDA: 188 MMHG (ref 80–100)
PO2 BLDA: 245 MMHG (ref 80–100)
POC BE: -2 MMOL/L
POC BE: 0 MMOL/L
POC IONIZED CALCIUM: 0.94 MMOL/L (ref 1.06–1.42)
POC IONIZED CALCIUM: 0.94 MMOL/L (ref 1.06–1.42)
POC SATURATED O2: 100 % (ref 95–100)
POC SATURATED O2: 100 % (ref 95–100)
POC TCO2: 23 MMOL/L (ref 23–27)
POC TCO2: 24 MMOL/L (ref 23–27)
POCT GLUCOSE: 147 MG/DL (ref 70–110)
POCT GLUCOSE: 147 MG/DL (ref 70–110)
POTASSIUM BLD-SCNC: 3 MMOL/L (ref 3.5–5.1)
POTASSIUM BLD-SCNC: 3.6 MMOL/L (ref 3.5–5.1)
POTASSIUM SERPL-SCNC: 3.9 MMOL/L
POTASSIUM SERPL-SCNC: 3.9 MMOL/L
RBC # BLD AUTO: 3.89 M/UL
RBC # BLD AUTO: 4.18 M/UL
SAMPLE: ABNORMAL
SAMPLE: ABNORMAL
SODIUM BLD-SCNC: 141 MMOL/L (ref 136–145)
SODIUM BLD-SCNC: 142 MMOL/L (ref 136–145)
SODIUM SERPL-SCNC: 140 MMOL/L
SODIUM SERPL-SCNC: 141 MMOL/L
WBC # BLD AUTO: 6.23 K/UL
WBC # BLD AUTO: 8.45 K/UL

## 2019-01-25 PROCEDURE — 63600175 PHARM REV CODE 636 W HCPCS: Performed by: STUDENT IN AN ORGANIZED HEALTH CARE EDUCATION/TRAINING PROGRAM

## 2019-01-25 PROCEDURE — 25000003 PHARM REV CODE 250: Performed by: NURSE ANESTHETIST, CERTIFIED REGISTERED

## 2019-01-25 PROCEDURE — 37000008 HC ANESTHESIA 1ST 15 MINUTES: Performed by: SURGERY

## 2019-01-25 PROCEDURE — 25000003 PHARM REV CODE 250: Performed by: SURGERY

## 2019-01-25 PROCEDURE — 27200676 HC TRANSDUCER MONITOR KIT DOUBLE: Performed by: NURSE ANESTHETIST, CERTIFIED REGISTERED

## 2019-01-25 PROCEDURE — P9045 ALBUMIN (HUMAN), 5%, 250 ML: HCPCS | Mod: JG | Performed by: SURGERY

## 2019-01-25 PROCEDURE — 88305 TISSUE EXAM BY PATHOLOGIST: CPT | Performed by: PATHOLOGY

## 2019-01-25 PROCEDURE — 88360 TUMOR IMMUNOHISTOCHEM/MANUAL: CPT | Mod: 26,,, | Performed by: PATHOLOGY

## 2019-01-25 PROCEDURE — 88360 PR  TUMOR IMMUNOHISTOCHEM/MANUAL: ICD-10-PCS | Mod: 26,59,, | Performed by: PATHOLOGY

## 2019-01-25 PROCEDURE — 27100021 HC MULTIPORT INFUSION MANIFOLD: Performed by: NURSE ANESTHETIST, CERTIFIED REGISTERED

## 2019-01-25 PROCEDURE — 88342 IMHCHEM/IMCYTCHM 1ST ANTB: CPT | Mod: 26,59,, | Performed by: PATHOLOGY

## 2019-01-25 PROCEDURE — 88331 TISSUE SPECIMEN TO PATHOLOGY - SURGERY: ICD-10-PCS | Mod: 26,,, | Performed by: PATHOLOGY

## 2019-01-25 PROCEDURE — 11000001 HC ACUTE MED/SURG PRIVATE ROOM

## 2019-01-25 PROCEDURE — 36000709 HC OR TIME LEV III EA ADD 15 MIN: Performed by: SURGERY

## 2019-01-25 PROCEDURE — P9047 ALBUMIN (HUMAN), 25%, 50ML: HCPCS | Mod: JG | Performed by: NURSE ANESTHETIST, CERTIFIED REGISTERED

## 2019-01-25 PROCEDURE — 63600175 PHARM REV CODE 636 W HCPCS: Performed by: ANESTHESIOLOGY

## 2019-01-25 PROCEDURE — 88305 TISSUE EXAM BY PATHOLOGIST: CPT | Mod: 26,,, | Performed by: PATHOLOGY

## 2019-01-25 PROCEDURE — 80048 BASIC METABOLIC PNL TOTAL CA: CPT | Mod: 91

## 2019-01-25 PROCEDURE — S0171 BUMETANIDE 0.5 MG: HCPCS | Performed by: SURGERY

## 2019-01-25 PROCEDURE — 63600175 PHARM REV CODE 636 W HCPCS

## 2019-01-25 PROCEDURE — 88331 PATH CONSLTJ SURG 1 BLK 1SPC: CPT | Mod: 26,,, | Performed by: PATHOLOGY

## 2019-01-25 PROCEDURE — C9290 INJ, BUPIVACAINE LIPOSOME: HCPCS | Performed by: STUDENT IN AN ORGANIZED HEALTH CARE EDUCATION/TRAINING PROGRAM

## 2019-01-25 PROCEDURE — 63600175 PHARM REV CODE 636 W HCPCS: Mod: JG | Performed by: NURSE ANESTHETIST, CERTIFIED REGISTERED

## 2019-01-25 PROCEDURE — 88304 TISSUE SPECIMEN TO PATHOLOGY - SURGERY: ICD-10-PCS | Mod: 26,,, | Performed by: PATHOLOGY

## 2019-01-25 PROCEDURE — S0171 BUMETANIDE 0.5 MG: HCPCS | Performed by: NURSE ANESTHETIST, CERTIFIED REGISTERED

## 2019-01-25 PROCEDURE — 63600175 PHARM REV CODE 636 W HCPCS: Mod: JG | Performed by: SURGERY

## 2019-01-25 PROCEDURE — S0028 INJECTION, FAMOTIDINE, 20 MG: HCPCS | Performed by: SURGERY

## 2019-01-25 PROCEDURE — 63600175 PHARM REV CODE 636 W HCPCS: Performed by: SURGERY

## 2019-01-25 PROCEDURE — P9045 ALBUMIN (HUMAN), 5%, 250 ML: HCPCS | Mod: JG | Performed by: NURSE ANESTHETIST, CERTIFIED REGISTERED

## 2019-01-25 PROCEDURE — S0077 INJECTION, CLINDAMYCIN PHOSP: HCPCS | Performed by: SURGERY

## 2019-01-25 PROCEDURE — 27201423 OPTIME MED/SURG SUP & DEVICES STERILE SUPPLY: Performed by: SURGERY

## 2019-01-25 PROCEDURE — 64488 TAP BLOCK BI INJECTION: CPT | Performed by: ANESTHESIOLOGY

## 2019-01-25 PROCEDURE — 25000003 PHARM REV CODE 250: Performed by: STUDENT IN AN ORGANIZED HEALTH CARE EDUCATION/TRAINING PROGRAM

## 2019-01-25 PROCEDURE — 80048 BASIC METABOLIC PNL TOTAL CA: CPT

## 2019-01-25 PROCEDURE — 88342 TISSUE SPECIMEN TO PATHOLOGY - SURGERY: ICD-10-PCS | Mod: 26,59,, | Performed by: PATHOLOGY

## 2019-01-25 PROCEDURE — 88304 TISSUE EXAM BY PATHOLOGIST: CPT | Mod: 26,,, | Performed by: PATHOLOGY

## 2019-01-25 PROCEDURE — 36000708 HC OR TIME LEV III 1ST 15 MIN: Performed by: SURGERY

## 2019-01-25 PROCEDURE — 88305 TISSUE SPECIMEN TO PATHOLOGY - SURGERY: ICD-10-PCS | Mod: 26,,, | Performed by: PATHOLOGY

## 2019-01-25 PROCEDURE — 71000033 HC RECOVERY, INTIAL HOUR: Performed by: SURGERY

## 2019-01-25 PROCEDURE — C1751 CATH, INF, PER/CENT/MIDLINE: HCPCS | Performed by: NURSE ANESTHETIST, CERTIFIED REGISTERED

## 2019-01-25 PROCEDURE — 71000039 HC RECOVERY, EACH ADD'L HOUR: Performed by: SURGERY

## 2019-01-25 PROCEDURE — 37000009 HC ANESTHESIA EA ADD 15 MINS: Performed by: SURGERY

## 2019-01-25 PROCEDURE — 88360 TUMOR IMMUNOHISTOCHEM/MANUAL: CPT | Mod: 26,59,, | Performed by: PATHOLOGY

## 2019-01-25 PROCEDURE — 85025 COMPLETE CBC W/AUTO DIFF WBC: CPT

## 2019-01-25 PROCEDURE — 36415 COLL VENOUS BLD VENIPUNCTURE: CPT

## 2019-01-25 RX ORDER — CLINDAMYCIN PHOSPHATE 150 MG/ML
600 INJECTION, SOLUTION INTRAVENOUS
Status: DISCONTINUED | OUTPATIENT
Start: 2019-01-25 | End: 2019-01-25

## 2019-01-25 RX ORDER — ONDANSETRON HYDROCHLORIDE 2 MG/ML
INJECTION, SOLUTION INTRAMUSCULAR; INTRAVENOUS
Status: DISCONTINUED | OUTPATIENT
Start: 2019-01-25 | End: 2019-01-25

## 2019-01-25 RX ORDER — GLYCOPYRROLATE 0.2 MG/ML
INJECTION INTRAMUSCULAR; INTRAVENOUS
Status: DISCONTINUED | OUTPATIENT
Start: 2019-01-25 | End: 2019-01-25

## 2019-01-25 RX ORDER — POTASSIUM CHLORIDE 14.9 MG/ML
20 INJECTION INTRAVENOUS
Status: COMPLETED | OUTPATIENT
Start: 2019-01-25 | End: 2019-01-25

## 2019-01-25 RX ORDER — ALBUMIN HUMAN 50 G/1000ML
SOLUTION INTRAVENOUS CONTINUOUS PRN
Status: DISCONTINUED | OUTPATIENT
Start: 2019-01-25 | End: 2019-01-25

## 2019-01-25 RX ORDER — BUMETANIDE 0.25 MG/ML
0.5 INJECTION INTRAMUSCULAR; INTRAVENOUS DAILY
Status: COMPLETED | OUTPATIENT
Start: 2019-01-25 | End: 2019-01-25

## 2019-01-25 RX ORDER — BACITRACIN 50000 [IU]/1
INJECTION, POWDER, FOR SOLUTION INTRAMUSCULAR
Status: DISCONTINUED | OUTPATIENT
Start: 2019-01-25 | End: 2019-01-25 | Stop reason: HOSPADM

## 2019-01-25 RX ORDER — MIDAZOLAM HYDROCHLORIDE 1 MG/ML
INJECTION INTRAMUSCULAR; INTRAVENOUS
Status: DISCONTINUED | OUTPATIENT
Start: 2019-01-25 | End: 2019-01-25

## 2019-01-25 RX ORDER — ALBUMIN HUMAN 250 G/1000ML
SOLUTION INTRAVENOUS CONTINUOUS PRN
Status: DISCONTINUED | OUTPATIENT
Start: 2019-01-25 | End: 2019-01-25

## 2019-01-25 RX ORDER — METOPROLOL SUCCINATE 50 MG/1
100 TABLET, EXTENDED RELEASE ORAL 2 TIMES DAILY
Status: DISCONTINUED | OUTPATIENT
Start: 2019-01-25 | End: 2019-01-28

## 2019-01-25 RX ORDER — ALBUMIN HUMAN 50 G/1000ML
25 SOLUTION INTRAVENOUS ONCE
Status: COMPLETED | OUTPATIENT
Start: 2019-01-25 | End: 2019-01-25

## 2019-01-25 RX ORDER — SUCCINYLCHOLINE CHLORIDE 20 MG/ML
INJECTION INTRAMUSCULAR; INTRAVENOUS
Status: DISCONTINUED | OUTPATIENT
Start: 2019-01-25 | End: 2019-01-25

## 2019-01-25 RX ORDER — ACETAMINOPHEN 10 MG/ML
1000 INJECTION, SOLUTION INTRAVENOUS ONCE
Status: DISCONTINUED | OUTPATIENT
Start: 2019-01-25 | End: 2019-01-25

## 2019-01-25 RX ORDER — AMLODIPINE BESYLATE 5 MG/1
5 TABLET ORAL DAILY
Status: DISCONTINUED | OUTPATIENT
Start: 2019-01-26 | End: 2019-01-28

## 2019-01-25 RX ORDER — HYDROMORPHONE HYDROCHLORIDE 2 MG/ML
0.5 INJECTION, SOLUTION INTRAMUSCULAR; INTRAVENOUS; SUBCUTANEOUS ONCE
Status: COMPLETED | OUTPATIENT
Start: 2019-01-25 | End: 2019-01-25

## 2019-01-25 RX ORDER — HYDRALAZINE HYDROCHLORIDE 20 MG/ML
5 INJECTION INTRAMUSCULAR; INTRAVENOUS
Status: DISCONTINUED | OUTPATIENT
Start: 2019-01-25 | End: 2019-02-26 | Stop reason: HOSPADM

## 2019-01-25 RX ORDER — ENOXAPARIN SODIUM 100 MG/ML
30 INJECTION SUBCUTANEOUS
Status: COMPLETED | OUTPATIENT
Start: 2019-01-25 | End: 2019-01-25

## 2019-01-25 RX ORDER — ONDANSETRON 2 MG/ML
8 INJECTION INTRAMUSCULAR; INTRAVENOUS
Status: COMPLETED | OUTPATIENT
Start: 2019-01-25 | End: 2019-01-25

## 2019-01-25 RX ORDER — ENOXAPARIN SODIUM 100 MG/ML
30 INJECTION SUBCUTANEOUS
Status: DISCONTINUED | OUTPATIENT
Start: 2019-01-25 | End: 2019-01-25

## 2019-01-25 RX ORDER — SODIUM CHLORIDE, SODIUM LACTATE, POTASSIUM CHLORIDE, CALCIUM CHLORIDE 600; 310; 30; 20 MG/100ML; MG/100ML; MG/100ML; MG/100ML
INJECTION, SOLUTION INTRAVENOUS CONTINUOUS PRN
Status: DISCONTINUED | OUTPATIENT
Start: 2019-01-25 | End: 2019-01-25

## 2019-01-25 RX ORDER — ONDANSETRON 2 MG/ML
4 INJECTION INTRAMUSCULAR; INTRAVENOUS EVERY 6 HOURS PRN
Status: DISCONTINUED | OUTPATIENT
Start: 2019-01-25 | End: 2019-02-26 | Stop reason: HOSPADM

## 2019-01-25 RX ORDER — PREGABALIN 75 MG/1
75 CAPSULE ORAL
Status: DISCONTINUED | OUTPATIENT
Start: 2019-01-25 | End: 2019-01-25

## 2019-01-25 RX ORDER — PROPOFOL 10 MG/ML
VIAL (ML) INTRAVENOUS
Status: DISCONTINUED | OUTPATIENT
Start: 2019-01-25 | End: 2019-01-25

## 2019-01-25 RX ORDER — FENTANYL CITRATE 50 UG/ML
INJECTION, SOLUTION INTRAMUSCULAR; INTRAVENOUS
Status: DISCONTINUED | OUTPATIENT
Start: 2019-01-25 | End: 2019-01-25

## 2019-01-25 RX ORDER — LIDOCAINE HCL/PF 100 MG/5ML
SYRINGE (ML) INTRAVENOUS
Status: DISCONTINUED | OUTPATIENT
Start: 2019-01-25 | End: 2019-01-25

## 2019-01-25 RX ORDER — DEXTROSE MONOHYDRATE, SODIUM CHLORIDE, AND POTASSIUM CHLORIDE 50; 1.49; 4.5 G/1000ML; G/1000ML; G/1000ML
INJECTION, SOLUTION INTRAVENOUS CONTINUOUS
Status: DISCONTINUED | OUTPATIENT
Start: 2019-01-25 | End: 2019-01-26

## 2019-01-25 RX ORDER — FAMOTIDINE 10 MG/ML
20 INJECTION INTRAVENOUS
Status: DISCONTINUED | OUTPATIENT
Start: 2019-01-25 | End: 2019-01-25

## 2019-01-25 RX ORDER — LABETALOL HYDROCHLORIDE 5 MG/ML
5 INJECTION, SOLUTION INTRAVENOUS
Status: DISCONTINUED | OUTPATIENT
Start: 2019-01-25 | End: 2019-01-25

## 2019-01-25 RX ORDER — DIPHENHYDRAMINE HYDROCHLORIDE 50 MG/ML
12.5 INJECTION INTRAMUSCULAR; INTRAVENOUS EVERY 4 HOURS PRN
Status: DISCONTINUED | OUTPATIENT
Start: 2019-01-25 | End: 2019-02-26 | Stop reason: HOSPADM

## 2019-01-25 RX ORDER — MIDAZOLAM HYDROCHLORIDE 1 MG/ML
2 INJECTION, SOLUTION INTRAMUSCULAR; INTRAVENOUS ONCE
Status: DISCONTINUED | OUTPATIENT
Start: 2019-01-25 | End: 2019-01-29

## 2019-01-25 RX ORDER — SODIUM CHLORIDE 9 MG/ML
INJECTION, SOLUTION INTRAVENOUS CONTINUOUS
Status: DISCONTINUED | OUTPATIENT
Start: 2019-01-25 | End: 2019-01-26

## 2019-01-25 RX ORDER — NALOXONE HCL 0.4 MG/ML
0.02 VIAL (ML) INJECTION
Status: DISCONTINUED | OUTPATIENT
Start: 2019-01-25 | End: 2019-02-26 | Stop reason: HOSPADM

## 2019-01-25 RX ORDER — AMITRIPTYLINE HYDROCHLORIDE 25 MG/1
50 TABLET, FILM COATED ORAL NIGHTLY
Status: DISCONTINUED | OUTPATIENT
Start: 2019-01-25 | End: 2019-01-28

## 2019-01-25 RX ORDER — FAMOTIDINE 10 MG/ML
20 INJECTION INTRAVENOUS
Status: COMPLETED | OUTPATIENT
Start: 2019-01-25 | End: 2019-01-25

## 2019-01-25 RX ORDER — KETOROLAC TROMETHAMINE 15 MG/ML
15 INJECTION, SOLUTION INTRAMUSCULAR; INTRAVENOUS
Status: DISCONTINUED | OUTPATIENT
Start: 2019-01-25 | End: 2019-01-25

## 2019-01-25 RX ORDER — ROCURONIUM BROMIDE 10 MG/ML
INJECTION, SOLUTION INTRAVENOUS
Status: DISCONTINUED | OUTPATIENT
Start: 2019-01-25 | End: 2019-01-25

## 2019-01-25 RX ORDER — ACETAMINOPHEN 10 MG/ML
1000 INJECTION, SOLUTION INTRAVENOUS ONCE
Status: COMPLETED | OUTPATIENT
Start: 2019-01-25 | End: 2019-01-25

## 2019-01-25 RX ORDER — MAGNESIUM SULFATE HEPTAHYDRATE 40 MG/ML
2 INJECTION, SOLUTION INTRAVENOUS ONCE
Status: COMPLETED | OUTPATIENT
Start: 2019-01-25 | End: 2019-01-25

## 2019-01-25 RX ORDER — KETOROLAC TROMETHAMINE 30 MG/ML
10 INJECTION, SOLUTION INTRAMUSCULAR; INTRAVENOUS EVERY 6 HOURS
Status: COMPLETED | OUTPATIENT
Start: 2019-01-25 | End: 2019-01-27

## 2019-01-25 RX ORDER — SODIUM CHLORIDE, SODIUM LACTATE, POTASSIUM CHLORIDE, CALCIUM CHLORIDE 600; 310; 30; 20 MG/100ML; MG/100ML; MG/100ML; MG/100ML
INJECTION, SOLUTION INTRAVENOUS CONTINUOUS
Status: DISCONTINUED | OUTPATIENT
Start: 2019-01-25 | End: 2019-01-25

## 2019-01-25 RX ORDER — PROMETHAZINE HYDROCHLORIDE 25 MG/ML
INJECTION, SOLUTION INTRAMUSCULAR; INTRAVENOUS
Status: COMPLETED
Start: 2019-01-25 | End: 2019-01-25

## 2019-01-25 RX ORDER — PROMETHAZINE HYDROCHLORIDE 25 MG/ML
6.25 INJECTION, SOLUTION INTRAMUSCULAR; INTRAVENOUS
Status: COMPLETED | OUTPATIENT
Start: 2019-01-25 | End: 2019-01-25

## 2019-01-25 RX ORDER — NEOSTIGMINE METHYLSULFATE 1 MG/ML
INJECTION, SOLUTION INTRAVENOUS
Status: DISCONTINUED | OUTPATIENT
Start: 2019-01-25 | End: 2019-01-25

## 2019-01-25 RX ORDER — ONDANSETRON 2 MG/ML
4 INJECTION INTRAMUSCULAR; INTRAVENOUS DAILY PRN
Status: DISCONTINUED | OUTPATIENT
Start: 2019-01-25 | End: 2019-01-25 | Stop reason: HOSPADM

## 2019-01-25 RX ORDER — ROPIVACAINE HYDROCHLORIDE 5 MG/ML
INJECTION, SOLUTION EPIDURAL; INFILTRATION; PERINEURAL
Status: DISCONTINUED | OUTPATIENT
Start: 2019-01-25 | End: 2019-01-25

## 2019-01-25 RX ORDER — CLONAZEPAM 0.5 MG/1
0.5 TABLET ORAL NIGHTLY
Status: DISCONTINUED | OUTPATIENT
Start: 2019-01-25 | End: 2019-01-28

## 2019-01-25 RX ORDER — PREGABALIN 75 MG/1
75 CAPSULE ORAL 2 TIMES DAILY
Status: DISCONTINUED | OUTPATIENT
Start: 2019-01-25 | End: 2019-01-28

## 2019-01-25 RX ORDER — ALBUTEROL SULFATE 2.5 MG/.5ML
2.5 SOLUTION RESPIRATORY (INHALATION) EVERY 4 HOURS PRN
Status: DISCONTINUED | OUTPATIENT
Start: 2019-01-25 | End: 2019-01-28

## 2019-01-25 RX ORDER — KETOROLAC TROMETHAMINE 30 MG/ML
15 INJECTION, SOLUTION INTRAMUSCULAR; INTRAVENOUS
Status: DISCONTINUED | OUTPATIENT
Start: 2019-01-25 | End: 2019-01-25

## 2019-01-25 RX ORDER — INDOCYANINE GREEN AND WATER 25 MG
KIT INJECTION
Status: DISCONTINUED | OUTPATIENT
Start: 2019-01-25 | End: 2019-01-25

## 2019-01-25 RX ORDER — MIDAZOLAM HYDROCHLORIDE 1 MG/ML
INJECTION INTRAMUSCULAR; INTRAVENOUS
Status: DISCONTINUED
Start: 2019-01-25 | End: 2019-01-25 | Stop reason: WASHOUT

## 2019-01-25 RX ORDER — CLINDAMYCIN PHOSPHATE 600 MG/50ML
600 INJECTION, SOLUTION INTRAVENOUS
Status: DISCONTINUED | OUTPATIENT
Start: 2019-01-25 | End: 2019-01-28

## 2019-01-25 RX ORDER — ONDANSETRON 2 MG/ML
8 INJECTION INTRAMUSCULAR; INTRAVENOUS
Status: DISCONTINUED | OUTPATIENT
Start: 2019-01-25 | End: 2019-01-25

## 2019-01-25 RX ORDER — SODIUM CHLORIDE 0.9 % (FLUSH) 0.9 %
3 SYRINGE (ML) INJECTION
Status: DISCONTINUED | OUTPATIENT
Start: 2019-01-25 | End: 2019-02-26 | Stop reason: HOSPADM

## 2019-01-25 RX ORDER — LIDOCAINE HYDROCHLORIDE 10 MG/ML
1 INJECTION, SOLUTION EPIDURAL; INFILTRATION; INTRACAUDAL; PERINEURAL ONCE
Status: DISCONTINUED | OUTPATIENT
Start: 2019-01-25 | End: 2019-01-25

## 2019-01-25 RX ORDER — HYDROMORPHONE HCL IN 0.9% NACL 6 MG/30 ML
PATIENT CONTROLLED ANALGESIA SYRINGE INTRAVENOUS CONTINUOUS
Status: DISCONTINUED | OUTPATIENT
Start: 2019-01-25 | End: 2019-01-28

## 2019-01-25 RX ORDER — SODIUM CHLORIDE 9 MG/ML
INJECTION, SOLUTION INTRAVENOUS CONTINUOUS PRN
Status: DISCONTINUED | OUTPATIENT
Start: 2019-01-25 | End: 2019-01-25

## 2019-01-25 RX ORDER — LABETALOL HCL 20 MG/4 ML
5 SYRINGE (ML) INTRAVENOUS
Status: DISCONTINUED | OUTPATIENT
Start: 2019-01-25 | End: 2019-02-26 | Stop reason: HOSPADM

## 2019-01-25 RX ORDER — OCTREOTIDE ACETATE 50 UG/ML
INJECTION, SOLUTION INTRAVENOUS; SUBCUTANEOUS
Status: DISCONTINUED | OUTPATIENT
Start: 2019-01-25 | End: 2019-01-25

## 2019-01-25 RX ORDER — BUMETANIDE 0.25 MG/ML
INJECTION INTRAMUSCULAR; INTRAVENOUS
Status: DISCONTINUED | OUTPATIENT
Start: 2019-01-25 | End: 2019-01-25

## 2019-01-25 RX ADMIN — CLINDAMYCIN IN 5 PERCENT DEXTROSE 900 MG: 12 INJECTION, SOLUTION INTRAVENOUS at 07:01

## 2019-01-25 RX ADMIN — ROCURONIUM BROMIDE 20 MG: 10 INJECTION, SOLUTION INTRAVENOUS at 11:01

## 2019-01-25 RX ADMIN — KETOROLAC TROMETHAMINE 10 MG: 30 INJECTION, SOLUTION INTRAMUSCULAR at 11:01

## 2019-01-25 RX ADMIN — SODIUM CHLORIDE, SODIUM LACTATE, POTASSIUM CHLORIDE, AND CALCIUM CHLORIDE: .6; .31; .03; .02 INJECTION, SOLUTION INTRAVENOUS at 12:01

## 2019-01-25 RX ADMIN — VASOPRESSIN 1 UNITS: 20 INJECTION, SOLUTION INTRAMUSCULAR; SUBCUTANEOUS at 11:01

## 2019-01-25 RX ADMIN — ALBUMIN (HUMAN): 2.5 SOLUTION INTRAVENOUS at 09:01

## 2019-01-25 RX ADMIN — PREGABALIN 75 MG: 75 CAPSULE ORAL at 06:01

## 2019-01-25 RX ADMIN — SODIUM CHLORIDE 100 ML/HR: 0.9 INJECTION, SOLUTION INTRAVENOUS at 02:01

## 2019-01-25 RX ADMIN — VASOPRESSIN 1 UNITS: 20 INJECTION, SOLUTION INTRAMUSCULAR; SUBCUTANEOUS at 07:01

## 2019-01-25 RX ADMIN — ALBUMIN HUMAN 25 G: 0.05 INJECTION, SOLUTION INTRAVENOUS at 08:01

## 2019-01-25 RX ADMIN — ROCURONIUM BROMIDE 20 MG: 10 INJECTION, SOLUTION INTRAVENOUS at 09:01

## 2019-01-25 RX ADMIN — BUMETANIDE 0.5 MG: 0.25 INJECTION INTRAMUSCULAR; INTRAVENOUS at 10:01

## 2019-01-25 RX ADMIN — PROMETHAZINE HYDROCHLORIDE 6.25 MG: 25 INJECTION INTRAMUSCULAR; INTRAVENOUS at 02:01

## 2019-01-25 RX ADMIN — VASOPRESSIN 1 UNITS: 20 INJECTION, SOLUTION INTRAMUSCULAR; SUBCUTANEOUS at 10:01

## 2019-01-25 RX ADMIN — PREGABALIN 75 MG: 75 CAPSULE ORAL at 09:01

## 2019-01-25 RX ADMIN — ALBUMIN (HUMAN): 2.5 SOLUTION INTRAVENOUS at 10:01

## 2019-01-25 RX ADMIN — GLYCOPYRROLATE 0.2 MG: 0.2 INJECTION, SOLUTION INTRAMUSCULAR; INTRAVENOUS at 08:01

## 2019-01-25 RX ADMIN — PROPOFOL 150 MG: 10 INJECTION, EMULSION INTRAVENOUS at 07:01

## 2019-01-25 RX ADMIN — ENOXAPARIN SODIUM 30 MG: 100 INJECTION SUBCUTANEOUS at 06:01

## 2019-01-25 RX ADMIN — VASOPRESSIN 1 UNITS: 20 INJECTION, SOLUTION INTRAMUSCULAR; SUBCUTANEOUS at 12:01

## 2019-01-25 RX ADMIN — ROCURONIUM BROMIDE 30 MG: 10 INJECTION, SOLUTION INTRAVENOUS at 07:01

## 2019-01-25 RX ADMIN — ALBUMIN (HUMAN): 25 SOLUTION INTRAVENOUS at 07:01

## 2019-01-25 RX ADMIN — ACETAMINOPHEN 1000 MG: 10 INJECTION, SOLUTION INTRAVENOUS at 07:01

## 2019-01-25 RX ADMIN — BUMETANIDE 1 MG: 0.25 INJECTION, SOLUTION INTRAMUSCULAR; INTRAVENOUS at 11:01

## 2019-01-25 RX ADMIN — ROCURONIUM BROMIDE 20 MG: 10 INJECTION, SOLUTION INTRAVENOUS at 08:01

## 2019-01-25 RX ADMIN — FAMOTIDINE 20 MG: 10 INJECTION, SOLUTION INTRAVENOUS at 06:01

## 2019-01-25 RX ADMIN — INDOCYANINE GREEN 5 MG: KIT INTRAVENOUS at 12:01

## 2019-01-25 RX ADMIN — VASOPRESSIN 1 UNITS: 20 INJECTION, SOLUTION INTRAMUSCULAR; SUBCUTANEOUS at 08:01

## 2019-01-25 RX ADMIN — HYDROCORTISONE SODIUM SUCCINATE 50 MG: 100 INJECTION, POWDER, FOR SOLUTION INTRAMUSCULAR; INTRAVENOUS at 06:01

## 2019-01-25 RX ADMIN — FENTANYL CITRATE 150 MCG: 50 INJECTION, SOLUTION INTRAMUSCULAR; INTRAVENOUS at 07:01

## 2019-01-25 RX ADMIN — POTASSIUM CHLORIDE: 14.9 INJECTION, SOLUTION INTRAVENOUS at 09:01

## 2019-01-25 RX ADMIN — HYDROMORPHONE HYDROCHLORIDE 0.5 MG: 2 INJECTION INTRAMUSCULAR; INTRAVENOUS; SUBCUTANEOUS at 03:01

## 2019-01-25 RX ADMIN — IRON SUCROSE 100 MG: 20 INJECTION, SOLUTION INTRAVENOUS at 03:01

## 2019-01-25 RX ADMIN — PROPOFOL 50 MG: 10 INJECTION, EMULSION INTRAVENOUS at 10:01

## 2019-01-25 RX ADMIN — FENTANYL CITRATE 50 MCG: 50 INJECTION, SOLUTION INTRAMUSCULAR; INTRAVENOUS at 10:01

## 2019-01-25 RX ADMIN — KETOROLAC TROMETHAMINE 15 MG: 30 INJECTION, SOLUTION INTRAMUSCULAR at 06:01

## 2019-01-25 RX ADMIN — ONDANSETRON 8 MG: 2 INJECTION, SOLUTION INTRAMUSCULAR; INTRAVENOUS at 12:01

## 2019-01-25 RX ADMIN — CLINDAMYCIN IN 5 PERCENT DEXTROSE 600 MG: 12 INJECTION, SOLUTION INTRAVENOUS at 04:01

## 2019-01-25 RX ADMIN — SODIUM CHLORIDE, SODIUM LACTATE, POTASSIUM CHLORIDE, AND CALCIUM CHLORIDE: .6; .31; .03; .02 INJECTION, SOLUTION INTRAVENOUS at 07:01

## 2019-01-25 RX ADMIN — POTASSIUM CHLORIDE: 14.9 INJECTION, SOLUTION INTRAVENOUS at 12:01

## 2019-01-25 RX ADMIN — POTASSIUM CHLORIDE, DEXTROSE MONOHYDRATE AND SODIUM CHLORIDE: 150; 5; 450 INJECTION, SOLUTION INTRAVENOUS at 10:01

## 2019-01-25 RX ADMIN — OCTREOTIDE ACETATE: 500 INJECTION, SOLUTION INTRAVENOUS; SUBCUTANEOUS at 07:01

## 2019-01-25 RX ADMIN — GLYCOPYRROLATE 0.8 MG: 0.2 INJECTION, SOLUTION INTRAMUSCULAR; INTRAVENOUS at 12:01

## 2019-01-25 RX ADMIN — NEOSTIGMINE METHYLSULFATE 5 MG: 1 INJECTION INTRAVENOUS at 12:01

## 2019-01-25 RX ADMIN — LIDOCAINE HYDROCHLORIDE 60 MG: 20 INJECTION, SOLUTION INTRAVENOUS at 07:01

## 2019-01-25 RX ADMIN — OCTREOTIDE ACETATE 100 MCG/HR: 1000 INJECTION, SOLUTION INTRAVENOUS; SUBCUTANEOUS at 07:01

## 2019-01-25 RX ADMIN — CLINDAMYCIN IN 5 PERCENT DEXTROSE 600 MG: 12 INJECTION, SOLUTION INTRAVENOUS at 11:01

## 2019-01-25 RX ADMIN — SODIUM CHLORIDE: 0.9 INJECTION, SOLUTION INTRAVENOUS at 06:01

## 2019-01-25 RX ADMIN — FENTANYL CITRATE 50 MCG: 50 INJECTION, SOLUTION INTRAMUSCULAR; INTRAVENOUS at 08:01

## 2019-01-25 RX ADMIN — MIDAZOLAM HYDROCHLORIDE 2 MG: 1 INJECTION, SOLUTION INTRAMUSCULAR; INTRAVENOUS at 07:01

## 2019-01-25 RX ADMIN — ONDANSETRON 8 MG: 2 INJECTION INTRAMUSCULAR; INTRAVENOUS at 06:01

## 2019-01-25 RX ADMIN — KETOROLAC TROMETHAMINE 10 MG: 30 INJECTION, SOLUTION INTRAMUSCULAR at 06:01

## 2019-01-25 RX ADMIN — VASOPRESSIN 1 UNITS: 20 INJECTION, SOLUTION INTRAMUSCULAR; SUBCUTANEOUS at 09:01

## 2019-01-25 RX ADMIN — SUCCINYLCHOLINE CHLORIDE 120 MG: 20 INJECTION, SOLUTION INTRAMUSCULAR; INTRAVENOUS at 07:01

## 2019-01-25 RX ADMIN — CLONAZEPAM 0.5 MG: 0.5 TABLET ORAL at 09:01

## 2019-01-25 RX ADMIN — BUPIVACAINE 20 ML: 13.3 INJECTION, SUSPENSION, LIPOSOMAL INFILTRATION at 01:01

## 2019-01-25 RX ADMIN — Medication: at 03:01

## 2019-01-25 RX ADMIN — ROPIVACAINE HYDROCHLORIDE 40 ML: 5 INJECTION, SOLUTION EPIDURAL; INFILTRATION; PERINEURAL at 01:01

## 2019-01-25 RX ADMIN — AMITRIPTYLINE HYDROCHLORIDE 50 MG: 25 TABLET, FILM COATED ORAL at 09:01

## 2019-01-25 RX ADMIN — MAGNESIUM SULFATE IN WATER 2 G: 40 INJECTION, SOLUTION INTRAVENOUS at 03:01

## 2019-01-25 NOTE — PLAN OF CARE
Received ICU hold from OR per stretcher. Monitors applied. Pt strongly c/o nausea. Dr Kiser at bedside. Orders received. See MAR    Tin Kiser, Nicolaas and Sheree at bedside for Rossy block . 1337 Time out completed.  1338 Procedure started.  1343 procedure ended.     Pt beginning to report relief of pain.

## 2019-01-25 NOTE — INTERVAL H&P NOTE
The patient has been examined and the H&P has been reviewed:    I concur with the findings and no changes have occurred since H&P was written.    Anesthesia/Surgery risks, benefits and alternative options discussed and understood by patient/family.          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

## 2019-01-25 NOTE — OP NOTE
[facility]  Surgery Department  Operative Note    SUMMARY     Date of Procedure: 1/25/2019     Procedure:   1. Ex lap  2. Gastro-gastrostomy EEA with 33 mm circular stapler  3. Pyloroplasty and gastrostomy closure  3. enteric resection with side to side to anstamosis and restoration of small bowel continuity using Idrive tan load stapler  4 Liver US  5. Liver biopsy  6. Liver resection segment 3  7. Supraceliac lymphadenectomy  8. FNA pancreas  9. Lt salpingo oopherectomy   10 . Meckel diverticulectomy  11. Feeding jejunostomy 14 Fr malecot  12. ICG perfusion and Infrared exam of all anastamosis    Surgeon(s) and Role:     * Cam Ashton MD - Primary    Assisting Surgeon: Teddy Cantor                Pre-Operative Diagnosis: Malignant carcinoid tumor unknown primary [C7A.012]  Mesenteric and retroperitoneal lymphadenopathy    Post-Operative Diagnosis: Post-Op Diagnosis Codes:     *1. Pancreatic primary with entire pancreas involved with involvement of supraceliac lymph node fused and adhered to pancreas extending to aorta  2. Cirrhosis micronodular  3. Extensive mesenteric lymphadenopathy circumferential involvement of entire SMA  Starting from origin to distal aspect  4. Tumour nodule of lt salpinx close to ovary  5. severe malabsorption from Sandeep y  Gastric bypass  6. extensve para aortic lymphadenopathy from diaphragmatic hiatus to aortic bifurcation  7. Multiple tumour liver     Anesthesia: General  [id number]  Complications: no    Estimated Blood Loss (EBL): 300 mL           Implants: none  Specimens:   Specimen (12h ago, onward)    None                  Condition: Stable    Disposition: PACU - hemodynamically stable.    Attestation: I was present and scrubbed for the entire procedure.   Operation Form for [facility] Database    Name __ _Kaylee Ngo                    Date of Birth __1955 _____    Patient Admission Date to hospital:   1/25/2019    Surgeon(s):  Cam Ashton  MD                     Other ___________       Length of Operation: 5 hours and 14 mts  Type of Operation (check all that apply)     Procedure(s):  LAPAROTOMY, EXPLORATORY  LYMPHADENECTOMY  EXCISION, LIVER     Gastric Resection                   x Small bowel resection                  Colon resection   x Hepatic resection                     Pancreatic resection                    Whipple                   Lymph node resection             Cholecystectomy                          Adrenalectomy       Lung resection                          Mesenteric dissection                   Nephrectomy         Thyroidectomy                         RFA                                             Peritoneal stripping   x Explorative                               Lysis of Adhesions              Diaphramatic Resection   Other      significant tumor mesentery and retroperitoneum fused from aortic bifurcation all the way to diaphragmatic  Hiatus,   Hard pancreas fused to hard lymph node allaround especially suprapancreatic and supraceliac, cirrhosis of liver      Type of vascular encasements (check all that apply)     x SMA       x Renal       x Aorta/Cava     Iliac      x Marc x Marc Hepatis   x Celiac      Was tumor collected?           Yes                 No          If yes, tumor form must be completed by Data staff.                   Neoprobe Used          Yes           No    Was it helpful in finding the tumor?      Yes        No     Operative Findings  x Vascular Encasement                                       Mesenteric Extension     Bowel Obstruction - complete or partial    Bowel Ischemia                                                 Carcinomatosis     Location of Primary Tumor pancreas body                                    Primary Resected     Yes      xNo   How many primary sites? Pancreas entire body         % of Tumor Debulked None, biopsy done                   %of Mesenteric Tumor Debulked___none,  circumferential involvemnt of SMA_____  Was sentinel lymph node done?      Yes       xNo    Number of [address] Sent __none____         Number of [address] LN Positive none   __        Evidence of lymph obstruction:    Yes      xNo    Mets to other organs:       Yes         x No                                            If yes, Nodes and Metastases form must be completed  Number of tumors found: ____body of pancreas__________   Was tumor left behind? ____yes_________   How much small bowel removed (cm)?  20 cms_________    Seprafilm used:    Yes      x No  Visible ovarian masses:             Yes       x No   Visible retroperitoneal nodes:   xYes         No    Preop Sandostatin used:          x Yes        No    Intraop chemotherapy used:      Yes        x No      Gallstones present :   Yes          No    x N/A  Visible liver mets:      x Yes         No      Blood transfusion needed:    Yes        x No  Complications_________________________________________    Reversal of barbara y gastric bypass done  excision of mesentery and supraceliac lymph node      Nodes & Metastases Form    Nodes    Resected   Mets  Tissue Resected?      Solid organ/soft     Cervical   Yes No                      Liver   Yes No     Supraclavicular  Yes No   Bone   Yes No     Hilar           Yes No Brain   Yes No        Mediastinal  Yes No  Lung    Yes No     Auxiliary     Yes No Colon   Yes No     Mesenteric Yes No Pancreas  Yes No      Periportal Yes No Appendix  Yes No     Retroperitoneal Yes No  Thyroid     Yes No     Celiac Yes No Chest wall  Yes No     Inguinal Yes No  Kidney       Yes   No     Iliac Yes No Breast       Yes   No     Other Yes No Ovary        Yes No   ___________________ Pelvic Floor   Yes   No   Uterus         Yes No   Ureter         Yes   No   Seminal Vesicle YesNo      Spleen     Yes No   L Diaphram   Yes No   R Diaphram   Yes No    Other             Yes No   __    Extensive tumour load, not safe to undergo rsection and  nanoknife ablation considered however with the extent of involvement would not benefit__________________    Radiofrequency Ablation Form      Mode: Site:      Open    Liver- R Lobe                        Percutaneous   Liver- L Lobe     Laproscopic    Kidney        Bone        Pelvis         Other  ____________________                                      #of Lesions      Tumor Sizes  ________________ _______________  ________________ _______________  ________________ _______________  ________________ _______________    Complications?   YES  NO  UNKNOWN    Complications____________________________________________________________________________________________________________________________________      Jaylene Knife Form      Indication: ________________________________________________________________________________________________________________   LIVER   R lobe size   L lobe size          Hepatic karen tumor sizes  _______       _______      _______   _______      Left tumor size_______      _______       _______      _______      _______      Right tumor size_______       _______       _______      _______  _______      Middle tumor size ______     Single probe                  2 Probe           3 Probes            4 Probes   Overlaping ablation       1        2         3        4     HILUM   Central duct-- tumor sizes ________     ________     ________   R duct--     tumor sizes _______       ________      ________   L duct--  tumor sizes _______      ________       ________       MESENTERIC ROOT NODES--tumor sizes _____________      URETER-- tumor sizes _____________     PERIAORTIC-- tumor sizes _______     ________    ________     PELVIC TUMOR  Obturator node         tumor sizes ________     ________     ________   Pres  tumor sizes ________     ________     _______   Other  tumor size _____________      PANCREAS--  tumor size ______                              Head     tumor size _______                             Body      tumor size _______                                                         Tail        tumor size _______     KIDNEY-- tumor size ________   Right        Left       OTHER SITES   _________________        size_____      _________________         size_____     _____ ____________        size_____    Ablation time___________        Complications?        Yes      No   Unknown    Complications/Comments:__________________________________________________________________________________________________________________________

## 2019-01-25 NOTE — ANESTHESIA PROCEDURE NOTES
Peripheral Block    Patient location during procedure: pre-op   Block not for primary anesthetic.  Reason for block: at surgeon's request and post-op pain management   Post-op Pain Location: abdominal wall pain  Start time: 1/25/2019 1:38 PM  Timeout: 1/25/2019 1:37 PM   End time: 1/25/2019 1:43 PM  Staffing  Anesthesiologist: Ernesto Kiser MD  Resident/CRNA: Etienne Baldwin MD  Performed: resident/CRNA   Preanesthetic Checklist  Completed: patient identified, site marked, surgical consent, pre-op evaluation, timeout performed, IV checked, risks and benefits discussed and monitors and equipment checked  Peripheral Block  Patient position: supine  Prep: ChloraPrep  Patient monitoring: cardiac monitor, heart rate, continuous pulse ox, continuous capnometry and frequent blood pressure checks  Block type: TAP  Laterality: bilateral  Injection technique: single shot  Needle  Needle type: Stimuplex   Needle gauge: 21 G  Needle length: 4 in  Needle localization: ultrasound guidance   -ultrasound image captured on disc.  Assessment  Injection assessment: negative aspiration, negative parasthesia and local visualized surrounding nerve  Paresthesia pain: none  Heart rate change: no  Slow fractionated injection: yes  Additional Notes  VSS.  [facility] RN monitoring vitals throughout procedure.  Patient tolerated procedure well.

## 2019-01-25 NOTE — OP NOTE
DATE OF PROCEDURE:  01/25/2019.    PREOPERATIVE DIAGNOSES:  1.  Carcinoid tumor of unknown origin with extensive lymphadenopathy of the   mesentery and retroperitoneum.  2.  Status post gastric bypass with malabsorption, status post G-tube placement   at the remnant stomach for supplementation with malabsorption and severe nausea   and diarrhea.    POSTOPERATIVE DIAGNOSES:  1.  Carcinoid tumor of pancreatic origin, origin from the body of pancreas.  The   entire body and tail of the pancreas was enlarged with a hard consistency with   the involvement of the supraceliac lymph nodes fused together and adhered to the   pancreas and the whole lymph node mass extending into the aorta.  2.  Micronodular cirrhosis of the liver with multiple carcinoid tumors in the   liver.  3.  Extensive mesenteric lymphadenopathy with circumferential involvement of the   entire SMA starting from the origin to the distal aspect.  4.  Extensive periaortic lymphadenopathy from the diaphragmatic hiatus to the   aortic bifurcation.  The common iliac and external iliacs were free and the   pelvis is free of tumor.  Tumor nodule of the left salpinx close to the ovary.  4.  Severe malabsorption from Sandeep-en-Y gastric bypass.    PROCEDURES DONE:  1.  Exploratory laparotomy.  2.  Gastrogastrostomy with EEA 33 mm circular stapler.  3.  Pyloroplasty and gastrostomy closure.  4.  Enteric resection with side-to-side anastomosis and restoration of small   bowel continuity using iDrive tan load stapler.  5.  Liver ultrasound.  6.  Liver biopsy.  7.  Liver resection, segment 3.  8.  Supraceliac lymphadenectomy.  9.  FNA of the pancreas.  10.  Left salpingo-oophorectomy.  11.  Meckel diverticulectomy.  12.  Feeding jejunostomy 14-Filipino Malecot.  13.  ICG perfusion and infrared examination of all anastomosis.    SURGEON:  Niki Levy M.D.    SURGERY RESIDENT:  Dr. Teddy Mtz.    ANESTHESIA:  General endotracheal anesthesia.    The patient is  stable, transferred to ICU in stable condition.    BLOOD LOSS:  About 200 mL.    HISTORY AND INDICATION:  This is a 63-year-old female who has a history of   diabetes and status post gastric bypass, was seen in the office for evaluation   of her carcinoid tumor.  During her recurrent episodes of nausea and vomiting,   she underwent CAT scan, which showed retroperitoneal adenopathy.  This was   finally worked up.  A biopsy was done, which showed a carcinoid tumor.  Her   primary could not be identified and then she was referred to our center.  The   patient underwent a complete workup.  Her primary could not be ascertained.    After extensive workup and evaluation, her case was discussed with the Tumor   Board multiple times.  Because of her gastric bypass status and her significant   tumor load, the patient has not been eating and her nutritional status was poor.    She was seen by Dr. Nicole who also recommended her nutritional status to be   updated before any treatment.    In spite of counseling, the patient's nutrition status remained poor.  Finally,   a decision was made to place a feeding tube at the remnant stomach.  Following   the placement of the tube there, she was started on nutrition supplementation.    After extensive discussion with the dietitian and multiple counseling from me,   the patient finally understood the extent of her condition and was willing to   pursue nutrition supplementation as per our recommendation.  She continued to   have significant nausea and intermittent diarrhea.  She did not have any   symptoms of carcinoid syndrome; however, her symptoms are mostly suggestive of   dysmotility from her diabetes and her retroperitoneal tumor.  She has a   low-grade tumor and she does not have good uptake on the gallium scan.    Therefore, she does not have other options.  She has a low-grade tumor and   because of the nutrition status, Dr. Nicole could not start her on   chemotherapy.   Finally, after improvement of nutrition status, she was seen by   me in the beginning of the year.  Finally, after extensive counseling, I   informed her that I will proceed with surgery.  I had multiple discussions and   informed her and her sister who always accompanied her from [place], [place]   that her surgery is a high-risk surgery, not only because of the extent of the   tumor involvement, but also because of her nutritional status and her overall   general condition.  I counseled them.  I informed them extensively about the   risk involved such as bleeding, infection, DVT, PE, stroke, MI, intraoperative   death, prolonged hospital stay, multiple reexplorations for leaks and   thrombosis.  Finally, she was scheduled for surgery.  I clearly informed them   that if the tumor involvement was extensive, I would abort the whole procedure   or would do selective debulking or just would pursue reversal of the gastric   bypass, so that at least her nutrition status will be better in the future.    They agreed for the procedure.  Finally, consent was obtained after elaborately   explaining to them the risk involved.    FINDINGS:  The patient had a micronodular cirrhotic liver with multiple tumors   in the liver.  She had significant retroperitoneal lymphadenopathy with the   entire paraaortic area involved with lymph nodes, enlarged hard.  I could not   even see or feel the aorta.  The involvement was from the diaphragmatic hiatus   all the way up to the aortic bifurcation.  No involvement beyond the   bifurcation.  The mesentery was fully circumferentially involved with the tumor.    SMA pulsation could not be felt; however, the vasculature of the small bowel   was good.  This tumor was extending all the way from the right side to the   ligament of Treitz and extending peripherally very close to the small bowel.    During examination, I ran the small bowel multiple times.  There was no small   bowel primary and  on examination of the pancreas, the pancreas was hard in   consistency and was fused to all the lymph nodes around.  The suprapancreatic   and supraceliac lymph nodes were fused altogether and they were also fused to   the paraaortic lymph vito mass.  I did a biopsy of the pancreas mass and a   biopsy was consistent with pancreatic origin.    Because of the extent of involvement and with cirrhosis and with her overall   condition, I decided not to pursue any extensive or aggressive debulking.  The   plan was to just reverse the gastric bypass.    PROCEDURE IN DETAIL:  The patient was brought to the Operating Room, was placed   in supine position.  IV Sandostatin was started.  She was then sedated and   intubated.  A Barney catheter was placed in the bladder.  A central line and   arterial line was placed by Anesthesia staff.  The abdomen was prepped and   draped in the usual sterile manner.  Prior to prepping the abdomen, the   previously placed G-tube was removed and the G-tube site was cleaned with   Betadine followed by sterile prepping of the abdomen.  A timeout was done to   verify the ID of the patient, procedure and everyone concurred.    A midline incision was made all the way from xiphisternum to just below the   umbilicus.  The site of the G-tube was at the midline.  An elliptical incision   was made around it and this piece of skin and subcutaneous tissue was completely   excised.  The fascia was incised and abdominal cavity was entered.  The   falciform ligament was taken down using LigaSure.  Wound protector was placed   after mobilizing the stomach from the left side of the anterior abdominal wall.    The opened stomach where the G-tube placed was approximated using 4-0 PDS   stitch.  Following the wound protector placement, the abdominal wall was   retracted using Rea retractor.    I visualized the liver.  The liver was found to be cirrhotic, macronodular and   on palpation, there were multiple  nodules consistent with a tumor in all   segments of the liver.  None of the tumors where large.  The diaphragm on the   left and right side was free of any tumor.  I ran the small bowel multiple times   from the ligament of Treitz to the cecum, examined the biliopancreatic as well   as alimentary limb extensively.  There was no tumor of the small bowel.    Following this, I then examined the pancreas.  The lesser sac was entered by   taking down the gastrocolic omentum.  Carefully, I enlarged the space by taking   down the gastrocolic omentum more and then I felt the body of the pancreas.  The   body of the pancreas was hard, extending to the tail of the pancreas and this   whole pancreas was fused to the mesenteric mass and also to the supraceliac   lymph nodes, which was again fused to the paraaortic mass.  On careful   examination, the whole retroperitoneum was involved.  I could not feel or see   the aorta and on deep palpation, the pulsations were felt.  The whole mass was   fusiform, involving the whole retroperitoneum on the medial side.  Starting from   the diaphragm it carried all the way to the bifurcation, this mass was   completely involving aorta and cava area.  However, there was no extension   beyond the bifurcation.  I then examined the mesentery.  The base of the   mesentery was circumferentially involved with this tumor.  Starting from the   right side, just close to the duodenum on the right side, it was extended to the   mesentery on the left side.  The tumor extension was also distal, very close to   the small bowel loops.  However, the bowel looked pink.  There was no any   devascularization or any ischemia of the bowel.    I then placed the patient in Trendelenburg position.  I examined the pelvis.    The pelvis looked clean.  There was no obvious presence of tumor nodules.  The   common iliac, external iliac areas were clean of tumor.  There was a tumor   implant on the left salpinx.   Therefore, using LigaSure, I took down the   fallopian tube and ovarian ligament and this was sent as a specimen.  I tacked   the nodule, which I felt on the fallopian tube using 4-0 PDS stitch and sent to   the Pathology.  The pelvis was free of tumor; therefore, I then focused my   attention to the central abdomen.  Ultrasound of the liver was done.  Ultrasound   showed multiple tumor nodules all over the liver.  The tumor nodules were   small.  Some of them were palpable.  I then chose a tumor on the segment 2.    This was carefully resected and the liver edges were cauterized.  Meticulous   hemostasis was accomplished.  Also, a wedge-shaped biopsy was obtained and the   liver edges were carefully cauterized.    At this point, I made a decision not to pursue any lymphadenectomy because of   the involvement.  Any attempt in lymph node resection or debulking would be   detrimental to her life and also not safe for her and it would not be beneficial   as she cannot accomplish any significant lymphadenectomy.  Therefore, I decided   to proceed with the reversal of the gastric bypass as she was not able to eat   much and having significant malabsorption.  Prior to that, using a Tomas-Cut   needle, a biopsy of the body of the pancreas was obtained, meticulous hemostasis   was accomplished in that area.  The supraceliac lymph node was carefully   resected and this was also sent to the lab.  The frozen section of the pancreas   biopsy was positive for pancreatic neuroendocrine tumor and thus confirming the   primary for the origin of the tumor.  Following the resection of the lymph node,   I then decided to excise the mesenteric lymph node.  The mesenteric lymph node   was excised and was sent to Pathology.  Following this, meticulous hemostasis   was accomplished all around.    I then focused my attention to the upper abdomen.  The gastrojejunostomy was   identified.  The Sandeep limb was completely dissected off.  The Sandeep  limb was   disconnected from the stomach and I took down all the adhesions of the Sandeep limb   from all around to the transverse colon, transverse mesocolon and to the   mesentery.  This was carefully taken down so that this loop could be used for   restoration of her ____ continuity.  Through the open gastrojejunostomy, I   inserted the anvil of the 33 mm EEA stapler and I brought out through the   anterior aspect of the gastric pouch to facilitate anastomosis.  After this, I   used an iDrive purple stapler and transected the stomach just proximal to the   previous gastrojejunal anastomosis and this rim of tissue containing the   previous anastomosis was resected.  The 4-0 PDS stitch, which was placed at the   gastrostomy site was enlarged.  The handle of the EEA was inserted through this   and the needle was brought through the anterior aspect of the stomach and the   EEA was secured to the anvil and anterior anastomosis was accomplished without   any problem.  Following this, the anterior gastrostomy was closed in 2 layers   using 4-0 PDS stitch.    Since she is diabetic and has gastroparesis and also her status of the pylorus   is not known, I decided to proceed with pyloroplasty.  Stay sutures were placed   on the superior and anterior aspect of the pyloric muscle.  The pyloric muscle   was transected and the gastroduodenum was approximated in a vertical fashion to   prevent any narrowing of the anastomosis.  Prior to closing the stomach, the NG   tube was placed and it was advanced to the second part of the duodenum and then   pyloroplasty was accomplished.  Hemostasis was accomplished all around.  The   hole in the mesocolon was closed using 4-0 PDS stitch.  The small bowel was   again run all the way from ligament of Treitz to the terminal ileum.  In the   process, I was able to see the long Meckel diverticulum.  This diverticulum was   transected using iDrive stapler, just away from the small bowel lumen, so  that   the lumen would not be compromised.  The jejunojejunal anastomosis was taken   down, transection was done proximal and distal to this anastomosis.  A   side-to-side anastomosis was made between the biliopancreatic limb and the Sandeep   limb using staple and handsewn anastomosis.  Following this, the place where the   jejunojejunal anastomosis was taken down was also approximated using staple   anastomosis and handsewn technique.  A small portion of jejunum had to be   resected to facilitate the anastomosis.  Following this, the whole abdomen was   irrigated with lots of antibiotic solution and Betadine was used to irrigate the   abdomen and all the irrigant was drained.  Meticulous hemostasis was   accomplished all around.  Because of the multiple anastomosis, I decided to   check for the perfusion.  Indocyanine green was injected about 2 mL and using   infrared camera, the perfusion were all verified.  There was excellent perfusion   of the stomach as well as the intestinal anastomosis.    Finally, after ensuring adequate hemostasis, I decided to place a feeding tube   as the patient has nutritional issues.  I chose the jejunum about 40 cm beyond   the ligament of Treitz.  A pursestring suture was made.  The intestine was   opened and a 14-Danish Malecot was inserted into the jejunum after bringing it   into the abdominal cavity through the abdominal wall on the left side.  The   Malecot was advanced as distal as possible and a Witzel tunnel was made using   4-0 PDS stitch.  Carefully, this segment of jejunum was tacked to the anterior   abdominal wall.  The feeding tube was secured to the skin using 2-0 nylon   stitch.  The abdomen was again irrigated with lots of antibiotic solution.    After ensuring adequate hemostasis, the abdominal wall was injected with   Marcaine and Exparel.  The fascia was approximated using #1 PDS stitch.  The   instrument and sponge counts were correct.  The KUB did not show any  evidence of   foreign body.  The skin was closed using 4-0 Monocryl.  Blood loss was less   than 200 mL.  The patient was stable.  Dermabond was applied over the skin   incision.  She was extubated, taken to Recovery Room in stable condition.      TR/IN  dd: 01/25/2019 14:00:16 (CST)  td: 01/25/2019 17:10:37 (CST)  Doc ID   #[id number]  Job ID #[id number]    CC:

## 2019-01-25 NOTE — TRANSFER OF CARE
"Anesthesia Transfer of Care Note    Patient: Kaylee Ngo    Procedure(s) Performed: Procedure(s) (LRB):  LAPAROTOMY, EXPLORATORY (N/A)  LYMPHADENECTOMY (N/A)  EXCISION, LIVER (N/A)    Patient location: PACU (ICU flow over in PACU)    Anesthesia Type: general    Transport from OR: Transported from OR on 6-10 L/min O2 by face mask with adequate spontaneous ventilation    Post pain: adequate analgesia    Post assessment: no apparent anesthetic complications    Post vital signs: stable    Level of consciousness: awake, alert and oriented    Nausea/Vomiting: nausea and no vomiting    Complications: none    Transfer of care protocol was followed      Last vitals:   Visit Vitals  BP (!) 145/70 (BP Location: Right arm, Patient Position: Lying)   Pulse (!) 53   Temp 37.5 °C (99.5 °F)   Resp 20   Ht 5' 5" (1.651 m)   Wt 61.5 kg (135 lb 9.3 oz)   SpO2 97%   Breastfeeding? No   BMI 22.56 kg/m²     "

## 2019-01-25 NOTE — ANESTHESIA PROCEDURE NOTES
Arterial    Diagnosis: carcinoid    Patient location during procedure: done in OR  Procedure start time: 1/25/2019 7:36 AM  Timeout: 1/25/2019 7:35 AM  Procedure end time: 1/25/2019 7:38 AM  Staffing  Anesthesiologist: Ernesto Kiser MD  Resident/CRNA: Nkechi Desir CRNA  Performed: resident/CRNA   Anesthesiologist was present at the time of the procedure.  Preanesthetic Checklist  Completed: patient identified, site marked, surgical consent, pre-op evaluation, timeout performed, IV checked, risks and benefits discussed, monitors and equipment checked and anesthesia consent givenArterial  Skin Prep: chlorhexidine gluconate  Local Infiltration: none  Orientation: right  Location: radial  Catheter Size: 20 G  Catheter placement by Anatomical landmarks. Heme positive aspiration all ports.Insertion Attempts: 1  Assessment  Dressing: secured with tape and tegaderm  Patient: Tolerated well

## 2019-01-25 NOTE — ANESTHESIA POSTPROCEDURE EVALUATION
"Anesthesia Post Evaluation    Patient: Kaylee Ngo    Procedure(s) Performed: Procedure(s) (LRB):  LAPAROTOMY, EXPLORATORY (N/A)  LYMPHADENECTOMY (N/A)  EXCISION, LIVER (N/A)    Final Anesthesia Type: general  Patient location during evaluation: PACU  Patient participation: Yes- Able to Participate  Level of consciousness: awake and alert  Post-procedure vital signs: reviewed and stable  Pain management: adequate  Airway patency: patent  PONV status at discharge: No PONV  Anesthetic complications: no      Cardiovascular status: blood pressure returned to baseline  Respiratory status: unassisted  Hydration status: euvolemic  Follow-up not needed.        Visit Vitals  BP (!) 93/54   Pulse 86   Temp 37.5 °C (99.5 °F)   Resp (!) 51   Ht 5' 5" (1.651 m)   Wt 61.5 kg (135 lb 9.3 oz)   SpO2 100%   Breastfeeding? No   BMI 22.56 kg/m²       Pain/Geo Score: Pain Rating Prior to Med Admin: 0 (1/25/2019  7:01 AM)        "

## 2019-01-25 NOTE — ANESTHESIA PROCEDURE NOTES
Central Line    Diagnosis: Ex lap  Patient location during procedure: done in OR  Procedure start time: 1/25/2019 7:26 AM  Timeout: 1/25/2019 7:25 AM  Procedure end time: 1/25/2019 7:40 AM  Staffing  Anesthesiologist: Ernesto Kiser MD  Resident/CRNA: Etienne Baldwin MD  Performed: resident/CRNA   Anesthesiologist was present at the time of the procedure.  Preanesthetic Checklist  Completed: patient identified, site marked, surgical consent, pre-op evaluation, timeout performed, IV checked, risks and benefits discussed, monitors and equipment checked and anesthesia consent given  Indication  Indication: vascular access, med administration, hemodynamic monitoring     Anesthesia   general anesthesia    Central Line  Skin Prep: skin prepped with ChloraPrep, skin prep agent completely dried prior to procedure  maximum sterile barriers used during central venous catheter insertion  hand hygiene performed prior to central venous catheter insertion  Location: right, internal jugular.   Catheter type: triple lumen  Catheter Size: 7 Fr  Inserted Catheter Length: 14 cm  Ultrasound: vascular probe with ultrasound   Vessel Caliber: medium, patent  Vascular Doppler:  not done, compressibility normal  Needle advanced into vessel with real time Ultrasound guidance.  Guidewire confirmed in vessel.  Image recorded and saved.  Manometry: none  Insertion Attempts: 1   Securement:line sutured, chlorhexidine patch and sterile dressing applied     Post-Procedure

## 2019-01-26 LAB
ALBUMIN SERPL BCP-MCNC: 3.1 G/DL
ALBUMIN SERPL BCP-MCNC: 3.2 G/DL
ALP SERPL-CCNC: 51 U/L
ALP SERPL-CCNC: 64 U/L
ALT SERPL W/O P-5'-P-CCNC: 28 U/L
ALT SERPL W/O P-5'-P-CCNC: 37 U/L
ANION GAP SERPL CALC-SCNC: 7 MMOL/L
ANION GAP SERPL CALC-SCNC: 9 MMOL/L
AST SERPL-CCNC: 30 U/L
AST SERPL-CCNC: 48 U/L
BASOPHILS # BLD AUTO: 0.01 K/UL
BASOPHILS NFR BLD: 0.1 %
BILIRUB SERPL-MCNC: 0.6 MG/DL
BILIRUB SERPL-MCNC: 0.7 MG/DL
BUN SERPL-MCNC: 12 MG/DL
BUN SERPL-MCNC: 14 MG/DL
CALCIUM SERPL-MCNC: 6.6 MG/DL
CALCIUM SERPL-MCNC: 7.2 MG/DL
CHLORIDE SERPL-SCNC: 105 MMOL/L
CHLORIDE SERPL-SCNC: 106 MMOL/L
CO2 SERPL-SCNC: 25 MMOL/L
CO2 SERPL-SCNC: 26 MMOL/L
CREAT SERPL-MCNC: 0.8 MG/DL
CREAT SERPL-MCNC: 0.8 MG/DL
DIFFERENTIAL METHOD: ABNORMAL
EOSINOPHIL # BLD AUTO: 0 K/UL
EOSINOPHIL NFR BLD: 0.1 %
ERYTHROCYTE [DISTWIDTH] IN BLOOD BY AUTOMATED COUNT: 13.9 %
EST. GFR  (AFRICAN AMERICAN): >60 ML/MIN/1.73 M^2
EST. GFR  (AFRICAN AMERICAN): >60 ML/MIN/1.73 M^2
EST. GFR  (NON AFRICAN AMERICAN): >60 ML/MIN/1.73 M^2
EST. GFR  (NON AFRICAN AMERICAN): >60 ML/MIN/1.73 M^2
GLUCOSE SERPL-MCNC: 174 MG/DL
GLUCOSE SERPL-MCNC: 253 MG/DL
HCT VFR BLD AUTO: 34 %
HGB BLD-MCNC: 11 G/DL
LYMPHOCYTES # BLD AUTO: 0.6 K/UL
LYMPHOCYTES NFR BLD: 7.1 %
MAGNESIUM SERPL-MCNC: 1.8 MG/DL
MCH RBC QN AUTO: 28.7 PG
MCHC RBC AUTO-ENTMCNC: 32.4 G/DL
MCV RBC AUTO: 89 FL
MONOCYTES # BLD AUTO: 0.2 K/UL
MONOCYTES NFR BLD: 2.7 %
NEUTROPHILS # BLD AUTO: 7.9 K/UL
NEUTROPHILS NFR BLD: 89.8 %
PHOSPHATE SERPL-MCNC: 2.7 MG/DL
PLATELET # BLD AUTO: 289 K/UL
PMV BLD AUTO: 9.4 FL
POTASSIUM SERPL-SCNC: 3.9 MMOL/L
POTASSIUM SERPL-SCNC: 3.9 MMOL/L
PROT SERPL-MCNC: 4.8 G/DL
PROT SERPL-MCNC: 5 G/DL
RBC # BLD AUTO: 3.83 M/UL
SODIUM SERPL-SCNC: 138 MMOL/L
SODIUM SERPL-SCNC: 140 MMOL/L
WBC # BLD AUTO: 8.75 K/UL

## 2019-01-26 PROCEDURE — 11000001 HC ACUTE MED/SURG PRIVATE ROOM

## 2019-01-26 PROCEDURE — S0077 INJECTION, CLINDAMYCIN PHOSP: HCPCS | Performed by: SURGERY

## 2019-01-26 PROCEDURE — 94770 HC EXHALED C02 TEST: CPT

## 2019-01-26 PROCEDURE — 25000003 PHARM REV CODE 250: Performed by: SURGERY

## 2019-01-26 PROCEDURE — 80053 COMPREHEN METABOLIC PANEL: CPT

## 2019-01-26 PROCEDURE — B4185 PARENTERAL SOL 10 GM LIPIDS: HCPCS | Performed by: SURGERY

## 2019-01-26 PROCEDURE — 25000003 PHARM REV CODE 250: Performed by: STUDENT IN AN ORGANIZED HEALTH CARE EDUCATION/TRAINING PROGRAM

## 2019-01-26 PROCEDURE — 85025 COMPLETE CBC W/AUTO DIFF WBC: CPT

## 2019-01-26 PROCEDURE — 63600175 PHARM REV CODE 636 W HCPCS: Performed by: SURGERY

## 2019-01-26 PROCEDURE — 80053 COMPREHEN METABOLIC PANEL: CPT | Mod: 91

## 2019-01-26 PROCEDURE — P9045 ALBUMIN (HUMAN), 5%, 250 ML: HCPCS | Mod: JG | Performed by: SURGERY

## 2019-01-26 PROCEDURE — 63600175 PHARM REV CODE 636 W HCPCS: Mod: JG | Performed by: SURGERY

## 2019-01-26 PROCEDURE — 84100 ASSAY OF PHOSPHORUS: CPT

## 2019-01-26 PROCEDURE — 94761 N-INVAS EAR/PLS OXIMETRY MLT: CPT

## 2019-01-26 PROCEDURE — 63600175 PHARM REV CODE 636 W HCPCS: Performed by: STUDENT IN AN ORGANIZED HEALTH CARE EDUCATION/TRAINING PROGRAM

## 2019-01-26 PROCEDURE — 27000221 HC OXYGEN, UP TO 24 HOURS

## 2019-01-26 PROCEDURE — 99900035 HC TECH TIME PER 15 MIN (STAT)

## 2019-01-26 PROCEDURE — 83735 ASSAY OF MAGNESIUM: CPT

## 2019-01-26 RX ORDER — ALBUMIN HUMAN 50 G/1000ML
25 SOLUTION INTRAVENOUS ONCE
Status: COMPLETED | OUTPATIENT
Start: 2019-01-26 | End: 2019-01-26

## 2019-01-26 RX ORDER — DIAZEPAM 2 MG/1
2 TABLET ORAL EVERY 8 HOURS
Status: COMPLETED | OUTPATIENT
Start: 2019-01-26 | End: 2019-01-27

## 2019-01-26 RX ORDER — ACETAMINOPHEN 10 MG/ML
1000 INJECTION, SOLUTION INTRAVENOUS EVERY 8 HOURS
Status: COMPLETED | OUTPATIENT
Start: 2019-01-26 | End: 2019-01-27

## 2019-01-26 RX ORDER — SODIUM CHLORIDE 9 MG/ML
INJECTION, SOLUTION INTRAVENOUS CONTINUOUS
Status: DISCONTINUED | OUTPATIENT
Start: 2019-01-26 | End: 2019-01-28

## 2019-01-26 RX ORDER — DULOXETIN HYDROCHLORIDE 30 MG/1
60 CAPSULE, DELAYED RELEASE ORAL DAILY
Status: DISCONTINUED | OUTPATIENT
Start: 2019-01-26 | End: 2019-01-28

## 2019-01-26 RX ADMIN — KETOROLAC TROMETHAMINE 10 MG: 30 INJECTION, SOLUTION INTRAMUSCULAR at 11:01

## 2019-01-26 RX ADMIN — POTASSIUM CHLORIDE, DEXTROSE MONOHYDRATE AND SODIUM CHLORIDE: 150; 5; 450 INJECTION, SOLUTION INTRAVENOUS at 08:01

## 2019-01-26 RX ADMIN — KETOROLAC TROMETHAMINE 10 MG: 30 INJECTION, SOLUTION INTRAMUSCULAR at 12:01

## 2019-01-26 RX ADMIN — OCTREOTIDE ACETATE 250 MCG/HR: 1000 INJECTION, SOLUTION INTRAVENOUS; SUBCUTANEOUS at 08:01

## 2019-01-26 RX ADMIN — Medication: at 07:01

## 2019-01-26 RX ADMIN — KETOROLAC TROMETHAMINE 10 MG: 30 INJECTION, SOLUTION INTRAMUSCULAR at 07:01

## 2019-01-26 RX ADMIN — DIAZEPAM 2 MG: 2 TABLET ORAL at 03:01

## 2019-01-26 RX ADMIN — ASCORBIC ACID, VITAMIN A PALMITATE, CHOLECALCIFEROL, THIAMINE HYDROCHLORIDE, RIBOFLAVIN-5 PHOSPHATE SODIUM, PYRIDOXINE HYDROCHLORIDE, NIACINAMIDE, DEXPANTHENOL, ALPHA-TOCOPHEROL ACETATE, VITAMIN K1, FOLIC ACID, BIOTIN, CYANOCOBALAMIN: 200; 3300; 200; 6; 3.6; 6; 40; 15; 10; 150; 600; 60; 5 INJECTION, SOLUTION INTRAVENOUS at 12:01

## 2019-01-26 RX ADMIN — CLINDAMYCIN IN 5 PERCENT DEXTROSE 600 MG: 12 INJECTION, SOLUTION INTRAVENOUS at 06:01

## 2019-01-26 RX ADMIN — SODIUM CHLORIDE: 0.9 INJECTION, SOLUTION INTRAVENOUS at 12:01

## 2019-01-26 RX ADMIN — ACETAMINOPHEN 1000 MG: 10 INJECTION, SOLUTION INTRAVENOUS at 03:01

## 2019-01-26 RX ADMIN — PREGABALIN 75 MG: 75 CAPSULE ORAL at 09:01

## 2019-01-26 RX ADMIN — CALCIUM GLUCONATE 1 G: 98 INJECTION, SOLUTION INTRAVENOUS at 09:01

## 2019-01-26 RX ADMIN — IRON SUCROSE 100 MG: 20 INJECTION, SOLUTION INTRAVENOUS at 08:01

## 2019-01-26 RX ADMIN — CLONAZEPAM 0.5 MG: 0.5 TABLET ORAL at 09:01

## 2019-01-26 RX ADMIN — AMITRIPTYLINE HYDROCHLORIDE 50 MG: 25 TABLET, FILM COATED ORAL at 09:01

## 2019-01-26 RX ADMIN — ALBUMIN HUMAN 25 G: 0.05 INJECTION, SOLUTION INTRAVENOUS at 10:01

## 2019-01-26 RX ADMIN — PREGABALIN 75 MG: 75 CAPSULE ORAL at 08:01

## 2019-01-26 RX ADMIN — CLINDAMYCIN IN 5 PERCENT DEXTROSE 600 MG: 12 INJECTION, SOLUTION INTRAVENOUS at 03:01

## 2019-01-26 RX ADMIN — KETOROLAC TROMETHAMINE 10 MG: 30 INJECTION, SOLUTION INTRAMUSCULAR at 06:01

## 2019-01-26 RX ADMIN — CALCIUM GLUCONATE 1 G: 98 INJECTION, SOLUTION INTRAVENOUS at 07:01

## 2019-01-26 RX ADMIN — CALCIUM GLUCONATE 1000 MG: 98 INJECTION, SOLUTION INTRAVENOUS at 10:01

## 2019-01-26 RX ADMIN — DIAZEPAM 2 MG: 2 TABLET ORAL at 09:01

## 2019-01-26 RX ADMIN — CLINDAMYCIN IN 5 PERCENT DEXTROSE 600 MG: 12 INJECTION, SOLUTION INTRAVENOUS at 11:01

## 2019-01-26 RX ADMIN — AMLODIPINE BESYLATE 5 MG: 5 TABLET ORAL at 08:01

## 2019-01-26 RX ADMIN — CALCIUM GLUCONATE 1000 MG: 98 INJECTION, SOLUTION INTRAVENOUS at 12:01

## 2019-01-26 RX ADMIN — DULOXETINE 60 MG: 30 CAPSULE, DELAYED RELEASE ORAL at 12:01

## 2019-01-26 RX ADMIN — ACETAMINOPHEN 1000 MG: 10 INJECTION, SOLUTION INTRAVENOUS at 09:01

## 2019-01-26 RX ADMIN — I.V. FAT EMULSION 250 ML: 20 EMULSION INTRAVENOUS at 09:01

## 2019-01-26 NOTE — PROGRESS NOTES
[facility]  Critical Care -NeuroendocrineSx  Progress Note    Patient Name: Kaylee Ngo  MRN: [id number]  Admission Date: 1/25/2019  Hospital Length of Stay: 1 days  Code Status: Prior  Attending Provider: Cam Ashton MD  Primary Care Provider: Robert Garcia MD   Principal Problem: Primary pancreatic carcinoid tumor    Subjective:     HPI: PNET    Hospital/ICU Course: 1/25/18 -ex lap, reverse gastric  bypass    Interval History/Significant Events: dPCA on, c/o rib pain/muscle pain,some low BP    Review of Systems  Objective:     Vital Signs (Most Recent):  Temp: 98.9 °F (37.2 °C) (01/26/19 0715)  Pulse: 94 (01/26/19 1015)  Resp: (!) 23 (01/26/19 1015)  BP: (!) 83/54 (01/26/19 1015)  SpO2: 100 % (01/26/19 1015) Vital Signs (24h Range):  Temp:  [97.3 °F (36.3 °C)-98.9 °F (37.2 °C)] 98.9 °F (37.2 °C)  Pulse:  [56-95] 94  Resp:  [15-51] 23  SpO2:  [100 %] 100 %  BP: ()/(49-65) 83/54  Arterial Line BP: ()/(52-90) 108/67     Weight: 61.5 kg (135 lb 9.3 oz)  Body mass index is 22.56 kg/m².      Intake/Output Summary (Last 24 hours) at 1/26/2019 1057  Last data filed at 1/26/2019 1043  Gross per 24 hour   Intake 5080 ml   Output 3635 ml   Net 1445 ml       Physical Exam   GEN -  NAD  HEENT -NGT  ABD - not distended, incision clean  Tubes in good position    Vents:       Lines/Drains/Airways     Central Venous Catheter Line                 Percutaneous Central Line Insertion/Assessment - triple lumen  01/25/19 0726 1 day          Drain                 NG/OG Tube 01/25/19 0728 Brooks sump 18 Fr. Right nostril 1 day         Urethral Catheter 01/25/19 0745 Non-latex 1 day         Gastrostomy/Enterostomy 01/25/19 1300 Gastrostomy tube w/ balloon midline less than 1 day          Peripheral Intravenous Line                 Peripheral IV - Single Lumen 01/25/19 0654 Left Hand 1 day                Significant Labs:    CBC/Anemia Profile:  Recent Labs   Lab 01/25/19  1355 01/25/19  2205  01/26/19  0520   WBC 6.23 8.45 8.75   HGB 11.1* 11.9* 11.0*   HCT 34.0* 37.0 34.0*    296 289   MCV 87 89 89   RDW 13.4 13.6 13.9        Chemistries:  Recent Labs   Lab 01/24/19  1520 01/25/19  1355 01/25/19  2207 01/26/19  0520    140 141 140   K 3.7 3.9 3.9 3.9    107 108 106   CO2 29 21* 25 25   BUN 8 8 12 12   CREATININE 0.8 0.7 0.8 0.8   CALCIUM 8.6* 7.2* 6.9* 6.6*   ALBUMIN 3.6  --   --  3.2*   PROT 6.9  --   --  5.0*   BILITOT 0.3  --   --  0.7   ALKPHOS 142*  --   --  64   ALT 34  --   --  37   AST 33  --   --  48*   MG  --   --   --  1.8   PHOS  --   --   --  2.7       A/P:   1/25/18 -ex lap, reverse gastric  Bypass for PNET      Decrease sophie 125  TPN/lipids, stop IVF  Giving Ca  Alb 500cc bolus  Valium, increased PCA freq, heating pad  OOB/PT      J Kb Prieto MD  Critical Care - Medicine  [facility]

## 2019-01-26 NOTE — NURSING
Discussed with pharmacy calcium carbonate allergy and need for IV calcium gluconate replacement. Pharm states would be ok to give med, benefit outweighs risk. Dr. Olivier notified. Will order 2g calcium riders to replace Ca of 6.6  CHANELLE Oliva updated on POC.  Trudi Atkins RN

## 2019-01-26 NOTE — NURSING
Notified Dr. Prieto and Dr. Olivier of patient's BP 70s-80s systolic and c/o pain. MD to enter order.  Trudi Atkins RN

## 2019-01-27 LAB
ALBUMIN SERPL BCP-MCNC: 2.3 G/DL
ALBUMIN SERPL BCP-MCNC: 2.7 G/DL
ALP SERPL-CCNC: 58 U/L
ALP SERPL-CCNC: 59 U/L
ALT SERPL W/O P-5'-P-CCNC: 21 U/L
ALT SERPL W/O P-5'-P-CCNC: 23 U/L
ANION GAP SERPL CALC-SCNC: 5 MMOL/L
ANION GAP SERPL CALC-SCNC: 8 MMOL/L
AST SERPL-CCNC: 18 U/L
AST SERPL-CCNC: 24 U/L
BASOPHILS # BLD AUTO: ABNORMAL K/UL
BASOPHILS NFR BLD: 0 %
BILIRUB SERPL-MCNC: 0.4 MG/DL
BILIRUB SERPL-MCNC: 0.6 MG/DL
BUN SERPL-MCNC: 15 MG/DL
BUN SERPL-MCNC: 15 MG/DL
CALCIUM SERPL-MCNC: 7.7 MG/DL
CALCIUM SERPL-MCNC: 7.8 MG/DL
CHLORIDE SERPL-SCNC: 103 MMOL/L
CHLORIDE SERPL-SCNC: 104 MMOL/L
CO2 SERPL-SCNC: 24 MMOL/L
CO2 SERPL-SCNC: 29 MMOL/L
CREAT SERPL-MCNC: 0.7 MG/DL
CREAT SERPL-MCNC: 0.8 MG/DL
DIFFERENTIAL METHOD: ABNORMAL
EOSINOPHIL # BLD AUTO: ABNORMAL K/UL
EOSINOPHIL NFR BLD: 6 %
ERYTHROCYTE [DISTWIDTH] IN BLOOD BY AUTOMATED COUNT: 14.3 %
EST. GFR  (AFRICAN AMERICAN): >60 ML/MIN/1.73 M^2
EST. GFR  (AFRICAN AMERICAN): >60 ML/MIN/1.73 M^2
EST. GFR  (NON AFRICAN AMERICAN): >60 ML/MIN/1.73 M^2
EST. GFR  (NON AFRICAN AMERICAN): >60 ML/MIN/1.73 M^2
GLUCOSE SERPL-MCNC: 224 MG/DL
GLUCOSE SERPL-MCNC: 88 MG/DL
HCT VFR BLD AUTO: 31.6 %
HGB BLD-MCNC: 10.2 G/DL
LYMPHOCYTES # BLD AUTO: ABNORMAL K/UL
LYMPHOCYTES NFR BLD: 9 %
MAGNESIUM SERPL-MCNC: 1.8 MG/DL
MCH RBC QN AUTO: 28.7 PG
MCHC RBC AUTO-ENTMCNC: 32.3 G/DL
MCV RBC AUTO: 89 FL
METAMYELOCYTES NFR BLD MANUAL: 1 %
MONOCYTES # BLD AUTO: ABNORMAL K/UL
MONOCYTES NFR BLD: 2 %
MYELOCYTES NFR BLD MANUAL: 2 %
NEUTROPHILS NFR BLD: 76 %
NEUTS BAND NFR BLD MANUAL: 4 %
PHOSPHATE SERPL-MCNC: 1.9 MG/DL
PLATELET # BLD AUTO: 245 K/UL
PMV BLD AUTO: 9.7 FL
POCT GLUCOSE: 117 MG/DL (ref 70–110)
POTASSIUM SERPL-SCNC: 3.6 MMOL/L
POTASSIUM SERPL-SCNC: 3.9 MMOL/L
PROT SERPL-MCNC: 4.8 G/DL
PROT SERPL-MCNC: 5.1 G/DL
RBC # BLD AUTO: 3.56 M/UL
SODIUM SERPL-SCNC: 135 MMOL/L
SODIUM SERPL-SCNC: 138 MMOL/L
WBC # BLD AUTO: 3.18 K/UL

## 2019-01-27 PROCEDURE — B4185 PARENTERAL SOL 10 GM LIPIDS: HCPCS | Performed by: STUDENT IN AN ORGANIZED HEALTH CARE EDUCATION/TRAINING PROGRAM

## 2019-01-27 PROCEDURE — 63600175 PHARM REV CODE 636 W HCPCS: Performed by: SURGERY

## 2019-01-27 PROCEDURE — 25000003 PHARM REV CODE 250: Performed by: STUDENT IN AN ORGANIZED HEALTH CARE EDUCATION/TRAINING PROGRAM

## 2019-01-27 PROCEDURE — 25000003 PHARM REV CODE 250: Performed by: SURGERY

## 2019-01-27 PROCEDURE — 63600175 PHARM REV CODE 636 W HCPCS: Performed by: STUDENT IN AN ORGANIZED HEALTH CARE EDUCATION/TRAINING PROGRAM

## 2019-01-27 PROCEDURE — 85007 BL SMEAR W/DIFF WBC COUNT: CPT

## 2019-01-27 PROCEDURE — 99900037 HC PT THERAPY SCREENING (STAT)

## 2019-01-27 PROCEDURE — 84100 ASSAY OF PHOSPHORUS: CPT

## 2019-01-27 PROCEDURE — 85027 COMPLETE CBC AUTOMATED: CPT

## 2019-01-27 PROCEDURE — 99900035 HC TECH TIME PER 15 MIN (STAT)

## 2019-01-27 PROCEDURE — S0077 INJECTION, CLINDAMYCIN PHOSP: HCPCS | Performed by: SURGERY

## 2019-01-27 PROCEDURE — 80053 COMPREHEN METABOLIC PANEL: CPT

## 2019-01-27 PROCEDURE — 11000001 HC ACUTE MED/SURG PRIVATE ROOM

## 2019-01-27 PROCEDURE — 94770 HC EXHALED C02 TEST: CPT

## 2019-01-27 PROCEDURE — 83735 ASSAY OF MAGNESIUM: CPT

## 2019-01-27 RX ORDER — SODIUM CHLORIDE 9 MG/ML
INJECTION, SOLUTION INTRAVENOUS ONCE
Status: COMPLETED | OUTPATIENT
Start: 2019-01-27 | End: 2019-01-27

## 2019-01-27 RX ADMIN — KETOROLAC TROMETHAMINE 10 MG: 30 INJECTION, SOLUTION INTRAMUSCULAR at 11:01

## 2019-01-27 RX ADMIN — AMITRIPTYLINE HYDROCHLORIDE 50 MG: 25 TABLET, FILM COATED ORAL at 10:01

## 2019-01-27 RX ADMIN — ACETAMINOPHEN 1000 MG: 10 INJECTION, SOLUTION INTRAVENOUS at 05:01

## 2019-01-27 RX ADMIN — I.V. FAT EMULSION 250 ML: 20 EMULSION INTRAVENOUS at 10:01

## 2019-01-27 RX ADMIN — PREGABALIN 75 MG: 75 CAPSULE ORAL at 08:01

## 2019-01-27 RX ADMIN — DIAZEPAM 2 MG: 2 TABLET ORAL at 05:01

## 2019-01-27 RX ADMIN — SODIUM CHLORIDE: 0.9 INJECTION, SOLUTION INTRAVENOUS at 10:01

## 2019-01-27 RX ADMIN — CLONAZEPAM 0.5 MG: 0.5 TABLET ORAL at 10:01

## 2019-01-27 RX ADMIN — POTASSIUM PHOSPHATE, MONOBASIC AND POTASSIUM PHOSPHATE, DIBASIC 20 MMOL: 224; 236 INJECTION, SOLUTION INTRAVENOUS at 08:01

## 2019-01-27 RX ADMIN — CLINDAMYCIN IN 5 PERCENT DEXTROSE 600 MG: 12 INJECTION, SOLUTION INTRAVENOUS at 06:01

## 2019-01-27 RX ADMIN — ASCORBIC ACID, VITAMIN A PALMITATE, CHOLECALCIFEROL, THIAMINE HYDROCHLORIDE, RIBOFLAVIN-5 PHOSPHATE SODIUM, PYRIDOXINE HYDROCHLORIDE, NIACINAMIDE, DEXPANTHENOL, ALPHA-TOCOPHEROL ACETATE, VITAMIN K1, FOLIC ACID, BIOTIN, CYANOCOBALAMIN: 200; 3300; 200; 6; 3.6; 6; 40; 15; 10; 150; 600; 60; 5 INJECTION, SOLUTION INTRAVENOUS at 10:01

## 2019-01-27 RX ADMIN — AMLODIPINE BESYLATE 5 MG: 5 TABLET ORAL at 08:01

## 2019-01-27 RX ADMIN — PREGABALIN 75 MG: 75 CAPSULE ORAL at 10:01

## 2019-01-27 RX ADMIN — IRON SUCROSE 100 MG: 20 INJECTION, SOLUTION INTRAVENOUS at 08:01

## 2019-01-27 RX ADMIN — DULOXETINE 60 MG: 30 CAPSULE, DELAYED RELEASE ORAL at 08:01

## 2019-01-27 RX ADMIN — DIAZEPAM 2 MG: 2 TABLET ORAL at 02:01

## 2019-01-27 RX ADMIN — CLINDAMYCIN IN 5 PERCENT DEXTROSE 600 MG: 12 INJECTION, SOLUTION INTRAVENOUS at 10:01

## 2019-01-27 RX ADMIN — KETOROLAC TROMETHAMINE 10 MG: 30 INJECTION, SOLUTION INTRAMUSCULAR at 05:01

## 2019-01-27 RX ADMIN — CALCIUM GLUCONATE 1000 MG: 98 INJECTION, SOLUTION INTRAVENOUS at 06:01

## 2019-01-27 RX ADMIN — CLINDAMYCIN IN 5 PERCENT DEXTROSE 600 MG: 12 INJECTION, SOLUTION INTRAVENOUS at 02:01

## 2019-01-27 NOTE — PLAN OF CARE
Problem: Adult Inpatient Plan of Care  Goal: Plan of Care Review  Outcome: Ongoing (interventions implemented as appropriate)  Pt is aaox4. Pain well controlled with pain pump. Pt will work with pt and ot tomorrow. TPN was started today @ 100cc. Calcium replaced. Still at 7.7 and will be replaced this evening. And CMP will be drawn after 2g of calcium gluconate are given. Safety maintained.

## 2019-01-27 NOTE — PT/OT/SLP PROGRESS
Physical Therapy Screen      Patient Name:  Kaylee Ngo   MRN:  [id number]    PTA, pt lived alone in a mobile home with 4 BOSSMAN, B HR's.  Prior to December, pt was Mod Indep with ambulation and ADL's.  DME:  Shower chair, rollator, SPC, and SW.  Pt reports PEDRO lives next door and sister/PEDRO are able to assist pt if needed  Patient not seen today secondary to Pt declined 2/2 7/10 pain and already sat up in chair this morning with nursing staff.  Will follow-up 1/28/2019.    Naila Benavidez, PT

## 2019-01-27 NOTE — NURSING TRANSFER
Nursing Transfer Note      1/27/2019     Transfer To: [address]     Transfer via bed    Transfer with  to O2    Transported by chanelle mcfarlane and hector     Medicines sent: yes    Chart send with patient: Yes    Notified: daughter    Patient reassessed at: 1/27 1655    Upon arrival to floor: patient oriented to room, call bell in reach and bed in lowest position

## 2019-01-27 NOTE — PROGRESS NOTES
[facility]  Critical Care -NeuroendocrineSx  Progress Note    Patient Name: Kaylee Ngo  MRN: [id number]  Admission Date: 1/25/2019  Hospital Length of Stay: 2 days  Code Status: Prior  Attending Provider: Cam Ashton MD  Primary Care Provider: Robert Garcia MD   Principal Problem: Primary pancreatic carcinoid tumor    Subjective:     HPI: PNET    Hospital/ICU Course: 1/25/18 -ex lap, reverse gastric  bypass    Interval History/Significant Events: dPCA on, c/o rib pain/muscle pain improved.    Review of Systems  Objective:     Vital Signs (Most Recent):  Temp: 98 °F (36.7 °C) (01/27/19 0330)  Pulse: (!) 117 (01/27/19 0810)  Resp: (!) 28 (01/27/19 0730)  BP: (!) 104/58 (01/27/19 0809)  SpO2: 97 % (01/27/19 0730) Vital Signs (24h Range):  Temp:  [97.4 °F (36.3 °C)-98.9 °F (37.2 °C)] 98 °F (36.7 °C)  Pulse:  [] 117  Resp:  [13-28] 28  SpO2:  [93 %-100 %] 97 %  BP: ()/(49-65) 104/58     Weight: 61.5 kg (135 lb 9.3 oz)  Body mass index is 22.56 kg/m².      Intake/Output Summary (Last 24 hours) at 1/27/2019 0828  Last data filed at 1/27/2019 0600  Gross per 24 hour   Intake 2916.43 ml   Output 1870 ml   Net 1046.43 ml       Physical Exam   GEN -  NAD  HEENT -NGT  ABD - not distended, incision clean  Tubes in good position    Vents:       Lines/Drains/Airways     Central Venous Catheter Line                 Percutaneous Central Line Insertion/Assessment - triple lumen  01/25/19 0726 2 days          Drain                 NG/OG Tube 01/25/19 0728 [place] sump 18 Fr. Right nostril 2 days         Urethral Catheter 01/25/19 0745 Non-latex 2 days         Gastrostomy/Enterostomy 01/25/19 1300 Gastrostomy tube w/ balloon midline 1 day          Peripheral Intravenous Line                 Peripheral IV - Single Lumen 01/27/19 0046 Posterior;Right Hand less than 1 day                Significant Labs:    CBC/Anemia Profile:  Recent Labs   Lab 01/25/19  2208 01/26/19  0520 01/27/19  0502    WBC 8.45 8.75 3.18*   HGB 11.9* 11.0* 10.2*   HCT 37.0 34.0* 31.6*    289 245   MCV 89 89 89   RDW 13.6 13.9 14.3        Chemistries:  Recent Labs   Lab 01/26/19  0520 01/26/19  1548 01/27/19  0509    138 138   K 3.9 3.9 3.6    105 104   CO2 25 26 29   BUN 12 14 15   CREATININE 0.8 0.8 0.7   CALCIUM 6.6* 7.2* 7.7*   ALBUMIN 3.2* 3.1* 2.7*   PROT 5.0* 4.8* 5.1*   BILITOT 0.7 0.6 0.6   ALKPHOS 64 51* 59   ALT 37 28 23   AST 48* 30 24   MG 1.8  --  1.8   PHOS 2.7  --  1.9*       A/P:   1/25/18 -ex lap, reverse gastric  Bypass for PNET    ABLA expected postop.  stable  Stop sophie  TPN/lipids,   Pain control  Valium, increased PCA freq, heating pad  OOB/PT      J Kb Prieto MD  Critical Care - Surgery  [facility]

## 2019-01-27 NOTE — PLAN OF CARE
Pt is up in the chair. C/o of pain. Educated to press pain button when in pain. Pt verbalized understanding

## 2019-01-28 PROBLEM — C7A.098 MALIGNANT CARCINOID TUMOR OF PANCREAS: Status: ACTIVE | Noted: 2019-01-25

## 2019-01-28 LAB
ALBUMIN SERPL BCP-MCNC: 2 G/DL
ALLENS TEST: ABNORMAL
ALLENS TEST: ABNORMAL
ALP SERPL-CCNC: 68 U/L
ALT SERPL W/O P-5'-P-CCNC: 16 U/L
ANION GAP SERPL CALC-SCNC: 11 MMOL/L
ANION GAP SERPL CALC-SCNC: 9 MMOL/L
AST SERPL-CCNC: 19 U/L
BASOPHILS # BLD AUTO: ABNORMAL K/UL
BASOPHILS # BLD AUTO: ABNORMAL K/UL
BASOPHILS NFR BLD: 0 %
BASOPHILS NFR BLD: 0 %
BILIRUB SERPL-MCNC: 0.5 MG/DL
BUN SERPL-MCNC: 23 MG/DL
BUN SERPL-MCNC: 24 MG/DL
CALCIUM SERPL-MCNC: 7.5 MG/DL
CALCIUM SERPL-MCNC: 7.6 MG/DL
CHLORIDE SERPL-SCNC: 102 MMOL/L
CHLORIDE SERPL-SCNC: 103 MMOL/L
CO2 SERPL-SCNC: 23 MMOL/L
CO2 SERPL-SCNC: 24 MMOL/L
CREAT SERPL-MCNC: 0.9 MG/DL
CREAT SERPL-MCNC: 0.9 MG/DL
DELSYS: ABNORMAL
DELSYS: ABNORMAL
DIFFERENTIAL METHOD: ABNORMAL
DIFFERENTIAL METHOD: ABNORMAL
EOSINOPHIL # BLD AUTO: ABNORMAL K/UL
EOSINOPHIL # BLD AUTO: ABNORMAL K/UL
EOSINOPHIL NFR BLD: 0 %
EOSINOPHIL NFR BLD: 0 %
EP: 8
ERYTHROCYTE [DISTWIDTH] IN BLOOD BY AUTOMATED COUNT: 14.2 %
ERYTHROCYTE [DISTWIDTH] IN BLOOD BY AUTOMATED COUNT: 14.5 %
ERYTHROCYTE [SEDIMENTATION RATE] IN BLOOD BY WESTERGREN METHOD: 10 MM/H
EST. GFR  (AFRICAN AMERICAN): >60 ML/MIN/1.73 M^2
EST. GFR  (AFRICAN AMERICAN): >60 ML/MIN/1.73 M^2
EST. GFR  (NON AFRICAN AMERICAN): >60 ML/MIN/1.73 M^2
EST. GFR  (NON AFRICAN AMERICAN): >60 ML/MIN/1.73 M^2
FIO2: 100
GLUCOSE SERPL-MCNC: 210 MG/DL
GLUCOSE SERPL-MCNC: 226 MG/DL
HCO3 UR-SCNC: 22.4 MMOL/L (ref 24–28)
HCO3 UR-SCNC: 22.4 MMOL/L (ref 24–28)
HCT VFR BLD AUTO: 28.3 %
HCT VFR BLD AUTO: 28.8 %
HGB BLD-MCNC: 9 G/DL
HGB BLD-MCNC: 9.4 G/DL
IP: 16
LYMPHOCYTES # BLD AUTO: ABNORMAL K/UL
LYMPHOCYTES # BLD AUTO: ABNORMAL K/UL
LYMPHOCYTES NFR BLD: 13 %
LYMPHOCYTES NFR BLD: 17.5 %
MAGNESIUM SERPL-MCNC: 2 MG/DL
MCH RBC QN AUTO: 27.9 PG
MCH RBC QN AUTO: 28.1 PG
MCHC RBC AUTO-ENTMCNC: 31.8 G/DL
MCHC RBC AUTO-ENTMCNC: 32.6 G/DL
MCV RBC AUTO: 86 FL
MCV RBC AUTO: 88 FL
METAMYELOCYTES NFR BLD MANUAL: 3.7 %
METAMYELOCYTES NFR BLD MANUAL: 7 %
MODE: ABNORMAL
MONOCYTES # BLD AUTO: ABNORMAL K/UL
MONOCYTES # BLD AUTO: ABNORMAL K/UL
MONOCYTES NFR BLD: 0 %
MONOCYTES NFR BLD: 3 %
MYELOCYTES NFR BLD MANUAL: 1.3 %
MYELOCYTES NFR BLD MANUAL: 3 %
NEUTROPHILS NFR BLD: 40 %
NEUTROPHILS NFR BLD: 77.5 %
NEUTS BAND NFR BLD MANUAL: 34 %
PCO2 BLDA: 36.7 MMHG (ref 35–45)
PCO2 BLDA: 43.3 MMHG (ref 35–45)
PH SMN: 7.32 [PH] (ref 7.35–7.45)
PH SMN: 7.39 [PH] (ref 7.35–7.45)
PHOSPHATE SERPL-MCNC: 2 MG/DL
PLATELET # BLD AUTO: 197 K/UL
PLATELET # BLD AUTO: 212 K/UL
PLATELET BLD QL SMEAR: ABNORMAL
PMV BLD AUTO: 10.4 FL
PMV BLD AUTO: 9.9 FL
PO2 BLDA: 54 MMHG (ref 80–100)
PO2 BLDA: 81 MMHG (ref 80–100)
POC BE: -2 MMOL/L
POC BE: -4 MMOL/L
POC SATURATED O2: 85 % (ref 95–100)
POC SATURATED O2: 96 % (ref 95–100)
POC TCO2: 24 MMOL/L (ref 23–27)
POC TCO2: 24 MMOL/L (ref 23–27)
POTASSIUM SERPL-SCNC: 3.7 MMOL/L
POTASSIUM SERPL-SCNC: 3.8 MMOL/L
PROT SERPL-MCNC: 4.8 G/DL
RBC # BLD AUTO: 3.23 M/UL
RBC # BLD AUTO: 3.34 M/UL
SAMPLE: ABNORMAL
SAMPLE: ABNORMAL
SITE: ABNORMAL
SITE: ABNORMAL
SODIUM SERPL-SCNC: 135 MMOL/L
SODIUM SERPL-SCNC: 137 MMOL/L
SP02: 98
SPONT RATE: 26
WBC # BLD AUTO: 2.91 K/UL
WBC # BLD AUTO: 3.72 K/UL

## 2019-01-28 PROCEDURE — S0171 BUMETANIDE 0.5 MG: HCPCS | Performed by: SURGERY

## 2019-01-28 PROCEDURE — 25000003 PHARM REV CODE 250: Performed by: SURGERY

## 2019-01-28 PROCEDURE — 97161 PT EVAL LOW COMPLEX 20 MIN: CPT

## 2019-01-28 PROCEDURE — 80048 BASIC METABOLIC PNL TOTAL CA: CPT

## 2019-01-28 PROCEDURE — B4185 PARENTERAL SOL 10 GM LIPIDS: HCPCS | Performed by: STUDENT IN AN ORGANIZED HEALTH CARE EDUCATION/TRAINING PROGRAM

## 2019-01-28 PROCEDURE — 85027 COMPLETE CBC AUTOMATED: CPT

## 2019-01-28 PROCEDURE — 63600175 PHARM REV CODE 636 W HCPCS: Performed by: SURGERY

## 2019-01-28 PROCEDURE — 27000221 HC OXYGEN, UP TO 24 HOURS

## 2019-01-28 PROCEDURE — 36600 WITHDRAWAL OF ARTERIAL BLOOD: CPT

## 2019-01-28 PROCEDURE — 99900035 HC TECH TIME PER 15 MIN (STAT)

## 2019-01-28 PROCEDURE — 94660 CPAP INITIATION&MGMT: CPT

## 2019-01-28 PROCEDURE — 25000003 PHARM REV CODE 250: Performed by: STUDENT IN AN ORGANIZED HEALTH CARE EDUCATION/TRAINING PROGRAM

## 2019-01-28 PROCEDURE — 80053 COMPREHEN METABOLIC PANEL: CPT

## 2019-01-28 PROCEDURE — 84100 ASSAY OF PHOSPHORUS: CPT

## 2019-01-28 PROCEDURE — A9698 NON-RAD CONTRAST MATERIALNOC: HCPCS | Performed by: SURGERY

## 2019-01-28 PROCEDURE — S0077 INJECTION, CLINDAMYCIN PHOSP: HCPCS | Performed by: SURGERY

## 2019-01-28 PROCEDURE — 63600175 PHARM REV CODE 636 W HCPCS: Performed by: STUDENT IN AN ORGANIZED HEALTH CARE EDUCATION/TRAINING PROGRAM

## 2019-01-28 PROCEDURE — 20000000 HC ICU ROOM

## 2019-01-28 PROCEDURE — 25000242 PHARM REV CODE 250 ALT 637 W/ HCPCS: Performed by: STUDENT IN AN ORGANIZED HEALTH CARE EDUCATION/TRAINING PROGRAM

## 2019-01-28 PROCEDURE — 85007 BL SMEAR W/DIFF WBC COUNT: CPT

## 2019-01-28 PROCEDURE — 82803 BLOOD GASES ANY COMBINATION: CPT

## 2019-01-28 PROCEDURE — 83735 ASSAY OF MAGNESIUM: CPT

## 2019-01-28 PROCEDURE — 94761 N-INVAS EAR/PLS OXIMETRY MLT: CPT

## 2019-01-28 PROCEDURE — 94770 HC EXHALED C02 TEST: CPT

## 2019-01-28 PROCEDURE — 25500020 PHARM REV CODE 255: Performed by: SURGERY

## 2019-01-28 PROCEDURE — 94640 AIRWAY INHALATION TREATMENT: CPT

## 2019-01-28 PROCEDURE — 27000190 HC CPAP FULL FACE MASK W/VALVE

## 2019-01-28 PROCEDURE — S0030 INJECTION, METRONIDAZOLE: HCPCS | Performed by: SURGERY

## 2019-01-28 RX ORDER — BUMETANIDE 0.25 MG/ML
1 INJECTION INTRAMUSCULAR; INTRAVENOUS EVERY 8 HOURS
Status: DISCONTINUED | OUTPATIENT
Start: 2019-01-28 | End: 2019-01-29

## 2019-01-28 RX ORDER — BUMETANIDE 0.25 MG/ML
1 INJECTION INTRAMUSCULAR; INTRAVENOUS DAILY
Status: DISCONTINUED | OUTPATIENT
Start: 2019-01-29 | End: 2019-01-28

## 2019-01-28 RX ORDER — IPRATROPIUM BROMIDE 0.5 MG/2.5ML
0.5 SOLUTION RESPIRATORY (INHALATION) EVERY 4 HOURS PRN
Status: DISCONTINUED | OUTPATIENT
Start: 2019-01-28 | End: 2019-02-26 | Stop reason: HOSPADM

## 2019-01-28 RX ORDER — ACETAMINOPHEN 10 MG/ML
1000 INJECTION, SOLUTION INTRAVENOUS EVERY 8 HOURS
Status: COMPLETED | OUTPATIENT
Start: 2019-01-28 | End: 2019-01-28

## 2019-01-28 RX ORDER — BUMETANIDE 0.25 MG/ML
2 INJECTION INTRAMUSCULAR; INTRAVENOUS ONCE
Status: COMPLETED | OUTPATIENT
Start: 2019-01-28 | End: 2019-01-28

## 2019-01-28 RX ORDER — AMOXICILLIN 250 MG
1 CAPSULE ORAL 2 TIMES DAILY
Status: DISCONTINUED | OUTPATIENT
Start: 2019-01-28 | End: 2019-01-28

## 2019-01-28 RX ORDER — TAMSULOSIN HYDROCHLORIDE 0.4 MG/1
0.4 CAPSULE ORAL DAILY
Status: DISCONTINUED | OUTPATIENT
Start: 2019-01-28 | End: 2019-01-28

## 2019-01-28 RX ORDER — DIPHENHYDRAMINE HYDROCHLORIDE 50 MG/ML
12.5 INJECTION INTRAMUSCULAR; INTRAVENOUS ONCE
Status: COMPLETED | OUTPATIENT
Start: 2019-01-28 | End: 2019-01-28

## 2019-01-28 RX ORDER — PANTOPRAZOLE SODIUM 40 MG/10ML
40 INJECTION, POWDER, LYOPHILIZED, FOR SOLUTION INTRAVENOUS DAILY
Status: DISCONTINUED | OUTPATIENT
Start: 2019-01-29 | End: 2019-02-13

## 2019-01-28 RX ORDER — ENOXAPARIN SODIUM 100 MG/ML
40 INJECTION SUBCUTANEOUS EVERY 24 HOURS
Status: DISCONTINUED | OUTPATIENT
Start: 2019-01-28 | End: 2019-02-01

## 2019-01-28 RX ORDER — METRONIDAZOLE 500 MG/100ML
500 INJECTION, SOLUTION INTRAVENOUS
Status: DISCONTINUED | OUTPATIENT
Start: 2019-01-28 | End: 2019-02-02

## 2019-01-28 RX ORDER — LEVALBUTEROL INHALATION SOLUTION 0.63 MG/3ML
0.63 SOLUTION RESPIRATORY (INHALATION) EVERY 4 HOURS PRN
Status: DISCONTINUED | OUTPATIENT
Start: 2019-01-28 | End: 2019-01-28

## 2019-01-28 RX ORDER — BETHANECHOL CHLORIDE 10 MG/1
10 TABLET ORAL ONCE
Status: COMPLETED | OUTPATIENT
Start: 2019-01-28 | End: 2019-01-28

## 2019-01-28 RX ORDER — CIPROFLOXACIN 2 MG/ML
400 INJECTION, SOLUTION INTRAVENOUS
Status: DISCONTINUED | OUTPATIENT
Start: 2019-01-28 | End: 2019-02-02

## 2019-01-28 RX ADMIN — ACETAMINOPHEN 1000 MG: 10 INJECTION, SOLUTION INTRAVENOUS at 01:01

## 2019-01-28 RX ADMIN — CIPROFLOXACIN 400 MG: 2 INJECTION, SOLUTION INTRAVENOUS at 08:01

## 2019-01-28 RX ADMIN — SODIUM PHOSPHATE, MONOBASIC, MONOHYDRATE 30 MMOL: 276; 142 INJECTION, SOLUTION INTRAVENOUS at 10:01

## 2019-01-28 RX ADMIN — BUMETANIDE 1 MG: 0.25 INJECTION INTRAMUSCULAR; INTRAVENOUS at 09:01

## 2019-01-28 RX ADMIN — METOPROLOL SUCCINATE 100 MG: 50 TABLET, EXTENDED RELEASE ORAL at 10:01

## 2019-01-28 RX ADMIN — ENOXAPARIN SODIUM 40 MG: 100 INJECTION SUBCUTANEOUS at 08:01

## 2019-01-28 RX ADMIN — DIPHENHYDRAMINE HYDROCHLORIDE 12.5 MG: 50 INJECTION, SOLUTION INTRAMUSCULAR; INTRAVENOUS at 09:01

## 2019-01-28 RX ADMIN — I.V. FAT EMULSION 250 ML: 20 EMULSION INTRAVENOUS at 09:01

## 2019-01-28 RX ADMIN — PREGABALIN 75 MG: 75 CAPSULE ORAL at 10:01

## 2019-01-28 RX ADMIN — CLINDAMYCIN IN 5 PERCENT DEXTROSE 600 MG: 12 INJECTION, SOLUTION INTRAVENOUS at 06:01

## 2019-01-28 RX ADMIN — ONDANSETRON 4 MG: 2 INJECTION INTRAMUSCULAR; INTRAVENOUS at 01:01

## 2019-01-28 RX ADMIN — IPRATROPIUM BROMIDE 0.5 MG: 0.5 SOLUTION RESPIRATORY (INHALATION) at 08:01

## 2019-01-28 RX ADMIN — ACETAMINOPHEN 1000 MG: 10 INJECTION, SOLUTION INTRAVENOUS at 09:01

## 2019-01-28 RX ADMIN — Medication: at 07:01

## 2019-01-28 RX ADMIN — STANDARDIZED SENNA CONCENTRATE AND DOCUSATE SODIUM 1 TABLET: 8.6; 5 TABLET, FILM COATED ORAL at 10:01

## 2019-01-28 RX ADMIN — Medication 900 ML: at 02:01

## 2019-01-28 RX ADMIN — IRON SUCROSE 100 MG: 20 INJECTION, SOLUTION INTRAVENOUS at 10:01

## 2019-01-28 RX ADMIN — ACETAMINOPHEN 1000 MG: 10 INJECTION, SOLUTION INTRAVENOUS at 08:01

## 2019-01-28 RX ADMIN — TAMSULOSIN HYDROCHLORIDE 0.4 MG: 0.4 CAPSULE ORAL at 02:01

## 2019-01-28 RX ADMIN — ASCORBIC ACID, VITAMIN A PALMITATE, CHOLECALCIFEROL, THIAMINE HYDROCHLORIDE, RIBOFLAVIN-5 PHOSPHATE SODIUM, PYRIDOXINE HYDROCHLORIDE, NIACINAMIDE, DEXPANTHENOL, ALPHA-TOCOPHEROL ACETATE, VITAMIN K1, FOLIC ACID, BIOTIN, CYANOCOBALAMIN: 200; 3300; 200; 6; 3.6; 6; 40; 15; 10; 150; 600; 60; 5 INJECTION, SOLUTION INTRAVENOUS at 05:01

## 2019-01-28 RX ADMIN — BETHANECHOL CHLORIDE 10 MG: 10 TABLET ORAL at 10:01

## 2019-01-28 RX ADMIN — BUMETANIDE 2 MG: 0.25 INJECTION INTRAMUSCULAR; INTRAVENOUS at 05:01

## 2019-01-28 RX ADMIN — METRONIDAZOLE 500 MG: 500 INJECTION, SOLUTION INTRAVENOUS at 08:01

## 2019-01-28 RX ADMIN — DULOXETINE 60 MG: 30 CAPSULE, DELAYED RELEASE ORAL at 10:01

## 2019-01-28 NOTE — PLAN OF CARE
Problem: Adult Inpatient Plan of Care  Goal: Plan of Care Review  Outcome: Ongoing (interventions implemented as appropriate)  Received pt on 2L NC; no distress noted. Will cont to monitor

## 2019-01-28 NOTE — PROGRESS NOTES
Informed by beside RNPetra, that pt's respirations are 41 and o2 sats were 88% on 1 liters nc and that RN increased o2 to 2 liters nasal cannula. Pt also c/o sob. Laura with respiratory notified by VN. RT to bedside to assess patient. Notified by Laura that pt's lung sounds are real coarse upon auscultation and pt's hr is in the 130's and that sats are currently 87-88% on 4 liters o2 nasal cannula. Prn albuterol treatments currently ordered. Respiratory recommends changing treatments to prn xopenex due to elevated heart rate. Dr Mtz notified of above info. New orders received to d/c albuterol prn treatments and start xopenex treatments q 4 hrs prn sob. Respiratory therapist, Laura and bedside RNpetra notified.

## 2019-01-28 NOTE — PLAN OF CARE
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 No current HH (has used [facility] in [place] for tube feeds in the past- would use again). Pt has Glucometer, Dexcom Meter/Glucose Monitor, S Cane, BSC, RW, Bath Bench. Pt lives in a trailer on her sister, Emily Pickard and Brother in Law's property.  They assist pt when needed and provide transportation to [facility]. They will provide transportation on discharge.     Pt denies any needs or concerns at this time. Discharge planning brochure and business card provided. CM numbers written on white board. Pt encouraged to call with any questions or needs. CM will continue to follow patient throughout the transitions of care, and assist with any discharge needs.       01/27/19 4483   Discharge Assessment   Assessment Type Discharge Planning Assessment   Confirmed/corrected address and phone number on facesheet? Yes   Assessment information obtained from? Patient;Caregiver  (pt and sister Emily Pickard [phone])   Expected Length of Stay (days) 3   Communicated expected length of stay with patient/caregiver yes   Prior to hospitilization cognitive status: Alert/Oriented   Prior to hospitalization functional status: Independent;Assistive Equipment   Current cognitive status: Alert/Oriented   Current Functional Status: Needs Assistance;Assistive Equipment   Facility Arrived From: (home)   Lives With alone  (lives in trailer on Emily mauro's property near her)   Able to Return to Prior Arrangements yes   Is patient able to care for self after discharge? Yes   Who are your caregiver(s) and their phone number(s)? sister: Emily Pickard [phone]   Patient's perception of discharge disposition home or selfcare   Readmission Within the Last 30 Days no  previous admission in last 30 days   Patient currently being followed by outpatient case management? Unable to determine (comments)   Patient currently receives any other outside agency services? No   Equipment Currently Used at Home cane, straight;bedside commode;walker, rolling;bath bench;glucometer  (Dexcom Meter/ Glucose Monitor)   Do you have any problems affording any of your prescribed medications? No   Is the patient taking medications as prescribed? yes   Does the patient have transportation home? Yes   Transportation Anticipated family or friend will provide   Dialysis Name and Scheduled days N/A   Does the patient receive services at the [facility]? No   Discharge Plan A Home Health   Discharge Plan B Skilled Nursing Facility   DME Needed Upon Discharge  (TBD)   Patient/Family in Agreement with Plan yes     Vidya Saul RN Transitional Navigator  [phone]

## 2019-01-28 NOTE — PLAN OF CARE
Problem: Adult Inpatient Plan of Care  Goal: Plan of Care Review  Outcome: Ongoing (interventions implemented as appropriate)  Patient on oxygen with documented flow. Will attempt to wean per protocol.

## 2019-01-28 NOTE — PT/OT/SLP EVAL
Physical Therapy Evaluation    Patient Name:  Kaylee Ngo   MRN:  [id number]    Recommendations:     Discharge Recommendations:  (ongoing assessment)   Discharge Equipment Recommendations: (TBD)   Barriers to discharge: Inaccessible home and Decreased caregiver support    Assessment:     Kaylee Ngo is a 63 y.o. female admitted with a medical diagnosis of Primary pancreatic carcinoid tumor.  She presents with the following impairments/functional limitations:  weakness, impaired endurance, impaired self care skills, impaired functional mobilty, gait instability, impaired balance, decreased lower extremity function, pain, decreased ROM, decreased safety awareness, impaired cardiopulmonary response to activity, decreased coordination. Limited activity tolerance this date 2/2 pt reports of fatigue. Pt sat EOB with SBA and deferred standing, transfers, or gait assessment this date. Pt presents with high respiratory rate and at start of session stating she wants to get up and OOB. Recommendations are ongoing pending pt's progress.    Rehab Prognosis: Good; patient would benefit from acute skilled PT services to address these deficits and reach maximum level of function.    Recent Surgery: Procedure(s) (LRB):  LAPAROTOMY, EXPLORATORY (N/A)  LYMPHADENECTOMY (N/A)  EXCISION, LIVER (N/A) 3 Days Post-Op    Plan:     During this hospitalization, patient to be seen 6 x/week to address the identified rehab impairments via gait training, therapeutic activities, therapeutic exercises and progress toward the following goals:    · Plan of Care Expires:  02/28/19    Subjective     Chief Complaint: fatigue and throat/chest pain  Patient/Family Comments/goals: At start of session, pt reporting she wants to get up and OOB. RN and charge nurse present who reported pt has been wanting to get up.  Pain/Comfort:  · Pain Rating 1: (5-6/10)  · Location 1: (throat and chest)  · Pain Addressed 1: Reposition, Distraction, Nurse  "notified    Patients cultural, spiritual, Pentecostal conflicts given the current situation: no    Living Environment:  Pt lives alone in a mobile home with 4 BOSSMAN with B rails and WIS with shower chair.  Prior to admission, patients level of function was mod I with rollator for mobility and ADLs.  Equipment used at home: cane, straight, rollator, shower chair.  DME owned (not currently used): single point cane.  Upon discharge, patient will have assistance from her sister and brother-in-law live next door and can assist as needed.    Objective:     Communicated with CHANELLE Rodrigez prior to session.  Patient found all lines intact peripheral IV, central line, oxygen, PCA, telemetry, NG tube, PEG Tube  upon PT entry to room.    General Precautions: Standard, fall   Orthopedic Precautions:N/A   Braces: N/A     Exams:  · Pt with high respiratory rate throughout session, educated throughout for pursed lip breathing but does not correct  · Postural Exam:  Patient presented with the following abnormalities:    · -       Rounded shoulders  · Sensation:    · -       Intact  · Skin Integrity/Edema:      · -       Skin integrity: Visible skin intact  · RLE ROM: WFL  · RLE Strength: WFL  · LLE ROM: WFL  · LLE Strength: WFL    Functional Mobility:  · Bed Mobility:     · Scooting: mod A to scoot forward while sitting EOB, dependent x 2 for draw sheet transfer towards HOB  · Supine to Sit: contact guard assistance and HOB maximally elevated and increased time  · Sit to Supine: contact guard assistance      Therapeutic Activities and Exercises:  At start of session pt reporting she wants to get OOB.  After brief interview while pt remaining supine in bed, pt reporting, "I think I'm too tired to get up now."  Pt sat EOB with SBA and stated, "I need to lie back, I'm tired. I can't get up." Pt requested to return to supine and returned to supine and required dependent x 2 for draw sheet transfer towards HOB.  Educated to completed APs, " QS, heel slides, and hip abduction slides.    AM-PAC 6 CLICK MOBILITY  Total Score:10     Patient left HOB elevated with all lines intact, call button in reach, bed alarm on and RN notified.    GOALS:   Multidisciplinary Problems     Physical Therapy Goals        Problem: Physical Therapy Goal    Goal Priority Disciplines Outcome Goal Variances Interventions   Physical Therapy Goal     PT, PT/OT Ongoing (interventions implemented as appropriate)     Description:  Goals to be met by: 19     Patient will increase functional independence with mobility by performin. Supine <> sit with Stand-by Assistance  2. Sit to stand transfer with Stand-by Assistance  3. Bed to chair transfer with Stand-by Assistance using Rolling Walker  4. Gait  x 50 feet with Stand-by Assistance using Rolling Walker.                       History:     Past Medical History:   Diagnosis Date    Bipolar disease, chronic     Depression     Diabetes     Drug therapy     Lanreotide    Gastric ulcer     GERD (gastroesophageal reflux disease)     HTN (hypertension)     Hx antineoplastic chemotherapy 2017    [employer]    Hyperlipemia     Malnutrition     Migraines     Nausea 2018    Neuroendocrine cancer 2017    Neuroendocrine carcinoma of small bowel 2017    Neuroendocrine tumor 2018    Obsessive compulsive disorder     Sleep apnea        Past Surgical History:   Procedure Laterality Date    APPENDECTOMY      BIOPSY-LIVER  2018    [facility]    CHOLECYSTECTOMY      ESOPHAGOGASTRODUODENOSCOPY (EGD) N/A 2018    [facility] by Kermit Reddy [facility] at [facility] ENDO    EXCISION, LIVER N/A 2019    [facility] at [facility]    GASTRIC BYPASS      INSERTION-TUBE-GASTROSTOMY-LAPAROSCOPIC  with gastrogram N/A 2018    Performed by [facility]    LAPAROTOMY, EXPLORATORY N/A 2019    [facility]    LYMPHADENECTOMY N/A 1/25/2019    [facility] OR    TOTAL KNEE ARTHROPLASTY Left        Clinical Decision Making:     History  Co-morbidities and personal factors that may impact the plan of care Examination  Body Structures and Functions, activity limitations and participation restrictions that may impact the plan of care Clinical Presentation   Decision Making/ Complexity Score   Co-morbidities:   [x] Time since onset of injury / illness / exacerbation  [x] Status of current condition  []Patient's cognitive status and safety concerns    [x] Multiple Medical Problems (see med hx)  Personal Factors:   [] Patient's age  [] Prior Level of function   [x] Patient's home situation (environment and family support)  [] Patient's level of motivation  [] Expected progression of patient      HISTORY:(criteria)    [] 07580 - no personal factors/history    [] 85498 - has 1-2 personal factor/comorbidity     [x] 53218 - has >3 personal factor/comorbidity     Body Regions:  [] Objective examination findings  [] Head     []  Neck  [] Trunk   [] Upper Extremity  [] Lower Extremity    Body Systems:  [] For communication ability, affect, cognition, language, and learning style: the assessment of the ability to make needs known, consciousness, orientation (person, place, and time), expected emotional /behavioral responses, and learning preferences (eg, learning barriers, education  needs)  [x] For the neuromuscular system: a general assessment of gross coordinated movement (eg, balance, gait, locomotion, transfers, and transitions) and motor function  (motor control and motor learning)  [x] For the musculoskeletal system: the assessment of gross symmetry, gross range of motion, gross strength, height, and weight  [] For the integumentary system: the assessment of pliability(texture), presence of scar formation, skin color, and skin integrity  [x] For  cardiovascular/pulmonary system: the assessment of heart rate, respiratory rate, blood pressure, and edema     Activity limitations:    [] Patient's cognitive status and saf ety concerns          [] Status of current condition      [] Weight bearing restriction  [] Cardiopulmunary Restriction    Participation Restrictions:   [] Goals and goal agreement with the patient     [] Rehab potential (prognosis) and probable outcome      Examination of Body System: (criteria)    [] 50032 - addressing 1-2 elements    [] 23448 - addressing a total of 3 or more elements     [x] 63757 -  Addressing a total of 4 or more elements         Clinical Presentation: (criteria)  Stable - 97751     On examination of body system using standardized tests and measures patient presents with 3 or more elements from any of the following: body structures and functions, activity limitations, and/or participation restrictions.  Leading to a clinical presentation that is considered stable and/or uncomplicated                              Clinical Decision Making  (Eval Complexity):  Low- 73270     Time Tracking:     PT Received On: 01/28/19  PT Start Time: 0916     PT Stop Time: 0931  PT Total Time (min): 15 min     Billable Minutes: Evaluation 15      Zeinab Zhang, PT  01/28/2019

## 2019-01-28 NOTE — PROGRESS NOTES
[facility] General Surgery  Neuroendocrine Tumor Service  Progress Note     Admission Date: 1/25/2019  Hospital Length of Stay: 3  Principal Problem: Primary pancreatic carcinoid tumor     Subjective:      Interval History:   Some issues with tachycardia/tachypnea  Pain controlled with PCA but could improve  Some SOB, RT treated with albuterol  Minimally ambulating  NPO with NGT in place  Barney out yesterday, required in & out cath    Scheduled Meds:   acetaminophen  1,000 mg Intravenous Q8H    amitriptyline  50 mg Oral QHS    amLODIPine  5 mg Oral Daily    clonazePAM  0.5 mg Oral QHS    DULoxetine  60 mg Oral Daily    fat emulsion 20%  250 mL Intravenous Daily    iron sucrose (VENOFER) IVPB  100 mg Intravenous Daily    metoprolol succinate  100 mg Oral BID    midazolam  2 mg Intravenous Once    pregabalin  75 mg Oral BID    sodium phosphate IVPB  30 mmol Intravenous Once     Continuous Infusions:   sodium chloride 0.9% 5 mL/hr at 01/26/19 1212    Amino acid 5% - dextrose 15% (CLINIMIX-E) solution with additives (1L  provides 510 kcal/L dextrose, with 50 gm AA, 150 gm CHO, Na 35, K 30, Mg 5, Ca 4.5, Acetate 80, Cl 39, Phos 15) 75 mL/hr at 01/27/19 2238    hydromorphone in 0.9 % NaCl 6 mg/30 ml       PRN Meds:.diphenhydrAMINE, hydrALAZINE, ipratropium, labetalol, naloxone, ondansetron, sodium chloride 0.9%    Objective:      Temp:  [96.8 °F (36 °C)-98.9 °F (37.2 °C)] 96.8 °F (36 °C)  Pulse:  [119-133] 129  Resp:  [16-41] 32  SpO2:  [92 %-97 %] 92 %  BP: ()/(50-80) 184/80  Weight change:     Intake/Output Summary (Last 24 hours) at 1/28/2019 0849  Last data filed at 1/28/2019 0603  Gross per 24 hour   Intake 1626.25 ml   Output 1931 ml   Net -304.75 ml       Physical Exam:  GEN: awake, alert, NAD, NGT  CV: tachycardic  PULM: unlabored  ABD: S/appropriately TTP/ ND, incision c/d/i, Jtube in place  EXT: Peripheral pulses present and equal bilaterally    Recent Labs   Lab  01/28/19  0614   *   K 3.8   CO2 23      BUN 23   CREATININE 0.9   CALCIUM 7.6*   PROT 4.8*   AST 19   ALT 16   ALKPHOS 68   BILITOT 0.5     Recent Labs   Lab 01/28/19  0614   WBC 2.91*   HGB 9.0*   HCT 28.3*          Significant Imaging: I have reviewed all pertinent imaging results/findings within the past 24 hours.     Assessment and Plan:    64 yo F w/ PNET, s/p reversal of Sandeep-en-Y w/ Jtube placement    - dPCA  - ofirmev/toradol  - ambulation/IS  - PT/OT  - whitlock if continues to not urinate  - will attempt bethanecol to stimulate bladder    Teddy Mzt MD  Neuroendocrine Surgery

## 2019-01-28 NOTE — PROGRESS NOTES
Pt came back from CT with 81% sats on 5LNC. Placed on non rebreather and now with sats up to 87%, respirations 32, BP 95/57, . Dr. Levy notified. Respiratory at bedside.

## 2019-01-28 NOTE — PLAN OF CARE
Problem: Physical Therapy Goal  Goal: Physical Therapy Goal  Goals to be met by: 19     Patient will increase functional independence with mobility by performin. Supine <> sit with Stand-by Assistance  2. Sit to stand transfer with Stand-by Assistance  3. Bed to chair transfer with Stand-by Assistance using Rolling Walker  4. Gait  x 50 feet with Stand-by Assistance using Rolling Walker.     Outcome: Ongoing (interventions implemented as appropriate)  PT evaluation completed, note to follow. Limited activity tolerance this date 2/2 pt reports of fatigue. Pt sat EOB with SBA and deferred standing, transfers, or gait assessment this date. Pt presents with high respiratory rate and at start of session stating she wants to get up and OOB. Recommendations are ongoing pending pt's progress.

## 2019-01-28 NOTE — PROGRESS NOTES
Pt's bp 96/53, pulse 133 with machine. Pt's bp 90/56 manually. Dr Mtz notified of patient's bp and pulse and of list of scheduled pm meds to be given. Instructed to hold tonight's dose of metoprolol and that it is okay to give scheduled elavil, clonopin, and lyrica as ordered. new orders received and read back also for 1 liter NS  IV bolus to be given over one hour.  Informed md that patient was not on cardiac monitor. No new orders received at this time for cardiac monitoring. Bedside nurse, Petra, notified.

## 2019-01-28 NOTE — PLAN OF CARE
Problem: Adult Inpatient Plan of Care  Goal: Plan of Care Review  Outcome: Ongoing (interventions implemented as appropriate)  Pt confused intermittently, no family members at bedside throughout shift. Pt complains of intermittent pain and SOB but denies n/v/d. TPN and PCA infusing to IJTLC - dressing CDI. Pt unable to void, straight cath performed at 0300 with 700cc of dark yellow output. PEG draining to whitlock bag and NGT to LCWS. Safety maintained, AVASYS monitoring at bedside - will cont to monitor.

## 2019-01-28 NOTE — PROGRESS NOTES
Spoke with Dr. Mtz regarding pt's status. Pt with RR in 30s-40s and HR in the 130s sustaining. Pt does not look well and is flushed. Pt is also confused to place, stating she is at a high school. Pt has not voided since whitlock removal at 1700. Bladder scan showed 553cc and pt was placed on BSC with no success. Orders for straight cath and STAT CXR received. Will cont to monitor.

## 2019-01-29 PROBLEM — E83.42 HYPOMAGNESEMIA: Status: ACTIVE | Noted: 2019-01-29

## 2019-01-29 PROBLEM — E44.0 MODERATE MALNUTRITION: Status: ACTIVE | Noted: 2018-03-25

## 2019-01-29 PROBLEM — R00.0 SINUS TACHYCARDIA: Status: ACTIVE | Noted: 2019-01-29

## 2019-01-29 PROBLEM — E87.6 HYPOKALEMIA: Status: ACTIVE | Noted: 2019-01-29

## 2019-01-29 LAB
ALBUMIN SERPL BCP-MCNC: 1.9 G/DL
ALP SERPL-CCNC: 77 U/L
ALT SERPL W/O P-5'-P-CCNC: 13 U/L
ANION GAP SERPL CALC-SCNC: 12 MMOL/L
AST SERPL-CCNC: 13 U/L
BASOPHILS # BLD AUTO: 0.01 K/UL
BASOPHILS NFR BLD: 0.1 %
BILIRUB SERPL-MCNC: 0.8 MG/DL
BUN SERPL-MCNC: 22 MG/DL
CALCIUM SERPL-MCNC: 7.8 MG/DL
CHLORIDE SERPL-SCNC: 102 MMOL/L
CO2 SERPL-SCNC: 25 MMOL/L
CREAT SERPL-MCNC: 0.8 MG/DL
DIFFERENTIAL METHOD: ABNORMAL
EOSINOPHIL # BLD AUTO: 0 K/UL
EOSINOPHIL NFR BLD: 0.1 %
ERYTHROCYTE [DISTWIDTH] IN BLOOD BY AUTOMATED COUNT: 14.3 %
EST. GFR  (AFRICAN AMERICAN): >60 ML/MIN/1.73 M^2
EST. GFR  (NON AFRICAN AMERICAN): >60 ML/MIN/1.73 M^2
GLUCOSE SERPL-MCNC: 196 MG/DL
HCT VFR BLD AUTO: 27.3 %
HGB BLD-MCNC: 8.9 G/DL
LYMPHOCYTES # BLD AUTO: 0.5 K/UL
LYMPHOCYTES NFR BLD: 6.2 %
MAGNESIUM SERPL-MCNC: 1.4 MG/DL
MCH RBC QN AUTO: 27.7 PG
MCHC RBC AUTO-ENTMCNC: 32.6 G/DL
MCV RBC AUTO: 85 FL
MONOCYTES # BLD AUTO: 0.5 K/UL
MONOCYTES NFR BLD: 6.9 %
NEUTROPHILS # BLD AUTO: 6.7 K/UL
NEUTROPHILS NFR BLD: 86.7 %
PHOSPHATE SERPL-MCNC: 1.9 MG/DL
PLATELET # BLD AUTO: 193 K/UL
PLATELET BLD QL SMEAR: ABNORMAL
PMV BLD AUTO: 10.5 FL
POCT GLUCOSE: 204 MG/DL (ref 70–110)
POCT GLUCOSE: 230 MG/DL (ref 70–110)
POCT GLUCOSE: 279 MG/DL (ref 70–110)
POTASSIUM SERPL-SCNC: 3.3 MMOL/L
PROT SERPL-MCNC: 5.1 G/DL
RBC # BLD AUTO: 3.21 M/UL
SODIUM SERPL-SCNC: 139 MMOL/L
WBC # BLD AUTO: 7.79 K/UL

## 2019-01-29 PROCEDURE — C9113 INJ PANTOPRAZOLE SODIUM, VIA: HCPCS | Performed by: SURGERY

## 2019-01-29 PROCEDURE — P9047 ALBUMIN (HUMAN), 25%, 50ML: HCPCS | Mod: JG | Performed by: SURGERY

## 2019-01-29 PROCEDURE — 25000003 PHARM REV CODE 250: Performed by: NURSE PRACTITIONER

## 2019-01-29 PROCEDURE — 85007 BL SMEAR W/DIFF WBC COUNT: CPT

## 2019-01-29 PROCEDURE — 93010 EKG 12-LEAD: ICD-10-PCS | Mod: ,,, | Performed by: STUDENT IN AN ORGANIZED HEALTH CARE EDUCATION/TRAINING PROGRAM

## 2019-01-29 PROCEDURE — 94761 N-INVAS EAR/PLS OXIMETRY MLT: CPT

## 2019-01-29 PROCEDURE — 93010 ELECTROCARDIOGRAM REPORT: CPT | Mod: ,,, | Performed by: STUDENT IN AN ORGANIZED HEALTH CARE EDUCATION/TRAINING PROGRAM

## 2019-01-29 PROCEDURE — 20000000 HC ICU ROOM

## 2019-01-29 PROCEDURE — 99223 PR INITIAL HOSPITAL CARE,LEVL III: ICD-10-PCS | Mod: ,,, | Performed by: INTERNAL MEDICINE

## 2019-01-29 PROCEDURE — 94660 CPAP INITIATION&MGMT: CPT

## 2019-01-29 PROCEDURE — 84100 ASSAY OF PHOSPHORUS: CPT

## 2019-01-29 PROCEDURE — 27100171 HC OXYGEN HIGH FLOW UP TO 24 HOURS

## 2019-01-29 PROCEDURE — 27100092 HC HIGH FLOW DELIVERY CANNULA

## 2019-01-29 PROCEDURE — 93005 ELECTROCARDIOGRAM TRACING: CPT

## 2019-01-29 PROCEDURE — 63600175 PHARM REV CODE 636 W HCPCS: Mod: JG | Performed by: SURGERY

## 2019-01-29 PROCEDURE — S0171 BUMETANIDE 0.5 MG: HCPCS | Performed by: SURGERY

## 2019-01-29 PROCEDURE — 27000221 HC OXYGEN, UP TO 24 HOURS

## 2019-01-29 PROCEDURE — 63600175 PHARM REV CODE 636 W HCPCS: Performed by: SURGERY

## 2019-01-29 PROCEDURE — 99223 1ST HOSP IP/OBS HIGH 75: CPT | Mod: ,,, | Performed by: INTERNAL MEDICINE

## 2019-01-29 PROCEDURE — 97530 THERAPEUTIC ACTIVITIES: CPT

## 2019-01-29 PROCEDURE — 27000190 HC CPAP FULL FACE MASK W/VALVE

## 2019-01-29 PROCEDURE — 97164 PT RE-EVAL EST PLAN CARE: CPT

## 2019-01-29 PROCEDURE — 99900035 HC TECH TIME PER 15 MIN (STAT)

## 2019-01-29 PROCEDURE — 25000003 PHARM REV CODE 250: Performed by: SURGERY

## 2019-01-29 PROCEDURE — 80053 COMPREHEN METABOLIC PANEL: CPT

## 2019-01-29 PROCEDURE — 85027 COMPLETE CBC AUTOMATED: CPT

## 2019-01-29 PROCEDURE — S0030 INJECTION, METRONIDAZOLE: HCPCS | Performed by: SURGERY

## 2019-01-29 PROCEDURE — 83735 ASSAY OF MAGNESIUM: CPT

## 2019-01-29 RX ORDER — HALOPERIDOL 5 MG/ML
2 INJECTION INTRAMUSCULAR ONCE AS NEEDED
Status: COMPLETED | OUTPATIENT
Start: 2019-01-29 | End: 2019-01-29

## 2019-01-29 RX ORDER — GLUCAGON 1 MG
1 KIT INJECTION
Status: DISCONTINUED | OUTPATIENT
Start: 2019-01-29 | End: 2019-02-04 | Stop reason: SDUPTHER

## 2019-01-29 RX ORDER — ALBUMIN HUMAN 250 G/1000ML
10 SOLUTION INTRAVENOUS CONTINUOUS
Status: DISCONTINUED | OUTPATIENT
Start: 2019-01-29 | End: 2019-02-05

## 2019-01-29 RX ORDER — KETOROLAC TROMETHAMINE 30 MG/ML
15 INJECTION, SOLUTION INTRAMUSCULAR; INTRAVENOUS EVERY 6 HOURS
Status: COMPLETED | OUTPATIENT
Start: 2019-01-29 | End: 2019-01-30

## 2019-01-29 RX ORDER — HYDROMORPHONE HYDROCHLORIDE 2 MG/ML
0.2 INJECTION, SOLUTION INTRAMUSCULAR; INTRAVENOUS; SUBCUTANEOUS EVERY 6 HOURS PRN
Status: DISCONTINUED | OUTPATIENT
Start: 2019-01-29 | End: 2019-02-17

## 2019-01-29 RX ORDER — METOPROLOL TARTRATE 1 MG/ML
5 INJECTION, SOLUTION INTRAVENOUS EVERY 12 HOURS
Status: DISCONTINUED | OUTPATIENT
Start: 2019-01-29 | End: 2019-01-29

## 2019-01-29 RX ORDER — BUMETANIDE 0.25 MG/ML
1 INJECTION INTRAMUSCULAR; INTRAVENOUS DAILY
Status: DISCONTINUED | OUTPATIENT
Start: 2019-01-29 | End: 2019-02-02

## 2019-01-29 RX ORDER — MAGNESIUM SULFATE HEPTAHYDRATE 40 MG/ML
2 INJECTION, SOLUTION INTRAVENOUS ONCE
Status: COMPLETED | OUTPATIENT
Start: 2019-01-29 | End: 2019-01-29

## 2019-01-29 RX ORDER — METOPROLOL TARTRATE 1 MG/ML
5 INJECTION, SOLUTION INTRAVENOUS
Status: DISCONTINUED | OUTPATIENT
Start: 2019-01-29 | End: 2019-02-21

## 2019-01-29 RX ORDER — INSULIN ASPART 100 [IU]/ML
1-10 INJECTION, SOLUTION INTRAVENOUS; SUBCUTANEOUS EVERY 4 HOURS PRN
Status: DISCONTINUED | OUTPATIENT
Start: 2019-01-29 | End: 2019-02-04 | Stop reason: SDUPTHER

## 2019-01-29 RX ADMIN — BUMETANIDE 1 MG: 0.25 INJECTION INTRAMUSCULAR; INTRAVENOUS at 08:01

## 2019-01-29 RX ADMIN — DIPHENHYDRAMINE HYDROCHLORIDE 12.5 MG: 50 INJECTION, SOLUTION INTRAMUSCULAR; INTRAVENOUS at 09:01

## 2019-01-29 RX ADMIN — INSULIN ASPART 6 UNITS: 100 INJECTION, SOLUTION INTRAVENOUS; SUBCUTANEOUS at 06:01

## 2019-01-29 RX ADMIN — KETOROLAC TROMETHAMINE 15 MG: 30 INJECTION, SOLUTION INTRAMUSCULAR at 01:01

## 2019-01-29 RX ADMIN — METRONIDAZOLE 500 MG: 500 INJECTION, SOLUTION INTRAVENOUS at 12:01

## 2019-01-29 RX ADMIN — KETOROLAC TROMETHAMINE 15 MG: 30 INJECTION, SOLUTION INTRAMUSCULAR at 05:01

## 2019-01-29 RX ADMIN — DIPHENHYDRAMINE HYDROCHLORIDE 12.5 MG: 50 INJECTION, SOLUTION INTRAMUSCULAR; INTRAVENOUS at 02:01

## 2019-01-29 RX ADMIN — INSULIN ASPART 2 UNITS: 100 INJECTION, SOLUTION INTRAVENOUS; SUBCUTANEOUS at 09:01

## 2019-01-29 RX ADMIN — HYDROMORPHONE HYDROCHLORIDE 0.2 MG: 2 INJECTION INTRAMUSCULAR; INTRAVENOUS; SUBCUTANEOUS at 09:01

## 2019-01-29 RX ADMIN — CIPROFLOXACIN 400 MG: 2 INJECTION, SOLUTION INTRAVENOUS at 09:01

## 2019-01-29 RX ADMIN — ENOXAPARIN SODIUM 40 MG: 100 INJECTION SUBCUTANEOUS at 05:01

## 2019-01-29 RX ADMIN — MAGNESIUM SULFATE IN WATER 2 G: 40 INJECTION, SOLUTION INTRAVENOUS at 08:01

## 2019-01-29 RX ADMIN — KETOROLAC TROMETHAMINE 15 MG: 30 INJECTION, SOLUTION INTRAMUSCULAR at 11:01

## 2019-01-29 RX ADMIN — POTASSIUM PHOSPHATE, MONOBASIC AND POTASSIUM PHOSPHATE, DIBASIC 30 MMOL: 224; 236 INJECTION, SOLUTION INTRAVENOUS at 08:01

## 2019-01-29 RX ADMIN — ALBUMIN HUMAN 10 ML/HR: 0.25 SOLUTION INTRAVENOUS at 11:01

## 2019-01-29 RX ADMIN — ALBUMIN HUMAN 10 ML/HR: 0.25 SOLUTION INTRAVENOUS at 05:01

## 2019-01-29 RX ADMIN — METOPROLOL TARTRATE 5 MG: 1 INJECTION, SOLUTION INTRAVENOUS at 09:01

## 2019-01-29 RX ADMIN — ALBUMIN HUMAN 10 ML/HR: 0.25 SOLUTION INTRAVENOUS at 07:01

## 2019-01-29 RX ADMIN — CIPROFLOXACIN 400 MG: 2 INJECTION, SOLUTION INTRAVENOUS at 08:01

## 2019-01-29 RX ADMIN — METRONIDAZOLE 500 MG: 500 INJECTION, SOLUTION INTRAVENOUS at 04:01

## 2019-01-29 RX ADMIN — PANTOPRAZOLE SODIUM 40 MG: 40 INJECTION, POWDER, LYOPHILIZED, FOR SOLUTION INTRAVENOUS at 08:01

## 2019-01-29 RX ADMIN — METOPROLOL TARTRATE 5 MG: 1 INJECTION, SOLUTION INTRAVENOUS at 03:01

## 2019-01-29 RX ADMIN — HALOPERIDOL LACTATE 2 MG: 5 INJECTION, SOLUTION INTRAMUSCULAR at 11:01

## 2019-01-29 RX ADMIN — ALBUMIN HUMAN 10 ML/HR: 0.25 SOLUTION INTRAVENOUS at 09:01

## 2019-01-29 RX ADMIN — METRONIDAZOLE 500 MG: 500 INJECTION, SOLUTION INTRAVENOUS at 09:01

## 2019-01-29 RX ADMIN — ASCORBIC ACID, VITAMIN A PALMITATE, CHOLECALCIFEROL, THIAMINE HYDROCHLORIDE, RIBOFLAVIN-5 PHOSPHATE SODIUM, PYRIDOXINE HYDROCHLORIDE, NIACINAMIDE, DEXPANTHENOL, ALPHA-TOCOPHEROL ACETATE, VITAMIN K1, FOLIC ACID, BIOTIN, CYANOCOBALAMIN: 200; 3300; 200; 6; 3.6; 6; 40; 15; 10; 150; 600; 60; 5 INJECTION, SOLUTION INTRAVENOUS at 06:01

## 2019-01-29 NOTE — ASSESSMENT & PLAN NOTE
-ST noted on telemetry with HR up to 100s-120s  -EKG pending  -echocardiogram pending  -etiology multifactoral and related to demand etiology vs lyte imbalance vs stress etiology vs volume imbalance  -will place on Metoprolol 5mg IV Q6hrs for HR control; overall goal HR is less than 120bpm; if BP does not tolerate BB then can use IV Digoxin  -3.6 liters in and 4.1 liters out; negative 470cc in 24 hours and + 834cc since admission; on IV Bumex currently; continue to monitor fluid volume status closely

## 2019-01-29 NOTE — PLAN OF CARE
Recommendations      1. Initiate J feeds when able: Diabetisource initiated @ 10 ml/hr and advanced 10 ml q 6 hrs to goal rate of 60 ml/hr with fluid flushes for patency or if no po intake: 135 ml Q 6 hrs.    -TF to provide: 1728 kcal, 86 g pro, 1178 mL fluid. Can adjust TF depending on po intake once diet advanced.    2. If pt to remain on PN support rec increase rate to 80 mL/hr to better meet estimate needs.   3. RD to monitor     Goals: meet > 85% EEN daily  Nutrition Goal Status: new  Communication of RD Recs: reviewed with RN

## 2019-01-29 NOTE — PLAN OF CARE
Problem: Adult Inpatient Plan of Care  Goal: Plan of Care Review  Outcome: Ongoing (interventions implemented as appropriate)   is AAO, but confused often. Pt was often pulling the Bipap mask off, mepilex was placed on nose to prevent skin breakdown and reduce pain. UOP adequate, VSS but tachycardic. pts WOB has improved , RR in 20s from 30s. Care is ongoing, sister is at bedside, will continue to monitor.

## 2019-01-29 NOTE — CONSULTS
[facility]  Cardiology  Consult Note    Patient Name: Kaylee Ngo  MRN: [id number]  Admission Date: 1/25/2019  Hospital Length of Stay: 4 days  Code Status: Prior   Attending Provider: Cam Ashton MD   Consulting Provider: MARLEEN Ferrer ANP  Primary Care Physician: Robert Garcia MD  Principal Problem:Malignant carcinoid tumor of pancreas    Patient information was obtained from patient and past medical records.     Inpatient consult to [facility]  Consult performed by: MARLEEN Waddell ANP  Consult ordered by: Cam Ashton MD        Subjective:     Chief Complaint:  Carcinoid tumor      HPI:   62yo female wit history of carcinoid of ileum, pancreas and lymph nodes with unknown primary, cirrhosis, s/p gastric bypass and HTN who was admitted for exp lap by [facility]/general surgery. She underwent surgery on 1/25/2019     1.  Exploratory laparotomy.  2.  Gastrogastrostomy with EEA 33 mm circular stapler.  3.  Pyloroplasty and gastrostomy closure.  4.  Enteric resection with side-to-side anastomosis and restoration of small   bowel continuity using iDrive tan load stapler.  5.  Liver ultrasound.  6.  Liver biopsy.  7.  Liver resection, segment 3.  8.  Supraceliac lymphadenectomy.  9.  FNA of the pancreas.  10.  Left salpingo-oophorectomy.  11.  Meckel diverticulectomy.  12.  Feeding jejunostomy 14-Bur[place] Malecot.  13.  ICG perfusion and infrared examination of all anastomosis.     She is admitted to ICU with NGT intact along with IV Albumin, Clinimix and Lipids along with IV Bumex daily. Cardiology consulted for ST noted on telemetry with marginal BP 90-120s/50s.     Hospital Course:    1/29/2019 Cardiology consulted for ST noted on telemetry. Labs today with H&H 8.9&27.3 BMP with K+ 3.3 BUN 22 creatinine .8 albumin 1.9 total bili .8 Mg 1.4 phos 1.9. Repeat EKG pending today. 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    Neuroendocrine tumor 4/9/2018    Obsessive compulsive disorder     Sleep apnea        Past Surgical History:   Procedure Laterality Date    APPENDECTOMY      BIOPSY-LIVER  2/28/2018    [facility]    CHOLECYSTECTOMY      ESOPHAGOGASTRODUODENOSCOPY (EGD) N/A 1/29/2018    [facility] Kermit Reddy [facility] at [facility] ENDO    EXCISION, LIVER N/A 1/25/2019    [facility]    GASTRIC BYPASS      INSERTION-TUBE-GASTROSTOMY-LAPAROSCOPIC  with gastrogram N/A 2/28/2018    [facility]    LAPAROTOMY, EXPLORATORY N/A 1/25/2019    [facility]    LYMPHADENECTOMY N/A 1/25/2019    [facility]    TOTAL KNEE ARTHROPLASTY Left        Review of patient's allergies indicates:   Allergen Reactions    Calcium carbonate Nausea And Vomiting     The liquid form    Codeine Hives    Contrast media      Oral and IV    Darvocet a500 [propoxyphene n-acetaminophen]     Epinephrine      Neuroendocrine Tumor patient    Neuroendocrine Tumor patient    Morphine     Propoxyphene napsylate     Sulfa (sulfonamide antibiotics)     Erythromycin Rash    Erythromycin base Rash    Iodinated contrast- oral and iv dye Rash and Other (See Comments)     Copper taste in mouth-Benadryl all that is needed for scans  Copper taste in mouth-Benadryl all that is needed for scans    Pcn [penicillins] Rash     "It looks like measles."       No current facility-administered medications on file prior " to encounter.      Current Outpatient Medications on File Prior to Encounter   Medication Sig    metoprolol succinate (TOPROL-XL) 100 MG 24 hr tablet Take 100 mg by mouth 2 (two) times daily.    ACCU-CHEK SHU CONTROL SOLN Soln     ACCU-CHEK SHU PLUS METER Misc     ACCU-CHEK SHU PLUS TEST STRP Strp     amitriptyline (ELAVIL) 50 MG tablet Take 50 mg by mouth every evening.    amLODIPine (NORVASC) 5 MG tablet Take 5 mg by mouth once daily.     calcium carbonate (OS-BEST) 600 mg calcium (1,500 mg) Tab Take 600 mg by mouth once.    clonazePAM (KLONOPIN) 0.5 MG tablet Take 0.5 mg by mouth 2 (two) times daily as needed for Anxiety.    cyanocobalamin 1,000 mcg/mL injection 1,000 mcg every 28 days.    dexamethasone 1.5 mg (27 tabs) DsPk Take 1.5 mg by mouth once daily. Tapering dose as directed (Patient not taking: Reported on 1/24/2019)    diclofenac (CATAFLAM) 50 MG tablet as needed.     diphenoxylate-atropine 2.5-0.025 mg (LOMOTIL) 2.5-0.025 mg per tablet Take 1 tablet by mouth 4 (four) times daily as needed for Diarrhea.    DULoxetine (CYMBALTA) 60 MG capsule Take 60 mg by mouth once daily.    FLUARIX QUAD 2639-7795, PF, 60 mcg (15 mcg x 4)/0.5 mL Syrg vaccine     GLUCAGON EMERGENCY KIT, HUMAN, 1 mg injection     hydrOXYzine pamoate (VISTARIL) 25 MG Cap Take 25 mg by mouth 3 (three) times daily.     insulin aspart U-100 (NOVOLOG FLEXPEN U-100 INSULIN) 100 unit/mL InPn pen 2 units bid prn    insulin degludec (TRESIBA FLEXTOUCH U-100) 100 unit/mL (3 mL) InPn Inject 15 Units into the skin once daily.     lamoTRIgine (LAMICTAL) 150 MG Tab     lancets 30 gauge Misc     lancing device Misc     lanreotide (SOMATULINE DEPOT) 60 mg/0.2 mL Syrg inject 0.2 milliliter by subcutaneous route once a month    losartan (COZAAR) 100 MG tablet Take 1 tablet (100 mg total) by mouth once daily.    metoclopramide HCl (REGLAN) 5 mg/5 mL Soln TAKE 10ML BY MOUTH THREE TIMES A DAY    metoclopramide HCl (REGLAN) 5  "mg/5 mL Soln TAKE 10ML BY MOUTH THREE TIMES A DAY    multivitamin capsule Take 1 capsule by mouth once daily.    omeprazole (PRILOSEC) 40 MG capsule Take 40 mg by mouth 2 (two) times daily before meals.     ondansetron (ZOFRAN) 8 MG tablet Take 8 mg by mouth every 8 (eight) hours as needed.     potassium chloride (KLOR-CON) 20 mEq Pack Take 20 mEq by mouth once.     promethazine (PHENERGAN) 25 MG tablet Take 25 mg by mouth.    promethazine (PHENERGAN) 25 MG tablet Take 1 tablet (25 mg total) by mouth every 6 (six) hours as needed for Nausea.    scopolamine (TRANSDERM-SCOP) 1.3-1.5 mg (1 mg over 3 days) Place 1 patch onto the skin Every 3 (three) days.    SURE COMFORT PEN NEEDLE 32 gauge x 5/32" Ndle     tiZANidine (ZANAFLEX) 4 MG tablet as needed.     vitamin D 1000 units Tab Take 1,000 Units by mouth once daily.     Family History     Problem Relation (Age of Onset)    Cancer Mother, Father        Tobacco Use    Smoking status: Never Smoker    Smokeless tobacco: Never Used   Substance and Sexual Activity    Alcohol use: No    Drug use: No    Sexual activity: Not on file     Review of Systems   Constitution: Positive for weakness. Negative for chills, decreased appetite, diaphoresis and fever.   Cardiovascular: Negative for chest pain, claudication, cyanosis, dyspnea on exertion, irregular heartbeat, leg swelling, near-syncope, orthopnea, palpitations, paroxysmal nocturnal dyspnea and syncope.   Respiratory: Negative for cough, hemoptysis, shortness of breath and wheezing.    Gastrointestinal: Positive for abdominal pain (related to surgery; improving ). Negative for bloating, constipation, diarrhea, melena, nausea and vomiting.   Neurological: Negative for dizziness.     Objective:     Vital Signs (Most Recent):  Temp: 98.7 °F (37.1 °C) (01/29/19 1234)  Pulse: (!) 126 (01/29/19 1330)  Resp: (!) 34 (01/29/19 1330)  BP: 99/61 (01/29/19 1330)  SpO2: (!) 93 % (01/29/19 1330) Vital Signs (24h " Range):  Temp:  [97.5 °F (36.4 °C)-98.7 °F (37.1 °C)] 98.7 °F (37.1 °C)  Pulse:  [105-133] 126  Resp:  [20-50] 34  SpO2:  [81 %-100 %] 93 %  BP: ()/(50-80) 99/61     Weight: 67.4 kg (148 lb 9.4 oz)  Body mass index is 24.73 kg/m².    SpO2: (!) 93 %  O2 Device (Oxygen Therapy): Comfort Flow      Intake/Output Summary (Last 24 hours) at 1/29/2019 1503  Last data filed at 1/29/2019 1300  Gross per 24 hour   Intake 2673.75 ml   Output 4650 ml   Net -1976.25 ml       Lines/Drains/Airways     Central Venous Catheter Line                 Percutaneous Central Line Insertion/Assessment - triple lumen  01/25/19 0726 4 days          Drain                 Gastrostomy/Enterostomy 01/25/19 1300 Gastrostomy tube w/ balloon midline 4 days         NG/OG Tube 01/25/19 0728 Beccaria sump 18 Fr. Right nostril 4 days         Urethral Catheter 01/28/19 1129 14 Fr. 1 day          Peripheral Intravenous Line                 Peripheral IV - Single Lumen 01/27/19 0046 Posterior;Right Hand 2 days                Physical Exam   Constitutional: She is oriented to person, place, and time. She appears well-developed and well-nourished. No distress.   Cardiovascular: Regular rhythm. Tachycardia present. Exam reveals no gallop.   No murmur heard.  Pulmonary/Chest: Effort normal and breath sounds normal. No respiratory distress. She has no wheezes.   Abdominal: Soft. Bowel sounds are normal. She exhibits no distension. There is no tenderness.   Neurological: She is alert and oriented to person, place, and time.   Skin: Skin is warm and dry.       Significant Labs:     Recent Labs   Lab 01/29/19  0459   CALCIUM 7.8*   PROT 5.1*      K 3.3*   CO2 25      BUN 22   CREATININE 0.8   ALKPHOS 77   ALT 13   AST 13   BILITOT 0.8     Recent Labs   Lab 01/29/19  0459   WBC 7.79   RBC 3.21*   HGB 8.9*   HCT 27.3*      MCV 85   MCH 27.7   MCHC 32.6         Significant Imaging:     TTE ordered     Assessment and Plan:     Hypomagnesemia     -Mag 1.4  -recommend replacement with IV Mag for goal level greater than 2.0     Hypokalemia    -K+ 3.3  -replace K+ to goal of >4.0     Sinus tachycardia    -ST noted on telemetry with HR up to 100s-120s  -EKG pending  -echocardiogram pending  -etiology multifactoral and related to demand etiology vs lyte imbalance vs stress etiology vs volume imbalance  -will place on Metoprolol 5mg IV Q6hrs for HR control; overall goal HR is less than 120bpm; if BP does not tolerate BB then can use IV Digoxin  -3.6 liters in and 4.1 liters out; negative 470cc in 24 hours and + 834cc since admission; on IV Bumex currently; continue to monitor fluid volume status closely     Malignant carcinoid tumor of unknown primary site    -s/p exp lap  -management per NET/general surgery          VTE Risk Mitigation (From admission, onward)        Ordered     enoxaparin injection 40 mg  Daily      01/28/19 1849     IP VTE HIGH RISK PATIENT  Once      01/25/19 1959          Thank you for your consult. I will follow-up with patient. Please contact us if you have any additional questions.    MARLEEN Ferrer, ANP  Cardiology   [facility]

## 2019-01-29 NOTE — CONSULTS
Pt seen with Dr. Levy in person this am. Pt doing better. Dr. Levy would like to hold off on para/thora for now.    Please re-consult IR if paracentesis or thoracentesis desired in the future. Thank you for the opportunity to assist in Ms. Ngo's care.      Gucci Hawkins MD  [facility]  Pager [phone]

## 2019-01-29 NOTE — PLAN OF CARE
Problem: Physical Therapy Goal  Goal: Physical Therapy Goal  Goals to be met by: 19     Patient will increase functional independence with mobility by performin. Supine <> sit with Stand-by Assistance  2. Sit to stand transfer with Stand-by Assistance  3. Bed to chair transfer with Stand-by Assistance using Rolling Walker  4. Gait  x 50 feet with Stand-by Assistance using Rolling Walker.      Outcome: Ongoing (interventions implemented as appropriate)  Pt completed stand and SPT this date with no AD and CGA/min A. Ongoing assessment for d/c disposition pending pt's progress.

## 2019-01-29 NOTE — HPI
62yo female wit history of carcinoid of ileum, pancreas and lymph nodes with unknown primary, cirrhosis, s/p gastric bypass and HTN who was admitted for exp lap by NET/general surgery. She underwent surgery on 1/25/2019     1.  Exploratory laparotomy.  2.  Gastrogastrostomy with EEA 33 mm circular stapler.  3.  Pyloroplasty and gastrostomy closure.  4.  Enteric resection with side-to-side anastomosis and restoration of small   bowel continuity using iDrive tan load stapler.  5.  Liver ultrasound.  6.  Liver biopsy.  7.  Liver resection, segment 3.  8.  Supraceliac lymphadenectomy.  9.  FNA of the pancreas.  10.  Left salpingo-oophorectomy.  11.  Meckel diverticulectomy.  12.  Feeding jejunostomy 14-[place] Malecot.  13.  ICG perfusion and infrared examination of all anastomosis.     She is admitted to ICU with NGT intact along with IV Albumin, Clinimix and Lipids along with IV Bumex daily. Cardiology consulted for ST noted on telemetry with marginal BP 90-120s/50s.

## 2019-01-29 NOTE — CONSULTS
"Consult Note  [facility]    Attending: Armani March MD  Fellow: Delilha Duong MD  Admit Date: 1/25/2019  Today's Date: 01/28/2019  Reason for Consult: Hypoxic Respiratory Failure, aspiration     SUBJECTIVE:     HPI:  Ms. Ngo is a 64 yo F with metastatic neuroendocrine carcinoma that was initially admitted for surgery on Friday that was extensive (see op note for details). She seemed to do ok after surgery and her sister reports that this morning she seemed more uncomfortable and tachypneic. She went down for a CT with contrast to evaluate her abdomen and likely aspirated as there was contrast noted in her RML and RLL. There was also some question of possible anastomotic leak. AT CT she de-satted to the mid 80s and was placed on non rebreather. Her sats improved to 90 but she remained tachypnic and tachycardic. She was subsequently transferred to the ICU for further evaluation and care.     Review of patient's allergies indicates:   Allergen Reactions    Calcium carbonate Nausea And Vomiting     The liquid form    Codeine Hives    Contrast media      Oral and IV    Darvocet a500 [propoxyphene n-acetaminophen]     Epinephrine      Neuroendocrine Tumor patient    Neuroendocrine Tumor patient    Morphine     Propoxyphene napsylate     Sulfa (sulfonamide antibiotics)     Erythromycin Rash    Erythromycin base Rash    Iodinated contrast- oral and iv dye Rash and Other (See Comments)     Copper taste in mouth-Benadryl all that is needed for scans  Copper taste in mouth-Benadryl all that is needed for scans    Pcn [penicillins] Rash     "It looks like measles."       Past Medical History:   Diagnosis Date    Bipolar disease, chronic     Depression     Diabetes     Drug therapy     Lanreotide    Gastric ulcer     GERD (gastroesophageal reflux disease)     HTN (hypertension)     Hx antineoplastic chemotherapy 06/2017    [employer]    Hyperlipemia     Malnutrition     " Migraines     Nausea 8/24/2018    Neuroendocrine cancer 01/2017    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    Neuroendocrine tumor 4/9/2018    Obsessive compulsive disorder     Sleep apnea      Past Surgical History:   Procedure Laterality Date    APPENDECTOMY      BIOPSY-LIVER  2/28/2018    [facility]    CHOLECYSTECTOMY      ESOPHAGOGASTRODUODENOSCOPY (EGD) N/A 1/29/2018    [facility] ENDO    EXCISION, LIVER N/A 1/25/2019    [facility]    GASTRIC BYPASS      INSERTION-TUBE-GASTROSTOMY-LAPAROSCOPIC  with gastrogram N/A 2/28/2018    [facility]    LAPAROTOMY, EXPLORATORY N/A 1/25/2019    [facility]    LYMPHADENECTOMY N/A 1/25/2019    [facility]    TOTAL KNEE ARTHROPLASTY Left      Family History   Problem Relation Age of Onset    Cancer Mother         pancreas    Cancer Father         colon     Social History     Tobacco Use    Smoking status: Never Smoker    Smokeless tobacco: Never Used   Substance Use Topics    Alcohol use: No    Drug use: No       All medications reviewed.    Review of Systems   Constitutional: Positive for malaise/fatigue and weight loss. Negative for chills and fever.   HENT: Negative for congestion, ear pain, nosebleeds and sore throat.    Eyes: Negative for double vision, photophobia and discharge.   Respiratory: Positive for shortness of breath. Negative for cough, hemoptysis, sputum production and wheezing.    Cardiovascular: Negative for chest pain, orthopnea and leg swelling.   Gastrointestinal: Positive for abdominal pain and nausea. Negative for constipation, heartburn and vomiting.   Genitourinary: Negative for flank pain, frequency and urgency.   Musculoskeletal: Negative for back pain and joint pain.   Skin: Negative for itching and  rash.   Neurological: Positive for weakness. Negative for dizziness, focal weakness and seizures.   Endo/Heme/Allergies: Negative.        OBJECTIVE:     Vital Signs Trends/Hx Reviewed  Temp:  [96.8 °F (36 °C)-98.9 °F (37.2 °C)] 97.8 °F (36.6 °C)  Pulse:  [108-133] 121  Resp:  [16-41] 20  SpO2:  [81 %-92 %] 88 %  BP: ()/(49-80) 107/60      Physical Exam:  General: ill appearing WF, mild distress  HEENT: AT/NC, PERRL, EOMI, oral and nasal mucosa moist. NG tube in place  Cardiac: tachycardic, regular rhythm, with no MRG with brisk cap refill and symmetric pulses in distal extremities.  Respiratory: Normal inspection. Symmetric chest rise. Crackles and rhonchi in the R base, L side course but with good air movement   Abdomen: Soft, tender throughout. +BS.    Extremities: No edema.   Neuro: Grossly intact to brief exam. Oriented x3 with appropriate mood/affect to situation.       Laboratory:  Hb dropped from 9 to 10 this am  BMP stable    CXR with patchy airspace disease worse on the R   CT with bilateral effusions and airspace disease     ASSESSMENT/PLAN:     Acute Hypoxic Respiratory failure  - likely secondary to aspiration as well as atelectasis post surgery.   - CT with evidence of aspirated gastrograffin  -bedside ultrasound with a small effusion and consolidated lung  - WBC has decreased over the last 2 days which could be concerning for a septic picture  - would consider empiric antibiotics with blood, urine,  and respiratory culture  - would also recheck Hb as she is more pale and tachycardic and her Hb dropped 2 points since admission     - continue bipap tonight, she may recover from this event and avoid intubation  - she does have a R sided pleural effusion- will re-evaluate in the am for thoracentesis      Thank you for allowing us to participate in the care of this patient. We will continue to follow.  Please call with questions.    Delilah Duong M.D.  [facility]

## 2019-01-29 NOTE — PROGRESS NOTES
Was called n as her breathing ststua worsended after Ct was done    ereviewed CT  Lung worse with atelectasis and pleural effusion , contrast in rt lobe    abd stable    conscious little confused    Transferred to ICU    Seen in ICU , at bedside for the last 2 hours    Conscious  Discussed with her and sister likely may need intubation

## 2019-01-29 NOTE — SUBJECTIVE & OBJECTIVE
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    Neuroendocrine tumor 4/9/2018    Obsessive compulsive disorder     Sleep apnea        Past Surgical History:   Procedure Laterality Date    APPENDECTOMY      BIOPSY-LIVER  2/28/2018    [facility]    CHOLECYSTECTOMY      ESOPHAGOGASTRODUODENOSCOPY (EGD) N/A 1/29/2018    [facility]formed by Kermit Reddy [facility] at [facility] ENDO    EXCISION, LIVER N/A 1/25/2019    [facility]    GASTRIC BYPASS      INSERTION-TUBE-GASTROSTOMY-LAPAROSCOPIC  with gastrogram N/A 2/28/2018    [facility]    LAPAROTOMY, EXPLORATORY N/A 1/25/2019    [facility]    LYMPHADENECTOMY N/A 1/25/2019    [facility]    TOTAL KNEE ARTHROPLASTY Left        Review of patient's allergies indicates:   Allergen Reactions    Calcium carbonate Nausea And Vomiting     The liquid form    Codeine Hives    Contrast media      Oral and IV    Darvocet a500 [propoxyphene n-acetaminophen]     Epinephrine      Neuroendocrine Tumor patient    Neuroendocrine Tumor patient    Morphine     Propoxyphene napsylate     Sulfa (sulfonamide antibiotics)     Erythromycin Rash    Erythromycin base Rash    Iodinated contrast- oral and iv dye Rash and Other (See Comments)     Copper taste in mouth-Benadryl all that is needed for scans  Copper taste in mouth-Benadryl all that is needed for scans    Pcn [penicillins] Rash     "It looks like measles." "       No current facility-administered medications on file prior to encounter.      Current Outpatient Medications on File Prior to Encounter   Medication Sig    metoprolol succinate (TOPROL-XL) 100 MG 24 hr tablet Take 100 mg by mouth 2 (two) times daily.    ACCU-CHEK SHU CONTROL SOLN Soln     ACCU-CHEK SHU PLUS METER Misc     ACCU-CHEK SHU PLUS TEST STRP Strp     amitriptyline (ELAVIL) 50 MG tablet Take 50 mg by mouth every evening.    amLODIPine (NORVASC) 5 MG tablet Take 5 mg by mouth once daily.     calcium carbonate (OS-BEST) 600 mg calcium (1,500 mg) Tab Take 600 mg by mouth once.    clonazePAM (KLONOPIN) 0.5 MG tablet Take 0.5 mg by mouth 2 (two) times daily as needed for Anxiety.    cyanocobalamin 1,000 mcg/mL injection 1,000 mcg every 28 days.    dexamethasone 1.5 mg (27 tabs) DsPk Take 1.5 mg by mouth once daily. Tapering dose as directed (Patient not taking: Reported on 1/24/2019)    diclofenac (CATAFLAM) 50 MG tablet as needed.     diphenoxylate-atropine 2.5-0.025 mg (LOMOTIL) 2.5-0.025 mg per tablet Take 1 tablet by mouth 4 (four) times daily as needed for Diarrhea.    DULoxetine (CYMBALTA) 60 MG capsule Take 60 mg by mouth once daily.    FLUARIX QUAD [id number], PF, 60 mcg (15 mcg x 4)/0.5 mL Syrg vaccine     GLUCAGON EMERGENCY KIT, HUMAN, 1 mg injection     hydrOXYzine pamoate (VISTARIL) 25 MG Cap Take 25 mg by mouth 3 (three) times daily.     insulin aspart U-100 (NOVOLOG FLEXPEN U-100 INSULIN) 100 unit/mL InPn pen 2 units bid prn    insulin degludec (TRESIBA FLEXTOUCH U-100) 100 unit/mL (3 mL) InPn Inject 15 Units into the skin once daily.     lamoTRIgine (LAMICTAL) 150 MG Tab     lancets 30 gauge Misc     lancing device Misc     lanreotide (SOMATULINE DEPOT) 60 mg/0.2 mL Syrg inject 0.2 milliliter by subcutaneous route once a month    losartan (COZAAR) 100 MG tablet Take 1 tablet (100 mg total) by mouth once daily.    metoclopramide HCl (REGLAN) 5 mg/5 mL Soln TAKE 10ML  "BY MOUTH THREE TIMES A DAY    metoclopramide HCl (REGLAN) 5 mg/5 mL Soln TAKE 10ML BY MOUTH THREE TIMES A DAY    multivitamin capsule Take 1 capsule by mouth once daily.    omeprazole (PRILOSEC) 40 MG capsule Take 40 mg by mouth 2 (two) times daily before meals.     ondansetron (ZOFRAN) 8 MG tablet Take 8 mg by mouth every 8 (eight) hours as needed.     potassium chloride (KLOR-CON) 20 mEq Pack Take 20 mEq by mouth once.     promethazine (PHENERGAN) 25 MG tablet Take 25 mg by mouth.    promethazine (PHENERGAN) 25 MG tablet Take 1 tablet (25 mg total) by mouth every 6 (six) hours as needed for Nausea.    scopolamine (TRANSDERM-SCOP) 1.3-1.5 mg (1 mg over 3 days) Place 1 patch onto the skin Every 3 (three) days.    SURE COMFORT PEN NEEDLE 32 gauge x 5/32" Ndle     tiZANidine (ZANAFLEX) 4 MG tablet as needed.     vitamin D 1000 units Tab Take 1,000 Units by mouth once daily.     Family History     Problem Relation (Age of Onset)    Cancer Mother, Father        Tobacco Use    Smoking status: Never Smoker    Smokeless tobacco: Never Used   Substance and Sexual Activity    Alcohol use: No    Drug use: No    Sexual activity: Not on file     Review of Systems   Constitution: Positive for weakness. Negative for chills, decreased appetite, diaphoresis and fever.   Cardiovascular: Negative for chest pain, claudication, cyanosis, dyspnea on exertion, irregular heartbeat, leg swelling, near-syncope, orthopnea, palpitations, paroxysmal nocturnal dyspnea and syncope.   Respiratory: Negative for cough, hemoptysis, shortness of breath and wheezing.    Gastrointestinal: Positive for abdominal pain (related to surgery; improving ). Negative for bloating, constipation, diarrhea, melena, nausea and vomiting.   Neurological: Negative for dizziness.     Objective:     Vital Signs (Most Recent):  Temp: 98.7 °F (37.1 °C) (01/29/19 1234)  Pulse: (!) 126 (01/29/19 1330)  Resp: (!) 34 (01/29/19 1330)  BP: 99/61 (01/29/19 " 1330)  SpO2: (!) 93 % (01/29/19 1330) Vital Signs (24h Range):  Temp:  [97.5 °F (36.4 °C)-98.7 °F (37.1 °C)] 98.7 °F (37.1 °C)  Pulse:  [105-133] 126  Resp:  [20-50] 34  SpO2:  [81 %-100 %] 93 %  BP: ()/(50-80) 99/61     Weight: 67.4 kg (148 lb 9.4 oz)  Body mass index is 24.73 kg/m².    SpO2: (!) 93 %  O2 Device (Oxygen Therapy): Comfort Flow      Intake/Output Summary (Last 24 hours) at 1/29/2019 1503  Last data filed at 1/29/2019 1300  Gross per 24 hour   Intake 2673.75 ml   Output 4650 ml   Net -1976.25 ml       Lines/Drains/Airways     Central Venous Catheter Line                 Percutaneous Central Line Insertion/Assessment - triple lumen  01/25/19 0726 4 days          Drain                 Gastrostomy/Enterostomy 01/25/19 1300 Gastrostomy tube w/ balloon midline 4 days         NG/OG Tube 01/25/19 0728 Garfield sump 18 Fr. Right nostril 4 days         Urethral Catheter 01/28/19 1129 14 Fr. 1 day          Peripheral Intravenous Line                 Peripheral IV - Single Lumen 01/27/19 0046 Posterior;Right Hand 2 days                Physical Exam   Constitutional: She is oriented to person, place, and time. She appears well-developed and well-nourished. No distress.   Cardiovascular: Regular rhythm. Tachycardia present. Exam reveals no gallop.   No murmur heard.  Pulmonary/Chest: Effort normal and breath sounds normal. No respiratory distress. She has no wheezes.   Abdominal: Soft. Bowel sounds are normal. She exhibits no distension. There is no tenderness.   Neurological: She is alert and oriented to person, place, and time.   Skin: Skin is warm and dry.       Significant Labs:     Recent Labs   Lab 01/29/19  0459   CALCIUM 7.8*   PROT 5.1*      K 3.3*   CO2 25      BUN 22   CREATININE 0.8   ALKPHOS 77   ALT 13   AST 13   BILITOT 0.8     Recent Labs   Lab 01/29/19  0459   WBC 7.79   RBC 3.21*   HGB 8.9*   HCT 27.3*      MCV 85   MCH 27.7   MCHC 32.6         Significant Imaging:     TTE  ordered

## 2019-01-29 NOTE — PROGRESS NOTES
"[facility]  Adult Nutrition  Progress Note    SUMMARY       Recommendations     1. Initiate J feeds when able: Diabetisource initiated @ 10 ml/hr and advanced 10 ml q 6 hrs to goal rate of 60 ml/hr with fluid flushes for patency or if no po intake: 135 ml Q 6 hrs.    -TF to provide: 1728 kcal, 86 g pro, 1178 mL fluid. Can adjust TF depending on po intake once diet advanced.    2. If pt to remain on PN support rec increase rate to 80 mL/hr to better meet estimate needs.   3. RD to monitor    Goals: meet > 85% EEN daily  Nutrition Goal Status: new  Communication of RD Recs: reviewed with RN    Reason for Assessment    Reason For Assessment: new TPN  Diagnosis: (Malignant Pancreatic carcinoid tumor)  Relevant Medical History: DM, HLD, GERD, Gastric Ulcers  Interdisciplinary Rounds: did not attend    General Information Comments: Pt s/p Sandeep-en-Y reversal with J-tube placement. Currently on PN support with NGT to suction. Pt receiving TF outpatient, known to NET RD. NFPE done today: mild loss of fat mass, mild to moderate losses of lean body mass evident; see malnutrition section for details. Noted pt weight increasing over past months.    Nutrition Discharge Planning: Too soon to determine    Nutrition Risk Screen    Nutrition Risk Screen: tube feeding or parenteral nutrition    Nutrition/Diet History    Spiritual, Cultural Beliefs, Yazidism Practices, Values that Affect Care: no  Factors Affecting Nutritional Intake: altered gastrointestinal function, NPO    Anthropometrics    Temp: 98.7 °F (37.1 °C)  Height Method: Stated  Height: 5' 5" (165.1 cm)  Height (inches): 65 in  Weight Method: Bed Scale  Weight: 67.4 kg (148 lb 9.4 oz)  Weight (lb): 148.59 lb  Ideal Body Weight (IBW), Female: 125 lb  % Ideal Body Weight, Female (lb): 118.87 lb  BMI (Calculated): 24.8  BMI Grade: 18.5-24.9 - normal       Lab/Procedures/Meds    Pertinent Labs Reviewed: reviewed  Pertinent Labs Comments: Phos 1.9; Mg 1.4; K " 3.3; WBC elevated; alb 1.9  Pertinent Medications Reviewed: reviewed  Pertinent Medications Comments: bumetanide, abx, IV albumin    Estimated/Assessed Needs    Weight Used For Calorie Calculations: 67.4 kg (148 lb 9.4 oz)(UBW: 61 kg; noted edema)  Energy Calorie Requirements (kcal): 3023-6708 kcal (x 1.3-1.5)  Energy Need Method: [employer]  Protein Requirements: 81-95g (1.2-1.4g/kg)  Weight Used For Protein Calculations: 67.4 kg (148 lb 9.4 oz)     Estimated Fluid Requirement Method: RDA Method  RDA Method (mL): 1604  CHO Requirement: 200g      Nutrition Prescription Ordered    Current Diet Order: NPO  Current Nutrition Support Formula Ordered: Clinimix E 5/15  Current Nutrition Support Rate Ordered: 50 (ml)(ml/hr)  Oral Nutrition Supplement: lipids 3 x week    Evaluation of Received Nutrient/Fluid Intake    Parenteral Calories (kcal): 852  Parenteral Protein (gm): 60  Parenteral Fluid (mL): 1200  Lipid Calories (kcals): 212 kcals  GIR (Glucose Infusion Rate) (mg/kg/min): 1.8 mg/kg/min  % Kcal Needs: 65%  % Protein Needs: 75%  Energy Calories Required: not meeting needs  Protein Required: not meeting needs  Fluid Required: (per MD)  Comments: NGT to suction    % Meal Intake: NPO    Nutrition Risk    Level of Risk/Frequency of Follow-up: (2 x week)     Assessment and Plan    Nutrition Problem  Altered GI Function    Related to (etiology):   Sandeep-en-Y reversal    Signs and Symptoms (as evidenced by):   NPO    Interventions  Collaboration with providers    Nutrition Diagnosis Status:   New      Monitor and Evaluation    Food and Nutrient Intake: energy intake, enteral nutrition intake, parenteral nutrition intake  Food and Nutrient Adminstration: enteral and parenteral nutrition administration, diet order  Physical Activity and Function: nutrition-related ADLs and IADLs  Anthropometric Measurements: weight, weight change  Biochemical Data, Medical Tests and Procedures: electrolyte and renal panel,  glucose/endocrine profile  Nutrition-Focused Physical Findings: overall appearance     Malnutrition Assessment             Subcutaneous Fat (Malnutrition): mild depletion  Muscle Mass (Malnutrition): moderate depletion   Orbital Region (Subcutaneous Fat Loss): mild depletion  Upper Arm Region (Subcutaneous Fat Loss): mild depletion  Thoracic and Lumbar Region: mild depletion   Baptism Region (Muscle Loss): moderate depletion  Clavicle Bone Region (Muscle Loss): moderate depletion  Clavicle and Acromion Bone Region (Muscle Loss): mild depletion  Dorsal Hand (Muscle Loss): mild depletion  Patellar Region (Muscle Loss): moderate depletion  Anterior Thigh Region (Muscle Loss): moderate depletion  Posterior Calf Region (Muscle Loss): moderate depletion       Subcutaneous Fat Loss (Final Summary): mild protein-calorie malnutrition  Muscle Loss Evaluation (Final Summary): moderate protein-calorie malnutrition         Nutrition Follow-Up    RD Follow-up?: Yes

## 2019-01-29 NOTE — PROGRESS NOTES
Pt transferred to ICU via bed with CHANELLE Salazar and Rain RN at bedside. Heart monitor with pads in place. Pt placed on nonrebreather for transport. Report given to CHANELLE Quiros in ICU.

## 2019-01-29 NOTE — PT/OT/SLP RE-EVAL
Physical Therapy Re-evaluation and Treatment    Patient Name:  Kaylee Ngo   MRN:  [id number]    Recommendations:     Discharge Recommendations:  (ongoing assessment)   Discharge Equipment Recommendations: (TBD)   Barriers to discharge: Decreased caregiver support    Assessment:     Kaylee Ngo is a 63 y.o. female admitted with a medical diagnosis of Malignant carcinoid tumor of pancreas.  She presents with the following impairments/functional limitations:  weakness, impaired endurance, impaired self care skills, impaired functional mobilty, gait instability, impaired balance, impaired cognition, decreased ROM, impaired cardiopulmonary response to activity. Pt completed stand and SPT this date with no AD and CGA/min A. Ongoing assessment for d/c disposition pending pt's progress.    Rehab Prognosis: Good; patient would benefit from acute skilled PT services to address these deficits and reach maximum level of function.      Recent Surgery: Procedure(s) (LRB):  LAPAROTOMY, EXPLORATORY (N/A)  LYMPHADENECTOMY (N/A)  EXCISION, LIVER (N/A) 4 Days Post-Op    Plan:     During this hospitalization, patient to be seen 6 x/week to address the above listed problems via gait training, therapeutic activities, therapeutic exercises  · Plan of Care Expires:  02/28/19   Plan of Care Reviewed with: patient    Subjective     Communicated with RN prior to session.  Patient found supine with HOB elevated upon PT entry to room, agreeable to evaluation.      Chief Complaint: none  Patient comments/goals: pt is agreeable to participate in therapy session and mobility  Pain/Comfort:  · Pain Rating 1: 0/10  · Pain Rating Post-Intervention 1: 0/10    Patients cultural, spiritual, Pentecostalism conflicts given the current situation: no      Objective:     Patient found with: peripheral IV, central line, oxygen, NG tube, blood pressure cuff, SCD, pulse ox (continuous)(ICU monitoring)     General Precautions: Standard, fall   Orthopedic  Precautions:N/A   Braces: N/A     Exams:  · Postural Exam:  Patient presented with the following abnormalities:    · -       Rounded shoulders  · Sensation:    · -       Intact  · Skin Integrity/Edema:      · -       Skin integrity: Visible skin intact  · RLE ROM: WFL  · RLE Strength: WFL  · LLE ROM: WFL  · LLE Strength: WFL    Functional Mobility:  · Bed Mobility:     · Scooting: stand by assistance  · Supine to Sit: moderate assistance  · Transfers:     · Sit to Stand:  contact guard assistance and minimum assistance with no AD  · Bed to Chair: contact guard assistance and minimum assistance with  hand-held assist  using  Stand Pivot  · Toilet Transfer: contact guard assistance and minimum assistance with  hand-held assist  using  Step Transfer  · Gait: 2-3 small steps during transfers with no AD and min A. Pt stands with flexed posture.    AM-PAC 6 CLICK MOBILITY  Total Score:14       Therapeutic Activities and Exercises:  Pt sits EOB with SBA.  Completed SPT from bed>chair then reporting having to use the restroom.  Completed transfer from chair>BSC, had a large bowel movement, then returned to sit up in chair.  Educated on BLE exercises and completed 3-5 reps of: APs, heel slides, and QS.    Patient left up in chair with all lines intact, call button in reach and RN notified.    GOALS:   Multidisciplinary Problems     Physical Therapy Goals        Problem: Physical Therapy Goal    Goal Priority Disciplines Outcome Goal Variances Interventions   Physical Therapy Goal     PT, PT/OT Ongoing (interventions implemented as appropriate)     Description:  Goals to be met by: 19     Patient will increase functional independence with mobility by performin. Supine <> sit with Stand-by Assistance  2. Sit to stand transfer with Stand-by Assistance  3. Bed to chair transfer with Stand-by Assistance using Rolling Walker  4. Gait  x 50 feet with Stand-by Assistance using Rolling Walker.                        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    Neuroendocrine tumor 4/9/2018    Obsessive compulsive disorder     Sleep apnea        Past Surgical History:   Procedure Laterality Date    APPENDECTOMY      BIOPSY-LIVER  2/28/2018    Performed by Cam [facility] at [facility]    CHOLECYSTECTOMY      ESOPHAGOGASTRODUODENOSCOPY (EGD) N/A 1/29/2018    [facility] Kermit Reddy [facility] at [facility]    EXCISION, LIVER N/A 1/25/2019    [facility]    GASTRIC BYPASS      INSERTION-TUBE-GASTROSTOMY-LAPAROSCOPIC  with gastrogram N/A 2/28/2018    [facility] at [facility]    LAPAROTOMY, EXPLORATORY N/A 1/25/2019    [facility] at [facility]    LYMPHADENECTOMY N/A 1/25/2019    [facility] Cam [facility] at [facility]    TOTAL KNEE ARTHROPLASTY Left        Clinical Decision Making:     History  Co-morbidities and personal factors that may impact the plan of care Examination  Body Structures and Functions, activity limitations and participation restrictions that may impact the plan of care Clinical Presentation   Decision Making/ Complexity Score   Co-morbidities:   [] Time since onset of injury / illness / exacerbation  [] Status of current condition  []Patient's cognitive status and safety concerns    [x] Multiple Medical Problems (see med hx)  Personal Factors:   [] Patient's age  [] Prior Level of function   [x] Patient's home situation (environment and family support)  [] Patient's level of motivation  [] Expected  progression of patient      HISTORY:(criteria)    [] 74305 - no personal factors/history    [x] 45763 - has 1-2 personal factor/comorbidity     [] 22369 - has >3 personal factor/comorbidity     Body Regions:  [] Objective examination findings  [] Head     []  Neck  [] Trunk   [] Upper Extremity  [] Lower Extremity    Body Systems:  [] For communication ability, affect, cognition, language, and learning style: the assessment of the ability to make needs known, consciousness, orientation (person, place, and time), expected emotional /behavioral responses, and learning preferences (eg, learning barriers, education  needs)  [x] For the neuromuscular system: a general assessment of gross coordinated movement (eg, balance, gait, locomotion, transfers, and transitions) and motor function  (motor control and motor learning)  [] For the musculoskeletal system: the assessment of gross symmetry, gross range of motion, gross strength, height, and weight  [] For the integumentary system: the assessment of pliability(texture), presence of scar formation, skin color, and skin integrity  [x] For cardiovascular/pulmonary system: the assessment of heart rate, respiratory rate, blood pressure, and edema     Activity limitations:    [] Patient's cognitive status and saf ety concerns          [] Status of current condition      [] Weight bearing restriction  [] Cardiopulmunary Restriction    Participation Restrictions:   [] Goals and goal agreement with the patient     [] Rehab potential (prognosis) and probable outcome      Examination of Body System: (criteria)    [x] 72759 - addressing 1-2 elements    [] 34739 - addressing a total of 3 or more elements     [] 33766 -  Addressing a total of 4 or more elements         Clinical Presentation: (criteria)  Stable - 15817     On examination of body system using standardized tests and measures patient presents with 1-2 elements from any of the following: body structures and functions,  activity limitations, and/or participation restrictions.  Leading to a clinical presentation that is considered stable and/or uncomplicated                              Clinical Decision Making  (Eval Complexity):  Low- 03763     Time Tracking:     PT Received On: 01/29/19  PT Start Time: 1102     PT Stop Time: 1128  PT Total Time (min): 26 min     Billable Minutes: Re-eval 15 and Therapeutic Activity 11      Zeinab Zhang, PT  01/29/2019

## 2019-01-29 NOTE — HOSPITAL COURSE
1/29/2019 Cardiology consulted for ST noted on telemetry. Labs today with H&H 8.9&27.3 BMP with K+ 3.3 BUN 22 creatinine .8 albumin 1.9 total bili .8 Mg 1.4 phos 1.9. Repeat EKG pending today. Will order TTE   1/30/2019 Echocardiogram yesterday with normal LVEF and normal diastolic function. HR 110s-120s. BP 120s-130s/60s overnight. K+ 2.9, phos 1.3 and Mag 1.6 this AM. KPhos ordered per primary team. Placed on BiPap this AM due to desaturation with pulse ox 86%. Repeat CXR this AM. WBCs 14,000 up from 7,000 yesterday

## 2019-01-29 NOTE — PLAN OF CARE
Problem: Adult Inpatient Plan of Care  Goal: Plan of Care Review  Outcome: Ongoing (interventions implemented as appropriate)  Pt has improved since last night, she has been off bipap since this morning, on comfort flow nasal cannula, maintaining good oxygenation. Resp unlabored. Sinus tach on monitor which she is receiving metoprolol IVP. Pt is awake and alert, oriented to self and place, but confused at times, picks at equipment and takes nasal cannula off, etc. Tami e-sitter in room, bed alarm on. Mid line incision clean dry and intact. NG tube output decreasing. Pt denies any nausea or vomitting. Worked with physical therapy, got up to chair with min to moderate assistance. Barney catheter with clear yellow urine with sediment, good urine output. Pt remains afebrile.

## 2019-01-29 NOTE — PROGRESS NOTES
Talked to her, sister and ICU charge nurse   valerie    Initially she was confused     However discussed whe she was alert  Plan is to continye treatment for current situation  If mech vent necessary can proceed   She agrred

## 2019-01-29 NOTE — PROGRESS NOTES
Pulmonary & Critical Care Medicine Progress Note    Subjective:   Ms. Ngo is doing better this am. She is now on HFNC and tolerating it well. She does still desat when it comes off but denies any chest pain or shortness of breath. No fevers, chills, or cough. She has remained tachycardic throughout the night.     Past medical, surgical, family, and social history from initial consult was reviewed and verified during today's visit.     Vital Signs:   Temp:  [97.5 °F (36.4 °C)-98.7 °F (37.1 °C)] 98.7 °F (37.1 °C)  Pulse:  [105-133] 126  Resp:  [20-50] 34  SpO2:  [81 %-100 %] 93 %  BP: ()/(50-80) 99/61      Fluid Balance:     Intake/Output Summary (Last 24 hours) at 1/29/2019 1332  Last data filed at 1/29/2019 1300  Gross per 24 hour   Intake 3673.75 ml   Output 4650 ml   Net -976.25 ml       Review of Systems:   A comprehensive 12-point review of systems was performed, and is negative except for those items mentioned above in the HPI section of this note.     Physical Exam:   General: NAD, cooperative & interactive.  HEENT: AT/NC, PERRL, EOMI, oral and nasal mucosa moist.   Neck: Supple without JVD or palpable LAD.   Cardiac: tachycardic, regular rhythm,with no MRG with brisk cap refill and symmetric pulses in distal extremities.  Respiratory: Normal inspection. Symmetric chest rise. Course breath sounds bilaterally, decreased basilar breath sounds. No increased work of breathing noted.   Abdomen: Soft, NT/ND. +BS. No hepatosplenomegaly.   Extremities: No edema.   Neuro: Grossly intact to brief exam. Oriented x3 with appropriate mood/affect to situation.     Personal Review and Summary of Interval Diagnostics    Laboratory Studies:   Recent Labs   Lab 01/28/19 2113   PH 7.394   PCO2 36.7   PO2 81   HCO3 22.4*   POCSATURATED 96   BE -2     Recent Labs   Lab 01/29/19  0459   WBC 7.79   RBC 3.21*   HGB 8.9*   HCT 27.3*      MCV 85   MCH 27.7   MCHC 32.6     Recent Labs   Lab 01/29/19  0459      K  3.3*      CO2 25   BUN 22   CREATININE 0.8   MG 1.4*       Microbiology Data:   Microbiology Results (last 7 days)     ** No results found for the last 168 hours. **        Chest Imaging:     Infusions:     albumin human 25% 10 mL/hr (01/29/19 0517)    Amino acid 5% - dextrose 15% (CLINIMIX-E) solution with additives (1L  provides 510 kcal/L dextrose, with 50 gm AA, 150 gm CHO, Na 35, K 30, Mg 5, Ca 4.5, Acetate 80, Cl 39, Phos 15) 50 mL/hr at 01/28/19 1845       Scheduled Medications:    bumetanide  1 mg Intravenous Daily    ciprofloxacin  400 mg Intravenous Q12H    enoxaparin  40 mg Subcutaneous Daily    ketorolac  15 mg Intravenous Q6H    metoprolol  5 mg Intravenous Q6H    metronidazole  500 mg Intravenous Q8H    pantoprazole  40 mg Intravenous Daily    potassium phosphate IVPB  30 mmol Intravenous Once       PRN Medications:   diphenhydrAMINE, hydrALAZINE, HYDROmorphone, ipratropium, labetalol, naloxone, ondansetron, sodium chloride 0.9%    Impression & Recommendations    Acute Hypoxic Respiratory failure  - likely secondary to aspiration as well as atelectasis post surgery.   - CT with evidence of aspirated barium - should not cause long term issues with her lungs.   -bedside ultrasound with a small effusion and consolidated lung  - continue HFNC and wean O2 as tolerated.   - no need for further intervention at this time.   - Continue aspiration precautions, can add CPT for her RLL to help mobilize some secretions.         Delilah Duong M.D.  [employer] Pulmonary/Critical Care Fellow

## 2019-01-29 NOTE — PROGRESS NOTES
[facility]  Neuroendocrine Tumor Service  Progress Note     Admission Date: 1/25/2019  Hospital Length of Stay: 4  Principal Problem: Malignant carcinoid tumor of pancreas     Subjective:      Interval History:     Stepped up to ICU yest for concern impending resp failure  Did well overnight on BiPAP and able to switch to HFNC this AM  Breathing more comfortably  RT and Pulm on board  Barney replaced yesterday  CT scan equivocal for leak Abd/Pelv but Chest with pleural effusion, consolidation  IR consulted for thoracentesis      Scheduled Meds:   bumetanide  1 mg Intravenous Daily    ciprofloxacin  400 mg Intravenous Q12H    enoxaparin  40 mg Subcutaneous Daily    fat emulsion 20%  250 mL Intravenous Every Mon, Wed, Fri    magnesium sulfate IVPB  2 g Intravenous Once    metronidazole  500 mg Intravenous Q8H    midazolam  2 mg Intravenous Once    pantoprazole  40 mg Intravenous Daily    potassium phosphate IVPB  30 mmol Intravenous Once     Continuous Infusions:   albumin human 25% 10 mL/hr (01/29/19 0555)    Amino acid 5% - dextrose 15% (CLINIMIX-E) solution with additives (1L  provides 510 kcal/L dextrose, with 50 gm AA, 150 gm CHO, Na 35, K 30, Mg 5, Ca 4.5, Acetate 80, Cl 39, Phos 15) 50 mL/hr at 01/28/19 1845     PRN Meds:.diphenhydrAMINE, hydrALAZINE, ipratropium, labetalol, naloxone, ondansetron, sodium chloride 0.9%    Objective:      Temp:  [97.4 °F (36.3 °C)-98 °F (36.7 °C)] 97.9 °F (36.6 °C)  Pulse:  [108-133] 130  Resp:  [18-50] 42  SpO2:  [81 %-99 %] 95 %  BP: ()/(49-66) 105/59  Weight change:     Intake/Output Summary (Last 24 hours) at 1/29/2019 0859  Last data filed at 1/29/2019 0600  Gross per 24 hour   Intake 3680 ml   Output 4150 ml   Net -470 ml     UOP 2.4L  NGT 1.7L     Physical Exam:  GEN: awake, alert, NAD, NGT in place, bilious  CV: tachycardic  PULM: unlabored  ABD: S/appropriately TTP/ ND, incision c/d/i, Jtube in place clamped  EXT:  Peripheral pulses present and equal bilaterally    Recent Labs   Lab 01/29/19  0459      K 3.3*   CO2 25      BUN 22   CREATININE 0.8   CALCIUM 7.8*   PROT 5.1*   AST 13   ALT 13   ALKPHOS 77   BILITOT 0.8     Recent Labs   Lab 01/29/19  0459   WBC 7.79   HGB 8.9*   HCT 27.3*          Significant Imaging: I have reviewed all pertinent imaging results/findings within the past 24 hours.     Assessment and Plan:    62 yo F w/ PNET, s/p reversal of Sandeep-en-Y w/ Jtube placement    Neuro - Not c/o pain - will d/w Dr. Levy plan moving forward   TPN - continue NPO, cont TPN/lipids  ID - Cipro/Flagyl - aspiration pneumonia, no fever, no leukocytosis, IR consulted for thoracentesis  FEN/GI - lytes replaced this AM per Dr. Levy  Renal - Whitlock for HD monitoring, good UOP, cont whitlock  PPX - lovenox, protonix  Dispo - cont ICU care    Colton Olivier MD - HO4

## 2019-01-29 NOTE — ASSESSMENT & PLAN NOTE
Malnutrition in the context of Chronic Illness/Injury    Related to (etiology):  Altered GI Function; Cancer    Signs and Symptoms (as evidenced by):    Body Fat Depletion: mild depletion of orbitals, triceps and thoracic and lumbar region   Muscle Mass Depletion: moderate depletion of temples, clavicle region, interosseous muscle and lower extremities     Interventions  Collaboration with providers    Nutrition Diagnosis Status:  New

## 2019-01-29 NOTE — PLAN OF CARE
Problem: Adult Inpatient Plan of Care  Goal: Plan of Care Review  Outcome: Ongoing (interventions implemented as appropriate)  Pt. On oxygen via comfort flow nasal cannula in no apparent distress.  Will cont. To monitor.

## 2019-01-30 LAB
ALBUMIN SERPL BCP-MCNC: 2.4 G/DL
ALLENS TEST: ABNORMAL
ALLENS TEST: ABNORMAL
ALP SERPL-CCNC: 125 U/L
ALT SERPL W/O P-5'-P-CCNC: 9 U/L
ANION GAP SERPL CALC-SCNC: 11 MMOL/L
ANION GAP SERPL CALC-SCNC: 9 MMOL/L
AORTIC ROOT ANNULUS: 2.55 CM
AORTIC VALVE CUSP SEPERATION: 2 CM
AST SERPL-CCNC: 16 U/L
AV INDEX (PROSTH): 1.09
AV MEAN GRADIENT: 5.14 MMHG
AV PEAK GRADIENT: 9.49 MMHG
AV VALVE AREA: 3.52 CM2
AV VELOCITY RATIO: 0.94
BASOPHILS # BLD AUTO: 0.01 K/UL
BASOPHILS NFR BLD: 0.1 %
BILIRUB SERPL-MCNC: 1.2 MG/DL
BSA FOR ECHO PROCEDURE: 1.76 M2
BUN SERPL-MCNC: 20 MG/DL
BUN SERPL-MCNC: 21 MG/DL
CALCIUM SERPL-MCNC: 8 MG/DL
CALCIUM SERPL-MCNC: 8 MG/DL
CHLORIDE SERPL-SCNC: 103 MMOL/L
CHLORIDE SERPL-SCNC: 104 MMOL/L
CO2 SERPL-SCNC: 30 MMOL/L
CO2 SERPL-SCNC: 31 MMOL/L
CREAT SERPL-MCNC: 0.7 MG/DL
CREAT SERPL-MCNC: 0.8 MG/DL
CV ECHO LV RWT: 0.52 CM
DELSYS: ABNORMAL
DELSYS: ABNORMAL
DIFFERENTIAL METHOD: ABNORMAL
DOP CALC AO PEAK VEL: 1.54 M/S
DOP CALC AO VTI: 24.41 CM
DOP CALC LVOT AREA: 3.23 CM2
DOP CALC LVOT DIAMETER: 2.03 CM
DOP CALC LVOT PEAK VEL: 1.45 M/S
DOP CALC LVOT STROKE VOLUME: 85.92 CM3
DOP CALCLVOT PEAK VEL VTI: 26.56 CM
ECHO LV POSTERIOR WALL: 0.98 CM (ref 0.6–1.1)
EOSINOPHIL # BLD AUTO: 0 K/UL
EOSINOPHIL NFR BLD: 0.3 %
EP: 4
EP: 8
ERYTHROCYTE [DISTWIDTH] IN BLOOD BY AUTOMATED COUNT: 14.3 %
ERYTHROCYTE [SEDIMENTATION RATE] IN BLOOD BY WESTERGREN METHOD: 4 MM/H
EST. GFR  (AFRICAN AMERICAN): >60 ML/MIN/1.73 M^2
EST. GFR  (AFRICAN AMERICAN): >60 ML/MIN/1.73 M^2
EST. GFR  (NON AFRICAN AMERICAN): >60 ML/MIN/1.73 M^2
EST. GFR  (NON AFRICAN AMERICAN): >60 ML/MIN/1.73 M^2
FIO2: 80
FIO2: 90
FRACTIONAL SHORTENING: 29 % (ref 28–44)
GLUCOSE SERPL-MCNC: 154 MG/DL (ref 70–110)
GLUCOSE SERPL-MCNC: 236 MG/DL
GLUCOSE SERPL-MCNC: 366 MG/DL
HCO3 UR-SCNC: 21.6 MMOL/L (ref 24–28)
HCO3 UR-SCNC: 29.9 MMOL/L (ref 24–28)
HCO3 UR-SCNC: 31.5 MMOL/L (ref 24–28)
HCT VFR BLD AUTO: 25.3 %
HCT VFR BLD CALC: 32 %PCV (ref 36–54)
HGB BLD-MCNC: 11 G/DL
HGB BLD-MCNC: 8.3 G/DL
INTERVENTRICULAR SEPTUM: 1.31 CM (ref 0.6–1.1)
IP: 16
IP: 16
IVRT: 0.06 MSEC
LEFT ATRIUM SIZE: 3.37 CM
LEFT INTERNAL DIMENSION IN SYSTOLE: 2.69 CM (ref 2.1–4)
LEFT VENTRICLE DIASTOLIC VOLUME INDEX: 35.34 ML/M2
LEFT VENTRICLE DIASTOLIC VOLUME: 61.61 ML
LEFT VENTRICLE MASS INDEX: 81.6 G/M2
LEFT VENTRICLE SYSTOLIC VOLUME INDEX: 15.3 ML/M2
LEFT VENTRICLE SYSTOLIC VOLUME: 26.75 ML
LEFT VENTRICULAR INTERNAL DIMENSION IN DIASTOLE: 3.79 CM (ref 3.5–6)
LEFT VENTRICULAR MASS: 142.3 G
LYMPHOCYTES # BLD AUTO: 0.6 K/UL
LYMPHOCYTES NFR BLD: 4.5 %
MAGNESIUM SERPL-MCNC: 1.6 MG/DL
MAGNESIUM SERPL-MCNC: 1.6 MG/DL
MCH RBC QN AUTO: 27.7 PG
MCHC RBC AUTO-ENTMCNC: 32.8 G/DL
MCV RBC AUTO: 84 FL
MODE: ABNORMAL
MODE: ABNORMAL
MONOCYTES # BLD AUTO: 0.4 K/UL
MONOCYTES NFR BLD: 3 %
NEUTROPHILS # BLD AUTO: 12.7 K/UL
NEUTROPHILS NFR BLD: 92.1 %
PCO2 BLDA: 36.7 MMHG (ref 35–45)
PCO2 BLDA: 49.1 MMHG (ref 35–45)
PCO2 BLDA: 50 MMHG (ref 35–45)
PH SMN: 7.38 [PH] (ref 7.35–7.45)
PH SMN: 7.39 [PH] (ref 7.35–7.45)
PH SMN: 7.41 [PH] (ref 7.35–7.45)
PHOSPHATE SERPL-MCNC: 1.3 MG/DL
PISA TR MAX VEL: 1.5 M/S
PLATELET # BLD AUTO: 163 K/UL
PMV BLD AUTO: 10.8 FL
PO2 BLDA: 176 MMHG (ref 80–100)
PO2 BLDA: 61 MMHG (ref 80–100)
PO2 BLDA: 73 MMHG (ref 80–100)
POC BE: -4 MMOL/L
POC BE: 5 MMOL/L
POC BE: 7 MMOL/L
POC IONIZED CALCIUM: 0.91 MMOL/L (ref 1.06–1.42)
POC SATURATED O2: 100 % (ref 95–100)
POC SATURATED O2: 90 % (ref 95–100)
POC SATURATED O2: 94 % (ref 95–100)
POC TCO2: 23 MMOL/L (ref 23–27)
POC TCO2: 31 MMOL/L (ref 23–27)
POC TCO2: 33 MMOL/L (ref 23–27)
POCT GLUCOSE: 228 MG/DL (ref 70–110)
POCT GLUCOSE: 247 MG/DL (ref 70–110)
POCT GLUCOSE: 282 MG/DL (ref 70–110)
POCT GLUCOSE: 315 MG/DL (ref 70–110)
POCT GLUCOSE: 377 MG/DL (ref 70–110)
POTASSIUM BLD-SCNC: 3 MMOL/L (ref 3.5–5.1)
POTASSIUM SERPL-SCNC: 2.9 MMOL/L
POTASSIUM SERPL-SCNC: 3.5 MMOL/L
PROT SERPL-MCNC: 5.4 G/DL
RA PRESSURE: 3 MMHG
RBC # BLD AUTO: 3 M/UL
RIGHT VENTRICULAR END-DIASTOLIC DIMENSION: 2.58 CM
SAMPLE: ABNORMAL
SITE: ABNORMAL
SITE: ABNORMAL
SODIUM BLD-SCNC: 142 MMOL/L (ref 136–145)
SODIUM SERPL-SCNC: 142 MMOL/L
SODIUM SERPL-SCNC: 146 MMOL/L
TR MAX PG: 9 MMHG
TV REST PULMONARY ARTERY PRESSURE: 12 MMHG
WBC # BLD AUTO: 14.12 K/UL

## 2019-01-30 PROCEDURE — 36600 WITHDRAWAL OF ARTERIAL BLOOD: CPT

## 2019-01-30 PROCEDURE — 83735 ASSAY OF MAGNESIUM: CPT | Mod: 91

## 2019-01-30 PROCEDURE — 80048 BASIC METABOLIC PNL TOTAL CA: CPT

## 2019-01-30 PROCEDURE — 27000221 HC OXYGEN, UP TO 24 HOURS

## 2019-01-30 PROCEDURE — 85347 COAGULATION TIME ACTIVATED: CPT

## 2019-01-30 PROCEDURE — 94640 AIRWAY INHALATION TREATMENT: CPT

## 2019-01-30 PROCEDURE — 25000242 PHARM REV CODE 250 ALT 637 W/ HCPCS: Performed by: SURGERY

## 2019-01-30 PROCEDURE — 25000003 PHARM REV CODE 250: Performed by: STUDENT IN AN ORGANIZED HEALTH CARE EDUCATION/TRAINING PROGRAM

## 2019-01-30 PROCEDURE — 84100 ASSAY OF PHOSPHORUS: CPT

## 2019-01-30 PROCEDURE — 87040 BLOOD CULTURE FOR BACTERIA: CPT

## 2019-01-30 PROCEDURE — 80053 COMPREHEN METABOLIC PANEL: CPT

## 2019-01-30 PROCEDURE — 83735 ASSAY OF MAGNESIUM: CPT

## 2019-01-30 PROCEDURE — 25000003 PHARM REV CODE 250: Performed by: SURGERY

## 2019-01-30 PROCEDURE — C9113 INJ PANTOPRAZOLE SODIUM, VIA: HCPCS | Performed by: SURGERY

## 2019-01-30 PROCEDURE — 94667 MNPJ CHEST WALL 1ST: CPT

## 2019-01-30 PROCEDURE — 63600175 PHARM REV CODE 636 W HCPCS: Performed by: SURGERY

## 2019-01-30 PROCEDURE — 94761 N-INVAS EAR/PLS OXIMETRY MLT: CPT

## 2019-01-30 PROCEDURE — S0171 BUMETANIDE 0.5 MG: HCPCS | Performed by: SURGERY

## 2019-01-30 PROCEDURE — 25000242 PHARM REV CODE 250 ALT 637 W/ HCPCS: Performed by: STUDENT IN AN ORGANIZED HEALTH CARE EDUCATION/TRAINING PROGRAM

## 2019-01-30 PROCEDURE — 63600175 PHARM REV CODE 636 W HCPCS: Performed by: NURSE PRACTITIONER

## 2019-01-30 PROCEDURE — S0030 INJECTION, METRONIDAZOLE: HCPCS | Performed by: SURGERY

## 2019-01-30 PROCEDURE — 94668 MNPJ CHEST WALL SBSQ: CPT

## 2019-01-30 PROCEDURE — 25000003 PHARM REV CODE 250: Performed by: NURSE PRACTITIONER

## 2019-01-30 PROCEDURE — 99233 PR SUBSEQUENT HOSPITAL CARE,LEVL III: ICD-10-PCS | Mod: ,,, | Performed by: INTERNAL MEDICINE

## 2019-01-30 PROCEDURE — P9047 ALBUMIN (HUMAN), 25%, 50ML: HCPCS | Mod: JG | Performed by: SURGERY

## 2019-01-30 PROCEDURE — B4185 PARENTERAL SOL 10 GM LIPIDS: HCPCS | Performed by: SURGERY

## 2019-01-30 PROCEDURE — 99900035 HC TECH TIME PER 15 MIN (STAT)

## 2019-01-30 PROCEDURE — 36415 COLL VENOUS BLD VENIPUNCTURE: CPT

## 2019-01-30 PROCEDURE — 20000000 HC ICU ROOM

## 2019-01-30 PROCEDURE — 63600175 PHARM REV CODE 636 W HCPCS: Performed by: STUDENT IN AN ORGANIZED HEALTH CARE EDUCATION/TRAINING PROGRAM

## 2019-01-30 PROCEDURE — 85025 COMPLETE CBC W/AUTO DIFF WBC: CPT

## 2019-01-30 PROCEDURE — 94660 CPAP INITIATION&MGMT: CPT

## 2019-01-30 PROCEDURE — 99233 SBSQ HOSP IP/OBS HIGH 50: CPT | Mod: ,,, | Performed by: INTERNAL MEDICINE

## 2019-01-30 RX ORDER — ACETAMINOPHEN 650 MG/20.3ML
650 LIQUID ORAL EVERY 4 HOURS PRN
Status: DISCONTINUED | OUTPATIENT
Start: 2019-01-30 | End: 2019-01-30

## 2019-01-30 RX ORDER — MAGNESIUM SULFATE HEPTAHYDRATE 40 MG/ML
2 INJECTION, SOLUTION INTRAVENOUS ONCE
Status: DISCONTINUED | OUTPATIENT
Start: 2019-01-30 | End: 2019-01-30

## 2019-01-30 RX ORDER — POTASSIUM CHLORIDE 29.8 MG/ML
40 INJECTION INTRAVENOUS ONCE
Status: COMPLETED | OUTPATIENT
Start: 2019-01-30 | End: 2019-01-30

## 2019-01-30 RX ORDER — ACETAMINOPHEN 650 MG/20.3ML
650 LIQUID ORAL EVERY 8 HOURS PRN
Status: DISCONTINUED | OUTPATIENT
Start: 2019-01-31 | End: 2019-02-01

## 2019-01-30 RX ORDER — ALBUTEROL SULFATE 2.5 MG/.5ML
2.5 SOLUTION RESPIRATORY (INHALATION)
Status: DISCONTINUED | OUTPATIENT
Start: 2019-01-30 | End: 2019-02-26 | Stop reason: HOSPADM

## 2019-01-30 RX ORDER — IPRATROPIUM BROMIDE AND ALBUTEROL SULFATE 2.5; .5 MG/3ML; MG/3ML
3 SOLUTION RESPIRATORY (INHALATION) EVERY 4 HOURS
Status: DISCONTINUED | OUTPATIENT
Start: 2019-01-30 | End: 2019-02-26 | Stop reason: HOSPADM

## 2019-01-30 RX ORDER — MAGNESIUM SULFATE 1 G/100ML
1 INJECTION INTRAVENOUS ONCE
Status: COMPLETED | OUTPATIENT
Start: 2019-01-30 | End: 2019-01-30

## 2019-01-30 RX ORDER — HYDROXYZINE PAMOATE 25 MG/1
25 CAPSULE ORAL 3 TIMES DAILY
Status: DISCONTINUED | OUTPATIENT
Start: 2019-01-30 | End: 2019-01-30

## 2019-01-30 RX ORDER — DULOXETIN HYDROCHLORIDE 30 MG/1
60 CAPSULE, DELAYED RELEASE ORAL DAILY
Status: DISCONTINUED | OUTPATIENT
Start: 2019-01-30 | End: 2019-02-01

## 2019-01-30 RX ORDER — AMITRIPTYLINE HYDROCHLORIDE 25 MG/1
50 TABLET, FILM COATED ORAL NIGHTLY
Status: DISCONTINUED | OUTPATIENT
Start: 2019-01-30 | End: 2019-02-01

## 2019-01-30 RX ORDER — CLONAZEPAM 0.5 MG/1
0.5 TABLET ORAL NIGHTLY
Status: DISCONTINUED | OUTPATIENT
Start: 2019-01-30 | End: 2019-02-01

## 2019-01-30 RX ORDER — HYDROXYZINE PAMOATE 25 MG/1
25 CAPSULE ORAL 3 TIMES DAILY
Status: DISCONTINUED | OUTPATIENT
Start: 2019-01-30 | End: 2019-02-01

## 2019-01-30 RX ORDER — SODIUM CHLORIDE FOR INHALATION 3 %
4 VIAL, NEBULIZER (ML) INHALATION
Status: DISCONTINUED | OUTPATIENT
Start: 2019-01-30 | End: 2019-02-26 | Stop reason: HOSPADM

## 2019-01-30 RX ADMIN — METRONIDAZOLE 500 MG: 500 INJECTION, SOLUTION INTRAVENOUS at 03:01

## 2019-01-30 RX ADMIN — I.V. FAT EMULSION 250 ML: 20 EMULSION INTRAVENOUS at 09:01

## 2019-01-30 RX ADMIN — MAGNESIUM SULFATE 1 G: 1 INJECTION INTRAVENOUS at 11:01

## 2019-01-30 RX ADMIN — ENOXAPARIN SODIUM 40 MG: 100 INJECTION SUBCUTANEOUS at 04:01

## 2019-01-30 RX ADMIN — HYDROXYZINE PAMOATE 25 MG: 25 CAPSULE ORAL at 02:01

## 2019-01-30 RX ADMIN — BUMETANIDE 1 MG: 0.25 INJECTION INTRAMUSCULAR; INTRAVENOUS at 08:01

## 2019-01-30 RX ADMIN — CIPROFLOXACIN 400 MG: 2 INJECTION, SOLUTION INTRAVENOUS at 07:01

## 2019-01-30 RX ADMIN — AMITRIPTYLINE HYDROCHLORIDE 50 MG: 25 TABLET, FILM COATED ORAL at 09:01

## 2019-01-30 RX ADMIN — METRONIDAZOLE 500 MG: 500 INJECTION, SOLUTION INTRAVENOUS at 11:01

## 2019-01-30 RX ADMIN — IPRATROPIUM BROMIDE AND ALBUTEROL SULFATE 3 ML: .5; 3 SOLUTION RESPIRATORY (INHALATION) at 07:01

## 2019-01-30 RX ADMIN — HYDROMORPHONE HYDROCHLORIDE 0.2 MG: 2 INJECTION INTRAMUSCULAR; INTRAVENOUS; SUBCUTANEOUS at 03:01

## 2019-01-30 RX ADMIN — METOPROLOL TARTRATE 5 MG: 1 INJECTION, SOLUTION INTRAVENOUS at 02:01

## 2019-01-30 RX ADMIN — INSULIN ASPART 6 UNITS: 100 INJECTION, SOLUTION INTRAVENOUS; SUBCUTANEOUS at 12:01

## 2019-01-30 RX ADMIN — ACETAMINOPHEN 650 MG: 650 SOLUTION ORAL at 04:01

## 2019-01-30 RX ADMIN — IPRATROPIUM BROMIDE AND ALBUTEROL SULFATE 3 ML: .5; 3 SOLUTION RESPIRATORY (INHALATION) at 03:01

## 2019-01-30 RX ADMIN — ALBUMIN HUMAN 10 ML/HR: 0.25 SOLUTION INTRAVENOUS at 09:01

## 2019-01-30 RX ADMIN — KETOROLAC TROMETHAMINE 15 MG: 30 INJECTION, SOLUTION INTRAMUSCULAR at 05:01

## 2019-01-30 RX ADMIN — METRONIDAZOLE 500 MG: 500 INJECTION, SOLUTION INTRAVENOUS at 07:01

## 2019-01-30 RX ADMIN — INSULIN ASPART 4 UNITS: 100 INJECTION, SOLUTION INTRAVENOUS; SUBCUTANEOUS at 08:01

## 2019-01-30 RX ADMIN — IPRATROPIUM BROMIDE AND ALBUTEROL SULFATE 3 ML: .5; 3 SOLUTION RESPIRATORY (INHALATION) at 11:01

## 2019-01-30 RX ADMIN — SODIUM CHLORIDE 30 MG/ML INHALATION SOLUTION 4 ML: 30 SOLUTION INHALANT at 07:01

## 2019-01-30 RX ADMIN — KETOROLAC TROMETHAMINE 15 MG: 30 INJECTION, SOLUTION INTRAMUSCULAR at 11:01

## 2019-01-30 RX ADMIN — HYDROXYZINE PAMOATE 25 MG: 25 CAPSULE ORAL at 10:01

## 2019-01-30 RX ADMIN — HYDROXYZINE PAMOATE 25 MG: 25 CAPSULE ORAL at 09:01

## 2019-01-30 RX ADMIN — METOPROLOL TARTRATE 5 MG: 1 INJECTION, SOLUTION INTRAVENOUS at 08:01

## 2019-01-30 RX ADMIN — ALBUMIN HUMAN 10 ML/HR: 0.25 SOLUTION INTRAVENOUS at 11:01

## 2019-01-30 RX ADMIN — ALBUMIN HUMAN 10 ML/HR: 0.25 SOLUTION INTRAVENOUS at 06:01

## 2019-01-30 RX ADMIN — SODIUM CHLORIDE 30 MG/ML INHALATION SOLUTION: 30 SOLUTION INHALANT at 12:01

## 2019-01-30 RX ADMIN — PANTOPRAZOLE SODIUM 40 MG: 40 INJECTION, POWDER, LYOPHILIZED, FOR SOLUTION INTRAVENOUS at 08:01

## 2019-01-30 RX ADMIN — IPRATROPIUM BROMIDE AND ALBUTEROL SULFATE 3 ML: .5; 3 SOLUTION RESPIRATORY (INHALATION) at 12:01

## 2019-01-30 RX ADMIN — ASCORBIC ACID, VITAMIN A PALMITATE, CHOLECALCIFEROL, THIAMINE HYDROCHLORIDE, RIBOFLAVIN-5 PHOSPHATE SODIUM, PYRIDOXINE HYDROCHLORIDE, NIACINAMIDE, DEXPANTHENOL, ALPHA-TOCOPHEROL ACETATE, VITAMIN K1, FOLIC ACID, BIOTIN, CYANOCOBALAMIN: 200; 3300; 200; 6; 3.6; 6; 40; 15; 10; 150; 600; 60; 5 INJECTION, SOLUTION INTRAVENOUS at 07:01

## 2019-01-30 RX ADMIN — INSULIN ASPART 2 UNITS: 100 INJECTION, SOLUTION INTRAVENOUS; SUBCUTANEOUS at 03:01

## 2019-01-30 RX ADMIN — DULOXETINE 60 MG: 30 CAPSULE, DELAYED RELEASE ORAL at 10:01

## 2019-01-30 RX ADMIN — CIPROFLOXACIN 400 MG: 2 INJECTION, SOLUTION INTRAVENOUS at 08:01

## 2019-01-30 RX ADMIN — LORAZEPAM 1 MG: 2 INJECTION INTRAMUSCULAR; INTRAVENOUS at 04:01

## 2019-01-30 RX ADMIN — ALBUMIN HUMAN 10 ML/HR: 0.25 SOLUTION INTRAVENOUS at 04:01

## 2019-01-30 RX ADMIN — METOPROLOL TARTRATE 5 MG: 1 INJECTION, SOLUTION INTRAVENOUS at 07:01

## 2019-01-30 RX ADMIN — POTASSIUM PHOSPHATE, MONOBASIC AND POTASSIUM PHOSPHATE, DIBASIC 30 MMOL: 224; 236 INJECTION, SOLUTION INTRAVENOUS at 09:01

## 2019-01-30 RX ADMIN — POTASSIUM CHLORIDE 40 MEQ: 29.8 INJECTION, SOLUTION INTRAVENOUS at 11:01

## 2019-01-30 RX ADMIN — INSULIN ASPART 5 UNITS: 100 INJECTION, SOLUTION INTRAVENOUS; SUBCUTANEOUS at 11:01

## 2019-01-30 RX ADMIN — CLONAZEPAM 0.5 MG: 0.5 TABLET ORAL at 09:01

## 2019-01-30 RX ADMIN — INSULIN ASPART 8 UNITS: 100 INJECTION, SOLUTION INTRAVENOUS; SUBCUTANEOUS at 04:01

## 2019-01-30 RX ADMIN — IPRATROPIUM BROMIDE AND ALBUTEROL SULFATE 3 ML: .5; 3 SOLUTION RESPIRATORY (INHALATION) at 09:01

## 2019-01-30 NOTE — PT/OT/SLP PROGRESS
Speech Language Pathology      Kaylee Ngo  MRN: [id number]    Patient not seen today secondary to Nursing hold (Comment)(Pt unable to be weaned from BiPap thus unable to participate in BSS.). Will follow-up as able for clinical swallow evaluation.    Kay Faustin, CCC-SLP

## 2019-01-30 NOTE — PROGRESS NOTES
Spoke with Dr. Mtz regarding patient pulling medical devices such as ngt, IV lines and monitor equipments. He was made aware that  received Haldol and prn benadryl which was not effective.  Requested for other alternatives and mitt restrains. Stated, to take telephone order for mitt r. Order was repeated and verified.

## 2019-01-30 NOTE — PLAN OF CARE
POD # 5 S/P ex lap, reverse gastric  bypass  Hx:Primary pancreatic carcinoid tumor    Pt transferred to ICU on 1/28 for  Acute Hypoxic Respiratory failure  - likely secondary to aspiration as well as atelectasis post surgery.   - CT with evidence of aspirated barium - should not cause long term issues with her lungs.   - continue HFNC and wean O2 as tolerated.   - Continue aspiration precautions, can add CPT for her RLL to help mobilize some secretions.   - can send sputum culture if she is coughing up enough.   - continue PT/OT and get out of bed as much as possible.       TN visited with pt and her sister, Emily who was at bedside and updated on plan of care. Tn provided card and wrote TN and SW # on board and encouraged to call for any needs/questions.      Per admit TN Note:  Pt mostly independent with ADLs using assistive equipment. No current HH (has used [facility] in [place] for tube feeds in the past- would use again). Pt has Glucometer, Dexcom Meter/Glucose Monitor, S Cane, BSC, RW, Bath Bench. Pt lives in a trailer on her sister, Emily Pickard and Brother in Law's property.  They assist pt when needed and provide transportation to [facility]. They will provide transportation on discharge.     TN to continue to follow for post op needs.     01/30/19 1106   Discharge Reassessment   Assessment Type Discharge Planning Reassessment   Provided patient/caregiver education on the expected discharge date and the discharge plan Yes   Do you have any problems affording any of your prescribed medications? No   Discharge Plan A Home with family   Discharge Plan B Home with family;Home Health   DME Needed Upon Discharge  (tbd)   Patient choice form signed by patient/caregiver N/A   Anticipated Discharge Disposition Home   Can the patient answer the patient profile reliably? Yes, cognitively intact   How does the patient rate their overall health at the present time? Fair   Describe the patient's  ability to walk at the present time. Major restrictions/daily assistance from another person   How often would a person be available to care for the patient? Whenever needed   Number of comorbid conditions (as recorded on the chart) Five or more   During the past month, has the patient often been bothered by feeling down, depressed or hopeless? Yes   During the past month, has the patient often been bothered by little interest or pleasure in doing things? Yes

## 2019-01-30 NOTE — PLAN OF CARE
Problem: Adult Inpatient Plan of Care  Goal: Plan of Care Review  Outcome: Ongoing (interventions implemented as appropriate)   01/30/19 0342   Plan of Care Review   Plan of Care Reviewed With Patient;sibling; Safety: call light in reach, patient oriented to room & instructed how to notify nurse if assistance is needed, current questions/concerns addressed, bed in lowest position with wheels locked & side rails up X 3. Pt and family were educated regarding fall precaution and taking appropriate action. Safety camera on at the bedside. Took mitt off because patient was pulling it out by biting on it. Activity: reposition self independently  Neurological: Oriented x2 Respiratory: on comfort flow; o2 sat sat wnl.  Cardiac: BP stable; HR stable;  Afebrile this shift. Intake/Output: UO adequate; no bm overnight. Diet; NPO; TPN continued Pain: controlled with prn medication Skin: incision site dry and intact; NGT to suction; Drained moderate amount;  Plan: Plan of care reviewed with the patient. All questions and concerns were addressed with the sister and the patient. Will continue to monitor.

## 2019-01-30 NOTE — SUBJECTIVE & OBJECTIVE
ROS  Objective:     Vital Signs (Most Recent):  Temp: 97.6 °F (36.4 °C) (01/30/19 0715)  Pulse: (!) 129 (01/30/19 0830)  Resp: (!) 30 (01/30/19 0830)  BP: (!) 164/88 (01/30/19 0830)  SpO2: (!) 94 % (01/30/19 0830) Vital Signs (24h Range):  Temp:  [97.6 °F (36.4 °C)-98.7 °F (37.1 °C)] 97.6 °F (36.4 °C)  Pulse:  [104-133] 129  Resp:  [16-49] 30  SpO2:  [86 %-100 %] 94 %  BP: ()/(54-88) 164/88     Weight: 63.8 kg (140 lb 10.5 oz)  Body mass index is 23.41 kg/m².     SpO2: (!) 94 %  O2 Device (Oxygen Therapy): BiPAP      Intake/Output Summary (Last 24 hours) at 1/30/2019 0948  Last data filed at 1/30/2019 0920  Gross per 24 hour   Intake 1010.83 ml   Output 2736 ml   Net -1725.17 ml       Lines/Drains/Airways     Central Venous Catheter Line                 Percutaneous Central Line Insertion/Assessment - triple lumen  01/25/19 0726 5 days          Drain                 NG/OG Tube 01/25/19 0728 Dewey sump 18 Fr. Right nostril 5 days         Gastrostomy/Enterostomy 01/25/19 1300 Gastrostomy tube w/ balloon midline 4 days         Urethral Catheter 01/28/19 1129 14 Fr. 1 day                Physical Exam   Constitutional: She has a sickly appearance. She appears ill.   Cardiovascular: Regular rhythm. Tachycardia present.   Pulmonary/Chest: She has decreased breath sounds. She has rhonchi.       Significant Labs:     Recent Labs   Lab 01/30/19  0513      K 2.9*      CO2 30*   BUN 20   CREATININE 0.7   MG 1.6     Recent Labs   Lab 01/30/19  0513   WBC 14.12*   RBC 3.00*   HGB 8.3*   HCT 25.3*      MCV 84   MCH 27.7   MCHC 32.8       Significant Imaging:     TTE 1/29/2019    · Concentric left ventricular remodeling.  · Normal left ventricular systolic function. The estimated ejection fraction is 55%  · Normal LV diastolic function.  · Normal right ventricular systolic function.  · Normal central venous pressure (3 mm Hg).  · The estimated PA systolic pressure is 12 mm Hg

## 2019-01-30 NOTE — ASSESSMENT & PLAN NOTE
-remains ST with HR 110s-120s  -EKG ST with RAD and nonspecific T waves to inferior leads   -echocardiogram with normal LVEF, normal diastolic function and normal RV function ending  -etiology multifactoral and related to demand etiology vs lyte imbalance vs stress etiology vs volume imbalance  -will continue Metoprolol 5mg IV Q6hrs for HR control; overall goal HR is less than 120bpm; if BP does not tolerate BB then can use IV Digoxin in place  -1.9 liters out overnight and only +634cc since admission; CVP estimated at 3 on echo yesterday; considered volume depletion given hospital course but apprehensive to start on IVF due to use of Bumex by surgery; can transduce central line for CVP if needed; goal CVP 8-12

## 2019-01-30 NOTE — PT/OT/SLP PROGRESS
Physical Therapy  Missed Visit    Patient Name:  Kaylee Ngo   MRN:  [id number]    Patient not seen today secondary to RN hold at this time as pt with decrease in respiratory status and is currently on BiPAP. Will follow-up as able.    Zeinab Zhang, PT   1/30/2019

## 2019-01-30 NOTE — PROGRESS NOTES
Clinimix/TPN orders renewed for today. Same formula and at same rate.  M-V-I and trace elements are added to Clinimix daily.      Lipids emulsion to be given every Monday,Wednesday and Friday at 2200.  (per P&T approved pharmacy protocol)

## 2019-01-30 NOTE — PROGRESS NOTES
[facility]  Neuroendocrine Tumor Service  Progress Note     Admission Date: 1/25/2019  Hospital Length of Stay: 5  Principal Problem: Malignant carcinoid tumor of pancreas     Subjective:      Interval History:     Stepped up to ICU 2days ago for concern impending resp failure  Back on BiPAP from HFNC  RT and Pulm on board  Barney replaced   CT scan equivocal for leak Abd/Pelv but Chest with pleural effusion, consolidation  IR consulted for thoracentesis but we will hold off for now and reconsult as needed  Had BM      Scheduled Meds:   albuterol-ipratropium  3 mL Nebulization Q4H    amitriptyline  50 mg Oral QHS    bumetanide  1 mg Intravenous Daily    ciprofloxacin  400 mg Intravenous Q12H    clonazePAM  0.5 mg Oral QHS    DULoxetine  60 mg Oral Daily    enoxaparin  40 mg Subcutaneous Daily    hydrOXYzine pamoate  25 mg Oral TID    ketorolac  15 mg Intravenous Q6H    magnesium sulfate IVPB  1 g Intravenous Once    metoprolol  5 mg Intravenous Q6H    metronidazole  500 mg Intravenous Q8H    pantoprazole  40 mg Intravenous Daily    potassium chloride in water  40 mEq Intravenous Once    potassium phosphate IVPB  30 mmol Intravenous Once     Continuous Infusions:   albumin human 25% 10 mL/hr (01/30/19 0648)    Amino acid 5% - dextrose 15% (CLINIMIX-E) solution with additives (1L  provides 510 kcal/L dextrose, with 50 gm AA, 150 gm CHO, Na 35, K 30, Mg 5, Ca 4.5, Acetate 80, Cl 39, Phos 15) 50 mL/hr at 01/29/19 1851     PRN Meds:.dextrose 50%, diphenhydrAMINE, glucagon (human recombinant), hydrALAZINE, HYDROmorphone, insulin aspart U-100, ipratropium, labetalol, naloxone, ondansetron, sodium chloride 0.9%    Objective:      Temp:  [97.6 °F (36.4 °C)-98.7 °F (37.1 °C)] 97.6 °F (36.4 °C)  Pulse:  [104-133] 121  Resp:  [16-49] 37  SpO2:  [86 %-100 %] 95 %  BP: ()/(54-88) 152/74  Weight change: -0.5 kg (-1.6 oz)    Intake/Output Summary (Last 24 hours) at 1/30/2019  1031  Last data filed at 1/30/2019 1000  Gross per 24 hour   Intake 2668.33 ml   Output 2736 ml   Net -67.67 ml     UOP 1.6L  NGT 1.7L --> 100  GT -- 200     Physical Exam:  GEN: awake, alert, NAD, NGT in place, bilious  CV: tachycardic  PULM: unlabored  ABD: S/appropriately TTP/ ND, incision c/d/i, Jtube in place clamped  EXT: Peripheral pulses present and equal bilaterally    Recent Labs   Lab 01/30/19  0513      K 2.9*   CO2 30*      BUN 20   CREATININE 0.7   CALCIUM 8.0*   PROT 5.4*   AST 16   ALT 9*   ALKPHOS 125   BILITOT 1.2*     Recent Labs   Lab 01/30/19 0513   WBC 14.12*   HGB 8.3*   HCT 25.3*          Significant Imaging: I have reviewed all pertinent imaging results/findings within the past 24 hours.     Assessment and Plan:    64 yo F w/ PNET, s/p reversal of Sandeep-en-Y w/ Jtube placement    Neuro - Not c/o pain - will d/w Dr. Levy plan moving forward   TPN - continue NPO, cont TPN/lipids  ID - Cipro/Flagyl - aspiration pneumonia, no fever, no leukocytosis, IR consulted for thoracentesis - will hold off for now  Pulm - on board, will get CPT, will check sputum Cx  FEN/GI - lytes replaced this AM per Dr. Levy - will recheck this PM  Renal - Whitlock for HD monitoring, good UOP, cont whitlock  PPX - lovenox, protonix  Dispo - cont ICU care    Colton Olivier MD - HO4

## 2019-01-30 NOTE — PROGRESS NOTES
Pulmonary & Critical Care Medicine Progress Note    Subjective:   Ms. Ngo is maintaining but is requiring bipap this am secondary to shortness of breath and desats. She denies any pain but does report that she feels better with the bipap this morning. No fevers, chills, or chest pain.     Past medical, surgical, family, and social history from initial consult was reviewed and verified during today's visit.     Vital Signs:   Temp:  [97.6 °F (36.4 °C)-98.7 °F (37.1 °C)] 97.6 °F (36.4 °C)  Pulse:  [104-133] 129  Resp:  [16-49] 30  SpO2:  [86 %-100 %] 94 %  BP: ()/(54-88) 164/88      Fluid Balance:     Intake/Output Summary (Last 24 hours) at 1/30/2019 0942  Last data filed at 1/30/2019 0900  Gross per 24 hour   Intake 1010.83 ml   Output 2461 ml   Net -1450.17 ml       Review of Systems:   A comprehensive 12-point review of systems was performed, and is negative except for those items mentioned above in the HPI section of this note.     Physical Exam:   General: NAD, cooperative & interactive.  HEENT: AT/NC, PERRL, EOMI, oral and nasal mucosa moist.   Neck: Supple without JVD or palpable LAD.   Cardiac: tachycardic, regular rhythm,with no MRG with brisk cap refill and symmetric pulses in distal extremities.  Respiratory: Normal inspection. Symmetric chest rise. Course breath sounds bilaterally, decreased basilar breath sounds. No increased work of breathing noted.   Abdomen: Soft, NT/ND. +BS. No hepatosplenomegaly.   Extremities: No edema.   Neuro: Grossly intact to brief exam. Oriented x3 with appropriate mood/affect to situation.     Personal Review and Summary of Interval Diagnostics    Laboratory Studies:   No results for input(s): PH, PCO2, PO2, HCO3, POCSATURATED, BE in the last 24 hours.  Recent Labs   Lab 01/30/19  0513   WBC 14.12*   RBC 3.00*   HGB 8.3*   HCT 25.3*      MCV 84   MCH 27.7   MCHC 32.8     Recent Labs   Lab 01/30/19  0513      K 2.9*      CO2 30*   BUN 20    CREATININE 0.7   MG 1.6       Microbiology Data:   Microbiology Results (last 7 days)     ** No results found for the last 168 hours. **        Chest Imaging:     Infusions:     albumin human 25% 10 mL/hr (01/30/19 0648)    Amino acid 5% - dextrose 15% (CLINIMIX-E) solution with additives (1L  provides 510 kcal/L dextrose, with 50 gm AA, 150 gm CHO, Na 35, K 30, Mg 5, Ca 4.5, Acetate 80, Cl 39, Phos 15) 50 mL/hr at 01/29/19 1851       Scheduled Medications:    albuterol-ipratropium  3 mL Nebulization Q4H    amitriptyline  50 mg Oral QHS    bumetanide  1 mg Intravenous Daily    ciprofloxacin  400 mg Intravenous Q12H    clonazePAM  0.5 mg Oral QHS    DULoxetine  60 mg Oral Daily    enoxaparin  40 mg Subcutaneous Daily    hydrOXYzine pamoate  25 mg Oral TID    ketorolac  15 mg Intravenous Q6H    magnesium sulfate IVPB  1 g Intravenous Once    metoprolol  5 mg Intravenous Q6H    metronidazole  500 mg Intravenous Q8H    pantoprazole  40 mg Intravenous Daily    potassium phosphate IVPB  30 mmol Intravenous Once    potassium phosphate IVPB  30 mmol Intravenous Once       PRN Medications:   dextrose 50%, diphenhydrAMINE, glucagon (human recombinant), hydrALAZINE, HYDROmorphone, insulin aspart U-100, ipratropium, labetalol, naloxone, ondansetron, sodium chloride 0.9%    Impression & Recommendations    Acute Hypoxic Respiratory failure  - likely secondary to aspiration as well as atelectasis post surgery.   - CT with evidence of aspirated barium - should not cause long term issues with her lungs.   - continue HFNC and wean O2 as tolerated.   - Continue aspiration precautions, can add CPT for her RLL to help mobilize some secretions.   - can send sputum culture if she is coughing up enough.   - continue PT/OT and get out of bed as much as possible.       Delilah Duong M.D.  [employer] Pulmonary/Critical Care Fellow

## 2019-01-30 NOTE — PLAN OF CARE
Problem: Adult Inpatient Plan of Care  Goal: Plan of Care Review  PT  ON CF 30L/60% SPO2 92%. NO DISTRESS NOTED. WILL CONTINUE TO MONITOR

## 2019-01-30 NOTE — ASSESSMENT & PLAN NOTE
-K+ 2.9 this AM  -replace K+ to goal of >4.0  -receiving KPhos currently; likely need more with IV K+rider and would recommend 40mEq IVPB x 2

## 2019-01-30 NOTE — ASSESSMENT & PLAN NOTE
-Mag 1.6 this AM  -recommend replacement with IV Mag for goal level greater than 2.0; would give Mag rider 2gm IVPB

## 2019-01-30 NOTE — PROGRESS NOTES
[facility]  Cardiology  Progress Note    Patient Name: Kaylee Ngo  MRN: [id number]  Admission Date: 1/25/2019  Hospital Length of Stay: 5 days  Code Status: Prior   Attending Physician: Cam Ashton MD   Primary Care Physician: Robert Garcia MD  Expected Discharge Date:   Principal Problem:Malignant carcinoid tumor of pancreas    Subjective:     Hospital Course:   1/29/2019 Cardiology consulted for ST noted on telemetry. Labs today with H&H 8.9&27.3 BMP with K+ 3.3 BUN 22 creatinine .8 albumin 1.9 total bili .8 Mg 1.4 phos 1.9. Repeat EKG pending today. Will order TTE   1/30/2019 Echocardiogram yesterday with normal LVEF and normal diastolic function. HR 110s-120s. BP 120s-130s/60s overnight. K+ 2.9, phos 1.3 and Mag 1.6 this AM. KPhos ordered per primary team. Placed on BiPap this AM due to desaturation with pulse ox 86%. Repeat CXR this AM. WBCs 14,000 up from 7,000 yesterday         ROS  Objective:     Vital Signs (Most Recent):  Temp: 97.6 °F (36.4 °C) (01/30/19 0715)  Pulse: (!) 129 (01/30/19 0830)  Resp: (!) 30 (01/30/19 0830)  BP: (!) 164/88 (01/30/19 0830)  SpO2: (!) 94 % (01/30/19 0830) Vital Signs (24h Range):  Temp:  [97.6 °F (36.4 °C)-98.7 °F (37.1 °C)] 97.6 °F (36.4 °C)  Pulse:  [104-133] 129  Resp:  [16-49] 30  SpO2:  [86 %-100 %] 94 %  BP: ()/(54-88) 164/88     Weight: 63.8 kg (140 lb 10.5 oz)  Body mass index is 23.41 kg/m².     SpO2: (!) 94 %  O2 Device (Oxygen Therapy): BiPAP      Intake/Output Summary (Last 24 hours) at 1/30/2019 0948  Last data filed at 1/30/2019 0920  Gross per 24 hour   Intake 1010.83 ml   Output 2736 ml   Net -1725.17 ml       Lines/Drains/Airways     Central Venous Catheter Line                 Percutaneous Central Line Insertion/Assessment - triple lumen  01/25/19 0726 5 days          Drain                 NG/OG Tube 01/25/19 0728 Dalia bartlett 18 Fr. Right nostril 5 days         Gastrostomy/Enterostomy 01/25/19 1300 Gastrostomy tube w/  balloon midline 4 days         Urethral Catheter 01/28/19 1129 14 Fr. 1 day                Physical Exam   Constitutional: She has a sickly appearance. She appears ill.   Cardiovascular: Regular rhythm. Tachycardia present.   Pulmonary/Chest: She has decreased breath sounds. She has rhonchi.       Significant Labs:     Recent Labs   Lab 01/30/19  0513      K 2.9*      CO2 30*   BUN 20   CREATININE 0.7   MG 1.6     Recent Labs   Lab 01/30/19  0513   WBC 14.12*   RBC 3.00*   HGB 8.3*   HCT 25.3*      MCV 84   MCH 27.7   MCHC 32.8       Significant Imaging:     TTE 1/29/2019    · Concentric left ventricular remodeling.  · Normal left ventricular systolic function. The estimated ejection fraction is 55%  · Normal LV diastolic function.  · Normal right ventricular systolic function.  · Normal central venous pressure (3 mm Hg).  · The estimated PA systolic pressure is 12 mm Hg    Assessment and Plan:     Brief HPI: Seen this morning on AM NP rounds while on BiPap. No ROS done due to BiPap. Briefly discussed POC with patient as detailed below. Will continue to follow      Hypomagnesemia    -Mag 1.6 this AM  -recommend replacement with IV Mag for goal level greater than 2.0; would give Mag rider 2gm IVPB      Hypokalemia    -K+ 2.9 this AM  -replace K+ to goal of >4.0  -receiving KPhos currently; likely need more with IV K+rider and would recommend 40mEq IVPB x 2      Sinus tachycardia    -remains ST with HR 110s-120s  -EKG ST with RAD and nonspecific T waves to inferior leads   -echocardiogram with normal LVEF, normal diastolic function and normal RV function ending  -etiology multifactoral and related to demand etiology vs lyte imbalance vs stress etiology vs volume imbalance  -will continue Metoprolol 5mg IV Q6hrs for HR control; overall goal HR is less than 120bpm; if BP does not tolerate BB then can use IV Digoxin in place  -1.9 liters out overnight and only +634cc since admission; CVP estimated at 3  on echo yesterday; considered volume depletion given hospital course but apprehensive to start on IVF due to use of Bumex by surgery; can transduce central line for CVP if needed; goal CVP 8-12     Malignant carcinoid tumor of unknown primary site    -s/p exp lap  -management per NET/general surgery          VTE Risk Mitigation (From admission, onward)        Ordered     enoxaparin injection 40 mg  Daily      01/28/19 1849     IP VTE HIGH RISK PATIENT  Once      01/25/19 1959          MARLEEN Ferrer, ANP  Cardiology  [facility]

## 2019-01-30 NOTE — PROGRESS NOTES
Spoke with Dr. Mtz that Mitt was not effective for her as she was pulling them out with her teeth. She almost took her NGT out. Placed the patient on soft wrist restrains b/l and requested for order. Stated to take telephone order. Order was repeated and verified.

## 2019-01-31 LAB
ALBUMIN SERPL BCP-MCNC: 2.8 G/DL
ALLENS TEST: ABNORMAL
ALP SERPL-CCNC: 131 U/L
ALT SERPL W/O P-5'-P-CCNC: 8 U/L
ANION GAP SERPL CALC-SCNC: 9 MMOL/L
ANISOCYTOSIS BLD QL SMEAR: SLIGHT
AST SERPL-CCNC: 13 U/L
BASOPHILS # BLD AUTO: ABNORMAL K/UL
BASOPHILS NFR BLD: 0 %
BILIRUB SERPL-MCNC: 1.2 MG/DL
BUN SERPL-MCNC: 22 MG/DL
CALCIUM SERPL-MCNC: 8.1 MG/DL
CHLORIDE SERPL-SCNC: 106 MMOL/L
CO2 SERPL-SCNC: 32 MMOL/L
CREAT SERPL-MCNC: 0.8 MG/DL
DELSYS: ABNORMAL
DIFFERENTIAL METHOD: ABNORMAL
EOSINOPHIL # BLD AUTO: ABNORMAL K/UL
EOSINOPHIL NFR BLD: 0 %
EP: 5
ERYTHROCYTE [DISTWIDTH] IN BLOOD BY AUTOMATED COUNT: 14.6 %
EST. GFR  (AFRICAN AMERICAN): >60 ML/MIN/1.73 M^2
EST. GFR  (NON AFRICAN AMERICAN): >60 ML/MIN/1.73 M^2
FIO2: 90
GLUCOSE SERPL-MCNC: 343 MG/DL
HCO3 UR-SCNC: 33.4 MMOL/L (ref 24–28)
HCT VFR BLD AUTO: 23.5 %
HGB BLD-MCNC: 7.7 G/DL
HYPOCHROMIA BLD QL SMEAR: ABNORMAL
IP: 16
LYMPHOCYTES # BLD AUTO: ABNORMAL K/UL
LYMPHOCYTES NFR BLD: 7 %
MAGNESIUM SERPL-MCNC: 1.7 MG/DL
MCH RBC QN AUTO: 27.8 PG
MCHC RBC AUTO-ENTMCNC: 32.8 G/DL
MCV RBC AUTO: 85 FL
MODE: ABNORMAL
MONOCYTES # BLD AUTO: ABNORMAL K/UL
MONOCYTES NFR BLD: 2 %
NEUTROPHILS NFR BLD: 87 %
NEUTS BAND NFR BLD MANUAL: 4 %
NRBC BLD-RTO: 1 /100 WBC
PCO2 BLDA: 51.8 MMHG (ref 35–45)
PH SMN: 7.42 [PH] (ref 7.35–7.45)
PHOSPHATE SERPL-MCNC: 1.4 MG/DL
PLATELET # BLD AUTO: 143 K/UL
PLATELET BLD QL SMEAR: ABNORMAL
PMV BLD AUTO: 11.4 FL
PO2 BLDA: 63 MMHG (ref 80–100)
POC BE: 9 MMOL/L
POC SATURATED O2: 91 % (ref 95–100)
POC TCO2: 35 MMOL/L (ref 23–27)
POCT GLUCOSE: 304 MG/DL (ref 70–110)
POCT GLUCOSE: 306 MG/DL (ref 70–110)
POCT GLUCOSE: 324 MG/DL (ref 70–110)
POCT GLUCOSE: 327 MG/DL (ref 70–110)
POCT GLUCOSE: 360 MG/DL (ref 70–110)
POCT GLUCOSE: 376 MG/DL (ref 70–110)
POCT GLUCOSE: 468 MG/DL (ref 70–110)
POLYCHROMASIA BLD QL SMEAR: ABNORMAL
POTASSIUM SERPL-SCNC: 3.1 MMOL/L
PROT SERPL-MCNC: 5.4 G/DL
RBC # BLD AUTO: 2.77 M/UL
SAMPLE: ABNORMAL
SITE: ABNORMAL
SODIUM SERPL-SCNC: 147 MMOL/L
WBC # BLD AUTO: 16.74 K/UL

## 2019-01-31 PROCEDURE — P9047 ALBUMIN (HUMAN), 25%, 50ML: HCPCS | Mod: JG | Performed by: SURGERY

## 2019-01-31 PROCEDURE — 63600175 PHARM REV CODE 636 W HCPCS: Performed by: SURGERY

## 2019-01-31 PROCEDURE — 25500020 PHARM REV CODE 255: Performed by: SURGERY

## 2019-01-31 PROCEDURE — 94668 MNPJ CHEST WALL SBSQ: CPT

## 2019-01-31 PROCEDURE — 25000003 PHARM REV CODE 250: Performed by: STUDENT IN AN ORGANIZED HEALTH CARE EDUCATION/TRAINING PROGRAM

## 2019-01-31 PROCEDURE — S5571 INSULIN DISPOS PEN 3 ML: HCPCS | Performed by: STUDENT IN AN ORGANIZED HEALTH CARE EDUCATION/TRAINING PROGRAM

## 2019-01-31 PROCEDURE — 94003 VENT MGMT INPAT SUBQ DAY: CPT

## 2019-01-31 PROCEDURE — 99900035 HC TECH TIME PER 15 MIN (STAT)

## 2019-01-31 PROCEDURE — 87205 SMEAR GRAM STAIN: CPT

## 2019-01-31 PROCEDURE — 63600175 PHARM REV CODE 636 W HCPCS: Performed by: STUDENT IN AN ORGANIZED HEALTH CARE EDUCATION/TRAINING PROGRAM

## 2019-01-31 PROCEDURE — 25000003 PHARM REV CODE 250: Performed by: INTERNAL MEDICINE

## 2019-01-31 PROCEDURE — 36600 WITHDRAWAL OF ARTERIAL BLOOD: CPT

## 2019-01-31 PROCEDURE — A9698 NON-RAD CONTRAST MATERIALNOC: HCPCS | Performed by: SURGERY

## 2019-01-31 PROCEDURE — S0171 BUMETANIDE 0.5 MG: HCPCS | Performed by: SURGERY

## 2019-01-31 PROCEDURE — 87186 SC STD MICRODIL/AGAR DIL: CPT

## 2019-01-31 PROCEDURE — 25000003 PHARM REV CODE 250: Performed by: SURGERY

## 2019-01-31 PROCEDURE — 82803 BLOOD GASES ANY COMBINATION: CPT

## 2019-01-31 PROCEDURE — 94761 N-INVAS EAR/PLS OXIMETRY MLT: CPT

## 2019-01-31 PROCEDURE — 83735 ASSAY OF MAGNESIUM: CPT

## 2019-01-31 PROCEDURE — S0030 INJECTION, METRONIDAZOLE: HCPCS | Performed by: SURGERY

## 2019-01-31 PROCEDURE — 85007 BL SMEAR W/DIFF WBC COUNT: CPT

## 2019-01-31 PROCEDURE — 85027 COMPLETE CBC AUTOMATED: CPT

## 2019-01-31 PROCEDURE — 97803 MED NUTRITION INDIV SUBSEQ: CPT | Performed by: DIETITIAN, REGISTERED

## 2019-01-31 PROCEDURE — 27000221 HC OXYGEN, UP TO 24 HOURS

## 2019-01-31 PROCEDURE — 87077 CULTURE AEROBIC IDENTIFY: CPT

## 2019-01-31 PROCEDURE — 87070 CULTURE OTHR SPECIMN AEROBIC: CPT

## 2019-01-31 PROCEDURE — 63600175 PHARM REV CODE 636 W HCPCS: Performed by: INTERNAL MEDICINE

## 2019-01-31 PROCEDURE — 25000003 PHARM REV CODE 250: Performed by: NURSE PRACTITIONER

## 2019-01-31 PROCEDURE — 84100 ASSAY OF PHOSPHORUS: CPT

## 2019-01-31 PROCEDURE — 25000242 PHARM REV CODE 250 ALT 637 W/ HCPCS: Performed by: SURGERY

## 2019-01-31 PROCEDURE — 94640 AIRWAY INHALATION TREATMENT: CPT

## 2019-01-31 PROCEDURE — 20000000 HC ICU ROOM

## 2019-01-31 PROCEDURE — 25000003 PHARM REV CODE 250

## 2019-01-31 PROCEDURE — 63600175 PHARM REV CODE 636 W HCPCS

## 2019-01-31 PROCEDURE — 80053 COMPREHEN METABOLIC PANEL: CPT

## 2019-01-31 PROCEDURE — 94660 CPAP INITIATION&MGMT: CPT

## 2019-01-31 PROCEDURE — C9113 INJ PANTOPRAZOLE SODIUM, VIA: HCPCS | Performed by: SURGERY

## 2019-01-31 RX ORDER — PROPOFOL 10 MG/ML
INJECTION, EMULSION INTRAVENOUS
Status: DISCONTINUED
Start: 2019-01-31 | End: 2019-01-31 | Stop reason: WASHOUT

## 2019-01-31 RX ORDER — DEXMEDETOMIDINE HYDROCHLORIDE 4 UG/ML
INJECTION, SOLUTION INTRAVENOUS
Status: COMPLETED
Start: 2019-01-31 | End: 2019-01-31

## 2019-01-31 RX ORDER — PROPOFOL 10 MG/ML
INJECTION, EMULSION INTRAVENOUS
Status: COMPLETED
Start: 2019-01-31 | End: 2019-01-31

## 2019-01-31 RX ORDER — ETOMIDATE 2 MG/ML
INJECTION INTRAVENOUS
Status: DISCONTINUED
Start: 2019-01-31 | End: 2019-01-31 | Stop reason: WASHOUT

## 2019-01-31 RX ORDER — SUCCINYLCHOLINE CHLORIDE 20 MG/ML
INJECTION INTRAMUSCULAR; INTRAVENOUS
Status: COMPLETED
Start: 2019-01-31 | End: 2019-01-31

## 2019-01-31 RX ORDER — FENTANYL CITRATE 50 UG/ML
INJECTION, SOLUTION INTRAMUSCULAR; INTRAVENOUS
Status: COMPLETED
Start: 2019-01-31 | End: 2019-01-31

## 2019-01-31 RX ORDER — DEXMEDETOMIDINE HYDROCHLORIDE 4 UG/ML
0.2 INJECTION, SOLUTION INTRAVENOUS CONTINUOUS
Status: DISCONTINUED | OUTPATIENT
Start: 2019-01-31 | End: 2019-02-04

## 2019-01-31 RX ADMIN — INSULIN DETEMIR 8 UNITS: 100 INJECTION, SOLUTION SUBCUTANEOUS at 09:01

## 2019-01-31 RX ADMIN — CIPROFLOXACIN 400 MG: 2 INJECTION, SOLUTION INTRAVENOUS at 07:01

## 2019-01-31 RX ADMIN — METOPROLOL TARTRATE 5 MG: 1 INJECTION, SOLUTION INTRAVENOUS at 07:01

## 2019-01-31 RX ADMIN — HYDROCORTISONE SODIUM SUCCINATE 100 MG: 100 INJECTION, POWDER, FOR SOLUTION INTRAMUSCULAR; INTRAVENOUS at 02:01

## 2019-01-31 RX ADMIN — ENOXAPARIN SODIUM 40 MG: 100 INJECTION SUBCUTANEOUS at 05:01

## 2019-01-31 RX ADMIN — CLONAZEPAM 0.5 MG: 0.5 TABLET ORAL at 10:01

## 2019-01-31 RX ADMIN — HYDROCORTISONE SODIUM SUCCINATE 200 MG: 100 INJECTION, POWDER, FOR SOLUTION INTRAMUSCULAR; INTRAVENOUS at 10:01

## 2019-01-31 RX ADMIN — FENTANYL CITRATE 25 MCG/HR: 50 INJECTION, SOLUTION INTRAMUSCULAR; INTRAVENOUS at 11:01

## 2019-01-31 RX ADMIN — IPRATROPIUM BROMIDE AND ALBUTEROL SULFATE 3 ML: .5; 3 SOLUTION RESPIRATORY (INHALATION) at 11:01

## 2019-01-31 RX ADMIN — ALBUMIN HUMAN 10 ML/HR: 0.25 SOLUTION INTRAVENOUS at 02:01

## 2019-01-31 RX ADMIN — DIPHENHYDRAMINE HYDROCHLORIDE 12.5 MG: 50 INJECTION, SOLUTION INTRAMUSCULAR; INTRAVENOUS at 05:01

## 2019-01-31 RX ADMIN — DIPHENHYDRAMINE HYDROCHLORIDE 12.5 MG: 50 INJECTION, SOLUTION INTRAMUSCULAR; INTRAVENOUS at 11:01

## 2019-01-31 RX ADMIN — PROPOFOL 200 MG: 10 INJECTION, EMULSION INTRAVENOUS at 10:01

## 2019-01-31 RX ADMIN — PANTOPRAZOLE SODIUM 40 MG: 40 INJECTION, POWDER, LYOPHILIZED, FOR SOLUTION INTRAVENOUS at 08:01

## 2019-01-31 RX ADMIN — IPRATROPIUM BROMIDE AND ALBUTEROL SULFATE 3 ML: .5; 3 SOLUTION RESPIRATORY (INHALATION) at 07:01

## 2019-01-31 RX ADMIN — DULOXETINE 60 MG: 30 CAPSULE, DELAYED RELEASE ORAL at 08:01

## 2019-01-31 RX ADMIN — HYDROXYZINE PAMOATE 25 MG: 25 CAPSULE ORAL at 10:01

## 2019-01-31 RX ADMIN — METRONIDAZOLE 500 MG: 500 INJECTION, SOLUTION INTRAVENOUS at 04:01

## 2019-01-31 RX ADMIN — IPRATROPIUM BROMIDE AND ALBUTEROL SULFATE 3 ML: .5; 3 SOLUTION RESPIRATORY (INHALATION) at 03:01

## 2019-01-31 RX ADMIN — Medication 900 ML: at 02:01

## 2019-01-31 RX ADMIN — LORAZEPAM 1 MG: 2 INJECTION INTRAMUSCULAR; INTRAVENOUS at 08:01

## 2019-01-31 RX ADMIN — BUMETANIDE 1 MG: 0.25 INJECTION INTRAMUSCULAR; INTRAVENOUS at 08:01

## 2019-01-31 RX ADMIN — METOPROLOL TARTRATE 5 MG: 1 INJECTION, SOLUTION INTRAVENOUS at 10:01

## 2019-01-31 RX ADMIN — METRONIDAZOLE 500 MG: 500 INJECTION, SOLUTION INTRAVENOUS at 09:01

## 2019-01-31 RX ADMIN — HYDROXYZINE PAMOATE 25 MG: 25 CAPSULE ORAL at 02:01

## 2019-01-31 RX ADMIN — Medication 900 ML: at 12:01

## 2019-01-31 RX ADMIN — CIPROFLOXACIN 400 MG: 2 INJECTION, SOLUTION INTRAVENOUS at 09:01

## 2019-01-31 RX ADMIN — METOPROLOL TARTRATE 5 MG: 1 INJECTION, SOLUTION INTRAVENOUS at 01:01

## 2019-01-31 RX ADMIN — ACETAMINOPHEN 650 MG: 650 SOLUTION ORAL at 03:01

## 2019-01-31 RX ADMIN — FENTANYL CITRATE 100 MCG: 50 INJECTION, SOLUTION INTRAMUSCULAR; INTRAVENOUS at 10:01

## 2019-01-31 RX ADMIN — INSULIN ASPART 10 UNITS: 100 INJECTION, SOLUTION INTRAVENOUS; SUBCUTANEOUS at 09:01

## 2019-01-31 RX ADMIN — METRONIDAZOLE 500 MG: 500 INJECTION, SOLUTION INTRAVENOUS at 11:01

## 2019-01-31 RX ADMIN — POTASSIUM PHOSPHATE, MONOBASIC AND POTASSIUM PHOSPHATE, DIBASIC 30 MMOL: 224; 236 INJECTION, SOLUTION INTRAVENOUS at 08:01

## 2019-01-31 RX ADMIN — DEXMEDETOMIDINE HYDROCHLORIDE 0.2 MCG: 4 INJECTION, SOLUTION INTRAVENOUS at 10:01

## 2019-01-31 RX ADMIN — DEXMEDETOMIDINE HYDROCHLORIDE 0.6 MCG/KG/HR: 4 INJECTION, SOLUTION INTRAVENOUS at 12:01

## 2019-01-31 RX ADMIN — DEXMEDETOMIDINE HYDROCHLORIDE 1.4 MCG/KG/HR: 4 INJECTION, SOLUTION INTRAVENOUS at 11:01

## 2019-01-31 RX ADMIN — ALTEPLASE 2 MG: 2.2 INJECTION, POWDER, LYOPHILIZED, FOR SOLUTION INTRAVENOUS at 10:01

## 2019-01-31 RX ADMIN — ALBUMIN HUMAN 10 ML/HR: 0.25 SOLUTION INTRAVENOUS at 08:01

## 2019-01-31 RX ADMIN — INSULIN ASPART 8 UNITS: 100 INJECTION, SOLUTION INTRAVENOUS; SUBCUTANEOUS at 11:01

## 2019-01-31 RX ADMIN — INSULIN ASPART 4 UNITS: 100 INJECTION, SOLUTION INTRAVENOUS; SUBCUTANEOUS at 02:01

## 2019-01-31 RX ADMIN — HYDROXYZINE PAMOATE 25 MG: 25 CAPSULE ORAL at 08:01

## 2019-01-31 RX ADMIN — INSULIN ASPART 10 UNITS: 100 INJECTION, SOLUTION INTRAVENOUS; SUBCUTANEOUS at 05:01

## 2019-01-31 RX ADMIN — ALBUMIN HUMAN 10 ML/HR: 0.25 SOLUTION INTRAVENOUS at 05:01

## 2019-01-31 RX ADMIN — SUCCINYLCHOLINE CHLORIDE 20 MG: 20 INJECTION, SOLUTION INTRAMUSCULAR; INTRAVENOUS at 10:01

## 2019-01-31 RX ADMIN — LORAZEPAM 1 MG: 2 INJECTION INTRAMUSCULAR; INTRAVENOUS at 01:01

## 2019-01-31 RX ADMIN — DEXMEDETOMIDINE HYDROCHLORIDE 0.7 MCG/KG/HR: 4 INJECTION, SOLUTION INTRAVENOUS at 05:01

## 2019-01-31 RX ADMIN — INSULIN ASPART 8 UNITS: 100 INJECTION, SOLUTION INTRAVENOUS; SUBCUTANEOUS at 05:01

## 2019-01-31 RX ADMIN — ALBUMIN HUMAN 10 ML/HR: 0.25 SOLUTION INTRAVENOUS at 11:01

## 2019-01-31 RX ADMIN — AMITRIPTYLINE HYDROCHLORIDE 50 MG: 25 TABLET, FILM COATED ORAL at 10:01

## 2019-01-31 RX ADMIN — ASCORBIC ACID, VITAMIN A PALMITATE, CHOLECALCIFEROL, THIAMINE HYDROCHLORIDE, RIBOFLAVIN-5 PHOSPHATE SODIUM, PYRIDOXINE HYDROCHLORIDE, NIACINAMIDE, DEXPANTHENOL, ALPHA-TOCOPHEROL ACETATE, VITAMIN K1, FOLIC ACID, BIOTIN, CYANOCOBALAMIN: 200; 3300; 200; 6; 3.6; 6; 40; 15; 10; 150; 600; 60; 5 INJECTION, SOLUTION INTRAVENOUS at 09:01

## 2019-01-31 RX ADMIN — INSULIN ASPART 5 UNITS: 100 INJECTION, SOLUTION INTRAVENOUS; SUBCUTANEOUS at 11:01

## 2019-01-31 NOTE — NURSING
Patient agitated, pulling at lines, disoriented x4. Dr. Olivier called per CHANELLE Garcia.  New order for bilat wrist restraints. Bruising present prior to start of restraints.

## 2019-01-31 NOTE — PROGRESS NOTES
Dr Levy updated on current status. Patient visibly in distress. New orders noted for ABG. Pulmonary also updated. Will continue to monitor.

## 2019-01-31 NOTE — PROGRESS NOTES
Pulmonary & Critical Care Medicine Progress Note    Subjective:   Ms. Ngo has become more somnolent and having increased work of breathing. She has required continuous bipap at % fiO2. The decision was made to intubate her.     Past medical, surgical, family, and social history from initial consult was reviewed and verified during today's visit.     Vital Signs:   Temp:  [99.1 °F (37.3 °C)-101 °F (38.3 °C)] 100.8 °F (38.2 °C)  Pulse:  [110-133] 120  Resp:  [21-53] 29  SpO2:  [85 %-99 %] 94 %  BP: ()/() 136/58      Fluid Balance:     Intake/Output Summary (Last 24 hours) at 1/31/2019 1211  Last data filed at 1/31/2019 1059  Gross per 24 hour   Intake 2031.02 ml   Output 2705 ml   Net -673.98 ml       Review of Systems:   A comprehensive 12-point review of systems was performed, and is negative except for those items mentioned above in the HPI section of this note.     Physical Exam:   General: cachectic, very ill appearing WF, now intubated and sedated  HEENT: AT/NC, PERRL, EOMI, dry mm, ET tube in place.   Cardiac: tachycardic, regular rhythm,with no MRG with brisk cap refill and symmetric pulses in distal extremities.  Respiratory: Normal inspection. Symmetric chest rise. Worsening crackles on the R with course on the L, decreased basilar breath sounds. No increased work of breathing noted.   Abdomen: Soft, NT/ND. +BS. No hepatosplenomegaly.   Extremities: No edema.   Neuro: somnolent prior to intubation, now sedated     Personal Review and Summary of Interval Diagnostics    Laboratory Studies:   Recent Labs   Lab 01/31/19  0923   PH 7.418   PCO2 51.8*   PO2 63*   HCO3 33.4*   POCSATURATED 91*   BE 9     Recent Labs   Lab 01/31/19  0525   WBC 16.74*   RBC 2.77*   HGB 7.7*   HCT 23.5*   *   MCV 85   MCH 27.8   MCHC 32.8     Recent Labs   Lab 01/31/19  0525   *   K 3.1*      CO2 32*   BUN 22   CREATININE 0.8   MG 1.7       Microbiology Data:   Microbiology Results (last 7 days)      Procedure Component Value Units Date/Time    Blood culture [[id number]] Collected:  01/30/19 2347    Order Status:  Sent Specimen:  Blood Updated:  01/31/19 0411    Blood culture [[id number]] Collected:  01/30/19 2353    Order Status:  Sent Specimen:  Blood Updated:  01/31/19 0411    Culture, Respiratory with Gram Stain [[id number]]     Order Status:  No result Specimen:  Respiratory         Chest Imaging:     Infusions:     albumin human 25% 10 mL/hr (01/31/19 0811)    Amino acid 5% - dextrose 15% (CLINIMIX-E) solution with additives (1L  provides 510 kcal/L dextrose, with 50 gm AA, 150 gm CHO, Na 35, K 30, Mg 5, Ca 4.5, Acetate 80, Cl 39, Phos 15) 50 mL/hr at 01/30/19 1950    Amino acid 5% - dextrose 15% (CLINIMIX-E) solution with additives (1L  provides 510 kcal/L dextrose, with 50 gm AA, 150 gm CHO, Na 35, K 30, Mg 5, Ca 4.5, Acetate 80, Cl 39, Phos 15)      dexmedetomidine (PRECEDEX) infusion 0.5 mcg/kg/hr (01/31/19 1120)    fentanyl 50 mcg/hr (01/31/19 1127)       Scheduled Medications:    albuterol-ipratropium  3 mL Nebulization Q4H    amitriptyline  50 mg Oral QHS    bumetanide  1 mg Intravenous Daily    ciprofloxacin  400 mg Intravenous Q12H    clonazePAM  0.5 mg Oral QHS    DULoxetine  60 mg Oral Daily    enoxaparin  40 mg Subcutaneous Daily    hydrocortisone sodium succinate  100 mg Intravenous Once    hydrOXYzine pamoate  25 mg Oral TID    insulin detemir U-100  8 Units Subcutaneous Daily    metoprolol  5 mg Intravenous Q6H    metronidazole  500 mg Intravenous Q8H    pantoprazole  40 mg Intravenous Daily    potassium phosphate IVPB  30 mmol Intravenous Once       PRN Medications:   acetaminophen, albuterol sulfate, dextrose 50%, diphenhydrAMINE, glucagon (human recombinant), hydrALAZINE, HYDROmorphone, insulin aspart U-100, ipratropium, labetalol, lorazepam, naloxone, ondansetron, sodium chloride 0.9%, sodium chloride 3%    Impression & Recommendations    Acute Hypoxic Respiratory  failure  - likely secondary to aspiration as well as atelectasis post surgery.   - now with worsening air space disease on the R concerning for likely ongoing aspiration vs developing pneumonia vs pneumonitis  - CT with evidence of aspirated barium - should not cause long term issues with her lungs.   - obtain sputum sample now that she is intubated  - maintain lung protective ventilation  - monitor fluid balance with a goal of fluid neutrality  - this patient is very ill and has an overall very poor prognosis.       Delilah Duong M.D.  [employer] Pulmonary/Critical Care Fellow

## 2019-01-31 NOTE — PLAN OF CARE
Problem: Adult Inpatient Plan of Care  Goal: Plan of Care Review  Pt. on BiPAP with documented settings. Alarms on and functioning properly. Pt appears to be in mild distress. Will continue to monitor

## 2019-01-31 NOTE — PLAN OF CARE
Problem: Nutrition Impairment (Mechanical Ventilation, Invasive)  Goal: Optimal Nutrition Delivery    Intervention: Optimize Nutrition Delivery  Recommendation:   1. REC to increase TPN 15%dex, 5%AA to 60 mL/hr with 20% Lipids 250 mL on MWF (discontinue if patient started on propofol) to better meet pt's assessed needs provding 1228 calories, 70 gm Pro, 1440 mL fluid, GIR 2.3 mg/kg/min   2. Once medically able to start TF, rec Diabetasource AC at 10 mL/hr to INC as ASHANTI by 10 mL every 4 hrs until reach goal rate of 50 mL/hr to provide 1440 calories, 72 gm Pro, 981 mL water.    3. Will reassess TPN and/or TF rate on next visit.      Goals: meet > 85% EEN daily  Nutrition Goal Status: goal not met  Communication of RD Recs: reviewed with RN     Nutrition Discharge Planning: Home on TF

## 2019-01-31 NOTE — PLAN OF CARE
Problem: Adult Inpatient Plan of Care  Goal: Plan of Care Review  Outcome: Ongoing (interventions implemented as appropriate)   01/31/19 0421   Plan of Care Review   Plan of Care Reviewed With patient;sibling   Progress no change   Patient resting in bed. Sister aldair at bedside earlier this shift and updated on plan of care. Remained on bipap all night with adequate O2 sats in the upper 90s. PO2 improved on ABG. Able to sleep for a few hours in between care this shift since one hour after receiving night time meds. Appears to be calmer than previous shift. PRN ativan utilized. Soft wrist restraints in place. No injuries noted other than bruising that was present prior. Safety maintained. Remains tachycardic. Tmax 101 axillary. Blood cultures obtained per order. Plan to continue plan of care and monitor respiratory status. Continuous monitoring maintained.

## 2019-01-31 NOTE — PROCEDURES
"Kaylee Ngo is a 63 y.o. female patient.    Temp: (!) 100.8 °F (38.2 °C) (19 0715)  Pulse: (!) 120 (19 1130)  Resp: (!) 29 (19 1130)  BP: (!) 136/58 (19 1130)  SpO2: (!) 94 % (19 1130)  Weight: 63.9 kg (140 lb 14 oz) (19 0600)  Height: 5' 5" (165.1 cm) (19 1250)       Intubation  Date/Time: 2019 12:03 PM  Location procedure was performed: [facility] PULMONARY MEDICINE  Performed by: Delilah Duong MD  Authorized by: Delilah Duong MD   Assisting provider: Sohan Woodall MD  Pre-operative diagnosis: abnormal cxr  Post-operative diagnosis: abnormal cxr  Consent Done: Yes  [facility].  Patient identity confirmed: , MRN and verbally with patient  Time out: Immediately prior to procedure a "time out" was called to verify the correct patient, procedure, equipment, support staff and site/side marked as required.  Indications: respiratory failure and  airway protection  Intubation method: direct  Patient status: paralyzed (RSI)  Preoxygenation: bag valve mask  Sedatives: propofol  Paralytic: succinylcholine  Laryngoscope size: López 4  Tube size: 7.5 mm  Tube type: cuffed  Number of attempts: 1  Cricoid pressure: yes  Cords visualized: yes  Post-procedure assessment: chest rise and CO2 detector  Breath sounds: rales/crackles  Cuff inflated: yes  ETT to lip: 22 cm  ETT to teeth: 23 cm  Tube secured with: ETT zurita  Chest x-ray interpreted by me.  Complications: No  Estimated blood loss (mL): 0  Specimens: No  Implants: No          Delilah Duong M.D.  [facility] Pulmonary/Critical Care Fellow    "

## 2019-01-31 NOTE — PT/OT/SLP PROGRESS
Speech Language Pathology      Kaylee Ngo  MRN: [id number]   [id number]/[id number] A    Patient not seen today secondary to Nursing hold (Comment)(Pt unable to be weaned from BiPap thus unable to participate in Bedside Swallowing Evaluation). Will follow-up in PM as appropriate or next therapy date.    QUIANA Christensen, CCC-SLP   Spectra: [phone]  01/31/2019

## 2019-01-31 NOTE — PROGRESS NOTES
[facility]  Adult Nutrition  Progress Note    SUMMARY       Recommendations    Recommendation:   1. REC to increase TPN 15%dex, 5%AA to 60 mL/hr with 20% Lipids 250 mL on MWF (  discontinue if patient started on propofol) to better meet pt's assessed needs provding 1228 calories, 70 gm Pro, 1440 mL fluid, GIR 2.3 mg/kg/min   2. Once medically able to start TF, rec Diabetasource AC at 10 mL/hr to INC as ASHANTI by 10 mL every 4 hrs until reach goal rate of 50 mL/hr to provide 1440 calories, 72 gm Pro, 981 mL water.    3. Will reassess TPN and/or TF rate on next visit.     Goals: meet > 85% EEN daily  Nutrition Goal Status: goal not met  Communication of RD Recs: reviewed with RN    Nutrition Discharge Planning: Home on TF       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 Sedation via Precedex and Fentanyl. Pt s/p Sandeep-en-Y reversal with J-tube placement. Currently on PN support with NGT to LIS. NFPE completed 1/22/19.      Nutrition Risk Screen    Nutrition Risk Screen: tube feeding or parenteral nutrition    Nutrition/Diet History    Patient Reported Diet/Restrictions/Preferences: diabetic diet  Typical Food/Fluid Intake: Patient now intubated. Sister present.   Food Preferences: none  Spiritual, Cultural Beliefs, Moravian Practices,  "Values that Affect Care: no  Supplemental Drinks or Food Habits: Impact Peptide 1.5-3 cans per day  Factors Affecting Nutritional Intake: altered gastrointestinal function, NPO    Anthropometrics    Temp: (!) 100.8 °F (38.2 °C)  Height Method: Stated  Height: 5' 5" (165.1 cm)  Height (inches): 65 in  Weight Method: Bed Scale  Weight: 63.9 kg (140 lb 14 oz)  Weight (lb): 140.88 lb  Ideal Body Weight (IBW), Female: 125 lb  % Ideal Body Weight, Female (lb): 112.7 lb  BMI (Calculated): 23.5  BMI Grade: 18.5-24.9 - normal  Usual Body Weight (UBW), k.9 kg(Admit Weight 19)  Weight Change Amount: 9 lb  % Usual Body Weight: 94.31  % Weight Change From Usual Weight: -5.89 %       Lab/Procedures/Meds    Pertinent Labs Reviewed: reviewed  Recent Labs   Lab 19   *   K 3.1*      CO2 32*   BUN 22   CREATININE 0.8   MG 1.7     Recent Labs   Lab 19  0525   WBC 16.74*   RBC 2.77*   HGB 7.7*   HCT 23.5*   *   MCV 85   MCH 27.8   MCHC 32.8     Recent Labs   Lab 19  0525   ALT 8*   AST 13   ALKPHOS 131   BILITOT 1.2*   PROT 5.4*   ALBUMIN 2.8*     Hemoglobin A1C   Date Value Ref Range Status   2018 6.1 (H) 4.0 - 5.6 % Final     Comment:     ADA Screening Guidelines:  5.7-6.4%  Consistent with prediabetes  >or=6.5%  Consistent with diabetes  High levels of fetal hemoglobin interfere with the HbA1C  assay. Heterozygous hemoglobin variants (HbS, HgC, etc)do  not significantly interfere with this assay.   However, presence of multiple variants may affect accuracy.           Pertinent Medications Reviewed: reviewed  Scheduled Meds:   albuterol-ipratropium  3 mL Nebulization Q4H    amitriptyline  50 mg Oral QHS    bumetanide  1 mg Intravenous Daily    ciprofloxacin  400 mg Intravenous Q12H    clonazePAM  0.5 mg Oral QHS    DULoxetine  60 mg Oral Daily    enoxaparin  40 mg Subcutaneous Daily    hydrOXYzine pamoate  25 mg Oral TID    insulin detemir U-100  8 Units Subcutaneous " Daily    metoprolol  5 mg Intravenous Q6H    metronidazole  500 mg Intravenous Q8H    pantoprazole  40 mg Intravenous Daily    potassium phosphate IVPB  30 mmol Intravenous Once     Continuous Infusions:   albumin human 25% 10 mL/hr (01/31/19 0811)    Amino acid 5% - dextrose 15% (CLINIMIX-E) solution with additives (1L  provides 510 kcal/L dextrose, with 50 gm AA, 150 gm CHO, Na 35, K 30, Mg 5, Ca 4.5, Acetate 80, Cl 39, Phos 15) 50 mL/hr at 01/30/19 1950    Amino acid 5% - dextrose 15% (CLINIMIX-E) solution with additives (1L  provides 510 kcal/L dextrose, with 50 gm AA, 150 gm CHO, Na 35, K 30, Mg 5, Ca 4.5, Acetate 80, Cl 39, Phos 15)      dexmedetomidine (PRECEDEX) infusion 0.6 mcg/kg/hr (01/31/19 1249)    fentanyl 50 mcg/hr (01/31/19 1127)     PRN Meds:.acetaminophen, albuterol sulfate, dextrose 50%, diphenhydrAMINE, glucagon (human recombinant), hydrALAZINE, HYDROmorphone, insulin aspart U-100, ipratropium, labetalol, lorazepam, naloxone, ondansetron, sodium chloride 0.9%, sodium chloride 3%    Estimated/Assessed Needs    Weight Used For Calorie Calculations: 63.9 kg (140 lb 14 oz)  Energy Calorie Requirements (kcal): 1650  Energy Need Method: [facility]     Protein Requirements: 77-90(1.2-1.4 gm Pro/kg)  Weight Used For Protein Calculations: 63.9 kg (140 lb 14 oz)     Estimated Fluid Requirement Method: RDA Method  RDA Method (mL): 1650  CHO Requirement: 200g      Nutrition Prescription Ordered    Current Diet Order: NPO  Current Nutrition Support Formula Ordered: Clinimix E 5/15  Current Nutrition Support Rate Ordered: 50 (ml)(ml/hr)  Current Nutrition Support Frequency Ordered: mL/hr  Oral Nutrition Supplement: lipids 3 x week    Evaluation of Received Nutrient/Fluid Intake    Parenteral Calories (kcal): 852  Parenteral Protein (gm): 60  Parenteral Fluid (mL): 1200  Lipid Calories (kcals): 212 kcals  GIR (Glucose Infusion Rate) (mg/kg/min): 2 mg/kg/min  % Kcal Needs: 75  Total Protein (gm): 60  %  Protein Needs: 75  Energy Calories Required: not meeting needs  Protein Required: not meeting needs  Fluid Required: (per MD)  Comments: LBM 1/31/19  Tolerance: tolerating     % Intake of Estimated Energy Needs: 50 - 75 %  % Meal Intake: NPO    Nutrition Risk    Level of Risk/Frequency of Follow-up: (2 x week)     Assessment and Plan    Moderate malnutrition    Malnutrition in the context of Chronic Illness/Injury    Related to (etiology):  Altered GI Function; Cancer    Signs and Symptoms (as evidenced by):    Body Fat Depletion: mild depletion of orbitals, triceps and thoracic and lumbar region   Muscle Mass Depletion: moderate depletion of temples, clavicle region, interosseous muscle and lower extremities     Interventions  Collaboration with providers    Nutrition Diagnosis Status:  Continues            Monitor and Evaluation    Food and Nutrient Intake: energy intake, enteral nutrition intake, parenteral nutrition intake  Food and Nutrient Adminstration: enteral and parenteral nutrition administration, diet order  Physical Activity and Function: nutrition-related ADLs and IADLs  Anthropometric Measurements: weight, weight change  Biochemical Data, Medical Tests and Procedures: electrolyte and renal panel, glucose/endocrine profile  Nutrition-Focused Physical Findings: overall appearance     Malnutrition Assessment  Subcutaneous Fat (Malnutrition): mild depletion  Muscle Mass (Malnutrition): moderate depletion   Orbital Region (Subcutaneous Fat Loss): mild depletion  Upper Arm Region (Subcutaneous Fat Loss): mild depletion  Thoracic and Lumbar Region: mild depletion   [place] Region (Muscle Loss): moderate depletion  Clavicle Bone Region (Muscle Loss): moderate depletion  Clavicle and Acromion Bone Region (Muscle Loss): mild depletion  Dorsal Hand (Muscle Loss): mild depletion  Patellar Region (Muscle Loss): moderate depletion  Anterior Thigh Region (Muscle Loss): moderate depletion  Posterior Calf Region  (Muscle Loss): moderate depletion       Subcutaneous Fat Loss (Final Summary): mild protein-calorie malnutrition  Muscle Loss Evaluation (Final Summary): moderate protein-calorie malnutrition         Nutrition Follow-Up    RD Follow-up?: Yes

## 2019-01-31 NOTE — NURSING
Reported temp of 100.2 to Dr Olivier order for tylenol Q8 PRN for temp >101. Order for blood cultures x2. Reported patient having scratched part of incision open. Cleansed with normal saline. Foam dressing applied. States team will assess in the AM.

## 2019-01-31 NOTE — PT/OT/SLP PROGRESS
Physical Therapy  Missed Visit    Patient Name:  Kaylee Ngo   MRN:  [id number]    Patient not seen today secondary to pt intubated this AM. Will follow-up as able.    Zeinab Zhang, PT   1/31/2019

## 2019-01-31 NOTE — PT/OT/SLP PROGRESS
Speech Language Pathology      Kaylee Ngo  MRN: [id number]   [id number]/[id number] A    Patient will require new Speech Therapy orders upon extubation and when medically appropriate 2/2 change in medical status and intubation.     CECE Barraza., CCC-SLP  Pager: [phone]  01/31/2019

## 2019-02-01 PROBLEM — J96.01 ACUTE HYPOXEMIC RESPIRATORY FAILURE: Status: ACTIVE | Noted: 2019-02-01

## 2019-02-01 LAB
ABO + RH BLD: NORMAL
ALBUMIN SERPL BCP-MCNC: 3 G/DL
ALLENS TEST: ABNORMAL
ALP SERPL-CCNC: 119 U/L
ALT SERPL W/O P-5'-P-CCNC: 5 U/L
ANION GAP SERPL CALC-SCNC: 11 MMOL/L
AST SERPL-CCNC: 10 U/L
BASOPHILS # BLD AUTO: 0.03 K/UL
BASOPHILS NFR BLD: 0.2 %
BILIRUB SERPL-MCNC: 1 MG/DL
BLD GP AB SCN CELLS X3 SERPL QL: NORMAL
BLD PROD TYP BPU: NORMAL
BLD PROD TYP BPU: NORMAL
BLOOD UNIT EXPIRATION DATE: NORMAL
BLOOD UNIT EXPIRATION DATE: NORMAL
BLOOD UNIT TYPE CODE: 8400
BLOOD UNIT TYPE CODE: 8400
BLOOD UNIT TYPE: NORMAL
BLOOD UNIT TYPE: NORMAL
BUN SERPL-MCNC: 41 MG/DL
CALCIUM SERPL-MCNC: 7.6 MG/DL
CHLORIDE SERPL-SCNC: 105 MMOL/L
CO2 SERPL-SCNC: 31 MMOL/L
CODING SYSTEM: NORMAL
CODING SYSTEM: NORMAL
CREAT SERPL-MCNC: 1 MG/DL
DELSYS: ABNORMAL
DIFFERENTIAL METHOD: ABNORMAL
DISPENSE STATUS: NORMAL
DISPENSE STATUS: NORMAL
EOSINOPHIL # BLD AUTO: 0 K/UL
EOSINOPHIL NFR BLD: 0 %
ERYTHROCYTE [DISTWIDTH] IN BLOOD BY AUTOMATED COUNT: 14.5 %
ERYTHROCYTE [SEDIMENTATION RATE] IN BLOOD BY WESTERGREN METHOD: 20 MM/H
EST. GFR  (AFRICAN AMERICAN): >60 ML/MIN/1.73 M^2
EST. GFR  (NON AFRICAN AMERICAN): >60 ML/MIN/1.73 M^2
FIO2: 70
GLUCOSE SERPL-MCNC: 429 MG/DL
HCO3 UR-SCNC: 31.4 MMOL/L (ref 24–28)
HCT VFR BLD AUTO: 21.6 %
HGB BLD-MCNC: 6.7 G/DL
LYMPHOCYTES # BLD AUTO: 1 K/UL
LYMPHOCYTES NFR BLD: 7.3 %
MAGNESIUM SERPL-MCNC: 2 MG/DL
MCH RBC QN AUTO: 26.9 PG
MCHC RBC AUTO-ENTMCNC: 31 G/DL
MCV RBC AUTO: 87 FL
MIN VOL: 11.2
MODE: ABNORMAL
MONOCYTES # BLD AUTO: 0.4 K/UL
MONOCYTES NFR BLD: 2.7 %
NEUTROPHILS # BLD AUTO: 11.6 K/UL
NEUTROPHILS NFR BLD: 89.8 %
PCO2 BLDA: 48 MMHG (ref 35–45)
PEEP: 8
PH SMN: 7.42 [PH] (ref 7.35–7.45)
PHOSPHATE SERPL-MCNC: 2.3 MG/DL
PIP: 22
PLATELET # BLD AUTO: 161 K/UL
PMV BLD AUTO: 12.3 FL
PO2 BLDA: 76 MMHG (ref 80–100)
POC BE: 7 MMOL/L
POC SATURATED O2: 95 % (ref 95–100)
POC TCO2: 33 MMOL/L (ref 23–27)
POCT GLUCOSE: 278 MG/DL (ref 70–110)
POCT GLUCOSE: 280 MG/DL (ref 70–110)
POCT GLUCOSE: 355 MG/DL (ref 70–110)
POCT GLUCOSE: 401 MG/DL (ref 70–110)
POCT GLUCOSE: 435 MG/DL (ref 70–110)
POTASSIUM SERPL-SCNC: 3.7 MMOL/L
PROT SERPL-MCNC: 5.2 G/DL
RBC # BLD AUTO: 2.49 M/UL
SAMPLE: ABNORMAL
SITE: ABNORMAL
SODIUM SERPL-SCNC: 147 MMOL/L
SP02: 95
TRANS ERYTHROCYTES VOL PATIENT: NORMAL ML
TRANS ERYTHROCYTES VOL PATIENT: NORMAL ML
VT: 400
WBC # BLD AUTO: 13.07 K/UL

## 2019-02-01 PROCEDURE — 80053 COMPREHEN METABOLIC PANEL: CPT

## 2019-02-01 PROCEDURE — 36600 WITHDRAWAL OF ARTERIAL BLOOD: CPT

## 2019-02-01 PROCEDURE — S0030 INJECTION, METRONIDAZOLE: HCPCS | Performed by: SURGERY

## 2019-02-01 PROCEDURE — 27000221 HC OXYGEN, UP TO 24 HOURS

## 2019-02-01 PROCEDURE — 25000242 PHARM REV CODE 250 ALT 637 W/ HCPCS: Performed by: SURGERY

## 2019-02-01 PROCEDURE — B4185 PARENTERAL SOL 10 GM LIPIDS: HCPCS | Performed by: SURGERY

## 2019-02-01 PROCEDURE — S0171 BUMETANIDE 0.5 MG: HCPCS | Performed by: SURGERY

## 2019-02-01 PROCEDURE — 94640 AIRWAY INHALATION TREATMENT: CPT

## 2019-02-01 PROCEDURE — 20000000 HC ICU ROOM

## 2019-02-01 PROCEDURE — 63600175 PHARM REV CODE 636 W HCPCS: Performed by: SURGERY

## 2019-02-01 PROCEDURE — P9021 RED BLOOD CELLS UNIT: HCPCS

## 2019-02-01 PROCEDURE — C9113 INJ PANTOPRAZOLE SODIUM, VIA: HCPCS | Performed by: SURGERY

## 2019-02-01 PROCEDURE — 25000003 PHARM REV CODE 250: Performed by: STUDENT IN AN ORGANIZED HEALTH CARE EDUCATION/TRAINING PROGRAM

## 2019-02-01 PROCEDURE — 83735 ASSAY OF MAGNESIUM: CPT

## 2019-02-01 PROCEDURE — 25000003 PHARM REV CODE 250: Performed by: NURSE PRACTITIONER

## 2019-02-01 PROCEDURE — 63600175 PHARM REV CODE 636 W HCPCS: Performed by: STUDENT IN AN ORGANIZED HEALTH CARE EDUCATION/TRAINING PROGRAM

## 2019-02-01 PROCEDURE — 85027 COMPLETE CBC AUTOMATED: CPT

## 2019-02-01 PROCEDURE — 84100 ASSAY OF PHOSPHORUS: CPT

## 2019-02-01 PROCEDURE — 94668 MNPJ CHEST WALL SBSQ: CPT

## 2019-02-01 PROCEDURE — 86920 COMPATIBILITY TEST SPIN: CPT

## 2019-02-01 PROCEDURE — 94003 VENT MGMT INPAT SUBQ DAY: CPT

## 2019-02-01 PROCEDURE — 85007 BL SMEAR W/DIFF WBC COUNT: CPT

## 2019-02-01 PROCEDURE — 99900035 HC TECH TIME PER 15 MIN (STAT)

## 2019-02-01 PROCEDURE — S5571 INSULIN DISPOS PEN 3 ML: HCPCS | Performed by: STUDENT IN AN ORGANIZED HEALTH CARE EDUCATION/TRAINING PROGRAM

## 2019-02-01 PROCEDURE — 82803 BLOOD GASES ANY COMBINATION: CPT

## 2019-02-01 PROCEDURE — 86901 BLOOD TYPING SEROLOGIC RH(D): CPT

## 2019-02-01 PROCEDURE — 36430 TRANSFUSION BLD/BLD COMPNT: CPT

## 2019-02-01 PROCEDURE — 25000003 PHARM REV CODE 250: Performed by: SURGERY

## 2019-02-01 PROCEDURE — 27201040 HC RC 50 FILTER: Mod: 91

## 2019-02-01 PROCEDURE — 85025 COMPLETE CBC W/AUTO DIFF WBC: CPT

## 2019-02-01 PROCEDURE — 63600175 PHARM REV CODE 636 W HCPCS: Performed by: INTERNAL MEDICINE

## 2019-02-01 PROCEDURE — 94761 N-INVAS EAR/PLS OXIMETRY MLT: CPT

## 2019-02-01 PROCEDURE — 25000003 PHARM REV CODE 250: Performed by: INTERNAL MEDICINE

## 2019-02-01 PROCEDURE — P9047 ALBUMIN (HUMAN), 25%, 50ML: HCPCS | Mod: JG | Performed by: SURGERY

## 2019-02-01 RX ORDER — BUMETANIDE 0.25 MG/ML
1 INJECTION INTRAMUSCULAR; INTRAVENOUS ONCE
Status: COMPLETED | OUTPATIENT
Start: 2019-02-01 | End: 2019-02-01

## 2019-02-01 RX ORDER — ACETAMINOPHEN 650 MG/20.3ML
650 LIQUID ORAL ONCE
Status: COMPLETED | OUTPATIENT
Start: 2019-02-01 | End: 2019-02-01

## 2019-02-01 RX ORDER — ACETAMINOPHEN 650 MG/1
650 SUPPOSITORY RECTAL EVERY 8 HOURS PRN
Status: DISCONTINUED | OUTPATIENT
Start: 2019-02-01 | End: 2019-02-26 | Stop reason: HOSPADM

## 2019-02-01 RX ORDER — HYDROCODONE BITARTRATE AND ACETAMINOPHEN 500; 5 MG/1; MG/1
TABLET ORAL
Status: DISCONTINUED | OUTPATIENT
Start: 2019-02-01 | End: 2019-02-23

## 2019-02-01 RX ADMIN — DEXMEDETOMIDINE HYDROCHLORIDE 1.4 MCG/KG/HR: 4 INJECTION, SOLUTION INTRAVENOUS at 04:02

## 2019-02-01 RX ADMIN — I.V. FAT EMULSION 250 ML: 20 EMULSION INTRAVENOUS at 09:02

## 2019-02-01 RX ADMIN — ALBUMIN HUMAN 10 ML/HR: 0.25 SOLUTION INTRAVENOUS at 08:02

## 2019-02-01 RX ADMIN — FENTANYL CITRATE 200 MCG/HR: 50 INJECTION, SOLUTION INTRAMUSCULAR; INTRAVENOUS at 03:02

## 2019-02-01 RX ADMIN — METOPROLOL TARTRATE 5 MG: 1 INJECTION, SOLUTION INTRAVENOUS at 02:02

## 2019-02-01 RX ADMIN — PANTOPRAZOLE SODIUM 40 MG: 40 INJECTION, POWDER, LYOPHILIZED, FOR SOLUTION INTRAVENOUS at 09:02

## 2019-02-01 RX ADMIN — METRONIDAZOLE 500 MG: 500 INJECTION, SOLUTION INTRAVENOUS at 03:02

## 2019-02-01 RX ADMIN — ACETAMINOPHEN 650 MG: 650 SOLUTION ORAL at 09:02

## 2019-02-01 RX ADMIN — BUMETANIDE 1 MG: 0.25 INJECTION INTRAMUSCULAR; INTRAVENOUS at 09:02

## 2019-02-01 RX ADMIN — HYDROXYZINE PAMOATE 25 MG: 25 CAPSULE ORAL at 09:02

## 2019-02-01 RX ADMIN — ACETAMINOPHEN 650 MG: 650 SOLUTION ORAL at 04:02

## 2019-02-01 RX ADMIN — METRONIDAZOLE 500 MG: 500 INJECTION, SOLUTION INTRAVENOUS at 07:02

## 2019-02-01 RX ADMIN — INSULIN ASPART 6 UNITS: 100 INJECTION, SOLUTION INTRAVENOUS; SUBCUTANEOUS at 05:02

## 2019-02-01 RX ADMIN — INSULIN ASPART 10 UNITS: 100 INJECTION, SOLUTION INTRAVENOUS; SUBCUTANEOUS at 12:02

## 2019-02-01 RX ADMIN — FENTANYL CITRATE 175 MCG/HR: 50 INJECTION, SOLUTION INTRAMUSCULAR; INTRAVENOUS at 01:02

## 2019-02-01 RX ADMIN — INSULIN ASPART 3 UNITS: 100 INJECTION, SOLUTION INTRAVENOUS; SUBCUTANEOUS at 09:02

## 2019-02-01 RX ADMIN — ACETAMINOPHEN 650 MG: 650 SOLUTION ORAL at 12:02

## 2019-02-01 RX ADMIN — INSULIN ASPART 4 UNITS: 100 INJECTION, SOLUTION INTRAVENOUS; SUBCUTANEOUS at 03:02

## 2019-02-01 RX ADMIN — LORAZEPAM 1 MG: 2 INJECTION INTRAMUSCULAR; INTRAVENOUS at 05:02

## 2019-02-01 RX ADMIN — BUMETANIDE 1 MG: 0.25 INJECTION INTRAMUSCULAR; INTRAVENOUS at 03:02

## 2019-02-01 RX ADMIN — DEXMEDETOMIDINE HYDROCHLORIDE 1.4 MCG/KG/HR: 4 INJECTION, SOLUTION INTRAVENOUS at 08:02

## 2019-02-01 RX ADMIN — METRONIDAZOLE 500 MG: 500 INJECTION, SOLUTION INTRAVENOUS at 11:02

## 2019-02-01 RX ADMIN — ONDANSETRON 4 MG: 2 INJECTION INTRAMUSCULAR; INTRAVENOUS at 12:02

## 2019-02-01 RX ADMIN — ALBUMIN HUMAN 10 ML/HR: 0.25 SOLUTION INTRAVENOUS at 04:02

## 2019-02-01 RX ADMIN — HYDROXYZINE PAMOATE 25 MG: 25 CAPSULE ORAL at 02:02

## 2019-02-01 RX ADMIN — IPRATROPIUM BROMIDE AND ALBUTEROL SULFATE 3 ML: .5; 3 SOLUTION RESPIRATORY (INHALATION) at 07:02

## 2019-02-01 RX ADMIN — ALBUMIN HUMAN 10 ML/HR: 0.25 SOLUTION INTRAVENOUS at 12:02

## 2019-02-01 RX ADMIN — IPRATROPIUM BROMIDE AND ALBUTEROL SULFATE 3 ML: .5; 3 SOLUTION RESPIRATORY (INHALATION) at 11:02

## 2019-02-01 RX ADMIN — INSULIN DETEMIR 8 UNITS: 100 INJECTION, SOLUTION SUBCUTANEOUS at 08:02

## 2019-02-01 RX ADMIN — IPRATROPIUM BROMIDE AND ALBUTEROL SULFATE 3 ML: .5; 3 SOLUTION RESPIRATORY (INHALATION) at 08:02

## 2019-02-01 RX ADMIN — METOPROLOL TARTRATE 5 MG: 1 INJECTION, SOLUTION INTRAVENOUS at 08:02

## 2019-02-01 RX ADMIN — DEXMEDETOMIDINE HYDROCHLORIDE 1 MCG/KG/HR: 4 INJECTION, SOLUTION INTRAVENOUS at 05:02

## 2019-02-01 RX ADMIN — ACETAMINOPHEN 650 MG: 650 SOLUTION ORAL at 03:02

## 2019-02-01 RX ADMIN — DULOXETINE 60 MG: 30 CAPSULE, DELAYED RELEASE ORAL at 09:02

## 2019-02-01 RX ADMIN — ONDANSETRON 4 MG: 2 INJECTION INTRAMUSCULAR; INTRAVENOUS at 05:02

## 2019-02-01 RX ADMIN — IPRATROPIUM BROMIDE AND ALBUTEROL SULFATE 3 ML: .5; 3 SOLUTION RESPIRATORY (INHALATION) at 03:02

## 2019-02-01 RX ADMIN — DEXMEDETOMIDINE HYDROCHLORIDE 1.2 MCG/KG/HR: 4 INJECTION, SOLUTION INTRAVENOUS at 11:02

## 2019-02-01 RX ADMIN — BUMETANIDE 1 MG: 0.25 INJECTION INTRAMUSCULAR; INTRAVENOUS at 06:02

## 2019-02-01 RX ADMIN — CIPROFLOXACIN 400 MG: 2 INJECTION, SOLUTION INTRAVENOUS at 07:02

## 2019-02-01 RX ADMIN — ASCORBIC ACID, VITAMIN A PALMITATE, CHOLECALCIFEROL, THIAMINE HYDROCHLORIDE, RIBOFLAVIN-5 PHOSPHATE SODIUM, PYRIDOXINE HYDROCHLORIDE, NIACINAMIDE, DEXPANTHENOL, ALPHA-TOCOPHEROL ACETATE, VITAMIN K1, FOLIC ACID, BIOTIN, CYANOCOBALAMIN: 200; 3300; 200; 6; 3.6; 6; 40; 15; 10; 150; 600; 60; 5 INJECTION, SOLUTION INTRAVENOUS at 07:02

## 2019-02-01 RX ADMIN — CIPROFLOXACIN 400 MG: 2 INJECTION, SOLUTION INTRAVENOUS at 08:02

## 2019-02-01 RX ADMIN — INSULIN ASPART 10 UNITS: 100 INJECTION, SOLUTION INTRAVENOUS; SUBCUTANEOUS at 08:02

## 2019-02-01 NOTE — PROGRESS NOTES
[facility]  General Surgery  Neuroendocrine Tumor Service  Progress Note     Admission Date: 1/25/2019  Hospital Length of Stay: 7  Principal Problem: Malignant carcinoid tumor of pancreas     Subjective:      Operation:  1/25/18  1. Ex lap  2. Gastro-gastrostomy EEA with 33 mm circular stapler  3. Pyloroplasty and gastrostomy closure  3. enteric resection with side to side to anstamosis and restoration of small bowel continuity using Idrive tan load stapler  4 Liver US  5. Liver biopsy  6. Liver resection segment 3  7. Supraceliac lymphadenectomy  8. FNA pancreas  9. Lt salpingo oopherectomy   10 . Meckel diverticulectomy  11. Feeding jejunostomy 14 Fr malecot  12. ICG perfusion and Infrared exam of all anastamosis    Interval History:     Stepped up to ICU 4 days ago for concern impending resp failure  BiPAP overnight, worsening ABG, intubated yesterday, remains in critical condition, sedated on fentanyl and precedex, Hemodynamically stable off vasopressors. RT and Pulm on board    Scheduled Meds:   albuterol-ipratropium  3 mL Nebulization Q4H    amitriptyline  50 mg Oral QHS    bumetanide  1 mg Intravenous Daily    bumetanide  1 mg Intravenous Once    bumetanide  1 mg Intravenous Once    ciprofloxacin  400 mg Intravenous Q12H    clonazePAM  0.5 mg Oral QHS    DULoxetine  60 mg Oral Daily    enoxaparin  40 mg Subcutaneous Daily    hydrOXYzine pamoate  25 mg Oral TID    insulin detemir U-100  8 Units Subcutaneous Daily    metoprolol  5 mg Intravenous Q6H    metronidazole  500 mg Intravenous Q8H    pantoprazole  40 mg Intravenous Daily     Continuous Infusions:   albumin human 25% 10 mL/hr (02/01/19 0825)    Amino acid 5% - dextrose 15% (CLINIMIX-E) solution with additives (1L  provides 510 kcal/L dextrose, with 50 gm AA, 150 gm CHO, Na 35, K 30, Mg 5, Ca 4.5, Acetate 80, Cl 39, Phos 15) 50 mL/hr at 01/31/19 2134    dexmedetomidine (PRECEDEX) infusion 1.2 mcg/kg/hr (02/01/19 0918)     fentanyl 192.5 mcg/hr (02/01/19 0918)     PRN Meds:.sodium chloride, acetaminophen, albuterol sulfate, dextrose 50%, diphenhydrAMINE, glucagon (human recombinant), hydrALAZINE, HYDROmorphone, insulin aspart U-100, ipratropium, labetalol, lorazepam, naloxone, ondansetron, sodium chloride 0.9%, sodium chloride 3%    Objective:      Temp:  [98.3 °F (36.8 °C)-102.6 °F (39.2 °C)] 100.9 °F (38.3 °C)  Pulse:  [] 88  Resp:  [17-32] 19  SpO2:  [89 %-100 %] 95 %  BP: ()/(47-93) 95/53  Weight change: 0 kg (0 lb)    Intake/Output Summary (Last 24 hours) at 2/1/2019 1037  Last data filed at 2/1/2019 1035  Gross per 24 hour   Intake 3395.09 ml   Output 2817 ml   Net 578.09 ml     UOP 2.8L   gastric contents   Jtube clamped    Physical Exam:  GEN: awake, alert, NAD, NGT in place, bilious  CV: tachycardic  PULM: unlabored  ABD: S/appropriately TTP/ ND, incision c/d/i, Jtube in place clamped  EXT: Peripheral pulses present and equal bilaterally    Recent Labs   Lab 02/01/19  0509   *   K 3.7   CO2 31*      BUN 41*   CREATININE 1.0   CALCIUM 7.6*   PROT 5.2*   AST 10   ALT 5*   ALKPHOS 119   BILITOT 1.0     Recent Labs   Lab 02/01/19  0509   WBC 13.07*   HGB 6.7*   HCT 21.6*          Significant Imaging: I have reviewed all pertinent imaging results/findings within the past 24 hours.     Assessment and Plan:    62 yo F w/ PNET, s/p reversal of Sandeep-en-Y w/ Jtube placement  POD#7     1. Ex lap  2. Gastro-gastrostomy EEA with 33 mm circular stapler  3. Pyloroplasty and gastrostomy closure  3. enteric resection with side to side to anstamosis and restoration of small bowel continuity using Idrive tan load stapler  4 Liver US  5. Liver biopsy  6. Liver resection segment 3  7. Supraceliac lymphadenectomy  8. FNA pancreas  9. Lt salpingo oopherectomy   10 . Meckel diverticulectomy  11. Feeding jejunostomy 14 Fr malecot  12. ICG perfusion and Infrared exam of all anastamosis    Neuro - intubated.  Sedated on fentanyl and precedex  TPN - Continue NPO, cont TPN/lipids  ID - Cipro/Flagyl - aspiration pneumonia. Blood cx. NGTD. WBC 13.  Heme: H/H 6.7/21.6 transfused two units will recheck CBC posttranfusion  Pulm - reintubated 1/31, appreciate pulm assistance. FiO2 80% with PEEP 8. PRN ABGs. Resp Cx growing GNR with final growths pending.  FEN/GI - NPO, continue  replacing lytes prn  Renal - Cr 1.0, Whitlock for HD monitoring, good UOP, cont whitlock, check CVP, Albumin 25% 10 ml continuous   PPX - hold lovenox, protonix, ssc in place  Dispo - Remains in critical condition cont ICU care    Nash Titus MD [facility]

## 2019-02-01 NOTE — PROGRESS NOTES
Reordered Clinimix 5/15 and Fat Emulsion 20%, per protocol. MVI and Trace Elements added to TPN. Phos and Calcium low. MD Tacho notified and pharmacy recommended supplemental Phos and Calcium IVPB. MD stated that pt would be monitored and f/u in AM.     Carlene Gutierrez, PharmD  [phone]

## 2019-02-01 NOTE — PLAN OF CARE
Problem: Adult Inpatient Plan of Care  Goal: Plan of Care Review  Outcome: Ongoing (interventions implemented as appropriate)  Patient on  with documented settings.  Alarms are set and functioning with adequate volumes.  AMBU bag and mask at bedside.The proper method of use, as well as anticipated side effects, of this aerosol treatment are discussed and demonstrated to the patient.

## 2019-02-01 NOTE — PROGRESS NOTES
[facility] General Surgery  Neuroendocrine Tumor Service  Progress Note     Admission Date: 1/25/2019  Hospital Length of Stay: 6  Principal Problem: Malignant carcinoid tumor of pancreas     Subjective:      Operation:  1/25/18  1. Ex lap  2. Gastro-gastrostomy EEA with 33 mm circular stapler  3. Pyloroplasty and gastrostomy closure  3. enteric resection with side to side to anstamosis and restoration of small bowel continuity using Idrive tan load stapler  4 Liver US  5. Liver biopsy  6. Liver resection segment 3  7. Supraceliac lymphadenectomy  8. FNA pancreas  9. Lt salpingo oopherectomy   10 . Meckel diverticulectomy  11. Feeding jejunostomy 14 Fr malecot  12. ICG perfusion and Infrared exam of all anastamosis    Interval History:     Stepped up to ICU 3days ago for concern impending resp failure  BiPAP overnight, worsening ABG, intubated today  RT and Pulm on board  Barney replaced   CT scan equivocal for leak Abd/Pelv but Chest with pleural effusion, consolidation  CT scan today - no acute changes      Scheduled Meds:   albuterol-ipratropium  3 mL Nebulization Q4H    amitriptyline  50 mg Oral QHS    bumetanide  1 mg Intravenous Daily    ciprofloxacin  400 mg Intravenous Q12H    clonazePAM  0.5 mg Oral QHS    DULoxetine  60 mg Oral Daily    enoxaparin  40 mg Subcutaneous Daily    hydrOXYzine pamoate  25 mg Oral TID    insulin detemir U-100  8 Units Subcutaneous Daily    metoprolol  5 mg Intravenous Q6H    metronidazole  500 mg Intravenous Q8H    pantoprazole  40 mg Intravenous Daily     Continuous Infusions:   albumin human 25% 10 mL/hr (01/31/19 1721)    Amino acid 5% - dextrose 15% (CLINIMIX-E) solution with additives (1L  provides 510 kcal/L dextrose, with 50 gm AA, 150 gm CHO, Na 35, K 30, Mg 5, Ca 4.5, Acetate 80, Cl 39, Phos 15) 50 mL/hr at 01/30/19 1950    Amino acid 5% - dextrose 15% (CLINIMIX-E) solution with additives (1L  provides 510 kcal/L dextrose, with 50 gm AA,  150 gm CHO, Na 35, K 30, Mg 5, Ca 4.5, Acetate 80, Cl 39, Phos 15)      dexmedetomidine (PRECEDEX) infusion 0.7 mcg/kg/hr (01/31/19 1700)    fentanyl 50 mcg/hr (01/31/19 1127)     PRN Meds:.acetaminophen, albuterol sulfate, dextrose 50%, diphenhydrAMINE, glucagon (human recombinant), hydrALAZINE, HYDROmorphone, insulin aspart U-100, ipratropium, labetalol, lorazepam, naloxone, ondansetron, sodium chloride 0.9%, sodium chloride 3%    Objective:      Temp:  [98.3 °F (36.8 °C)-101 °F (38.3 °C)] 98.3 °F (36.8 °C)  Pulse:  [] 128  Resp:  [19-53] 32  SpO2:  [90 %-100 %] 95 %  BP: ()/() 158/82  Weight change: -3.5 kg (-11.5 oz)    Intake/Output Summary (Last 24 hours) at 1/31/2019 1802  Last data filed at 1/31/2019 1734  Gross per 24 hour   Intake 1401.02 ml   Output 3065 ml   Net -1663.98 ml     UOP 3L  NGT 50   Jtube clamped    Physical Exam:  GEN: awake, alert, NAD, NGT in place, bilious  CV: tachycardic  PULM: unlabored  ABD: S/appropriately TTP/ ND, incision c/d/i, Jtube in place clamped  EXT: Peripheral pulses present and equal bilaterally    Recent Labs   Lab 01/31/19  0525   *   K 3.1*   CO2 32*      BUN 22   CREATININE 0.8   CALCIUM 8.1*   PROT 5.4*   AST 13   ALT 8*   ALKPHOS 131   BILITOT 1.2*     Recent Labs   Lab 01/31/19  0525   WBC 16.74*   HGB 7.7*   HCT 23.5*   *       Significant Imaging: I have reviewed all pertinent imaging results/findings within the past 24 hours.     Assessment and Plan:    62 yo F w/ PNET, s/p reversal of Sandeep-en-Y w/ Jtube placement  POD#6  1. Ex lap  2. Gastro-gastrostomy EEA with 33 mm circular stapler  3. Pyloroplasty and gastrostomy closure  3. enteric resection with side to side to anstamosis and restoration of small bowel continuity using Idrive tan load stapler  4 Liver US  5. Liver biopsy  6. Liver resection segment 3  7. Supraceliac lymphadenectomy  8. FNA pancreas  9. Lt salpingo oopherectomy   10 . Meckel diverticulectomy  11.  Feeding jejunostomy 14 Fr malecot  12. ICG perfusion and Infrared exam of all anastamosis    Neuro - intubated. sedated  TPN - continue NPO, cont TPN/lipids  ID - Cipro/Flagyl - aspiration pneumonia  Pulm - reintubated today, appreciate pulm assistance  FEN/GI - replacing lytes prn  Renal - Whitlock for HD monitoring, good UOP, cont whitlock  PPX - lovenox, protonix  Dispo - cont ICU care    Colton Olivier MD - [facility]

## 2019-02-01 NOTE — PLAN OF CARE
Problem: Adult Inpatient Plan of Care  Goal: Plan of Care Review  Pt received as charted on vent flowsheet. Ambu bag and mask at bedside. All alarms on and functional with adequate volume. Cuff pressure monitored via MLT. ETT size #7.5 .Pt with no apprent distress noted. Will continue to  Monitor.

## 2019-02-01 NOTE — PLAN OF CARE
Problem: Fall Injury Risk  Goal: Absence of Fall and Fall-Related Injury    Intervention: Identify and Manage Contributors to Fall Injury Risk   01/31/19 2315   Manage Acute Allergic Reaction   Medication Review/Management medications reviewed;high risk medications identified     Intervention: Promote Injury-Free Environment   01/31/19 1918 02/01/19 0105   Optimize Balance and Safe Activity   Safety Promotion/Fall Prevention --  assistive device/personal item within reach;bed alarm set;Fall Risk reviewed with patient/family;Fall Risk signage in place;side rails raised x 3;room near unit station;pulse ox   Optimize Pettis and Functional Mobility   Environmental Safety Modification assistive device/personal items within reach;clutter free environment maintained;room organization consistent --          Problem: Infection  Goal: Infection Symptom Resolution    Intervention: Prevent or Manage Infection   01/31/19 2315 02/01/19 0224   Prevent or Manage Infection   Fever Reduction/Comfort Measures --  lightweight bedding;lightweight clothing   Infection Management aseptic technique maintained --    Manage Diarrhea   Isolation Precautions --  protective environment maintained         Problem: Skin Injury Risk Increased  Goal: Skin Health and Integrity    Intervention: Optimize Skin Protection   01/31/19 2315 02/01/19 0105   Prevent Additional Skin Injury   Head of Bed (HOB) --  HOB at 30-45 degrees   Pressure Reduction Devices pressure-redistributing mattress utilized --    Pressure Reduction Techniques weight shift assistance provided;pressure points protected --    Monitor and Manage Hypervolemia   Skin Protection adhesive use limited;incontinence pads utilized;protective footwear used;tubing/devices free from skin contact;transparent dressing maintained;silicone foam dressing in place --          Problem: Restraint, Nonbehavioral (Nonviolent)  Goal: Discontinuation Criteria Achieved    Intervention: Implement  Least-restrictive Safety Strategies   01/31/19 1901 01/31/19 2315   Implement Least-restrictive Safety Strategies   Less Restrictive Alternatives bed alarm in use;calming techniques promoted;emotional support provided;positive reinforcement provided --    Medical Device Protection --  IV pole/bag removed from visual field;tubing secured       Goal: Personal Dignity and Safety Maintained    Intervention: Protect Dignity, Rights, and Personal Wellbeing   01/31/19 2315   Support Dyspnea Relief   Trust Relationship/Rapport care explained;questions answered;questions encouraged     Intervention: Protect Skin and Joint Integrity   01/31/19 2315   Optimize Functional Ability   Range of Motion ROM (range of motion) performed

## 2019-02-01 NOTE — PT/OT/SLP PROGRESS
Physical Therapy  Visit Attempt    Patient Name:  Kaylee Ngo   MRN:  [id number]    Patient not seen today secondary to pt intubated. Will follow-up as able.    Zeinab Zhang, PT   2/1/2019

## 2019-02-02 PROBLEM — J15.5 E. COLI PNEUMONIA: Status: ACTIVE | Noted: 2019-02-02

## 2019-02-02 LAB
ALBUMIN SERPL BCP-MCNC: 3.3 G/DL
ALP SERPL-CCNC: 95 U/L
ALT SERPL W/O P-5'-P-CCNC: 6 U/L
ANION GAP SERPL CALC-SCNC: 10 MMOL/L
ANISOCYTOSIS BLD QL SMEAR: SLIGHT
AST SERPL-CCNC: 15 U/L
BACTERIA SPEC AEROBE CULT: NORMAL
BASOPHILS # BLD AUTO: 0.01 K/UL
BASOPHILS # BLD AUTO: 0.02 K/UL
BASOPHILS NFR BLD: 0.1 %
BASOPHILS NFR BLD: 0.2 %
BILIRUB SERPL-MCNC: 1.1 MG/DL
BUN SERPL-MCNC: 41 MG/DL
CALCIUM SERPL-MCNC: 7.7 MG/DL
CHLORIDE SERPL-SCNC: 108 MMOL/L
CO2 SERPL-SCNC: 33 MMOL/L
CREAT SERPL-MCNC: 1 MG/DL
DIFFERENTIAL METHOD: ABNORMAL
DIFFERENTIAL METHOD: ABNORMAL
EOSINOPHIL # BLD AUTO: 0 K/UL
EOSINOPHIL # BLD AUTO: 0 K/UL
EOSINOPHIL NFR BLD: 0.2 %
EOSINOPHIL NFR BLD: 0.4 %
ERYTHROCYTE [DISTWIDTH] IN BLOOD BY AUTOMATED COUNT: 14.1 %
ERYTHROCYTE [DISTWIDTH] IN BLOOD BY AUTOMATED COUNT: 14.3 %
EST. GFR  (AFRICAN AMERICAN): >60 ML/MIN/1.73 M^2
EST. GFR  (NON AFRICAN AMERICAN): >60 ML/MIN/1.73 M^2
GLUCOSE SERPL-MCNC: 310 MG/DL
GRAM STN SPEC: NORMAL
HCT VFR BLD AUTO: 29.5 %
HCT VFR BLD AUTO: 29.5 %
HGB BLD-MCNC: 9.5 G/DL
HGB BLD-MCNC: 9.5 G/DL
HYPOCHROMIA BLD QL SMEAR: ABNORMAL
LYMPHOCYTES # BLD AUTO: 0.8 K/UL
LYMPHOCYTES # BLD AUTO: 0.8 K/UL
LYMPHOCYTES NFR BLD: 7 %
LYMPHOCYTES NFR BLD: 8 %
MAGNESIUM SERPL-MCNC: 1.7 MG/DL
MCH RBC QN AUTO: 28.4 PG
MCH RBC QN AUTO: 28.5 PG
MCHC RBC AUTO-ENTMCNC: 32.2 G/DL
MCHC RBC AUTO-ENTMCNC: 32.2 G/DL
MCV RBC AUTO: 88 FL
MCV RBC AUTO: 89 FL
MONOCYTES # BLD AUTO: 0.2 K/UL
MONOCYTES # BLD AUTO: 0.2 K/UL
MONOCYTES NFR BLD: 1.5 %
MONOCYTES NFR BLD: 2.2 %
NEUTROPHILS # BLD AUTO: 9.2 K/UL
NEUTROPHILS # BLD AUTO: 9.8 K/UL
NEUTROPHILS NFR BLD: 89.4 %
NEUTROPHILS NFR BLD: 90.3 %
PHOSPHATE SERPL-MCNC: 1.8 MG/DL
PLATELET # BLD AUTO: 154 K/UL
PLATELET # BLD AUTO: 178 K/UL
PLATELET BLD QL SMEAR: ABNORMAL
PMV BLD AUTO: 11.2 FL
PMV BLD AUTO: 12.1 FL
POCT GLUCOSE: 260 MG/DL (ref 70–110)
POCT GLUCOSE: 269 MG/DL (ref 70–110)
POCT GLUCOSE: 273 MG/DL (ref 70–110)
POCT GLUCOSE: 298 MG/DL (ref 70–110)
POCT GLUCOSE: 309 MG/DL (ref 70–110)
POCT GLUCOSE: 352 MG/DL (ref 70–110)
POLYCHROMASIA BLD QL SMEAR: ABNORMAL
POTASSIUM SERPL-SCNC: 3.1 MMOL/L
PROT SERPL-MCNC: 5.6 G/DL
RBC # BLD AUTO: 3.33 M/UL
RBC # BLD AUTO: 3.35 M/UL
SODIUM SERPL-SCNC: 151 MMOL/L
TARGETS BLD QL SMEAR: ABNORMAL
WBC # BLD AUTO: 10.49 K/UL
WBC # BLD AUTO: 10.82 K/UL

## 2019-02-02 PROCEDURE — P9047 ALBUMIN (HUMAN), 25%, 50ML: HCPCS | Mod: JG | Performed by: SURGERY

## 2019-02-02 PROCEDURE — 84100 ASSAY OF PHOSPHORUS: CPT

## 2019-02-02 PROCEDURE — 63600175 PHARM REV CODE 636 W HCPCS: Performed by: STUDENT IN AN ORGANIZED HEALTH CARE EDUCATION/TRAINING PROGRAM

## 2019-02-02 PROCEDURE — 25000003 PHARM REV CODE 250: Performed by: INTERNAL MEDICINE

## 2019-02-02 PROCEDURE — S0171 BUMETANIDE 0.5 MG: HCPCS | Performed by: SURGERY

## 2019-02-02 PROCEDURE — 25000242 PHARM REV CODE 250 ALT 637 W/ HCPCS: Performed by: SURGERY

## 2019-02-02 PROCEDURE — 63600175 PHARM REV CODE 636 W HCPCS: Performed by: INTERNAL MEDICINE

## 2019-02-02 PROCEDURE — 80053 COMPREHEN METABOLIC PANEL: CPT

## 2019-02-02 PROCEDURE — 94761 N-INVAS EAR/PLS OXIMETRY MLT: CPT

## 2019-02-02 PROCEDURE — 27000221 HC OXYGEN, UP TO 24 HOURS

## 2019-02-02 PROCEDURE — 20000000 HC ICU ROOM

## 2019-02-02 PROCEDURE — 94003 VENT MGMT INPAT SUBQ DAY: CPT

## 2019-02-02 PROCEDURE — 63600175 PHARM REV CODE 636 W HCPCS: Performed by: SURGERY

## 2019-02-02 PROCEDURE — 99900035 HC TECH TIME PER 15 MIN (STAT)

## 2019-02-02 PROCEDURE — 85025 COMPLETE CBC W/AUTO DIFF WBC: CPT

## 2019-02-02 PROCEDURE — 25000003 PHARM REV CODE 250: Performed by: STUDENT IN AN ORGANIZED HEALTH CARE EDUCATION/TRAINING PROGRAM

## 2019-02-02 PROCEDURE — 25000003 PHARM REV CODE 250: Performed by: NURSE PRACTITIONER

## 2019-02-02 PROCEDURE — C9113 INJ PANTOPRAZOLE SODIUM, VIA: HCPCS | Performed by: SURGERY

## 2019-02-02 PROCEDURE — 25000003 PHARM REV CODE 250: Performed by: SURGERY

## 2019-02-02 PROCEDURE — S0030 INJECTION, METRONIDAZOLE: HCPCS | Performed by: SURGERY

## 2019-02-02 PROCEDURE — 94640 AIRWAY INHALATION TREATMENT: CPT

## 2019-02-02 PROCEDURE — 83735 ASSAY OF MAGNESIUM: CPT

## 2019-02-02 RX ORDER — POTASSIUM CHLORIDE 29.8 MG/ML
40 INJECTION INTRAVENOUS ONCE
Status: COMPLETED | OUTPATIENT
Start: 2019-02-02 | End: 2019-02-02

## 2019-02-02 RX ORDER — POTASSIUM CHLORIDE 14.9 MG/ML
40 INJECTION INTRAVENOUS ONCE
Status: DISCONTINUED | OUTPATIENT
Start: 2019-02-02 | End: 2019-02-02

## 2019-02-02 RX ORDER — BUMETANIDE 0.25 MG/ML
1 INJECTION INTRAMUSCULAR; INTRAVENOUS DAILY
Status: DISCONTINUED | OUTPATIENT
Start: 2019-02-03 | End: 2019-02-05

## 2019-02-02 RX ADMIN — DEXMEDETOMIDINE HYDROCHLORIDE 1.4 MCG/KG/HR: 4 INJECTION, SOLUTION INTRAVENOUS at 01:02

## 2019-02-02 RX ADMIN — IPRATROPIUM BROMIDE AND ALBUTEROL SULFATE 3 ML: .5; 3 SOLUTION RESPIRATORY (INHALATION) at 03:02

## 2019-02-02 RX ADMIN — DEXMEDETOMIDINE HYDROCHLORIDE 1.4 MCG/KG/HR: 4 INJECTION, SOLUTION INTRAVENOUS at 12:02

## 2019-02-02 RX ADMIN — INSULIN ASPART 3 UNITS: 100 INJECTION, SOLUTION INTRAVENOUS; SUBCUTANEOUS at 07:02

## 2019-02-02 RX ADMIN — METOPROLOL TARTRATE 5 MG: 1 INJECTION, SOLUTION INTRAVENOUS at 07:02

## 2019-02-02 RX ADMIN — ACETAMINOPHEN 650 MG: 650 SUPPOSITORY RECTAL at 11:02

## 2019-02-02 RX ADMIN — POTASSIUM CHLORIDE 40 MEQ: 29.8 INJECTION, SOLUTION INTRAVENOUS at 01:02

## 2019-02-02 RX ADMIN — IPRATROPIUM BROMIDE AND ALBUTEROL SULFATE 3 ML: .5; 3 SOLUTION RESPIRATORY (INHALATION) at 07:02

## 2019-02-02 RX ADMIN — ACETAMINOPHEN 650 MG: 650 SUPPOSITORY RECTAL at 05:02

## 2019-02-02 RX ADMIN — ALBUMIN HUMAN 10 ML/HR: 0.25 SOLUTION INTRAVENOUS at 08:02

## 2019-02-02 RX ADMIN — BUMETANIDE 1 MG: 0.25 INJECTION INTRAMUSCULAR; INTRAVENOUS at 08:02

## 2019-02-02 RX ADMIN — METRONIDAZOLE 500 MG: 500 INJECTION, SOLUTION INTRAVENOUS at 03:02

## 2019-02-02 RX ADMIN — METOPROLOL TARTRATE 5 MG: 1 INJECTION, SOLUTION INTRAVENOUS at 08:02

## 2019-02-02 RX ADMIN — INSULIN ASPART 6 UNITS: 100 INJECTION, SOLUTION INTRAVENOUS; SUBCUTANEOUS at 12:02

## 2019-02-02 RX ADMIN — ALBUMIN HUMAN 10 ML/HR: 0.25 SOLUTION INTRAVENOUS at 03:02

## 2019-02-02 RX ADMIN — IPRATROPIUM BROMIDE AND ALBUTEROL SULFATE 3 ML: .5; 3 SOLUTION RESPIRATORY (INHALATION) at 12:02

## 2019-02-02 RX ADMIN — IPRATROPIUM BROMIDE AND ALBUTEROL SULFATE 3 ML: .5; 3 SOLUTION RESPIRATORY (INHALATION) at 08:02

## 2019-02-02 RX ADMIN — ALBUMIN HUMAN 10 ML/HR: 0.25 SOLUTION INTRAVENOUS at 01:02

## 2019-02-02 RX ADMIN — DEXMEDETOMIDINE HYDROCHLORIDE 1.4 MCG/KG/HR: 4 INJECTION, SOLUTION INTRAVENOUS at 05:02

## 2019-02-02 RX ADMIN — ALBUMIN HUMAN 10 ML/HR: 0.25 SOLUTION INTRAVENOUS at 11:02

## 2019-02-02 RX ADMIN — INSULIN ASPART 6 UNITS: 100 INJECTION, SOLUTION INTRAVENOUS; SUBCUTANEOUS at 05:02

## 2019-02-02 RX ADMIN — PANTOPRAZOLE SODIUM 40 MG: 40 INJECTION, POWDER, LYOPHILIZED, FOR SOLUTION INTRAVENOUS at 08:02

## 2019-02-02 RX ADMIN — ACETAMINOPHEN 650 MG: 650 SUPPOSITORY RECTAL at 08:02

## 2019-02-02 RX ADMIN — ASCORBIC ACID, VITAMIN A PALMITATE, CHOLECALCIFEROL, THIAMINE HYDROCHLORIDE, RIBOFLAVIN-5 PHOSPHATE SODIUM, PYRIDOXINE HYDROCHLORIDE, NIACINAMIDE, DEXPANTHENOL, ALPHA-TOCOPHEROL ACETATE, VITAMIN K1, FOLIC ACID, BIOTIN, CYANOCOBALAMIN: 200; 3300; 200; 6; 3.6; 6; 40; 15; 10; 150; 600; 60; 5 INJECTION, SOLUTION INTRAVENOUS at 07:02

## 2019-02-02 RX ADMIN — ALBUMIN HUMAN 10 ML/HR: 0.25 SOLUTION INTRAVENOUS at 06:02

## 2019-02-02 RX ADMIN — DEXMEDETOMIDINE HYDROCHLORIDE 1.4 MCG/KG/HR: 4 INJECTION, SOLUTION INTRAVENOUS at 11:02

## 2019-02-02 RX ADMIN — INSULIN ASPART 8 UNITS: 100 INJECTION, SOLUTION INTRAVENOUS; SUBCUTANEOUS at 04:02

## 2019-02-02 RX ADMIN — IPRATROPIUM BROMIDE AND ALBUTEROL SULFATE 3 ML: .5; 3 SOLUTION RESPIRATORY (INHALATION) at 11:02

## 2019-02-02 RX ADMIN — FENTANYL CITRATE 250 MCG/HR: 50 INJECTION INTRAVENOUS at 07:02

## 2019-02-02 RX ADMIN — DEXMEDETOMIDINE HYDROCHLORIDE 1.4 MCG/KG/HR: 4 INJECTION, SOLUTION INTRAVENOUS at 09:02

## 2019-02-02 RX ADMIN — METOPROLOL TARTRATE 5 MG: 1 INJECTION, SOLUTION INTRAVENOUS at 03:02

## 2019-02-02 RX ADMIN — POTASSIUM PHOSPHATE, MONOBASIC AND POTASSIUM PHOSPHATE, DIBASIC 30 MMOL: 224; 236 INJECTION, SOLUTION INTRAVENOUS at 01:02

## 2019-02-02 RX ADMIN — INSULIN ASPART 3 UNITS: 100 INJECTION, SOLUTION INTRAVENOUS; SUBCUTANEOUS at 12:02

## 2019-02-02 RX ADMIN — DEXMEDETOMIDINE HYDROCHLORIDE 1.4 MCG/KG/HR: 4 INJECTION, SOLUTION INTRAVENOUS at 06:02

## 2019-02-02 RX ADMIN — CEFTRIAXONE SODIUM 1 G: 1 INJECTION, POWDER, FOR SOLUTION INTRAMUSCULAR; INTRAVENOUS at 01:02

## 2019-02-02 RX ADMIN — FENTANYL CITRATE 200 MCG/HR: 50 INJECTION, SOLUTION INTRAMUSCULAR; INTRAVENOUS at 08:02

## 2019-02-02 RX ADMIN — METOPROLOL TARTRATE 5 MG: 1 INJECTION, SOLUTION INTRAVENOUS at 01:02

## 2019-02-02 RX ADMIN — INSULIN ASPART 10 UNITS: 100 INJECTION, SOLUTION INTRAVENOUS; SUBCUTANEOUS at 08:02

## 2019-02-02 RX ADMIN — CIPROFLOXACIN 400 MG: 2 INJECTION, SOLUTION INTRAVENOUS at 08:02

## 2019-02-02 NOTE — ASSESSMENT & PLAN NOTE
Advanced malignancy causing MOF.   -will adjust sedation with the intent of optimizing her chances of vent weaning but with attention to the concern for adequate pain control

## 2019-02-02 NOTE — PLAN OF CARE
Problem: Skin Injury Risk Increased  Goal: Skin Health and Integrity  Outcome: Ongoing (interventions implemented as appropriate)  Needs turning and repositioning often and every 2 hours, bed in rotation mode, skin wnls, mepilex on sacral area.

## 2019-02-02 NOTE — SUBJECTIVE & OBJECTIVE
Interval History: Patient intubated and sedated. No family at bedside this morning. No signs of bleeding or significant changes per nurses.     Objective:     Vital Signs (Most Recent):  Temp: (!) 102 °F (38.9 °C) (02/01/19 1815)  Pulse: 92 (02/01/19 1830)  Resp: 18 (02/01/19 1830)  BP: (!) 95/51 (02/01/19 1830)  SpO2: 98 % (02/01/19 1830) Vital Signs (24h Range):  Temp:  [99.9 °F (37.7 °C)-102.6 °F (39.2 °C)] 102 °F (38.9 °C)  Pulse:  [] 92  Resp:  [17-29] 18  SpO2:  [89 %-100 %] 98 %  BP: ()/(47-93) 95/51     Weight: 62.9 kg (138 lb 10.7 oz)  Body mass index is 23.08 kg/m².      Intake/Output Summary (Last 24 hours) at 2/1/2019 1903  Last data filed at 2/1/2019 1842  Gross per 24 hour   Intake 4980.86 ml   Output 2852 ml   Net 2128.86 ml       Physical Exam   Constitutional: She appears well-developed.   HENT:   Head: Normocephalic and atraumatic.   Eyes: No scleral icterus.   Neck: Neck supple.   Cardiovascular: Regular rhythm. Bradycardia present.   No murmur heard.  Abdominal:   j-tube, + bowel sounds, grabbed my hands to push away from attempting to examine her abdomen   Musculoskeletal: She exhibits no edema or deformity.   Neurological: She is alert.   CAM-ICU+, RASS +1, not tracking to voice or stimulation but eyes open   Skin: Skin is warm and dry. No rash noted. There is pallor.   Vitals reviewed.      Vents:  Vent Mode: A/C (02/01/19 1802)  Set Rate: 20 bmp (02/01/19 1802)  Vt Set: 400 mL (02/01/19 1802)  PEEP/CPAP: 8 cmH20 (02/01/19 1802)  Oxygen Concentration (%): 55 (02/01/19 1520)  Peak Airway Pressure: 26 cmH2O (02/01/19 1802)  Total Ve: 11 mL (02/01/19 1802)  F/VT Ratio<105 (RSBI): (!) 40.36 (02/01/19 1802)    Lines/Drains/Airways     Central Venous Catheter Line                 Percutaneous Central Line Insertion/Assessment - triple lumen  01/25/19 0726 7 days          Drain                 Gastrostomy/Enterostomy 01/25/19 1300 Gastrostomy tube w/ balloon midline 7 days         NG/OG  Tube 01/25/19 0728 Dalia sump 18 Fr. Right nostril 7 days         Urethral Catheter 01/28/19 1129 14 Fr. 4 days          Airway                 Airway - Non-Surgical 01/31/19 1005 Endotracheal Tube 1 day          Peripheral Intravenous Line                 Peripheral IV - Single Lumen 01/31/19 1800 Right Antecubital 1 day                Significant Labs:    CBC/Anemia Profile:  Recent Labs   Lab 01/31/19  0525 02/01/19  0509   WBC 16.74* 13.07*   HGB 7.7* 6.7*   HCT 23.5* 21.6*   * 161   MCV 85 87   RDW 14.6* 14.5        Chemistries:  Recent Labs   Lab 01/31/19  0525 02/01/19  0509   * 147*   K 3.1* 3.7    105   CO2 32* 31*   BUN 22 41*   CREATININE 0.8 1.0   CALCIUM 8.1* 7.6*   ALBUMIN 2.8* 3.0*   PROT 5.4* 5.2*   BILITOT 1.2* 1.0   ALKPHOS 131 119   ALT 8* 5*   AST 13 10   MG 1.7 2.0   PHOS 1.4* 2.3*       All pertinent labs within the past 24 hours have been reviewed.

## 2019-02-02 NOTE — ASSESSMENT & PLAN NOTE
-Recommend low tidal volume ventilation (6cc/kg IBW) and high-PEEP strategy per [facility] protocol.  -Recommend titration of FiO2 to maintain SpO2 >88%.  -Maintain net negative or at least net even volume status for the admission. Agree with giving pRBCs.  -Daily SAT & SBTs when medically appropriate.  -Agree with pain control being a priority at this point in her life and will use sedation as needed to help maintain her comfort on the ventilator.

## 2019-02-02 NOTE — PROGRESS NOTES
[facility]  General Surgery  Neuroendocrine Tumor Service  Progress Note     Admission Date: 1/25/2019  Hospital Length of Stay: 8  Principal Problem: Malignant carcinoid tumor of pancreas     Subjective:      Operation:  1/25/18  1. Ex lap  2. Gastro-gastrostomy EEA with 33 mm circular stapler  3. Pyloroplasty and gastrostomy closure  3. enteric resection with side to side to anstamosis and restoration of small bowel continuity using Idrive tan load stapler  4 Liver US  5. Liver biopsy  6. Liver resection segment 3  7. Supraceliac lymphadenectomy  8. FNA pancreas  9. Lt salpingo oopherectomy   10 . Meckel diverticulectomy  11. Feeding jejunostomy 14 Fr malecot  12. ICG perfusion and Infrared exam of all anastamosis    Interval History:     Stepped up to ICU 5 days ago for concern impending resp failure  Intubated 2 days ago, remains in critical condition, sedated on fentanyl and precedex, Hemodynamically stable off vasopressors. RT and Pulm on board. Continues to need ventilatory support. Still spiking fevers up to 102. Patient continues to be somnolent.    Scheduled Meds:   albuterol-ipratropium  3 mL Nebulization Q4H    cefTRIAXone (ROCEPHIN) IVPB  1 g Intravenous Q24H    [START ON 2/3/2019] insulin detemir U-100  15 Units Subcutaneous Daily    metoprolol  5 mg Intravenous Q6H    pantoprazole  40 mg Intravenous Daily    potassium chloride in water  40 mEq Intravenous Once    potassium phosphate IVPB  30 mmol Intravenous Once     Continuous Infusions:   albumin human 25% 10 mL/hr (02/02/19 0815)    Amino acid 5% - dextrose 15% (CLINIMIX-E) solution with additives (1L  provides 510 kcal/L dextrose, with 50 gm AA, 150 gm CHO, Na 35, K 30, Mg 5, Ca 4.5, Acetate 80, Cl 39, Phos 15) 50 mL/hr at 02/01/19 1953    Amino acid 5% - dextrose 15% (CLINIMIX-E) solution with additives (1L  provides 510 kcal/L dextrose, with 50 gm AA, 150 gm CHO, Na 35, K 30, Mg 5, Ca 4.5, Acetate 80, Cl 39, Phos 15)       dexmedetomidine (PRECEDEX) infusion 1.4 mcg/kg/hr (02/02/19 0930)    fentanyl 200 mcg/hr (02/02/19 0816)     PRN Meds:.sodium chloride, acetaminophen, albuterol sulfate, dextrose 50%, diphenhydrAMINE, glucagon (human recombinant), hydrALAZINE, HYDROmorphone, insulin aspart U-100, ipratropium, labetalol, lorazepam, naloxone, ondansetron, sodium chloride 0.9%, sodium chloride 3%    Objective:      Temp:  [99.5 °F (37.5 °C)-102.9 °F (39.4 °C)] 99.5 °F (37.5 °C)  Pulse:  [] 96  Resp:  [17-27] 18  SpO2:  [95 %-100 %] 95 %  BP: ()/(48-63) 105/53  Weight change: 3.7 kg (8 lb 2.5 oz)    Intake/Output Summary (Last 24 hours) at 2/2/2019 1243  Last data filed at 2/2/2019 1231  Gross per 24 hour   Intake 3659.19 ml   Output 3775 ml   Net -115.81 ml     UOP 3L   gastric contents   Jtube clamped    Physical Exam:  GEN: awake, alert, NAD, NGT in place, bilious  CV: RRR  PULM: unlabored, on vent FiO2 of 55% with Peep 8  ABD: S/appropriately TTP/ ND, incision c/d/i, Jtube in place clamped  EXT: Peripheral pulses present and equal bilaterally    Recent Labs   Lab 02/02/19  0530   *   K 3.1*   CO2 33*      BUN 41*   CREATININE 1.0   CALCIUM 7.7*   PROT 5.6*   AST 15   ALT 6*   ALKPHOS 95   BILITOT 1.1*     Recent Labs   Lab 02/02/19  0530   WBC 10.82   HGB 9.5*   HCT 29.5*          Significant Imaging: I have reviewed all pertinent imaging results/findings within the past 24 hours.     Assessment and Plan:    64 yo F w/ PNET, s/p reversal of Sandeep-en-Y w/ Jtube placement  POD#8    1. Ex lap  2. Gastro-gastrostomy EEA with 33 mm circular stapler  3. Pyloroplasty and gastrostomy closure  3. enteric resection with side to side to anstamosis and restoration of small bowel continuity using Idrive tan load stapler  4 Liver US  5. Liver biopsy  6. Liver resection segment 3  7. Supraceliac lymphadenectomy  8. FNA pancreas  9. Lt salpingo oopherectomy   10 . Meckel diverticulectomy  11. Feeding  jejunostomy 14 Fr malecot  12. ICG perfusion and Infrared exam of all anastamosis    Neuro - Intubated. Sedated on fentanyl and Precedex continue for pain control but wean as appropriate in preparation for SBT.  TPN - Continue NPO, cont TPN/lipids  ID - Will deescalate to Rocephin with cultures showing E Coli sensitive to Rocephin. - aspiration pneumonia. Blood cx. NGTD. WBC 11  Heme: H/H 9.5/29.5 responded appropriately from 6.7/21.6 after transfusion of two units  Pulm - reintubated 1/31, appreciate pulm assistance. FiO2 45% with PEEP 8. PRN ABGs. Resp Cx growing E Coli  FEN/GI - NPO, continue  replacing lytes prn. Hypernatremia 151. Hypokalemia 3.1  Renal - Cr 1.0, Whitlock for HD monitoring, good UOP, cont whitlock, check CVP, Albumin 25% 10 ml continuous   PPX - hold lovenox, protonix, ssc in place  Dispo - Remains in critical condition cont ICU care    MD KACIE Avila

## 2019-02-02 NOTE — ASSESSMENT & PLAN NOTE
Advanced malignancy causing MOF. Concern for uncontrolled pain.  -requested pain medications be increased to improve her comfort as we continue to care for her

## 2019-02-02 NOTE — ASSESSMENT & PLAN NOTE
-Recommend low tidal volume ventilation (6cc/kg IBW) and high-PEEP strategy per [facility] protocol.  -Recommend titration of FiO2 to maintain SpO2 >88%- able to titrate down significantly today.  -Maintain net negative or at least net even volume status for the admission.   -Daily SAT & SBTs when medically appropriate- likely tomorrow.  -Agree with pain control being a priority at this point in her life and will use sedation as needed to help maintain her comfort on the ventilator. Discussed with primary team the concern for prolonged recovery as it relates to the prognosis of her carcinoid.

## 2019-02-02 NOTE — SUBJECTIVE & OBJECTIVE
Interval History: Ms. Ngo remained intubated and sedated overnight. She had ongoing episodes of vomiting despite her NGT. She had significant drainage after advancing the tube this morning.     Objective:     Vital Signs (Most Recent):  Temp: 100.2 °F (37.9 °C) (02/02/19 0922)  Pulse: 88 (02/02/19 0900)  Resp: 19 (02/02/19 0900)  BP: (!) 119/56 (02/02/19 0900)  SpO2: 97 % (02/02/19 0900) Vital Signs (24h Range):  Temp:  [99.7 °F (37.6 °C)-102.9 °F (39.4 °C)] 100.2 °F (37.9 °C)  Pulse:  [] 88  Resp:  [17-27] 19  SpO2:  [92 %-100 %] 97 %  BP: ()/(47-63) 119/56     Weight: 67.6 kg (149 lb 0.5 oz)  Body mass index is 24.8 kg/m².      Intake/Output Summary (Last 24 hours) at 2/2/2019 0958  Last data filed at 2/2/2019 0928  Gross per 24 hour   Intake 3979.14 ml   Output 3635 ml   Net 344.14 ml       Physical Exam   Constitutional:   Frail-appearing; appears much more comfortable than yesterday   HENT:   Head: Normocephalic and atraumatic.   Eyes: No scleral icterus.   Neck: Neck supple.   RIJ CVC   Cardiovascular: Normal rate and regular rhythm.   No murmur heard.  Pulmonary/Chest:   Breathing comfortably over the vent. CTAB.    Abdominal: Soft. She exhibits no distension. There is no tenderness.   j-tube   Musculoskeletal: She exhibits no edema or deformity.   Neurological:   Eyes open but not tracking or responding to verbal stimulation or during physical exam.   Skin: Skin is warm and dry. There is pallor.   Vitals reviewed.      Vents:  Vent Mode: A/C (02/02/19 0756)  Set Rate: 20 bmp (02/02/19 0756)  Vt Set: 400 mL (02/02/19 0756)  PEEP/CPAP: 8 cmH20 (02/02/19 0756)  Oxygen Concentration (%): 55 (02/02/19 0756)  Peak Airway Pressure: 26 cmH2O (02/02/19 0756)  Total Ve: 11.6 mL (02/02/19 0756)  F/VT Ratio<105 (RSBI): (!) 52.38 (02/02/19 0756)    Lines/Drains/Airways     Central Venous Catheter Line                 Percutaneous Central Line Insertion/Assessment - triple lumen  01/25/19 0726 8 days           Drain                 NG/OG Tube 01/25/19 0728 [place] sump 18 Fr. Right nostril 8 days         Gastrostomy/Enterostomy 01/25/19 1300 Gastrostomy tube w/ balloon midline 7 days         Urethral Catheter 01/28/19 1129 14 Fr. 4 days          Airway                 Airway - Non-Surgical 01/31/19 1005 Endotracheal Tube 1 day          Peripheral Intravenous Line                 Peripheral IV - Single Lumen 01/31/19 1800 Right Antecubital 1 day                Significant Labs:    CBC/Anemia Profile:  Recent Labs   Lab 02/01/19  0509 02/01/19  2339 02/02/19  0530   WBC 13.07* 10.49 10.82   HGB 6.7* 9.5* 9.5*   HCT 21.6* 29.5* 29.5*    154 178   MCV 87 88 89   RDW 14.5 14.1 14.3        Chemistries:  Recent Labs   Lab 02/01/19  0509 02/02/19  0530   * 151*   K 3.7 3.1*    108   CO2 31* 33*   BUN 41* 41*   CREATININE 1.0 1.0   CALCIUM 7.6* 7.7*   ALBUMIN 3.0* 3.3*   PROT 5.2* 5.6*   BILITOT 1.0 1.1*   ALKPHOS 119 95   ALT 5* 6*   AST 10 15   MG 2.0 1.7   PHOS 2.3* 1.8*       Respiratory Culture:   Recent Labs   Lab 01/31/19  1732   GSRESP <10 epithelial cells per low power field.  Rare WBC's  Rare Gram negative rods   RESPIRATORYC ESCHERICHIA COLI  Few     resistant to cipro; susceptible to cephalosporins    All pertinent labs within the past 24 hours have been reviewed.    Significant Imaging:  CXR: I have reviewed all pertinent results/findings within the past 24 hours and my personal findings are:  Significant improvement in bilateral airspace disease, R>L. ETT 4cm above the vidya.

## 2019-02-02 NOTE — PROGRESS NOTES
[facility]  Pulmonology  Progress Note    Patient Name: Kaylee Ngo  MRN: [id number]  Admission Date: 1/25/2019  Hospital Length of Stay: 8 days  Code Status: Prior  Attending Provider: Cam Ashton MD  Primary Care Provider: Robert Garcia MD   Principal Problem: Malignant carcinoid tumor of pancreas    Subjective:     Interval History: Ms. Ngo remained intubated and sedated overnight. She had ongoing episodes of vomiting despite her NGT. She had significant drainage after advancing the tube this morning.     Objective:     Vital Signs (Most Recent):  Temp: 100.2 °F (37.9 °C) (02/02/19 0922)  Pulse: 88 (02/02/19 0900)  Resp: 19 (02/02/19 0900)  BP: (!) 119/56 (02/02/19 0900)  SpO2: 97 % (02/02/19 0900) Vital Signs (24h Range):  Temp:  [99.7 °F (37.6 °C)-102.9 °F (39.4 °C)] 100.2 °F (37.9 °C)  Pulse:  [] 88  Resp:  [17-27] 19  SpO2:  [92 %-100 %] 97 %  BP: ()/(47-63) 119/56     Weight: 67.6 kg (149 lb 0.5 oz)  Body mass index is 24.8 kg/m².      Intake/Output Summary (Last 24 hours) at 2/2/2019 0958  Last data filed at 2/2/2019 0928  Gross per 24 hour   Intake 3979.14 ml   Output 3635 ml   Net 344.14 ml       Physical Exam   Constitutional:   Frail-appearing; appears much more comfortable than yesterday   HENT:   Head: Normocephalic and atraumatic.   Eyes: No scleral icterus.   Neck: Neck supple.   RIJ CVC   Cardiovascular: Normal rate and regular rhythm.   No murmur heard.  Pulmonary/Chest:   Breathing comfortably over the vent. CTAB.    Abdominal: Soft. She exhibits no distension. There is no tenderness.   j-tube   Musculoskeletal: She exhibits no edema or deformity.   Neurological:   Eyes open but not tracking or responding to verbal stimulation or during physical exam.   Skin: Skin is warm and dry. There is pallor.   Vitals reviewed.      Vents:  Vent Mode: A/C (02/02/19 0756)  Set Rate: 20 bmp (02/02/19 0756)  Vt Set: 400 mL (02/02/19 0756)  PEEP/CPAP: 8 cmH20  (02/02/19 0756)  Oxygen Concentration (%): 55 (02/02/19 0756)  Peak Airway Pressure: 26 cmH2O (02/02/19 0756)  Total Ve: 11.6 mL (02/02/19 0756)  F/VT Ratio<105 (RSBI): (!) 52.38 (02/02/19 0756)    Lines/Drains/Airways     Central Venous Catheter Line                 Percutaneous Central Line Insertion/Assessment - triple lumen  01/25/19 0726 8 days          Drain                 NG/OG Tube 01/25/19 0728 [place] sump 18 Fr. Right nostril 8 days         Gastrostomy/Enterostomy 01/25/19 1300 Gastrostomy tube w/ balloon midline 7 days         Urethral Catheter 01/28/19 1129 14 Fr. 4 days          Airway                 Airway - Non-Surgical 01/31/19 1005 Endotracheal Tube 1 day          Peripheral Intravenous Line                 Peripheral IV - Single Lumen 01/31/19 1800 Right Antecubital 1 day                Significant Labs:    CBC/Anemia Profile:  Recent Labs   Lab 02/01/19  0509 02/01/19  2339 02/02/19  0530   WBC 13.07* 10.49 10.82   HGB 6.7* 9.5* 9.5*   HCT 21.6* 29.5* 29.5*    154 178   MCV 87 88 89   RDW 14.5 14.1 14.3        Chemistries:  Recent Labs   Lab 02/01/19  0509 02/02/19  0530   * 151*   K 3.7 3.1*    108   CO2 31* 33*   BUN 41* 41*   CREATININE 1.0 1.0   CALCIUM 7.6* 7.7*   ALBUMIN 3.0* 3.3*   PROT 5.2* 5.6*   BILITOT 1.0 1.1*   ALKPHOS 119 95   ALT 5* 6*   AST 10 15   MG 2.0 1.7   PHOS 2.3* 1.8*       Respiratory Culture:   Recent Labs   Lab 01/31/19  1732   GSRESP <10 epithelial cells per low power field.  Rare WBC's  Rare Gram negative rods   RESPIRATORYC ESCHERICHIA COLI  Few     resistant to cipro; susceptible to cephalosporins    All pertinent labs within the past 24 hours have been reviewed.    Significant Imaging:  CXR: I have reviewed all pertinent results/findings within the past 24 hours and my personal findings are:  Significant improvement in bilateral airspace disease, R>L. ETT 4cm above the vidya.    Assessment/Plan:     E. coli pneumonia    -agree with  deescalating antibiotics to ceftriaxone; will require 7-day course (2/2-2/8)     Acute hypoxemic respiratory failure    -Recommend low tidal volume ventilation (6cc/kg IBW) and high-PEEP strategy per ARDSNet protocol.  -Recommend titration of FiO2 to maintain SpO2 >88%- able to titrate down significantly today.  -Maintain net negative or at least net even volume status for the admission.   -Daily SAT & SBTs when medically appropriate- likely tomorrow.  -Agree with pain control being a priority at this point in her life and will use sedation as needed to help maintain her comfort on the ventilator. Discussed with primary team the concern for prolonged recovery as it relates to the prognosis of her carcinoid.       Malignant carcinoid tumor of unknown primary site    Advanced malignancy causing MOF.   -will adjust sedation with the intent of optimizing her chances of vent weaning but with attention to the concern for adequate pain control       Ms. Ngo's sister was present at bedside during rounds and was updated on the plan to continue ventilator weaning with hopes of extubation if her encephalopathy and respiratory failure continue to improve.     Elsy Tafoya, DO  Pulmonology  [facility]

## 2019-02-02 NOTE — PLAN OF CARE
Problem: Fall Injury Risk  Goal: Absence of Fall and Fall-Related Injury  Outcome: Ongoing (interventions implemented as appropriate)  Pt free of fall and/or injury. SR up x 3. B wrist restraints on for pt's safety.

## 2019-02-02 NOTE — NURSING
Dr. Levy phoned unit, updated on pt's status at this time. Orders given to d/c PO meds and keep NGT to continuous suction at all times. Orders also rec'd to switch Tylenol from PO to suppository.

## 2019-02-02 NOTE — PROGRESS NOTES
[facility]  Pulmonology  Progress Note    Patient Name: Kaylee Ngo  MRN: [id number]  Admission Date: 1/25/2019  Hospital Length of Stay: 7 days  Code Status: Prior  Attending Provider: Cam Ashton MD  Primary Care Provider: Robert Garcia MD   Principal Problem: Malignant carcinoid tumor of pancreas    Subjective:     Interval History: Patient intubated and sedated. No family at bedside this morning. No signs of bleeding or significant changes per nurses.     Objective:     Vital Signs (Most Recent):  Temp: (!) 102 °F (38.9 °C) (02/01/19 1815)  Pulse: 92 (02/01/19 1830)  Resp: 18 (02/01/19 1830)  BP: (!) 95/51 (02/01/19 1830)  SpO2: 98 % (02/01/19 1830) Vital Signs (24h Range):  Temp:  [99.9 °F (37.7 °C)-102.6 °F (39.2 °C)] 102 °F (38.9 °C)  Pulse:  [] 92  Resp:  [17-29] 18  SpO2:  [89 %-100 %] 98 %  BP: ()/(47-93) 95/51     Weight: 62.9 kg (138 lb 10.7 oz)  Body mass index is 23.08 kg/m².      Intake/Output Summary (Last 24 hours) at 2/1/2019 1903  Last data filed at 2/1/2019 1842  Gross per 24 hour   Intake 4980.86 ml   Output 2852 ml   Net 2128.86 ml       Physical Exam   Constitutional: She appears well-developed.   HENT:   Head: Normocephalic and atraumatic.   Eyes: No scleral icterus.   Neck: Neck supple.   Cardiovascular: Regular rhythm. Bradycardia present.   No murmur heard.  Abdominal:   j-tube, + bowel sounds, grabbed my hands to push away from attempting to examine her abdomen   Musculoskeletal: She exhibits no edema or deformity.   Neurological: She is alert.   CAM-ICU+, RASS +1, not tracking to voice or stimulation but eyes open   Skin: Skin is warm and dry. No rash noted. There is pallor.   Vitals reviewed.      Vents:  Vent Mode: A/C (02/01/19 1802)  Set Rate: 20 bmp (02/01/19 1802)  Vt Set: 400 mL (02/01/19 1802)  PEEP/CPAP: 8 cmH20 (02/01/19 1802)  Oxygen Concentration (%): 55 (02/01/19 1520)  Peak Airway Pressure: 26 cmH2O (02/01/19 1802)  Total Ve:  11 mL (02/01/19 1802)  F/VT Ratio<105 (RSBI): (!) 40.36 (02/01/19 1802)    Lines/Drains/Airways     Central Venous Catheter Line                 Percutaneous Central Line Insertion/Assessment - triple lumen  01/25/19 0726 7 days          Drain                 Gastrostomy/Enterostomy 01/25/19 1300 Gastrostomy tube w/ balloon midline 7 days         NG/OG Tube 01/25/19 0728 [place] sump 18 Fr. Right nostril 7 days         Urethral Catheter 01/28/19 1129 14 Fr. 4 days          Airway                 Airway - Non-Surgical 01/31/19 1005 Endotracheal Tube 1 day          Peripheral Intravenous Line                 Peripheral IV - Single Lumen 01/31/19 1800 Right Antecubital 1 day                Significant Labs:    CBC/Anemia Profile:  Recent Labs   Lab 01/31/19  0525 02/01/19  0509   WBC 16.74* 13.07*   HGB 7.7* 6.7*   HCT 23.5* 21.6*   * 161   MCV 85 87   RDW 14.6* 14.5        Chemistries:  Recent Labs   Lab 01/31/19  0525 02/01/19  0509   * 147*   K 3.1* 3.7    105   CO2 32* 31*   BUN 22 41*   CREATININE 0.8 1.0   CALCIUM 8.1* 7.6*   ALBUMIN 2.8* 3.0*   PROT 5.4* 5.2*   BILITOT 1.2* 1.0   ALKPHOS 131 119   ALT 8* 5*   AST 13 10   MG 1.7 2.0   PHOS 1.4* 2.3*       All pertinent labs within the past 24 hours have been reviewed.      Assessment/Plan:     Acute hypoxemic respiratory failure    -Recommend low tidal volume ventilation (6cc/kg IBW) and high-PEEP strategy per ARDSNet protocol.  -Recommend titration of FiO2 to maintain SpO2 >88%.  -Maintain net negative or at least net even volume status for the admission. Agree with giving pRBCs.  -Daily SAT & SBTs when medically appropriate.  -Agree with pain control being a priority at this point in her life and will use sedation as needed to help maintain her comfort on the ventilator.       Malignant carcinoid tumor of unknown primary site    Advanced malignancy causing MOF. Concern for uncontrolled pain.  -requested pain medications be increased to improve  her comfort as we continue to care for her            Elsy Tafoya, DO  Pulmonology  [facility]

## 2019-02-02 NOTE — PLAN OF CARE
Problem: Adult Inpatient Plan of Care  Goal: Plan of Care Review  Outcome: Ongoing (interventions implemented as appropriate)  Patient on  with documented settings.  Alarms are set and functioning with adequate volumes.  AMBU bag and mask at bedside. Will continue to monitor.

## 2019-02-03 PROBLEM — E87.0 HYPERNATREMIA: Status: ACTIVE | Noted: 2019-02-03

## 2019-02-03 PROBLEM — F31.9 BIPOLAR AFFECTIVE DISORDER: Status: ACTIVE | Noted: 2019-02-03

## 2019-02-03 LAB
ALBUMIN SERPL BCP-MCNC: 3.6 G/DL
ALP SERPL-CCNC: 83 U/L
ALT SERPL W/O P-5'-P-CCNC: 5 U/L
ANION GAP SERPL CALC-SCNC: 11 MMOL/L
ANISOCYTOSIS BLD QL SMEAR: SLIGHT
AST SERPL-CCNC: 13 U/L
BASOPHILS # BLD AUTO: ABNORMAL K/UL
BASOPHILS NFR BLD: 0 %
BILIRUB SERPL-MCNC: 1.4 MG/DL
BUN SERPL-MCNC: 34 MG/DL
CALCIUM SERPL-MCNC: 7.6 MG/DL
CHLORIDE SERPL-SCNC: 110 MMOL/L
CO2 SERPL-SCNC: 33 MMOL/L
CREAT SERPL-MCNC: 0.8 MG/DL
DIFFERENTIAL METHOD: ABNORMAL
EOSINOPHIL # BLD AUTO: ABNORMAL K/UL
EOSINOPHIL NFR BLD: 0 %
ERYTHROCYTE [DISTWIDTH] IN BLOOD BY AUTOMATED COUNT: 14.9 %
EST. GFR  (AFRICAN AMERICAN): >60 ML/MIN/1.73 M^2
EST. GFR  (NON AFRICAN AMERICAN): >60 ML/MIN/1.73 M^2
GLUCOSE SERPL-MCNC: 293 MG/DL
HCT VFR BLD AUTO: 32.3 %
HGB BLD-MCNC: 10.4 G/DL
HYPOCHROMIA BLD QL SMEAR: ABNORMAL
LYMPHOCYTES # BLD AUTO: ABNORMAL K/UL
LYMPHOCYTES NFR BLD: 5 %
MAGNESIUM SERPL-MCNC: 1.9 MG/DL
MCH RBC QN AUTO: 28.6 PG
MCHC RBC AUTO-ENTMCNC: 32.2 G/DL
MCV RBC AUTO: 89 FL
METAMYELOCYTES NFR BLD MANUAL: 1 %
MONOCYTES # BLD AUTO: ABNORMAL K/UL
MONOCYTES NFR BLD: 0 %
MYELOCYTES NFR BLD MANUAL: 1 %
NEUTROPHILS NFR BLD: 93 %
PHOSPHATE SERPL-MCNC: 2.4 MG/DL
PLATELET # BLD AUTO: 240 K/UL
PLATELET BLD QL SMEAR: ABNORMAL
PMV BLD AUTO: 11.9 FL
POCT GLUCOSE: 280 MG/DL (ref 70–110)
POCT GLUCOSE: 293 MG/DL (ref 70–110)
POCT GLUCOSE: 338 MG/DL (ref 70–110)
POCT GLUCOSE: 339 MG/DL (ref 70–110)
POCT GLUCOSE: 392 MG/DL (ref 70–110)
POTASSIUM SERPL-SCNC: 4 MMOL/L
PROT SERPL-MCNC: 6.1 G/DL
RBC # BLD AUTO: 3.64 M/UL
SODIUM SERPL-SCNC: 154 MMOL/L
WBC # BLD AUTO: 11.79 K/UL

## 2019-02-03 PROCEDURE — 25000003 PHARM REV CODE 250: Performed by: SURGERY

## 2019-02-03 PROCEDURE — 99900035 HC TECH TIME PER 15 MIN (STAT)

## 2019-02-03 PROCEDURE — S0171 BUMETANIDE 0.5 MG: HCPCS | Performed by: SURGERY

## 2019-02-03 PROCEDURE — 20000000 HC ICU ROOM

## 2019-02-03 PROCEDURE — 27000221 HC OXYGEN, UP TO 24 HOURS

## 2019-02-03 PROCEDURE — 63600175 PHARM REV CODE 636 W HCPCS: Performed by: INTERNAL MEDICINE

## 2019-02-03 PROCEDURE — 94761 N-INVAS EAR/PLS OXIMETRY MLT: CPT

## 2019-02-03 PROCEDURE — 93010 ELECTROCARDIOGRAM REPORT: CPT | Mod: ,,, | Performed by: INTERNAL MEDICINE

## 2019-02-03 PROCEDURE — 25000242 PHARM REV CODE 250 ALT 637 W/ HCPCS: Performed by: SURGERY

## 2019-02-03 PROCEDURE — P9047 ALBUMIN (HUMAN), 25%, 50ML: HCPCS | Mod: JG | Performed by: SURGERY

## 2019-02-03 PROCEDURE — 84100 ASSAY OF PHOSPHORUS: CPT

## 2019-02-03 PROCEDURE — 83735 ASSAY OF MAGNESIUM: CPT

## 2019-02-03 PROCEDURE — 80053 COMPREHEN METABOLIC PANEL: CPT

## 2019-02-03 PROCEDURE — 25000003 PHARM REV CODE 250: Performed by: INTERNAL MEDICINE

## 2019-02-03 PROCEDURE — 93010 EKG 12-LEAD: ICD-10-PCS | Mod: ,,, | Performed by: INTERNAL MEDICINE

## 2019-02-03 PROCEDURE — C9113 INJ PANTOPRAZOLE SODIUM, VIA: HCPCS | Performed by: SURGERY

## 2019-02-03 PROCEDURE — 25000003 PHARM REV CODE 250: Performed by: STUDENT IN AN ORGANIZED HEALTH CARE EDUCATION/TRAINING PROGRAM

## 2019-02-03 PROCEDURE — 93005 ELECTROCARDIOGRAM TRACING: CPT

## 2019-02-03 PROCEDURE — 85027 COMPLETE CBC AUTOMATED: CPT

## 2019-02-03 PROCEDURE — 94668 MNPJ CHEST WALL SBSQ: CPT

## 2019-02-03 PROCEDURE — 63600175 PHARM REV CODE 636 W HCPCS: Mod: JG | Performed by: SURGERY

## 2019-02-03 PROCEDURE — 85007 BL SMEAR W/DIFF WBC COUNT: CPT

## 2019-02-03 PROCEDURE — 94640 AIRWAY INHALATION TREATMENT: CPT

## 2019-02-03 PROCEDURE — 25000003 PHARM REV CODE 250: Performed by: NURSE PRACTITIONER

## 2019-02-03 PROCEDURE — 63600175 PHARM REV CODE 636 W HCPCS: Performed by: STUDENT IN AN ORGANIZED HEALTH CARE EDUCATION/TRAINING PROGRAM

## 2019-02-03 RX ORDER — LAMOTRIGINE 25 MG/1
50 TABLET ORAL 2 TIMES DAILY
Status: DISCONTINUED | OUTPATIENT
Start: 2019-02-03 | End: 2019-02-10

## 2019-02-03 RX ORDER — LAMOTRIGINE 25 MG/1
50 TABLET ORAL DAILY
Status: DISCONTINUED | OUTPATIENT
Start: 2019-02-03 | End: 2019-02-03

## 2019-02-03 RX ORDER — HYDROXYZINE HYDROCHLORIDE 10 MG/5ML
25 SYRUP ORAL 3 TIMES DAILY
Status: DISCONTINUED | OUTPATIENT
Start: 2019-02-03 | End: 2019-02-11

## 2019-02-03 RX ORDER — ENOXAPARIN SODIUM 100 MG/ML
40 INJECTION SUBCUTANEOUS EVERY 24 HOURS
Status: DISCONTINUED | OUTPATIENT
Start: 2019-02-03 | End: 2019-02-13

## 2019-02-03 RX ADMIN — IPRATROPIUM BROMIDE AND ALBUTEROL SULFATE 3 ML: .5; 3 SOLUTION RESPIRATORY (INHALATION) at 11:02

## 2019-02-03 RX ADMIN — HYPROMELLOSE 2910 1 DROP: 5 SOLUTION OPHTHALMIC at 12:02

## 2019-02-03 RX ADMIN — HYPROMELLOSE 2910 1 DROP: 5 SOLUTION OPHTHALMIC at 08:02

## 2019-02-03 RX ADMIN — INSULIN ASPART 8 UNITS: 100 INJECTION, SOLUTION INTRAVENOUS; SUBCUTANEOUS at 08:02

## 2019-02-03 RX ADMIN — IPRATROPIUM BROMIDE AND ALBUTEROL SULFATE 3 ML: .5; 3 SOLUTION RESPIRATORY (INHALATION) at 07:02

## 2019-02-03 RX ADMIN — IPRATROPIUM BROMIDE AND ALBUTEROL SULFATE 3 ML: .5; 3 SOLUTION RESPIRATORY (INHALATION) at 12:02

## 2019-02-03 RX ADMIN — ALBUMIN HUMAN 10 ML/HR: 0.25 SOLUTION INTRAVENOUS at 03:02

## 2019-02-03 RX ADMIN — HYPROMELLOSE 2910 1 DROP: 5 SOLUTION OPHTHALMIC at 05:02

## 2019-02-03 RX ADMIN — HYDROXYZINE HYDROCHLORIDE 25 MG: 10 SYRUP ORAL at 03:02

## 2019-02-03 RX ADMIN — DEXMEDETOMIDINE HYDROCHLORIDE 1.4 MCG/KG/HR: 4 INJECTION, SOLUTION INTRAVENOUS at 03:02

## 2019-02-03 RX ADMIN — FENTANYL CITRATE 175 MCG/HR: 50 INJECTION INTRAVENOUS at 08:02

## 2019-02-03 RX ADMIN — HYDROXYZINE HYDROCHLORIDE 25 MG: 10 SYRUP ORAL at 08:02

## 2019-02-03 RX ADMIN — INSULIN ASPART 8 UNITS: 100 INJECTION, SOLUTION INTRAVENOUS; SUBCUTANEOUS at 12:02

## 2019-02-03 RX ADMIN — IPRATROPIUM BROMIDE AND ALBUTEROL SULFATE 3 ML: .5; 3 SOLUTION RESPIRATORY (INHALATION) at 03:02

## 2019-02-03 RX ADMIN — METOPROLOL TARTRATE 5 MG: 1 INJECTION, SOLUTION INTRAVENOUS at 08:02

## 2019-02-03 RX ADMIN — INSULIN ASPART 3 UNITS: 100 INJECTION, SOLUTION INTRAVENOUS; SUBCUTANEOUS at 03:02

## 2019-02-03 RX ADMIN — METOPROLOL TARTRATE 5 MG: 1 INJECTION, SOLUTION INTRAVENOUS at 01:02

## 2019-02-03 RX ADMIN — CEFTRIAXONE SODIUM 1 G: 1 INJECTION, POWDER, FOR SOLUTION INTRAMUSCULAR; INTRAVENOUS at 12:02

## 2019-02-03 RX ADMIN — METOPROLOL TARTRATE 5 MG: 1 INJECTION, SOLUTION INTRAVENOUS at 02:02

## 2019-02-03 RX ADMIN — LAMOTRIGINE 50 MG: 25 TABLET ORAL at 08:02

## 2019-02-03 RX ADMIN — SODIUM PHOSPHATE, MONOBASIC, MONOHYDRATE 30 MMOL: 276; 142 INJECTION, SOLUTION INTRAVENOUS at 12:02

## 2019-02-03 RX ADMIN — INSULIN ASPART 6 UNITS: 100 INJECTION, SOLUTION INTRAVENOUS; SUBCUTANEOUS at 08:02

## 2019-02-03 RX ADMIN — DEXMEDETOMIDINE HYDROCHLORIDE 0.9 MCG/KG/HR: 4 INJECTION, SOLUTION INTRAVENOUS at 08:02

## 2019-02-03 RX ADMIN — ALBUMIN HUMAN 10 ML/HR: 0.25 SOLUTION INTRAVENOUS at 08:02

## 2019-02-03 RX ADMIN — DEXMEDETOMIDINE HYDROCHLORIDE 1.4 MCG/KG/HR: 4 INJECTION, SOLUTION INTRAVENOUS at 08:02

## 2019-02-03 RX ADMIN — ACETAMINOPHEN 650 MG: 650 SUPPOSITORY RECTAL at 02:02

## 2019-02-03 RX ADMIN — INSULIN ASPART 10 UNITS: 100 INJECTION, SOLUTION INTRAVENOUS; SUBCUTANEOUS at 05:02

## 2019-02-03 RX ADMIN — BUMETANIDE 1 MG: 0.25 INJECTION INTRAMUSCULAR; INTRAVENOUS at 08:02

## 2019-02-03 RX ADMIN — ASCORBIC ACID, VITAMIN A PALMITATE, CHOLECALCIFEROL, THIAMINE HYDROCHLORIDE, RIBOFLAVIN-5 PHOSPHATE SODIUM, PYRIDOXINE HYDROCHLORIDE, NIACINAMIDE, DEXPANTHENOL, ALPHA-TOCOPHEROL ACETATE, VITAMIN K1, FOLIC ACID, BIOTIN, CYANOCOBALAMIN: 200; 3300; 200; 6; 3.6; 6; 40; 15; 10; 150; 600; 60; 5 INJECTION, SOLUTION INTRAVENOUS at 08:02

## 2019-02-03 RX ADMIN — PANTOPRAZOLE SODIUM 40 MG: 40 INJECTION, POWDER, LYOPHILIZED, FOR SOLUTION INTRAVENOUS at 08:02

## 2019-02-03 RX ADMIN — IPRATROPIUM BROMIDE AND ALBUTEROL SULFATE 3 ML: .5; 3 SOLUTION RESPIRATORY (INHALATION) at 04:02

## 2019-02-03 RX ADMIN — ENOXAPARIN SODIUM 40 MG: 100 INJECTION SUBCUTANEOUS at 05:02

## 2019-02-03 RX ADMIN — LAMOTRIGINE 50 MG: 25 TABLET ORAL at 12:02

## 2019-02-03 NOTE — ASSESSMENT & PLAN NOTE
Acute respiratory failure due to aspiration pneumonitis and E. Coli pneumonia  -Recommend low tidal volume ventilation (6cc/kg IBW) and high-PEEP strategy per ARDSNet protocol.  -Recommend titration of FiO2 to maintain SpO2 >88%- able to titrate down significantly today.  -Maintain net negative or at least net even volume status for the admission.   -Daily SAT & SBTs when medically appropriate  -Passed SBT on 5/5 today, but very weak with difficulty following commands. Low vital capacity.   -con't to monitor NGT output  -Will plan to extubate tomorrow after sedation has had more time to wear off.

## 2019-02-03 NOTE — NURSING
Temp 102.2 rectal. Rectal Tylenol given. No change in temp 30 minutes following Tylenol administration. Pt placed on cooling blank for better temp management. MD Tafoya aware.

## 2019-02-03 NOTE — PROGRESS NOTES
This note also relates to the following rows which could not be included:  BP - Cannot attach notes to unvalidated device data

## 2019-02-03 NOTE — PROGRESS NOTES
[facility] General Surgery  Neuroendocrine Tumor Service  Progress Note     Admission Date: 1/25/2019  Hospital Length of Stay: 9  Principal Problem: Malignant carcinoid tumor of pancreas     Subjective:      Operation:  1/25/18  1. Ex lap  2. Gastro-gastrostomy EEA with 33 mm circular stapler  3. Pyloroplasty and gastrostomy closure  3. enteric resection with side to side to anstamosis and restoration of small bowel continuity using Idrive tan load stapler  4 Liver US  5. Liver biopsy  6. Liver resection segment 3  7. Supraceliac lymphadenectomy  8. FNA pancreas  9. Lt salpingo oopherectomy   10 . Meckel diverticulectomy  11. Feeding jejunostomy 14 Fr malecot  12. ICG perfusion and Infrared exam of all anastamosis    Interval History:     Patient still sedated on fentanyl and precedex, required increased fentanyl overnight for agitation. Hemodynamically stable off vasopressors. RT and Pulm on board.Still spiking fevers up to 102. Tolerating TF with minimal NGT output.     Scheduled Meds:   albuterol-ipratropium  3 mL Nebulization Q4H    artificial tears  1 drop Both Eyes QID    bumetanide  1 mg Intravenous Daily    cefTRIAXone (ROCEPHIN) IVPB  1 g Intravenous Q24H    insulin detemir U-100  15 Units Subcutaneous Daily    metoprolol  5 mg Intravenous Q6H    pantoprazole  40 mg Intravenous Daily    sodium phosphate IVPB  30 mmol Intravenous Once     Continuous Infusions:   albumin human 25% 10 mL/hr (02/03/19 0822)    Amino acid 5% - dextrose 15% (CLINIMIX-E) solution with additives (1L  provides 510 kcal/L dextrose, with 50 gm AA, 150 gm CHO, Na 35, K 30, Mg 5, Ca 4.5, Acetate 80, Cl 39, Phos 15) 30 mL/hr at 02/02/19 1953    dexmedetomidine (PRECEDEX) infusion Stopped (02/03/19 0905)    fentanyl Stopped (02/03/19 0905)     PRN Meds:.sodium chloride, acetaminophen, albuterol sulfate, dextrose 50%, diphenhydrAMINE, glucagon (human recombinant), hydrALAZINE, HYDROmorphone, insulin aspart  U-100, ipratropium, labetalol, lorazepam, naloxone, ondansetron, sodium chloride 0.9%, sodium chloride 3%    Objective:      Temp:  [98.9 °F (37.2 °C)-102.7 °F (39.3 °C)] 100.4 °F (38 °C)  Pulse:  [] 95  Resp:  [16-28] 20  SpO2:  [84 %-99 %] 92 %  BP: ()/(41-72) 118/58  Weight change: 0 kg (0 lb)    Intake/Output Summary (Last 24 hours) at 2/3/2019 0955  Last data filed at 2/3/2019 0800  Gross per 24 hour   Intake 2865.35 ml   Output 2965 ml   Net -99.65 ml     UOP 3L   gastric contents   Jtube feeds going at 20cc    Physical Exam:  GEN: intubated and sedated on fentanyl and precedex, NAD, NGT in place, gastric contents  CV: RRR  PULM: unlabored, on vent FiO2 of 40% with Peep 5  ABD: S/NT/ ND, incision c/d/i, Jtube in place with feeds going  EXT: Peripheral pulses present and equal bilaterally    Recent Labs   Lab 02/03/19  0519   *   K 4.0   CO2 33*      BUN 34*   CREATININE 0.8   CALCIUM 7.6*   PROT 6.1   AST 13   ALT 5*   ALKPHOS 83   BILITOT 1.4*     Recent Labs   Lab 02/03/19  0519   WBC 11.79   HGB 10.4*   HCT 32.3*          Significant Imaging: I have reviewed all pertinent imaging results/findings within the past 24 hours.     Assessment and Plan:    62 yo F w/ PNET, s/p reversal of Sandeep-en-Y w/ Jtube placement  POD#9    1. Ex lap  2. Gastro-gastrostomy EEA with 33 mm circular stapler  3. Pyloroplasty and gastrostomy closure  3. enteric resection with side to side to anstamosis and restoration of small bowel continuity using [place] tan load stapler  4 Liver US  5. Liver biopsy  6. Liver resection segment 3  7. Supraceliac lymphadenectomy  8. FNA pancreas  9. Lt salpingo oopherectomy   10 . Meckel diverticulectomy  11. Feeding jejunostomy 14 Fr malecot  12. ICG perfusion and Infrared exam of all anastamosis    Neuro - Intubated. Sedated on fentanyl and Precedex continue for pain control but wean as appropriate in preparation for SBT today. Restart Cymbalta.  TPN -  Continue NPO, cont but decrease TPN/lipids to 20cc  ID - On Rocephin with cultures showing E Coli sensitive to Rocephin. - aspiration pneumonia. Blood cx. NGTD. WBC 11  Heme: H/H 10/32 stable  Pulm - reintubated 1/31, appreciate pulm assistance. FiO2 40% with PEEP 5. PRN ABGs. Resp Cx growing E Coli. Plan to try SBT and possible extubation today  FEN/GI - NPO, continue  replacing lytes prn. Hypernatremia 154. TF started yesterday and increase today to 20cc hr with 40cc free water flushes Q4  Renal - Cr 1.0, [place] for HD monitoring, good UOP, cont [place], check CVP, Albumin 25% 10 ml continuous   PPX - restart lovenox, protonix, ssc in place  Dispo - Remains in critical condition cont ICU care    Nash Titus MD HOII

## 2019-02-03 NOTE — PLAN OF CARE
Problem: Adult Inpatient Plan of Care  Goal: Plan of Care Review  Outcome: Ongoing (interventions implemented as appropriate)    Patient intubated and sedated, on Fentanyl and precedex, on albumin at 10 ml/hr. UOP adequate but gely concentrated, response to voice and pain but does not follow commands, upper and lower extremities elevated on pillows, PRN meds given for high temp, ice bags in place, on TPN at 30 ml/hr and tube feeding at 10 ml/hr with 20 cc flush every 4 hours, turned every two hours, mepilex applied on bilateral heels and elevated on pillows.

## 2019-02-03 NOTE — PROGRESS NOTES
02/02/19 2310   Vital Signs   Temp (!) 102.2 °F (39 °C)     Dr. Titus notified about the temp as above and as per tylenol every 8 hour order, the next dose will be due at 0117. Dr. Titus ordered to give tylenol dose early. Orders implemented. Will continue to monitor.

## 2019-02-03 NOTE — SUBJECTIVE & OBJECTIVE
Interval History: Overnight no acute events.    Objective:     Vital Signs (Most Recent):  Temp: 100.3 °F (37.9 °C) (02/03/19 1115)  Pulse: (!) 149 (02/03/19 1330)  Resp: (!) 31 (02/03/19 1330)  BP: (!) 156/72 (02/03/19 1330)  SpO2: (!) 91 % (02/03/19 1330) Vital Signs (24h Range):  Temp:  [98.9 °F (37.2 °C)-102.7 °F (39.3 °C)] 100.3 °F (37.9 °C)  Pulse:  [] 149  Resp:  [16-32] 31  SpO2:  [84 %-96 %] 91 %  BP: ()/(41-81) 156/72     Weight: 67.6 kg (149 lb 0.5 oz)  Body mass index is 24.8 kg/m².      Intake/Output Summary (Last 24 hours) at 2/3/2019 1432  Last data filed at 2/3/2019 1226  Gross per 24 hour   Intake 2019.97 ml   Output 3420 ml   Net -1400.03 ml       Physical Exam   Constitutional:   Frail-appearing woman laying in bed comfortably   HENT:   Head: Normocephalic and atraumatic.   Eyes: No scleral icterus.   Neck: Neck supple.   RIJ CVC   Cardiovascular:   Tachycardic, regular rhythm   Pulmonary/Chest: Effort normal. She has no wheezes. She has no rales.   Comfortably breathing on SBT 5/5. No significant ETT secretions.   Abdominal: Soft. She exhibits no distension. There is no tenderness.   j-tube   Musculoskeletal:   Minimal hand edema   Neurological: She is alert.   Tracking to voice, weakly nodding to answer y/n questions. Globally weak, but attempting to wiggle toes and minimally moving thumbs to attempt to follow commands. No agitation.   Skin: Skin is warm. She is diaphoretic. There is pallor.   Psychiatric: She has a normal mood and affect. Her behavior is normal.   Vitals reviewed.      Vents:  Vent Mode: A/C (02/03/19 1131)  Set Rate: 20 bmp (02/03/19 1131)  Vt Set: 400 mL (02/03/19 1131)  Pressure Support: 5 cmH20 (02/03/19 1125)  PEEP/CPAP: 5 cmH20 (02/03/19 1131)  Oxygen Concentration (%): 40 (02/03/19 1131)  Peak Airway Pressure: 11 cmH2O (02/03/19 1125)  Total Ve: 14.9 mL (02/03/19 1125)  F/VT Ratio<105 (RSBI): (!) 45.8 (02/03/19 1125)    Lines/Drains/Airways     Central Venous  Catheter Line                 Percutaneous Central Line Insertion/Assessment - triple lumen  01/25/19 0726 9 days          Drain                 Gastrostomy/Enterostomy 01/25/19 1300 Gastrostomy tube w/ balloon midline 9 days         NG/OG Tube 01/25/19 0728 [place] sump 18 Fr. Right nostril 9 days         Urethral Catheter 01/28/19 1129 14 Fr. 6 days          Airway                 Airway - Non-Surgical 01/31/19 1005 Endotracheal Tube 3 days          Peripheral Intravenous Line                 Peripheral IV - Single Lumen 01/31/19 1800 Right Antecubital 2 days                Significant Labs:    CBC/Anemia Profile:  Recent Labs   Lab 02/01/19  2339 02/02/19  0530 02/03/19  0519   WBC 10.49 10.82 11.79   HGB 9.5* 9.5* 10.4*   HCT 29.5* 29.5* 32.3*    178 240   MCV 88 89 89   RDW 14.1 14.3 14.9*        Chemistries:  Recent Labs   Lab 02/02/19  0530 02/03/19  0519   * 154*   K 3.1* 4.0    110   CO2 33* 33*   BUN 41* 34*   CREATININE 1.0 0.8   CALCIUM 7.7* 7.6*   ALBUMIN 3.3* 3.6   PROT 5.6* 6.1   BILITOT 1.1* 1.4*   ALKPHOS 95 83   ALT 6* 5*   AST 15 13   MG 1.7 1.9   PHOS 1.8* 2.4*       All pertinent labs within the past 24 hours have been reviewed.    Significant Imaging:  CXR: I have reviewed all pertinent results/findings within the past 24 hours and my personal findings are:  slight silhouetting of the lateral left hemidiaphragm, no significant difference in parenchymal disease

## 2019-02-03 NOTE — PROGRESS NOTES
[facility]  Pulmonology  Progress Note    Patient Name: Kaylee Ngo  MRN: [id number]  Admission Date: 1/25/2019  Hospital Length of Stay: 9 days  Code Status: Prior  Attending Provider: Cam Ashton MD  Primary Care Provider: Robert Garcia MD   Principal Problem: Malignant carcinoid tumor of pancreas    Subjective:     Interval History: Overnight no acute events.    Objective:     Vital Signs (Most Recent):  Temp: 100.3 °F (37.9 °C) (02/03/19 1115)  Pulse: (!) 149 (02/03/19 1330)  Resp: (!) 31 (02/03/19 1330)  BP: (!) 156/72 (02/03/19 1330)  SpO2: (!) 91 % (02/03/19 1330) Vital Signs (24h Range):  Temp:  [98.9 °F (37.2 °C)-102.7 °F (39.3 °C)] 100.3 °F (37.9 °C)  Pulse:  [] 149  Resp:  [16-32] 31  SpO2:  [84 %-96 %] 91 %  BP: ()/(41-81) 156/72     Weight: 67.6 kg (149 lb 0.5 oz)  Body mass index is 24.8 kg/m².      Intake/Output Summary (Last 24 hours) at 2/3/2019 1432  Last data filed at 2/3/2019 1226  Gross per 24 hour   Intake 2019.97 ml   Output 3420 ml   Net -1400.03 ml       Physical Exam   Constitutional:   Frail-appearing woman laying in bed comfortably   HENT:   Head: Normocephalic and atraumatic.   Eyes: No scleral icterus.   Neck: Neck supple.   RIJ CVC   Cardiovascular:   Tachycardic, regular rhythm   Pulmonary/Chest: Effort normal. She has no wheezes. She has no rales.   Comfortably breathing on SBT 5/5. No significant ETT secretions.   Abdominal: Soft. She exhibits no distension. There is no tenderness.   j-tube   Musculoskeletal:   Minimal hand edema   Neurological: She is alert.   Tracking to voice, weakly nodding to answer y/n questions. Globally weak, but attempting to wiggle toes and minimally moving thumbs to attempt to follow commands. No agitation.   Skin: Skin is warm. She is diaphoretic. There is pallor.   Psychiatric: She has a normal mood and affect. Her behavior is normal.   Vitals reviewed.      Vents:  Vent Mode: A/C (02/03/19 1131)  Set Rate:  20 bmp (02/03/19 1131)  Vt Set: 400 mL (02/03/19 1131)  Pressure Support: 5 cmH20 (02/03/19 1125)  PEEP/CPAP: 5 cmH20 (02/03/19 1131)  Oxygen Concentration (%): 40 (02/03/19 1131)  Peak Airway Pressure: 11 cmH2O (02/03/19 1125)  Total Ve: 14.9 mL (02/03/19 1125)  F/VT Ratio<105 (RSBI): (!) 45.8 (02/03/19 1125)    Lines/Drains/Airways     Central Venous Catheter Line                 Percutaneous Central Line Insertion/Assessment - triple lumen  01/25/19 0726 9 days          Drain                 Gastrostomy/Enterostomy 01/25/19 1300 Gastrostomy tube w/ balloon midline 9 days         NG/OG Tube 01/25/19 0728 [place] sump 18 Fr. Right nostril 9 days         Urethral Catheter 01/28/19 1129 14 Fr. 6 days          Airway                 Airway - Non-Surgical 01/31/19 1005 Endotracheal Tube 3 days          Peripheral Intravenous Line                 Peripheral IV - Single Lumen 01/31/19 1800 Right Antecubital 2 days                Significant Labs:    CBC/Anemia Profile:  Recent Labs   Lab 02/01/19  2339 02/02/19  0530 02/03/19 0519   WBC 10.49 10.82 11.79   HGB 9.5* 9.5* 10.4*   HCT 29.5* 29.5* 32.3*    178 240   MCV 88 89 89   RDW 14.1 14.3 14.9*        Chemistries:  Recent Labs   Lab 02/02/19 0530 02/03/19 0519   * 154*   K 3.1* 4.0    110   CO2 33* 33*   BUN 41* 34*   CREATININE 1.0 0.8   CALCIUM 7.7* 7.6*   ALBUMIN 3.3* 3.6   PROT 5.6* 6.1   BILITOT 1.1* 1.4*   ALKPHOS 95 83   ALT 6* 5*   AST 15 13   MG 1.7 1.9   PHOS 1.8* 2.4*       All pertinent labs within the past 24 hours have been reviewed.    Significant Imaging:  CXR: I have reviewed all pertinent results/findings within the past 24 hours and my personal findings are:  slight silhouetting of the lateral left hemidiaphragm, no significant difference in parenchymal disease    Assessment/Plan:     Bipolar affective disorder    -recommend restarting PTA psychiatry meds through J-tube in preparation for extubtion.     Hypernatremia    -agree  with increasing free water flushes  -can add D5W if necessary  -consider TF formula change     E. coli pneumonia    -agree with deescalating antibiotics to ceftriaxone; will require 7-day course (2/2-2/8)     Acute hypoxemic respiratory failure    Acute respiratory failure due to aspiration pneumonitis and E. Coli pneumonia  -Recommend low tidal volume ventilation (6cc/kg IBW) and high-PEEP strategy per ARDSNet protocol.  -Recommend titration of FiO2 to maintain SpO2 >88%- able to titrate down significantly today.  -Maintain net negative or at least net even volume status for the admission.   -Daily SAT & SBTs when medically appropriate  -Passed SBT on 5/5 today, but very weak with difficulty following commands. Low vital capacity.   -con't to monitor NGT output  -Will plan to extubate tomorrow after sedation has had more time to wear off.        Sinus tachycardia    Sinus tachycardia likely due to fevers and underlying disease. Precedex has likely masked the tachycardia previously.  -restart precedex to manage agitation  -optimize pain control. Attempting to limit fentalyn in excess of what is required to treat pain while prepping for extubation.  -treat fevers and underlying disease            Elsy Tafoya, DO  Pulmonology  [facility]

## 2019-02-03 NOTE — NURSING
HR increasing since SAT/SBT. Now 152. RR 30 with pt back on A/C. Dr. Tafoya notified. Will restart Precedex and cont to monitor.

## 2019-02-03 NOTE — ASSESSMENT & PLAN NOTE
Sinus tachycardia likely due to fevers and underlying disease. Precedex has likely masked the tachycardia previously.  -restart precedex to manage agitation  -optimize pain control. Attempting to limit fentalyn in excess of what is required to treat pain while prepping for extubation.  -treat fevers and underlying disease

## 2019-02-04 LAB
ALBUMIN SERPL BCP-MCNC: 3.5 G/DL
ALP SERPL-CCNC: 72 U/L
ALT SERPL W/O P-5'-P-CCNC: <5 U/L
ANION GAP SERPL CALC-SCNC: 11 MMOL/L
AST SERPL-CCNC: 9 U/L
BASOPHILS # BLD AUTO: 0.01 K/UL
BASOPHILS NFR BLD: 0.1 %
BILIRUB SERPL-MCNC: 1.1 MG/DL
BUN SERPL-MCNC: 30 MG/DL
CALCIUM SERPL-MCNC: 7.9 MG/DL
CHLORIDE SERPL-SCNC: 111 MMOL/L
CO2 SERPL-SCNC: 33 MMOL/L
CREAT SERPL-MCNC: 0.8 MG/DL
DIFFERENTIAL METHOD: ABNORMAL
EOSINOPHIL # BLD AUTO: 0 K/UL
EOSINOPHIL NFR BLD: 0 %
ERYTHROCYTE [DISTWIDTH] IN BLOOD BY AUTOMATED COUNT: 15.4 %
EST. GFR  (AFRICAN AMERICAN): >60 ML/MIN/1.73 M^2
EST. GFR  (NON AFRICAN AMERICAN): >60 ML/MIN/1.73 M^2
GLUCOSE SERPL-MCNC: 430 MG/DL
HCT VFR BLD AUTO: 30.9 %
HGB BLD-MCNC: 9.7 G/DL
LYMPHOCYTES # BLD AUTO: 0.4 K/UL
LYMPHOCYTES NFR BLD: 3.9 %
MAGNESIUM SERPL-MCNC: 2 MG/DL
MCH RBC QN AUTO: 28.2 PG
MCHC RBC AUTO-ENTMCNC: 31.4 G/DL
MCV RBC AUTO: 90 FL
MONOCYTES # BLD AUTO: 0.1 K/UL
MONOCYTES NFR BLD: 0.8 %
NEUTROPHILS # BLD AUTO: 10 K/UL
NEUTROPHILS NFR BLD: 95.2 %
PHOSPHATE SERPL-MCNC: 1.7 MG/DL
PLATELET # BLD AUTO: 296 K/UL
PMV BLD AUTO: 12.3 FL
POCT GLUCOSE: 135 MG/DL (ref 70–110)
POCT GLUCOSE: 153 MG/DL (ref 70–110)
POCT GLUCOSE: 154 MG/DL (ref 70–110)
POCT GLUCOSE: 170 MG/DL (ref 70–110)
POCT GLUCOSE: 172 MG/DL (ref 70–110)
POCT GLUCOSE: 207 MG/DL (ref 70–110)
POCT GLUCOSE: 221 MG/DL (ref 70–110)
POCT GLUCOSE: 228 MG/DL (ref 70–110)
POCT GLUCOSE: 258 MG/DL (ref 70–110)
POCT GLUCOSE: 378 MG/DL (ref 70–110)
POCT GLUCOSE: 425 MG/DL (ref 70–110)
POTASSIUM SERPL-SCNC: 3 MMOL/L
POTASSIUM SERPL-SCNC: 3.2 MMOL/L
PROT SERPL-MCNC: 6 G/DL
RBC # BLD AUTO: 3.44 M/UL
SODIUM SERPL-SCNC: 155 MMOL/L
WBC # BLD AUTO: 10.59 K/UL

## 2019-02-04 PROCEDURE — 84132 ASSAY OF SERUM POTASSIUM: CPT

## 2019-02-04 PROCEDURE — 25000003 PHARM REV CODE 250: Performed by: STUDENT IN AN ORGANIZED HEALTH CARE EDUCATION/TRAINING PROGRAM

## 2019-02-04 PROCEDURE — 63600175 PHARM REV CODE 636 W HCPCS: Performed by: STUDENT IN AN ORGANIZED HEALTH CARE EDUCATION/TRAINING PROGRAM

## 2019-02-04 PROCEDURE — 84100 ASSAY OF PHOSPHORUS: CPT

## 2019-02-04 PROCEDURE — 94640 AIRWAY INHALATION TREATMENT: CPT

## 2019-02-04 PROCEDURE — 97110 THERAPEUTIC EXERCISES: CPT

## 2019-02-04 PROCEDURE — 94668 MNPJ CHEST WALL SBSQ: CPT

## 2019-02-04 PROCEDURE — 94003 VENT MGMT INPAT SUBQ DAY: CPT

## 2019-02-04 PROCEDURE — 25000003 PHARM REV CODE 250: Performed by: INTERNAL MEDICINE

## 2019-02-04 PROCEDURE — S0171 BUMETANIDE 0.5 MG: HCPCS | Performed by: SURGERY

## 2019-02-04 PROCEDURE — 25000003 PHARM REV CODE 250: Performed by: SURGERY

## 2019-02-04 PROCEDURE — P9047 ALBUMIN (HUMAN), 25%, 50ML: HCPCS | Mod: JG | Performed by: SURGERY

## 2019-02-04 PROCEDURE — 85025 COMPLETE CBC W/AUTO DIFF WBC: CPT

## 2019-02-04 PROCEDURE — 25000003 PHARM REV CODE 250: Performed by: NURSE PRACTITIONER

## 2019-02-04 PROCEDURE — 25000242 PHARM REV CODE 250 ALT 637 W/ HCPCS: Performed by: SURGERY

## 2019-02-04 PROCEDURE — 20000000 HC ICU ROOM

## 2019-02-04 PROCEDURE — 63600175 PHARM REV CODE 636 W HCPCS: Performed by: SURGERY

## 2019-02-04 PROCEDURE — C9113 INJ PANTOPRAZOLE SODIUM, VIA: HCPCS | Performed by: SURGERY

## 2019-02-04 PROCEDURE — 94761 N-INVAS EAR/PLS OXIMETRY MLT: CPT

## 2019-02-04 PROCEDURE — 27000221 HC OXYGEN, UP TO 24 HOURS

## 2019-02-04 PROCEDURE — 94150 VITAL CAPACITY TEST: CPT

## 2019-02-04 PROCEDURE — 27200966 HC CLOSED SUCTION SYSTEM

## 2019-02-04 PROCEDURE — 97164 PT RE-EVAL EST PLAN CARE: CPT

## 2019-02-04 PROCEDURE — 80053 COMPREHEN METABOLIC PANEL: CPT

## 2019-02-04 PROCEDURE — B4185 PARENTERAL SOL 10 GM LIPIDS: HCPCS | Performed by: SURGERY

## 2019-02-04 PROCEDURE — 99900017 HC EXTUBATION W/PARAMETERS (STAT)

## 2019-02-04 PROCEDURE — 63600175 PHARM REV CODE 636 W HCPCS: Mod: JG | Performed by: SURGERY

## 2019-02-04 PROCEDURE — 83735 ASSAY OF MAGNESIUM: CPT

## 2019-02-04 RX ORDER — DEXTROSE MONOHYDRATE 50 MG/ML
INJECTION, SOLUTION INTRAVENOUS CONTINUOUS
Status: DISCONTINUED | OUTPATIENT
Start: 2019-02-04 | End: 2019-02-04

## 2019-02-04 RX ORDER — METOCLOPRAMIDE HYDROCHLORIDE 5 MG/ML
10 INJECTION INTRAMUSCULAR; INTRAVENOUS EVERY 6 HOURS
Status: DISCONTINUED | OUTPATIENT
Start: 2019-02-04 | End: 2019-02-12

## 2019-02-04 RX ORDER — POTASSIUM CHLORIDE 14.9 MG/ML
40 INJECTION INTRAVENOUS ONCE
Status: DISCONTINUED | OUTPATIENT
Start: 2019-02-04 | End: 2019-02-04

## 2019-02-04 RX ADMIN — ALBUMIN HUMAN 10 ML/HR: 0.25 SOLUTION INTRAVENOUS at 02:02

## 2019-02-04 RX ADMIN — IPRATROPIUM BROMIDE AND ALBUTEROL SULFATE 3 ML: .5; 3 SOLUTION RESPIRATORY (INHALATION) at 11:02

## 2019-02-04 RX ADMIN — LABETALOL HYDROCHLORIDE 5 MG: 5 INJECTION, SOLUTION INTRAVENOUS at 06:02

## 2019-02-04 RX ADMIN — METOPROLOL TARTRATE 5 MG: 1 INJECTION, SOLUTION INTRAVENOUS at 09:02

## 2019-02-04 RX ADMIN — METOPROLOL TARTRATE 5 MG: 1 INJECTION, SOLUTION INTRAVENOUS at 01:02

## 2019-02-04 RX ADMIN — HYPROMELLOSE 2910 1 DROP: 5 SOLUTION OPHTHALMIC at 09:02

## 2019-02-04 RX ADMIN — ONDANSETRON 4 MG: 2 INJECTION INTRAMUSCULAR; INTRAVENOUS at 06:02

## 2019-02-04 RX ADMIN — PANTOPRAZOLE SODIUM 40 MG: 40 INJECTION, POWDER, LYOPHILIZED, FOR SOLUTION INTRAVENOUS at 09:02

## 2019-02-04 RX ADMIN — ALBUMIN HUMAN 10 ML/HR: 0.25 SOLUTION INTRAVENOUS at 06:02

## 2019-02-04 RX ADMIN — BUMETANIDE 1 MG: 0.25 INJECTION INTRAMUSCULAR; INTRAVENOUS at 09:02

## 2019-02-04 RX ADMIN — METOCLOPRAMIDE 10 MG: 5 INJECTION, SOLUTION INTRAMUSCULAR; INTRAVENOUS at 05:02

## 2019-02-04 RX ADMIN — IPRATROPIUM BROMIDE AND ALBUTEROL SULFATE 3 ML: .5; 3 SOLUTION RESPIRATORY (INHALATION) at 12:02

## 2019-02-04 RX ADMIN — HYPROMELLOSE 2910 1 DROP: 5 SOLUTION OPHTHALMIC at 05:02

## 2019-02-04 RX ADMIN — SODIUM CHLORIDE AND POTASSIUM CHLORIDE 20 ML/HR: 4.5; 1.49 INJECTION, SOLUTION INTRAVENOUS at 09:02

## 2019-02-04 RX ADMIN — HYDROXYZINE HYDROCHLORIDE 25 MG: 10 SYRUP ORAL at 09:02

## 2019-02-04 RX ADMIN — ALBUMIN HUMAN 10 ML/HR: 0.25 SOLUTION INTRAVENOUS at 01:02

## 2019-02-04 RX ADMIN — I.V. FAT EMULSION 250 ML: 20 EMULSION INTRAVENOUS at 09:02

## 2019-02-04 RX ADMIN — POTASSIUM PHOSPHATE, MONOBASIC AND POTASSIUM PHOSPHATE, DIBASIC 30 MMOL: 224; 236 INJECTION, SOLUTION INTRAVENOUS at 09:02

## 2019-02-04 RX ADMIN — INSULIN ASPART 10 UNITS: 100 INJECTION, SOLUTION INTRAVENOUS; SUBCUTANEOUS at 11:02

## 2019-02-04 RX ADMIN — HYPROMELLOSE 2910 1 DROP: 5 SOLUTION OPHTHALMIC at 01:02

## 2019-02-04 RX ADMIN — IPRATROPIUM BROMIDE AND ALBUTEROL SULFATE 3 ML: .5; 3 SOLUTION RESPIRATORY (INHALATION) at 07:02

## 2019-02-04 RX ADMIN — ENOXAPARIN SODIUM 40 MG: 100 INJECTION SUBCUTANEOUS at 05:02

## 2019-02-04 RX ADMIN — IPRATROPIUM BROMIDE AND ALBUTEROL SULFATE 3 ML: .5; 3 SOLUTION RESPIRATORY (INHALATION) at 03:02

## 2019-02-04 RX ADMIN — METOPROLOL TARTRATE 5 MG: 1 INJECTION, SOLUTION INTRAVENOUS at 07:02

## 2019-02-04 RX ADMIN — METOPROLOL TARTRATE 5 MG: 1 INJECTION, SOLUTION INTRAVENOUS at 02:02

## 2019-02-04 RX ADMIN — DEXMEDETOMIDINE HYDROCHLORIDE 0.9 MCG/KG/HR: 4 INJECTION, SOLUTION INTRAVENOUS at 03:02

## 2019-02-04 RX ADMIN — LAMOTRIGINE 50 MG: 25 TABLET ORAL at 09:02

## 2019-02-04 RX ADMIN — HYDROXYZINE HYDROCHLORIDE 25 MG: 10 SYRUP ORAL at 02:02

## 2019-02-04 RX ADMIN — INSULIN ASPART 10 UNITS: 100 INJECTION, SOLUTION INTRAVENOUS; SUBCUTANEOUS at 04:02

## 2019-02-04 RX ADMIN — SODIUM CHLORIDE 1 UNITS/HR: 9 INJECTION, SOLUTION INTRAVENOUS at 03:02

## 2019-02-04 RX ADMIN — INSULIN DETEMIR 25 UNITS: 100 INJECTION, SOLUTION SUBCUTANEOUS at 09:02

## 2019-02-04 RX ADMIN — POTASSIUM PHOSPHATE, MONOBASIC AND POTASSIUM PHOSPHATE, DIBASIC 30 MMOL: 224; 236 INJECTION, SOLUTION INTRAVENOUS at 07:02

## 2019-02-04 RX ADMIN — CEFTRIAXONE SODIUM 1 G: 1 INJECTION, POWDER, FOR SOLUTION INTRAMUSCULAR; INTRAVENOUS at 01:02

## 2019-02-04 NOTE — ASSESSMENT & PLAN NOTE
Acute respiratory failure due to aspiration pneumonitis and E. Coli pneumonia  -Recommend low tidal volume ventilation (6cc/kg IBW) and high-PEEP strategy per ARDSNet protocol.  -Recommend titration of FiO2 to maintain SpO2 >88%- able to titrate down significantly today.  -Maintain net negative or at least net even volume status for the admission.   -Daily SAT & SBTs when medically appropriate  -Passed SBT on 5/5 today, but very weak with difficulty following commands. Low vital capacity.   -con't to monitor NGT output  -Will plan to extubate this AM after sedation has had more time to wear off.

## 2019-02-04 NOTE — ASSESSMENT & PLAN NOTE
-agree with increasing free water flushes  -can add D5W if necessary; consider insulin gtt for elevated glucose   -consider TF formula change

## 2019-02-04 NOTE — PROGRESS NOTES
[facility] General Surgery  Neuroendocrine Tumor Service  Progress Note     Admission Date: 1/25/2019  Hospital Length of Stay: 10  Principal Problem: Malignant carcinoid tumor of pancreas     Subjective:      Operation:  1/25/18  1. Ex lap  2. Gastro-gastrostomy EEA with 33 mm circular stapler  3. Pyloroplasty and gastrostomy closure  3. enteric resection with side to side to anstamosis and restoration of small bowel continuity using [place]rive tan load stapler  4 Liver US  5. Liver biopsy  6. Liver resection segment 3  7. Supraceliac lymphadenectomy  8. FNA pancreas  9. Lt salpingo oopherectomy   10 . Meckel diverticulectomy  11. Feeding jejunostomy 14 Fr malecot  12. ICG perfusion and Infrared exam of all anastamosis    Interval History:     Patient extubated this morning. Afebrile since 3pm yesterday. Still hyperglycemic. Tolerating TF with minimal NGT output.     Scheduled Meds:   albuterol-ipratropium  3 mL Nebulization Q4H    artificial tears  1 drop Both Eyes QID    bumetanide  1 mg Intravenous Daily    cefTRIAXone (ROCEPHIN) IVPB  1 g Intravenous Q24H    enoxaparin  40 mg Subcutaneous Daily    fat emulsion 20%  250 mL Intravenous Daily    hydrOXYzine  25 mg Per J Tube TID    insulin detemir U-100  25 Units Subcutaneous Daily    lamoTRIgine  50 mg Oral BID    metoclopramide HCl  10 mg Intravenous Q6H    metoprolol  5 mg Intravenous Q6H    pantoprazole  40 mg Intravenous Daily    potassium phosphate IVPB  30 mmol Intravenous Once     Continuous Infusions:   0.45% NaCl with KCl 20 mEq infusion 35 mL/hr (02/04/19 1255)    albumin human 25% 10 mL/hr (02/04/19 1342)    insulin (HUMAN R) infusion (adults)       PRN Meds:.sodium chloride, acetaminophen, albuterol sulfate, dextrose 50%, dextrose 50%, diphenhydrAMINE, hydrALAZINE, HYDROmorphone, ipratropium, labetalol, lorazepam, naloxone, ondansetron, sodium chloride 0.9%, sodium chloride 3%    Objective:      Temp:  [98.1 °F (36.7  °C)-102.4 °F (39.1 °C)] 98.8 °F (37.1 °C)  Pulse:  [] 120  Resp:  [17-41] 22  SpO2:  [89 %-100 %] 95 %  BP: ()/(51-79) 151/72  Weight change: -2.5 kg (-8.2 oz)    Intake/Output Summary (Last 24 hours) at 2/4/2019 1457  Last data filed at 2/4/2019 1342  Gross per 24 hour   Intake 2049.68 ml   Output 5120 ml   Net -3070.32 ml     UOP 2130L   gastric contents   Jtube feeds going at 20cc    Physical Exam:  GEN: extubated bu confused, NAD, NGT in place, gastric contents  CV: RRR  PULM: unlabored, on RA with some oxygen support  ABD: S/NT/ ND, incision c/d/i, Jtube in place with feeds going  EXT: Peripheral pulses present and equal bilaterally    Recent Labs   Lab 02/04/19  0518   *   K 3.0*   CO2 33*   *   BUN 30*   CREATININE 0.8   CALCIUM 7.9*   PROT 6.0   AST 9*   ALT <5*   ALKPHOS 72   BILITOT 1.1*     Recent Labs   Lab 02/04/19  0518   WBC 10.59   HGB 9.7*   HCT 30.9*          Significant Imaging: I have reviewed all pertinent imaging results/findings within the past 24 hours.     Assessment and Plan:    62 yo F w/ PNET, s/p reversal of Sandeep-en-Y w/ Jtube placement  POD#10    1. Ex lap  2. Gastro-gastrostomy EEA with 33 mm circular stapler  3. Pyloroplasty and gastrostomy closure  3. enteric resection with side to side to anstamosis and restoration of small bowel continuity using [place]rive tan load stapler  4 Liver US  5. Liver biopsy  6. Liver resection segment 3  7. Supraceliac lymphadenectomy  8. FNA pancreas  9. Lt salpingo oopherectomy   10 . Meckel diverticulectomy  11. Feeding jejunostomy 14 Fr malecot  12. ICG perfusion and Infrared exam of all anastamosis    Neuro - No sedation but not AOx3 Cymbalta.  TPN - Continue NPO, cont but decrease TPN/lipids to 20cc  ID - On Rocephin with cultures showing E Coli sensitive to Rocephin. - aspiration pneumonia. Blood cx. NGTD. WBC 11  Heme: H/H stable  Pulm - Extubated. Aggressive chest CPT and acapella. ABGs. Resp Cx growing E Coli.    FEN/GI - NPO, continue  replacing lytes prn. Hypernatremia 155. TF started yesterday and increase today to 20cc hr with 40cc free water flushes Q4  Renal - Cr 1.0, [place] for HD monitoring, good UOP, cont [place], check CVP, Albumin 25% 10 ml continuous   PPX - restart lovenox, protonix, ssc in place  Dispo - Remains in critical condition cont ICU care    Nash Titus MD [facility]

## 2019-02-04 NOTE — PROGRESS NOTES
Results for NAOMI NAJERA (MRN [id number]) as of 2/4/2019 12:01   Ref. Range 2/4/2019 05:18   Sodium Latest Ref Range: 136 - 145 mmol/L 155 (H)   Potassium Latest Ref Range: 3.5 - 5.1 mmol/L 3.0 (L)   Chloride Latest Ref Range: 95 - 110 mmol/L 111 (H)   CO2 Latest Ref Range: 23 - 29 mmol/L 33 (H)   Anion Gap Latest Ref Range: 8 - 16 mmol/L 11   BUN, Bld Latest Ref Range: 8 - 23 mg/dL 30 (H)   Creatinine Latest Ref Range: 0.5 - 1.4 mg/dL 0.8   eGFR if non African American Latest Ref Range: >60 mL/min/1.73 m^2 >60   eGFR if  Latest Ref Range: >60 mL/min/1.73 m^2 >60   Glucose Latest Ref Range: 70 - 110 mg/dL 430 (H)   Calcium Latest Ref Range: 8.7 - 10.5 mg/dL 7.9 (L)   Phosphorus Latest Ref Range: 2.7 - 4.5 mg/dL 1.7 (L)   Magnesium Latest Ref Range: 1.6 - 2.6 mg/dL 2.0   Alkaline Phosphatase Latest Ref Range: 55 - 135 U/L 72   Total Protein Latest Ref Range: 6.0 - 8.4 g/dL 6.0   Albumin Latest Ref Range: 3.5 - 5.2 g/dL 3.5   Total Bilirubin Latest Ref Range: 0.1 - 1.0 mg/dL 1.1 (H)   AST Latest Ref Range: 10 - 40 U/L 9 (L)   ALT Latest Ref Range: 10 - 44 U/L <5 (L)     Ordered a custom TPN and fats

## 2019-02-04 NOTE — PLAN OF CARE
Problem: Adult Inpatient Plan of Care  Goal: Plan of Care Review  Outcome: Ongoing (interventions implemented as appropriate)  Pt continues to be febrile- Tmax 102.4 this shift, not responsive to rectal Tylenol. Rectal probe placed for continuous temp monitoring and pt placed on cooling blanket for better temp management. Pt still hyperglycemic with CBGs in 300s. TPN and levemir adjusted. Pt turned q 2 hours to maintain skin integrity. SAT/SBT done this AM. Pt following commands during SAT, plan for possible extubation tomorrow. Psych meds restarted today. Pt receiving tube feeds and TPN for nutrition. Plan of care reviewed with patient and family.

## 2019-02-04 NOTE — PROGRESS NOTES
[facility]  Pulmonology  Progress Note    Patient Name: Kaylee Ngo  MRN: [id number]  Admission Date: 1/25/2019  Hospital Length of Stay: 10 days  Code Status: Prior  Attending Provider: Cam Ashton MD  Primary Care Provider: Robert Garcia MD   Principal Problem: Malignant carcinoid tumor of pancreas    Subjective:     Interval History: Fevers yesterday, cooling blanket in place. Following commands this AM. Denies pain.     Objective:     Vital Signs (Most Recent):  Temp: 99.5 °F (37.5 °C) (02/04/19 0716)  Pulse: (!) 112 (02/04/19 0716)  Resp: (!) 25 (02/04/19 0716)  BP: 132/60 (02/04/19 0645)  SpO2: 96 % (02/04/19 0716) Vital Signs (24h Range):  Temp:  [98.1 °F (36.7 °C)-102.4 °F (39.1 °C)] 99.5 °F (37.5 °C)  Pulse:  [] 112  Resp:  [16-41] 25  SpO2:  [89 %-100 %] 96 %  BP: ()/(51-81) 132/60     Weight: 65.1 kg (143 lb 8.3 oz)  Body mass index is 23.88 kg/m².      Intake/Output Summary (Last 24 hours) at 2/4/2019 0840  Last data filed at 2/4/2019 0600  Gross per 24 hour   Intake 2135.08 ml   Output 4415 ml   Net -2279.92 ml       Physical Exam   Constitutional: No distress.   HENT:   Head: Normocephalic and atraumatic.   Eyes: Conjunctivae and EOM are normal.   Neck: No JVD present. No thyromegaly present.   Cardiovascular: Normal rate, regular rhythm and normal heart sounds.   No murmur heard.  Pulmonary/Chest: She has no wheezes. She has no rales.   Ventilator sounds anteriorly    Abdominal: Soft. Bowel sounds are normal. She exhibits no distension and no mass. There is no rebound.   Musculoskeletal: She exhibits no edema or deformity.   Lymphadenopathy:     She has no cervical adenopathy.   Neurological: She exhibits normal muscle tone.   Following commands    Skin: Skin is warm and dry. She is not diaphoretic.       Vents:  Vent Mode: A/C (02/04/19 0716)  Set Rate: 20 bmp (02/04/19 0716)  Vt Set: 400 mL (02/04/19 0716)  Pressure Support: 5 cmH20 (02/03/19  1125)  PEEP/CPAP: 5 cmH20 (02/04/19 0716)  Oxygen Concentration (%): 40 (02/04/19 0716)  Peak Airway Pressure: 21 cmH2O (02/04/19 0716)  Total Ve: 12 mL (02/04/19 0716)  F/VT Ratio<105 (RSBI): (!) 56.82 (02/04/19 0716)    Lines/Drains/Airways     Central Venous Catheter Line                 Percutaneous Central Line Insertion/Assessment - triple lumen  01/25/19 0726 10 days          Drain                 NG/OG Tube 01/25/19 0728 [place] sump 18 Fr. Right nostril 10 days         Gastrostomy/Enterostomy 01/25/19 1300 Gastrostomy tube w/ balloon midline 9 days         Urethral Catheter 01/28/19 1129 14 Fr. 6 days          Airway                 Airway - Non-Surgical 01/31/19 1005 Endotracheal Tube 3 days          Peripheral Intravenous Line                 Peripheral IV - Single Lumen 01/31/19 1800 Right Antecubital 3 days                Significant Labs:    CBC/Anemia Profile:  Recent Labs   Lab 02/03/19 0519 02/04/19 0518   WBC 11.79 10.59   HGB 10.4* 9.7*   HCT 32.3* 30.9*    296   MCV 89 90   RDW 14.9* 15.4*        Chemistries:  Recent Labs   Lab 02/03/19 0519 02/04/19 0518   * 155*   K 4.0 3.0*    111*   CO2 33* 33*   BUN 34* 30*   CREATININE 0.8 0.8   CALCIUM 7.6* 7.9*   ALBUMIN 3.6 3.5   PROT 6.1 6.0   BILITOT 1.4* 1.1*   ALKPHOS 83 72   ALT 5* <5*   AST 13 9*   MG 1.9 2.0   PHOS 2.4* 1.7*       All pertinent labs within the past 24 hours have been reviewed.    Significant Imaging:  CXR: I have reviewed all pertinent results/findings within the past 24 hours and my personal findings are:     I have reviewed and interpreted all pertinent imaging results/findings within the past 24 hours.       Assessment/Plan:     Hypernatremia    -agree with increasing free water flushes  -can add D5W if necessary; consider insulin gtt for elevated glucose   -consider TF formula change     E. coli pneumonia    -agree with deescalating antibiotics to ceftriaxone; will require 7-day course (2/2-2/8)     Acute  hypoxemic respiratory failure    Acute respiratory failure due to aspiration pneumonitis and E. Coli pneumonia  -Recommend low tidal volume ventilation (6cc/kg IBW) and high-PEEP strategy per ARDSNet protocol.  -Recommend titration of FiO2 to maintain SpO2 >88%- able to titrate down significantly today.  -Maintain net negative or at least net even volume status for the admission.   -Daily SAT & SBTs when medically appropriate  -Passed SBT on 5/5 today, but very weak with difficulty following commands. Low vital capacity.   -con't to monitor NGT output  -Will plan to extubate this AM after sedation has had more time to wear off.        Malignant carcinoid tumor of unknown primary site    Advanced malignancy causing MOF.   -will adjust sedation with the intent of optimizing her chances of vent weaning but with attention to the concern for adequate pain control            RICH Swartz  Pulmonology  [facility]

## 2019-02-04 NOTE — SUBJECTIVE & OBJECTIVE
Interval History: Fevers yesterday, cooling blanket in place. Following commands this AM. Denies pain.     Objective:     Vital Signs (Most Recent):  Temp: 99.5 °F (37.5 °C) (02/04/19 0716)  Pulse: (!) 112 (02/04/19 0716)  Resp: (!) 25 (02/04/19 0716)  BP: 132/60 (02/04/19 0645)  SpO2: 96 % (02/04/19 0716) Vital Signs (24h Range):  Temp:  [98.1 °F (36.7 °C)-102.4 °F (39.1 °C)] 99.5 °F (37.5 °C)  Pulse:  [] 112  Resp:  [16-41] 25  SpO2:  [89 %-100 %] 96 %  BP: ()/(51-81) 132/60     Weight: 65.1 kg (143 lb 8.3 oz)  Body mass index is 23.88 kg/m².      Intake/Output Summary (Last 24 hours) at 2/4/2019 0840  Last data filed at 2/4/2019 0600  Gross per 24 hour   Intake 2135.08 ml   Output 4415 ml   Net -2279.92 ml       Physical Exam   Constitutional: No distress.   HENT:   Head: Normocephalic and atraumatic.   Eyes: Conjunctivae and EOM are normal.   Neck: No JVD present. No thyromegaly present.   Cardiovascular: Normal rate, regular rhythm and normal heart sounds.   No murmur heard.  Pulmonary/Chest: She has no wheezes. She has no rales.   Ventilator sounds anteriorly    Abdominal: Soft. Bowel sounds are normal. She exhibits no distension and no mass. There is no rebound.   Musculoskeletal: She exhibits no edema or deformity.   Lymphadenopathy:     She has no cervical adenopathy.   Neurological: She exhibits normal muscle tone.   Following commands    Skin: Skin is warm and dry. She is not diaphoretic.       Vents:  Vent Mode: A/C (02/04/19 0716)  Set Rate: 20 bmp (02/04/19 0716)  Vt Set: 400 mL (02/04/19 0716)  Pressure Support: 5 cmH20 (02/03/19 1125)  PEEP/CPAP: 5 cmH20 (02/04/19 0716)  Oxygen Concentration (%): 40 (02/04/19 0716)  Peak Airway Pressure: 21 cmH2O (02/04/19 0716)  Total Ve: 12 mL (02/04/19 0716)  F/VT Ratio<105 (RSBI): (!) 56.82 (02/04/19 0716)    Lines/Drains/Airways     Central Venous Catheter Line                 Percutaneous Central Line Insertion/Assessment - triple lumen  01/25/19  0726 10 days          Drain                 NG/OG Tube 01/25/19 0728 [place] sump 18 Fr. Right nostril 10 days         Gastrostomy/Enterostomy 01/25/19 1300 Gastrostomy tube w/ balloon midline 9 days         Urethral Catheter 01/28/19 1129 14 Fr. 6 days          Airway                 Airway - Non-Surgical 01/31/19 1005 Endotracheal Tube 3 days          Peripheral Intravenous Line                 Peripheral IV - Single Lumen 01/31/19 1800 Right Antecubital 3 days                Significant Labs:    CBC/Anemia Profile:  Recent Labs   Lab 02/03/19 0519 02/04/19 0518   WBC 11.79 10.59   HGB 10.4* 9.7*   HCT 32.3* 30.9*    296   MCV 89 90   RDW 14.9* 15.4*        Chemistries:  Recent Labs   Lab 02/03/19 0519 02/04/19 0518   * 155*   K 4.0 3.0*    111*   CO2 33* 33*   BUN 34* 30*   CREATININE 0.8 0.8   CALCIUM 7.6* 7.9*   ALBUMIN 3.6 3.5   PROT 6.1 6.0   BILITOT 1.4* 1.1*   ALKPHOS 83 72   ALT 5* <5*   AST 13 9*   MG 1.9 2.0   PHOS 2.4* 1.7*       All pertinent labs within the past 24 hours have been reviewed.    Significant Imaging:  CXR: I have reviewed all pertinent results/findings within the past 24 hours and my personal findings are:     I have reviewed and interpreted all pertinent imaging results/findings within the past 24 hours.

## 2019-02-04 NOTE — PROGRESS NOTES
Pt was experiencing ventilator dyssynchrony. Pts flow was increased to 75 lpm. Pt has since gained synchronization with the ventilator. Nurse notified.

## 2019-02-05 LAB
ALBUMIN SERPL BCP-MCNC: 3.9 G/DL
ALP SERPL-CCNC: 97 U/L
ALT SERPL W/O P-5'-P-CCNC: 8 U/L
ANION GAP SERPL CALC-SCNC: 13 MMOL/L
AST SERPL-CCNC: 20 U/L
BACTERIA BLD CULT: NORMAL
BACTERIA BLD CULT: NORMAL
BASOPHILS # BLD AUTO: 0.01 K/UL
BASOPHILS NFR BLD: 0.1 %
BILIRUB SERPL-MCNC: 1.1 MG/DL
BUN SERPL-MCNC: 22 MG/DL
CALCIUM SERPL-MCNC: 7.9 MG/DL
CHLORIDE SERPL-SCNC: 112 MMOL/L
CO2 SERPL-SCNC: 32 MMOL/L
CREAT SERPL-MCNC: 0.7 MG/DL
DIFFERENTIAL METHOD: ABNORMAL
EOSINOPHIL # BLD AUTO: 0.1 K/UL
EOSINOPHIL NFR BLD: 0.4 %
ERYTHROCYTE [DISTWIDTH] IN BLOOD BY AUTOMATED COUNT: 15.7 %
EST. GFR  (AFRICAN AMERICAN): >60 ML/MIN/1.73 M^2
EST. GFR  (NON AFRICAN AMERICAN): >60 ML/MIN/1.73 M^2
GLUCOSE SERPL-MCNC: 178 MG/DL
HCT VFR BLD AUTO: 31.5 %
HGB BLD-MCNC: 9.8 G/DL
LYMPHOCYTES # BLD AUTO: 0.8 K/UL
LYMPHOCYTES NFR BLD: 5.3 %
MAGNESIUM SERPL-MCNC: 2.2 MG/DL
MCH RBC QN AUTO: 27.8 PG
MCHC RBC AUTO-ENTMCNC: 31.1 G/DL
MCV RBC AUTO: 90 FL
MONOCYTES # BLD AUTO: 0.2 K/UL
MONOCYTES NFR BLD: 1.3 %
NEUTROPHILS # BLD AUTO: 13.2 K/UL
NEUTROPHILS NFR BLD: 92.6 %
PHOSPHATE SERPL-MCNC: 2.6 MG/DL
PLATELET # BLD AUTO: 390 K/UL
PMV BLD AUTO: 11.7 FL
POCT GLUCOSE: 132 MG/DL (ref 70–110)
POCT GLUCOSE: 133 MG/DL (ref 70–110)
POCT GLUCOSE: 142 MG/DL (ref 70–110)
POCT GLUCOSE: 159 MG/DL (ref 70–110)
POCT GLUCOSE: 162 MG/DL (ref 70–110)
POCT GLUCOSE: 168 MG/DL (ref 70–110)
POCT GLUCOSE: 176 MG/DL (ref 70–110)
POCT GLUCOSE: 189 MG/DL (ref 70–110)
POCT GLUCOSE: 191 MG/DL (ref 70–110)
POCT GLUCOSE: 192 MG/DL (ref 70–110)
POCT GLUCOSE: 208 MG/DL (ref 70–110)
POCT GLUCOSE: 211 MG/DL (ref 70–110)
POCT GLUCOSE: 212 MG/DL (ref 70–110)
POCT GLUCOSE: 217 MG/DL (ref 70–110)
POTASSIUM SERPL-SCNC: 3.1 MMOL/L
PROT SERPL-MCNC: 6.3 G/DL
RBC # BLD AUTO: 3.52 M/UL
SODIUM SERPL-SCNC: 157 MMOL/L
WBC # BLD AUTO: 14.25 K/UL

## 2019-02-05 PROCEDURE — 94640 AIRWAY INHALATION TREATMENT: CPT

## 2019-02-05 PROCEDURE — 25000003 PHARM REV CODE 250: Performed by: STUDENT IN AN ORGANIZED HEALTH CARE EDUCATION/TRAINING PROGRAM

## 2019-02-05 PROCEDURE — 80053 COMPREHEN METABOLIC PANEL: CPT

## 2019-02-05 PROCEDURE — 84100 ASSAY OF PHOSPHORUS: CPT

## 2019-02-05 PROCEDURE — 63600175 PHARM REV CODE 636 W HCPCS: Performed by: SURGERY

## 2019-02-05 PROCEDURE — 94668 MNPJ CHEST WALL SBSQ: CPT

## 2019-02-05 PROCEDURE — 25000003 PHARM REV CODE 250: Performed by: SURGERY

## 2019-02-05 PROCEDURE — 85025 COMPLETE CBC W/AUTO DIFF WBC: CPT

## 2019-02-05 PROCEDURE — 25000003 PHARM REV CODE 250: Performed by: NURSE PRACTITIONER

## 2019-02-05 PROCEDURE — 94761 N-INVAS EAR/PLS OXIMETRY MLT: CPT

## 2019-02-05 PROCEDURE — P9047 ALBUMIN (HUMAN), 25%, 50ML: HCPCS | Mod: JG | Performed by: SURGERY

## 2019-02-05 PROCEDURE — 25000242 PHARM REV CODE 250 ALT 637 W/ HCPCS: Performed by: SURGERY

## 2019-02-05 PROCEDURE — 27000221 HC OXYGEN, UP TO 24 HOURS

## 2019-02-05 PROCEDURE — 83735 ASSAY OF MAGNESIUM: CPT

## 2019-02-05 PROCEDURE — 63600175 PHARM REV CODE 636 W HCPCS: Performed by: STUDENT IN AN ORGANIZED HEALTH CARE EDUCATION/TRAINING PROGRAM

## 2019-02-05 PROCEDURE — C9113 INJ PANTOPRAZOLE SODIUM, VIA: HCPCS | Performed by: SURGERY

## 2019-02-05 PROCEDURE — 20000000 HC ICU ROOM

## 2019-02-05 RX ORDER — AMITRIPTYLINE HYDROCHLORIDE 25 MG/1
50 TABLET, FILM COATED ORAL NIGHTLY
Status: DISCONTINUED | OUTPATIENT
Start: 2019-02-05 | End: 2019-02-26 | Stop reason: HOSPADM

## 2019-02-05 RX ORDER — INSULIN ASPART 100 [IU]/ML
1-10 INJECTION, SOLUTION INTRAVENOUS; SUBCUTANEOUS
Status: DISCONTINUED | OUTPATIENT
Start: 2019-02-05 | End: 2019-02-26 | Stop reason: HOSPADM

## 2019-02-05 RX ORDER — GLUCAGON 1 MG
1 KIT INJECTION
Status: DISCONTINUED | OUTPATIENT
Start: 2019-02-05 | End: 2019-02-26 | Stop reason: HOSPADM

## 2019-02-05 RX ADMIN — ALBUMIN HUMAN 10 ML/HR: 0.25 SOLUTION INTRAVENOUS at 08:02

## 2019-02-05 RX ADMIN — ENOXAPARIN SODIUM 40 MG: 100 INJECTION SUBCUTANEOUS at 04:02

## 2019-02-05 RX ADMIN — HYPROMELLOSE 2910 1 DROP: 5 SOLUTION OPHTHALMIC at 09:02

## 2019-02-05 RX ADMIN — ALBUMIN HUMAN 10 ML/HR: 0.25 SOLUTION INTRAVENOUS at 12:02

## 2019-02-05 RX ADMIN — METOCLOPRAMIDE 10 MG: 5 INJECTION, SOLUTION INTRAMUSCULAR; INTRAVENOUS at 05:02

## 2019-02-05 RX ADMIN — METOPROLOL TARTRATE 5 MG: 1 INJECTION, SOLUTION INTRAVENOUS at 08:02

## 2019-02-05 RX ADMIN — IPRATROPIUM BROMIDE AND ALBUTEROL SULFATE 3 ML: .5; 3 SOLUTION RESPIRATORY (INHALATION) at 03:02

## 2019-02-05 RX ADMIN — HYDROXYZINE HYDROCHLORIDE 25 MG: 10 SYRUP ORAL at 09:02

## 2019-02-05 RX ADMIN — LAMOTRIGINE 50 MG: 25 TABLET ORAL at 08:02

## 2019-02-05 RX ADMIN — HYDROXYZINE HYDROCHLORIDE 25 MG: 10 SYRUP ORAL at 08:02

## 2019-02-05 RX ADMIN — IPRATROPIUM BROMIDE AND ALBUTEROL SULFATE 3 ML: .5; 3 SOLUTION RESPIRATORY (INHALATION) at 07:02

## 2019-02-05 RX ADMIN — IPRATROPIUM BROMIDE AND ALBUTEROL SULFATE 3 ML: .5; 3 SOLUTION RESPIRATORY (INHALATION) at 11:02

## 2019-02-05 RX ADMIN — PANTOPRAZOLE SODIUM 40 MG: 40 INJECTION, POWDER, LYOPHILIZED, FOR SOLUTION INTRAVENOUS at 09:02

## 2019-02-05 RX ADMIN — METOPROLOL TARTRATE 5 MG: 1 INJECTION, SOLUTION INTRAVENOUS at 07:02

## 2019-02-05 RX ADMIN — AMITRIPTYLINE HYDROCHLORIDE 50 MG: 25 TABLET, FILM COATED ORAL at 08:02

## 2019-02-05 RX ADMIN — HYPROMELLOSE 2910 1 DROP: 5 SOLUTION OPHTHALMIC at 12:02

## 2019-02-05 RX ADMIN — CEFTRIAXONE SODIUM 1 G: 1 INJECTION, POWDER, FOR SOLUTION INTRAMUSCULAR; INTRAVENOUS at 01:02

## 2019-02-05 RX ADMIN — HYPROMELLOSE 2910 1 DROP: 5 SOLUTION OPHTHALMIC at 04:02

## 2019-02-05 RX ADMIN — INSULIN DETEMIR 25 UNITS: 100 INJECTION, SOLUTION SUBCUTANEOUS at 09:02

## 2019-02-05 RX ADMIN — ALBUMIN HUMAN 10 ML/HR: 0.25 SOLUTION INTRAVENOUS at 05:02

## 2019-02-05 RX ADMIN — SODIUM CHLORIDE 500 ML: 0.45 INJECTION, SOLUTION INTRAVENOUS at 12:02

## 2019-02-05 RX ADMIN — POTASSIUM PHOSPHATE, MONOBASIC AND POTASSIUM PHOSPHATE, DIBASIC 30 MMOL: 224; 236 INJECTION, SOLUTION INTRAVENOUS at 09:02

## 2019-02-05 RX ADMIN — METOCLOPRAMIDE 10 MG: 5 INJECTION, SOLUTION INTRAMUSCULAR; INTRAVENOUS at 12:02

## 2019-02-05 RX ADMIN — SODIUM CHLORIDE AND POTASSIUM CHLORIDE 50 ML/HR: 4.5; 1.49 INJECTION, SOLUTION INTRAVENOUS at 11:02

## 2019-02-05 RX ADMIN — IPRATROPIUM BROMIDE AND ALBUTEROL SULFATE 3 ML: .5; 3 SOLUTION RESPIRATORY (INHALATION) at 08:02

## 2019-02-05 RX ADMIN — HYDROXYZINE HYDROCHLORIDE 25 MG: 10 SYRUP ORAL at 03:02

## 2019-02-05 RX ADMIN — LAMOTRIGINE 50 MG: 25 TABLET ORAL at 09:02

## 2019-02-05 RX ADMIN — METOPROLOL TARTRATE 5 MG: 1 INJECTION, SOLUTION INTRAVENOUS at 02:02

## 2019-02-05 RX ADMIN — HYPROMELLOSE 2910 1 DROP: 5 SOLUTION OPHTHALMIC at 08:02

## 2019-02-05 RX ADMIN — INSULIN ASPART 2 UNITS: 100 INJECTION, SOLUTION INTRAVENOUS; SUBCUTANEOUS at 08:02

## 2019-02-05 RX ADMIN — METOPROLOL TARTRATE 5 MG: 1 INJECTION, SOLUTION INTRAVENOUS at 01:02

## 2019-02-05 RX ADMIN — INSULIN ASPART 2 UNITS: 100 INJECTION, SOLUTION INTRAVENOUS; SUBCUTANEOUS at 04:02

## 2019-02-05 NOTE — PROGRESS NOTES
Following breathing treatment, RT worked with patient on coughing. Was able to suction a small amount of sputum from back of throat. Patient's cough is weak. Please consider cough assist as a secondary method of clearance for secretions as therapist is unable to pass suction catheter through nare for NT suction.

## 2019-02-05 NOTE — ASSESSMENT & PLAN NOTE
Malnutrition in the context of Chronic Illness/Injury    Related to (etiology):  Altered GI Function; Cancer    Signs and Symptoms (as evidenced by):    Energy Intake: <75% intake > 1 week  Body Fat Depletion: mild depletion of orbitals, triceps and thoracic and lumbar region   Muscle Mass Depletion: moderate depletion of temples, clavicle region, interosseous muscle and lower extremities     Interventions  Collaboration with providers    Nutrition Diagnosis Status  Continues

## 2019-02-05 NOTE — PROGRESS NOTES
"[facility]  Adult Nutrition  Progress Note    SUMMARY       Recommendations     1. Advance TF as able to goal rate of 50 ml/hr to meet estimated needs   -Fluid flushes for normal intake: 115 ml q 3 hrs or per MD   -TF to provide 1800 kcal, 98g pro, 912 ml fluid.    2. RD to monitor progress and readjust TF/PN recs as needed.     Goals: meet > 85% EEN daily  Nutrition Goal Status: goal not met  Communication of RD Recs: reviewed with RN    Reason for Assessment    Reason For Assessment: RD follow-up  Diagnosis: (Malignant Pancreatic carcinoid tumor)  Relevant Medical History: DM, HLD, GERD, Gastric Ulcers  Interdisciplinary Rounds: did not attend    General Information Comments: Pt extubated, on 0xygen mask, w/ cooling blanket. s/p barbara-en-y reversal with J-tube placement; lymphadectomy.  TF running at low rate; BG levels problematic (on insulin drip); PN d/c. IVFE running. Prev NFPE performed 19.     Nutrition Discharge Planning: Home on TF     Nutrition Risk Screen    Nutrition Risk Screen: tube feeding or parenteral nutrition    Nutrition/Diet History    Patient Reported Diet/Restrictions/Preferences: diabetic diet  Typical Food/Fluid Intake: TF at home  Food Preferences: n/a  Spiritual, Cultural Beliefs, Anglican Practices, Values that Affect Care: no  Supplemental Drinks or Food Habits: Peptaman 1.5  Factors Affecting Nutritional Intake: altered gastrointestinal function, NPO    Anthropometrics    Temp: 99 °F (37.2 °C)  Height Method: Stated  Height: 5' 5" (165.1 cm)  Height (inches): 65 in  Weight Method: Bed Scale  Weight: 62.7 kg (138 lb 3.7 oz)  Weight (lb): 138.23 lb  Ideal Body Weight (IBW), Female: 125 lb  % Ideal Body Weight, Female (lb): 112.7 lb  BMI (Calculated): 23.5  BMI Grade: 18.5-24.9 - normal  Usual Body Weight (UBW), k.9 kg(Admit Weight 19)  Weight Change Amount: 9 lb  % Usual Body Weight: 94.31  % Weight Change From Usual Weight: -5.89 %   "     Lab/Procedures/Meds    Pertinent Labs Reviewed: reviewed  Pertinent Labs Comments: Na 157; ; K 3.1; Phos 1.7; WBC elevated  Pertinent Medications Reviewed: reviewed  Pertinent Medications Comments: IV albumin, abx, reglan, IVF, insulin    Physical Findings/Assessment     Overall: loss of muscle mass   Tubes: PEG/J; NGT  Skin: Incision    Estimated/Assessed Needs    Weight Used For Calorie Calculations: 62.7 kg (138 lb 3.7 oz)  Energy Calorie Requirements (kcal): 4155-2568 kcal (1.3-1.5)  Energy Need Method: B[place]-St Jeor  Protein Requirements: 77-90(1.2-1.4 gm Pro/kg)  Weight Used For Protein Calculations: 63.9 kg (140 lb 14 oz)     Estimated Fluid Requirement Method: RDA Method  RDA Method (mL): 1550  CHO Requirement: 200g      Nutrition Prescription Ordered    Current Diet Order: NPO  Current Nutrition Support Formula Ordered: Glucerna 1.5  Current Nutrition Support Rate Ordered: 20 (ml)(ml/hr)  Current Nutrition Support Frequency Ordered: mL/hr  Oral Nutrition Supplement: lipids 3 x week    Evaluation of Received Nutrient/Fluid Intake    Enteral Calories (kcal): 720  Enteral Protein (gm): 59  Enteral (Free Water) Fluid (mL): 550  Free Water Flush Fluid (mL): 240  Parenteral Calories (kcal): 0  Parenteral Protein (gm): 0  Parenteral Fluid (mL): 0  Lipid Calories (kcals): 212 kcals  GIR (Glucose Infusion Rate) (mg/kg/min): 0 mg/kg/min  Total Calories (kcal): 932  % Kcal Needs: 58%  Total Protein (gm): 60  % Protein Needs: 75  IV Fluid (mL): 50(ml/hr 1/2 NS)  I/O: 1598/4190 (300 out via NGT)  Energy Calories Required: not meeting needs  Protein Required: not meeting needs  Fluid Required: (per MD)  Comments: LBM: 1/31  Tolerance: tolerating    % Meal Intake: NPO    Nutrition Risk    Level of Risk/Frequency of Follow-up: (2 x week)     Assessment and Plan    Moderate malnutrition     Malnutrition in the context of Chronic Illness/Injury     Related to (etiology):  Altered GI Function; Cancer     Signs and  Symptoms (as evidenced by):     Body Fat Depletion: mild depletion of orbitals, triceps and thoracic and lumbar region   Muscle Mass Depletion: moderate depletion of temples, clavicle region, interosseous muscle and lower extremities.  <75% intake over past week.     Interventions  Collaboration with providers     Nutrition Diagnosis Status:  Continues     Monitor and Evaluation    Food and Nutrient Intake: energy intake, enteral nutrition intake, parenteral nutrition intake  Food and Nutrient Adminstration: enteral and parenteral nutrition administration, diet order  Physical Activity and Function: nutrition-related ADLs and IADLs  Anthropometric Measurements: weight, weight change  Biochemical Data, Medical Tests and Procedures: electrolyte and renal panel, glucose/endocrine profile  Nutrition-Focused Physical Findings: overall appearance     Malnutrition Assessment  Energy Intake: moderate energy intake          Energy Intake (Malnutrition): less than 75% for greater than 7 days  Subcutaneous Fat (Malnutrition): mild depletion  Muscle Mass (Malnutrition): moderate depletion   Orbital Region (Subcutaneous Fat Loss): mild depletion  Upper Arm Region (Subcutaneous Fat Loss): mild depletion  Thoracic and Lumbar Region: mild depletion   Star Region (Muscle Loss): moderate depletion  Clavicle Bone Region (Muscle Loss): moderate depletion  Clavicle and Acromion Bone Region (Muscle Loss): mild depletion  Dorsal Hand (Muscle Loss): mild depletion  Patellar Region (Muscle Loss): moderate depletion  Anterior Thigh Region (Muscle Loss): moderate depletion  Posterior Calf Region (Muscle Loss): moderate depletion       Subcutaneous Fat Loss (Final Summary): mild protein-calorie malnutrition  Muscle Loss Evaluation (Final Summary): moderate protein-calorie malnutrition         Nutrition Follow-Up    RD Follow-up?: Yes

## 2019-02-05 NOTE — PLAN OF CARE
Problem: Adult Inpatient Plan of Care  Goal: Plan of Care Review  Outcome: Ongoing (interventions implemented as appropriate)  Pt still awake and alert but not verbally responding. Pt will follow some simple commands but it unable to cough on command. Pt will occasionally cough which is weak but congested, may possibly benefit from a cough assist machine. Pt maintained O2 sat mid to high 90's throughout the night on Venti mask. Pt still on cooling blanket with temp ranging between 98.4 to 99.9. Barney still in place, urine output good, ~1100 mL.

## 2019-02-05 NOTE — PROGRESS NOTES
NT suction ordered Q6. During attempt to suction, RT is unable to pass suction catheter through left nare. Right nare is occupied by NG tube. No resp distress noted. RN aware and present. Will continue to monitor.

## 2019-02-05 NOTE — PLAN OF CARE
Problem: Adult Inpatient Plan of Care  Goal: Plan of Care Review  Outcome: Ongoing (interventions implemented as appropriate)  Patient on oxygen with documented flow.  Will attempt to wean per O2 order protocol. The proper method of use, as well as anticipated side effects, of this aerosol treatment are discussed and demonstrated to the patient. CPT is performed by hand. RT attempted to perform NT suction as ordered; however, unable to pass suction catheter through nare. RN aware. Will continue to monitor.

## 2019-02-05 NOTE — PLAN OF CARE
w/ PNET, s/p reversal of Sandeep-en-Y w/ Jtube placement  POD#11      Per admit TN Note:  Pt mostly independent with ADLs using assistive equipment. No current HH (has used [facility] [place] for tube feeds in the past- would use again). Pt has Glucometer, Dexcom Meter/Glucose Monitor, S Cane, BSC, RW, Bath Bench. Pt lives in a trailer on her sister, Emily Pickard and Brother in Law's property.  They assist pt when needed and provide transportation to [facility]. They will provide transportation on discharge.      TN to continue to follow for post op needs.        Tn visited with pt and sister and updated on plan of care.  Tn to continue to follow.     02/05/19 1406   Discharge Reassessment   Assessment Type Discharge Planning Reassessment   Provided patient/caregiver education on the expected discharge date and the discharge plan Yes  (sister at bedside)   Do you have any problems affording any of your prescribed medications? No   Discharge Plan A Home with family;Home Health   Discharge Plan B Skilled Nursing Facility   DME Needed Upon Discharge  (tbd)   Can the patient answer the patient profile reliably? Yes, cognitively intact   How does the patient rate their overall health at the present time? Fair   Describe the patient's ability to walk at the present time. Does not walk or unable to take any steps at all   How often would a person be available to care for the patient? Often   Number of comorbid conditions (as recorded on the chart) Five or more   During the past month, has the patient often been bothered by feeling down, depressed or hopeless? Yes   During the past month, has the patient often been bothered by little interest or pleasure in doing things? Yes

## 2019-02-05 NOTE — PLAN OF CARE
Problem: Physical Therapy Goal  Goal: Physical Therapy Goal  Goals to be met by: 3/4/2019     Patient will increase functional independence with mobility by performin. Supine to sit with Moderate Assistance  2. Sit to supine with Moderate Assistance  3. Rolling to Left and Right with Moderate Assistance.  4. Sitting at edge of bed x10 minutes with Minimal Assistance and Moderate Assistance  4. Lower extremity exercise program x5-10 active reps with assistance as needed    Outcome: Ongoing (interventions implemented as appropriate)  Patient extremely weak; unable to talk, but nods head appropriately; primarily PROM performed with sister present for instruction; pt tachycardic throughout; will cont with POC.

## 2019-02-05 NOTE — PROGRESS NOTES
[facility] General Surgery  Neuroendocrine Tumor Service  Progress Note     Admission Date: 1/25/2019  Hospital Length of Stay: 11  Principal Problem: Malignant carcinoid tumor of pancreas     Subjective:      Operation:  1/25/18  1. Ex lap  2. Gastro-gastrostomy EEA with 33 mm circular stapler  3. Pyloroplasty and gastrostomy closure  3. enteric resection with side to side to anstamosis and restoration of small bowel continuity using Idrive tan load stapler  4 Liver US  5. Liver biopsy  6. Liver resection segment 3  7. Supraceliac lymphadenectomy  8. FNA pancreas  9. Lt salpingo oopherectomy   10 . Meckel diverticulectomy  11. Feeding jejunostomy 14 Fr malecot  12. ICG perfusion and Infrared exam of all anastamosis    Interval History:     Patient doing well extubated. NAEO. Still afebrile .Hyperglycemia improved. Tolerating TF with minimal NGT output.     Scheduled Meds:   albuterol-ipratropium  3 mL Nebulization Q4H    artificial tears  1 drop Both Eyes QID    cefTRIAXone (ROCEPHIN) IVPB  1 g Intravenous Q24H    enoxaparin  40 mg Subcutaneous Daily    fat emulsion 20%  250 mL Intravenous Daily    hydrOXYzine  25 mg Per J Tube TID    insulin detemir U-100  25 Units Subcutaneous Daily    lamoTRIgine  50 mg Oral BID    metoclopramide HCl  10 mg Intravenous Q6H    metoprolol  5 mg Intravenous Q6H    pantoprazole  40 mg Intravenous Daily     Continuous Infusions:   0.45% NaCl with KCl 20 mEq infusion 50 mL/hr (02/05/19 0308)    albumin human 25% 10 mL/hr (02/05/19 0520)    insulin (HUMAN R) infusion (adults) 1.3 Units/hr (02/05/19 0733)     PRN Meds:.sodium chloride, acetaminophen, albuterol sulfate, dextrose 50%, dextrose 50%, diphenhydrAMINE, hydrALAZINE, HYDROmorphone, ipratropium, labetalol, lorazepam, naloxone, ondansetron, sodium chloride 0.9%, sodium chloride 3%    Objective:      Temp:  [98.2 °F (36.8 °C)-99.9 °F (37.7 °C)] 99.3 °F (37.4 °C)  Pulse:  [] 119  Resp:   [16-43] 19  SpO2:  [89 %-100 %] 92 %  BP: (114-173)/(57-79) 162/70  Weight change: -2.4 kg (-4.7 oz)    Intake/Output Summary (Last 24 hours) at 2/5/2019 0759  Last data filed at 2/5/2019 0750  Gross per 24 hour   Intake 1598.58 ml   Output 3995 ml   Net -2396.42 ml     UOP 3.9L   gastric contents   Jtube feeds going at 20cc    Physical Exam:  GEN: extubated, NAD, NGT in place with gastric contents  CV: RRR  PULM: unlabored, on RA with some oxygen support  ABD: S/NT/ ND, incision c/d/i, Jtube in place with feeds going  EXT: Peripheral pulses present and equal bilaterally    Recent Labs   Lab 02/05/19  0514   *   K 3.1*   CO2 32*   *   BUN 22   CREATININE 0.7   CALCIUM 7.9*   PROT 6.3   AST 20   ALT 8*   ALKPHOS 97   BILITOT 1.1*     Recent Labs   Lab 02/05/19  0514   WBC 14.25*   HGB 9.8*   HCT 31.5*   *       Significant Imaging: I have reviewed all pertinent imaging results/findings within the past 24 hours.     Assessment and Plan:    62 yo F w/ PNET, s/p reversal of Sandeep-en-Y w/ Jtube placement  POD#11    1. Ex lap  2. Gastro-gastrostomy EEA with 33 mm circular stapler  3. Pyloroplasty and gastrostomy closure  3. enteric resection with side to side to anstamosis and restoration of small bowel continuity using Idrive tan load stapler  4 Liver US  5. Liver biopsy  6. Liver resection segment 3  7. Supraceliac lymphadenectomy  8. FNA pancreas  9. Lt salpingo oopherectomy   10 . Meckel diverticulectomy  11. Feeding jejunostomy 14 Fr malecot  12. ICG perfusion and Infrared exam of all anastamosis    Neuro - No sedation and AOx2. On Cymbalta.  TPN - Continue NPO, cont but decrease TPN/lipids to 20cc  ID - On Rocephin with cultures showing E Coli sensitive to Rocephin. - aspiration pneumonia. Blood cx. NGTD. WBC 14  Heme: H/H stable  Pulm - Extubated. Aggressive chest CPT and acapella. ABGs. Resp Cx growing E Coli.   FEN/GI - NPO, continue  replacing lytes prn. Hypernatremia 157. And  hypokalemic with potassium of 3.1. TF going and will increase 60cc free water flushes Q4  Renal - Cr .7, Whitlock for HD monitoring, good UOP, cont whitlock, check CVP, Albumin 25% 10 ml continuous   PPX - continue lovenox, protonix, ssc in place  Dispo - Remains in critical condition cont ICU care    Nash Titus MD HO

## 2019-02-05 NOTE — PROGRESS NOTES
"NT suction attempted again. Unable to pass catheter again. RN aware. Mouth suctioned orally. Patient instructed to cough; however cough is weak. She shakes her head "yes" that she understand that she needs to cough. Oral care is performed.  "

## 2019-02-05 NOTE — PT/OT/SLP EVAL
Physical Therapy Re- Evaluation/Treatment    Patient Name:  Kaylee Ngo   MRN:  [id number]    Recommendations:     Discharge Recommendations:  (ongoing assessment)   Discharge Equipment Recommendations: (TBD)   Barriers to discharge: Decreased caregiver support    Assessment:     Kaylee Ngo is a 63 y.o. female admitted with a medical diagnosis of Malignant carcinoid tumor of pancreas.  She presents with the following impairments/functional limitations:  weakness, impaired endurance, gait instability, impaired functional mobilty, impaired self care skills, impaired balance, decreased upper extremity function, decreased lower extremity function, impaired cardiopulmonary response to activity, impaired skin, decreased ROM Patient extremely weak; unable to talk, but nods head appropriately; primarily PROM performed with sister present for instruction; pt tachycardic throughout; will cont with POC.   .    Rehab Prognosis: Fair-; patient would benefit from acute skilled PT services to address these deficits and reach maximum level of function.    Recent Surgery: Procedure(s) (LRB):  LAPAROTOMY, EXPLORATORY (N/A)  LYMPHADENECTOMY (N/A)  EXCISION, LIVER (N/A) 10 Days Post-Op    Plan:     During this hospitalization, patient to be seen 3 x/week to address the identified rehab impairments via therapeutic activities, therapeutic exercises and progress toward the following goals:    · Plan of Care Expires:  03/04/19    Subjective     Chief Complaint: Pt nodding appropriately with very little head movement and increased time to respond to questions 75% of time, not responding to question or command other 25%  Patient/Family Comments/goals: to get stronger  Pain/Comfort:  · Pain Rating 1: 0/10  · Location - Orientation 1: generalized  · Location 1: abdomen(pt did not rate)  · Pain Addressed 1: Cessation of Activity(during hip fl; resolved with cessation)  · Pain Rating Post-Intervention 1: 0/10    Patients cultural,  spiritual, Sabianism conflicts given the current situation: no    Living Environment:  Pt lives alone in a mobile home with 4 BOSSMAN with B rails and WIS with shower chair.  Prior to admission, patients level of function was mod I with rollator for mobility and ADLs; today sister reported that the pt used her std cane to amb prior and that she pays someone to come in every 2 wks to assist her with cleaning, laundry.  Equipment used at home: cane, straight, rollator, (sister reports she has a 2 wheeled RW, and std walker), shower chair.  DME owned (not currently used): single point cane.  Upon discharge, patient will have assistance from her sister and brother-in-law live next door and can assist as needed.      Objective:     Communicated with nurse prior to session.  Patient found with central line, peripheral IV, SCD, whitlock catheter, oxygen(ICU monitoring)  upon PT entry to room.    General Precautions: Standard, fall   Orthopedic Precautions:N/A   Braces: N/A     Exams:  · Cognitive Exam:  Patient is oriented to Person and Place  · Postural Exam:  Patient presented with the following abnormalities:    · -       Rounded shoulders  · Skin Integrity/Edema:      · -       Skin integrity: Visible skin intact  · BLE/UE Strength: 1+ to 2- grossly  · BLE ROM: WFL except B ankles~0*df, knee~90*fl passively    Functional Mobility:  Bed Mobility: Dependent    Therapeutic Activities and Exercises:   PROM to all 4 extremities with increased time to allow pt to try active movement; pt tried 3 AA reps and remaining reps to 10 in LEs and 5-6 in UEs with end range stretches to major ms groups; patellar mob to help increase knee flexion range; pt tachycardic throughout~118-119; RR and BP stable throughout; sister present for instruction in range.    AM-PAC 6 CLICK MOBILITY  Total Score:6     Patient left HOB elevated with all lines intact, call button in reach, nurse notified and sister present.    GOALS:   Multidisciplinary  Problems     Physical Therapy Goals        Problem: Physical Therapy Goal    Goal Priority Disciplines Outcome Goal Variances Interventions   Physical Therapy Goal     PT, PT/OT Ongoing (interventions implemented as appropriate)     Description:  Goals to be met by: 19     Patient will increase functional independence with mobility by performin. Supine <> sit with Stand-by Assistance  2. Sit to stand transfer with Stand-by Assistance  3. Bed to chair transfer with Stand-by Assistance using Rolling Walker  4. Gait  x 50 feet with Stand-by Assistance using Rolling Walker.              Problem: Physical Therapy Goal    Goal Priority Disciplines Outcome Goal Variances Interventions   Physical Therapy Goal     PT, PT/OT Ongoing (interventions implemented as appropriate)     Description:  Goals to be met by: 3/4/2019     Patient will increase functional independence with mobility by performin. Supine to sit with Moderate Assistance  2. Sit to supine with Moderate Assistance  3. Rolling to Left and Right with Moderate Assistance.  4. Sitting at edge of bed x10 minutes with Minimal Assistance and Moderate Assistance  4. Lower extremity exercise program x5-10 active reps with assistance as needed                      History:     Past Medical History:   Diagnosis Date    Bipolar disease, chronic     Depression     Diabetes     Drug therapy     Lanreotide    Gastric ulcer     GERD (gastroesophageal reflux disease)     HTN (hypertension)     Hx antineoplastic chemotherapy 2017    [employer]    Hyperlipemia     Malnutrition     Migraines     Nausea 2018    Neuroendocrine cancer 2017    Neuroendocrine carcinoma of small bowel 2017    Neuroendocrine tumor 2018    Obsessive compulsive disorder     Sleep apnea        Past Surgical History:   Procedure Laterality Date    APPENDECTOMY      BIOPSY-LIVER  2018    [facility]     CHOLECYSTECTOMY      ESOPHAGOGASTRODUODENOSCOPY (EGD) N/A 1/29/2018    [facility] ENDO    EXCISION, LIVER N/A 1/25/2019    [facility] OR    GASTRIC BYPASS      INSERTION-TUBE-GASTROSTOMY-LAPAROSCOPIC  with gastrogram N/A 2/28/2018    [facility] OR    LAPAROTOMY, EXPLORATORY N/A 1/25/2019    [facility] OR    LYMPHADENECTOMY N/A 1/25/2019    [facility] OR    TOTAL KNEE ARTHROPLASTY Left        Clinical Decision Making:     History  Co-morbidities and personal factors that may impact the plan of care Examination  Body Structures and Functions, activity limitations and participation restrictions that may impact the plan of care Clinical Presentation   Decision Making/ Complexity Score   Co-morbidities:   [] Time since onset of injury / illness / exacerbation  [] Status of current condition  []Patient's cognitive status and safety concerns    [] Multiple Medical Problems (see med hx)  Personal Factors:   [] Patient's age  [] Prior Level of function   [] Patient's home situation (environment and family support)  [] Patient's level of motivation  [] Expected progression of patient      HISTORY:(criteria)    [] 52389 - no personal factors/history    [] 54386 - has 1-2 personal factor/comorbidity     [] 58661 - has >3 personal factor/comorbidity     Body Regions:  [] Objective examination findings  [] Head     []  Neck  [] Trunk   [] Upper Extremity  [] Lower Extremity    Body Systems:  [] For communication ability, affect, cognition, language, and learning style: the assessment of the ability to make needs known, consciousness, orientation (person, place, and time), expected emotional /behavioral responses, and learning preferences (eg, learning barriers, education  needs)  [] For the neuromuscular system: a general assessment of gross  coordinated movement (eg, balance, gait, locomotion, transfers, and transitions) and motor function  (motor control and motor learning)  [] For the musculoskeletal system: the assessment of gross symmetry, gross range of motion, gross strength, height, and weight  [] For the integumentary system: the assessment of pliability(texture), presence of scar formation, skin color, and skin integrity  [] For cardiovascular/pulmonary system: the assessment of heart rate, respiratory rate, blood pressure, and edema     Activity limitations:    [] Patient's cognitive status and saf ety concerns          [] Status of current condition      [] Weight bearing restriction  [] Cardiopulmunary Restriction    Participation Restrictions:   [] Goals and goal agreement with the patient     [] Rehab potential (prognosis) and probable outcome      Examination of Body System: (criteria)    [] 41032 - addressing 1-2 elements    [] 03725 - addressing a total of 3 or more elements     [] 09268 -  Addressing a total of 4 or more elements         Clinical Presentation: (criteria)  Choose one     On examination of body system using standardized tests and measures patient presents with (CHOOSE ONE) elements from any of the following: body structures and functions, activity limitations, and/or participation restrictions.  Leading to a clinical presentation that is considered (CHOOSE ONE)                              Clinical Decision Making  (Eval Complexity):  Choose One     Time Tracking:     PT Received On: 02/04/19  PT Start Time: 1534     PT Stop Time: 1629  PT Total Time (min): 55 min     Billable Minutes: Re-eval 15 minutes and Therapeutic Exercise 40 minutes      Merry Francis, PT  02/04/2019

## 2019-02-06 LAB
ALBUMIN SERPL BCP-MCNC: 3.5 G/DL
ALP SERPL-CCNC: 138 U/L
ALT SERPL W/O P-5'-P-CCNC: 12 U/L
ANION GAP SERPL CALC-SCNC: 10 MMOL/L
ANISOCYTOSIS BLD QL SMEAR: SLIGHT
AST SERPL-CCNC: 23 U/L
BASOPHILS # BLD AUTO: 0.03 K/UL
BASOPHILS NFR BLD: 0.2 %
BILIRUB SERPL-MCNC: 1.5 MG/DL
BUN SERPL-MCNC: 23 MG/DL
CALCIUM SERPL-MCNC: 7.6 MG/DL
CHLORIDE SERPL-SCNC: 112 MMOL/L
CO2 SERPL-SCNC: 28 MMOL/L
CREAT SERPL-MCNC: 0.7 MG/DL
DIFFERENTIAL METHOD: ABNORMAL
EOSINOPHIL # BLD AUTO: 0.1 K/UL
EOSINOPHIL NFR BLD: 0.5 %
ERYTHROCYTE [DISTWIDTH] IN BLOOD BY AUTOMATED COUNT: 15.6 %
EST. GFR  (AFRICAN AMERICAN): >60 ML/MIN/1.73 M^2
EST. GFR  (NON AFRICAN AMERICAN): >60 ML/MIN/1.73 M^2
GLUCOSE SERPL-MCNC: 270 MG/DL
HCT VFR BLD AUTO: 30.4 %
HGB BLD-MCNC: 9.4 G/DL
HYPOCHROMIA BLD QL SMEAR: ABNORMAL
LYMPHOCYTES # BLD AUTO: 0.8 K/UL
LYMPHOCYTES NFR BLD: 5.5 %
MAGNESIUM SERPL-MCNC: 2.1 MG/DL
MCH RBC QN AUTO: 28.2 PG
MCHC RBC AUTO-ENTMCNC: 30.9 G/DL
MCV RBC AUTO: 91 FL
MONOCYTES # BLD AUTO: 0.2 K/UL
MONOCYTES NFR BLD: 1.5 %
NEUTROPHILS # BLD AUTO: 13.2 K/UL
NEUTROPHILS NFR BLD: 92.3 %
PHOSPHATE SERPL-MCNC: 1.9 MG/DL
PLATELET # BLD AUTO: 443 K/UL
PLATELET BLD QL SMEAR: ABNORMAL
PMV BLD AUTO: 11.7 FL
POCT GLUCOSE: 131 MG/DL (ref 70–110)
POCT GLUCOSE: 166 MG/DL (ref 70–110)
POCT GLUCOSE: 208 MG/DL (ref 70–110)
POCT GLUCOSE: 211 MG/DL (ref 70–110)
POCT GLUCOSE: 215 MG/DL (ref 70–110)
POCT GLUCOSE: 228 MG/DL (ref 70–110)
POCT GLUCOSE: 260 MG/DL (ref 70–110)
POIKILOCYTOSIS BLD QL SMEAR: SLIGHT
POLYCHROMASIA BLD QL SMEAR: ABNORMAL
POTASSIUM SERPL-SCNC: 4.2 MMOL/L
PROT SERPL-MCNC: 6.1 G/DL
RBC # BLD AUTO: 3.33 M/UL
SODIUM SERPL-SCNC: 150 MMOL/L
STOMATOCYTES BLD QL SMEAR: PRESENT
TARGETS BLD QL SMEAR: ABNORMAL
WBC # BLD AUTO: 14.29 K/UL

## 2019-02-06 PROCEDURE — C9113 INJ PANTOPRAZOLE SODIUM, VIA: HCPCS | Performed by: SURGERY

## 2019-02-06 PROCEDURE — 80053 COMPREHEN METABOLIC PANEL: CPT

## 2019-02-06 PROCEDURE — 94668 MNPJ CHEST WALL SBSQ: CPT

## 2019-02-06 PROCEDURE — 25000003 PHARM REV CODE 250: Performed by: STUDENT IN AN ORGANIZED HEALTH CARE EDUCATION/TRAINING PROGRAM

## 2019-02-06 PROCEDURE — 84100 ASSAY OF PHOSPHORUS: CPT

## 2019-02-06 PROCEDURE — 99900035 HC TECH TIME PER 15 MIN (STAT)

## 2019-02-06 PROCEDURE — 97110 THERAPEUTIC EXERCISES: CPT

## 2019-02-06 PROCEDURE — 92610 EVALUATE SWALLOWING FUNCTION: CPT

## 2019-02-06 PROCEDURE — 94640 AIRWAY INHALATION TREATMENT: CPT

## 2019-02-06 PROCEDURE — 27000221 HC OXYGEN, UP TO 24 HOURS

## 2019-02-06 PROCEDURE — 83735 ASSAY OF MAGNESIUM: CPT

## 2019-02-06 PROCEDURE — 94761 N-INVAS EAR/PLS OXIMETRY MLT: CPT

## 2019-02-06 PROCEDURE — 63600175 PHARM REV CODE 636 W HCPCS: Performed by: SURGERY

## 2019-02-06 PROCEDURE — 63600175 PHARM REV CODE 636 W HCPCS: Performed by: STUDENT IN AN ORGANIZED HEALTH CARE EDUCATION/TRAINING PROGRAM

## 2019-02-06 PROCEDURE — 25000003 PHARM REV CODE 250: Performed by: NURSE PRACTITIONER

## 2019-02-06 PROCEDURE — 25000242 PHARM REV CODE 250 ALT 637 W/ HCPCS: Performed by: SURGERY

## 2019-02-06 PROCEDURE — 20000000 HC ICU ROOM

## 2019-02-06 PROCEDURE — 85025 COMPLETE CBC W/AUTO DIFF WBC: CPT

## 2019-02-06 PROCEDURE — 94664 DEMO&/EVAL PT USE INHALER: CPT

## 2019-02-06 PROCEDURE — 31720 CLEARANCE OF AIRWAYS: CPT

## 2019-02-06 RX ADMIN — IPRATROPIUM BROMIDE AND ALBUTEROL SULFATE 3 ML: .5; 3 SOLUTION RESPIRATORY (INHALATION) at 08:02

## 2019-02-06 RX ADMIN — HYPROMELLOSE 2910 1 DROP: 5 SOLUTION OPHTHALMIC at 09:02

## 2019-02-06 RX ADMIN — HYPROMELLOSE 2910 1 DROP: 5 SOLUTION OPHTHALMIC at 01:02

## 2019-02-06 RX ADMIN — INSULIN ASPART 4 UNITS: 100 INJECTION, SOLUTION INTRAVENOUS; SUBCUTANEOUS at 11:02

## 2019-02-06 RX ADMIN — AMITRIPTYLINE HYDROCHLORIDE 50 MG: 25 TABLET, FILM COATED ORAL at 08:02

## 2019-02-06 RX ADMIN — HYDROXYZINE HYDROCHLORIDE 25 MG: 10 SYRUP ORAL at 08:02

## 2019-02-06 RX ADMIN — SODIUM CHLORIDE AND POTASSIUM CHLORIDE 75 ML/HR: 4.5; 1.49 INJECTION, SOLUTION INTRAVENOUS at 12:02

## 2019-02-06 RX ADMIN — HYPROMELLOSE 2910 1 DROP: 5 SOLUTION OPHTHALMIC at 04:02

## 2019-02-06 RX ADMIN — ENOXAPARIN SODIUM 40 MG: 100 INJECTION SUBCUTANEOUS at 04:02

## 2019-02-06 RX ADMIN — METOPROLOL TARTRATE 5 MG: 1 INJECTION, SOLUTION INTRAVENOUS at 08:02

## 2019-02-06 RX ADMIN — IPRATROPIUM BROMIDE AND ALBUTEROL SULFATE 3 ML: .5; 3 SOLUTION RESPIRATORY (INHALATION) at 04:02

## 2019-02-06 RX ADMIN — LAMOTRIGINE 50 MG: 25 TABLET ORAL at 08:02

## 2019-02-06 RX ADMIN — HYPROMELLOSE 2910 1 DROP: 5 SOLUTION OPHTHALMIC at 08:02

## 2019-02-06 RX ADMIN — METOCLOPRAMIDE 10 MG: 5 INJECTION, SOLUTION INTRAMUSCULAR; INTRAVENOUS at 12:02

## 2019-02-06 RX ADMIN — INSULIN DETEMIR 25 UNITS: 100 INJECTION, SOLUTION SUBCUTANEOUS at 09:02

## 2019-02-06 RX ADMIN — INSULIN ASPART 4 UNITS: 100 INJECTION, SOLUTION INTRAVENOUS; SUBCUTANEOUS at 09:02

## 2019-02-06 RX ADMIN — METOCLOPRAMIDE 10 MG: 5 INJECTION, SOLUTION INTRAMUSCULAR; INTRAVENOUS at 06:02

## 2019-02-06 RX ADMIN — IPRATROPIUM BROMIDE AND ALBUTEROL SULFATE 3 ML: .5; 3 SOLUTION RESPIRATORY (INHALATION) at 07:02

## 2019-02-06 RX ADMIN — POTASSIUM PHOSPHATE, MONOBASIC AND POTASSIUM PHOSPHATE, DIBASIC 20 MMOL: 224; 236 INJECTION, SOLUTION INTRAVENOUS at 10:02

## 2019-02-06 RX ADMIN — IPRATROPIUM BROMIDE AND ALBUTEROL SULFATE 3 ML: .5; 3 SOLUTION RESPIRATORY (INHALATION) at 03:02

## 2019-02-06 RX ADMIN — METOCLOPRAMIDE 10 MG: 5 INJECTION, SOLUTION INTRAMUSCULAR; INTRAVENOUS at 11:02

## 2019-02-06 RX ADMIN — INSULIN ASPART 6 UNITS: 100 INJECTION, SOLUTION INTRAVENOUS; SUBCUTANEOUS at 04:02

## 2019-02-06 RX ADMIN — CEFTRIAXONE SODIUM 1 G: 1 INJECTION, POWDER, FOR SOLUTION INTRAMUSCULAR; INTRAVENOUS at 01:02

## 2019-02-06 RX ADMIN — IPRATROPIUM BROMIDE AND ALBUTEROL SULFATE 3 ML: .5; 3 SOLUTION RESPIRATORY (INHALATION) at 11:02

## 2019-02-06 RX ADMIN — PANTOPRAZOLE SODIUM 40 MG: 40 INJECTION, POWDER, LYOPHILIZED, FOR SOLUTION INTRAVENOUS at 08:02

## 2019-02-06 RX ADMIN — INSULIN ASPART 2 UNITS: 100 INJECTION, SOLUTION INTRAVENOUS; SUBCUTANEOUS at 04:02

## 2019-02-06 RX ADMIN — INSULIN ASPART 2 UNITS: 100 INJECTION, SOLUTION INTRAVENOUS; SUBCUTANEOUS at 12:02

## 2019-02-06 RX ADMIN — METOPROLOL TARTRATE 5 MG: 1 INJECTION, SOLUTION INTRAVENOUS at 01:02

## 2019-02-06 RX ADMIN — SODIUM CHLORIDE AND POTASSIUM CHLORIDE 75 ML/HR: 4.5; 1.49 INJECTION, SOLUTION INTRAVENOUS at 01:02

## 2019-02-06 RX ADMIN — HYDROXYZINE HYDROCHLORIDE 25 MG: 10 SYRUP ORAL at 03:02

## 2019-02-06 NOTE — PROGRESS NOTES
[facility] General Surgery  Neuroendocrine Tumor Service  Progress Note     Admission Date: 1/25/2019  Hospital Length of Stay: 12  Principal Problem: Malignant carcinoid tumor of pancreas     Subjective:      Operation:  1/25/18  1. Ex lap  2. Gastro-gastrostomy EEA with 33 mm circular stapler  3. Pyloroplasty and gastrostomy closure  3. enteric resection with side to side to anstamosis and restoration of small bowel continuity using Idrive tan load stapler  4 Liver US  5. Liver biopsy  6. Liver resection segment 3  7. Supraceliac lymphadenectomy  8. FNA pancreas  9. Lt salpingo oopherectomy   10 . Meckel diverticulectomy  11. Feeding jejunostomy 14 Fr malecot  12. ICG perfusion and Infrared exam of all anastamosis    Interval History:     Patient doing well. NAEO. One time fever of 100.6 when cooling blanket turned off .Hyperglycemia improved. Tolerating TF with minimal NGT output.     Scheduled Meds:   albuterol-ipratropium  3 mL Nebulization Q4H    amitriptyline  50 mg Oral QHS    artificial tears  1 drop Both Eyes QID    cefTRIAXone (ROCEPHIN) IVPB  1 g Intravenous Q24H    enoxaparin  40 mg Subcutaneous Daily    hydrOXYzine  25 mg Per J Tube TID    insulin aspart U-100  1-10 Units Subcutaneous 6 times per day    insulin detemir U-100  25 Units Subcutaneous Daily    lamoTRIgine  50 mg Oral BID    metoclopramide HCl  10 mg Intravenous Q6H    metoprolol  5 mg Intravenous Q6H    pantoprazole  40 mg Intravenous Daily    potassium phosphate IVPB  20 mmol Intravenous Once     Continuous Infusions:   0.45% NaCl with KCl 20 mEq infusion 75 mL/hr (02/06/19 0027)     PRN Meds:.sodium chloride, acetaminophen, albuterol sulfate, diphenhydrAMINE, glucagon (human recombinant), hydrALAZINE, HYDROmorphone, ipratropium, labetalol, lorazepam, naloxone, ondansetron, sodium chloride 0.9%, sodium chloride 3%    Objective:      Temp:  [97.7 °F (36.5 °C)-100.6 °F (38.1 °C)] 99.3 °F (37.4 °C)  Pulse:   [] 101  Resp:  [16-30] 20  SpO2:  [93 %-100 %] 100 %  BP: (105-164)/(51-83) 145/65  Weight change: -0.5 kg (-1.6 oz)    Intake/Output Summary (Last 24 hours) at 2/6/2019 0930  Last data filed at 2/6/2019 0600  Gross per 24 hour   Intake 2906.99 ml   Output 2955 ml   Net -48.01 ml     UOP 2350L  NGT approximately 70cc of residuals  Jtube feeds going at 20cc    Physical Exam:  GEN: extubated, NAD, NGT in place with gastric contents  CV: RRR  PULM: unlabored, on RA with some oxygen support  ABD: S/NT/ ND, incision c/some superior and middle scant drainage/i, Jtube in place with feeds going  EXT: Peripheral pulses present and equal bilaterally    Recent Labs   Lab 02/06/19  0448   *   K 4.2   CO2 28   *   BUN 23   CREATININE 0.7   CALCIUM 7.6*   PROT 6.1   AST 23   ALT 12   ALKPHOS 138*   BILITOT 1.5*     Recent Labs   Lab 02/06/19  0448   WBC 14.29*   HGB 9.4*   HCT 30.4*   *       Significant Imaging: I have reviewed all pertinent imaging results/findings within the past 24 hours.     Assessment and Plan:    62 yo F w/ PNET, s/p reversal of Sandeep-en-Y w/ Jtube placement  POD#12    1. Ex lap  2. Gastro-gastrostomy EEA with 33 mm circular stapler  3. Pyloroplasty and gastrostomy closure  3. enteric resection with side to side to anstamosis and restoration of small bowel continuity using Idrive tan load stapler  4 Liver US  5. Liver biopsy  6. Liver resection segment 3  7. Supraceliac lymphadenectomy  8. FNA pancreas  9. Lt salpingo oopherectomy   10 . Meckel diverticulectomy  11. Feeding jejunostomy 14 Fr malecot  12. ICG perfusion and Infrared exam of all anastamosis    Neuro - No sedation and AOx2. On Cymbalta.  TPN - Continue NPO, cont but continue TPN/lipids to 20cc  ID - On Rocephin with cultures showing E Coli sensitive to Rocephin. - aspiration pneumonia. Blood cx. NGTD. WBC 11 down from 14  Heme: H/H stable  Pulm - On RA. Aggressive chest CPT and acapella. ABGs. Resp Cx growing E Coli.    FEN/GI - NPO, continue  replacing lytes prn. Hypernatremia 150. TF going and will increase 50cc free water flushes Q4. Will remove NGT today and swallow evaluation today.  Renal - Cr good., Whitlock for HD monitoring, good UOP, cont whitlock, check CVP, Albumin 25% 10 ml continuous   PPX - continue lovenox, protonix, ssc in place  Dispo - Remains in critical condition cont ICU care    Nash Titus MD hospitals

## 2019-02-06 NOTE — PLAN OF CARE
Problem: Adult Inpatient Plan of Care  Goal: Plan of Care Review  Outcome: Ongoing (interventions implemented as appropriate)  Pt remains awake and alert but still nonverbal, mouths words but still no voice. Sinus tach on monitor, temp labile. Pt remains on cooling blanket. At one point during the shift, pt's temp was 98's for several hours and pt was complaining about being cold so the cooling blanket was turned off. Within one hour pt's temp was back up to 100 so the cooling blanket was restarted. Other VSS. Pt had slight abdominal cramping and several BM's through the shift. Urine output good, ~800 mL. NGT put out <300 so plan to pull NGT today and do swallow study.

## 2019-02-06 NOTE — PLAN OF CARE
"Problem: SLP Goal  Goal: SLP Goal  Short Term Goals:  1. Pt will participate in BSS to determine least restrictive diet.    Outcome: Ongoing (interventions implemented as appropriate)  2/6: Pt seen at bedside for clinical swallow eval. Pt observed with open mouth posture and not able to achieve closed mouth to trigger swallow--pt allowed ice chip trial to sit in oral cavity with limited oral manipulation and absent trigger of swallow-- pt observed with facial grimacing and nodded head "yes" when asked if she was in pain. SLP removed melted ice chip from oral cavity via suction. See note for full detail. SLP recs: Con't NPO with frequent, excellent oral care. SLP will continue to follow for ongoing swallow eval.  CECE Rachel., CCC-SLP  Speech-Language Pathologist       "

## 2019-02-06 NOTE — PLAN OF CARE
Problem: Adult Inpatient Plan of Care  Goal: Patient-Specific Goal (Individualization)  Outcome: Ongoing (interventions implemented as appropriate)  Pt remained on 50% Venti mask. Responds appropriately to yes no questions. Lethargic but appropriate. Pressure points protected with foam padding. Large BM today. Only 200 out of NGT today. Pt's temp went between 97.8 and 99.1 on Cooling blanket

## 2019-02-06 NOTE — PT/OT/SLP EVAL
Speech Language Pathology Evaluation  Bedside Swallow    Patient Name:  Kaylee Ngo   MRN:  [id number]  Admitting Diagnosis: Malignant carcinoid tumor of pancreas    Recommendations:                 General Recommendations:  ongoing swallow assessment  Diet recommendations:  NPO, NPO   Aspiration Precautions: Strict aspiration precautions   General Precautions: Standard, fall, aspiration, NPO, respiratory  Communication strategies:  yes/no questions only    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    Neuroendocrine tumor 4/9/2018    Obsessive compulsive disorder     Sleep apnea        Past Surgical History:   Procedure Laterality Date    APPENDECTOMY      BIOPSY-LIVER  2/28/2018    Performed by [facility] at [facility]    CHOLECYSTECTOMY      ESOPHAGOGASTRODUODENOSCOPY (EGD) N/A 1/29/2018    Performed by Kermit Reddy MD at [facility] ENDO    EXCISION, LIVER N/A 1/25/2019    Per[facility] by [facility] at [facility] OR    GASTRIC BYPASS      INSERTION-TUBE-GASTROSTOMY-LAPAROSCOPIC  with gastrogram N/A 2/28/2018    Performed by [facility] at [facility] OR    LAPAROTOMY, EXPLORATORY N/A 1/25/2019    Performed by [facility] at [facility] OR    LYMPHADENECTOMY N/A 1/25/2019    Performed by [facility] at [facility] OR    TOTAL KNEE ARTHROPLASTY Left      HPI:   62yo female wit history of carcinoid of ileum, pancreas and lymph nodes with unknown primary, cirrhosis, s/p gastric bypass and HTN who was admitted for exp lap by NET/general surgery. She underwent surgery on  "1/25/2019     Social History: Patient lives with her sister.    Prior Intubation HX:  Pt intubated 1/31 and extubated 2/4.    Modified Barium Swallow: None on file    Chest X-Rays: 2/3/19: Support lines and tubes are stable in position.  The tip of the nasogastric tube terminates in the gastric body however the side port is in the distal esophagus.  This should be advanced by approximately 4 cm.  Persistent mixed opacities are seen throughout both lungs, however, there is overall improved aeration of the lungs as compared to the previous study of 01/31/2019.  The heart is normal in size.  The skeletal structures are intact.    Subjective     Pt found in bed awake with pt's sister at bedside upon SLP entry. Pt confirmed with RN prior to entry. Pt with difficulty following simple commands and nonverbal throughout session. Pt's sister reported pt has not verbalized since extubation.     Patient goals: None stated by pt at this time.      Pain/Comfort:  · Pain Rating 1: (pt reported pain via head nod, but unable to rate 2/2 pt nonverbal)  · Pain Addressed 1: Cessation of Activity    Objective:     Oral Musculature Evaluation  · Oral Musculature: unable to assess due to poor participation/comprehension  · Dentition: (present but poor hygiene)  · Mucosal Quality: dry, sticky, ulcerated  · Volitional Swallow: (unable to assess 2/2 pt's difficulty following directions)  · Voice Prior to PO Intake: (pt nonverbal at this time)    Bedside Swallow Eval: Pt seen at bedside for clinical swallow eval. Pt observed with open mouth posture and not able to achieve closed mouth to trigger swallow--pt allowed ice chip trial to sit in oral cavity with limited oral manipulation and absent trigger of swallow-- pt observed with facial grimacing and nodded head "yes" when asked if she was in pain. SLP removed melted ice chip from oral cavity via suction.     Consistencies Assessed:  · Thin liquids -via ice chip trial x1     Oral Phase: "   · prolonged, uncoordinated oral manipulation  · Decreased closure around utensil  · Leakage, mouth breathing  · Poor oral acceptance   · Unable to perform mouth closure  · Poor management of oral secretions    Pharyngeal Phase:   · absent trigger of swallow    Compensatory Strategies  · None-- pt unable to perform cued strategies from SLP    Treatment: SLP will continue to follow for ongoing swallow assessment to determine safest/least restrictive PO diet.  Self-Care/Education: SLP also provided oral care hygiene prior to PO trials to remove thick brown colored secretions within oral cavity, via moistened oral swabs. SLP also provided further oral care/hygiene s/p PO trials 2/2 pt requesting further care to remove more secretions-- However, further oral care limited 2/2 pt's c/o of pain, via head nod and deferred further care.   SLP educated pt and pt's sister on role of SLP, clinical swallow eval, NPO recs/aspiration precautions and POC. Pt and pt's sister acknowledged and confirmed understanding. Pt will require reinforcement of education provided.     Assessment:     Kaylee Ngo is a 63 y.o. female with an SLP diagnosis of oropharyngeal Dysphagia.  She presents with uncoordinated, prolonged oral manipulation and poor management of oral secretions, as well as absent trigger of swallow with one trial of ice chips. SLP recs: Con't NPO with frequent, excellent oral care. SLP will continue to follow for ongoing swallow eval. SLP notified CHANELLE Fish and MD Titus of results/recs.     Goals:   Multidisciplinary Problems     SLP Goals        Problem: SLP Goal    Goal Priority Disciplines Outcome   SLP Goal     SLP Ongoing (interventions implemented as appropriate)   Description:  Short Term Goals:  1. Pt will participate in BSS to determine least restrictive diet.                      Plan:     · Patient to be seen:  3 x/week   · Plan of Care expires:  03/08/19  · Plan of Care reviewed with:  patient, sibling(RN  Polina)   · SLP Follow-Up:  Yes       Discharge recommendations:  (TBD pending pt's progress)       Time Tracking:     SLP Treatment Date:   02/06/19  Speech Start Time:  1247  Speech Stop Time:  1302     Speech Total Time (min):  15 min    Billable Minutes: Eval Swallow and Oral Function 15    QUIANA Rachel, CCC-SLP  02/06/2019

## 2019-02-07 LAB
ALBUMIN SERPL BCP-MCNC: 3 G/DL
ALP SERPL-CCNC: 162 U/L
ALT SERPL W/O P-5'-P-CCNC: 20 U/L
ANION GAP SERPL CALC-SCNC: 9 MMOL/L
ANISOCYTOSIS BLD QL SMEAR: SLIGHT
AST SERPL-CCNC: 28 U/L
BASOPHILS # BLD AUTO: 0.03 K/UL
BASOPHILS NFR BLD: 0.2 %
BILIRUB SERPL-MCNC: 1.1 MG/DL
BUN SERPL-MCNC: 20 MG/DL
CALCIUM SERPL-MCNC: 7.4 MG/DL
CHLORIDE SERPL-SCNC: 115 MMOL/L
CO2 SERPL-SCNC: 25 MMOL/L
CREAT SERPL-MCNC: 0.6 MG/DL
DACRYOCYTES BLD QL SMEAR: ABNORMAL
DIFFERENTIAL METHOD: ABNORMAL
EOSINOPHIL # BLD AUTO: 0.1 K/UL
EOSINOPHIL NFR BLD: 0.5 %
ERYTHROCYTE [DISTWIDTH] IN BLOOD BY AUTOMATED COUNT: 15.6 %
EST. GFR  (AFRICAN AMERICAN): >60 ML/MIN/1.73 M^2
EST. GFR  (NON AFRICAN AMERICAN): >60 ML/MIN/1.73 M^2
GLUCOSE SERPL-MCNC: 134 MG/DL
HCT VFR BLD AUTO: 28.5 %
HGB BLD-MCNC: 8.7 G/DL
HYPOCHROMIA BLD QL SMEAR: ABNORMAL
LYMPHOCYTES # BLD AUTO: 0.9 K/UL
LYMPHOCYTES NFR BLD: 7.3 %
MAGNESIUM SERPL-MCNC: 2 MG/DL
MCH RBC QN AUTO: 28 PG
MCHC RBC AUTO-ENTMCNC: 30.5 G/DL
MCV RBC AUTO: 92 FL
MONOCYTES # BLD AUTO: 0.3 K/UL
MONOCYTES NFR BLD: 2.2 %
NEUTROPHILS # BLD AUTO: 11.4 K/UL
NEUTROPHILS NFR BLD: 89.8 %
PHOSPHATE SERPL-MCNC: 2.1 MG/DL
PLATELET # BLD AUTO: 586 K/UL
PLATELET BLD QL SMEAR: ABNORMAL
PMV BLD AUTO: 10.8 FL
POCT GLUCOSE: 135 MG/DL (ref 70–110)
POCT GLUCOSE: 155 MG/DL (ref 70–110)
POCT GLUCOSE: 174 MG/DL (ref 70–110)
POCT GLUCOSE: 193 MG/DL (ref 70–110)
POCT GLUCOSE: 99 MG/DL (ref 70–110)
POIKILOCYTOSIS BLD QL SMEAR: SLIGHT
POLYCHROMASIA BLD QL SMEAR: ABNORMAL
POTASSIUM SERPL-SCNC: 4 MMOL/L
PROT SERPL-MCNC: 5.8 G/DL
RBC # BLD AUTO: 3.11 M/UL
SODIUM SERPL-SCNC: 149 MMOL/L
STOMATOCYTES BLD QL SMEAR: PRESENT
TARGETS BLD QL SMEAR: ABNORMAL
WBC # BLD AUTO: 12.73 K/UL

## 2019-02-07 PROCEDURE — 20000000 HC ICU ROOM

## 2019-02-07 PROCEDURE — 63600175 PHARM REV CODE 636 W HCPCS: Performed by: SURGERY

## 2019-02-07 PROCEDURE — 92526 ORAL FUNCTION THERAPY: CPT

## 2019-02-07 PROCEDURE — 97535 SELF CARE MNGMENT TRAINING: CPT

## 2019-02-07 PROCEDURE — 85025 COMPLETE CBC W/AUTO DIFF WBC: CPT

## 2019-02-07 PROCEDURE — 31720 CLEARANCE OF AIRWAYS: CPT

## 2019-02-07 PROCEDURE — 80053 COMPREHEN METABOLIC PANEL: CPT

## 2019-02-07 PROCEDURE — 25000003 PHARM REV CODE 250: Performed by: STUDENT IN AN ORGANIZED HEALTH CARE EDUCATION/TRAINING PROGRAM

## 2019-02-07 PROCEDURE — 97530 THERAPEUTIC ACTIVITIES: CPT

## 2019-02-07 PROCEDURE — 83735 ASSAY OF MAGNESIUM: CPT

## 2019-02-07 PROCEDURE — 99900035 HC TECH TIME PER 15 MIN (STAT)

## 2019-02-07 PROCEDURE — 97165 OT EVAL LOW COMPLEX 30 MIN: CPT

## 2019-02-07 PROCEDURE — 97803 MED NUTRITION INDIV SUBSEQ: CPT | Performed by: DIETITIAN, REGISTERED

## 2019-02-07 PROCEDURE — 63600175 PHARM REV CODE 636 W HCPCS: Performed by: STUDENT IN AN ORGANIZED HEALTH CARE EDUCATION/TRAINING PROGRAM

## 2019-02-07 PROCEDURE — C9113 INJ PANTOPRAZOLE SODIUM, VIA: HCPCS | Performed by: SURGERY

## 2019-02-07 PROCEDURE — 94761 N-INVAS EAR/PLS OXIMETRY MLT: CPT

## 2019-02-07 PROCEDURE — 94640 AIRWAY INHALATION TREATMENT: CPT

## 2019-02-07 PROCEDURE — 25000003 PHARM REV CODE 250: Performed by: NURSE PRACTITIONER

## 2019-02-07 PROCEDURE — 84100 ASSAY OF PHOSPHORUS: CPT

## 2019-02-07 PROCEDURE — 94668 MNPJ CHEST WALL SBSQ: CPT

## 2019-02-07 PROCEDURE — 94664 DEMO&/EVAL PT USE INHALER: CPT

## 2019-02-07 PROCEDURE — 25000242 PHARM REV CODE 250 ALT 637 W/ HCPCS: Performed by: SURGERY

## 2019-02-07 PROCEDURE — 27000221 HC OXYGEN, UP TO 24 HOURS

## 2019-02-07 RX ADMIN — METOCLOPRAMIDE 10 MG: 5 INJECTION, SOLUTION INTRAMUSCULAR; INTRAVENOUS at 06:02

## 2019-02-07 RX ADMIN — HYPROMELLOSE 2910 1 DROP: 5 SOLUTION OPHTHALMIC at 09:02

## 2019-02-07 RX ADMIN — METOCLOPRAMIDE 10 MG: 5 INJECTION, SOLUTION INTRAMUSCULAR; INTRAVENOUS at 12:02

## 2019-02-07 RX ADMIN — POTASSIUM PHOSPHATE, MONOBASIC AND POTASSIUM PHOSPHATE, DIBASIC 20 MMOL: 224; 236 INJECTION, SOLUTION INTRAVENOUS at 11:02

## 2019-02-07 RX ADMIN — CEFTRIAXONE SODIUM 1 G: 1 INJECTION, POWDER, FOR SOLUTION INTRAMUSCULAR; INTRAVENOUS at 02:02

## 2019-02-07 RX ADMIN — METOPROLOL TARTRATE 5 MG: 1 INJECTION, SOLUTION INTRAVENOUS at 08:02

## 2019-02-07 RX ADMIN — HYDROMORPHONE HYDROCHLORIDE 0.2 MG: 2 INJECTION INTRAMUSCULAR; INTRAVENOUS; SUBCUTANEOUS at 04:02

## 2019-02-07 RX ADMIN — IPRATROPIUM BROMIDE AND ALBUTEROL SULFATE 3 ML: .5; 3 SOLUTION RESPIRATORY (INHALATION) at 12:02

## 2019-02-07 RX ADMIN — HYPROMELLOSE 2910 1 DROP: 5 SOLUTION OPHTHALMIC at 04:02

## 2019-02-07 RX ADMIN — INSULIN ASPART 2 UNITS: 100 INJECTION, SOLUTION INTRAVENOUS; SUBCUTANEOUS at 12:02

## 2019-02-07 RX ADMIN — INSULIN ASPART 2 UNITS: 100 INJECTION, SOLUTION INTRAVENOUS; SUBCUTANEOUS at 09:02

## 2019-02-07 RX ADMIN — IPRATROPIUM BROMIDE AND ALBUTEROL SULFATE 3 ML: .5; 3 SOLUTION RESPIRATORY (INHALATION) at 07:02

## 2019-02-07 RX ADMIN — INSULIN ASPART 2 UNITS: 100 INJECTION, SOLUTION INTRAVENOUS; SUBCUTANEOUS at 04:02

## 2019-02-07 RX ADMIN — IPRATROPIUM BROMIDE AND ALBUTEROL SULFATE 3 ML: .5; 3 SOLUTION RESPIRATORY (INHALATION) at 04:02

## 2019-02-07 RX ADMIN — PANTOPRAZOLE SODIUM 40 MG: 40 INJECTION, POWDER, LYOPHILIZED, FOR SOLUTION INTRAVENOUS at 08:02

## 2019-02-07 RX ADMIN — HYPROMELLOSE 2910 1 DROP: 5 SOLUTION OPHTHALMIC at 08:02

## 2019-02-07 RX ADMIN — SODIUM CHLORIDE AND POTASSIUM CHLORIDE 75 ML/HR: 4.5; 1.49 INJECTION, SOLUTION INTRAVENOUS at 04:02

## 2019-02-07 RX ADMIN — ENOXAPARIN SODIUM 40 MG: 100 INJECTION SUBCUTANEOUS at 04:02

## 2019-02-07 RX ADMIN — METOPROLOL TARTRATE 5 MG: 1 INJECTION, SOLUTION INTRAVENOUS at 02:02

## 2019-02-07 RX ADMIN — INSULIN DETEMIR 25 UNITS: 100 INJECTION, SOLUTION SUBCUTANEOUS at 09:02

## 2019-02-07 RX ADMIN — IPRATROPIUM BROMIDE AND ALBUTEROL SULFATE 3 ML: .5; 3 SOLUTION RESPIRATORY (INHALATION) at 08:02

## 2019-02-07 RX ADMIN — HYPROMELLOSE 2910 1 DROP: 5 SOLUTION OPHTHALMIC at 12:02

## 2019-02-07 RX ADMIN — METOPROLOL TARTRATE 5 MG: 1 INJECTION, SOLUTION INTRAVENOUS at 03:02

## 2019-02-07 RX ADMIN — IPRATROPIUM BROMIDE AND ALBUTEROL SULFATE 3 ML: .5; 3 SOLUTION RESPIRATORY (INHALATION) at 03:02

## 2019-02-07 NOTE — PLAN OF CARE
Problem: Adult Inpatient Plan of Care  Goal: Plan of Care Review  Outcome: Ongoing (interventions implemented as appropriate)  Pt sat in cardiac chair x 4 hours today. ROM in upper extremities improving. Cough and speech strengthened since yesterday, however patient still not able to safely swallow. Speech to re-evaluate later. Unable to remove clog from J tube after attempts with Coke, guidewire,and warm water. MD aware. Pt to be restarted on TPN. Pt remains febrile on cooling blanket. Hyperglycemia well managed with subq insulin. Plan of care reviewed with patient and sister.

## 2019-02-07 NOTE — PLAN OF CARE
Problem: Occupational Therapy Goal  Goal: Occupational Therapy Goal  Goals to be met by: 3/7/2019     Patient will increase functional independence with ADLs by performing:    UE Dressing with Minimal Assistance.  LE Dressing with Moderate Assistance.  Grooming while seated with Minimal Assistance.  Toileting from bedside commode with Moderate Assistance for hygiene and clothing management.   Sitting at edge of bed x10 minutes with Moderate Assistance.  Supine to sit with Maximum Assistance.  Increased functional UE strength to 3/5     Outcome: Ongoing (interventions implemented as appropriate)  Eval performed on this date, report to follow.   Cont POC

## 2019-02-07 NOTE — PLAN OF CARE
Problem: Adult Inpatient Plan of Care  Goal: Plan of Care Review  Outcome: Ongoing (interventions implemented as appropriate)  Pt continues to spike temps up to 100-cooling blanket applied with moderate relief. Pt had copious amounts of thick secretions. She has weak cough and is unable to fully clear secretions--constant oral and NT suctioning performed with mild to moderate relief. NC 2L at beginning of shift and is now up to 6L with SATS 92%. Urine output 30-200ml/hr. Pt remains nonverbal--nods appropriately. Tele -110's. BP stable. Glucose stable.

## 2019-02-07 NOTE — NURSING
Unable to flush J tube. Dr. Titus aware. Attempting to instill coke [facility]

## 2019-02-07 NOTE — PT/OT/SLP PROGRESS
"Physical Therapy Treatment    Patient Name:  Kaylee Ngo   MRN:  [id number]    Recommendations:     Discharge Recommendations:  (TBD)   Discharge Equipment Recommendations: (TBD)   Barriers to discharge: Decreased caregiver support    Assessment:     Kaylee Ngo is a 63 y.o. female admitted with a medical diagnosis of Malignant carcinoid tumor of pancreas.  She presents with the following impairments/functional limitations:  weakness, impaired endurance, gait instability, impaired functional mobilty, impaired self care skills, impaired balance, decreased lower extremity function, decreased upper extremity function, decreased ROM, impaired skin, impaired cardiopulmonary response to activity Pt with improved strength throughout extremities as compared to last visit; will cont with POC..    Rehab Prognosis: Fair; patient would benefit from acute skilled PT services to address these deficits and reach maximum level of function.    Recent Surgery: Procedure(s) (LRB):  LAPAROTOMY, EXPLORATORY (N/A)  LYMPHADENECTOMY (N/A)  EXCISION, LIVER (N/A) 12 Days Post-Op    Plan:     During this hospitalization, patient to be seen 3 x/week to address the identified rehab impairments via therapeutic activities, therapeutic exercises and progress toward the following goals:    · Plan of Care Expires:  03/04/19    Subjective     Pain/Comfort:  · Pain Rating 1: (no s/s of pain; pt also nodded "no" to question of pain)  · Pain Rating Post-Intervention 1: (as above)      Objective:     Communicated with nurse prior to session.  Patient found with central line, peripheral IV, whitlock catheter, oxygen, SCD(ICU monitoring)  upon PT entry to room.     General Precautions: Standard, fall, aspiration, NPO, respiratory   Orthopedic Precautions:N/A   Braces: N/A     Functional Mobility:  Bed Mobility:  dependent    AM-PAC 6 CLICK MOBILITY  Turning over in bed (including adjusting bedclothes, sheets and blankets)?: 1  Sitting down on and " standing up from a chair with arms (e.g., wheelchair, bedside commode, etc.): 1  Moving from lying on back to sitting on the side of the bed?: 1  Moving to and from a bed to a chair (including a wheelchair)?: 1  Need to walk in hospital room?: 1  Climbing 3-5 steps with a railing?: 1  Basic Mobility Total Score: 6       Therapeutic Activities and Exercises:   Performed BUE/LE AA/PROM to all 4 extremities; also performed eccentric ex where appropriate; pt performed A/AA range x 5 reps and remaining passive reps x 8 reps; gentle stretching to heel cords, hams, IR/add; also performed neck ROM as tolerated with CGA and VCs/TCs for jaw movement; assisted scapular protraction/retraction, elevation/depressionprior to shld movement; applied Z-flex boots to B feet; pt tachycardic throughout at 120-123    Patient left HOB elevated with all lines intact, call button in reach, nurse notified and sister present..    GOALS:   Multidisciplinary Problems     Physical Therapy Goals        Problem: Physical Therapy Goal    Goal Priority Disciplines Outcome Goal Variances Interventions   Physical Therapy Goal     PT, PT/OT Ongoing (interventions implemented as appropriate)     Description:  Goals to be met by: 19     Patient will increase functional independence with mobility by performin. Supine <> sit with Stand-by Assistance  2. Sit to stand transfer with Stand-by Assistance  3. Bed to chair transfer with Stand-by Assistance using Rolling Walker  4. Gait  x 50 feet with Stand-by Assistance using Rolling Walker.              Problem: Physical Therapy Goal    Goal Priority Disciplines Outcome Goal Variances Interventions   Physical Therapy Goal     PT, PT/OT Ongoing (interventions implemented as appropriate)     Description:  Goals to be met by: 3/4/2019     Patient will increase functional independence with mobility by performin. Supine to sit with Moderate Assistance  2. Sit to supine with Moderate Assistance  3.  Rolling to Left and Right with Moderate Assistance.  4. Sitting at edge of bed x10 minutes with Minimal Assistance and Moderate Assistance  4. Lower extremity exercise program x5-10 active reps with assistance as needed                      Time Tracking:     PT Received On: 02/06/19  PT Start Time: 1540     PT Stop Time: 1644  PT Total Time (min): 64 min     Billable Minutes: Therapeutic Exercise 64 minutes    Treatment Type: Treatment  PT/PTA: PT     PTA Visit Number: 0     Merry Francis, PT  02/06/2019

## 2019-02-07 NOTE — PT/OT/SLP PROGRESS
"Speech Language Pathology Treatment    Patient Name:  Kaylee Ngo   MRN:  [id number]  Admitting Diagnosis: Malignant carcinoid tumor of pancreas    Recommendations:                 General Recommendations:  ongoing swallow assessment  Diet recommendations:  NPO, Liquid Diet Level: NPO   Aspiration Precautions: Frequent oral care and Strict aspiration precautions   General Precautions: Standard, fall, aspiration, NPO, respiratory  Communication strategies:  provide increased time to answer and go to room if call light pushed    Subjective     Pt found in bed with OT and OT student. SLP confirmed with RN prior to entry. SLP assisted OT, OT student and RN in moving pt from bed to cardiac chair for further OT treatment, in conjunction with SLP treatment. Pt agreeable to participate in skilled ST session. Pt with slightly improve JOHNNY, though pt did require mod-max redirection/repetition throughout session. Pt also observed with speech production during session, though pt with very low volume, breathy and difficulty coordinating breathing pattern for speech, as well as episodes of aphonia-- pt required cuing from SLP to utilize voicing. Pt also perseverative on utilizing bathroom towards of end of session, despite education from all disciplines present in room that whitlock is in place.     Patient goals: "I need to go to the bathroom" per pt    Pain/Comfort:  · Pain Rating 1: 0/10    Objective:     Has the patient been evaluated by SLP for swallowing?   Yes  Keep patient NPO? Yes   Current Respiratory Status: nasal cannula      Pt seen at bedside for ongoing swallow eval. Pt with slight improvement in JOHNNY and following simple commands, but with inconsistency and required mod-max redirection/repetition. Pt continues with uncoordinated oral motor movements, but able to initiate swallow today -- though pt with delayed trigger of swallow and with significantly dcr'd laryngeal elevation/excursion for thin and nectar thick " liquid trials. Pt also with wet vocal quality prior to PO trials, which was observed to become worse with PO trials of thin liquids (via tsp x3) and nectar thick liquids (via tsp x2), as well as episodes of delayed coughing s/p swallow given trials. Pt with difficulty clearing suspected residuals spontaneously and with cuing (via cough/throat clear), as pt with weak cough production-- SLP with limited success, despite help/education from OT to calm/relax pt, in adequately removing residuals via suction 2/2 pt became orally and physically defensive upon presentation of suction.     SLP deferred further PO trials 2/2 pt limited participation in suctioning to remove residuals and pt is at risk for aspiration. SLP educated pt and pt's sister on importance of allowing suctioning to remove residuals to reduce risk of aspiration and possibility of further infections, as well as con't NPO recs/strict aspiration precautions-- both confirmed understanding. However, pt will require frequent reinforcement of education provided.         Assessment:     Kaylee Ngo is a 63 y.o. female with an SLP diagnosis of oropharyngeal Dysphagia.  She presents with slight improvement in JOHNNY, as compared to previous session. However, pt still presents with poor oral motor coordination, as well as delayed trigger of swallow and significantly decreased laryngeal elevation/excursion and s/s of aspiration s/p swallow thin and nectar thick liquids. Pt is at high risk for aspiration, at this time.  SLP recs: Con't strict NPO/aspiration precautions with frequent, excellent oral care. SLP will continue to follow for ongoing swallow eval.    Goals:   Multidisciplinary Problems     SLP Goals        Problem: SLP Goal    Goal Priority Disciplines Outcome   SLP Goal     SLP Ongoing (interventions implemented as appropriate)   Description:  Short Term Goals:  1. Pt will participate in BSS to determine least restrictive diet.                      Plan:      · Patient to be seen:  3 x/week   · Plan of Care expires:  03/08/19  · Plan of Care reviewed with:  patient, sibling(CHANELLE Fish)   · SLP Follow-Up:  Yes       Discharge recommendations:  (TBD)   Barriers to Discharge:  Safety Awareness impaired and nutritional status    Time Tracking:     SLP Treatment Date:   02/07/19  Speech Start Time:  1120  Speech Stop Time:  1146     Speech Total Time (min):  26 min-- OT present in room during session, SLP treatment time =1130- 1145, 15 mins total    Billable Minutes: Treatment Swallowing Dysfunction 15    QUIANA Rachel, CCC-SLP  02/07/2019

## 2019-02-07 NOTE — PROGRESS NOTES
[facility] General Surgery  Neuroendocrine Tumor Service  Progress Note     Admission Date: 1/25/2019  Hospital Length of Stay: 13  Principal Problem: Malignant carcinoid tumor of pancreas     Subjective:      Operation:  1/25/18  1. Ex lap  2. Gastro-gastrostomy EEA with 33 mm circular stapler  3. Pyloroplasty and gastrostomy closure  3. enteric resection with side to side to anstamosis and restoration of small bowel continuity using Idrive tan load stapler  4 Liver US  5. Liver biopsy  6. Liver resection segment 3  7. Supraceliac lymphadenectomy  8. FNA pancreas  9. Lt salpingo oopherectomy   10 . Meckel diverticulectomy  11. Feeding jejunostomy 14 Fr malecot  12. ICG perfusion and Infrared exam of all anastamosis    Interval History:     Patient doing well. NAEO. One time fever of 100.9. .Hyperglycemia improved. Tolerating TF with minimal NGT output.     Scheduled Meds:   albuterol-ipratropium  3 mL Nebulization Q4H    amitriptyline  50 mg Oral QHS    artificial tears  1 drop Both Eyes QID    cefTRIAXone (ROCEPHIN) IVPB  1 g Intravenous Q24H    enoxaparin  40 mg Subcutaneous Daily    hydrOXYzine  25 mg Per J Tube TID    insulin aspart U-100  1-10 Units Subcutaneous 6 times per day    insulin detemir U-100  25 Units Subcutaneous Daily    lamoTRIgine  50 mg Oral BID    metoclopramide HCl  10 mg Intravenous Q6H    metoprolol  5 mg Intravenous Q6H    pantoprazole  40 mg Intravenous Daily    potassium phosphate IVPB  20 mmol Intravenous Once     Continuous Infusions:   0.45% NaCl with KCl 20 mEq infusion 75 mL/hr (02/07/19 0423)     PRN Meds:.sodium chloride, acetaminophen, albuterol sulfate, diphenhydrAMINE, glucagon (human recombinant), hydrALAZINE, HYDROmorphone, ipratropium, labetalol, lorazepam, naloxone, ondansetron, sodium chloride 0.9%, sodium chloride 3%    Objective:      Temp:  [97.7 °F (36.5 °C)-100.9 °F (38.3 °C)] 98.1 °F (36.7 °C)  Pulse:  [103-124] 109  Resp:  [15-30]  25  SpO2:  [89 %-100 %] 92 %  BP: (114-153)/(58-90) 145/75  Weight change: 0.7 kg (1 lb 8.7 oz)    Intake/Output Summary (Last 24 hours) at 2/7/2019 1102  Last data filed at 2/7/2019 1000  Gross per 24 hour   Intake 2195 ml   Output 1680 ml   Net 515 ml     UOP 1530L  Jtube feeds going at 20cc    Physical Exam:  GEN: extubated, NAD  CV: RRR  PULM: unlabored, with NC 6 L  ABD: S/NT/ ND, incision c/some superior and middle scant drainage/i, Jtube in place occluded  EXT: Peripheral pulses present and equal bilaterally    Recent Labs   Lab 02/07/19  0359   *   K 4.0   CO2 25   *   BUN 20   CREATININE 0.6   CALCIUM 7.4*   PROT 5.8*   AST 28   ALT 20   ALKPHOS 162*   BILITOT 1.1*     Recent Labs   Lab 02/07/19  0359   WBC 12.73*   HGB 8.7*   HCT 28.5*   *       Significant Imaging: I have reviewed all pertinent imaging results/findings within the past 24 hours.     Assessment and Plan:    62 yo F w/ PNET, s/p reversal of Sandeep-en-Y w/ Jtube placement  POD#13    1. Ex lap  2. Gastro-gastrostomy EEA with 33 mm circular stapler  3. Pyloroplasty and gastrostomy closure  3. enteric resection with side to side to anstamosis and restoration of small bowel continuity using Idrive tan load stapler  4 Liver US  5. Liver biopsy  6. Liver resection segment 3  7. Supraceliac lymphadenectomy  8. FNA pancreas  9. Lt salpingo oopherectomy   10 . Meckel diverticulectomy  11. Feeding jejunostomy 14 Fr malecot  12. ICG perfusion and Infrared exam of all anastamosis    Neuro - No sedation and AOx2. On Cymbalta.  TPN - Continue NPO, cont but continue TPN/lipids to 20cc  ID - On Rocephin with cultures showing E Coli sensitive to Rocephin. - aspiration pneumonia. Blood cx. NGTD. WBC 12  Heme: H/H stable  Pulm - On 6L NC. Aggressive chest CPT and acapella. ABGs. Resp Cx growing E Coli.   FEN/GI - NPO, continue  replacing lytes prn. Hypernatremia 149. TF going and will increase 50cc free water flushes Q4. Swallow evaluation  pending. Jtube occluded will need to declot.  Renal - Cr good., Whitlock for HD monitoring, good UOP, cont whitlock, check CVP, Albumin 25% 10 ml continuous   PPX - continue lovenox, protonix, ssc in place  Dispo - Remains in critical condition cont ICU care    Nash Titus MD [facility]

## 2019-02-07 NOTE — NURSING
Attempted to give free water flush per J-tube as ordered with resistance met. Pasty tube feeding noted clogging tube. Attempted to strip and flush tube but unsuccessful. Tube feedings held. Dr. Olivier called with no answer--message left to return phone call.

## 2019-02-07 NOTE — PROGRESS NOTES
[facility]  Adult Nutrition  Progress Note    SUMMARY       Recommendations    Recommendation  1. If TF unable to restart due to clogged J-tube, rec initiating PPN 10% dex, 4.25%AA @ 60 mL/hr to provide 733 calories, 61 gm Pro, GIR 1.6 mg/kg/min with 20% Lipids 250 mL MWF.  2. Once J-tube available for use, rec change TF to Diabetasource AC to goal rate of 60 mL/hr to provide 1728 calories, 86 gm Pro, 1177 mL water. Patient reports intolerance to Glucerna 1.5 at home.    3. Diet advance as medically able to 1800 diabetic diet with SLP rec for texture and consisttency.     Goals: Adequate nutrition to meet >75% of needs  Nutrition Goal Status: new  Communication of RD Recs: reviewed with RN    Nutrition Discharge Planning: Home on TF.     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    Neuroendocrine tumor 4/9/2018    Obsessive compulsive disorder     Sleep apnea      Past Surgical History:   Procedure Laterality Date    APPENDECTOMY      BIOPSY-LIVER  2/28/2018    [facility]    CHOLECYSTECTOMY      ESOPHAGOGASTRODUODENOSCOPY (EGD) N/A 1/29/2018    [facility]it [facility]dy [facility] at [facility] ENDO    EXCISION, LIVER N/A 1/25/2019    [facility] OR    GASTRIC BYPASS      INSERTION-TUBE-GASTROSTOMY-LAPAROSCOPIC  with gastrogram N/A 2/28/2018    [facility]     "LAPAROTOMY, EXPLORATORY N/A 2019    [facility]    LYMPHADENECTOMY N/A 2019    [facility]    TOTAL KNEE ARTHROPLASTY Left        General Information Comments: Patient extubated. Patient sitting in chair at time of visit working with OT, PT, SLP. Sister at bedside. J-tube clogged, TF off. NFPE updated 19. See Malnutrition Section for details.      Nutrition Risk Screen    Nutrition Risk Screen: tube feeding or parenteral nutrition    Nutrition/Diet History    Patient Reported Diet/Restrictions/Preferences: diabetic diet  Typical Food/Fluid Intake: TF at home  Food Preferences: n/a  Spiritual, Cultural Beliefs, Congregation Practices, Values that Affect Care: no  Supplemental Drinks or Food Habits: Peptaman 1.5  Factors Affecting Nutritional Intake: altered gastrointestinal function, NPO  Nutrition Support Formula Prior to Admit: Impact Peptide 1.5  Nutrition Support Rate Prior to Admit: 240 (ml)  Nutrtion Support Frequency Prior to Admit: pump  Nutrition Support Provision Prior to Admit: 3 cans a day    Anthropometrics    Temp: 98.4 °F (36.9 °C)  Height Method: Stated  Height: 5' 5" (165.1 cm)  Height (inches): 65 in  Weight Method: Bed Scale  Weight: 62.9 kg (138 lb 10.7 oz)  Weight (lb): 138.67 lb  Ideal Body Weight (IBW), Female: 125 lb  % Ideal Body Weight, Female (lb): 110.94 lb  BMI (Calculated): 23.1  BMI Grade: 18.5-24.9 - normal  Usual Body Weight (UBW), k.9 kg(Admit wt 19)  Weight Change Amount: 11 lb  % Usual Body Weight: 92.83  % Weight Change From Usual Weight: -7.36 %       Lab/Procedures/Meds    Pertinent Labs Reviewed: reviewed  Recent Labs   Lab 19   *   K 4.0   *   CO2 25   BUN 20   CREATININE 0.6   MG 2.0     Recent Labs   Lab 19   WBC 12.73*   RBC 3.11*   HGB 8.7*   HCT 28.5*   *   MCV 92   MCH 28.0   MCHC 30.5*     Recent Labs   Lab 19   ALT 20 "   AST 28   ALKPHOS 162*   BILITOT 1.1*   PROT 5.8*   ALBUMIN 3.0*     Hemoglobin A1C   Date Value Ref Range Status   12/06/2018 6.1 (H) 4.0 - 5.6 % Final     Comment:     ADA Screening Guidelines:  5.7-6.4%  Consistent with prediabetes  >or=6.5%  Consistent with diabetes  High levels of fetal hemoglobin interfere with the HbA1C  assay. Heterozygous hemoglobin variants (HbS, HgC, etc)do  not significantly interfere with this assay.   However, presence of multiple variants may affect accuracy.         Pertinent Medications Reviewed: reviewed  Scheduled Meds:   albuterol-ipratropium  3 mL Nebulization Q4H    amitriptyline  50 mg Oral QHS    artificial tears  1 drop Both Eyes QID    cefTRIAXone (ROCEPHIN) IVPB  1 g Intravenous Q24H    enoxaparin  40 mg Subcutaneous Daily    hydrOXYzine  25 mg Per J Tube TID    insulin aspart U-100  1-10 Units Subcutaneous 6 times per day    insulin detemir U-100  25 Units Subcutaneous Daily    lamoTRIgine  50 mg Oral BID    metoclopramide HCl  10 mg Intravenous Q6H    metoprolol  5 mg Intravenous Q6H    pantoprazole  40 mg Intravenous Daily     Continuous Infusions:   0.45% NaCl with KCl 20 mEq infusion 75 mL/hr (02/07/19 0423)     PRN Meds:.sodium chloride, acetaminophen, albuterol sulfate, diphenhydrAMINE, glucagon (human recombinant), hydrALAZINE, HYDROmorphone, ipratropium, labetalol, lorazepam, naloxone, ondansetron, sodium chloride 0.9%, sodium chloride 3%      Estimated/Assessed Needs    Weight Used For Calorie Calculations: 62.7 kg (138 lb 3.7 oz)  Energy Calorie Requirements (kcal): 8187-3192 kcal (1.3-1.5)  Energy Need Method: Caro-St Arturoor     Protein Requirements: 75-95(1.2-1.5 gm Pro/kg)  Weight Used For Protein Calculations: 62.9 kg (138 lb 10.7 oz)     Estimated Fluid Requirement Method: RDA Method  RDA Method (mL): 1550  CHO Requirement: 200g      Nutrition Prescription Ordered    Current Diet Order: NPO  Current Nutrition Support Formula Ordered: Kirit  1.5  Current Nutrition Support Rate Ordered: 0 (ml)  Current Nutrition Support Frequency Ordered: mL/hr  Oral Nutrition Supplement: 0    Evaluation of Received Nutrient/Fluid Intake    I/O: reviewed  Energy Calories Required: not meeting needs  Protein Required: not meeting needs  Fluid Required: (per MD)    Comments: LBM 2/7/19  Tolerance: tolerating    % Intake of Estimated Energy Needs: 0 - 25 %  % Meal Intake: NPO    Nutrition Risk    Level of Risk/Frequency of Follow-up: (2 x week)     Assessment and Plan    Moderate malnutrition    Malnutrition in the context of Chronic Illness/Injury    Related to (etiology):  Altered GI Function; Cancer    Signs and Symptoms (as evidenced by):    Energy Intake: <75% intake > 1 week  Body Fat Depletion: mild depletion of orbitals, triceps and thoracic and lumbar region   Muscle Mass Depletion: moderate depletion of temples, clavicle region, interosseous muscle and lower extremities     Interventions  Collaboration with providers    Nutrition Diagnosis Status  Continues            Monitor and Evaluation    Food and Nutrient Intake: energy intake, enteral nutrition intake, parenteral nutrition intake  Food and Nutrient Adminstration: enteral and parenteral nutrition administration, diet order  Physical Activity and Function: nutrition-related ADLs and IADLs  Anthropometric Measurements: weight, weight change  Biochemical Data, Medical Tests and Procedures: electrolyte and renal panel, glucose/endocrine profile  Nutrition-Focused Physical Findings: overall appearance     Malnutrition Assessment  Malnutrition Type: acute illness or injury  Energy Intake: moderate energy intake  Skin (Micronutrient): dry, thinned, turgor reduced  Hair/Scalp (Micronutrient): brittle  Eyes (Micronutrient): none  Extraoral (Micronutrient): none  Neck/Chest (Micronutrient): bony prominence, subcutaneous fat loss       Energy Intake (Malnutrition): less than 75% for greater than 7 days  Subcutaneous  Fat (Malnutrition): mild depletion  Muscle Mass (Malnutrition): moderate depletion  Fluid Accumulation (Malnutrition): mild   Orbital Region (Subcutaneous Fat Loss): mild depletion  Upper Arm Region (Subcutaneous Fat Loss): mild depletion  Thoracic and Lumbar Region: mild depletion   Islam Region (Muscle Loss): moderate depletion  Clavicle Bone Region (Muscle Loss): moderate depletion  Clavicle and Acromion Bone Region (Muscle Loss): mild depletion  Dorsal Hand (Muscle Loss): mild depletion  Patellar Region (Muscle Loss): moderate depletion  Anterior Thigh Region (Muscle Loss): moderate depletion  Posterior Calf Region (Muscle Loss): moderate depletion       Subcutaneous Fat Loss (Final Summary): mild protein-calorie malnutrition  Muscle Loss Evaluation (Final Summary): moderate protein-calorie malnutrition  Fluid Accumulation Evaluation: mild         Nutrition Follow-Up    RD Follow-up?: Yes

## 2019-02-07 NOTE — PLAN OF CARE
Problem: Adult Inpatient Plan of Care  Goal: Plan of Care Review  Outcome: Ongoing (interventions implemented as appropriate)  Pt on oxygen in no apparent distress.  Breathing tx., Cough assist, CPT Given with ok pt. Effort.  Will cont. To monitor.

## 2019-02-07 NOTE — PLAN OF CARE
Problem: Device-Related Complication Risk (Enteral Nutrition)  Goal: Safe, Effective Therapy Delivery    Intervention: Prevent Feeding-Related Adverse Events  Recommendation  1. If TF unable to restart due to clogged J-tube, rec initiating PPN 10% dex, 4.25%AA @ 60 mL/hr to provide 733 calories, 61 gm Pro, GIR 1.6 mg/kg/min with 20% Lipids 250 mL MWF.  2. Once J-tube available for use, rec change TF to Diabetasource AC to goal rate of 60 mL/hr to provide 1728 calories, 86 gm Pro, 1177 mL water. Patient reports intolerance to Glucerna 1.5 at home.    3. Diet advance as medically able to 1800 diabetic diet with SLP rec for texture and consisttency.      Goals: Adequate nutrition to meet >75% of needs  Nutrition Goal Status: new  Communication of RD Recs: reviewed with RN     Nutrition Discharge Planning: Home on TF.

## 2019-02-07 NOTE — PLAN OF CARE
Problem: Physical Therapy Goal  Goal: Physical Therapy Goal  Goals to be met by: 3/4/2019     Patient will increase functional independence with mobility by performin. Supine to sit with Moderate Assistance  2. Sit to supine with Moderate Assistance  3. Rolling to Left and Right with Moderate Assistance.  4. Sitting at edge of bed x10 minutes with Minimal Assistance and Moderate Assistance  4. Lower extremity exercise program x5-10 active reps with assistance as needed     Outcome: Ongoing (interventions implemented as appropriate)  Pt with improved strength throughout extremities as compared to last visit; will cont with POC.

## 2019-02-07 NOTE — PLAN OF CARE
Problem: SLP Goal  Goal: SLP Goal  Short Term Goals:  1. Pt will participate in BSS to determine least restrictive diet.     Outcome: Ongoing (interventions implemented as appropriate)  2/7: Pt seen at bedside for ongoing swallow eval. Pt with slight improvement in JOHNNY and following simple commands, but with inconsistency. Pt continues to uncoordinated oral motor movements, but able to initiate swallow -- though pt delayed trigger of swallow and with significantly dcr'd laryngeal elevation/excursion Pt also with wet vocal quality prior to PO trials, which was observed to become worse with PO trials of thin and nectar thick liquids. Pt with difficulty clearing suspected residuals spontaneously and with cuing-- SLP with limited success in removing residuals via suction 2/2 pt became orally and physically defensive upon presentation of suction.  SLP recs: Con't NPO with frequent, excellent oral care. SLP will continue to follow for ongoing swallow eval.  CECE Rachel., CCC-SLP  Speech-Language Pathologist

## 2019-02-07 NOTE — PT/OT/SLP EVAL
"Occupational Therapy   Evaluation    Name: Kaylee Ngo  MRN: [id number]  Admitting Diagnosis:  Malignant carcinoid tumor of pancreas 13 Days Post-Op    Recommendations:     Discharge Recommendations: other (see comments)(TBD)  Discharge Equipment Recommendations:  other (see comments)(TBD)  Barriers to discharge:  None    Assessment:     Kaylee Ngo is a 63 y.o. female with a medical diagnosis of Malignant carcinoid tumor of pancreas. Performance deficits affecting function: weakness, impaired self care skills, impaired balance, decreased coordination, decreased ROM, decreased safety awareness, impaired endurance, impaired functional mobilty, decreased upper extremity function, pain, impaired cardiopulmonary response to activity, impaired fine motor, decreased lower extremity function, gait instability. Pt was recently extubated on 2/4/19 and has difficulty speaking and clearing her throat.     Rehab Prognosis: Fair; patient would benefit from acute skilled OT services to address these deficits and reach maximum level of function.       Plan:     Patient to be seen 5 x/week to address the above listed problems via self-care/home management, therapeutic activities, therapeutic exercises  · Plan of Care Expires: 03/07/19  · Plan of Care Reviewed with: patient, sibling    Subjective     Chief Complaint: "I want to use the bathroom"  Patient/Family Comments/goals: return home, maximize independence    Occupational Profile:  Living Environment: Pt lives alone in a mobile home with 4 BOSSMAN and B rails with WIS and shower chair  Previous level of function: mod I with rollator for mobility and ADLs  Equipment Used at Home:  cane, straight, rollator, shower chair  Assistance upon Discharge: sister & PEDRO live next door     Pain/Comfort:  · Pain Rating 1: (pt did not rate pain but nodded head 'yes" when asked if she had pain,)    Patients cultural, spiritual, Alevism conflicts given the current situation: " no    Objective:     Communicated with: Nurse prior to session.  Patient found HOB elevated with central line, peripheral IV, whitlock catheter, oxygen, SCD, pulse ox (continuous), blood pressure cuff, pressure relief boots, telemetry(ICU monitoring) upon OT entry to room.    General Precautions: Standard, fall, aspiration, NPO, respiratory   Orthopedic Precautions:N/A   Braces: N/A     Occupational Performance:    Bed Mobility:    Pt dependent for bed mobility     Functional Mobility/Transfers:  Patient completed Bed <> Chair Transfer using Drawsheet technique with dependence and of 4 persons     Activities of Daily Living:  · Grooming: stand by assistance wash face with washcloth in supine with HOB elevated and max verbal cues    Cognitive/Visual Perceptual:  Cognitive/Psychosocial Skills:     -       Oriented to: Person and unable to fully assess 2/2 pt's dysarthria   -       Follows Commands/attention:Follows one-step commands  -       Communication: dysarthria and decreased verbalizations 2/2 secretions  -       Safety awareness/insight to disability: impaired   -       Mood/Affect/Coping skills/emotional control: Flat affect and Guarded    Physical Exam:  Balance:    -       unable to assess sitting or standing balance  Postural examination/scapula alignment:    -       Rounded shoulders  -       Forward head  -       Kyphosis  Upper Extremity Range of Motion:     -       Right Upper Extremity: Deficits: shoulder flexion AROM <45 degrees  -       Left Upper Extremity: Deficits: shoulder flexion AROM <25 degrees  Upper Extremity Strength:    -       Right Upper Extremity: proximal strength grossly 2/5, distal strength grossly 3/5  -       Left Upper Extremity: proximal strength grossly 2/5, distal strength grossly 3/5   Strength:    -       Right Upper Extremity: decreased but able maintain  on yanker and washcloth   -       Left Upper Extremity: decreased    Wound noted on lower lip   Cervical ROM greater  "to R vs L.     [facility] 6 Click ADL:  AMPAC Total Score: 11    Treatment & Education:  Pt & fam educated on role of OT/POC. Pt with limitations in speech production and needed max verbal cues for cough production and for secretion suction via yanker. Pt able to follow 1-step commands to reach B hands out in multiple planes, however ROM limited in BUE. BUE with greater strength distally vs proximal. Pt able to wash face with wash cloth using R hand and max cues for participation. Pt dependently transferred via draw sheet t/f x4 people from bed to cardiac chair. Pt was resistant to using yanker suction to remove secretions from throat but was intermittently cooperative. Pt perserverated on dreams that she had in which she "saw the devil". While up in cardiac chair pt mouthed "I need to go to the bathroom",  Pt was adamant that she wanted to go on a toilet; pt advised that she is not safe to use a toilet at this time 2/2 weakness.    Education:  Pt with increased attempts at verbal communication, however tangential/nonsensical speech  Pt sat up in chair x 4 hours.  Dep drawsheet tf of 4.    Patient left up in chair with all lines intact, call button in reach, nurse notified and sister present    GOALS:   Multidisciplinary Problems     Occupational Therapy Goals        Problem: Occupational Therapy Goal    Goal Priority Disciplines Outcome Interventions   Occupational Therapy Goal     OT, PT/OT Ongoing (interventions implemented as appropriate)    Description:  Goals to be met by: 3/7/2019     Patient will increase functional independence with ADLs by performing:    UE Dressing with Minimal Assistance.  LE Dressing with Moderate Assistance.  Grooming while seated with Minimal Assistance.  Toileting from bedside commode with Moderate Assistance for hygiene and clothing management.   Sitting at edge of bed x10 minutes with Moderate Assistance.  Supine to sit with Maximum Assistance.  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    Neuroendocrine tumor 4/9/2018    Obsessive compulsive disorder     Sleep apnea        Past Surgical History:   Procedure Laterality Date    APPENDECTOMY      BIOPSY-LIVER  2/28/2018    [facility]    CHOLECYSTECTOMY      ESOPHAGOGASTRODUODENOSCOPY (EGD) N/A 1/29/2018    [facility] ENDO    EXCISION, LIVER N/A 1/25/2019    [facility] OR    GASTRIC BYPASS      INSERTION-TUBE-GASTROSTOMY-LAPAROSCOPIC  with gastrogram N/A 2/28/2018    [facility] OR    LAPAROTOMY, EXPLORATORY N/A 1/25/2019    [facility] OR    LYMPHADENECTOMY N/A 1/25/2019    [facility] at [facility] OR    TOTAL KNEE ARTHROPLASTY Left        Time Tracking:     OT Date of Treatment: 02/07/19  OT Start Time: 1057  OT Stop Time: 1146  OT Total Time (min): 49 min     Second visit: 15:15-15:30  15 minutes    Billable Minutes:Evaluation 10  Self Care/Home Management 16  Therapeutic Activity 23 and 15    SANAM Beebe  2/7/2019     I certify that I was present in the room directing the student in service delivery and guiding them using my skilled judgment. As the co-signing therapist I have reviewed the students documentation and am responsible for the treatment, assessment, and plan.

## 2019-02-08 LAB
ALBUMIN SERPL BCP-MCNC: 2.9 G/DL
ALP SERPL-CCNC: 240 U/L
ALT SERPL W/O P-5'-P-CCNC: 28 U/L
ANION GAP SERPL CALC-SCNC: 9 MMOL/L
ANISOCYTOSIS BLD QL SMEAR: SLIGHT
AST SERPL-CCNC: 38 U/L
BASOPHILS # BLD AUTO: 0.01 K/UL
BASOPHILS NFR BLD: 0.1 %
BILIRUB SERPL-MCNC: 1.1 MG/DL
BUN SERPL-MCNC: 15 MG/DL
CALCIUM SERPL-MCNC: 7.3 MG/DL
CHLORIDE SERPL-SCNC: 113 MMOL/L
CO2 SERPL-SCNC: 23 MMOL/L
CREAT SERPL-MCNC: 0.6 MG/DL
DIFFERENTIAL METHOD: ABNORMAL
EOSINOPHIL # BLD AUTO: 0.1 K/UL
EOSINOPHIL NFR BLD: 0.4 %
ERYTHROCYTE [DISTWIDTH] IN BLOOD BY AUTOMATED COUNT: 15.5 %
EST. GFR  (AFRICAN AMERICAN): >60 ML/MIN/1.73 M^2
EST. GFR  (NON AFRICAN AMERICAN): >60 ML/MIN/1.73 M^2
GLUCOSE SERPL-MCNC: 164 MG/DL
HCT VFR BLD AUTO: 27.7 %
HGB BLD-MCNC: 8.6 G/DL
HYPOCHROMIA BLD QL SMEAR: ABNORMAL
LYMPHOCYTES # BLD AUTO: 1.1 K/UL
LYMPHOCYTES NFR BLD: 7.9 %
MAGNESIUM SERPL-MCNC: 2.1 MG/DL
MCH RBC QN AUTO: 28.3 PG
MCHC RBC AUTO-ENTMCNC: 31 G/DL
MCV RBC AUTO: 91 FL
MONOCYTES # BLD AUTO: 0.3 K/UL
MONOCYTES NFR BLD: 2.3 %
NEUTROPHILS # BLD AUTO: 12.5 K/UL
NEUTROPHILS NFR BLD: 89.3 %
PHOSPHATE SERPL-MCNC: 2 MG/DL
PLATELET # BLD AUTO: 742 K/UL
PLATELET BLD QL SMEAR: ABNORMAL
PMV BLD AUTO: 10.7 FL
POCT GLUCOSE: 117 MG/DL (ref 70–110)
POCT GLUCOSE: 128 MG/DL (ref 70–110)
POCT GLUCOSE: 146 MG/DL (ref 70–110)
POCT GLUCOSE: 150 MG/DL (ref 70–110)
POCT GLUCOSE: 229 MG/DL (ref 70–110)
POCT GLUCOSE: 316 MG/DL (ref 70–110)
POCT GLUCOSE: 43 MG/DL (ref 70–110)
POCT GLUCOSE: 47 MG/DL (ref 70–110)
POIKILOCYTOSIS BLD QL SMEAR: SLIGHT
POLYCHROMASIA BLD QL SMEAR: ABNORMAL
POTASSIUM SERPL-SCNC: 4 MMOL/L
PROT SERPL-MCNC: 6 G/DL
RBC # BLD AUTO: 3.04 M/UL
SODIUM SERPL-SCNC: 145 MMOL/L
STOMATOCYTES BLD QL SMEAR: PRESENT
TARGETS BLD QL SMEAR: ABNORMAL
WBC # BLD AUTO: 14.08 K/UL

## 2019-02-08 PROCEDURE — 63600175 PHARM REV CODE 636 W HCPCS: Performed by: STUDENT IN AN ORGANIZED HEALTH CARE EDUCATION/TRAINING PROGRAM

## 2019-02-08 PROCEDURE — 97530 THERAPEUTIC ACTIVITIES: CPT

## 2019-02-08 PROCEDURE — A4216 STERILE WATER/SALINE, 10 ML: HCPCS | Performed by: ANESTHESIOLOGY

## 2019-02-08 PROCEDURE — 94668 MNPJ CHEST WALL SBSQ: CPT

## 2019-02-08 PROCEDURE — 84100 ASSAY OF PHOSPHORUS: CPT

## 2019-02-08 PROCEDURE — 25500020 PHARM REV CODE 255: Performed by: SURGERY

## 2019-02-08 PROCEDURE — 92526 ORAL FUNCTION THERAPY: CPT

## 2019-02-08 PROCEDURE — 80053 COMPREHEN METABOLIC PANEL: CPT

## 2019-02-08 PROCEDURE — 20000000 HC ICU ROOM

## 2019-02-08 PROCEDURE — 25000242 PHARM REV CODE 250 ALT 637 W/ HCPCS: Performed by: SURGERY

## 2019-02-08 PROCEDURE — 99900035 HC TECH TIME PER 15 MIN (STAT)

## 2019-02-08 PROCEDURE — 94761 N-INVAS EAR/PLS OXIMETRY MLT: CPT

## 2019-02-08 PROCEDURE — 25000003 PHARM REV CODE 250: Performed by: STUDENT IN AN ORGANIZED HEALTH CARE EDUCATION/TRAINING PROGRAM

## 2019-02-08 PROCEDURE — 83735 ASSAY OF MAGNESIUM: CPT

## 2019-02-08 PROCEDURE — 25000003 PHARM REV CODE 250: Performed by: NURSE PRACTITIONER

## 2019-02-08 PROCEDURE — 85025 COMPLETE CBC W/AUTO DIFF WBC: CPT

## 2019-02-08 PROCEDURE — 94640 AIRWAY INHALATION TREATMENT: CPT

## 2019-02-08 PROCEDURE — C9113 INJ PANTOPRAZOLE SODIUM, VIA: HCPCS | Performed by: SURGERY

## 2019-02-08 PROCEDURE — 97535 SELF CARE MNGMENT TRAINING: CPT

## 2019-02-08 PROCEDURE — 25000003 PHARM REV CODE 250: Performed by: ANESTHESIOLOGY

## 2019-02-08 PROCEDURE — 27000221 HC OXYGEN, UP TO 24 HOURS

## 2019-02-08 PROCEDURE — 25000003 PHARM REV CODE 250: Performed by: SURGERY

## 2019-02-08 PROCEDURE — 63600175 PHARM REV CODE 636 W HCPCS: Performed by: SURGERY

## 2019-02-08 PROCEDURE — B4185 PARENTERAL SOL 10 GM LIPIDS: HCPCS | Performed by: SURGERY

## 2019-02-08 PROCEDURE — 63600175 PHARM REV CODE 636 W HCPCS: Performed by: RADIOLOGY

## 2019-02-08 RX ORDER — DEXTROSE MONOHYDRATE, SODIUM CHLORIDE, AND POTASSIUM CHLORIDE 50; 1.49; 4.5 G/1000ML; G/1000ML; G/1000ML
INJECTION, SOLUTION INTRAVENOUS CONTINUOUS
Status: DISCONTINUED | OUTPATIENT
Start: 2019-02-08 | End: 2019-02-08

## 2019-02-08 RX ORDER — DIPHENHYDRAMINE HYDROCHLORIDE 50 MG/ML
INJECTION INTRAMUSCULAR; INTRAVENOUS CODE/TRAUMA/SEDATION MEDICATION
Status: COMPLETED | OUTPATIENT
Start: 2019-02-08 | End: 2019-02-08

## 2019-02-08 RX ADMIN — HYPROMELLOSE 2910 1 DROP: 5 SOLUTION OPHTHALMIC at 08:02

## 2019-02-08 RX ADMIN — IPRATROPIUM BROMIDE AND ALBUTEROL SULFATE 3 ML: .5; 3 SOLUTION RESPIRATORY (INHALATION) at 11:02

## 2019-02-08 RX ADMIN — CEFTRIAXONE SODIUM 1 G: 1 INJECTION, POWDER, FOR SOLUTION INTRAMUSCULAR; INTRAVENOUS at 02:02

## 2019-02-08 RX ADMIN — METOCLOPRAMIDE 10 MG: 5 INJECTION, SOLUTION INTRAMUSCULAR; INTRAVENOUS at 12:02

## 2019-02-08 RX ADMIN — INSULIN ASPART 8 UNITS: 100 INJECTION, SOLUTION INTRAVENOUS; SUBCUTANEOUS at 12:02

## 2019-02-08 RX ADMIN — Medication 3 ML: at 08:02

## 2019-02-08 RX ADMIN — DEXTROSE, SODIUM CHLORIDE, AND POTASSIUM CHLORIDE: 5; .45; .15 INJECTION INTRAVENOUS at 08:02

## 2019-02-08 RX ADMIN — IPRATROPIUM BROMIDE AND ALBUTEROL SULFATE 3 ML: .5; 3 SOLUTION RESPIRATORY (INHALATION) at 08:02

## 2019-02-08 RX ADMIN — METOCLOPRAMIDE 10 MG: 5 INJECTION, SOLUTION INTRAMUSCULAR; INTRAVENOUS at 05:02

## 2019-02-08 RX ADMIN — HYPROMELLOSE 2910 1 DROP: 5 SOLUTION OPHTHALMIC at 09:02

## 2019-02-08 RX ADMIN — METOPROLOL TARTRATE 5 MG: 1 INJECTION, SOLUTION INTRAVENOUS at 02:02

## 2019-02-08 RX ADMIN — METOPROLOL TARTRATE 5 MG: 1 INJECTION, SOLUTION INTRAVENOUS at 01:02

## 2019-02-08 RX ADMIN — HYPROMELLOSE 2910 1 DROP: 5 SOLUTION OPHTHALMIC at 12:02

## 2019-02-08 RX ADMIN — INSULIN DETEMIR 25 UNITS: 100 INJECTION, SOLUTION SUBCUTANEOUS at 08:02

## 2019-02-08 RX ADMIN — AMITRIPTYLINE HYDROCHLORIDE 50 MG: 25 TABLET, FILM COATED ORAL at 08:02

## 2019-02-08 RX ADMIN — HYPROMELLOSE 2910 1 DROP: 5 SOLUTION OPHTHALMIC at 05:02

## 2019-02-08 RX ADMIN — IPRATROPIUM BROMIDE AND ALBUTEROL SULFATE 3 ML: .5; 3 SOLUTION RESPIRATORY (INHALATION) at 12:02

## 2019-02-08 RX ADMIN — ASCORBIC ACID, VITAMIN A PALMITATE, CHOLECALCIFEROL, THIAMINE HYDROCHLORIDE, RIBOFLAVIN-5 PHOSPHATE SODIUM, PYRIDOXINE HYDROCHLORIDE, NIACINAMIDE, DEXPANTHENOL, ALPHA-TOCOPHEROL ACETATE, VITAMIN K1, FOLIC ACID, BIOTIN, CYANOCOBALAMIN: 200; 3300; 200; 6; 3.6; 6; 40; 15; 10; 150; 600; 60; 5 INJECTION, SOLUTION INTRAVENOUS at 08:02

## 2019-02-08 RX ADMIN — METOPROLOL TARTRATE 5 MG: 1 INJECTION, SOLUTION INTRAVENOUS at 08:02

## 2019-02-08 RX ADMIN — I.V. FAT EMULSION 250 ML: 20 EMULSION INTRAVENOUS at 09:02

## 2019-02-08 RX ADMIN — POTASSIUM CHLORIDE AND SODIUM CHLORIDE 125 ML/HR: 450; 150 INJECTION, SOLUTION INTRAVENOUS at 04:02

## 2019-02-08 RX ADMIN — POTASSIUM CHLORIDE AND SODIUM CHLORIDE 125 ML/HR: 450; 150 INJECTION, SOLUTION INTRAVENOUS at 09:02

## 2019-02-08 RX ADMIN — LAMOTRIGINE 50 MG: 25 TABLET ORAL at 08:02

## 2019-02-08 RX ADMIN — IPRATROPIUM BROMIDE AND ALBUTEROL SULFATE 3 ML: .5; 3 SOLUTION RESPIRATORY (INHALATION) at 04:02

## 2019-02-08 RX ADMIN — IOHEXOL 20 ML: 350 INJECTION, SOLUTION INTRAVENOUS at 05:02

## 2019-02-08 RX ADMIN — DEXTROSE, SODIUM CHLORIDE, AND POTASSIUM CHLORIDE: 5; .45; .15 INJECTION INTRAVENOUS at 12:02

## 2019-02-08 RX ADMIN — PANTOPRAZOLE SODIUM 40 MG: 40 INJECTION, POWDER, LYOPHILIZED, FOR SOLUTION INTRAVENOUS at 08:02

## 2019-02-08 RX ADMIN — IPRATROPIUM BROMIDE AND ALBUTEROL SULFATE 3 ML: .5; 3 SOLUTION RESPIRATORY (INHALATION) at 03:02

## 2019-02-08 RX ADMIN — DIPHENHYDRAMINE HYDROCHLORIDE 25 MG: 50 INJECTION INTRAMUSCULAR; INTRAVENOUS at 05:02

## 2019-02-08 RX ADMIN — HYDROXYZINE HYDROCHLORIDE 25 MG: 10 SYRUP ORAL at 08:02

## 2019-02-08 RX ADMIN — INSULIN ASPART 4 UNITS: 100 INJECTION, SOLUTION INTRAVENOUS; SUBCUTANEOUS at 08:02

## 2019-02-08 RX ADMIN — ENOXAPARIN SODIUM 40 MG: 100 INJECTION SUBCUTANEOUS at 05:02

## 2019-02-08 RX ADMIN — DEXTROSE MONOHYDRATE 25 G: 25 INJECTION, SOLUTION INTRAVENOUS at 12:02

## 2019-02-08 NOTE — PLAN OF CARE
Problem: Adult Inpatient Plan of Care  Goal: Plan of Care Review  Outcome: Ongoing (interventions implemented as appropriate)  Pt maintaining sats on 4L NC. Pt is cooling blanket for elevated temps. Pt turned q2hrs. Midline incision dressing monitored for drainage. Pt's j-tube is not patent, MD team aware. q4hrs Blood glucose monitoring in progress. Bed locked in lowest position, and bed alarm active. Will continue to monitor.

## 2019-02-08 NOTE — PLAN OF CARE
Problem: Physical Therapy Goal  Goal: Physical Therapy Goal  Goals to be met by: 3/4/2019     Patient will increase functional independence with mobility by performin. Supine to sit with Moderate Assistance MET 2019  2. Sit to supine with Moderate Assistance   3. Rolling to Left and Right with Moderate Assistance.   4. Sitting at edge of bed x10 minutes with Minimal Assistance and Moderate Assistance  4. Lower extremity exercise program x5-10 active reps with assistance as needed    Updated Goals 2019:  1. Supine<>sit with SBA  2. Sit to stand with  CGA  3. Bed to chair with CGA  4. Gait x 20 ft withmin A     Outcome: Ongoing (interventions implemented as appropriate)  Pt presenting with significant improvements in functional mobility this date as she was able to complete 2 stands with min A and completed SPT from bed>chair with mod A. Recommending SNF placement upon d/c.

## 2019-02-08 NOTE — PLAN OF CARE
Problem: SLP Goal  Goal: SLP Goal  Short Term Goals:  1. Pt will participate in BSS to determine least restrictive diet.     Outcome: Ongoing (interventions implemented as appropriate)  2/8: ongoing swallow assessment. Pt NPO for procedure this PM, thus no PO trials this AM. Aggressive oral care performed to decrease risk of aspiration pneumonia. Speech strategies reviewed to improve speech clarity. ST to f/u.

## 2019-02-08 NOTE — PROCEDURES
Interventional Radiology Post-Procedure Note    Pre Op Diagnosis: Clogged J-tube  Post Op Diagnosis: Same    Procedure: Tube check, wire manipulation    Procedure performed by: Shruthi    Written Informed Consent Obtained: Yes  Specimen Removed: None  Estimated Blood Loss: None    Findings:   Tube initially obstructed. Able to clear obstruction by gentle wire and catheter manipulation. No need for exchange at this time. Tube may be used immediately.    No immediate complications. Patient tolerated procedure well. Please see full dictated procedure report for additional details.      Gucci Hawkins MD  [facility]  Pager [phone]

## 2019-02-08 NOTE — NURSING
Dr. Titus at bedside. Reported continued issues with pt's j-tube about not being to flush line despite warm water flushes. Also reported UOP decreasing to 20ml/hr. Will continue to monitor pt.

## 2019-02-08 NOTE — PLAN OF CARE
Pt remains in ICU ; progress notes reviewed  POD#14  s/p reversal of Sandeep-en-Y w/ Jtube placement  HX:: Malignant carcinoid tumor of pancreas      Per admit TN Note:  Pt mostly independent with ADLs using assistive equipment. No current HH (has used [facility] in [place] for tube feeds in the past- would use again). Pt has Glucometer, Dexcom Meter/Glucose Monitor, S Cane, BSC, RW, Bath Bench. Pt lives in a trailer on her sister, Emily Pickard and Brother in Law's property.  They assist pt when needed and provide transportation to [facility]. They will provide transportation on discharge.      TN to continue to follow for post op needs.      02/08/19 1316   Discharge Reassessment   Assessment Type Discharge Planning Reassessment   Provided patient/caregiver education on the expected discharge date and the discharge plan Yes   Do you have any problems affording any of your prescribed medications? No   Discharge Plan A Home with family;Home Health   Discharge Plan B Skilled Nursing Facility   DME Needed Upon Discharge  (tbd)   Patient choice form signed by patient/caregiver N/A   Anticipated Discharge Disposition Home-Health   Can the patient answer the patient profile reliably? Yes, cognitively intact   How does the patient rate their overall health at the present time? Fair   Describe the patient's ability to walk at the present time. Major restrictions/daily assistance from another person   How often would a person be available to care for the patient? Often   Number of comorbid conditions (as recorded on the chart) Five or more   During the past month, has the patient often been bothered by feeling down, depressed or hopeless? Yes   During the past month, has the patient often been bothered by little interest or pleasure in doing things? Yes

## 2019-02-08 NOTE — PROGRESS NOTES
[facility]  Neuroendocrine Tumor Service  Progress Note     Admission Date: 1/25/2019  Hospital Length of Stay: 14  Principal Problem: Malignant carcinoid tumor of pancreas     Subjective:      Operation:  62 yo F w/ PNET, s/p reversal of Sandeep-en-Y w/ Jtube placement  POD#14    1/25/18  Ex lap, gastro-gastrostomy EEA with 33 mm circular stapler, Pyloroplasty and gastrostomy closure, enteric resection with side to side to anstamosis and restoration of small bowel continuity using Idrive tan load stapler, Liver US, Liver biopsy, Liver resection segment 3, Supraceliac lymphadenectomy, FNA pancreas, Lt salpingo oopherectomy, Meckel diverticulectomy, Feeding jejunostomy 14 Fr malecot, ICG perfusion and Infrared exam of all anastamosis    Interval History:     NAEON.   Tm 102 at 4PM yest  J Tube Clogged still    Scheduled Meds:   albuterol-ipratropium  3 mL Nebulization Q4H    amitriptyline  50 mg Oral QHS    artificial tears  1 drop Both Eyes QID    cefTRIAXone (ROCEPHIN) IVPB  1 g Intravenous Q24H    enoxaparin  40 mg Subcutaneous Daily    hydrOXYzine  25 mg Per J Tube TID    insulin aspart U-100  1-10 Units Subcutaneous 6 times per day    insulin detemir U-100  25 Units Subcutaneous Daily    lamoTRIgine  50 mg Oral BID    metoclopramide HCl  10 mg Intravenous Q6H    metoprolol  5 mg Intravenous Q6H    pantoprazole  40 mg Intravenous Daily     Continuous Infusions:   Amino acid 5% - dextrose 15% (CLINIMIX-E) solution with additives (1L  provides 510 kcal/L dextrose, with 50 gm AA, 150 gm CHO, Na 35, K 30, Mg 5, Ca 4.5, Acetate 80, Cl 39, Phos 15) 20 mL/hr at 02/08/19 0807    dextrose 5 % and 0.45 % NaCl with KCl 20 mEq 125 mL/hr at 02/08/19 0856     PRN Meds:.sodium chloride, acetaminophen, albuterol sulfate, diphenhydrAMINE, glucagon (human recombinant), hydrALAZINE, HYDROmorphone, ipratropium, labetalol, lorazepam, naloxone, ondansetron, sodium chloride 0.9%, sodium  chloride 3%    Objective:      Temp:  [97.9 °F (36.6 °C)-102 °F (38.9 °C)] 98.8 °F (37.1 °C)  Pulse:  [] 104  Resp:  [13-39] 16  SpO2:  [92 %-99 %] 97 %  BP: (108-160)/(55-75) 154/66  Weight change: 0 kg (0 lb)    Intake/Output Summary (Last 24 hours) at 2/8/2019 0922  Last data filed at 2/8/2019 0600  Gross per 24 hour   Intake 2445.83 ml   Output 1325 ml   Net 1120.83 ml     UOP 1.7L     Lines:  RIJ TLC  J tube  Barney    Physical Exam:  GEN: extubated, NAD  CV: RRR  PULM: unlabored, with NC 4 L  ABD: S/NT/ ND, incision c/some superior and middle scant drainage, Jtube in place occluded  EXT: Peripheral pulses present and equal bilaterally    Recent Labs   Lab 02/08/19  0350      K 4.0   CO2 23   *   BUN 15   CREATININE 0.6   CALCIUM 7.3*   PROT 6.0   AST 38   ALT 28   ALKPHOS 240*   BILITOT 1.1*     Recent Labs   Lab 02/08/19  0350   WBC 14.08*   HGB 8.6*   HCT 27.7*   *       Significant Imaging: I have reviewed all pertinent imaging results/findings within the past 24 hours.     Assessment and Plan:   62 yo F w/ PNET, s/p reversal of Sandeep-en-Y w/ Jtube placement  POD#14    1/25/18  Ex lap, gastro-gastrostomy EEA with 33 mm circular stapler, Pyloroplasty and gastrostomy closure, enteric resection with side to side to anstamosis and restoration of small bowel continuity using Idrive tan load stapler, Liver US, Liver biopsy, Liver resection segment 3, Supraceliac lymphadenectomy, FNA pancreas, Lt salpingo oopherectomy, Meckel diverticulectomy, Feeding jejunostomy 14 Fr malecot, ICG perfusion and Infrared exam of all anastamosis    Neuro - No sedation and AOx2. On Cymbalta.  TPN - Continue NPO, continue TPN/lipids to 20cc  ID - On Rocephin with cultures showing E Coli sensitive to Rocephin. - aspiration pneumonia. Blood cx. NGTD. WBC 12  Heme: H/H stable  Pulm - On 6L NC. Aggressive chest CPT and acapella. ABGs. Resp Cx growing E Coli.   FEN/GI - NPO, continue  replacing lytes prn.  Hypernatremia resolved, TF not going so no free water flushes right now, Jtube occluded will need to declot - IR contacted, Dr. Hawkins  Renal - Cr good., [place] for HD monitoring, good UOP, cont whitlock  PPX - continue lovenox, protonix, scd in place  Dispo - Remains in critical condition cont ICU care    Trudi Olivier MD - [facility]

## 2019-02-08 NOTE — NURSING
Spoke with Dr. Titus regarding pt blood sugar being 47. New orders for one time dose of D50, and to change maintenance fluids to D5 1/2NS with 20K. New orders implemented. Rechecked blood sugar it was 117. Will continue to monitor.

## 2019-02-08 NOTE — NURSING
Spoke with Dr. Christine regarding patient's J tube. Will continue with attempting coke flushes throughout the night and will hold off on starting TPN per MD orders. Will re evaluate for swallowing tomorrow.

## 2019-02-08 NOTE — PROGRESS NOTES
Reorder Clinimix-E 5/15% at 20 ml/hr and Fat Emulsion 20% 20.8 ml/hr x 12 hrs per pharmacy protocol

## 2019-02-08 NOTE — CONSULTS
Interventional Radiology Consult/Pre-Procedure Note      Chief Complaint/Reason for Consult: Clogged J-tube    History of Present Illness:  Kaylee Ngo is a 63 y.o. female with the PMH/PSH listed below who presents with a clogged 14-Fr Malecot feeding jejunostomy tube placed 1/25/19. D/w Dr. Levy by phone. All attempts to unclog tube at bedside unsuccessful. Tube deemed essential for nutrition. Dr. Mancuso requests eval/exchange under fluoroscopy. Says jejunum is surgically fixed to anterior abd wall.    Admission H&P reviewed.    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    Neuroendocrine tumor 4/9/2018    Obsessive compulsive disorder     Sleep apnea      Past Surgical History:   Procedure Laterality Date    APPENDECTOMY      BIOPSY-LIVER  2/28/2018    [facility]    CHOLECYSTECTOMY      ESOPHAGOGASTRODUODENOSCOPY (EGD) N/A 1/29/2018    [facility] Kermit Reddy [facility] at [facility] ENDO    EXCISION, LIVER N/A 1/25/2019    [facility]    GASTRIC BYPASS      INSERTION-TUBE-GASTROSTOMY-LAPAROSCOPIC  with gastrogram N/A 2/28/2018    [facility]    LAPAROTOMY, EXPLORATORY N/A 1/25/2019    [facility] OR    LYMPHADENECTOMY N/A 1/25/2019    [facility] OR    TOTAL KNEE ARTHROPLASTY Left        Allergies:   Review of patient's allergies indicates:   Allergen Reactions    Calcium carbonate Nausea And Vomiting     The liquid form     "Codeine Hives    Contrast media      Oral and IV    Darvocet a500 [propoxyphene n-acetaminophen]     Epinephrine      Neuroendocrine Tumor patient    Neuroendocrine Tumor patient    Morphine     Propoxyphene napsylate     Sulfa (sulfonamide antibiotics)     Erythromycin Rash    Erythromycin base Rash    Iodinated contrast- oral and iv dye Rash and Other (See Comments)     Copper taste in mouth-Benadryl all that is needed for scans  Copper taste in mouth-Benadryl all that is needed for scans    Pcn [penicillins] Rash     "It looks like measles."       Home Meds:   Prior to Admission medications    Medication Sig Start Date End Date Taking? Authorizing Provider   metoprolol succinate (TOPROL-XL) 100 MG 24 hr tablet Take 100 mg by mouth 2 (two) times daily.   Yes Historical Provider, MD   ACCU-CHEK SHU CONTROL SOLN Soln  10/17/17   Historical Provider, MD   ACCU-CHEK SHU PLUS METER Misc  10/17/17   Historical Provider, MD   ACCU-CHEK SHU PLUS TEST STRP Strp  10/17/17   Historical Provider, MD   amitriptyline (ELAVIL) 50 MG tablet Take 50 mg by mouth every evening.    Historical Provider, MD   amLODIPine (NORVASC) 5 MG tablet Take 5 mg by mouth once daily.  5/15/18 5/15/19  Historical Provider, MD   bisacodyl (DULCOLAX) 5 mg EC tablet Take by mouth 2 tablets  at 12 noon  the day prior to surgery 1/24/19   Cam Ashton MD   calcium carbonate (OS-BEST) 600 mg calcium (1,500 mg) Tab Take 600 mg by mouth once.    Historical Provider, MD   chlorhexidine (HIBICLENS) 4 % external liquid wash surgical area twice daily up to three days prior to surgery  and morning of surgery 1/24/19   Cam Ashton MD   clonazePAM (KLONOPIN) 0.5 MG tablet Take 0.5 mg by mouth 2 (two) times daily as needed for Anxiety.    Historical Provider, MD   cyanocobalamin 1,000 mcg/mL injection 1,000 mcg every 28 days.    Historical Provider, MD   dexamethasone 1.5 mg (27 tabs) DsPk Take 1.5 mg by mouth once daily. " Tapering dose as directed  Patient not taking: Reported on 1/24/2019 8/24/18   Jeff Nicole, DO, FACP   diclofenac (CATAFLAM) 50 MG tablet as needed.  9/11/17   Historical Provider, MD   diphenoxylate-atropine 2.5-0.025 mg (LOMOTIL) 2.5-0.025 mg per tablet Take 1 tablet by mouth 4 (four) times daily as needed for Diarrhea.    Historical Provider, MD   DULoxetine (CYMBALTA) 60 MG capsule Take 60 mg by mouth once daily.    Historical Provider, MD   FLUARIX QUAD 3217-9534, PF, 60 mcg (15 mcg x 4)/0.5 mL Syrg vaccine  11/5/18   Historical Provider, MD   GLUCAGON EMERGENCY KIT, HUMAN, 1 mg injection  11/14/17   Historical Provider, MD   hydrOXYzine pamoate (VISTARIL) 25 MG Cap Take 25 mg by mouth 3 (three) times daily.     Historical Provider, MD   insulin aspart U-100 (NOVOLOG FLEXPEN U-100 INSULIN) 100 unit/mL InPn pen 2 units bid prn 9/6/18   Historical Provider, MD   insulin degludec (TRESIBA FLEXTOUCH U-100) 100 unit/mL (3 mL) InPn Inject 15 Units into the skin once daily.     Historical Provider, MD   lamoTRIgine (LAMICTAL) 150 MG Tab  11/14/17   Historical Provider, MD   lancets 30 gauge Misc  10/17/17   Historical Provider, MD   lancing device Misc  10/17/17   Historical Provider, MD   lanreotide (SOMATULINE DEPOT) 60 mg/0.2 mL Syrg inject 0.2 milliliter by subcutaneous route once a month    Historical Provider, MD Shanthi Sesay swish and spit twice daily  up to 2 days prior to surgery 1/24/19   Cam Ahston MD   losartan (COZAAR) 100 MG tablet Take 1 tablet (100 mg total) by mouth once daily. 3/28/18 3/28/19  Natali Dodson MD   magnesium citrate solution drink entire bottle at 2pm  the day prior to surgery 1/24/19   Cam Ashton MD   metoclopramide HCl (REGLAN) 5 mg/5 mL Soln TAKE 10ML BY MOUTH THREE TIMES A DAY 7/16/18   Kermit Reddy MD   metoclopramide HCl (REGLAN) 5 mg/5 mL Soln TAKE 10ML BY MOUTH THREE TIMES A DAY 10/22/18   Kermit Reddy MD   metroNIDAZOLE (FLAGYL)  "500 MG tablet take 2 tablets by mouth at 1pm, 4pm and 10pm the day prior to surgery 1/24/19   Cam Ashton MD   multivitamin capsule Take 1 capsule by mouth once daily.    Historical Provider, MD   neomycin (MYCIFRADIN) 500 mg Tab take 2 tablets by mouth at 1pm , 3pm and 10 pm the day prior to surgery 1/24/19   Cam Ashton MD   omeprazole (PRILOSEC) 40 MG capsule Take 40 mg by mouth 2 (two) times daily before meals.     Historical Provider, MD   ondansetron (ZOFRAN) 8 MG tablet Take 8 mg by mouth every 8 (eight) hours as needed.     Historical Provider, MD   potassium chloride (KLOR-CON) 20 mEq Pack Take 20 mEq by mouth once.     Historical Provider, MD   promethazine (PHENERGAN) 25 MG tablet Take 25 mg by mouth.    Historical Provider, MD   promethazine (PHENERGAN) 25 MG tablet Take 1 tablet (25 mg total) by mouth every 6 (six) hours as needed for Nausea. 8/24/18   Jeff Nicole DO, FACP   scopolamine (TRANSDERM-SCOP) 1.3-1.5 mg (1 mg over 3 days) Place 1 patch onto the skin Every 3 (three) days. 10/25/18   Cam Ashton MD   SURE COMFORT PEN NEEDLE 32 gauge x 5/32" Ndle  10/17/17   Historical Provider, MD   tiZANidine (ZANAFLEX) 4 MG tablet as needed.  9/11/17   Historical Provider, MD   vitamin D 1000 units Tab Take 1,000 Units by mouth once daily.    Historical Provider, MD     Scheduled Meds:    albuterol-ipratropium  3 mL Nebulization Q4H    amitriptyline  50 mg Oral QHS    artificial tears  1 drop Both Eyes QID    cefTRIAXone (ROCEPHIN) IVPB  1 g Intravenous Q24H    enoxaparin  40 mg Subcutaneous Daily    hydrOXYzine  25 mg Per J Tube TID    insulin aspart U-100  1-10 Units Subcutaneous 6 times per day    insulin detemir U-100  25 Units Subcutaneous Daily    lamoTRIgine  50 mg Oral BID    metoclopramide HCl  10 mg Intravenous Q6H    metoprolol  5 mg Intravenous Q6H    pantoprazole  40 mg Intravenous Daily     Continuous Infusions:    Amino acid 5% - dextrose 15% " (CLINIMIX-E) solution with additives (1L  provides 510 kcal/L dextrose, with 50 gm AA, 150 gm CHO, Na 35, K 30, Mg 5, Ca 4.5, Acetate 80, Cl 39, Phos 15) 20 mL/hr at 02/08/19 0807    dextrose 5 % and 0.45 % NaCl with KCl 20 mEq 125 mL/hr at 02/08/19 0856     PRN Meds:sodium chloride, acetaminophen, albuterol sulfate, diphenhydrAMINE, glucagon (human recombinant), hydrALAZINE, HYDROmorphone, ipratropium, labetalol, lorazepam, naloxone, ondansetron, sodium chloride 0.9%, sodium chloride 3%    Anticoagulation/Antiplatelet Meds: Lovenox    Review of Systems:   As documented in admission H&P.    Physical Exam:  Temp: 98.8 °F (37.1 °C) (02/08/19 0600)  Pulse: 104 (02/08/19 0600)  Resp: 16 (02/08/19 0600)  BP: (!) 154/66 (02/08/19 0600)  SpO2: 97 % (02/08/19 0600)     General: NAD  HEENT: Normocephalic, sclera anicteric, oropharynx clear  Heart: RRR  Lungs: Symmetric excursions, breathing unlabored on O2 by NC  Abd: NTND, soft, incision w minimal drainage, J-tube in place  Neuro: Disoriented, gross nonfocal    Labs:  No results for input(s): INR in the last 168 hours.    Invalid input(s):  PT,  PTT    Recent Labs   Lab 02/08/19  0350   WBC 14.08*   HGB 8.6*   HCT 27.7*   MCV 91   *      Recent Labs   Lab 02/08/19  0350   *      K 4.0   *   CO2 23   BUN 15   CREATININE 0.6   CALCIUM 7.3*   MG 2.1   ALT 28   AST 38   ALBUMIN 2.9*   BILITOT 1.1*       Imaging:  CT 1/31/19 reviewed.    Assessment/Plan:   Clogged J-tube. Eval and exchange if necessary today.    Sedation:  Sedation history: have not been any systemic reactions  ASA: 3 / Mallampati: 2  Sedation plan: Up to moderate (Versed, fentanyl)     Risks (including, but not limited to, pain, bleeding, infection, damage to nearby structures, treatment failure, potential loss of access requiring surgical intervention, and the need for additional procedures), benefits, and alternatives were discussed with the patient's sister. All questions were  answered to the best of my abilities. The patient's sister wishes to proceed with evaluation and possible exchange of indwelling J-tube. Written informed consent was obtained.       Gucci Hawkins MD  [facility]  Pager [phone]

## 2019-02-08 NOTE — PT/OT/SLP PROGRESS
"Speech Language Pathology Treatment    Patient Name:  Kaylee Ngo   MRN:  [id number]  Admitting Diagnosis: Malignant carcinoid tumor of pancreas    Recommendations:                 General Recommendations:  Dysphagia therapy and Speech/language therapy  Diet recommendations:  NPO, Liquid Diet Level: NPO   Aspiration Precautions: Check for pocketing/oral residue, Continue alternate means of nutrition, Frequent oral care, HOB to 90 degrees and Strict aspiration precautions   General Precautions: Standard, aspiration, NPO, respiratory, fall  Communication strategies:  Remove environmental distractions/noise, encourage pt to speak loud/clear, and ensure comprehension of information provided.    Subjective     Pt seen at the bedside for ongoing swallow assessment. Pt awake/alert with sister present, providing oral care. ST checked w/ RNRadha prior to visit.  Patient goals: After finishing oral care, pt stated, "feels much better."     Pain/Comfort:  · Pain Rating 1: 0/10    Objective:     Has the patient been evaluated by SLP for swallowing?   Yes  Keep patient NPO? Yes   Current Respiratory Status: nasal cannula      Pt seated at 90* for tx this date with nasal cannula in place. ST continued oral care using oral swabs, mouth wash, toothbrush, lip ointment, and [place] suction. Pt's tongue continues to be coated in dry brownish covering. Poor/weak cough elicited given ST instruction. Pt often clears throat with minimal laryngeal movement upon palpation. Wet voice prior to oral care initiation which continued during tx. Pt unable to clear wet voice 2/2 weak swallow. Cough noted x1 during oral care. Improved speech production this date. Speech is still low volume and aphonic, but given ST instruction for adequate breath support and loud speech, improved speech intelligibility and # of communication attempts. Pt and sister educated re: NPO status, J tube procedure this afternoon to clear blockage, aspiration risks, and " need for frequent and aggressive oral care to prevent infection. Pt and sister verbalized understanding of taught material but will require reinforcement 2/2 pt's cognitive status. Upon finishing tx, pt remained sitting in bed at 90* with Yankeur suction at the bedside and call light within reach. No further needs at this time. All questions answered.     Assessment:     Kaylee Ngo is a 63 y.o. female with an SLP diagnosis of Aphonia and Dysphagia.  She presents with weak/non-productive cough, thick, dry coating along tongue, poor oral coordination, and low volume speech with aphonic breaks. Improved level of alertness this date with greater participation and tolerance of oral care and suction. ST to f/u for ongoing swallow assessment and further PO trials when appropriate. Reviewed recs w/ pt and RNRadha.     Goals:   Multidisciplinary Problems     SLP Goals        Problem: SLP Goal    Goal Priority Disciplines Outcome   SLP Goal     SLP Ongoing (interventions implemented as appropriate)   Description:  Short Term Goals:  1. Pt will participate in BSS to determine least restrictive diet.                      Plan:     · Patient to be seen:  3 x/week   · Plan of Care expires:  03/08/19  · Plan of Care reviewed with:  patient, sibling   · SLP Follow-Up:  Yes       Discharge recommendations:  other (see comments)(Per POC)   Barriers to Discharge:  Level of skilled assistance needed    Time Tracking:     SLP Treatment Date:   02/08/19  Speech Start Time:  1008  Speech Stop Time:  1032     Speech Total Time (min):  24 min    Billable Minutes: Treatment Swallowing Dysfunction 15 and Seld Care/Home Management Training 9    Kay Faustin CCC-SLP  02/08/2019

## 2019-02-08 NOTE — PT/OT/SLP PROGRESS
Occupational Therapy   Treatment    Name: Kaylee Ngo  MRN: [id number]  Admitting Diagnosis:  Malignant carcinoid tumor of pancreas  14 Days Post-Op    Recommendations:     Discharge Recommendations: nursing facility, skilled(TBD pending pt's progress)  Discharge Equipment Recommendations:  (TBD)  Barriers to discharge:  None    Assessment:     Kaylee Ngo is a 63 y.o. female with a medical diagnosis of Malignant carcinoid tumor of pancreas.Performance deficits affecting function are weakness, gait instability, decreased upper extremity function, decreased ROM, impaired cardiopulmonary response to activity, impaired endurance, impaired balance, decreased lower extremity function, decreased safety awareness, impaired self care skills, impaired skin, pain, impaired functional mobilty, decreased coordination.     Rehab Prognosis:  Fair; patient would benefit from acute skilled OT services to address these deficits and reach maximum level of function.       Plan:     Patient to be seen 5 x/week to address the above listed problems via self-care/home management, therapeutic activities, therapeutic exercises  · Plan of Care Expires: 03/07/19  · Plan of Care Reviewed with: patient, sibling    Subjective     Pain/Comfort:  Pain Rating 1: (pt did not rate pain)    Objective:     Communicated with: Nurse prior to session.  Patient found HOB elevated with blood pressure cuff, central line, whitlock catheter, oxygen, pressure relief boots, peripheral IV, pulse ox (continuous), telemetry, SCD upon OT entry to room. Sister present.     General Precautions: Standard, aspiration, NPO, respiratory, fall   Orthopedic Precautions:N/A   Braces: N/A     Occupational Performance:     Bed Mobility:    · Patient completed Supine to Sit with minimum assistance     Functional Mobility/Transfers:  · Patient completed Sit <> Stand Transfer with minimum assistance and of 1 persons  with  rolling walker   · Patient completed Bed <> Chair  Transfer using Stand Pivot technique with moderate assistance with no assistive device and hand-held assist  · Functional Mobility: Pt performed sit<>stand x2 with no LOB.       [facility] 6 Click ADL: 7    Treatment & Education:  Pt performed skills as above. Pt with improved participation and ax tolerance in tx today. Pt also with more audible vocalizations.Pt educated on bed-level BUE therex to be performed to promote ROM and UE strength; pt returned demonstration. Pt tolerated sitting EOB for ~8-10 minutes with CGA/min A for balance. Pt performed 2x sit>stand t/fs. Pt did stand pivot t/f to cardiac conversion chair with PT, which is a significant improvement from yesterday's tx.     Patient left up in chair with all lines intact, call button in reach, nurse notified and sister presentEducation:      GOALS:   Multidisciplinary Problems     Occupational Therapy Goals        Problem: Occupational Therapy Goal    Goal Priority Disciplines Outcome Interventions   Occupational Therapy Goal     OT, PT/OT Ongoing (interventions implemented as appropriate)    Description:  Goals to be met by: 3/7/2019     Patient will increase functional independence with ADLs by performing:    UE Dressing with Minimal Assistance.  LE Dressing with Moderate Assistance.  Grooming while seated with Minimal Assistance.  Toileting from bedside commode with Moderate Assistance for hygiene and clothing management.   Sitting at edge of bed x10 minutes with Moderate Assistance.  Supine to sit with Maximum Assistance.  Increased functional UE strength to 3/5                       Time Tracking:     OT Date of Treatment: 02/08/19  OT Start Time: 1128  OT Stop Time: 1154  OT Total Time (min): 26 min cotx with PT    Billable Minutes:Therapeutic Activity 13    SANAM Beebe  2/8/2019     I certify that I was present in the room directing the student in service delivery and guiding them using my skilled judgment. As the co-signing therapist I have  reviewed the students documentation and am responsible for the treatment, assessment, and plan.

## 2019-02-08 NOTE — PT/OT/SLP PROGRESS
"Physical Therapy Treatment    Patient Name:  Kaylee Ngo   MRN:  [id number]    Recommendations:     Discharge Recommendations:  nursing facility, skilled   Discharge Equipment Recommendations: (TBD)   Barriers to discharge: Decreased caregiver support    Assessment:     Kaylee Ngo is a 63 y.o. female admitted with a medical diagnosis of Malignant carcinoid tumor of pancreas.  She presents with the following impairments/functional limitations:  weakness, impaired endurance, impaired self care skills, impaired functional mobilty, gait instability, impaired balance, decreased lower extremity function, decreased upper extremity function, decreased safety awareness, impaired cardiopulmonary response to activity, impaired skin, impaired coordination, decreased coordination. Pt presenting with significant improvements in functional mobility this date as she was able to complete 2 stands with min A and completed SPT from bed>chair with mod A. Recommending SNF placement upon d/c.    Rehab Prognosis: Good; patient would benefit from acute skilled PT services to address these deficits and reach maximum level of function.    Recent Surgery: Procedure(s) (LRB):  LAPAROTOMY, EXPLORATORY (N/A)  LYMPHADENECTOMY (N/A)  EXCISION, LIVER (N/A) 14 Days Post-Op    Plan:     During this hospitalization, patient to be seen 5 x/week to address the identified rehab impairments via gait training, therapeutic activities, therapeutic exercises and progress toward the following goals:    · Plan of Care Expires:  03/04/19    Subjective     Chief Complaint: fatigue  Patient/Family Comments/goals: "I want to lie back"  Pain/Comfort:  · Pain Rating 1: (did not rate or c/o pain)      Objective:     Communicated with CHANELLE Garcia prior to session.  Patient found all lines intact bed alarm, blood pressure cuff, central line, whitlock catheter, pulse ox (continuous), telemetry, oxygen, pressure relief boots, SCD(ICU monitoring)  upon PT entry to room. "     General Precautions: Standard, aspiration, NPO, fall, respiratory   Orthopedic Precautions:N/A   Braces: N/A     Functional Mobility:  · Bed Mobility:     · Scooting: moderate assistance and to scoot forward towards EOB  · Supine to Sit: minimum assistance and assist to raise trunk into sitting  · Transfers:     · Sit to Stand:  minimum assistance with no AD and rolling walker  · Bed to Chair: moderate assistance with  no AD  using  Stand Pivot      AM-PAC 6 CLICK MOBILITY  Turning over in bed (including adjusting bedclothes, sheets and blankets)?: 3  Sitting down on and standing up from a chair with arms (e.g., wheelchair, bedside commode, etc.): 3  Moving from lying on back to sitting on the side of the bed?: 3  Moving to and from a bed to a chair (including a wheelchair)?: 2  Need to walk in hospital room?: 1  Climbing 3-5 steps with a railing?: 1  Basic Mobility Total Score: 13       Therapeutic Activities and Exercises:  BLEs heel slides and APs.  Pt sat EOB with CGA up to min A throughout as pt attempting to lean to the right to return to lying down and requires min A to return to sitting.  Completed 2 stands from EOB, 1st stand with RW and min A and 2nd stand with PT in front of pt and min A. Completed SPT from bed>chair to the left with mod A.  Able to scoot self back in chair with foot plates.  Educated in LAQs while sitting in chair x 5 reps.  Left up in chair.    Patient left up in chair with all lines intact, call button in reach, RN notified and pt's sister present..    GOALS:   Multidisciplinary Problems     Physical Therapy Goals        Problem: Physical Therapy Goal    Goal Priority Disciplines Outcome Goal Variances Interventions   Physical Therapy Goal     PT, PT/OT Ongoing (interventions implemented as appropriate)     Description:  Goals to be met by: 19     Patient will increase functional independence with mobility by performin. Supine <> sit with Stand-by Assistance  2. Sit to  stand transfer with Stand-by Assistance  3. Bed to chair transfer with Stand-by Assistance using Rolling Walker  4. Gait  x 50 feet with Stand-by Assistance using Rolling Walker.              Problem: Physical Therapy Goal    Goal Priority Disciplines Outcome Goal Variances Interventions   Physical Therapy Goal     PT, PT/OT Ongoing (interventions implemented as appropriate)     Description:  Goals to be met by: 3/4/2019     Patient will increase functional independence with mobility by performin. Supine to sit with Moderate Assistance MET 2019  2. Sit to supine with Moderate Assistance   3. Rolling to Left and Right with Moderate Assistance.   4. Sitting at edge of bed x10 minutes with Minimal Assistance and Moderate Assistance  4. Lower extremity exercise program x5-10 active reps with assistance as needed    Updated Goals 2019:  1. Supine<>sit with SBA  2. Sit to stand with  CGA  3. Bed to chair with CGA  4. Gait x 20 ft withmin A                       Time Tracking:     PT Received On: 19  PT Start Time: 1128     PT Stop Time: 1154  PT Total Time (min): 26 min co-tx with OT    Billable Minutes: Therapeutic Activity 13    Treatment Type: Treatment  PT/PTA: PT     PTA Visit Number: 0     Zeinab Zhang, PT  2019

## 2019-02-08 NOTE — PLAN OF CARE
Problem: Occupational Therapy Goal  Goal: Occupational Therapy Goal  Goals to be met by: 3/7/2019     Patient will increase functional independence with ADLs by performing:    UE Dressing with Minimal Assistance.  LE Dressing with Moderate Assistance.  Grooming while seated with Minimal Assistance.  Toileting from bedside commode with Moderate Assistance for hygiene and clothing management.   Sitting at edge of bed x10 minutes with Moderate Assistance.  Supine to sit with Maximum Assistance.  Increased functional UE strength to 3/5      Outcome: Ongoing (interventions implemented as appropriate)  Pt with improved participation and ax tolerance in tx today. Pt also with more audible vocalizations. Pt tolerated sitting EOB for ~8-10 minutes and performed 2x sit>stand t/fs.   Pt progressing towards goals, cont POC.

## 2019-02-09 LAB
ALBUMIN SERPL BCP-MCNC: 2.7 G/DL
ALP SERPL-CCNC: 187 U/L
ALT SERPL W/O P-5'-P-CCNC: 19 U/L
AMMONIA PLAS-SCNC: 33 UMOL/L
ANION GAP SERPL CALC-SCNC: 8 MMOL/L
AST SERPL-CCNC: 27 U/L
BASOPHILS # BLD AUTO: 0.03 K/UL
BASOPHILS NFR BLD: 0.3 %
BILIRUB SERPL-MCNC: 0.7 MG/DL
BUN SERPL-MCNC: 10 MG/DL
CALCIUM SERPL-MCNC: 7 MG/DL
CHLORIDE SERPL-SCNC: 111 MMOL/L
CO2 SERPL-SCNC: 22 MMOL/L
CREAT SERPL-MCNC: 0.6 MG/DL
DIFFERENTIAL METHOD: ABNORMAL
EOSINOPHIL # BLD AUTO: 0.2 K/UL
EOSINOPHIL NFR BLD: 2.2 %
ERYTHROCYTE [DISTWIDTH] IN BLOOD BY AUTOMATED COUNT: 16 %
EST. GFR  (AFRICAN AMERICAN): >60 ML/MIN/1.73 M^2
EST. GFR  (NON AFRICAN AMERICAN): >60 ML/MIN/1.73 M^2
GLUCOSE SERPL-MCNC: 157 MG/DL
HCT VFR BLD AUTO: 26 %
HGB BLD-MCNC: 7.9 G/DL
LYMPHOCYTES # BLD AUTO: 1 K/UL
LYMPHOCYTES NFR BLD: 9.7 %
MAGNESIUM SERPL-MCNC: 1.8 MG/DL
MCH RBC QN AUTO: 28.1 PG
MCHC RBC AUTO-ENTMCNC: 30.4 G/DL
MCV RBC AUTO: 93 FL
MONOCYTES # BLD AUTO: 0.5 K/UL
MONOCYTES NFR BLD: 4.3 %
NEUTROPHILS # BLD AUTO: 8.6 K/UL
NEUTROPHILS NFR BLD: 83.5 %
PHOSPHATE SERPL-MCNC: 1.6 MG/DL
PLATELET # BLD AUTO: 878 K/UL
PMV BLD AUTO: 10.4 FL
POCT GLUCOSE: 125 MG/DL (ref 70–110)
POCT GLUCOSE: 127 MG/DL (ref 70–110)
POCT GLUCOSE: 132 MG/DL (ref 70–110)
POCT GLUCOSE: 154 MG/DL (ref 70–110)
POCT GLUCOSE: 242 MG/DL (ref 70–110)
POCT GLUCOSE: 56 MG/DL (ref 70–110)
POTASSIUM SERPL-SCNC: 4 MMOL/L
PROT SERPL-MCNC: 5.8 G/DL
RBC # BLD AUTO: 2.81 M/UL
SODIUM SERPL-SCNC: 141 MMOL/L
WBC # BLD AUTO: 10.36 K/UL

## 2019-02-09 PROCEDURE — 20000000 HC ICU ROOM

## 2019-02-09 PROCEDURE — 92526 ORAL FUNCTION THERAPY: CPT

## 2019-02-09 PROCEDURE — 85025 COMPLETE CBC W/AUTO DIFF WBC: CPT

## 2019-02-09 PROCEDURE — 84100 ASSAY OF PHOSPHORUS: CPT

## 2019-02-09 PROCEDURE — 80053 COMPREHEN METABOLIC PANEL: CPT

## 2019-02-09 PROCEDURE — 63600175 PHARM REV CODE 636 W HCPCS: Performed by: STUDENT IN AN ORGANIZED HEALTH CARE EDUCATION/TRAINING PROGRAM

## 2019-02-09 PROCEDURE — 63600175 PHARM REV CODE 636 W HCPCS: Performed by: SURGERY

## 2019-02-09 PROCEDURE — 25000003 PHARM REV CODE 250: Performed by: NURSE PRACTITIONER

## 2019-02-09 PROCEDURE — 25000242 PHARM REV CODE 250 ALT 637 W/ HCPCS: Performed by: SURGERY

## 2019-02-09 PROCEDURE — 25000003 PHARM REV CODE 250

## 2019-02-09 PROCEDURE — 25000003 PHARM REV CODE 250: Performed by: STUDENT IN AN ORGANIZED HEALTH CARE EDUCATION/TRAINING PROGRAM

## 2019-02-09 PROCEDURE — 25000003 PHARM REV CODE 250: Performed by: SURGERY

## 2019-02-09 PROCEDURE — C9113 INJ PANTOPRAZOLE SODIUM, VIA: HCPCS | Performed by: SURGERY

## 2019-02-09 PROCEDURE — 83735 ASSAY OF MAGNESIUM: CPT

## 2019-02-09 PROCEDURE — 99900035 HC TECH TIME PER 15 MIN (STAT)

## 2019-02-09 PROCEDURE — 94668 MNPJ CHEST WALL SBSQ: CPT

## 2019-02-09 PROCEDURE — 82140 ASSAY OF AMMONIA: CPT

## 2019-02-09 PROCEDURE — 94640 AIRWAY INHALATION TREATMENT: CPT

## 2019-02-09 RX ORDER — SODIUM,POTASSIUM PHOSPHATES 280-250MG
2 POWDER IN PACKET (EA) ORAL ONCE
Status: COMPLETED | OUTPATIENT
Start: 2019-02-09 | End: 2019-02-09

## 2019-02-09 RX ORDER — DEXTROSE 50 % IN WATER (D50W) INTRAVENOUS SYRINGE
Status: COMPLETED
Start: 2019-02-09 | End: 2019-02-09

## 2019-02-09 RX ORDER — FLUCONAZOLE 40 MG/ML
200 POWDER, FOR SUSPENSION ORAL DAILY
Status: DISCONTINUED | OUTPATIENT
Start: 2019-02-09 | End: 2019-02-11

## 2019-02-09 RX ORDER — L. ACIDOPHILUS/L.BULGARICUS 100MM CELL
1 GRANULES IN PACKET (EA) ORAL 2 TIMES DAILY
Status: DISCONTINUED | OUTPATIENT
Start: 2019-02-09 | End: 2019-02-10

## 2019-02-09 RX ORDER — LIDOCAINE 50 MG/G
1 PATCH TOPICAL
Status: DISCONTINUED | OUTPATIENT
Start: 2019-02-09 | End: 2019-02-26 | Stop reason: HOSPADM

## 2019-02-09 RX ADMIN — FLUCONAZOLE 200 MG: 40 POWDER, FOR SUSPENSION ORAL at 11:02

## 2019-02-09 RX ADMIN — ASCORBIC ACID, VITAMIN A PALMITATE, CHOLECALCIFEROL, THIAMINE HYDROCHLORIDE, RIBOFLAVIN-5 PHOSPHATE SODIUM, PYRIDOXINE HYDROCHLORIDE, NIACINAMIDE, DEXPANTHENOL, ALPHA-TOCOPHEROL ACETATE, VITAMIN K1, FOLIC ACID, BIOTIN, CYANOCOBALAMIN: 200; 3300; 200; 6; 3.6; 6; 40; 15; 10; 150; 600; 60; 5 INJECTION, SOLUTION INTRAVENOUS at 09:02

## 2019-02-09 RX ADMIN — METOPROLOL TARTRATE 5 MG: 1 INJECTION, SOLUTION INTRAVENOUS at 09:02

## 2019-02-09 RX ADMIN — METOPROLOL TARTRATE 5 MG: 1 INJECTION, SOLUTION INTRAVENOUS at 02:02

## 2019-02-09 RX ADMIN — HYPROMELLOSE 2910 1 DROP: 5 SOLUTION OPHTHALMIC at 12:02

## 2019-02-09 RX ADMIN — POTASSIUM & SODIUM PHOSPHATES POWDER PACK 280-160-250 MG 2 PACKET: 280-160-250 PACK at 11:02

## 2019-02-09 RX ADMIN — IPRATROPIUM BROMIDE AND ALBUTEROL SULFATE 3 ML: .5; 3 SOLUTION RESPIRATORY (INHALATION) at 12:02

## 2019-02-09 RX ADMIN — CEFTRIAXONE SODIUM 1 G: 1 INJECTION, POWDER, FOR SOLUTION INTRAMUSCULAR; INTRAVENOUS at 12:02

## 2019-02-09 RX ADMIN — LACTOBACILLUS ACIDOPHILUS / LACTOBACILLUS BULGARICUS 1 EACH: 100 MILLION CFU STRENGTH GRANULES at 11:02

## 2019-02-09 RX ADMIN — METOCLOPRAMIDE 10 MG: 5 INJECTION, SOLUTION INTRAMUSCULAR; INTRAVENOUS at 05:02

## 2019-02-09 RX ADMIN — HYDROMORPHONE HYDROCHLORIDE 0.2 MG: 2 INJECTION INTRAMUSCULAR; INTRAVENOUS; SUBCUTANEOUS at 04:02

## 2019-02-09 RX ADMIN — PANTOPRAZOLE SODIUM 40 MG: 40 INJECTION, POWDER, LYOPHILIZED, FOR SOLUTION INTRAVENOUS at 09:02

## 2019-02-09 RX ADMIN — LORAZEPAM 1 MG: 2 INJECTION INTRAMUSCULAR; INTRAVENOUS at 04:02

## 2019-02-09 RX ADMIN — IPRATROPIUM BROMIDE AND ALBUTEROL SULFATE 3 ML: .5; 3 SOLUTION RESPIRATORY (INHALATION) at 08:02

## 2019-02-09 RX ADMIN — HYDROXYZINE HYDROCHLORIDE 25 MG: 10 SYRUP ORAL at 09:02

## 2019-02-09 RX ADMIN — LAMOTRIGINE 50 MG: 25 TABLET ORAL at 08:02

## 2019-02-09 RX ADMIN — METOPROLOL TARTRATE 5 MG: 1 INJECTION, SOLUTION INTRAVENOUS at 03:02

## 2019-02-09 RX ADMIN — METOPROLOL TARTRATE 5 MG: 1 INJECTION, SOLUTION INTRAVENOUS at 08:02

## 2019-02-09 RX ADMIN — DEXTROSE MONOHYDRATE 50 ML: 25 INJECTION, SOLUTION INTRAVENOUS at 08:02

## 2019-02-09 RX ADMIN — ONDANSETRON 4 MG: 2 INJECTION INTRAMUSCULAR; INTRAVENOUS at 03:02

## 2019-02-09 RX ADMIN — LAMOTRIGINE 50 MG: 25 TABLET ORAL at 09:02

## 2019-02-09 RX ADMIN — INSULIN ASPART 4 UNITS: 100 INJECTION, SOLUTION INTRAVENOUS; SUBCUTANEOUS at 12:02

## 2019-02-09 RX ADMIN — AMITRIPTYLINE HYDROCHLORIDE 50 MG: 25 TABLET, FILM COATED ORAL at 08:02

## 2019-02-09 RX ADMIN — LIDOCAINE 1 PATCH: 50 PATCH TOPICAL at 12:02

## 2019-02-09 RX ADMIN — HYDROXYZINE HYDROCHLORIDE 25 MG: 10 SYRUP ORAL at 08:02

## 2019-02-09 RX ADMIN — IPRATROPIUM BROMIDE AND ALBUTEROL SULFATE 3 ML: .5; 3 SOLUTION RESPIRATORY (INHALATION) at 04:02

## 2019-02-09 RX ADMIN — HYDROXYZINE HYDROCHLORIDE 25 MG: 10 SYRUP ORAL at 03:02

## 2019-02-09 RX ADMIN — LACTOBACILLUS ACIDOPHILUS / LACTOBACILLUS BULGARICUS 1 EACH: 100 MILLION CFU STRENGTH GRANULES at 08:02

## 2019-02-09 RX ADMIN — INSULIN DETEMIR 25 UNITS: 100 INJECTION, SOLUTION SUBCUTANEOUS at 09:02

## 2019-02-09 RX ADMIN — HYPROMELLOSE 2910 1 DROP: 5 SOLUTION OPHTHALMIC at 09:02

## 2019-02-09 RX ADMIN — ENOXAPARIN SODIUM 40 MG: 100 INJECTION SUBCUTANEOUS at 04:02

## 2019-02-09 NOTE — PROGRESS NOTES
[facility] General Surgery  Neuroendocrine Tumor Service  Progress Note     Admission Date: 1/25/2019  Hospital Length of Stay: 15  Principal Problem: Malignant carcinoid tumor of pancreas     Subjective:      Operation:  62 yo F w/ PNET, s/p reversal of Sandeep-en-Y w/ Jtube placement  POD#15    1/25/18  Ex lap, gastro-gastrostomy EEA with 33 mm circular stapler, Pyloroplasty and gastrostomy closure, enteric resection with side to side to anstamosis and restoration of small bowel continuity using Idrive tan load stapler, Liver US, Liver biopsy, Liver resection segment 3, Supraceliac lymphadenectomy, FNA pancreas, Lt salpingo oopherectomy, Meckel diverticulectomy, Feeding jejunostomy 14 Fr malecot, ICG perfusion and Infrared exam of all anastamosis    Interval History:     NAEON.   More alert andtalkative  Needs speech eval again  jtube unclogged  Gettingg TF 20 cc.hr    Scheduled Meds:   albuterol-ipratropium  3 mL Nebulization Q4H    amitriptyline  50 mg Oral QHS    artificial tears  1 drop Both Eyes QID    cefTRIAXone (ROCEPHIN) IVPB  1 g Intravenous Q24H    enoxaparin  40 mg Subcutaneous Daily    hydrOXYzine  25 mg Per J Tube TID    insulin aspart U-100  1-10 Units Subcutaneous 6 times per day    insulin detemir U-100  25 Units Subcutaneous Daily    lamoTRIgine  50 mg Oral BID    metoclopramide HCl  10 mg Intravenous Q6H    metoprolol  5 mg Intravenous Q6H    pantoprazole  40 mg Intravenous Daily    potassium, sodium phosphates  2 packet Per J Tube Once     Continuous Infusions:   Amino acid 5% - dextrose 15% (CLINIMIX-E) solution with additives (1L  provides 510 kcal/L dextrose, with 50 gm AA, 150 gm CHO, Na 35, K 30, Mg 5, Ca 4.5, Acetate 80, Cl 39, Phos 15) 20 mL/hr at 02/09/19 0942     PRN Meds:.sodium chloride, acetaminophen, albuterol sulfate, diphenhydrAMINE, glucagon (human recombinant), hydrALAZINE, HYDROmorphone, ipratropium, labetalol, lorazepam, naloxone, ondansetron,  sodium chloride 0.9%, sodium chloride 3%    Objective:      Temp:  [98.7 °F (37.1 °C)-100.4 °F (38 °C)] 98.7 °F (37.1 °C)  Pulse:  [] 104  Resp:  [14-47] 18  SpO2:  [90 %-100 %] 100 %  BP: (103-159)/(51-99) 157/70  Weight change: 0.5 kg (1 lb 1.6 oz)    Intake/Output Summary (Last 24 hours) at 2/9/2019 0948  Last data filed at 2/9/2019 0800  Gross per 24 hour   Intake 3205.47 ml   Output 2180 ml   Net 1025.47 ml       Lines:  RIJ TLC  J tube  Barney    Physical Exam:  GEN: extubated, NAD  CV: RRR  PULM: unlabored, with NC 4 L  ABD: S/NT/ ND, incision c/some superior and middle scant drainage, Jtube in place   EXT: Peripheral pulses present and equal bilaterally    Recent Labs   Lab 02/09/19  0520      K 4.0   CO2 22*   *   BUN 10   CREATININE 0.6   CALCIUM 7.0*   PROT 5.8*   AST 27   ALT 19   ALKPHOS 187*   BILITOT 0.7     Recent Labs   Lab 02/09/19  0520   WBC 10.36   HGB 7.9*   HCT 26.0*   *       Significant Imaging: I have reviewed all pertinent imaging results/findings within the past 24 hours.     Assessment and Plan:   62 yo F w/ PNET, s/p reversal of Sandeep-en-Y w/ Jtube placement  POD#15    1/25/18  Ex lap, gastro-gastrostomy EEA with 33 mm circular stapler, Pyloroplasty and gastrostomy closure, enteric resection with side to side to anstamosis and restoration of small bowel continuity using Idrive tan load stapler, Liver US, Liver biopsy, Liver resection segment 3, Supraceliac lymphadenectomy, FNA pancreas, Lt salpingo oopherectomy, Meckel diverticulectomy, Feeding jejunostomy 14 Fr malecot, ICG perfusion and Infrared exam of all anastamosis    Neuro - No sedation and AOx2. On Cymbalta.  TPN - Continue NPO, continue TPN/lipids, cont TFs advance slowly, repeat speech  ID - cont rocephin, adding probiotics, adding fluconazole  Heme: H/H stable  Pulm - On 6L NC. Aggressive chest CPT and acapella. ABGs. Resp Cx growing E Coli.   FEN/GI - NPO, TPN, TFs  Renal - Cr good, Barney for HD  monitoring, good UOP, cont whitlock  PPX - continue lovenox, protonix, scd in place  Dispo - Remains in critical condition cont ICU care    Trudi Olivier MD - [facility]

## 2019-02-09 NOTE — PLAN OF CARE
Problem: SLP Goal  Goal: SLP Goal  Short Term Goals:  1. Pt will participate in BSS to determine least restrictive diet.-ongoing     Outcome: Ongoing (interventions implemented as appropriate)  2/9: Pt seen for ongoing swallow assessment-- pt continues with poor management of secretions and continues to demonstrate overt s/s of aspiration c/b overt wet, gurgly vocal quality and delayed (weak) coughing/choking s/p swallow thin liquids, nectar thick liquids and puree textures. Pt with difficulty clearing suspected residuals, as pt continues to demonstrated wet vocal quality.   SLP recs: Con't strict NPO with frequent, excellent oral care. SLP will continue to follow for ongoing swallow eval. SLP notified CHANELLE Brooks and MD Olivier of results/recs.  CECE Rachel., CCC-SLP  Speech-Language Pathologist

## 2019-02-09 NOTE — PLAN OF CARE
"Problem: Adult Inpatient Plan of Care  Goal: Plan of Care Review  Outcome: Ongoing (interventions implemented as appropriate)   has shown some improvement today, she is awake and oriented to self. She has remained hemodynamically stable, low grade temps, sinus rhythm/sinus tach on monitor, BP stable. Resp unlabored, cough improving, but still requires a lot of "coaching" to cough and deep breathe. Voice is getting stronger but remains somewhat gurgly. Taken to IR today for Jtube declogging successful.       "

## 2019-02-09 NOTE — PLAN OF CARE
Problem: Adult Inpatient Plan of Care  Goal: Plan of Care Review  Outcome: Ongoing (interventions implemented as appropriate)  Pt's sister at bedside, both updated on POC and questions and concerns addressed. Pt had 2 BMs today. Bed in chair position, pt repositions self, help given when needed, no skin breakdown noted. Speech evaled pt again today, still not cleared. Barney in place, UO adequate. TPN infusing. Tube feeds at 20/hr infused for a few hrs until c/o nausea unrelieved with Zofran. Ativan given 1x for anxiety. VSS, no acute events. WCTM.

## 2019-02-09 NOTE — PT/OT/SLP PROGRESS
"Speech Language Pathology Treatment    Patient Name:  Kaylee Ngo   MRN:  [id number]  Admitting Diagnosis: Malignant carcinoid tumor of pancreas    Recommendations:                 General Recommendations:  ongoing swallow assessment-- SLP believes pt would benefit from deep suctioning treatment   Diet recommendations:  NPO, Liquid Diet Level: NPO   Aspiration Precautions: Frequent oral care and Strict aspiration precautions   General Precautions: Standard, aspiration, fall, NPO, respiratory  Communication strategies:  go to room if call light pushed    Subjective     Pt found in bed awake and agreeable to participate in ongoing swallow assessment. SLP confirmed with RN prior to entry. Pt observed with tangential/perseverative speech throughout the session, which required mod redirection from SLP to attend to given tasks.     Patient goals: Pt continuously stated "They gave me something for my migraine yesterday and I've been afraid of doing much ever since" SLP asked for further detail; however, pt would not provide further explanation of what she was afraid of doing.     Objective:     Has the patient been evaluated by SLP for swallowing?   Yes  Keep patient NPO? Yes   Current Respiratory Status: nasal cannula      Pt seen for ongoing swallow assessment-- pt continues with poor management of secretions, as pt observed with wet vocal quality prior to PO trials. SLP instructed pt to perform throat clearing techniques. Pt with limited success, as pt continued with wet vocal quality. Pt also continues to demonstrate overt s/s of aspiration c/b overt wet, gurgly vocal quality and delayed (weak) coughing/choking s/p swallow thin liquids x2, nectar thick liquids x1 and puree textures x1. Pt with difficulty clearing suspected residuals, as pt continues to demonstrated wet vocal quality. Pt also with dcr'd laryngeal elevation/excursion, per hand palpation, as well as loud, audible swallow across all PO trials. "     Assessment:     Kaylee Ngo is a 63 y.o. female with an SLP diagnosis of Dysphagia and Aphonia.  She continues to present with poor management of secretions, weak, non-productive cough and overt s/s of aspiration c/b overt wet, gurgly vocal quality and delayed coughing/choking s/p swallow across all PO trials.   SLP recs: Con't strict NPO with frequent, excellent oral care. SLP believes pt would benefit from deep suctioning treatment. SLP will continue to follow for ongoing swallow eval. SLP notified CHANELLE Brooks and MD Olivier of results/recs.     Goals:   Multidisciplinary Problems     SLP Goals        Problem: SLP Goal    Goal Priority Disciplines Outcome   SLP Goal     SLP Ongoing (interventions implemented as appropriate)   Description:  Short Term Goals:  1. Pt will participate in BSS to determine least restrictive diet.-ongoing                       Plan:     · Patient to be seen:  3 x/week   · Plan of Care expires:  03/08/19  · Plan of Care reviewed with:  patient(CHANELLE Brooks)   · SLP Follow-Up:  Yes       Discharge recommendations:  nursing facility, skilled   Barriers to Discharge:  Level of Skilled Assistance Needed , and Safety Awareness impaired    Time Tracking:     SLP Treatment Date:   02/09/19  Speech Start Time:  1005  Speech Stop Time:  1015     Speech Total Time (min):  10 min    Billable Minutes: Treatment Swallowing Dysfunction 10    QUIANA Rachel, ARIEL-SLP  02/09/2019

## 2019-02-09 NOTE — PLAN OF CARE
Problem: Adult Inpatient Plan of Care  Goal: Plan of Care Review  Outcome: Ongoing (interventions implemented as appropriate)  Pt maintaining sats on 2L NC. Pt afebrile all shift. Pt turned q2hrs. Midline incision dressing monitored for drainage. Pt's j-tube patent flushed per protocol. Q4hrs Blood glucose monitoring in progress. Bed locked in lowest position, and bed alarm active. Will continue to monitor.

## 2019-02-10 LAB
ALBUMIN SERPL BCP-MCNC: 2.7 G/DL
ALP SERPL-CCNC: 222 U/L
ALT SERPL W/O P-5'-P-CCNC: 19 U/L
ANION GAP SERPL CALC-SCNC: 6 MMOL/L
ANISOCYTOSIS BLD QL SMEAR: SLIGHT
AST SERPL-CCNC: 24 U/L
BASOPHILS # BLD AUTO: 0.02 K/UL
BASOPHILS NFR BLD: 0.2 %
BILIRUB SERPL-MCNC: 0.7 MG/DL
BUN SERPL-MCNC: 10 MG/DL
CALCIUM SERPL-MCNC: 7.3 MG/DL
CHLORIDE SERPL-SCNC: 109 MMOL/L
CO2 SERPL-SCNC: 26 MMOL/L
CREAT SERPL-MCNC: 0.6 MG/DL
DIFFERENTIAL METHOD: ABNORMAL
EOSINOPHIL # BLD AUTO: 0.2 K/UL
EOSINOPHIL NFR BLD: 2.1 %
ERYTHROCYTE [DISTWIDTH] IN BLOOD BY AUTOMATED COUNT: 16.6 %
EST. GFR  (AFRICAN AMERICAN): >60 ML/MIN/1.73 M^2
EST. GFR  (NON AFRICAN AMERICAN): >60 ML/MIN/1.73 M^2
GLUCOSE SERPL-MCNC: 132 MG/DL
HCT VFR BLD AUTO: 27.2 %
HGB BLD-MCNC: 8.3 G/DL
HYPOCHROMIA BLD QL SMEAR: ABNORMAL
LYMPHOCYTES # BLD AUTO: 1.2 K/UL
LYMPHOCYTES NFR BLD: 12.7 %
MAGNESIUM SERPL-MCNC: 1.8 MG/DL
MCH RBC QN AUTO: 28.5 PG
MCHC RBC AUTO-ENTMCNC: 30.5 G/DL
MCV RBC AUTO: 94 FL
MONOCYTES # BLD AUTO: 0.5 K/UL
MONOCYTES NFR BLD: 5.3 %
NEUTROPHILS # BLD AUTO: 7.8 K/UL
NEUTROPHILS NFR BLD: 79.7 %
PHOSPHATE SERPL-MCNC: 2 MG/DL
PLATELET # BLD AUTO: 960 K/UL
PLATELET BLD QL SMEAR: ABNORMAL
PMV BLD AUTO: 10 FL
POCT GLUCOSE: 117 MG/DL (ref 70–110)
POCT GLUCOSE: 164 MG/DL (ref 70–110)
POCT GLUCOSE: 199 MG/DL (ref 70–110)
POCT GLUCOSE: 225 MG/DL (ref 70–110)
POCT GLUCOSE: 244 MG/DL (ref 70–110)
POCT GLUCOSE: 48 MG/DL (ref 70–110)
POCT GLUCOSE: 49 MG/DL (ref 70–110)
POCT GLUCOSE: 80 MG/DL (ref 70–110)
POCT GLUCOSE: 96 MG/DL (ref 70–110)
POIKILOCYTOSIS BLD QL SMEAR: SLIGHT
POLYCHROMASIA BLD QL SMEAR: ABNORMAL
POTASSIUM SERPL-SCNC: 4.1 MMOL/L
PROT SERPL-MCNC: 5.9 G/DL
RBC # BLD AUTO: 2.91 M/UL
SODIUM SERPL-SCNC: 141 MMOL/L
STOMATOCYTES BLD QL SMEAR: PRESENT
WBC # BLD AUTO: 9.78 K/UL

## 2019-02-10 PROCEDURE — 94640 AIRWAY INHALATION TREATMENT: CPT

## 2019-02-10 PROCEDURE — 80053 COMPREHEN METABOLIC PANEL: CPT

## 2019-02-10 PROCEDURE — 94761 N-INVAS EAR/PLS OXIMETRY MLT: CPT

## 2019-02-10 PROCEDURE — 25000003 PHARM REV CODE 250: Performed by: STUDENT IN AN ORGANIZED HEALTH CARE EDUCATION/TRAINING PROGRAM

## 2019-02-10 PROCEDURE — 25000242 PHARM REV CODE 250 ALT 637 W/ HCPCS: Performed by: SURGERY

## 2019-02-10 PROCEDURE — 84100 ASSAY OF PHOSPHORUS: CPT

## 2019-02-10 PROCEDURE — 63600175 PHARM REV CODE 636 W HCPCS: Performed by: STUDENT IN AN ORGANIZED HEALTH CARE EDUCATION/TRAINING PROGRAM

## 2019-02-10 PROCEDURE — 97530 THERAPEUTIC ACTIVITIES: CPT

## 2019-02-10 PROCEDURE — 92526 ORAL FUNCTION THERAPY: CPT

## 2019-02-10 PROCEDURE — C9113 INJ PANTOPRAZOLE SODIUM, VIA: HCPCS | Performed by: SURGERY

## 2019-02-10 PROCEDURE — 27000221 HC OXYGEN, UP TO 24 HOURS

## 2019-02-10 PROCEDURE — 85025 COMPLETE CBC W/AUTO DIFF WBC: CPT

## 2019-02-10 PROCEDURE — 94664 DEMO&/EVAL PT USE INHALER: CPT

## 2019-02-10 PROCEDURE — 63600175 PHARM REV CODE 636 W HCPCS: Performed by: SURGERY

## 2019-02-10 PROCEDURE — 20000000 HC ICU ROOM

## 2019-02-10 PROCEDURE — 25000003 PHARM REV CODE 250: Performed by: SURGERY

## 2019-02-10 PROCEDURE — 99900035 HC TECH TIME PER 15 MIN (STAT)

## 2019-02-10 PROCEDURE — 83735 ASSAY OF MAGNESIUM: CPT

## 2019-02-10 PROCEDURE — 25000003 PHARM REV CODE 250: Performed by: NURSE PRACTITIONER

## 2019-02-10 PROCEDURE — 97110 THERAPEUTIC EXERCISES: CPT

## 2019-02-10 PROCEDURE — 94668 MNPJ CHEST WALL SBSQ: CPT

## 2019-02-10 RX ORDER — NAPROXEN SODIUM 220 MG/1
81 TABLET, FILM COATED ORAL DAILY
Status: DISCONTINUED | OUTPATIENT
Start: 2019-02-10 | End: 2019-02-19

## 2019-02-10 RX ORDER — ACETAMINOPHEN 650 MG/20.3ML
650 LIQUID ORAL EVERY 4 HOURS PRN
Status: DISCONTINUED | OUTPATIENT
Start: 2019-02-10 | End: 2019-02-26 | Stop reason: HOSPADM

## 2019-02-10 RX ORDER — MIRTAZAPINE 15 MG/1
15 TABLET, ORALLY DISINTEGRATING ORAL NIGHTLY
Status: DISCONTINUED | OUTPATIENT
Start: 2019-02-10 | End: 2019-02-11

## 2019-02-10 RX ADMIN — LIDOCAINE 1 PATCH: 50 PATCH TOPICAL at 12:02

## 2019-02-10 RX ADMIN — METOCLOPRAMIDE 10 MG: 5 INJECTION, SOLUTION INTRAMUSCULAR; INTRAVENOUS at 05:02

## 2019-02-10 RX ADMIN — LAMOTRIGINE 50 MG: 25 TABLET ORAL at 08:02

## 2019-02-10 RX ADMIN — FLUCONAZOLE 200 MG: 40 POWDER, FOR SUSPENSION ORAL at 10:02

## 2019-02-10 RX ADMIN — IPRATROPIUM BROMIDE AND ALBUTEROL SULFATE 3 ML: .5; 3 SOLUTION RESPIRATORY (INHALATION) at 08:02

## 2019-02-10 RX ADMIN — LACTOBACILLUS ACIDOPHILUS / LACTOBACILLUS BULGARICUS 1 EACH: 100 MILLION CFU STRENGTH GRANULES at 08:02

## 2019-02-10 RX ADMIN — PANTOPRAZOLE SODIUM 40 MG: 40 INJECTION, POWDER, LYOPHILIZED, FOR SOLUTION INTRAVENOUS at 08:02

## 2019-02-10 RX ADMIN — IPRATROPIUM BROMIDE AND ALBUTEROL SULFATE 3 ML: .5; 3 SOLUTION RESPIRATORY (INHALATION) at 11:02

## 2019-02-10 RX ADMIN — CEFTRIAXONE SODIUM 1 G: 1 INJECTION, POWDER, FOR SOLUTION INTRAMUSCULAR; INTRAVENOUS at 02:02

## 2019-02-10 RX ADMIN — ONDANSETRON 4 MG: 2 INJECTION INTRAMUSCULAR; INTRAVENOUS at 08:02

## 2019-02-10 RX ADMIN — METOPROLOL TARTRATE 5 MG: 1 INJECTION, SOLUTION INTRAVENOUS at 08:02

## 2019-02-10 RX ADMIN — METOPROLOL TARTRATE 5 MG: 1 INJECTION, SOLUTION INTRAVENOUS at 02:02

## 2019-02-10 RX ADMIN — DEXTROSE MONOHYDRATE 25 G: 25 INJECTION, SOLUTION INTRAVENOUS at 10:02

## 2019-02-10 RX ADMIN — HYDROMORPHONE HYDROCHLORIDE 0.2 MG: 2 INJECTION INTRAMUSCULAR; INTRAVENOUS; SUBCUTANEOUS at 10:02

## 2019-02-10 RX ADMIN — LAMOTRIGINE 150 MG: 100 TABLET ORAL at 08:02

## 2019-02-10 RX ADMIN — MIRTAZAPINE 15 MG: 15 TABLET, ORALLY DISINTEGRATING ORAL at 08:02

## 2019-02-10 RX ADMIN — INSULIN ASPART 4 UNITS: 100 INJECTION, SOLUTION INTRAVENOUS; SUBCUTANEOUS at 12:02

## 2019-02-10 RX ADMIN — METOPROLOL TARTRATE 5 MG: 1 INJECTION, SOLUTION INTRAVENOUS at 07:02

## 2019-02-10 RX ADMIN — IPRATROPIUM BROMIDE AND ALBUTEROL SULFATE 3 ML: .5; 3 SOLUTION RESPIRATORY (INHALATION) at 12:02

## 2019-02-10 RX ADMIN — IPRATROPIUM BROMIDE AND ALBUTEROL SULFATE 3 ML: .5; 3 SOLUTION RESPIRATORY (INHALATION) at 04:02

## 2019-02-10 RX ADMIN — METOCLOPRAMIDE 10 MG: 5 INJECTION, SOLUTION INTRAMUSCULAR; INTRAVENOUS at 12:02

## 2019-02-10 RX ADMIN — LORAZEPAM 1 MG: 2 INJECTION INTRAMUSCULAR; INTRAVENOUS at 08:02

## 2019-02-10 RX ADMIN — IPRATROPIUM BROMIDE AND ALBUTEROL SULFATE 3 ML: .5; 3 SOLUTION RESPIRATORY (INHALATION) at 03:02

## 2019-02-10 RX ADMIN — HYDROXYZINE HYDROCHLORIDE 25 MG: 10 SYRUP ORAL at 08:02

## 2019-02-10 RX ADMIN — AMITRIPTYLINE HYDROCHLORIDE 50 MG: 25 TABLET, FILM COATED ORAL at 08:02

## 2019-02-10 RX ADMIN — INSULIN ASPART 4 UNITS: 100 INJECTION, SOLUTION INTRAVENOUS; SUBCUTANEOUS at 08:02

## 2019-02-10 RX ADMIN — ACETAMINOPHEN 650 MG: 650 SOLUTION ORAL at 07:02

## 2019-02-10 RX ADMIN — HYDROXYZINE HYDROCHLORIDE 25 MG: 10 SYRUP ORAL at 02:02

## 2019-02-10 RX ADMIN — ENOXAPARIN SODIUM 40 MG: 100 INJECTION SUBCUTANEOUS at 05:02

## 2019-02-10 RX ADMIN — ASPIRIN 81 MG 81 MG: 81 TABLET ORAL at 10:02

## 2019-02-10 RX ADMIN — HYDROXYZINE HYDROCHLORIDE 25 MG: 10 SYRUP ORAL at 10:02

## 2019-02-10 RX ADMIN — INSULIN DETEMIR 25 UNITS: 100 INJECTION, SOLUTION SUBCUTANEOUS at 08:02

## 2019-02-10 RX ADMIN — SODIUM PHOSPHATE, MONOBASIC, MONOHYDRATE 39.99 MMOL: 276; 142 INJECTION, SOLUTION INTRAVENOUS at 10:02

## 2019-02-10 RX ADMIN — METOCLOPRAMIDE 10 MG: 5 INJECTION, SOLUTION INTRAMUSCULAR; INTRAVENOUS at 11:02

## 2019-02-10 NOTE — PLAN OF CARE
Problem: Adult Inpatient Plan of Care  Goal: Plan of Care Review  Outcome: Ongoing (interventions implemented as appropriate)  Cont to moniotor resp needs

## 2019-02-10 NOTE — PT/OT/SLP PROGRESS
Physical Therapy Treatment    Patient Name:  Kaylee Ngo   MRN:  [id number]    Recommendations:     Discharge Recommendations:  nursing facility, skilled   Discharge Equipment Recommendations: (TBD)   Barriers to discharge: Decreased caregiver support    Assessment:     Kaylee Ngo is a 63 y.o. female admitted with a medical diagnosis of Malignant carcinoid tumor of pancreas.  She presents with the following impairments/functional limitations:  weakness, impaired self care skills, impaired balance, decreased coordination, decreased safety awareness, decreased ROM, impaired skin, impaired endurance, impaired functional mobilty, decreased upper extremity function, gait instability, impaired cognition, decreased lower extremity function, impaired cardiopulmonary response to activity. Pt requires max vc's for encouragement for motivation from PTA & sister to participate in therapy. Pt had an ativan prior tx session which resulted to pt being very drowsy & lethargic during tx session. Pt became more alert upon sitting at EOB /c eyes opened. Cont POC to maximize function, endurance, strength, and tolerance.    Rehab Prognosis: Fair; patient would benefit from acute skilled PT services to address these deficits and reach maximum level of function.    Recent Surgery: Procedure(s) (LRB):  LAPAROTOMY, EXPLORATORY (N/A)  LYMPHADENECTOMY (N/A)  EXCISION, LIVER (N/A) 16 Days Post-Op    Plan:     During this hospitalization, patient to be seen 5 x/week to address the identified rehab impairments via gait training, therapeutic activities, therapeutic exercises and progress toward the following goals:    · Plan of Care Expires:  03/04/19    Subjective     Chief Complaint: that's all I can do. You're going to make me fall.  Patient/Family Comments/goals: Sister present encouraging pt. Sister wants pt to get better.  Pain/Comfort:  · Pain Rating 1: 8/10  · Location - Orientation 1: generalized  · Location 1: abdomen  · Pain  "Addressed 1: Reposition, Distraction  · Pain Rating Post-Intervention 1: 8/10      Objective:     Communicated with nurse Brooks prior to session.  Patient found all lines intact and call button in reach whitlock catheter, oxygen, PEG Tube, telemetry, pulse ox (continuous), PICC line, blood pressure cuff, SCD  upon PT entry to room. Nurse reports pt just received Ativan.    General Precautions: Standard, aspiration, fall, respiratory, NPO   Orthopedic Precautions:N/A   Braces: N/A     Functional Mobility:  · Bed Mobility:     · Scooting: moderate assistance and to EOB  · Supine to Sit: maximal assistance x2 assists and max vc/tc's for initiation & for encouragement. 1st attempt: PTA assisted pt's BLE towards EOB /c Total A, then tried to assist pt to sit up /c assistance to trunk. Pt tried to sit up but resisted. Pt pulled back to resting position. Pt stated, "that's all I can do". Pt tried to refuse afterwards. Sister then present. PTA was able to achieve to get pt to sit at EOB /c sister assistance.  · Sit to Supine: moderate assistance  · Transfers:     · Sit to Stand:  moderate assistance with rolling walker  · Gait: pt took few side-steps towards HOB /c PTA standing in front of pt /c mod A      AM-PAC 6 CLICK MOBILITY  Turning over in bed (including adjusting bedclothes, sheets and blankets)?: 3  Sitting down on and standing up from a chair with arms (e.g., wheelchair, bedside commode, etc.): 3  Moving from lying on back to sitting on the side of the bed?: 2  Moving to and from a bed to a chair (including a wheelchair)?: 2  Need to walk in hospital room?: 2  Climbing 3-5 steps with a railing?: 1  Basic Mobility Total Score: 13       Therapeutic Activities and Exercises:  Increased time spent on encouraging pt to participate in therapy & to have pt to keep eyes open even /c sternum rubs.  Bicep curls in bed x10 reps /c AAROM BUE  Unsupported sitting balance at EOB x15 min /c Mod-Min A /c sister holding pt's L " hand. Vc/tc's to keep head & trunk upright. Pt able to correct /c cues but returns back to flexed position. Performed AA/AROM BLE's therex x10 reps each for strengthening and endurance /c pt sitting at EOB: LAQ, marchnav, and AP.  Stood twice /c RW for <30 sec ea trial /c Mod-Min A for balance. Pt limiting self trying to lower herself back down to sitting position at EOB.    Patient left supine /c HOB raised with all lines intact, call button in reach, nurse notified and nurse present..    GOALS:   Multidisciplinary Problems     Physical Therapy Goals        Problem: Physical Therapy Goal    Goal Priority Disciplines Outcome Goal Variances Interventions   Physical Therapy Goal     PT, PT/OT Ongoing (interventions implemented as appropriate)     Description:  Goals to be met by: 19     Patient will increase functional independence with mobility by performin. Supine <> sit with Stand-by Assistance  2. Sit to stand transfer with Stand-by Assistance  3. Bed to chair transfer with Stand-by Assistance using Rolling Walker  4. Gait  x 50 feet with Stand-by Assistance using Rolling Walker.              Problem: Physical Therapy Goal    Goal Priority Disciplines Outcome Goal Variances Interventions   Physical Therapy Goal     PT, PT/OT Ongoing (interventions implemented as appropriate)     Description:  Goals to be met by: 3/4/2019     Patient will increase functional independence with mobility by performin. Supine to sit with Moderate Assistance MET 2019  2. Sit to supine with Moderate Assistance   3. Rolling to Left and Right with Moderate Assistance.   4. Sitting at edge of bed x10 minutes with Minimal Assistance and Moderate Assistance  4. Lower extremity exercise program x5-10 active reps with assistance as needed    Updated Goals 2019:  1. Supine<>sit with SBA  2. Sit to stand with  CGA  3. Bed to chair with CGA  4. Gait x 20 ft withmin A                       Time Tracking:     PT Received  On: 02/10/19  PT Start Time: 0928     PT Stop Time: 1011  PT Total Time (min): 43 min     Billable Minutes: Therapeutic Activity 30 and Therapeutic Exercise 13    Treatment Type: Treatment  PT/PTA: PTA     PTA Visit Number: 1     Cecilia Quintana PTA  02/10/2019

## 2019-02-10 NOTE — NURSING
Pt had  1 episode of low blood sugar BG 56, pt  was asymptomatic. No PRN D50 for IV in the pt MAR. MD ( Dr Olivier) was immediately called, she ordered D50 per telephone order).  Pt was given D50, 15 minutes later BG was rechecked 154.

## 2019-02-10 NOTE — PLAN OF CARE
Problem: Adult Inpatient Plan of Care  Goal: Plan of Care Review  Outcome: Ongoing (interventions implemented as appropriate)   02/10/19 0630   Plan of Care Review   Plan of Care Reviewed With patient   Progress improving     Pt AAOx4, VSS, 2 L NC, NPO, very calm during the shift and was able to sleep between care, denies pain or discomfort, has a very weak cough and inability to effectively clear secretions,  had one episode of asymptomatic hypoglycemia during the shift, she is currently on TPN @ 20 ml/hr and tube feeding via J tube @ 20 ml/hr. Abd is non distended, incision, mid abdominal, small to moderate prurulent drainage, new dressing applied this morning. Barney on ( good urinary output), no BM during my shift. All safely precautions in place, pt was frequently repositioned.

## 2019-02-10 NOTE — PROGRESS NOTES
[facility] General Surgery  Neuroendocrine Tumor Service  Progress Note     Admission Date: 1/25/2019  Hospital Length of Stay: 16  Principal Problem: Malignant carcinoid tumor of pancreas     Subjective:      Operation:  62 yo F w/ PNET, s/p reversal of Sandeep-en-Y w/ Jtube placement  POD#15    1/25/18  Ex lap, gastro-gastrostomy EEA with 33 mm circular stapler, Pyloroplasty and gastrostomy closure, enteric resection with side to side to anstamosis and restoration of small bowel continuity using Idrive tan load stapler, Liver US, Liver biopsy, Liver resection segment 3, Supraceliac lymphadenectomy, FNA pancreas, Lt salpingo oopherectomy, Meckel diverticulectomy, Feeding jejunostomy 14 Fr malecot, ICG perfusion and Infrared exam of all anastamosis    Interval History:     NAEON.   More alert and talkative  Needs speech eval again  jtube unclogged  Gettingg TF 20 cc.hr and TPN at 20 cc.hr    Scheduled Meds:   albuterol-ipratropium  3 mL Nebulization Q4H    amitriptyline  50 mg Oral QHS    cefTRIAXone (ROCEPHIN) IVPB  1 g Intravenous Q24H    enoxaparin  40 mg Subcutaneous Daily    fluconazole 40 mg/ml  200 mg Per J Tube Daily    hydrOXYzine  25 mg Per J Tube TID    insulin aspart U-100  1-10 Units Subcutaneous 6 times per day    insulin detemir U-100  25 Units Subcutaneous Daily    lactobacillus acidophilus & bulgar  1 packet Per J Tube BID    lamoTRIgine  50 mg Oral BID    lidocaine  1 patch Transdermal Q24H    metoclopramide HCl  10 mg Intravenous Q6H    metoprolol  5 mg Intravenous Q6H    pantoprazole  40 mg Intravenous Daily    sodium phosphate IVPB  39.99 mmol Intravenous Once     Continuous Infusions:   Amino acid 5% - dextrose 15% (CLINIMIX-E) solution with additives (1L  provides 510 kcal/L dextrose, with 50 gm AA, 150 gm CHO, Na 35, K 30, Mg 5, Ca 4.5, Acetate 80, Cl 39, Phos 15) 20 mL/hr at 02/10/19 0730     PRN Meds:.sodium chloride, acetaminophen, albuterol sulfate,  artificial tears, dextrose 50%, diphenhydrAMINE, glucagon (human recombinant), hydrALAZINE, HYDROmorphone, ipratropium, labetalol, lorazepam, naloxone, ondansetron, sodium chloride 0.9%, sodium chloride 3%    Objective:      Temp:  [97.9 °F (36.6 °C)-99.1 °F (37.3 °C)] 99.1 °F (37.3 °C)  Pulse:  [] 99  Resp:  [14-32] 14  SpO2:  [87 %-100 %] 99 %  BP: (106-166)/(51-81) 139/63  Weight change: -1.5 kg (-4.9 oz)    Intake/Output Summary (Last 24 hours) at 2/10/2019 0839  Last data filed at 2/10/2019 0730  Gross per 24 hour   Intake 876 ml   Output 2095 ml   Net -1219 ml       Lines:  RIJ TLC  J tube  Barney    Physical Exam:  GEN: extubated, NAD  CV: RRR  PULM: unlabored, with NC 4 L  ABD: S/NT/ ND, incision c/some superior and middle scant drainage, Jtube in place   EXT: Peripheral pulses present and equal bilaterally    Recent Labs   Lab 02/10/19  0527      K 4.1   CO2 26      BUN 10   CREATININE 0.6   CALCIUM 7.3*   PROT 5.9*   AST 24   ALT 19   ALKPHOS 222*   BILITOT 0.7     Recent Labs   Lab 02/10/19  0527   WBC 9.78   HGB 8.3*   HCT 27.2*   *       Significant Imaging: I have reviewed all pertinent imaging results/findings within the past 24 hours.     Assessment and Plan:   62 yo F w/ PNET, s/p reversal of Sandeep-en-Y w/ Jtube placement  POD#15    1/25/18  Ex lap, gastro-gastrostomy EEA with 33 mm circular stapler, Pyloroplasty and gastrostomy closure, enteric resection with side to side to anstamosis and restoration of small bowel continuity using Idrive tan load stapler, Liver US, Liver biopsy, Liver resection segment 3, Supraceliac lymphadenectomy, FNA pancreas, Lt salpingo oopherectomy, Meckel diverticulectomy, Feeding jejunostomy 14 Fr malecot, ICG perfusion and Infrared exam of all anastamosis    Neuro - No sedation and AOx3. On Cymbalta.  TPN - Continue NPO, continue TPN/lipids will likely stop TPN, cont TFs advance slowly, repeat speech  ID - cont rocephin, adding probiotics, adding  fluconazole  Heme: H/H stable  Pulm - On 4L NC. Aggressive chest CPT and acapella. ABGs. Resp Cx growing E Coli.   FEN/GI - NPO, TPN, TFs  Renal - Cr good, Whitlock for HD monitoring, good UOP, cont whitlock  PPX - continue lovenox, protonix, scd in place  Dispo - Remains in critical condition cont ICU care    Nash Titus MD - 2  [phone]

## 2019-02-10 NOTE — PLAN OF CARE
Problem: SLP Goal  Goal: SLP Goal  Short Term Goals:  1. Pt will participate in BSS to determine least restrictive diet.-ongoing  2. Pt will successfully participate in Modified Barium Swallow study to radiographically assess swallow function, rule out aspiration and determine safest/least restrictive diet.        Outcome: Ongoing (interventions implemented as appropriate)  2/10: Pt seen for ongoing swallow eval. Pt continues to demonstrate overt wet, gurgly s/p swallow limited trials of thin and nectar thick liquids, which SLP suspects 2/2 dcr'd laryngeal elevation/excursion noted per palpation. pt also with delayed (weak) coughing s/p swallow both trials of liquids, with limited success in clearing suspected residuals, as pt still demonstrated overt wet vocal quality. SLP recs: Con't NPO and alternative means of nutrition/hydration and medication via TF at this time-- Pt is still not safe for an oral diet. However, given pt's improvement in mentation and overall status, SLP believes pt would benefit from participating in Modified Barium Swallow Study to objectively r/o aspiration and determine safest PO diet.. SLP notified CHANELLE Brooks and MD Titus of results/recs. MD Titus in agreement with SLP recs for POC.   CECE Rachel., CCC-SLP  Speech-Language Pathologist

## 2019-02-10 NOTE — PLAN OF CARE
Problem: Physical Therapy Goal  Goal: Physical Therapy Goal  Goals to be met by: 3/4/2019     Patient will increase functional independence with mobility by performin. Supine to sit with Moderate Assistance MET 2019  2. Sit to supine with Moderate Assistance   3. Rolling to Left and Right with Moderate Assistance.   4. Sitting at edge of bed x10 minutes with Minimal Assistance and Moderate Assistance  4. Lower extremity exercise program x5-10 active reps with assistance as needed    Updated Goals 2019:  1. Supine<>sit with SBA  2. Sit to stand with  CGA  3. Bed to chair with CGA  4. Gait x 20 ft withmin A      Outcome: Ongoing (interventions implemented as appropriate)  Pt progressing fairly /c therapy. Pt requires max vc's for encouragement throughout tx session from PTA and sister. Pt took few side-steps at EOB to HOB /c Mod A & stood twice /c RW <30 sec Mod A this date. Cont POC to progress pt.

## 2019-02-11 LAB
ALBUMIN SERPL BCP-MCNC: 2.6 G/DL
ALP SERPL-CCNC: 195 U/L
ALT SERPL W/O P-5'-P-CCNC: 14 U/L
ANION GAP SERPL CALC-SCNC: 7 MMOL/L
AST SERPL-CCNC: 19 U/L
BASOPHILS # BLD AUTO: 0.03 K/UL
BASOPHILS NFR BLD: 0.4 %
BILIRUB SERPL-MCNC: 0.6 MG/DL
BUN SERPL-MCNC: 9 MG/DL
CALCIUM SERPL-MCNC: 7.5 MG/DL
CHLORIDE SERPL-SCNC: 108 MMOL/L
CO2 SERPL-SCNC: 26 MMOL/L
CREAT SERPL-MCNC: 0.6 MG/DL
DIFFERENTIAL METHOD: ABNORMAL
EOSINOPHIL # BLD AUTO: 0.2 K/UL
EOSINOPHIL NFR BLD: 1.9 %
ERYTHROCYTE [DISTWIDTH] IN BLOOD BY AUTOMATED COUNT: 18.5 %
EST. GFR  (AFRICAN AMERICAN): >60 ML/MIN/1.73 M^2
EST. GFR  (NON AFRICAN AMERICAN): >60 ML/MIN/1.73 M^2
GLUCOSE SERPL-MCNC: 169 MG/DL
HCT VFR BLD AUTO: 26.1 %
HGB BLD-MCNC: 7.8 G/DL
LYMPHOCYTES # BLD AUTO: 1.2 K/UL
LYMPHOCYTES NFR BLD: 14.4 %
MAGNESIUM SERPL-MCNC: 1.8 MG/DL
MCH RBC QN AUTO: 28.6 PG
MCHC RBC AUTO-ENTMCNC: 29.9 G/DL
MCV RBC AUTO: 96 FL
MONOCYTES # BLD AUTO: 0.6 K/UL
MONOCYTES NFR BLD: 7.1 %
NEUTROPHILS # BLD AUTO: 6.3 K/UL
NEUTROPHILS NFR BLD: 76.2 %
PHOSPHATE SERPL-MCNC: 2.1 MG/DL
PLATELET # BLD AUTO: 955 K/UL
PMV BLD AUTO: 10.4 FL
POCT GLUCOSE: 108 MG/DL (ref 70–110)
POCT GLUCOSE: 114 MG/DL (ref 70–110)
POCT GLUCOSE: 124 MG/DL (ref 70–110)
POCT GLUCOSE: 150 MG/DL (ref 70–110)
POCT GLUCOSE: 161 MG/DL (ref 70–110)
POCT GLUCOSE: 175 MG/DL (ref 70–110)
POCT GLUCOSE: 235 MG/DL (ref 70–110)
POTASSIUM SERPL-SCNC: 3.8 MMOL/L
PROT SERPL-MCNC: 5.8 G/DL
RBC # BLD AUTO: 2.73 M/UL
SODIUM SERPL-SCNC: 141 MMOL/L
WBC # BLD AUTO: 8.29 K/UL

## 2019-02-11 PROCEDURE — 94664 DEMO&/EVAL PT USE INHALER: CPT

## 2019-02-11 PROCEDURE — 94640 AIRWAY INHALATION TREATMENT: CPT

## 2019-02-11 PROCEDURE — 63600175 PHARM REV CODE 636 W HCPCS: Performed by: SURGERY

## 2019-02-11 PROCEDURE — 97535 SELF CARE MNGMENT TRAINING: CPT

## 2019-02-11 PROCEDURE — 25000003 PHARM REV CODE 250: Performed by: SURGERY

## 2019-02-11 PROCEDURE — S5571 INSULIN DISPOS PEN 3 ML: HCPCS | Performed by: STUDENT IN AN ORGANIZED HEALTH CARE EDUCATION/TRAINING PROGRAM

## 2019-02-11 PROCEDURE — 84100 ASSAY OF PHOSPHORUS: CPT

## 2019-02-11 PROCEDURE — 94668 MNPJ CHEST WALL SBSQ: CPT

## 2019-02-11 PROCEDURE — 25000003 PHARM REV CODE 250: Performed by: STUDENT IN AN ORGANIZED HEALTH CARE EDUCATION/TRAINING PROGRAM

## 2019-02-11 PROCEDURE — 83735 ASSAY OF MAGNESIUM: CPT

## 2019-02-11 PROCEDURE — 25000242 PHARM REV CODE 250 ALT 637 W/ HCPCS: Performed by: SURGERY

## 2019-02-11 PROCEDURE — 20000000 HC ICU ROOM

## 2019-02-11 PROCEDURE — 63600175 PHARM REV CODE 636 W HCPCS: Performed by: STUDENT IN AN ORGANIZED HEALTH CARE EDUCATION/TRAINING PROGRAM

## 2019-02-11 PROCEDURE — 85025 COMPLETE CBC W/AUTO DIFF WBC: CPT

## 2019-02-11 PROCEDURE — 63600175 PHARM REV CODE 636 W HCPCS: Performed by: RADIOLOGY

## 2019-02-11 PROCEDURE — C9113 INJ PANTOPRAZOLE SODIUM, VIA: HCPCS | Performed by: SURGERY

## 2019-02-11 PROCEDURE — 27000221 HC OXYGEN, UP TO 24 HOURS

## 2019-02-11 PROCEDURE — 25500020 PHARM REV CODE 255: Performed by: SURGERY

## 2019-02-11 PROCEDURE — 97110 THERAPEUTIC EXERCISES: CPT

## 2019-02-11 PROCEDURE — 80053 COMPREHEN METABOLIC PANEL: CPT

## 2019-02-11 PROCEDURE — 97530 THERAPEUTIC ACTIVITIES: CPT | Performed by: PHYSICAL THERAPIST

## 2019-02-11 PROCEDURE — 99900035 HC TECH TIME PER 15 MIN (STAT)

## 2019-02-11 PROCEDURE — 94761 N-INVAS EAR/PLS OXIMETRY MLT: CPT

## 2019-02-11 PROCEDURE — 25000003 PHARM REV CODE 250: Performed by: RADIOLOGY

## 2019-02-11 PROCEDURE — 25000003 PHARM REV CODE 250: Performed by: NURSE PRACTITIONER

## 2019-02-11 PROCEDURE — 92611 MOTION FLUOROSCOPY/SWALLOW: CPT

## 2019-02-11 RX ORDER — DIPHENHYDRAMINE HYDROCHLORIDE 50 MG/ML
INJECTION INTRAMUSCULAR; INTRAVENOUS CODE/TRAUMA/SEDATION MEDICATION
Status: COMPLETED | OUTPATIENT
Start: 2019-02-11 | End: 2019-02-11

## 2019-02-11 RX ORDER — FLUCONAZOLE 40 MG/ML
200 POWDER, FOR SUSPENSION ORAL DAILY
Status: DISCONTINUED | OUTPATIENT
Start: 2019-02-12 | End: 2019-02-11

## 2019-02-11 RX ORDER — TIZANIDINE 4 MG/1
4 TABLET ORAL EVERY 8 HOURS PRN
Status: DISCONTINUED | OUTPATIENT
Start: 2019-02-11 | End: 2019-02-26 | Stop reason: HOSPADM

## 2019-02-11 RX ORDER — KETOROLAC TROMETHAMINE 30 MG/ML
15 INJECTION, SOLUTION INTRAMUSCULAR; INTRAVENOUS EVERY 6 HOURS
Status: DISCONTINUED | OUTPATIENT
Start: 2019-02-11 | End: 2019-02-12

## 2019-02-11 RX ORDER — MIDAZOLAM HYDROCHLORIDE 1 MG/ML
INJECTION INTRAMUSCULAR; INTRAVENOUS CODE/TRAUMA/SEDATION MEDICATION
Status: COMPLETED | OUTPATIENT
Start: 2019-02-11 | End: 2019-02-11

## 2019-02-11 RX ORDER — FENTANYL CITRATE 50 UG/ML
INJECTION, SOLUTION INTRAMUSCULAR; INTRAVENOUS CODE/TRAUMA/SEDATION MEDICATION
Status: COMPLETED | OUTPATIENT
Start: 2019-02-11 | End: 2019-02-11

## 2019-02-11 RX ORDER — HYDROXYZINE HYDROCHLORIDE 10 MG/5ML
25 SYRUP ORAL 3 TIMES DAILY
Status: DISCONTINUED | OUTPATIENT
Start: 2019-02-11 | End: 2019-02-26 | Stop reason: HOSPADM

## 2019-02-11 RX ORDER — FLUCONAZOLE 40 MG/ML
200 POWDER, FOR SUSPENSION ORAL DAILY
Status: DISCONTINUED | OUTPATIENT
Start: 2019-02-11 | End: 2019-02-21

## 2019-02-11 RX ORDER — MIRTAZAPINE 15 MG/1
15 TABLET, ORALLY DISINTEGRATING ORAL NIGHTLY
Status: DISCONTINUED | OUTPATIENT
Start: 2019-02-11 | End: 2019-02-26 | Stop reason: HOSPADM

## 2019-02-11 RX ORDER — HYDROMORPHONE HYDROCHLORIDE 2 MG/ML
INJECTION, SOLUTION INTRAMUSCULAR; INTRAVENOUS; SUBCUTANEOUS CODE/TRAUMA/SEDATION MEDICATION
Status: COMPLETED | OUTPATIENT
Start: 2019-02-11 | End: 2019-02-11

## 2019-02-11 RX ORDER — LIDOCAINE HYDROCHLORIDE 10 MG/ML
INJECTION INFILTRATION; PERINEURAL CODE/TRAUMA/SEDATION MEDICATION
Status: COMPLETED | OUTPATIENT
Start: 2019-02-11 | End: 2019-02-11

## 2019-02-11 RX ADMIN — FENTANYL CITRATE 50 MCG: 50 INJECTION, SOLUTION INTRAMUSCULAR; INTRAVENOUS at 03:02

## 2019-02-11 RX ADMIN — FENTANYL CITRATE 50 MCG: 50 INJECTION, SOLUTION INTRAMUSCULAR; INTRAVENOUS at 04:02

## 2019-02-11 RX ADMIN — IOHEXOL 60 ML: 350 INJECTION, SOLUTION INTRAVENOUS at 05:02

## 2019-02-11 RX ADMIN — PANTOPRAZOLE SODIUM 40 MG: 40 INJECTION, POWDER, LYOPHILIZED, FOR SOLUTION INTRAVENOUS at 08:02

## 2019-02-11 RX ADMIN — MIDAZOLAM HYDROCHLORIDE 0.5 MG: 1 INJECTION, SOLUTION INTRAMUSCULAR; INTRAVENOUS at 03:02

## 2019-02-11 RX ADMIN — METOPROLOL TARTRATE 5 MG: 1 INJECTION, SOLUTION INTRAVENOUS at 02:02

## 2019-02-11 RX ADMIN — IPRATROPIUM BROMIDE AND ALBUTEROL SULFATE 3 ML: .5; 3 SOLUTION RESPIRATORY (INHALATION) at 11:02

## 2019-02-11 RX ADMIN — METOCLOPRAMIDE 10 MG: 5 INJECTION, SOLUTION INTRAMUSCULAR; INTRAVENOUS at 12:02

## 2019-02-11 RX ADMIN — MIDAZOLAM HYDROCHLORIDE 1 MG: 1 INJECTION, SOLUTION INTRAMUSCULAR; INTRAVENOUS at 05:02

## 2019-02-11 RX ADMIN — KETOROLAC TROMETHAMINE 15 MG: 30 INJECTION, SOLUTION INTRAMUSCULAR at 11:02

## 2019-02-11 RX ADMIN — INSULIN ASPART 4 UNITS: 100 INJECTION, SOLUTION INTRAVENOUS; SUBCUTANEOUS at 12:02

## 2019-02-11 RX ADMIN — METOCLOPRAMIDE 10 MG: 5 INJECTION, SOLUTION INTRAMUSCULAR; INTRAVENOUS at 05:02

## 2019-02-11 RX ADMIN — LIDOCAINE 1 PATCH: 50 PATCH TOPICAL at 12:02

## 2019-02-11 RX ADMIN — ACETAMINOPHEN 650 MG: 650 SOLUTION ORAL at 04:02

## 2019-02-11 RX ADMIN — DIPHENHYDRAMINE HYDROCHLORIDE 50 MG: 50 INJECTION INTRAMUSCULAR; INTRAVENOUS at 03:02

## 2019-02-11 RX ADMIN — FENTANYL CITRATE 25 MCG: 50 INJECTION, SOLUTION INTRAMUSCULAR; INTRAVENOUS at 04:02

## 2019-02-11 RX ADMIN — INSULIN DETEMIR 25 UNITS: 100 INJECTION, SOLUTION SUBCUTANEOUS at 09:02

## 2019-02-11 RX ADMIN — METOCLOPRAMIDE 10 MG: 5 INJECTION, SOLUTION INTRAMUSCULAR; INTRAVENOUS at 06:02

## 2019-02-11 RX ADMIN — HYDROMORPHONE HYDROCHLORIDE 0.2 MG: 2 INJECTION INTRAMUSCULAR; INTRAVENOUS; SUBCUTANEOUS at 11:02

## 2019-02-11 RX ADMIN — METOPROLOL TARTRATE 5 MG: 1 INJECTION, SOLUTION INTRAVENOUS at 08:02

## 2019-02-11 RX ADMIN — METOPROLOL TARTRATE 5 MG: 1 INJECTION, SOLUTION INTRAVENOUS at 03:02

## 2019-02-11 RX ADMIN — HYPROMELLOSE 2910 1 DROP: 5 SOLUTION OPHTHALMIC at 04:02

## 2019-02-11 RX ADMIN — MIDAZOLAM HYDROCHLORIDE 1 MG: 1 INJECTION, SOLUTION INTRAMUSCULAR; INTRAVENOUS at 04:02

## 2019-02-11 RX ADMIN — SODIUM PHOSPHATE, MONOBASIC, MONOHYDRATE 30 MMOL: 276; 142 INJECTION, SOLUTION INTRAVENOUS at 08:02

## 2019-02-11 RX ADMIN — IPRATROPIUM BROMIDE AND ALBUTEROL SULFATE 3 ML: .5; 3 SOLUTION RESPIRATORY (INHALATION) at 08:02

## 2019-02-11 RX ADMIN — IPRATROPIUM BROMIDE AND ALBUTEROL SULFATE 3 ML: .5; 3 SOLUTION RESPIRATORY (INHALATION) at 04:02

## 2019-02-11 RX ADMIN — IPRATROPIUM BROMIDE AND ALBUTEROL SULFATE 3 ML: .5; 3 SOLUTION RESPIRATORY (INHALATION) at 12:02

## 2019-02-11 RX ADMIN — LIDOCAINE HYDROCHLORIDE 5 ML: 10 INJECTION, SOLUTION INFILTRATION; PERINEURAL at 03:02

## 2019-02-11 RX ADMIN — CEFTRIAXONE SODIUM 1 G: 1 INJECTION, POWDER, FOR SOLUTION INTRAMUSCULAR; INTRAVENOUS at 06:02

## 2019-02-11 RX ADMIN — INSULIN ASPART 2 UNITS: 100 INJECTION, SOLUTION INTRAVENOUS; SUBCUTANEOUS at 08:02

## 2019-02-11 RX ADMIN — METOCLOPRAMIDE 10 MG: 5 INJECTION, SOLUTION INTRAMUSCULAR; INTRAVENOUS at 11:02

## 2019-02-11 RX ADMIN — INSULIN ASPART 2 UNITS: 100 INJECTION, SOLUTION INTRAVENOUS; SUBCUTANEOUS at 06:02

## 2019-02-11 RX ADMIN — FENTANYL CITRATE 50 MCG: 50 INJECTION, SOLUTION INTRAMUSCULAR; INTRAVENOUS at 05:02

## 2019-02-11 RX ADMIN — MIDAZOLAM HYDROCHLORIDE 1 MG: 1 INJECTION, SOLUTION INTRAMUSCULAR; INTRAVENOUS at 03:02

## 2019-02-11 RX ADMIN — FENTANYL CITRATE 25 MCG: 50 INJECTION, SOLUTION INTRAMUSCULAR; INTRAVENOUS at 03:02

## 2019-02-11 RX ADMIN — MIDAZOLAM HYDROCHLORIDE 0.5 MG: 1 INJECTION, SOLUTION INTRAMUSCULAR; INTRAVENOUS at 04:02

## 2019-02-11 RX ADMIN — ONDANSETRON 4 MG: 2 INJECTION INTRAMUSCULAR; INTRAVENOUS at 10:02

## 2019-02-11 RX ADMIN — ENOXAPARIN SODIUM 40 MG: 100 INJECTION SUBCUTANEOUS at 06:02

## 2019-02-11 RX ADMIN — HYDROMORPHONE HYDROCHLORIDE 0.25 MG: 2 INJECTION INTRAMUSCULAR; INTRAVENOUS; SUBCUTANEOUS at 05:02

## 2019-02-11 NOTE — PLAN OF CARE
Pt remains in ICU;   progress notes reviewed  POD#16 s/p reversal of Sandeep-en-Y w/ Jtube placement  HX: Malignant carcinoid tumor of pancreas    TN visited with pt and her sister, Emily; updated on plan of care and encouraged to continue working with therapy.         Per admit TN Note:  Pt mostly independent with ADLs using assistive equipment. No current HH (has used [facility] [place] feeds in the past- would use again). Pt has Glucometer, Dexcom Meter/Glucose Monitor, S Cane, BSC, RW, Bath Bench. Pt lives in a trailer on her sister, Emily [place]ard and Brother in Law's property.  They assist pt when needed and provide transportation to [facility]. They will provide transportation on discharge.      TN to continue to follow for post op needs.         02/11/19 1420   Discharge Reassessment   Assessment Type Discharge Planning Reassessment   Provided patient/caregiver education on the expected discharge date and the discharge plan Yes   Do you have any problems affording any of your prescribed medications? No   Discharge Plan A Skilled Nursing Facility   Discharge Plan B Long-term acute care facility (LTAC)   Patient choice form signed by patient/caregiver N/A   Can the patient answer the patient profile reliably? Yes, cognitively intact   How does the patient rate their overall health at the present time? Fair   Describe the patient's ability to walk at the present time. Major restrictions/daily assistance from another person   How often would a person be available to care for the patient? Whenever needed   Number of comorbid conditions (as recorded on the chart) Five or more   During the past month, has the patient often been bothered by feeling down, depressed or hopeless? Yes   During the past month, has the patient often been bothered by little interest or pleasure in doing things? Yes

## 2019-02-11 NOTE — CONSULTS
Interventional Radiology Consult/Pre-Procedure Note      Chief Complaint/Reason for Consult: J-tube clogged again    History of Present Illness:  Kaylee Ngo is a 63 y.o. female with the PMH/PSH listed below who presents with a clogged 14-Fr Malecot feeding jejunostomy tube placed 1/25/19. Has happened before and was cleared 3 days ago, but happened again. Tube deemed essential for nutrition. IR consulted for eval/exchange under fluoroscopy. Jejunum is surgically fixed to anterior abd wall.    Admission H&P reviewed.    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    Neuroendocrine tumor 4/9/2018    Obsessive compulsive disorder     Sleep apnea      Past Surgical History:   Procedure Laterality Date    APPENDECTOMY      BIOPSY-LIVER  2/28/2018    Performed by Cam [facility]hton [facility] at [facility] OR    CHOLECYSTECTOMY      ESOPHAGOGASTRODUODENOSCOPY (EGD) N/A 1/29/2018    Performed by Kermit Reddy MD at [facility] ENDO    EXCISION, LIVER N/A 1/25/2019    Performed by Cam Ashton MD at [facility] OR    GASTRIC BYPASS      INSERTION-TUBE-GASTROSTOMY-LAPAROSCOPIC  with gastrogram N/A 2/28/2018    Performed by Cam Ashton MD at [facility] OR    LAPAROTOMY, EXPLORATORY N/A 1/25/2019    Performed by Cam Ashton MD at [facility] OR    LYMPHADENECTOMY N/A 1/25/2019    Performed by Cam Ashton MD at [facility] OR    TOTAL KNEE ARTHROPLASTY Left        Allergies:   Review of patient's allergies indicates:   Allergen Reactions    Calcium carbonate Nausea And Vomiting     The liquid form    Codeine Hives     "Contrast media      Oral and IV    Darvocet a500 [propoxyphene n-acetaminophen]     Epinephrine      Neuroendocrine Tumor patient    Neuroendocrine Tumor patient    Morphine     Propoxyphene napsylate     Sulfa (sulfonamide antibiotics)     Erythromycin Rash    Erythromycin base Rash    Iodinated contrast- oral and iv dye Rash and Other (See Comments)     Copper taste in mouth-Benadryl all that is needed for scans  Copper taste in mouth-Benadryl all that is needed for scans    Pcn [penicillins] Rash     "It looks like measles."       Home Meds:   Prior to Admission medications    Medication Sig Start Date End Date Taking? Authorizing Provider   metoprolol succinate (TOPROL-XL) 100 MG 24 hr tablet Take 100 mg by mouth 2 (two) times daily.   Yes Historical Provider, MD   ACCU-CHEK SHU CONTROL SOLN Soln  10/17/17   Historical Provider, MD   ACCU-CHEK SHU PLUS METER Misc  10/17/17   Historical Provider, MD   ACCU-CHEK SHU PLUS TEST STRP Strp  10/17/17   Historical Provider, MD   amitriptyline (ELAVIL) 50 MG tablet Take 50 mg by mouth every evening.    Historical Provider, MD   amLODIPine (NORVASC) 5 MG tablet Take 5 mg by mouth once daily.  5/15/18 5/15/19  Historical Provider, MD   bisacodyl (DULCOLAX) 5 mg EC tablet Take by mouth 2 tablets  at 12 noon  the day prior to surgery 1/24/19   Cam Ashton MD   calcium carbonate (OS-BEST) 600 mg calcium (1,500 mg) Tab Take 600 mg by mouth once.    Historical Provider, MD   chlorhexidine (HIBICLENS) 4 % external liquid wash surgical area twice daily up to three days prior to surgery  and morning of surgery 1/24/19   Cam Ashton MD   clonazePAM (KLONOPIN) 0.5 MG tablet Take 0.5 mg by mouth 2 (two) times daily as needed for Anxiety.    Historical Provider, MD   cyanocobalamin 1,000 mcg/mL injection 1,000 mcg every 28 days.    Historical Provider, MD   dexamethasone 1.5 mg (27 tabs) DsPk Take 1.5 mg by mouth once daily. Tapering dose as " directed  Patient not taking: Reported on 1/24/2019 8/24/18   Jeff Nicole DO, FACP   diclofenac (CATAFLAM) 50 MG tablet as needed.  9/11/17   Historical Provider, MD   diphenoxylate-atropine 2.5-0.025 mg (LOMOTIL) 2.5-0.025 mg per tablet Take 1 tablet by mouth 4 (four) times daily as needed for Diarrhea.    Historical Provider, MD   DULoxetine (CYMBALTA) 60 MG capsule Take 60 mg by mouth once daily.    Historical Provider, MD   FLUARIX QUAD [id number], PF, 60 mcg (15 mcg x 4)/0.5 mL Syrg vaccine  11/5/18   Historical Provider, MD   GLUCAGON EMERGENCY KIT, HUMAN, 1 mg injection  11/14/17   Historical Provider, MD   hydrOXYzine pamoate (VISTARIL) 25 MG Cap Take 25 mg by mouth 3 (three) times daily.     Historical Provider, MD   insulin aspart U-100 (NOVOLOG FLEXPEN U-100 INSULIN) 100 unit/mL InPn pen 2 units bid prn 9/6/18   Historical Provider, MD   insulin degludec (TRESIBA FLEXTOUCH U-100) 100 unit/mL (3 mL) InPn Inject 15 Units into the skin once daily.     Historical Provider, MD   lamoTRIgine (LAMICTAL) 150 MG Tab  11/14/17   Historical Provider, MD   lancets 30 gauge Misc  10/17/17   Historical Provider, MD   lancing device Misc  10/17/17   Historical Provider, MD   lanreotide (SOMATULINE DEPOT) 60 mg/0.2 mL Syrg inject 0.2 milliliter by subcutaneous route once a month    Historical Provider, MD Shanthi Sesay swish and spit twice daily  up to 2 days prior to surgery 1/24/19   Cam Ashton MD   losartan (COZAAR) 100 MG tablet Take 1 tablet (100 mg total) by mouth once daily. 3/28/18 3/28/19  Natali Dodson MD   magnesium citrate solution drink entire bottle at 2pm  the day prior to surgery 1/24/19   Cam Ashton MD   metoclopramide HCl (REGLAN) 5 mg/5 mL Soln TAKE 10ML BY MOUTH THREE TIMES A DAY 7/16/18   Kermit Reddy MD   metoclopramide HCl (REGLAN) 5 mg/5 mL Soln TAKE 10ML BY MOUTH THREE TIMES A DAY 10/22/18   Kermit Reddy MD   metroNIDAZOLE (FLAGYL) 500 MG tablet take  "2 tablets by mouth at 1pm, 4pm and 10pm the day prior to surgery 1/24/19   Cam Ashton MD   multivitamin capsule Take 1 capsule by mouth once daily.    Historical Provider, MD   neomycin (MYCIFRADIN) 500 mg Tab take 2 tablets by mouth at 1pm , 3pm and 10 pm the day prior to surgery 1/24/19   Cam Ashton MD   omeprazole (PRILOSEC) 40 MG capsule Take 40 mg by mouth 2 (two) times daily before meals.     Historical Provider, MD   ondansetron (ZOFRAN) 8 MG tablet Take 8 mg by mouth every 8 (eight) hours as needed.     Historical Provider, MD   potassium chloride (KLOR-CON) 20 mEq Pack Take 20 mEq by mouth once.     Historical Provider, MD   promethazine (PHENERGAN) 25 MG tablet Take 25 mg by mouth.    Historical Provider, MD   promethazine (PHENERGAN) 25 MG tablet Take 1 tablet (25 mg total) by mouth every 6 (six) hours as needed for Nausea. 8/24/18   Jeff Nicole DO, FACP   scopolamine (TRANSDERM-SCOP) 1.3-1.5 mg (1 mg over 3 days) Place 1 patch onto the skin Every 3 (three) days. 10/25/18   Cam Ashton MD   SURE COMFORT PEN NEEDLE 32 gauge x 5/32" Ndle  10/17/17   Historical Provider, MD   tiZANidine (ZANAFLEX) 4 MG tablet as needed.  9/11/17   Historical Provider, MD   vitamin D 1000 units Tab Take 1,000 Units by mouth once daily.    Historical Provider, MD     Scheduled Meds:    albuterol-ipratropium  3 mL Nebulization Q4H    amitriptyline  50 mg Oral QHS    aspirin  81 mg Oral Daily    cefTRIAXone (ROCEPHIN) IVPB  1 g Intravenous Q24H    enoxaparin  40 mg Subcutaneous Daily    fluconazole 40 mg/ml  200 mg Per NG tube Daily    hydrOXYzine  25 mg Per NG tube TID    insulin aspart U-100  1-10 Units Subcutaneous 6 times per day    insulin detemir U-100  25 Units Subcutaneous Daily    ketorolac  15 mg Intravenous Q6H    lamoTRIgine  150 mg Oral BID    lidocaine  1 patch Transdermal Q24H    metoclopramide HCl  10 mg Intravenous Q6H    metoprolol  5 mg Intravenous Q6H "    mirtazapine  15 mg Per NG tube Nightly    pantoprazole  40 mg Intravenous Daily     Continuous Infusions:   PRN Meds:sodium chloride, acetaminophen, acetaminophen, albuterol sulfate, artificial tears, dextrose 50%, diphenhydrAMINE, glucagon (human recombinant), hydrALAZINE, HYDROmorphone, ipratropium, labetalol, lorazepam, naloxone, ondansetron, sodium chloride 0.9%, sodium chloride 3%, tiZANidine    Anticoagulation/Antiplatelet Meds: ASA, lovenox    Review of Systems:   As documented in admission H&P.    Physical Exam:  Temp: 98.1 °F (36.7 °C) (02/11/19 0700)  Pulse: 101 (02/11/19 1230)  Resp: 15 (02/11/19 1230)  BP: (!) 114/57 (02/11/19 1230)  SpO2: 100 % (02/11/19 1230)     General: NAD  HEENT: Normocephalic, sclera anicteric, oropharynx clear  Heart: RRR  Lungs: Symmetric excursions, breathing unlabored on O2 by NC  Abd: NTND, soft, incision w minimal drainage, J-tube in place  Neuro: AAOx3, gross nonfocal      Labs:  No results for input(s): INR in the last 168 hours.    Invalid input(s):  PT,  PTT    Recent Labs   Lab 02/11/19  0506   WBC 8.29   HGB 7.8*   HCT 26.1*   MCV 96   *      Recent Labs   Lab 02/11/19  0506   *      K 3.8      CO2 26   BUN 9   CREATININE 0.6   CALCIUM 7.5*   MG 1.8   ALT 14   AST 19   ALBUMIN 2.6*   BILITOT 0.6     Imaging:  CT 1/31/19 reviewed.    Assessment/Plan:   Clogged J-tube. Eval and exchange if necessary today.     Sedation:  Sedation history: have not been any systemic reactions  ASA: 3 / Mallampati: 2  Sedation plan: Up to moderate (Versed, fentanyl)      Risks (including, but not limited to, pain, bleeding, infection, damage to nearby structures, treatment failure, potential loss of access requiring surgical intervention, and the need for additional procedures), benefits, and alternatives were discussed with the patient's sister. All questions were answered to the best of my abilities. The patient's sister wishes to proceed with evaluation  and possible exchange of indwelling J-tube. Written informed consent was obtained.     Gucci Hawkins MD  [facility]  Pager [phone]

## 2019-02-11 NOTE — PROCEDURES
Interventional Radiology Post-Procedure Note    Pre Op Diagnosis: PNET  Post Op Diagnosis: Same    Procedure: Attempted J-tube exchange    Procedure performed by: Shruthi    Written Informed Consent Obtained: Yes  Specimen Removed: None sent  Estimated Blood Loss: Minimal    Findings:   Indwelling tube in good position, but obstructed. Able to get a 5-Fr catheter to the level of the obstruction and inject contrast which then flowed into the small bowel, but when this 5-Fr catheter was removed, the Malecot was obstructed again. This suggested exstrensic compression of the soft Malecot as the cause of the obstruction. Decision was made to place a stiffer J-tube which would better resist this compression. A wire was coiled within the jejunum and the Malecot was removed by traction. Prolonged attempts were then made to place a new tube but these were unsuccessful. 14-Fr MIRIAM and 14-Fr, 10-Fr and 6-Fr APD catheters were tried. Even attempts to dilate the tract were challenging, and the point of resistance appeared to be where the tube actually enters the jejunal lumen. The largest catheter which could be advanced into the jejunum was a 5-Fr Kumpe. Consideration was given to balloon dilation of the tract, but the patient wished to stop, despite shanon total doses of sedation. The Kumpe was sewed to the skin with 2-0 silk and a bandage was placed. At Dr. Levy's request, an attempt was made to place an NG tube, but resistance was met in the nasopharanx and the patient refused further attempts.    No immediate complications. Please see full dictated procedure report for additional details.    Above D/w Dr. Levy during and after the case.      Gucci Hawkins MD  [facility]  Pager [phone]

## 2019-02-11 NOTE — PLAN OF CARE
Problem: Adult Inpatient Plan of Care  Goal: Plan of Care Review  Outcome: Ongoing (interventions implemented as appropriate)  The proper method of use, as well as anticipated side effects, of this aerosol treatment are discussed and demonstrated to the patient.   Patient on oxygen with documented flow.  Will attempt to wean per O2 order protocol.  Will continue to monitor.

## 2019-02-11 NOTE — PROGRESS NOTES
[facility] General Surgery  Neuroendocrine Tumor Service  Progress Note     Admission Date: 1/25/2019  Hospital Length of Stay: 17  Principal Problem: Malignant carcinoid tumor of pancreas     Subjective:      Operation:  62 yo F w/ PNET, s/p reversal of Sandeep-en-Y w/ Jtube placement  POD#16    1/25/18  Ex lap, gastro-gastrostomy EEA with 33 mm circular stapler, Pyloroplasty and gastrostomy closure, enteric resection with side to side to anstamosis and restoration of small bowel continuity using Idrive tan load stapler, Liver US, Liver biopsy, Liver resection segment 3, Supraceliac lymphadenectomy, FNA pancreas, Lt salpingo oopherectomy, Meckel diverticulectomy, Feeding jejunostomy 14 Fr malecot, ICG perfusion and Infrared exam of all anastamosis    Interval History:     NAEON.   More alert and talkative  Needs speech eval again today  jtube clogged again  Gettingg TF 30 cc.hr    Scheduled Meds:   albuterol-ipratropium  3 mL Nebulization Q4H    amitriptyline  50 mg Oral QHS    aspirin  81 mg Oral Daily    cefTRIAXone (ROCEPHIN) IVPB  1 g Intravenous Q24H    enoxaparin  40 mg Subcutaneous Daily    fluconazole 40 mg/ml  200 mg Per J Tube Daily    hydrOXYzine  25 mg Per J Tube TID    insulin aspart U-100  1-10 Units Subcutaneous 6 times per day    insulin detemir U-100  25 Units Subcutaneous Daily    lamoTRIgine  150 mg Oral BID    lidocaine  1 patch Transdermal Q24H    metoclopramide HCl  10 mg Intravenous Q6H    metoprolol  5 mg Intravenous Q6H    mirtazapine  15 mg Per J Tube Nightly    pantoprazole  40 mg Intravenous Daily    sodium phosphate IVPB  30 mmol Intravenous Once     Continuous Infusions:    PRN Meds:.sodium chloride, acetaminophen, acetaminophen, albuterol sulfate, artificial tears, dextrose 50%, diphenhydrAMINE, glucagon (human recombinant), hydrALAZINE, HYDROmorphone, ipratropium, labetalol, lorazepam, naloxone, ondansetron, sodium chloride 0.9%, sodium chloride  3%    Objective:      Temp:  [98.5 °F (36.9 °C)-99.4 °F (37.4 °C)] 98.7 °F (37.1 °C)  Pulse:  [] 98  Resp:  [12-25] 18  SpO2:  [89 %-100 %] 100 %  BP: (111-137)/(52-69) 130/62  Weight change:     Intake/Output Summary (Last 24 hours) at 2/11/2019 0956  Last data filed at 2/11/2019 0600  Gross per 24 hour   Intake 1200 ml   Output 1215 ml   Net -15 ml       Lines:  RIJ TLC  J tube  Whitlock    Physical Exam:  GEN: extubated, NAD  CV: RRR  PULM: unlabored on RA  ABD: S/NT/ ND, incision c/some superior and middle scant drainage, Jtube in place   EXT: Peripheral pulses present and equal bilaterally    Recent Labs   Lab 02/11/19  0506      K 3.8   CO2 26      BUN 9   CREATININE 0.6   CALCIUM 7.5*   PROT 5.8*   AST 19   ALT 14   ALKPHOS 195*   BILITOT 0.6     Recent Labs   Lab 02/11/19  0506   WBC 8.29   HGB 7.8*   HCT 26.1*   *       Significant Imaging: I have reviewed all pertinent imaging results/findings within the past 24 hours.     Assessment and Plan:   62 yo F w/ PNET, s/p reversal of Sandeep-en-Y w/ Jtube placement  POD#16    1/25/18  Ex lap, gastro-gastrostomy EEA with 33 mm circular stapler, Pyloroplasty and gastrostomy closure, enteric resection with side to side to anstamosis and restoration of small bowel continuity using Idrive tan load stapler, Liver US, Liver biopsy, Liver resection segment 3, Supraceliac lymphadenectomy, FNA pancreas, Lt salpingo oopherectomy, Meckel diverticulectomy, Feeding jejunostomy 14 Fr malecot, ICG perfusion and Infrared exam of all anastamosis    Neuro - No sedation and AOx3.   TPN - Continue NPO, cont TFs advance slowly, repeat speech  ID - cont rocephin and fluconazole  Heme: H/H stable  Pulm - On RA. Aggressive chest CPT and acapella. ABGs. Resp Cx growing E Coli.   FEN/GI - NPO, TFs, MBS pending today  Renal - Cr good, Whitlock for HD monitoring, good UOP, cont whitlock  PPX - continue lovenox, protonix, scd in place  Dispo - Remains in critical condition cont  ICU care    Nash Titus MD - HO2  [phone]

## 2019-02-11 NOTE — PLAN OF CARE
Problem: Physical Therapy Goal  Goal: Physical Therapy Goal  Goals to be met by: 3/4/2019     Patient will increase functional independence with mobility by performin. Supine to sit with Moderate Assistance MET 2019  2. Sit to supine with Moderate Assistance   3. Rolling to Left and Right with Moderate Assistance.   4. Sitting at edge of bed x10 minutes with Minimal Assistance and Moderate Assistance  4. Lower extremity exercise program x5-10 active reps with assistance as needed    Updated Goals 2019:  1. Supine<>sit with SBA  2. Sit to stand with  CGA  3. Bed to chair with CGA  4. Gait x 20 ft withmin A      Outcome: Ongoing (interventions implemented as appropriate)  Pt would benefit from skilled PT to progress functional mobility.

## 2019-02-11 NOTE — PROCEDURES
Modified Barium Swallow    Patient Name:  Kaylee Ngo   MRN:  [id number]      Recommendations:     Recommendations:                General Recommendations:  Trial sessions of indirect dysphagia tx, pending pt's participation   Diet recommendations:  NPO, NPO   Aspiration Precautions: STRICT NPO and CONTINUE ALTERNATIVE MEANS OF NUTRITION VIA TUBE FEEDINGS, which is already in place.    General Precautions: Standard, aspiration, NPO, respiratory, fall  Communication strategies:  none    Referral     Reason for Referral  Patient was referred for a Modified Barium Swallow Study to assess the efficiency of his/her swallow function, rule out aspiration and make recommendations regarding safe dietary consistencies, effective compensatory strategies, and safe eating environment.     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    Neuroendocrine tumor 4/9/2018    Obsessive compulsive disorder     Sleep apnea        Objective:     Current Respiratory Status: 02/11/19    Alert: yes    Cooperative: inconsistent    Follows Directions: yes    Visualization  · Patient was seen in the lateral view  · Supplement O2 in place via nasal cannula 2L    Oral Peripheral Examination  · Oral Musculature: unable to assess due to poor participation/comprehension  · Dentition: (present but poor hygiene)  · Mucosal Quality: dry, sticky, ulcerated  · Volitional Swallow: (unable to assess 2/2 pt's difficulty following directions)  · Voice Prior to PO Intake: (pt nonverbal at this  time)    Consistencies Assessed  THIN: 5cc x1 of thin liquids barium via cup  NECTAR: 5cc x2 of nectar thick liquid barium via cup-- (Note: 2 trials administered 2/2 to SLP, nor Radiologist could not objectively determine whether initial trial of nectar thick liquids was aspirated vs significant residuals within vallecula and pyriform sinuses)   HONEY: 1/2 tsp amount of honey thick liquid barium x1  *Further consistencies deferred 2/2 pt with kim, silent aspiration across all liquids trials     Oral Preparation/Oral Phase  Pt presents with mild oral dysphagia:  · Decreased base of tongue mobility  · Premature spillagein to pharyngeal cavity prior to swallow    Pharyngeal Phase   Pt presents with severe-profound pharyngeal dysphagia c/b significantly reduced laryngeal elevation/excursion, along with poor epiglottic inversion and poor TVC (true vocal cord) closure, which resulted in KIM, silent aspiration of thin liquids, nectar thick liquids and honey thick liquids during the swallow. Pt with significantly reduced laryngeal sensation 2/2 pt required max instruction from SLP to cough to eject aspirated materials. However, pt with weak cough production and unable to effectively eject aspirated materials from airway.     - Pt also demonstrates reduced tongue base retraction (TBR), which resulted in significant residuals in vallecula and pyriform sinuses after the swallow across all consistencies. Pt also observed with moderately reduced pharyngeal sensation, as pt again required max instruction from SLP to perform multiple swallows to clear pharyngeal residuals. However, pt with limited success, as pt continues to demonstrate moderate-significant residuals present in pharyngeal cavity, as well as additional episodes of silent aspiration d/t subsequent swallows performed.   Pt was observed with delayed coughing after fluoro was turned off, which SLP suspects episode of gross aspiration from residuals.     -Pt  "demonstrated kim, silent aspiration under fluoro across all consistencies assessed. Pt is at HIGH RISK FOR ASPIRATION.      Cervical Esophageal Phase  · UES appeared to accommodate all bolus types without stasis or retrograde movement observed     Assessment:     Impressions  ·   Patient demonstrates mild oral dysphagia and severe-profound pharyngeal dysphagia characterized by above mentioned detailed deficits. Please refer to oral and pharyngeal phases for further explanation.     Prognosis: Guarded    Barriers:  · Cognitive status  · Compliance/ participation and motivation     Plan  Pt should continue Strict NPO, and continue alternative means of nutrition/hydration/medication, as pt is NOT SAFE for an oral diet. SLP will continue to follow for trial sessions of indirect dysphagia tx, pending pt's participation.     Education  -SLP educated pt and pt's sister on purpose of MBSS, MBSS results, strict NPOrecs/aspiration precautions and POC. Via printed handout, SLP also provided skilled education to pt and pt's sister on anatomy and physiology of normal swallowing mechanism vs. Pt's swallowing mechanism and deficits observed, as well as risks associated (aspiration PNA). However, pt appeared to be upset with SLP recs and outcome of MBSS and observed to turn head/look away from SLP during all education provided. SLP and pt's sister redirected pt several times-- however, pt stated "I don't care what you say. I want a glass of water." and eventually demanded pt's sister and SLP to leave room. SLP and pt's sister exited room upon pt's request.  -Dr. Levy observed to be sitting at nurse's station, SLP notified MD of results/recs. SLP also notified CHANELLE Conde, Charge Nurse Nhi of results/recs.  MD, RNs and pt's sister all acknowledged and confirmed understanding.  SLP answered all questions/concerns presented by pt's sister, within SLP's scope of practice.       Goals:   Multidisciplinary Problems     SLP Goals  "       Problem: SLP Goal    Goal Priority Disciplines Outcome   SLP Goal     SLP Ongoing (interventions implemented as appropriate)   Description:  Short Term Goals:  1. Pt will participate in BSS to determine least restrictive diet.-discontinued 2/11  2. Pt will successfully participate in Modified Barium Swallow study to radiographically assess swallow function, rule out aspiration and determine safest/least restrictive diet.- MET 2/11  3. Pt will participate in trial sessions of indirect dysphagia exercises to strengthen musculature for swallowing mechanism with mod-max effort.                              Plan:   · Patient to be seen:  Therapy Frequency: 3 x/week   · Plan of Care expires:  03/08/19  · Plan of Care reviewed with:  patient, sibling(CHANELLE Conde, Charge Nurse Nhi and MD Christine)        Discharge recommendations:  nursing facility, skilled   Barriers to Discharge:  Safety Awareness impaired and nutritional status    Time Tracking:   SLP Treatment Date:   02/11/19  Speech Start Time:  1325  Speech Stop Time:  1335     Self-Care/Education: 1345- 1359  Speech Total Time (min):  24 min    QUIANA Rachel, CCC-SLP  02/11/2019

## 2019-02-11 NOTE — PLAN OF CARE
"Problem: Occupational Therapy Goal  Goal: Occupational Therapy Goal  Goals to be met by: 3/7/2019     Patient will increase functional independence with ADLs by performing:    UE Dressing with Minimal Assistance.  LE Dressing with Moderate Assistance.  Grooming while seated with Minimal Assistance.  Toileting from bedside commode with Moderate Assistance for hygiene and clothing management.   Sitting at edge of bed x10 minutes with Moderate Assistance.  Supine to sit with Maximum Assistance.  Increased functional UE strength to 3/5      Outcome: Ongoing (interventions implemented as appropriate)  Pt with improved vocal quality and arousal today. Pt resistant to participating in therapy, initially stating "I just can't do it today" but with max encouragement from OT and sister at bedside pt did UE therex from bed level. Pt did 10x reps BUE shoulder flexion, BUE elbow flex/ext, 2x15 sec of BUE punching on pillow, 3x15 sec bouts of BUE aerobic hand cycling with no SOB noted. Pt perseverating on getting ice chips; pt advised she is still NPO. Pt voiced concerns for her own mental health status and stated "You need to send me to a psychiatric hospital after this" but also appeared to be joking.   Pt progressing towards goals, cont POC.       "

## 2019-02-11 NOTE — PT/OT/SLP PROGRESS
Physical Therapy Treatment    Patient Name:  Kaylee Ngo   MRN:  [id number]    Recommendations:     Discharge Recommendations:  nursing facility, skilled   Discharge Equipment Recommendations: (TBD)   Barriers to discharge: TBD    Assessment:     Kaylee Ngo is a 63 y.o. female admitted with a medical diagnosis of Malignant carcinoid tumor of pancreas.  She presents with the following impairments/functional limitations:  weakness, impaired endurance, impaired self care skills, impaired functional mobilty, gait instability, decreased lower extremity function, impaired cardiopulmonary response to activity, impaired joint extensibility.    Rehab Prognosis: Fair; patient would benefit from acute skilled PT services to address these deficits and reach maximum level of function.    Recent Surgery: Procedure(s) (LRB):  LAPAROTOMY, EXPLORATORY (N/A)  LYMPHADENECTOMY (N/A)  EXCISION, LIVER (N/A) 17 Days Post-Op    Plan:     During this hospitalization, patient to be seen 5 x/week to address the identified rehab impairments via gait training, therapeutic activities, therapeutic exercises and progress toward the following goals:    · Plan of Care Expires:  03/04/19    Subjective     Chief Complaint: has to have a bowel movement  Patient/Family Comments/goals: Pt requested bed pan  Pain/Comfort:  · Pain Rating 1: 0/10      Objective:     Communicated with nurse prior to session.  Patient found all lines intact and call button in reach    upon PT entry to room.     General Precautions: Standard, aspiration, fall, respiratory, NPO   Orthopedic Precautions:N/A   Braces:       Functional Mobility:  · Bed Mobility:     · Rolling Left:  contact guard assistance  · Rolling Right: contact guard assistance  · Scooting: contact guard assistance  · Supine to Sit: minimum assistance  · Sit to Supine: minimum assistance  · Transfers:     · Sit to Stand:  moderate assistance with rolling walker  · Gait:  Pt sit to stand and ambulated  2' to HOB with Mod assist 1.  · Balance: Pt has F-/P+ standing balance with RW      AM-PAC 6 CLICK MOBILITY  Turning over in bed (including adjusting bedclothes, sheets and blankets)?: 3  Sitting down on and standing up from a chair with arms (e.g., wheelchair, bedside commode, etc.): 3  Moving from lying on back to sitting on the side of the bed?: 3  Moving to and from a bed to a chair (including a wheelchair)?: 2  Need to walk in hospital room?: 2  Climbing 3-5 steps with a railing?: 1  Basic Mobility Total Score: 14       Therapeutic Activities and Exercises:   Pt scooted to HOB with CG.  Pt rolled bilaterally with CG for bed pan placement.  Pt sat at EOB 8 min total.    Patient left HOB elevated with all lines intact, call button in reach, bed alarm on, nurse notified and bed pan under patient..    GOALS:   Multidisciplinary Problems     Physical Therapy Goals        Problem: Physical Therapy Goal    Goal Priority Disciplines Outcome Goal Variances Interventions   Physical Therapy Goal     PT, PT/OT Ongoing (interventions implemented as appropriate)     Description:  Goals to be met by: 19     Patient will increase functional independence with mobility by performin. Supine <> sit with Stand-by Assistance  2. Sit to stand transfer with Stand-by Assistance  3. Bed to chair transfer with Stand-by Assistance using Rolling Walker  4. Gait  x 50 feet with Stand-by Assistance using Rolling Walker.              Problem: Physical Therapy Goal    Goal Priority Disciplines Outcome Goal Variances Interventions   Physical Therapy Goal     PT, PT/OT Ongoing (interventions implemented as appropriate)     Description:  Goals to be met by: 3/4/2019     Patient will increase functional independence with mobility by performin. Supine to sit with Moderate Assistance MET 2019  2. Sit to supine with Moderate Assistance   3. Rolling to Left and Right with Moderate Assistance.   4. Sitting at edge of bed x10  minutes with Minimal Assistance and Moderate Assistance  4. Lower extremity exercise program x5-10 active reps with assistance as needed    Updated Goals 2/8/2019:  1. Supine<>sit with SBA  2. Sit to stand with  CGA  3. Bed to chair with CGA  4. Gait x 20 ft withmin A              Problem: Physical Therapy Goal    Goal Priority Disciplines Outcome Goal Variances Interventions   Physical Therapy Goal     PT, PT/OT             Problem: Physical Therapy Goal    Goal Priority Disciplines Outcome Goal Variances Interventions   Physical Therapy Goal     PT, PT/OT                      Time Tracking:     PT Received On: 02/11/19  PT Start Time: 0930     PT Stop Time: 0954  PT Total Time (min): 24 min     Billable Minutes: Therapeutic Activity 24             PTA Visit Number: 1     Odilia Hernández, PT  02/11/2019

## 2019-02-11 NOTE — PT/OT/SLP PROGRESS
"Occupational Therapy   Treatment    Name: Kaylee Ngo  MRN: [id number]  Admitting Diagnosis:  Malignant carcinoid tumor of pancreas  17 Days Post-Op    Recommendations:     Discharge Recommendations: nursing facility, skilled  Discharge Equipment Recommendations:  other (see comments)(TBD)  Barriers to discharge:  None    Assessment:     Kaylee Ngo is a 63 y.o. female with a medical diagnosis of Malignant carcinoid tumor of pancreas. Performance deficits affecting function are weakness, impaired functional mobilty, impaired cognition, decreased safety awareness, impaired cardiopulmonary response to activity, impaired endurance, gait instability, decreased coordination, decreased upper extremity function, decreased lower extremity function, impaired self care skills, decreased ROM.     Rehab Prognosis:  Good; patient would benefit from acute skilled OT services to address these deficits and reach maximum level of function.       Plan:     Patient to be seen 5 x/week to address the above listed problems via self-care/home management, therapeutic activities, therapeutic exercises  · Plan of Care Expires: 03/07/19  · Plan of Care Reviewed with: patient, sibling    Subjective     Pain/Comfort:  · Pain Rating 1: 0/10    Objective:     Communicated with: Nurse prior to session.  Patient found HOB elevated with whitlock catheter, oxygen, PEG Tube, telemetry, pulse ox (continuous), PICC line, blood pressure cuff, SCD(ICU monitoring) upon OT entry to room. Sister present at bedside.    General Precautions: Standard, aspiration, fall, NPO, respiratory   Orthopedic Precautions:N/A   Braces: N/A     Occupational Performance:     AMPAC 6 Click ADL: 8    Treatment & Education:  Pt with improved vocal quality and arousal today. Pt resistant to participating in therapy, initially stating "I just can't do it today" but with max encouragement from OT and sister at bedside pt did UE therex from bed level. Pt did 10x reps BUE " "shoulder flexion, BUE elbow flex/ext, 2x15 sec of BUE punching on pillow, 3x15 sec bouts of BUE aerobic hand cycling with no SOB noted. Pt perseverating on getting ice chips; pt advised she is still NPO. Pt voiced concerns for her own mental health status and stated "You need to send me to a psychiatric hospital after this" but also appeared to be joking. Pt continues to benefit from skilled OT services to address her functional deficits.     Patient left HOB elevated with all lines intact, call button in reach, bed alarm on, nurse notified and sister presentEducation:      GOALS:   Multidisciplinary Problems     Occupational Therapy Goals        Problem: Occupational Therapy Goal    Goal Priority Disciplines Outcome Interventions   Occupational Therapy Goal     OT, PT/OT Ongoing (interventions implemented as appropriate)    Description:  Goals to be met by: 3/7/2019     Patient will increase functional independence with ADLs by performing:    UE Dressing with Minimal Assistance.  LE Dressing with Moderate Assistance.  Grooming while seated with Minimal Assistance.  Toileting from bedside commode with Moderate Assistance for hygiene and clothing management.   Sitting at edge of bed x10 minutes with Moderate Assistance.  Supine to sit with Maximum Assistance.  Increased functional UE strength to 3/5                       Time Tracking:     OT Date of Treatment: 02/11/19  OT Start Time: 1110  OT Stop Time: 1125  OT Total Time (min): 15 min    Billable Minutes:Therapeutic Exercise 15    SANAM Beebe  2/11/2019     I certify that I was present in the room directing the student in service delivery and guiding them using my skilled judgment. As the co-signing therapist I have reviewed the students documentation and am responsible for the treatment, assessment, and plan.       "

## 2019-02-11 NOTE — PLAN OF CARE
Problem: SLP Goal  Goal: SLP Goal  Short Term Goals:  1. Pt will participate in BSS to determine least restrictive diet.-discontinued 2/11  2. Pt will successfully participate in Modified Barium Swallow study to radiographically assess swallow function, rule out aspiration and determine safest/least restrictive diet.- MET 2/11  3. Pt will participate in trial sessions of indirect dysphagia exercises to strengthen musculature for swallowing mechanism with mod-max effort.           Outcome: Ongoing (interventions implemented as appropriate)  2/11: Pt participated in MBSS this PM. Pt presents with severe-profound pharyngeal dysphagia c/b kim, silent aspiration across ALL PO trials of thin liquids, nectar thick liquids, and honey thick liquids. See report for full details. SLP recs: Strict NPO, Continue alternative means of nutrition/hydration/medication, as pt is NOT SAFE for an oral diet. SLP will continue to follow for trial sessions of indirect dysphagia tx. SLP notified CHANELLE Conde and MD Christine of results/recs.   CECE Rachel., CCC-SLP  Speech-Language Pathologist

## 2019-02-11 NOTE — PLAN OF CARE
Problem: Adult Inpatient Plan of Care  Goal: Plan of Care Review  Outcome: Ongoing (interventions implemented as appropriate)  Ms. Page voiced that she misses her sister and tried to call her at the [facility] hotel. Pt was sad that her sister hasnt returned since this afternoon. Pt migraines were controlled with tylenol. Pt remained afebrile during shift. Dextrose 50% was given for sugar of 48. Blood sugar recheck was 164.  Insulin was held. Tube feeding were tolerated well. No n/v reported. Safety maintained.

## 2019-02-11 NOTE — CHAPLAIN
Awake and verbal.  She referred to herself as crazy.  I tried to normalize her feelings and help her explore what still works for her. She conversed openly, seems discouraged, was open to my visit and would like me to return.  I provided prayer for her, her sister, and the staff taking care of her.

## 2019-02-12 LAB
ALBUMIN SERPL BCP-MCNC: 2.6 G/DL
ALLENS TEST: ABNORMAL
ALP SERPL-CCNC: 208 U/L
ALT SERPL W/O P-5'-P-CCNC: 14 U/L
ANION GAP SERPL CALC-SCNC: 8 MMOL/L
AST SERPL-CCNC: 16 U/L
BASOPHILS # BLD AUTO: 0.03 K/UL
BASOPHILS NFR BLD: 0.3 %
BILIRUB SERPL-MCNC: 0.7 MG/DL
BUN SERPL-MCNC: 8 MG/DL
CALCIUM SERPL-MCNC: 7.5 MG/DL
CHLORIDE SERPL-SCNC: 108 MMOL/L
CO2 SERPL-SCNC: 24 MMOL/L
CREAT SERPL-MCNC: 0.6 MG/DL
DELSYS: ABNORMAL
DIFFERENTIAL METHOD: ABNORMAL
EOSINOPHIL # BLD AUTO: 0.1 K/UL
EOSINOPHIL NFR BLD: 0.9 %
ERYTHROCYTE [DISTWIDTH] IN BLOOD BY AUTOMATED COUNT: 18.3 %
EST. GFR  (AFRICAN AMERICAN): >60 ML/MIN/1.73 M^2
EST. GFR  (NON AFRICAN AMERICAN): >60 ML/MIN/1.73 M^2
FLOW: 3
GLUCOSE SERPL-MCNC: 194 MG/DL
HCO3 UR-SCNC: 21.3 MMOL/L (ref 24–28)
HCT VFR BLD AUTO: 28.9 %
HGB BLD-MCNC: 8.5 G/DL
LYMPHOCYTES # BLD AUTO: 0.8 K/UL
LYMPHOCYTES NFR BLD: 8.2 %
MAGNESIUM SERPL-MCNC: 1.8 MG/DL
MCH RBC QN AUTO: 28.6 PG
MCHC RBC AUTO-ENTMCNC: 29.4 G/DL
MCV RBC AUTO: 97 FL
MODE: ABNORMAL
MONOCYTES # BLD AUTO: 0.7 K/UL
MONOCYTES NFR BLD: 7.2 %
NEUTROPHILS # BLD AUTO: 7.9 K/UL
NEUTROPHILS NFR BLD: 83.1 %
PCO2 BLDA: 35.1 MMHG (ref 35–45)
PH SMN: 7.39 [PH] (ref 7.35–7.45)
PHOSPHATE SERPL-MCNC: 2 MG/DL
PLATELET # BLD AUTO: 995 K/UL
PMV BLD AUTO: 9.5 FL
PO2 BLDA: 91 MMHG (ref 80–100)
POC BE: -4 MMOL/L
POC SATURATED O2: 97 % (ref 95–100)
POC TCO2: 22 MMOL/L (ref 23–27)
POCT GLUCOSE: 154 MG/DL (ref 70–110)
POCT GLUCOSE: 195 MG/DL (ref 70–110)
POCT GLUCOSE: 208 MG/DL (ref 70–110)
POCT GLUCOSE: 283 MG/DL (ref 70–110)
POCT GLUCOSE: 295 MG/DL (ref 70–110)
POTASSIUM SERPL-SCNC: 4.4 MMOL/L
PROT SERPL-MCNC: 6.1 G/DL
RBC # BLD AUTO: 2.97 M/UL
SAMPLE: ABNORMAL
SITE: ABNORMAL
SODIUM SERPL-SCNC: 140 MMOL/L
WBC # BLD AUTO: 9.51 K/UL

## 2019-02-12 PROCEDURE — 27000221 HC OXYGEN, UP TO 24 HOURS

## 2019-02-12 PROCEDURE — 80053 COMPREHEN METABOLIC PANEL: CPT

## 2019-02-12 PROCEDURE — 63600175 PHARM REV CODE 636 W HCPCS: Performed by: STUDENT IN AN ORGANIZED HEALTH CARE EDUCATION/TRAINING PROGRAM

## 2019-02-12 PROCEDURE — 94640 AIRWAY INHALATION TREATMENT: CPT

## 2019-02-12 PROCEDURE — 25000003 PHARM REV CODE 250: Performed by: SURGERY

## 2019-02-12 PROCEDURE — 84100 ASSAY OF PHOSPHORUS: CPT

## 2019-02-12 PROCEDURE — 36600 WITHDRAWAL OF ARTERIAL BLOOD: CPT

## 2019-02-12 PROCEDURE — 97530 THERAPEUTIC ACTIVITIES: CPT

## 2019-02-12 PROCEDURE — 25000003 PHARM REV CODE 250: Performed by: NURSE PRACTITIONER

## 2019-02-12 PROCEDURE — 94761 N-INVAS EAR/PLS OXIMETRY MLT: CPT

## 2019-02-12 PROCEDURE — 25000003 PHARM REV CODE 250: Performed by: STUDENT IN AN ORGANIZED HEALTH CARE EDUCATION/TRAINING PROGRAM

## 2019-02-12 PROCEDURE — 63600175 PHARM REV CODE 636 W HCPCS: Performed by: SURGERY

## 2019-02-12 PROCEDURE — 94664 DEMO&/EVAL PT USE INHALER: CPT

## 2019-02-12 PROCEDURE — 25000242 PHARM REV CODE 250 ALT 637 W/ HCPCS: Performed by: SURGERY

## 2019-02-12 PROCEDURE — 85025 COMPLETE CBC W/AUTO DIFF WBC: CPT

## 2019-02-12 PROCEDURE — 20000000 HC ICU ROOM

## 2019-02-12 PROCEDURE — 99900035 HC TECH TIME PER 15 MIN (STAT)

## 2019-02-12 PROCEDURE — 82803 BLOOD GASES ANY COMBINATION: CPT

## 2019-02-12 PROCEDURE — B4185 PARENTERAL SOL 10 GM LIPIDS: HCPCS | Performed by: SURGERY

## 2019-02-12 PROCEDURE — C9113 INJ PANTOPRAZOLE SODIUM, VIA: HCPCS | Performed by: SURGERY

## 2019-02-12 PROCEDURE — 83735 ASSAY OF MAGNESIUM: CPT

## 2019-02-12 RX ORDER — LIDOCAINE HYDROCHLORIDE 20 MG/ML
10 SOLUTION OROPHARYNGEAL
Status: DISCONTINUED | OUTPATIENT
Start: 2019-02-12 | End: 2019-02-26 | Stop reason: HOSPADM

## 2019-02-12 RX ORDER — MIDAZOLAM HYDROCHLORIDE 1 MG/ML
2 INJECTION INTRAMUSCULAR; INTRAVENOUS ONCE
Status: COMPLETED | OUTPATIENT
Start: 2019-02-12 | End: 2019-02-12

## 2019-02-12 RX ORDER — HYDROMORPHONE HYDROCHLORIDE 2 MG/ML
0.5 INJECTION, SOLUTION INTRAMUSCULAR; INTRAVENOUS; SUBCUTANEOUS ONCE
Status: COMPLETED | OUTPATIENT
Start: 2019-02-12 | End: 2019-02-12

## 2019-02-12 RX ADMIN — METOPROLOL TARTRATE 5 MG: 1 INJECTION, SOLUTION INTRAVENOUS at 01:02

## 2019-02-12 RX ADMIN — HYDROMORPHONE HYDROCHLORIDE 0.2 MG: 2 INJECTION INTRAMUSCULAR; INTRAVENOUS; SUBCUTANEOUS at 09:02

## 2019-02-12 RX ADMIN — INSULIN ASPART 6 UNITS: 100 INJECTION, SOLUTION INTRAVENOUS; SUBCUTANEOUS at 05:02

## 2019-02-12 RX ADMIN — HYDROMORPHONE HYDROCHLORIDE 0.2 MG: 2 INJECTION INTRAMUSCULAR; INTRAVENOUS; SUBCUTANEOUS at 11:02

## 2019-02-12 RX ADMIN — LORAZEPAM 1 MG: 2 INJECTION INTRAMUSCULAR; INTRAVENOUS at 02:02

## 2019-02-12 RX ADMIN — ASCORBIC ACID, VITAMIN A PALMITATE, CHOLECALCIFEROL, THIAMINE HYDROCHLORIDE, RIBOFLAVIN-5 PHOSPHATE SODIUM, PYRIDOXINE HYDROCHLORIDE, NIACINAMIDE, DEXPANTHENOL, ALPHA-TOCOPHEROL ACETATE, VITAMIN K1, FOLIC ACID, BIOTIN, CYANOCOBALAMIN: 200; 3300; 200; 6; 3.6; 6; 40; 15; 10; 150; 600; 60; 5 INJECTION, SOLUTION INTRAVENOUS at 08:02

## 2019-02-12 RX ADMIN — INSULIN ASPART 2 UNITS: 100 INJECTION, SOLUTION INTRAVENOUS; SUBCUTANEOUS at 08:02

## 2019-02-12 RX ADMIN — AMITRIPTYLINE HYDROCHLORIDE 50 MG: 25 TABLET, FILM COATED ORAL at 09:02

## 2019-02-12 RX ADMIN — METOPROLOL TARTRATE 5 MG: 1 INJECTION, SOLUTION INTRAVENOUS at 09:02

## 2019-02-12 RX ADMIN — I.V. FAT EMULSION 250 ML: 20 EMULSION INTRAVENOUS at 09:02

## 2019-02-12 RX ADMIN — INSULIN DETEMIR 25 UNITS: 100 INJECTION, SOLUTION SUBCUTANEOUS at 08:02

## 2019-02-12 RX ADMIN — INSULIN ASPART 1 UNITS: 100 INJECTION, SOLUTION INTRAVENOUS; SUBCUTANEOUS at 04:02

## 2019-02-12 RX ADMIN — PANTOPRAZOLE SODIUM 40 MG: 40 INJECTION, POWDER, LYOPHILIZED, FOR SOLUTION INTRAVENOUS at 08:02

## 2019-02-12 RX ADMIN — INSULIN ASPART 6 UNITS: 100 INJECTION, SOLUTION INTRAVENOUS; SUBCUTANEOUS at 12:02

## 2019-02-12 RX ADMIN — IPRATROPIUM BROMIDE AND ALBUTEROL SULFATE 3 ML: .5; 3 SOLUTION RESPIRATORY (INHALATION) at 08:02

## 2019-02-12 RX ADMIN — MIDAZOLAM HYDROCHLORIDE 1 MG: 1 INJECTION, SOLUTION INTRAMUSCULAR; INTRAVENOUS at 02:02

## 2019-02-12 RX ADMIN — IPRATROPIUM BROMIDE AND ALBUTEROL SULFATE 3 ML: .5; 3 SOLUTION RESPIRATORY (INHALATION) at 04:02

## 2019-02-12 RX ADMIN — HYDROXYZINE HYDROCHLORIDE 25 MG: 10 SOLUTION ORAL at 09:02

## 2019-02-12 RX ADMIN — METOPROLOL TARTRATE 5 MG: 1 INJECTION, SOLUTION INTRAVENOUS at 08:02

## 2019-02-12 RX ADMIN — ACETAMINOPHEN 650 MG: 650 SOLUTION ORAL at 05:02

## 2019-02-12 RX ADMIN — METOCLOPRAMIDE 10 MG: 5 INJECTION, SOLUTION INTRAMUSCULAR; INTRAVENOUS at 05:02

## 2019-02-12 RX ADMIN — IPRATROPIUM BROMIDE AND ALBUTEROL SULFATE 3 ML: .5; 3 SOLUTION RESPIRATORY (INHALATION) at 11:02

## 2019-02-12 RX ADMIN — HYDROMORPHONE HYDROCHLORIDE 0.5 MG: 2 INJECTION, SOLUTION INTRAMUSCULAR; INTRAVENOUS; SUBCUTANEOUS at 02:02

## 2019-02-12 RX ADMIN — LIDOCAINE HYDROCHLORIDE 10 ML: 20 SOLUTION ORAL; TOPICAL at 02:02

## 2019-02-12 RX ADMIN — SODIUM PHOSPHATE, MONOBASIC, MONOHYDRATE 39.99 MMOL: 276; 142 INJECTION, SOLUTION INTRAVENOUS at 09:02

## 2019-02-12 RX ADMIN — KETOROLAC TROMETHAMINE 15 MG: 30 INJECTION, SOLUTION INTRAMUSCULAR at 05:02

## 2019-02-12 RX ADMIN — INSULIN ASPART 4 UNITS: 100 INJECTION, SOLUTION INTRAVENOUS; SUBCUTANEOUS at 09:02

## 2019-02-12 RX ADMIN — MIRTAZAPINE 15 MG: 15 TABLET, ORALLY DISINTEGRATING ORAL at 09:02

## 2019-02-12 RX ADMIN — IPRATROPIUM BROMIDE AND ALBUTEROL SULFATE 3 ML: .5; 3 SOLUTION RESPIRATORY (INHALATION) at 12:02

## 2019-02-12 RX ADMIN — LAMOTRIGINE 150 MG: 100 TABLET ORAL at 09:02

## 2019-02-12 RX ADMIN — ENOXAPARIN SODIUM 40 MG: 100 INJECTION SUBCUTANEOUS at 05:02

## 2019-02-12 RX ADMIN — LIDOCAINE 1 PATCH: 50 PATCH TOPICAL at 01:02

## 2019-02-12 NOTE — PT/OT/SLP PROGRESS
Physical Therapy      Patient Name:  Kaylee Ngo   MRN:  [id number]    Patient not seen at this time secondary to trying to have NG tub placed with difficulty. Will follow-up .    Merry Francis, PT   2/12/2019

## 2019-02-12 NOTE — PLAN OF CARE
Recommendations      1. Rec advance goal rate of PN support as able to 75 ml/hr to better meet estimated needs; rec lipids daily.   - Can customize for fluid w/  90g AA/250g dextrose; fluid/lytes per MD discretion.  - To provide: 1778 kcal, 90g pro, 1800 mL fluid, GIR: 3.4  2. Note high risk of clogging for 5 Fr J tube; if desire trophic feeds would rec Diabetisource as it is a more viscous formula   3. RD to monitor progress     Goals: Adequate nutrition to meet >75% of needs  Nutrition Goal Status: progressing towards goal  Communication of RD Recs: reviewed with RN

## 2019-02-12 NOTE — PROGRESS NOTES
[facility]  Neuroendocrine Tumor Service  Progress Note     Admission Date: 1/25/2019  Hospital Length of Stay: 18  Principal Problem: Malignant carcinoid tumor of pancreas     Subjective:      Operation:  62 yo F w/ PNET, s/p reversal of Sandeep-en-Y w/ Jtube placement  POD#17    1/25/18  Ex lap, gastro-gastrostomy EEA with 33 mm circular stapler, Pyloroplasty and gastrostomy closure, enteric resection with side to side to anstamosis and restoration of small bowel continuity using Idrive tan load stapler, Liver US, Liver biopsy, Liver resection segment 3, Supraceliac lymphadenectomy, FNA pancreas, Lt salpingo oopherectomy, Meckel diverticulectomy, Feeding jejunostomy 14 Fr malecot, ICG perfusion and Infrared exam of all anastamosis    Interval History:     Patient complaining of back pain and SOB, Xray ordered and ABG ordered which were WNL.  Alert and talkative  Patient failed MBSS yesterday still not safe for PO   No enteral access, patient did not tolerate Jtube placement or NGT yesterday will try again today.     Scheduled Meds:   albuterol-ipratropium  3 mL Nebulization Q4H    amitriptyline  50 mg Oral QHS    aspirin  81 mg Oral Daily    enoxaparin  40 mg Subcutaneous Daily    fat emulsion 20%  250 mL Intravenous Daily    fluconazole 40 mg/ml  200 mg Per NG tube Daily    hydrOXYzine  25 mg Per NG tube TID    insulin aspart U-100  1-10 Units Subcutaneous 6 times per day    insulin detemir U-100  25 Units Subcutaneous Daily    lamoTRIgine  150 mg Oral BID    lidocaine  1 patch Transdermal Q24H    metoprolol  5 mg Intravenous Q6H    mirtazapine  15 mg Per NG tube Nightly    pantoprazole  40 mg Intravenous Daily    sodium phosphate IVPB  39.99 mmol Intravenous Once     Continuous Infusions:   Amino acid 5% - dextrose 15% (CLINIMIX-E) solution with additives (1L  provides 510 kcal/L dextrose, with 50 gm AA, 150 gm CHO, Na 35, K 30, Mg 5, Ca 4.5, Acetate 80, Cl 39,  Phos 15) 50 mL/hr at 02/12/19 0826     PRN Meds:.sodium chloride, acetaminophen, acetaminophen, albuterol sulfate, artificial tears, dextrose 50%, diphenhydrAMINE, diphenhydrAMINE, fentaNYL, glucagon (human recombinant), hydrALAZINE, HYDROmorphone, HYDROmorphone, ipratropium, labetalol, lidocaine HCL 10 mg/ml (1%), lorazepam, midazolam, naloxone, ondansetron, sodium chloride 0.9%, sodium chloride 3%, tiZANidine    Objective:      Temp:  [97.4 °F (36.3 °C)-99.2 °F (37.3 °C)] 97.4 °F (36.3 °C)  Pulse:  [] 98  Resp:  [10-30] 30  SpO2:  [94 %-100 %] 99 %  BP: (114-156)/(56-69) 135/63  Weight change:     Intake/Output Summary (Last 24 hours) at 2/12/2019 0841  Last data filed at 2/12/2019 0600  Gross per 24 hour   Intake 50 ml   Output 870 ml   Net -820 ml       Lines:  RIJ TLC  J tube  Barney    Physical Exam:  GEN:NAD  CV: RRR  PULM: unlabored with 3L NC  ABD: S/NT/ ND, incision c/some superior bilious drainage  EXT: Peripheral pulses present and equal bilaterally    Recent Labs   Lab 02/12/19  0404      K 4.4   CO2 24      BUN 8   CREATININE 0.6   CALCIUM 7.5*   PROT 6.1   AST 16   ALT 14   ALKPHOS 208*   BILITOT 0.7     Recent Labs   Lab 02/12/19  0404   WBC 9.51   HGB 8.5*   HCT 28.9*   *       Significant Imaging: I have reviewed all pertinent imaging results/findings within the past 24 hours.     Assessment and Plan:   62 yo F w/ PNET, s/p reversal of Sandeep-en-Y w/ Jtube placement  POD#17    1/25/18  Ex lap, gastro-gastrostomy EEA with 33 mm circular stapler, Pyloroplasty and gastrostomy closure, enteric resection with side to side to anstamosis and restoration of small bowel continuity using Idrive tan load stapler, Liver US, Liver biopsy, Liver resection segment 3, Supraceliac lymphadenectomy, FNA pancreas, Lt salpingo oopherectomy, Meckel diverticulectomy, Feeding jejunostomy 14 Fr malecot, ICG perfusion and Infrared exam of all anastamosis    Neuro - No sedation and AOx3.   TPN -  Continue NPO, Restart TPN, Try to insert Jtube and NGT  ID - cont rocephin and fluconazole will assess if further antibiotics are needed  Heme: H/H stable  Pulm - On NC and RA. Aggressive chest CPT and acapella. ABGs. Resp Cx growing E Coli.   FEN/GI - NPO, MBS failed yesterday, restart TPN, try to place Jtube again today  Renal - Cr good, Whitlock for HD monitoring, good UOP, cont whitlock  PPX - continue lovenox, protonix, scd in place  Dispo - Remains in critical condition cont ICU care    Nash Titus MD - HO2  [phone]    Need plan for nutrition  Wound has discharge  Will place wound vac

## 2019-02-12 NOTE — PT/OT/SLP PROGRESS
Speech Language Pathology      Kaylee Ngo  MRN: [id number]    Attempted treatment at 11am, sister asked SLP to hold off. Pt agitated and placed back in bed from sitting up in chair. Sister asked for her to be allowed to rest.    Will follow-up.         QUIANA Herrera, CCC-SLP   2/12/2019

## 2019-02-12 NOTE — PLAN OF CARE
Problem: Occupational Therapy Goal  Goal: Occupational Therapy Goal  Goals to be met by: 3/7/2019     Patient will increase functional independence with ADLs by performing:    UE Dressing with Minimal Assistance.  LE Dressing with Moderate Assistance.  Grooming while seated with Minimal Assistance.  Toileting from bedside commode with Moderate Assistance for hygiene and clothing management.   Sitting at edge of bed x10 minutes with Moderate Assistance.  Supine to sit with Maximum Assistance.  Increased functional UE strength to 3/5      Outcome: Ongoing (interventions implemented as appropriate)  Kaylee Ngo is a 63 y.o. female with a medical diagnosis of Malignant carcinoid tumor of pancreas. Performance deficits affecting function are weakness, impaired endurance, impaired self care skills, impaired functional mobilty, gait instability, impaired balance, decreased lower extremity function, impaired cardiopulmonary response to activity. Pt required max encouragement from therapy, nurse, and sister to participate with therapy. Pt agreeable to OOB only and required CGA-Min A to transfer to chair.  All lines intact and vitals stable. Continue OT services to address functional goals, progressing as able.      TAMIR Faust/L

## 2019-02-12 NOTE — PROGRESS NOTES
[facility]  Adult Nutrition  Consult Note    SUMMARY     Recommendations     1. Rec advance goal rate of PN support as able to 75 ml/hr to better meet estimated needs; rec lipids daily.   - Can customize for fluid w/  90g AA/250g dextrose; fluid/lytes per MD discretion.  - To provide: 1778 kcal, 90g pro, 1800 mL fluid, GIR: 3.4  2. Note high risk of clogging for 5 Fr J tube; if desire trophic feeds would rec Diabetisource as it is a more viscous formula   3. RD to monitor progress    Goals: Adequate nutrition to meet >75% of needs  Nutrition Goal Status: progressing towards goal  Communication of RD Recs: reviewed with RN    Reason for Assessment    Reason For Assessment: RD follow-up  Diagnosis: (Malignant Pancreatic carcinoid tumor)  Relevant Medical History: DM, HLD, GERD, Gastric Ulcers  Interdisciplinary Rounds: did not attend    General Information Comments: s/p J tube exchange; team only able to place 5 Fr. J tube due to obstruction. Unable to place NGT.  Pt remains on PN support. Pt resting at time of visit, with junky cough and c/o not be able to breathe well, informed nsg. Prev NFPE on 2/7; see malnutrition section for details.     Nutrition Discharge Planning: Too soon to determine    Nutrition Risk Screen    Nutrition Risk Screen: dysphagia or difficulty swallowing    Nutrition/Diet History    Patient Reported Diet/Restrictions/Preferences: diabetic diet  Typical Food/Fluid Intake: TF at home  Food Preferences: n/a  Spiritual, Cultural Beliefs, Mosque Practices, Values that Affect Care: no  Supplemental Drinks or Food Habits: Peptaman 1.5  Factors Affecting Nutritional Intake: altered gastrointestinal function, NPO  Nutrition Support Formula Prior to Admit: Impact Peptide 1.5  Nutrition Support Rate Prior to Admit: 240 (ml)  Nutrtion Support Frequency Prior to Admit: pump  Nutrition Support Provision Prior to Admit: 3 cans a day    Anthropometrics    Temp: 97.4 °F (36.3  "°C)  Height Method: Stated  Height: 5' 5" (165.1 cm)  Height (inches): 65 in  Weight Method: Bed Scale  Weight: 55.9 kg (123 lb 3.8 oz)  Weight (lb): 123.24 lb  Ideal Body Weight (IBW), Female: 125 lb  % Ideal Body Weight, Female (lb): 110.94 lb  BMI (Calculated): 23.1  BMI Grade: 18.5-24.9 - normal  Usual Body Weight (UBW), k.9 kg(Admit wt 19)  Weight Change Amount: 11 lb  % Usual Body Weight: 92.83  % Weight Change From Usual Weight: -7.36 %       Lab/Procedures/Meds    Pertinent Labs Reviewed: reviewed  Pertinent Labs Comments: alb 2.6; Phos 2.1  Pertinent Medications Reviewed: reviewed  Pertinent Medications Comments: insulin, pantoprazole      Estimated/Assessed Needs    Weight Used For Calorie Calculations: 55.9 kg (123 lb 3.8 oz)  Energy Calorie Requirements (kcal): 3742-4911 kcal  Energy Need Method: Kcal/kg  Protein Requirements: 75-95(1.2-1.5 gm Pro/kg)  Weight Used For Protein Calculations: 62.9 kg (138 lb 10.7 oz)     Estimated Fluid Requirement Method: RDA Method  RDA Method (mL): 1677  CHO Requirement: 200g      Nutrition Prescription Ordered    Current Diet Order: NPO  Current Nutrition Support Formula Ordered: Clinimix E 5/15  Current Nutrition Support Rate Ordered: 50 (ml)  Current Nutrition Support Frequency Ordered: mL/hr  Oral Nutrition Supplement: 0    Evaluation of Received Nutrient/Fluid Intake    Enteral Calories (kcal): 0  Enteral Protein (gm): 0  Enteral (Free Water) Fluid (mL): 0  Free Water Flush Fluid (mL): 0  Parenteral Calories (kcal): 852  Parenteral Protein (gm): 60  Parenteral Fluid (mL): 0  Lipid Calories (kcals): 212 kcals  GIR (Glucose Infusion Rate) (mg/kg/min): 2.2 mg/kg/min  Total Calories (kcal): 1052  % Kcal Needs: 60%  Total Protein (gm): 60  % Protein Needs: 80%  IV Fluid (mL): 0  I/O: reviewed  Energy Calories Required: not meeting needs  Protein Required: not meeting needs  Fluid Required: (per MD)  Comments: 19  Tolerance: (n/a)    % Meal Intake: " NPO    Nutrition Risk    Level of Risk/Frequency of Follow-up: (2 x week)     Assessment and Plan    Moderate malnutrition     Malnutrition in the context of Chronic Illness/Injury     Related to (etiology):  Altered GI Function; Cancer     Signs and Symptoms (as evidenced by):     Energy Intake: <75% intake > 1 week  Body Fat Depletion: mild depletion of orbitals, triceps and thoracic and lumbar region   Muscle Mass Depletion: moderate depletion of temples, clavicle region, interosseous muscle and lower extremities      Interventions  Collaboration with providers     Nutrition Diagnosis Status  Continues              Monitor and Evaluation    Food and Nutrient Intake: energy intake, enteral nutrition intake, parenteral nutrition intake  Food and Nutrient Adminstration: enteral and parenteral nutrition administration, diet order  Physical Activity and Function: nutrition-related ADLs and IADLs  Anthropometric Measurements: weight, weight change  Biochemical Data, Medical Tests and Procedures: electrolyte and renal panel, glucose/endocrine profile  Nutrition-Focused Physical Findings: overall appearance     Malnutrition Assessment  Malnutrition Type: acute illness or injury  Energy Intake: moderate energy intake  Skin (Micronutrient): dry, thinned, turgor reduced  Hair/Scalp (Micronutrient): brittle  Eyes (Micronutrient): none  Extraoral (Micronutrient): none  Neck/Chest (Micronutrient): bony prominence, subcutaneous fat loss       Energy Intake (Malnutrition): less than 75% for greater than 7 days  Subcutaneous Fat (Malnutrition): mild depletion  Muscle Mass (Malnutrition): moderate depletion  Fluid Accumulation (Malnutrition): mild   Orbital Region (Subcutaneous Fat Loss): mild depletion  Upper Arm Region (Subcutaneous Fat Loss): mild depletion  Thoracic and Lumbar Region: mild depletion   [place] Region (Muscle Loss): moderate depletion  Clavicle Bone Region (Muscle Loss): moderate depletion  Clavicle and Acromion  Bone Region (Muscle Loss): mild depletion  Dorsal Hand (Muscle Loss): mild depletion  Patellar Region (Muscle Loss): moderate depletion  Anterior Thigh Region (Muscle Loss): moderate depletion  Posterior Calf Region (Muscle Loss): moderate depletion       Subcutaneous Fat Loss (Final Summary): mild protein-calorie malnutrition  Muscle Loss Evaluation (Final Summary): moderate protein-calorie malnutrition  Fluid Accumulation Evaluation: mild         Nutrition Follow-Up    RD Follow-up?: Yes

## 2019-02-12 NOTE — CONSULTS
Wound consult to assist Dr. Titus with placing wound vac to abdominal midline dehisced incision. Good seal was obtained, set at 125 mmHg, pt tolerated well. Dr. Titus to enter wound care and wound vac orders.

## 2019-02-12 NOTE — PLAN OF CARE
Problem: Fall Injury Risk  Goal: Absence of Fall and Fall-Related Injury    Intervention: Identify and Manage Contributors to Fall Injury Risk   02/11/19 1905 02/11/19 2305   Manage Acute Allergic Reaction   Medication Review/Management --  medications reviewed   Identify and Manage Contributors to Fall Injury Risk   Self-Care Promotion BADL personal objects within reach;independence encouraged;BADL personal routines maintained --      Intervention: Promote Injury-Free Environment   02/11/19 2305   Optimize [place] and Functional Mobility   Environmental Safety Modification assistive device/personal items within reach;clutter free environment maintained;room organization consistent   Optimize Balance and Safe Activity   Safety Promotion/Fall Prevention assistive device/personal item within reach;bed alarm set;Fall Risk reviewed with patient/family;Fall Risk signage in place;side rails raised x 3;pulse ox;medications reviewed         Problem: Skin Injury Risk Increased  Goal: Skin Health and Integrity    Intervention: Optimize Skin Protection   02/11/19 2305   Prevent Additional Skin Injury   Head of Bed (HOB) HOB at 60 degrees   Pressure Reduction Devices foam padding utilized;pressure-redistributing mattress utilized   Pressure Reduction Techniques heels elevated off bed;pressure points protected;weight shift assistance provided   Monitor and Manage Hypervolemia   Skin Protection adhesive use limited;incontinence pads utilized;protective footwear used;tubing/devices free from skin contact;transparent dressing maintained;silicone foam dressing in place         Problem: Infection (Surgery Nonspecified)  Goal: Absence of Infection Signs/Symptoms    Intervention: Prevent or Manage Infection   02/11/19 1905 02/12/19 0053   Prevent or Manage Infection   Fever Reduction/Comfort Measures --  lightweight clothing;lightweight bedding;cool cloth applied   Infection Management aseptic technique maintained --           Problem: Diabetes Comorbidity  Goal: Blood Glucose Level Within Desired Range    Intervention: Maintain Glycemic Control   02/12/19 0053   Monitor and Manage Ketoacidosis   Glycemic Management blood glucose monitoring

## 2019-02-12 NOTE — PROGRESS NOTES
"2330 - Pt complaining of SOB, stating "I can't breath." Pt's cardiac rhythm remains the same, and pt's saturation reads 95% and above. Pt has a weak cough that sounds very wet. Spoke to Dr. Olivier about this, who states to have a chest x-ray and an ABG ordered for the pt.   "

## 2019-02-12 NOTE — PT/OT/SLP PROGRESS
Physical Therapy      Patient Name:  Kaylee Ngo   MRN:  [id number]    Patient not seen at this time secondary to pt declining exercise; just worked with OT to get into bedside chair. Will follow-up .    Merry Francis, PT   2/12/2019

## 2019-02-12 NOTE — PROGRESS NOTES
Pt remains in ICU;LOS 18 days    Progress notes reviewed and pt not stable for d/c  62 yo F w/ PNET,   POD #17 s/p reversal of Sandeep-en-Y w/ Jtube placement    FEN/GI - NPO, MBS failed yesterday, restart TPN, try to place Jtube again today  Wound has discharge  Will place wound vac    Dispo - Remains in critical condition cont ICU care     TN to continue to follow.

## 2019-02-12 NOTE — PLAN OF CARE
Dr. Titus at bedside inserted NG tube pt received1 mg versed IVP prior to for anxiety. 5 Fr cath removed by . Wound care provided by  and Cielo ROCA

## 2019-02-12 NOTE — PT/OT/SLP PROGRESS
Occupational Therapy   Treatment    Name: Kaylee Ngo  MRN: [id number]  Admitting Diagnosis:  Malignant carcinoid tumor of pancreas  18 Days Post-Op    Recommendations:     Discharge Recommendations: nursing facility, skilled  Discharge Equipment Recommendations:  other (see comments)(TBD)  Barriers to discharge:  None    Assessment:     Kaylee Ngo is a 63 y.o. female with a medical diagnosis of Malignant carcinoid tumor of pancreas. Performance deficits affecting function are weakness, impaired endurance, impaired self care skills, impaired functional mobilty, gait instability, impaired balance, decreased lower extremity function, impaired cardiopulmonary response to activity. Pt required max encouragement from therapy, nurse, and sister to participate with therapy. Pt agreeable to OOB only and required CGA-Min A to transfer to chair.  All lines intact and vitals stable. Continue OT services to address functional goals, progressing as able.      Rehab Prognosis:  Good; patient would benefit from acute skilled OT services to address these deficits and reach maximum level of function.       Plan:     Patient to be seen 5 x/week to address the above listed problems via self-care/home management, therapeutic activities, therapeutic exercises  · Plan of Care Expires: 03/07/19  · Plan of Care Reviewed with: patient, sibling    Subjective     Pain/Comfort:  · Pain Rating 1: 0/10  · Pain Rating Post-Intervention 1: 0/10    Objective:     Communicated with: RN prior to session.  Patient found HOB elevated with oxygen, whitlock catheter, PEG Tube(multiple ICU lines and monitors) upon OT entry to room.    General Precautions: Standard, aspiration, fall, NPO, respiratory   Orthopedic Precautions:N/A   Braces: N/A     Occupational Performance:     Bed Mobility:    · Patient completed Rolling/Turning to Left with  maximal assistance  · Patient completed Scooting/Bridging with minimum assistance and scoot seated to  EOB  · Patient completed Supine to Sit with maximal assistance secondary to decreased participation.      Functional Mobility/Transfers:  · Patient completed Sit <> Stand Transfer with contact guard assistance and minimum assistance  with  hand-held assist   · Patient completed Bed <> Chair Transfer using Stand Pivot technique with contact guard assistance and minimum assistance with hand-held assist  · Functional Mobility: Pt took 2 steps to chair with CGA-Min A.    Activities of Daily Living:  · Pt refused all attempts at ADL's.      [facility] 6 Click ADL: 11    Treatment & Education:  Pt sat EOB x 5 min with SBA while prepping for transfer.   Pt refused UE/LE therex.    Patient left up in chair with all lines intact, call button in reach, RN notified and sister presentEducation:      GOALS:   Multidisciplinary Problems     Occupational Therapy Goals        Problem: Occupational Therapy Goal    Goal Priority Disciplines Outcome Interventions   Occupational Therapy Goal     OT, PT/OT Ongoing (interventions implemented as appropriate)    Description:  Goals to be met by: 3/7/2019     Patient will increase functional independence with ADLs by performing:    UE Dressing with Minimal Assistance.  LE Dressing with Moderate Assistance.  Grooming while seated with Minimal Assistance.  Toileting from bedside commode with Moderate Assistance for hygiene and clothing management.   Sitting at edge of bed x10 minutes with Moderate Assistance.  Supine to sit with Maximum Assistance.  Increased functional UE strength to 3/5                       Time Tracking:     OT Date of Treatment: 02/12/19  OT Start Time: 0950  OT Stop Time: 1007  OT Total Time (min): 17 min    Billable Minutes:Therapeutic Activity 17    CECY Faust  2/12/2019

## 2019-02-13 ENCOUNTER — ANESTHESIA (OUTPATIENT)
Dept: INTENSIVE CARE | Facility: HOSPITAL | Age: 64
DRG: 826 | End: 2019-02-13
Payer: MEDICARE

## 2019-02-13 ENCOUNTER — ANESTHESIA EVENT (OUTPATIENT)
Dept: INTENSIVE CARE | Facility: HOSPITAL | Age: 64
DRG: 826 | End: 2019-02-13
Payer: MEDICARE

## 2019-02-13 LAB
ALBUMIN SERPL BCP-MCNC: 2.7 G/DL
ALLENS TEST: ABNORMAL
ALP SERPL-CCNC: 194 U/L
ALT SERPL W/O P-5'-P-CCNC: 13 U/L
ANION GAP SERPL CALC-SCNC: 7 MMOL/L
AST SERPL-CCNC: 12 U/L
BASOPHILS # BLD AUTO: 0.01 K/UL
BASOPHILS NFR BLD: 0.1 %
BILIRUB SERPL-MCNC: 0.6 MG/DL
BUN SERPL-MCNC: 12 MG/DL
CALCIUM SERPL-MCNC: 8.2 MG/DL
CHLORIDE SERPL-SCNC: 107 MMOL/L
CO2 SERPL-SCNC: 28 MMOL/L
CREAT SERPL-MCNC: 0.6 MG/DL
DELSYS: ABNORMAL
DIFFERENTIAL METHOD: ABNORMAL
EOSINOPHIL # BLD AUTO: 0.2 K/UL
EOSINOPHIL NFR BLD: 3.2 %
ERYTHROCYTE [DISTWIDTH] IN BLOOD BY AUTOMATED COUNT: 18.5 %
ERYTHROCYTE [SEDIMENTATION RATE] IN BLOOD BY WESTERGREN METHOD: 18 MM/H
EST. GFR  (AFRICAN AMERICAN): >60 ML/MIN/1.73 M^2
EST. GFR  (NON AFRICAN AMERICAN): >60 ML/MIN/1.73 M^2
FIO2: 30
GLUCOSE SERPL-MCNC: 297 MG/DL
HCO3 UR-SCNC: 23.5 MMOL/L (ref 24–28)
HCT VFR BLD AUTO: 29.9 %
HGB BLD-MCNC: 8.8 G/DL
LYMPHOCYTES # BLD AUTO: 0.9 K/UL
LYMPHOCYTES NFR BLD: 12.2 %
MAGNESIUM SERPL-MCNC: 2.1 MG/DL
MCH RBC QN AUTO: 28.3 PG
MCHC RBC AUTO-ENTMCNC: 29.4 G/DL
MCV RBC AUTO: 96 FL
MODE: ABNORMAL
MONOCYTES # BLD AUTO: 0.6 K/UL
MONOCYTES NFR BLD: 8.3 %
NEUTROPHILS # BLD AUTO: 5.6 K/UL
NEUTROPHILS NFR BLD: 76.2 %
PCO2 BLDA: 39 MMHG (ref 35–45)
PEEP: 5
PH SMN: 7.39 [PH] (ref 7.35–7.45)
PHOSPHATE SERPL-MCNC: 1.5 MG/DL
PLATELET # BLD AUTO: 1087 K/UL
PLATELET BLD QL SMEAR: ABNORMAL
PMV BLD AUTO: 9.6 FL
PO2 BLDA: 47 MMHG (ref 40–60)
POC BE: -2 MMOL/L
POC SATURATED O2: 82 % (ref 95–100)
POC TCO2: 25 MMOL/L (ref 24–29)
POCT GLUCOSE: 111 MG/DL (ref 70–110)
POCT GLUCOSE: 127 MG/DL (ref 70–110)
POCT GLUCOSE: 226 MG/DL (ref 70–110)
POCT GLUCOSE: 292 MG/DL (ref 70–110)
POCT GLUCOSE: 305 MG/DL (ref 70–110)
POCT GLUCOSE: 348 MG/DL (ref 70–110)
POCT GLUCOSE: 384 MG/DL (ref 70–110)
POTASSIUM SERPL-SCNC: 3.8 MMOL/L
PROT SERPL-MCNC: 6.3 G/DL
RBC # BLD AUTO: 3.11 M/UL
SAMPLE: ABNORMAL
SODIUM SERPL-SCNC: 142 MMOL/L
VT: 350
WBC # BLD AUTO: 7.44 K/UL

## 2019-02-13 PROCEDURE — 97530 THERAPEUTIC ACTIVITIES: CPT

## 2019-02-13 PROCEDURE — 63600175 PHARM REV CODE 636 W HCPCS: Performed by: STUDENT IN AN ORGANIZED HEALTH CARE EDUCATION/TRAINING PROGRAM

## 2019-02-13 PROCEDURE — 63600175 PHARM REV CODE 636 W HCPCS: Performed by: SURGERY

## 2019-02-13 PROCEDURE — 84100 ASSAY OF PHOSPHORUS: CPT

## 2019-02-13 PROCEDURE — 25000003 PHARM REV CODE 250: Performed by: STUDENT IN AN ORGANIZED HEALTH CARE EDUCATION/TRAINING PROGRAM

## 2019-02-13 PROCEDURE — 94640 AIRWAY INHALATION TREATMENT: CPT

## 2019-02-13 PROCEDURE — 92526 ORAL FUNCTION THERAPY: CPT

## 2019-02-13 PROCEDURE — 80053 COMPREHEN METABOLIC PANEL: CPT

## 2019-02-13 PROCEDURE — 83735 ASSAY OF MAGNESIUM: CPT

## 2019-02-13 PROCEDURE — 25000003 PHARM REV CODE 250: Performed by: NURSE PRACTITIONER

## 2019-02-13 PROCEDURE — C9113 INJ PANTOPRAZOLE SODIUM, VIA: HCPCS | Performed by: SURGERY

## 2019-02-13 PROCEDURE — 99900035 HC TECH TIME PER 15 MIN (STAT)

## 2019-02-13 PROCEDURE — 85025 COMPLETE CBC W/AUTO DIFF WBC: CPT

## 2019-02-13 PROCEDURE — 25000003 PHARM REV CODE 250: Performed by: SURGERY

## 2019-02-13 PROCEDURE — B4185 PARENTERAL SOL 10 GM LIPIDS: HCPCS | Performed by: SURGERY

## 2019-02-13 PROCEDURE — 94664 DEMO&/EVAL PT USE INHALER: CPT

## 2019-02-13 PROCEDURE — 20000000 HC ICU ROOM

## 2019-02-13 PROCEDURE — 25000242 PHARM REV CODE 250 ALT 637 W/ HCPCS: Performed by: SURGERY

## 2019-02-13 PROCEDURE — 97535 SELF CARE MNGMENT TRAINING: CPT

## 2019-02-13 PROCEDURE — 94668 MNPJ CHEST WALL SBSQ: CPT

## 2019-02-13 PROCEDURE — 87449 NOS EACH ORGANISM AG IA: CPT

## 2019-02-13 PROCEDURE — 82803 BLOOD GASES ANY COMBINATION: CPT

## 2019-02-13 PROCEDURE — 94761 N-INVAS EAR/PLS OXIMETRY MLT: CPT

## 2019-02-13 PROCEDURE — 97110 THERAPEUTIC EXERCISES: CPT | Performed by: PHYSICAL THERAPIST

## 2019-02-13 PROCEDURE — 27000221 HC OXYGEN, UP TO 24 HOURS

## 2019-02-13 RX ORDER — PANTOPRAZOLE SODIUM 40 MG/1
40 TABLET, DELAYED RELEASE ORAL DAILY
Status: DISCONTINUED | OUTPATIENT
Start: 2019-02-13 | End: 2019-02-14

## 2019-02-13 RX ORDER — KETOROLAC TROMETHAMINE 30 MG/ML
15 INJECTION, SOLUTION INTRAMUSCULAR; INTRAVENOUS EVERY 6 HOURS
Status: COMPLETED | OUTPATIENT
Start: 2019-02-13 | End: 2019-02-14

## 2019-02-13 RX ORDER — ENOXAPARIN SODIUM 100 MG/ML
40 INJECTION SUBCUTANEOUS
Status: DISCONTINUED | OUTPATIENT
Start: 2019-02-13 | End: 2019-02-20

## 2019-02-13 RX ORDER — PREGABALIN 50 MG/1
50 CAPSULE ORAL 2 TIMES DAILY
Status: DISCONTINUED | OUTPATIENT
Start: 2019-02-13 | End: 2019-02-26 | Stop reason: HOSPADM

## 2019-02-13 RX ADMIN — ONDANSETRON 4 MG: 2 INJECTION INTRAMUSCULAR; INTRAVENOUS at 10:02

## 2019-02-13 RX ADMIN — PANTOPRAZOLE SODIUM 40 MG: 40 INJECTION, POWDER, LYOPHILIZED, FOR SOLUTION INTRAVENOUS at 08:02

## 2019-02-13 RX ADMIN — METOPROLOL TARTRATE 5 MG: 1 INJECTION, SOLUTION INTRAVENOUS at 02:02

## 2019-02-13 RX ADMIN — IPRATROPIUM BROMIDE AND ALBUTEROL SULFATE 3 ML: .5; 3 SOLUTION RESPIRATORY (INHALATION) at 07:02

## 2019-02-13 RX ADMIN — INSULIN DETEMIR 25 UNITS: 100 INJECTION, SOLUTION SUBCUTANEOUS at 08:02

## 2019-02-13 RX ADMIN — ACETAMINOPHEN 650 MG: 650 SOLUTION ORAL at 02:02

## 2019-02-13 RX ADMIN — LAMOTRIGINE 150 MG: 100 TABLET ORAL at 08:02

## 2019-02-13 RX ADMIN — LIDOCAINE 1 PATCH: 50 PATCH TOPICAL at 10:02

## 2019-02-13 RX ADMIN — HYDROXYZINE HYDROCHLORIDE 25 MG: 10 SOLUTION ORAL at 08:02

## 2019-02-13 RX ADMIN — ONDANSETRON 4 MG: 2 INJECTION INTRAMUSCULAR; INTRAVENOUS at 04:02

## 2019-02-13 RX ADMIN — INSULIN ASPART 10 UNITS: 100 INJECTION, SOLUTION INTRAVENOUS; SUBCUTANEOUS at 12:02

## 2019-02-13 RX ADMIN — PREGABALIN 50 MG: 50 CAPSULE ORAL at 08:02

## 2019-02-13 RX ADMIN — HYDROMORPHONE HYDROCHLORIDE 0.2 MG: 2 INJECTION INTRAMUSCULAR; INTRAVENOUS; SUBCUTANEOUS at 08:02

## 2019-02-13 RX ADMIN — HYDROMORPHONE HYDROCHLORIDE 0.2 MG: 2 INJECTION INTRAMUSCULAR; INTRAVENOUS; SUBCUTANEOUS at 02:02

## 2019-02-13 RX ADMIN — INSULIN ASPART 8 UNITS: 100 INJECTION, SOLUTION INTRAVENOUS; SUBCUTANEOUS at 08:02

## 2019-02-13 RX ADMIN — IPRATROPIUM BROMIDE AND ALBUTEROL SULFATE 3 ML: .5; 3 SOLUTION RESPIRATORY (INHALATION) at 04:02

## 2019-02-13 RX ADMIN — METOPROLOL TARTRATE 5 MG: 1 INJECTION, SOLUTION INTRAVENOUS at 08:02

## 2019-02-13 RX ADMIN — IPRATROPIUM BROMIDE AND ALBUTEROL SULFATE 3 ML: .5; 3 SOLUTION RESPIRATORY (INHALATION) at 01:02

## 2019-02-13 RX ADMIN — INSULIN ASPART 3 UNITS: 100 INJECTION, SOLUTION INTRAVENOUS; SUBCUTANEOUS at 12:02

## 2019-02-13 RX ADMIN — AMITRIPTYLINE HYDROCHLORIDE 50 MG: 25 TABLET, FILM COATED ORAL at 08:02

## 2019-02-13 RX ADMIN — IPRATROPIUM BROMIDE AND ALBUTEROL SULFATE 3 ML: .5; 3 SOLUTION RESPIRATORY (INHALATION) at 12:02

## 2019-02-13 RX ADMIN — KETOROLAC TROMETHAMINE 15 MG: 30 INJECTION, SOLUTION INTRAMUSCULAR at 05:02

## 2019-02-13 RX ADMIN — MIRTAZAPINE 15 MG: 15 TABLET, ORALLY DISINTEGRATING ORAL at 08:02

## 2019-02-13 RX ADMIN — ENOXAPARIN SODIUM 40 MG: 100 INJECTION SUBCUTANEOUS at 10:02

## 2019-02-13 RX ADMIN — FLUCONAZOLE 200 MG: 40 POWDER, FOR SUSPENSION ORAL at 08:02

## 2019-02-13 RX ADMIN — ASPIRIN 81 MG 81 MG: 81 TABLET ORAL at 08:02

## 2019-02-13 RX ADMIN — KETOROLAC TROMETHAMINE 15 MG: 30 INJECTION, SOLUTION INTRAMUSCULAR at 11:02

## 2019-02-13 RX ADMIN — INSULIN ASPART 8 UNITS: 100 INJECTION, SOLUTION INTRAVENOUS; SUBCUTANEOUS at 04:02

## 2019-02-13 RX ADMIN — HYDROXYZINE HYDROCHLORIDE 25 MG: 10 SOLUTION ORAL at 02:02

## 2019-02-13 RX ADMIN — SODIUM PHOSPHATE, MONOBASIC, MONOHYDRATE 30 MMOL: 276; 142 INJECTION, SOLUTION INTRAVENOUS at 10:02

## 2019-02-13 RX ADMIN — INSULIN ASPART 4 UNITS: 100 INJECTION, SOLUTION INTRAVENOUS; SUBCUTANEOUS at 04:02

## 2019-02-13 RX ADMIN — IPRATROPIUM BROMIDE AND ALBUTEROL SULFATE 3 ML: .5; 3 SOLUTION RESPIRATORY (INHALATION) at 03:02

## 2019-02-13 RX ADMIN — ASCORBIC ACID, VITAMIN A PALMITATE, CHOLECALCIFEROL, THIAMINE HYDROCHLORIDE, RIBOFLAVIN-5 PHOSPHATE SODIUM, PYRIDOXINE HYDROCHLORIDE, NIACINAMIDE, DEXPANTHENOL, ALPHA-TOCOPHEROL ACETATE, VITAMIN K1, FOLIC ACID, BIOTIN, CYANOCOBALAMIN: 200; 3300; 200; 6; 3.6; 6; 40; 15; 10; 150; 600; 60; 5 INJECTION, SOLUTION INTRAVENOUS at 11:02

## 2019-02-13 RX ADMIN — IPRATROPIUM BROMIDE AND ALBUTEROL SULFATE 3 ML: .5; 3 SOLUTION RESPIRATORY (INHALATION) at 08:02

## 2019-02-13 RX ADMIN — ROPIVACAINE HYDROCHLORIDE 10 ML: 2 INJECTION, SOLUTION EPIDURAL; INFILTRATION at 11:02

## 2019-02-13 RX ADMIN — I.V. FAT EMULSION 250 ML: 20 EMULSION INTRAVENOUS at 10:02

## 2019-02-13 NOTE — PROGRESS NOTES
X-ray was called to be done stat, per tech that they are busy doing others and will be here soon. Feeding tube still on held and will not administer any meds thru NGT till placement is verified. Pending KUB.

## 2019-02-13 NOTE — PLAN OF CARE
Problem: Adult Inpatient Plan of Care  Goal: Plan of Care Review  Outcome: Ongoing (interventions implemented as appropriate)   02/13/19 0400   Plan of Care Review   Plan of Care Reviewed With Patient; Safety: call light in reach, patient oriented to room & instructed how to notify nurse if assistance is needed, current questions/concerns addressed, bed in lowest position with wheels locked & side rails up X 3. Pt and family were educated regarding fall precaution and taking appropriate action. Activity: repositions self independently  Neurological: Oriented x3; frequent reorientation provided Respiratory: On 2L NC o2 sat wnl  Cardiac: BP; stable;  HR stable. Afebrile this shift. Intake/Output:UO adequate; whitlock d/c'd per order; no problem with urinating; no bm overnight; ngt to feeding tube and tolerated well; goal has been reached.  Pain: controlled with prn medication Skin: wound vac in place and incision site well approximated; Plan: Plan of care and meds reviewed; All questions and concerns were addressed. Patient requires a lot of education as provided and reinforced; at times non-compliant with treatments. Education provided will continue to monitor         Problem: Fall Injury Risk  Goal: Absence of Fall and Fall-Related Injury  Outcome: Ongoing (interventions implemented as appropriate)  Intervention: Identify and Manage Contributors to Fall Injury Risk   02/13/19 0400   Manage Acute Allergic Reaction   Medication Review/Management medications reviewed     Intervention: Promote Injury-Free Environment   02/13/19 0400   Optimize [place] and Functional Mobility   Environmental Safety Modification clutter free environment maintained;lighting adjusted   Optimize Balance and Safe Activity   Safety Promotion/Fall Prevention Fall Risk signage in place;commode/urinal/bedpan at bedside;side rails raised x 3;nonskid shoes/socks when out of bed;lighting adjusted;high risk medications identified         Problem:  Infection  Goal: Infection Symptom Resolution  Outcome: Ongoing (interventions implemented as appropriate)  Intervention: Prevent or Manage Infection   02/13/19 0400   Prevent or Manage Infection   Fever Reduction/Comfort Measures lightweight bedding;lightweight clothing   Infection Management aseptic technique maintained

## 2019-02-13 NOTE — PLAN OF CARE
Problem: SLP Goal  Goal: SLP Goal  Short Term Goals:  1. Pt will participate in BSS to determine least restrictive diet.-discontinued 2/11  2. Pt will successfully participate in Modified Barium Swallow study to radiographically assess swallow function, rule out aspiration and determine safest/least restrictive diet.- MET 2/11  3. Pt will participate in trial sessions of indirect dysphagia exercises to strengthen musculature for swallowing mechanism with mod-max effort.             Outcome: Ongoing (interventions implemented as appropriate)  Pt required encouragement with indirect dysphagia ex's. Pt with noted mouth sores, gums bleeding and dry mucosa along inside of cheeks. Pt with significant choking noted on oral secretions earlier in the day.

## 2019-02-13 NOTE — PLAN OF CARE
Problem: Occupational Therapy Goal  Goal: Occupational Therapy Goal  Goals to be met by: 3/7/2019     Patient will increase functional independence with ADLs by performing:    UE Dressing with Minimal Assistance.  LE Dressing with Moderate Assistance.  Grooming while seated with Minimal Assistance.  Toileting from bedside commode with Moderate Assistance for hygiene and clothing management.   Sitting at edge of bed x10 minutes with Moderate Assistance.  Supine to sit with Maximum Assistance.  Increased functional UE strength to 3/5      Outcome: Ongoing (interventions implemented as appropriate)  Pt with continued c/o fatigue but amenable to participation in tx with encouragement. Pt progressing towards goals. Cont POC

## 2019-02-13 NOTE — PROGRESS NOTES
Clinimix/ TPN orders renewed for today, same formula and at the same rate.   Lipid emulsion to be given every Mon-Wed-Fri  at 22:00  (per [employer] approved pharmacy protocol).

## 2019-02-13 NOTE — PROGRESS NOTES
[facility]  Neuroendocrine Tumor Service  Progress Note     Admission Date: 1/25/2019  Hospital Length of Stay: 19  Principal Problem: Malignant carcinoid tumor of pancreas     Subjective:      Operation:  64 yo F w/ PNET, s/p reversal of Sandeep-en-Y w/ Jtube placement  POD#19    1/25/18  Ex lap, gastro-gastrostomy EEA with 33 mm circular stapler, Pyloroplasty and gastrostomy closure, enteric resection with side to side to anstamosis and restoration of small bowel continuity using Idrive tan load stapler, Liver US, Liver biopsy, Liver resection segment 3, Supraceliac lymphadenectomy, FNA pancreas, Lt salpingo oopherectomy, Meckel diverticulectomy, Feeding jejunostomy 14 Fr malecot, ICG perfusion and Infrared exam of all anastamosis    Interval History:   No acute events  Patient failed MBSS - not safe for PO   NGT placed, Jtube out  Barney out yest    Scheduled Meds:   albuterol-ipratropium  3 mL Nebulization Q4H    amitriptyline  50 mg Oral QHS    aspirin  81 mg Oral Daily    enoxaparin  40 mg Subcutaneous Daily    fat emulsion 20%  250 mL Intravenous Daily    fluconazole 40 mg/ml  200 mg Per NG tube Daily    hydrOXYzine  25 mg Per NG tube TID    insulin aspart U-100  1-10 Units Subcutaneous 6 times per day    insulin detemir U-100  25 Units Subcutaneous Daily    lamoTRIgine  150 mg Oral BID    lidocaine  1 patch Transdermal Q24H    metoprolol  5 mg Intravenous Q6H    mirtazapine  15 mg Per NG tube Nightly    pantoprazole  40 mg Intravenous Daily    sodium phosphate IVPB  30 mmol Intravenous Once     Continuous Infusions:    PRN Meds:.sodium chloride, acetaminophen, acetaminophen, albuterol sulfate, artificial tears, dextrose 50%, diphenhydrAMINE, diphenhydrAMINE, fentaNYL, glucagon (human recombinant), hydrALAZINE, HYDROmorphone, HYDROmorphone, ipratropium, labetalol, lidocaine HCL 10 mg/ml (1%), lidocaine HCl 2%, lorazepam, midazolam, naloxone, ondansetron, sodium  chloride 0.9%, sodium chloride 3%, tiZANidine    Objective:      Temp:  [97.5 °F (36.4 °C)-97.8 °F (36.6 °C)] 97.6 °F (36.4 °C)  Pulse:  [] 105  Resp:  [11-30] 18  SpO2:  [90 %-100 %] 95 %  BP: (127-173)/(52-72) 139/64  Weight change: 2.9 kg (6 lb 6.3 oz)    Intake/Output Summary (Last 24 hours) at 2/13/2019 0910  Last data filed at 2/13/2019 0600  Gross per 24 hour   Intake 1560.33 ml   Output 1485 ml   Net 75.33 ml       Lines:  RIJ TLC  NGT    Physical Exam:  GEN:NAD  CV: RRR  PULM: unlabored with 3L NC  ABD: S/NT/ ND, incision c/some superior bilious drainage  EXT: Peripheral pulses present and equal bilaterally    Recent Labs   Lab 02/13/19  0528      K 3.8   CO2 28      BUN 12   CREATININE 0.6   CALCIUM 8.2*   PROT 6.3   AST 12   ALT 13   ALKPHOS 194*   BILITOT 0.6     Recent Labs   Lab 02/13/19  0528   WBC 7.44   HGB 8.8*   HCT 29.9*   PLT 1,087*       Significant Imaging: I have reviewed all pertinent imaging results/findings within the past 24 hours.     Assessment and Plan:   62 yo F w/ PNET, s/p reversal of Sandeep-en-Y w/ Jtube placement  POD#19    1/25/18  Ex lap, gastro-gastrostomy EEA with 33 mm circular stapler, Pyloroplasty and gastrostomy closure, enteric resection with side to side to anstamosis and restoration of small bowel continuity using Idrive tan load stapler, Liver US, Liver biopsy, Liver resection segment 3, Supraceliac lymphadenectomy, FNA pancreas, Lt salpingo oopherectomy, Meckel diverticulectomy, Feeding jejunostomy 14 Fr malecot, ICG perfusion and Infrared exam of all anastamosis    Neuro - No sedation and AOx3.   FEN/GI - Continue NPO, TPN on, TFs 30 cc/hr, check residuals, replete lytes prn, ordered this AM, Jtube pulled, was not functioning, will keep out  ID - cont rocephin and fluconazole will assess if further antibiotics are needed  Heme: H/H stable  Pulm - On NC and RA. Aggressive chest CPT and acapella. ABGs. Resp Cx growing E Coli.   Renal - Cr good, [place]  for HD monitoring, good UOP, cont whitlock  Surgical - WV in place white sponge then black sponge, change MWF eventually  PPX - continue lovenox, protonix, scd in place  Dispo - Remains in critical condition cont ICU care    Colton Olivier MD - [facility]

## 2019-02-13 NOTE — NURSING
Pt getting up to chair with PT/OT. Complained of nausea then vomited. Aspiration noted as pt had trouble clearing airway. Sats in the 80's. Oral suctioned. Pt refused NT suction. RT called. Cough encouraged. TF stopped and NGT placed to suction until pt had relief of nausea. Dr. Levy notified. Dr. Titus also aware. TF on hold until this afternoon. Verbal order to restart TF at 10cc hour slowly increasing to 30mls/hr. After pt was able to recover, sats are now 100% on 2L.

## 2019-02-13 NOTE — PT/OT/SLP PROGRESS
Occupational Therapy   Treatment    Name: Kaylee Ngo  MRN: [id number]  Admitting Diagnosis:  Malignant carcinoid tumor of pancreas  19 Days Post-Op    Recommendations:     Discharge Recommendations: nursing facility, skilled  Discharge Equipment Recommendations:  (TBD)  Barriers to discharge:  None    Assessment:     Kaylee Ngo is a 63 y.o. female with a medical diagnosis of Malignant carcinoid tumor of pancreas. Performance deficits affecting function are weakness, impaired functional mobilty, impaired sensation, impaired endurance, gait instability, decreased safety awareness, impaired cardiopulmonary response to activity, decreased lower extremity function, decreased upper extremity function, impaired self care skills.     Rehab Prognosis:  Good; patient would benefit from acute skilled OT services to address these deficits and reach maximum level of function.       Plan:     Patient to be seen 5 x/week to address the above listed problems via self-care/home management, therapeutic activities, therapeutic exercises  · Plan of Care Expires: 03/07/19  · Plan of Care Reviewed with: patient, sibling    Subjective     Pain/Comfort:  · Pain Rating 1: 0/10    Objective:     Communicated with: Nurse prior to session.  Patient found HOB elevated with NG tube, oxygen, blood pressure cuff, peripheral IV, telemetry upon OT entry to room. Sister present.     General Precautions: Standard, aspiration, fall, NPO, respiratory   Orthopedic Precautions:N/A   Braces: N/A     Occupational Performance:     Bed Mobility:    · Patient completed Rolling/Turning to Left with  minimum assistance  · Patient completed Supine to Sit with minimum assistance     Functional Mobility/Transfers:  · Patient completed Sit <> Stand Transfer with minimum assistance  with  hand-held assist   · Patient completed Bed <> Chair Transfer using Step Transfer technique with minimum assistance with hand-held assist    Activities of Daily  "Living:  · Grooming: stand by assistance washing face with bathwipe seated EOB      [facility] 6 Click ADL: 11    Treatment & Education:  Pt performed skills as above. Pt continues to c/o fatigue stating "I just can't do it today", but pt amenable to participation in tx with max encouragement from OT, sister, and MD. Pt perseverating on "taking a hot shower" but pt advised the she is not safe to do so at this time. Pt required max encouragement to sit EOB and to t/f to bedside chair. Pt c/o nausea and asked for "more Zofran", RN notified. Pt given emesis bag when seated in chair. Pt vomited thin yellow emesis into emesis bag and appeared to aspirate, as evidenced by audible sounds; yankeur used to suction emesis from throat with max encouragement and verbal cues 2/2 pt's oral aversion to yankeur. Nursing notified immediately and presented to room.    Patient left up in chair with all lines intact, call button in reach and nurse notifiedEducation:      GOALS:   Multidisciplinary Problems     Occupational Therapy Goals        Problem: Occupational Therapy Goal    Goal Priority Disciplines Outcome Interventions   Occupational Therapy Goal     OT, PT/OT Ongoing (interventions implemented as appropriate)    Description:  Goals to be met by: 3/7/2019     Patient will increase functional independence with ADLs by performing:    UE Dressing with Minimal Assistance.  LE Dressing with Moderate Assistance.  Grooming while seated with Minimal Assistance.  Toileting from bedside commode with Moderate Assistance for hygiene and clothing management.   Sitting at edge of bed x10 minutes with Moderate Assistance.  Supine to sit with Maximum Assistance.  Increased functional UE strength to 3/5                       Time Tracking:     OT Date of Treatment: 02/13/19  OT Start Time: 0936  OT Stop Time: 1009  OT Total Time (min): 33 min    Billable Minutes:Self Care/Home Management 16  Therapeutic Activity 17    Pari Stout, " SOT  2/13/2019    I certify that I was present in the room directing the student in service delivery and guiding them using my skilled judgment. As the co-signing therapist I have reviewed the students documentation and am responsible for the treatment, assessment, and plan.

## 2019-02-13 NOTE — PT/OT/SLP PROGRESS
"Speech Language Pathology Treatment    Patient Name:  Kaylee Ngo   MRN:  [id number]  Admitting Diagnosis: Malignant carcinoid tumor of pancreas    Recommendations:                 General Recommendations:  dysphagia treatment   Diet recommendations:  NPO, Liquid Diet Level: NPO   Aspiration Precautions: oral care   General Precautions: Standard, fall, aspiration, respiratory, NPO  Communication strategies:  able to express self     Subjective     Pt seen at bedside for indirect dysphagia tx. Pt found reclining in bed. Sister at bedside.  Patient goals: "I feel so cold all over."      Pain/Comfort:  · Pain Rating 1: 0/10    Objective:     Has the patient been evaluated by SLP for swallowing?   Yes  Keep patient NPO? Yes   Current Respiratory Status: nasal cannula      Pt seen in ICU, she was awake and sister at bedside. Pt did require verbal cues to participate in dysphagia ex. Reviewed MBS results and pt did inquire about drinking water. Discussed NPO rationale and risk for aspiration with continued oral trials. Pt with choking episode earlier on saliva and required NDT suctioning by respiratory and also demonstrated slight emesis episode.   Pt had no recall of episode earlier. Sister did ask questions about ex and overall progress towards eventual PO diet. She verbalized understanding.   Pt completed lingual ROM/strength/coordination  Ex x20, laryngeal lift x20, BOT x20, g/k words x20. Pt with fair effort noted and did required verbal cues for accuracy. Pt with strong voice noted during session and did report feeling saliva in back of her throat.   Pt did continue to ask for water again during session. Pt with dcr insight into severity of her dysphagia.     Assessment:     Kaylee Ngo is a 63 y.o. female admitted with Malignant carcinoid tumor of pancreas who presents with an SLP diagnosis of oral and pharyngeal dysphagia.      Goals:   Multidisciplinary Problems     SLP Goals        Problem: SLP Goal    " Goal Priority Disciplines Outcome   SLP Goal     SLP Ongoing (interventions implemented as appropriate)   Description:  Short Term Goals:  1. Pt will participate in BSS to determine least restrictive diet.-discontinued 2/11  2. Pt will successfully participate in Modified Barium Swallow study to radiographically assess swallow function, rule out aspiration and determine safest/least restrictive diet.- MET 2/11  3. Pt will participate in trial sessions of indirect dysphagia exercises to strengthen musculature for swallowing mechanism with mod-max effort.                              Plan:     · Patient to be seen:  3 x/week   · Plan of Care expires:  03/08/19  · Plan of Care reviewed with:  patient(sister)   · SLP Follow-Up:  Yes       Discharge recommendations:  nursing facility, skilled     Time Tracking:     SLP Treatment Date:   02/13/19  Speech Start Time:  1400  Speech Stop Time:  1416     Speech Total Time (min):  16 min    Billable Minutes: Treatment Swallowing Dysfunction 16    QUIANA Herrera, CCC-SLP  02/13/2019

## 2019-02-13 NOTE — ANESTHESIA PROCEDURE NOTES
Procedures Trigger point injection.    Trigger point pain noted 2cm left lateral to L2/L3 spinous process in which 10mL of 0.2% Ropivacaine was injected intramuscular and subcutaneous giving a wheal diameter of approximately 3 cm after sterile prep with alcohol. Patient tolerated the procedure well and pain was LBP was relieved 5 minutes after injection.      Marco Antonio Ricardo MD  [employer] [facility]  12:33 PM

## 2019-02-13 NOTE — PT/OT/SLP PROGRESS
Physical Therapy Treatment    Patient Name:  Kaylee Ngo   MRN:  [id number]    Recommendations:     Discharge Recommendations:  nursing facility, skilled   Discharge Equipment Recommendations: (TBD)   Barriers to discharge: Decreased caregiver support    Assessment:     Kaylee Ngo is a 63 y.o. female admitted with a medical diagnosis of Malignant carcinoid tumor of pancreas.  She presents with the following impairments/functional limitations:  weakness, impaired functional mobilty, impaired cardiopulmonary response to activity, impaired endurance, gait instability, impaired balance, decreased lower extremity function, impaired self care skills. Pt refused mobility but eventually agreed to LE exercise in bed only.    Rehab Prognosis: Fair; patient would benefit from acute skilled PT services to address these deficits and reach maximum level of function.    Recent Surgery: Procedure(s) (LRB):  LAPAROTOMY, EXPLORATORY (N/A)  LYMPHADENECTOMY (N/A)  EXCISION, LIVER (N/A) 19 Days Post-Op    Plan:     During this hospitalization, patient to be seen 5 x/week to address the identified rehab impairments via gait training, therapeutic activities, therapeutic exercises, neuromuscular re-education, wheelchair management/training and progress toward the following goals:    · Plan of Care Expires:  03/13/19    Subjective     Chief Complaint: nausea when asked to sit up only  Patient/Family Comments/goals: none stated  Pain/Comfort:  · Pain Rating 1: 0/10      Objective:     Communicated with nurse prior to session.  Patient found all lines intact, call button in reach and sister present NG tube, oxygen, blood pressure cuff, peripheral IV, telemetry  upon PT entry to room.     General Precautions: Standard, fall, aspiration, respiratory, NPO   Orthopedic Precautions:N/A   Braces:         AM-PAC 6 CLICK MOBILITY  Turning over in bed (including adjusting bedclothes, sheets and blankets)?: 3  Sitting down on and standing up  from a chair with arms (e.g., wheelchair, bedside commode, etc.): 3  Moving from lying on back to sitting on the side of the bed?: 3  Moving to and from a bed to a chair (including a wheelchair)?: 2  Need to walk in hospital room?: 2  Climbing 3-5 steps with a railing?: 1  Basic Mobility Total Score: 14       Therapeutic Activities and Exercises:   Pt performed AAROM BLE's knee/hip flex/ext, hip abd/add, hip IR/ER, SAQ and AP all 20 x 3 with rest breaks.  Pt refused getting OOB.    Patient left HOB elevated with all lines intact, call button in reach and nurse notified..    GOALS:   Multidisciplinary Problems     Physical Therapy Goals        Problem: Physical Therapy Goal    Goal Priority Disciplines Outcome Goal Variances Interventions   Physical Therapy Goal     PT, PT/OT Ongoing (interventions implemented as appropriate)     Description:  Goals to be met by: 19     Patient will increase functional independence with mobility by performin. Supine <> sit with Stand-by Assistance  2. Sit to stand transfer with Stand-by Assistance  3. Bed to chair transfer with Stand-by Assistance using Rolling Walker  4. Gait  x 50 feet with Stand-by Assistance using Rolling Walker.              Problem: Physical Therapy Goal    Goal Priority Disciplines Outcome Goal Variances Interventions   Physical Therapy Goal     PT, PT/OT Ongoing (interventions implemented as appropriate)     Description:  Goals to be met by: 3/4/2019     Patient will increase functional independence with mobility by performin. Supine to sit with Moderate Assistance MET 2019  2. Sit to supine with Moderate Assistance   3. Rolling to Left and Right with Moderate Assistance.   4. Sitting at edge of bed x10 minutes with Minimal Assistance and Moderate Assistance  4. Lower extremity exercise program x5-10 active reps with assistance as needed - met 19    Updated Goals 2019:  1. Supine<>sit with SBA  2. Sit to stand with  CGA  3. Bed  to chair with CGA  4. Gait x 20 ft withmin A               Problem: Physical Therapy Goal    Goal Priority Disciplines Outcome Goal Variances Interventions   Physical Therapy Goal     PT, PT/OT             Problem: Physical Therapy Goal    Goal Priority Disciplines Outcome Goal Variances Interventions   Physical Therapy Goal     PT, PT/OT             Problem: Physical Therapy Goal    Goal Priority Disciplines Outcome Goal Variances Interventions   Physical Therapy Goal     PT, PT/OT                      Time Tracking:     PT Received On: 02/13/19  PT Start Time: 1335     PT Stop Time: 1403  PT Total Time (min): 28 min     Billable Minutes: Therapeutic Exercise 28    Treatment Type: Treatment  PT/PTA: PT     PTA Visit Number: 1     Odilia Hernández, PT  02/13/2019

## 2019-02-13 NOTE — PROGRESS NOTES
Spoke with Dr. Titus upon assessment noted patient had some of ngt curled up in her mouth and it was hard to understand her.  Pulled the ngt up enough to push it back to her throat without taking the ngt completely out while the feeding tube was held. MD was notified and requested for KUB STAT. Per MD to call back once the KUB resulted. Will implement the order.

## 2019-02-13 NOTE — ANESTHESIA POST-OP PAIN MANAGEMENT
Acute Pain Service Progress Note    Kaylee Ngo is a 63 y.o., female, [phone] Trigger point pain noted 2cm left lateral to L2/L3 spinous process in which 10mL of 0.2% Ropivacaine was injected with a 22 gauge needle intramuscular and subcutaneous giving a wheal diameter of approximately 3 cm after sterile prep with alcohol.     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       Subjective:     General appearance of alert, oriented, no complaints   Pain with rest: 0        Vitals   Vitals:    02/13/19 1201   BP:    Pulse: 102   Resp: (!) 31   Temp:         Labs    Admission on 01/25/2019   No results displayed because visit has over 200 results.           Meds   Current Facility-Administered Medications   Medication Dose Route Frequency Provider Last Rate Last Dose    0.9%  NaCl infusion (for blood administration)   Intravenous Q24H PRN Cam Ashton MD        acetaminophen oral solution 650 mg  650 mg Per J Tube Q4H PRN Nash Titus MD   650 mg at 02/12/19 1733    acetaminophen suppository 650 mg  650 mg Rectal Q8H PRN Cam Ashton MD    650 mg at 02/03/19 1456    albuterol sulfate nebulizer solution 2.5 mg  2.5 mg Nebulization PRN Trudi Olivier MD        albuterol-ipratropium 2.5 mg-0.5 mg/3 mL nebulizer solution 3 mL  3 mL Nebulization Q4H Cam Ashton MD   3 mL at 02/13/19 1201    Amino acid 5% - dextrose 15% (CLINIMIX-E) solution with additives (1L  provides 510 kcal/L dextrose, with 50 gm AA, 150 gm CHO, Na 35, K 30, Mg 5, Ca 4.5, Acetate 80, Cl 39, Phos 15)   Intravenous Continuous Cam Ashton MD 20 mL/hr at 02/13/19 1100      Amino acid 5% - dextrose 15% (CLINIMIX-E) solution with additives (1L  provides 510 kcal/L dextrose, with 50 gm AA, 150 gm CHO, Na 35, K 30, Mg 5, Ca 4.5, Acetate 80, Cl 39, Phos 15)   Intravenous Continuous Cam Ashton MD        amitriptyline tablet 50 mg  50 mg Oral QHS Nash Titus MD   50 mg at 02/12/19 2127    artificial tears 0.5 % ophthalmic solution 1 drop  1 drop Both Eyes QID PRN Cam Ashton MD   1 drop at 02/11/19 0444    aspirin chewable tablet 81 mg  81 mg Oral Daily Nash Titus MD   81 mg at 02/13/19 0823    dextrose 50% injection 25 g  25 g Intravenous PRN Cam Ashton MD   25 g at 02/10/19 2251    diphenhydrAMINE injection 12.5 mg  12.5 mg Intravenous Q4H PRN Cam Ashton MD   12.5 mg at 01/31/19 1720    diphenhydrAMINE injection   Intravenous Code/trauma/sedation Med Gucci Hawkins II, MD   50 mg at 02/11/19 1550    enoxaparin injection 40 mg  40 mg Subcutaneous Q24H Trudi Olivier MD   40 mg at 02/13/19 1052    fat emulsion 20 % infusion 250 mL  250 mL Intravenous Daily Cam Ashton MD        fentaNYL injection   Intravenous Code/trauma/sedation Med Gucci Hawkins II, MD   50 mcg at 02/11/19 1708    fluconazole 40 mg/ml suspension 200 mg  200 mg Per NG tube Daily Cam Ashton MD   200 mg at 02/13/19 0831    glucagon (human recombinant) injection 1 mg  1 mg Intramuscular PRN  Cam Ashton MD        hydrALAZINE injection 5 mg  5 mg Intravenous PRN Cam Ashton MD        hydromorphone (PF) injection 0.2 mg  0.2 mg Intravenous Q6H PRN Cam Ashton MD   0.2 mg at 02/13/19 0830    hydromorphone (PF) injection   Intravenous Code/trauma/sedation Med Gucci Hawkins II, MD   0.25 mg at 02/11/19 1715    hydrOXYzine 10 mg/5 mL syrup 25 mg  25 mg Per NG tube TID Cam Ashton MD   25 mg at 02/13/19 0831    insulin aspart U-100 pen 1-10 Units  1-10 Units Subcutaneous 6 times per day Cam Ashton MD   8 Units at 02/13/19 0824    insulin detemir U-100 pen 25 Units  25 Units Subcutaneous Daily Trudi Olivier MD   25 Units at 02/13/19 0825    ipratropium 0.02 % nebulizer solution 0.5 mg  0.5 mg Nebulization Q4H PRN Teddy Mtz MD   0.5 mg at 01/28/19 2041    ketorolac injection 15 mg  15 mg Intravenous Q6H Cam Ashton MD   15 mg at 02/13/19 1105    labetalol 20 mg/4 mL (5 mg/mL) IV syring  5 mg Intravenous PRN Cam Ashton MD   5 mg at 02/04/19 1807    lamoTRIgine tablet 150 mg  150 mg Oral BID Nash Titus MD   150 mg at 02/13/19 0823    lidocaine 5 % patch 1 patch  1 patch Transdermal Q24H Trudi Olivier MD   1 patch at 02/13/19 1058    lidocaine HCL 10 mg/ml (1%) injection   Other Code/trauma/sedation Med Gucci Hawkins II, MD   5 mL at 02/11/19 1556    lidocaine HCl 2% oral solution 10 mL  10 mL Mucous Membrane On Call Procedure Nash Titus MD   10 mL at 02/12/19 1403    lorazepam (ATIVAN) injection 1 mg  1 mg Intravenous Q6H PRN Trudi Olivier MD   1 mg at 02/12/19 0241    metoprolol injection 5 mg  5 mg Intravenous Q6H MARLEEN Waddell, ANP   5 mg at 02/13/19 0823    midazolam (VERSED) 1 mg/mL injection    Code/trauma/sedation Med Gucci Hawkins II, MD   1 mg at 02/11/19 1708    mirtazapine disintegrating tablet 15 mg  15 mg Per NG tube Nightly Cam Ashton MD    15 mg at 02/12/19 2128    naloxone 0.4 mg/mL injection 0.02 mg  0.02 mg Intravenous PRN Cam Ashton MD        ondansetron injection 4 mg  4 mg Intravenous Q6H PRN Cam Ashton MD   4 mg at 02/13/19 1000    pantoprazole EC tablet 40 mg  40 mg Oral Daily Cam Ashton MD        pregabalin capsule 50 mg  50 mg Per NG tube BID Cam Ashton MD        sodium chloride 0.9% flush 3 mL  3 mL Intravenous PRN Ernesto Kiser MD   3 mL at 02/08/19 2025    sodium chloride 3% nebulizer solution 4 mL  4 mL Nebulization PRN Trudi Olivier MD   4 mL at 01/30/19 1915    sodium phosphate 30 mmol in dextrose 5 % 250 mL IVPB  30 mmol Intravenous Once Nash Titus MD 62.5 mL/hr at 02/13/19 1000 30 mmol at 02/13/19 1000    tiZANidine tablet 4 mg  4 mg Oral Q8H PRN Nash Titus MD         Facility-Administered Medications Ordered in Other Encounters   Medication Dose Route Frequency Provider Last Rate Last Dose    ropivacaine (PF) 2 mg/ml (0.2%) IVP    PRN Marco Antonio Ricardo MD   10 mL at 02/13/19 1130         Assessment:     Pain control adequate    Plan:     Patient doing well, continue present treatment.  Will repeat injection 2/14/19 am with Exparel.     Evaluator Marco Antonio Ricardo

## 2019-02-13 NOTE — PLAN OF CARE
Problem: Physical Therapy Goal  Goal: Physical Therapy Goal  Goals to be met by: 3/4/2019     Patient will increase functional independence with mobility by performin. Supine to sit with Moderate Assistance MET 2019  2. Sit to supine with Moderate Assistance   3. Rolling to Left and Right with Moderate Assistance.   4. Sitting at edge of bed x10 minutes with Minimal Assistance and Moderate Assistance  4. Lower extremity exercise program x5-10 active reps with assistance as needed - met 19    Updated Goals 2019:  1. Supine<>sit with SBA  2. Sit to stand with  CGA  3. Bed to chair with CGA  4. Gait x 20 ft withmin A      Outcome: Ongoing (interventions implemented as appropriate)  Pt would benefit from skilled PT to progress functional mobility.

## 2019-02-14 LAB
ALBUMIN SERPL BCP-MCNC: 2.6 G/DL
ALP SERPL-CCNC: 182 U/L
ALT SERPL W/O P-5'-P-CCNC: 13 U/L
ANION GAP SERPL CALC-SCNC: 9 MMOL/L
AST SERPL-CCNC: 15 U/L
BASOPHILS # BLD AUTO: 0.02 K/UL
BASOPHILS NFR BLD: 0.2 %
BILIRUB SERPL-MCNC: 0.5 MG/DL
BUN SERPL-MCNC: 12 MG/DL
CALCIUM SERPL-MCNC: 8.4 MG/DL
CHLORIDE SERPL-SCNC: 106 MMOL/L
CO2 SERPL-SCNC: 29 MMOL/L
CREAT SERPL-MCNC: 0.6 MG/DL
DIFFERENTIAL METHOD: ABNORMAL
EOSINOPHIL # BLD AUTO: 0.3 K/UL
EOSINOPHIL NFR BLD: 2.9 %
ERYTHROCYTE [DISTWIDTH] IN BLOOD BY AUTOMATED COUNT: 18.8 %
EST. GFR  (AFRICAN AMERICAN): >60 ML/MIN/1.73 M^2
EST. GFR  (NON AFRICAN AMERICAN): >60 ML/MIN/1.73 M^2
GLUCOSE SERPL-MCNC: 170 MG/DL
HCT VFR BLD AUTO: 30 %
HGB BLD-MCNC: 8.7 G/DL
LYMPHOCYTES # BLD AUTO: 0.5 K/UL
LYMPHOCYTES NFR BLD: 4.7 %
MAGNESIUM SERPL-MCNC: 1.9 MG/DL
MCH RBC QN AUTO: 28.1 PG
MCHC RBC AUTO-ENTMCNC: 29 G/DL
MCV RBC AUTO: 97 FL
MONOCYTES # BLD AUTO: 0.5 K/UL
MONOCYTES NFR BLD: 5.2 %
NEUTROPHILS # BLD AUTO: 9.1 K/UL
NEUTROPHILS NFR BLD: 87 %
PHOSPHATE SERPL-MCNC: 1.7 MG/DL
PLATELET # BLD AUTO: 989 K/UL
PMV BLD AUTO: 9.5 FL
POCT GLUCOSE: 136 MG/DL (ref 70–110)
POCT GLUCOSE: 182 MG/DL (ref 70–110)
POCT GLUCOSE: 193 MG/DL (ref 70–110)
POCT GLUCOSE: 254 MG/DL (ref 70–110)
POCT GLUCOSE: 300 MG/DL (ref 70–110)
POTASSIUM SERPL-SCNC: 3.9 MMOL/L
PROT SERPL-MCNC: 6.3 G/DL
RBC # BLD AUTO: 3.1 M/UL
SODIUM SERPL-SCNC: 144 MMOL/L
WBC # BLD AUTO: 10.45 K/UL

## 2019-02-14 PROCEDURE — 63600175 PHARM REV CODE 636 W HCPCS: Performed by: SURGERY

## 2019-02-14 PROCEDURE — 63600175 PHARM REV CODE 636 W HCPCS: Performed by: STUDENT IN AN ORGANIZED HEALTH CARE EDUCATION/TRAINING PROGRAM

## 2019-02-14 PROCEDURE — 25000003 PHARM REV CODE 250: Performed by: STUDENT IN AN ORGANIZED HEALTH CARE EDUCATION/TRAINING PROGRAM

## 2019-02-14 PROCEDURE — 25000003 PHARM REV CODE 250: Performed by: NURSE PRACTITIONER

## 2019-02-14 PROCEDURE — 27000221 HC OXYGEN, UP TO 24 HOURS

## 2019-02-14 PROCEDURE — 85025 COMPLETE CBC W/AUTO DIFF WBC: CPT

## 2019-02-14 PROCEDURE — C9113 INJ PANTOPRAZOLE SODIUM, VIA: HCPCS | Performed by: STUDENT IN AN ORGANIZED HEALTH CARE EDUCATION/TRAINING PROGRAM

## 2019-02-14 PROCEDURE — 99900035 HC TECH TIME PER 15 MIN (STAT)

## 2019-02-14 PROCEDURE — 80053 COMPREHEN METABOLIC PANEL: CPT

## 2019-02-14 PROCEDURE — 27000646 HC AEROBIKA DEVICE

## 2019-02-14 PROCEDURE — 20000000 HC ICU ROOM

## 2019-02-14 PROCEDURE — 94668 MNPJ CHEST WALL SBSQ: CPT

## 2019-02-14 PROCEDURE — 25000003 PHARM REV CODE 250: Performed by: SURGERY

## 2019-02-14 PROCEDURE — 84100 ASSAY OF PHOSPHORUS: CPT

## 2019-02-14 PROCEDURE — 94664 DEMO&/EVAL PT USE INHALER: CPT

## 2019-02-14 PROCEDURE — 94640 AIRWAY INHALATION TREATMENT: CPT

## 2019-02-14 PROCEDURE — 83735 ASSAY OF MAGNESIUM: CPT

## 2019-02-14 PROCEDURE — S0020 INJECTION, BUPIVICAINE HYDRO: HCPCS | Performed by: STUDENT IN AN ORGANIZED HEALTH CARE EDUCATION/TRAINING PROGRAM

## 2019-02-14 PROCEDURE — 94761 N-INVAS EAR/PLS OXIMETRY MLT: CPT

## 2019-02-14 PROCEDURE — 25000242 PHARM REV CODE 250 ALT 637 W/ HCPCS: Performed by: SURGERY

## 2019-02-14 PROCEDURE — 92526 ORAL FUNCTION THERAPY: CPT

## 2019-02-14 RX ORDER — BUPIVACAINE HYDROCHLORIDE 5 MG/ML
INJECTION, SOLUTION EPIDURAL; INTRACAUDAL
Status: DISCONTINUED | OUTPATIENT
Start: 2019-02-14 | End: 2019-02-23 | Stop reason: HOSPADM

## 2019-02-14 RX ORDER — PANTOPRAZOLE SODIUM 40 MG/10ML
40 INJECTION, POWDER, LYOPHILIZED, FOR SOLUTION INTRAVENOUS DAILY
Status: DISCONTINUED | OUTPATIENT
Start: 2019-02-14 | End: 2019-02-26 | Stop reason: HOSPADM

## 2019-02-14 RX ORDER — PROMETHAZINE HYDROCHLORIDE 25 MG/ML
INJECTION, SOLUTION INTRAMUSCULAR; INTRAVENOUS
Status: DISPENSED
Start: 2019-02-14 | End: 2019-02-15

## 2019-02-14 RX ADMIN — IPRATROPIUM BROMIDE AND ALBUTEROL SULFATE 3 ML: .5; 3 SOLUTION RESPIRATORY (INHALATION) at 12:02

## 2019-02-14 RX ADMIN — ASCORBIC ACID, VITAMIN A PALMITATE, CHOLECALCIFEROL, THIAMINE HYDROCHLORIDE, RIBOFLAVIN-5 PHOSPHATE SODIUM, PYRIDOXINE HYDROCHLORIDE, NIACINAMIDE, DEXPANTHENOL, ALPHA-TOCOPHEROL ACETATE, VITAMIN K1, FOLIC ACID, BIOTIN, CYANOCOBALAMIN: 200; 3300; 200; 6; 3.6; 6; 40; 15; 10; 150; 600; 60; 5 INJECTION, SOLUTION INTRAVENOUS at 01:02

## 2019-02-14 RX ADMIN — IPRATROPIUM BROMIDE AND ALBUTEROL SULFATE 3 ML: .5; 3 SOLUTION RESPIRATORY (INHALATION) at 08:02

## 2019-02-14 RX ADMIN — KETOROLAC TROMETHAMINE 15 MG: 30 INJECTION, SOLUTION INTRAMUSCULAR at 05:02

## 2019-02-14 RX ADMIN — ONDANSETRON 4 MG: 2 INJECTION INTRAMUSCULAR; INTRAVENOUS at 08:02

## 2019-02-14 RX ADMIN — PROMETHAZINE HYDROCHLORIDE 12.5 MG: 25 INJECTION INTRAMUSCULAR; INTRAVENOUS at 05:02

## 2019-02-14 RX ADMIN — ENOXAPARIN SODIUM 40 MG: 100 INJECTION SUBCUTANEOUS at 10:02

## 2019-02-14 RX ADMIN — LAMOTRIGINE 150 MG: 100 TABLET ORAL at 08:02

## 2019-02-14 RX ADMIN — AMITRIPTYLINE HYDROCHLORIDE 50 MG: 25 TABLET, FILM COATED ORAL at 10:02

## 2019-02-14 RX ADMIN — HYDROMORPHONE HYDROCHLORIDE 0.2 MG: 2 INJECTION INTRAMUSCULAR; INTRAVENOUS; SUBCUTANEOUS at 10:02

## 2019-02-14 RX ADMIN — METOPROLOL TARTRATE 5 MG: 1 INJECTION, SOLUTION INTRAVENOUS at 02:02

## 2019-02-14 RX ADMIN — METOPROLOL TARTRATE 5 MG: 1 INJECTION, SOLUTION INTRAVENOUS at 03:02

## 2019-02-14 RX ADMIN — METOPROLOL TARTRATE 5 MG: 1 INJECTION, SOLUTION INTRAVENOUS at 08:02

## 2019-02-14 RX ADMIN — BUPIVACAINE 133 MG: 13.3 INJECTION, SUSPENSION, LIPOSOMAL INFILTRATION at 12:02

## 2019-02-14 RX ADMIN — FLUCONAZOLE 200 MG: 40 POWDER, FOR SUSPENSION ORAL at 10:02

## 2019-02-14 RX ADMIN — PANTOPRAZOLE SODIUM 40 MG: 40 INJECTION, POWDER, FOR SOLUTION INTRAVENOUS at 12:02

## 2019-02-14 RX ADMIN — INSULIN ASPART 6 UNITS: 100 INJECTION, SOLUTION INTRAVENOUS; SUBCUTANEOUS at 08:02

## 2019-02-14 RX ADMIN — SODIUM PHOSPHATE, MONOBASIC, MONOHYDRATE 30 MMOL: 276; 142 INJECTION, SOLUTION INTRAVENOUS at 05:02

## 2019-02-14 RX ADMIN — BUPIVACAINE HYDROCHLORIDE 10 ML: 5 INJECTION, SOLUTION EPIDURAL; INTRACAUDAL; PERINEURAL at 12:02

## 2019-02-14 RX ADMIN — HYDROXYZINE HYDROCHLORIDE 25 MG: 10 SOLUTION ORAL at 08:02

## 2019-02-14 RX ADMIN — HYDROXYZINE HYDROCHLORIDE 25 MG: 10 SOLUTION ORAL at 10:02

## 2019-02-14 RX ADMIN — PANTOPRAZOLE SODIUM 40 MG: 40 TABLET, DELAYED RELEASE ORAL at 08:02

## 2019-02-14 RX ADMIN — PREGABALIN 50 MG: 50 CAPSULE ORAL at 08:02

## 2019-02-14 RX ADMIN — PREGABALIN 50 MG: 50 CAPSULE ORAL at 10:02

## 2019-02-14 RX ADMIN — INSULIN ASPART 2 UNITS: 100 INJECTION, SOLUTION INTRAVENOUS; SUBCUTANEOUS at 05:02

## 2019-02-14 RX ADMIN — HYPROMELLOSE 2910 1 DROP: 5 SOLUTION OPHTHALMIC at 10:02

## 2019-02-14 RX ADMIN — LAMOTRIGINE 150 MG: 100 TABLET ORAL at 10:02

## 2019-02-14 RX ADMIN — HYDROXYZINE HYDROCHLORIDE 25 MG: 10 SOLUTION ORAL at 04:02

## 2019-02-14 RX ADMIN — MIRTAZAPINE 15 MG: 15 TABLET, ORALLY DISINTEGRATING ORAL at 10:02

## 2019-02-14 RX ADMIN — ONDANSETRON 4 MG: 2 INJECTION INTRAMUSCULAR; INTRAVENOUS at 03:02

## 2019-02-14 RX ADMIN — ASPIRIN 81 MG 81 MG: 81 TABLET ORAL at 08:02

## 2019-02-14 RX ADMIN — INSULIN ASPART 6 UNITS: 100 INJECTION, SOLUTION INTRAVENOUS; SUBCUTANEOUS at 11:02

## 2019-02-14 RX ADMIN — INSULIN DETEMIR 25 UNITS: 100 INJECTION, SOLUTION SUBCUTANEOUS at 08:02

## 2019-02-14 RX ADMIN — KETOROLAC TROMETHAMINE 15 MG: 30 INJECTION, SOLUTION INTRAMUSCULAR at 12:02

## 2019-02-14 RX ADMIN — IPRATROPIUM BROMIDE AND ALBUTEROL SULFATE 3 ML: .5; 3 SOLUTION RESPIRATORY (INHALATION) at 11:02

## 2019-02-14 RX ADMIN — IPRATROPIUM BROMIDE AND ALBUTEROL SULFATE 3 ML: .5; 3 SOLUTION RESPIRATORY (INHALATION) at 03:02

## 2019-02-14 RX ADMIN — IPRATROPIUM BROMIDE AND ALBUTEROL SULFATE 3 ML: .5; 3 SOLUTION RESPIRATORY (INHALATION) at 07:02

## 2019-02-14 NOTE — ANESTHESIA POST-OP PAIN MANAGEMENT
Acute Pain Service Progress Note    Kaylee Ngo is a 63 y.o., female, [id number] Trigger point pain noted 2cm left lateral to L2/L3 spinous process in which 10mL of 0.2% Ropivacaine was injected with a 22 gauge needle intramuscular and subcutaneous giving a wheal diameter of approximately 3 cm after sterile prep with alcohol.     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       Subjective:     General appearance of alert, oriented, no complaints   Pain with rest: 0        Vitals   Vitals:    02/14/19 1144   BP:    Pulse: (!) 117   Resp: 18   Temp:         Labs    Admission on 01/25/2019   No results displayed because visit has over 200 results.           Meds   Current Facility-Administered Medications   Medication Dose Route Frequency Provider Last Rate Last Dose    0.9%  NaCl infusion (for blood administration)   Intravenous Q24H PRN Cam Ashton MD        acetaminophen oral solution 650 mg  650 mg Per J Tube Q4H PRN Nash Titus MD   650 mg at 02/13/19 1430    acetaminophen suppository 650 mg  650 mg Rectal Q8H PRN Cam Ashton MD    650 mg at 02/03/19 1456    albuterol sulfate nebulizer solution 2.5 mg  2.5 mg Nebulization PRN Trdui Olivier MD        albuterol-ipratropium 2.5 mg-0.5 mg/3 mL nebulizer solution 3 mL  3 mL Nebulization Q4H Cam Ashton MD   3 mL at 02/14/19 1144    Amino acid 5% - dextrose 15% (CLINIMIX-E) solution with additives (1L  provides 510 kcal/L dextrose, with 50 gm AA, 150 gm CHO, Na 35, K 30, Mg 5, Ca 4.5, Acetate 80, Cl 39, Phos 15)   Intravenous Continuous Nash Titus MD        amitriptyline tablet 50 mg  50 mg Oral QHS Nash Titus MD   50 mg at 02/13/19 2055    artificial tears 0.5 % ophthalmic solution 1 drop  1 drop Both Eyes QID PRN Cam Ashton MD   1 drop at 02/11/19 0444    aspirin chewable tablet 81 mg  81 mg Oral Daily Nash Titus MD   81 mg at 02/14/19 0813    dextrose 50% injection 25 g  25 g Intravenous PRN Cam Ashton MD   25 g at 02/10/19 2251    diphenhydrAMINE injection 12.5 mg  12.5 mg Intravenous Q4H PRN Cam Ashton MD   12.5 mg at 01/31/19 1720    diphenhydrAMINE injection   Intravenous Code/trauma/sedation Med Gucci Hawkins II, MD   50 mg at 02/11/19 1550    enoxaparin injection 40 mg  40 mg Subcutaneous Q24H Trudi Olivier MD   40 mg at 02/14/19 1000    fat emulsion 20 % infusion 250 mL  250 mL Intravenous Daily Cam Ashton MD 10.4 mL/hr at 02/13/19 2256 250 mL at 02/13/19 2256    fentaNYL injection   Intravenous Code/trauma/sedation Med Gucci Hawkins II, MD   50 mcg at 02/11/19 1708    fluconazole 40 mg/ml suspension 200 mg  200 mg Per NG tube Daily Cam Ashton MD   200 mg at 02/14/19 1000    glucagon (human recombinant) injection 1 mg  1 mg Intramuscular PRN Cam Ashton MD        hydrALAZINE injection 5 mg  5 mg Intravenous PRN Cam Ashton MD        hydromorphone (PF) injection 0.2 mg  0.2 mg Intravenous Q6H PRN Cam Ashton MD   0.2 mg at 02/14/19  1000    hydromorphone (PF) injection   Intravenous Code/trauma/sedation Med Gucci Hawkins II, MD   0.25 mg at 02/11/19 1715    hydrOXYzine 10 mg/5 mL syrup 25 mg  25 mg Per NG tube TID Cam Ashton MD   25 mg at 02/14/19 0820    insulin aspart U-100 pen 1-10 Units  1-10 Units Subcutaneous 6 times per day Cam Ashton MD   6 Units at 02/14/19 1159    insulin detemir U-100 pen 25 Units  25 Units Subcutaneous Daily Trudi Olivier MD   25 Units at 02/14/19 0823    ipratropium 0.02 % nebulizer solution 0.5 mg  0.5 mg Nebulization Q4H PRN Teddy Mtz MD   0.5 mg at 01/28/19 2041    ketorolac injection 15 mg  15 mg Intravenous Q6H Cam Ashton MD   15 mg at 02/14/19 1200    labetalol 20 mg/4 mL (5 mg/mL) IV syring  5 mg Intravenous PRN Cam Ashton MD   5 mg at 02/04/19 1807    lamoTRIgine tablet 150 mg  150 mg Oral BID Nash Titus MD   150 mg at 02/14/19 0813    lidocaine 5 % patch 1 patch  1 patch Transdermal Q24H Trudi Olivier MD   1 patch at 02/14/19 1200    lidocaine HCL 10 mg/ml (1%) injection   Other Code/trauma/sedation Med Gucci Hawkins II, MD   5 mL at 02/11/19 1556    lidocaine HCl 2% oral solution 10 mL  10 mL Mucous Membrane On Call Procedure Nash Titus MD   10 mL at 02/12/19 1403    lorazepam (ATIVAN) injection 1 mg  1 mg Intravenous Q6H PRN Trudi Olivier MD   1 mg at 02/12/19 0241    metoprolol injection 5 mg  5 mg Intravenous Q6H MARLEEN Amin, ELIZABETH   5 mg at 02/14/19 0815    midazolam (VERSED) 1 mg/mL injection    Code/trauma/sedation Med Gucci Hawkins II, MD   1 mg at 02/11/19 1708    mirtazapine disintegrating tablet 15 mg  15 mg Per NG tube Nightly Cam Ashton MD   15 mg at 02/13/19 4624    naloxone 0.4 mg/mL injection 0.02 mg  0.02 mg Intravenous PRN Cam Ashton MD        ondansetron injection 4 mg  4 mg Intravenous Q6H PRN Cam Ashton MD   4 mg at 02/14/19 3865     pantoprazole injection 40 mg  40 mg Intravenous Daily Nash Titus MD   40 mg at 02/14/19 1200    pregabalin capsule 50 mg  50 mg Per NG tube BID Cam Ashton MD   50 mg at 02/14/19 0813    sodium chloride 0.9% flush 3 mL  3 mL Intravenous PRN Ernesto Kiser MD   3 mL at 02/08/19 2025    sodium chloride 3% nebulizer solution 4 mL  4 mL Nebulization PRN Trudi Olivier MD   4 mL at 01/30/19 1915    tiZANidine tablet 4 mg  4 mg Oral Q8H PRN Nash Titus MD         Facility-Administered Medications Ordered in Other Encounters   Medication Dose Route Frequency Provider Last Rate Last Dose    ropivacaine (PF) 2 mg/ml (0.2%) IVP    PRN Marco Antonio Ricardo MD   10 mL at 02/13/19 1130         Assessment:     Pain control adequate    Plan:     Patient doing well, continue present treatment.  Will repeat injection 2/14/19 am with Exparel.     Evaluator Marco Antonio Ricardo

## 2019-02-14 NOTE — PROGRESS NOTES
[facility]  Adult Nutrition  Progress Note    SUMMARY       Recommendations    Recommendation:   1. REC to increase Glucerna 1.5 to goal rate of 50 mL/hr to provide 1800 calories, 99 gm Pro, 910 mL water    2. Continue with 50 mL warm water via NGT every 4 hrs.    3. Wean TPN and lipids once TF at goal rate of 40 mL and patient showing good ASHANTI to rate increase.     Goals: Adequate nutrition to meet >75% of needs  Nutrition Goal Status: progressing towards goal  Communication of RD Recs: reviewed with RN    Nutrition Discharge Planning: Home on TF with PO diet if applicable    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 NGT placed with Glucerna 1.5 infusing at 30 mL/hr. Patient talkative and more alert since previous RD visits. Pt remains NPO.       Nutrition Risk Screen    Nutrition Risk Screen: dysphagia or difficulty swallowing    Nutrition/Diet History    Patient Reported Diet/Restrictions/Preferences: diabetic diet  Typical Food/Fluid Intake: TF at home  Food Preferences: n/a  Spiritual, Cultural Beliefs, [employer]ian Practices, Values that Affect Care: no  Supplemental Drinks or Food Habits: Peptaman 1.5  Factors Affecting Nutritional Intake: altered gastrointestinal function, NPO  Nutrition  "Support Formula Prior to Admit: Impact Peptide 1.5  Nutrition Support Rate Prior to Admit: 240 (ml)  Nutrtion Support Frequency Prior to Admit: pump  Nutrition Support Provision Prior to Admit: 3 cans a day    Anthropometrics    Temp: 97.8 °F (36.6 °C)  Height Method: Stated  Height: 5' 5" (165.1 cm)  Height (inches): 65 in  Weight Method: Bed Scale  Weight: 58.4 kg (128 lb 12 oz)  Weight (lb): 128.75 lb  Ideal Body Weight (IBW), Female: 125 lb  % Ideal Body Weight, Female (lb): 103 lb  BMI (Calculated): 21.5  BMI Grade: 18.5-24.9 - normal  Weight Loss: unintentional  Usual Body Weight (UBW), k.9 kg(Admit weight on 19)  Weight Change Amount: 11 lb  % Usual Body Weight: 86.19  % Weight Change From Usual Weight: -13.99 %  Weight Loss Since Admission: 21 lb  % Weight Change Since Admission: 16.31       Lab/Procedures/Meds    Pertinent Labs Reviewed: reviewed  Recent Labs   Lab 19  0556   ALT 13   AST 15   ALKPHOS 182*   BILITOT 0.5   PROT 6.3   ALBUMIN 2.6*     Recent Labs   Lab 19  0556   WBC 10.45   RBC 3.10*   HGB 8.7*   HCT 30.0*   *   MCV 97   MCH 28.1   MCHC 29.0*     Recent Labs   Lab 19  0556      K 3.9      CO2 29   BUN 12   CREATININE 0.6   MG 1.9       Pertinent Medications Reviewed: reviewed      Scheduled Meds:   albuterol-ipratropium  3 mL Nebulization Q4H    amitriptyline  50 mg Oral QHS    aspirin  81 mg Oral Daily    enoxparin  40 mg Subcutaneous Q24H    fat emulsion  250 mL Intravenous Daily    fluconazole 40 mg/ml  200 mg Per NG tube Daily    hydrOXYzine  25 mg Per NG tube TID    insulin aspart U-100  1-10 Units Subcutaneous 6 times per day    insulin detemir U-100  25 Units Subcutaneous Daily    ketorolac  15 mg Intravenous Q6H    lamoTRIgine  150 mg Oral BID    lidocaine  1 patch Transdermal Q24H    metoprolol  5 mg Intravenous Q6H    mirtazapine  15 mg Per NG tube Nightly    pantoprazole  40 mg Intravenous Daily    pregabalin  50 mg " Per NG tube BID    sodium phosphate IVPB  30 mmol Intravenous Once     Continuous Infusions:   Amino acid 5% - dextrose 15% (CLINIMIX-E) solution with additives (1L  provides 510 kcal/L dextrose, with 50 gm AA, 150 gm CHO, Na 35, K 30, Mg 5, Ca 4.5, Acetate 80, Cl 39, Phos 15) 20 mL/hr at 02/14/19 1355     PRN Meds:.sodium chloride, acetaminophen, acetaminophen, albuterol sulfate, artificial tears, dextrose 50%, diphenhydrAMINE, diphenhydrAMINE, fentaNYL, glucagon (human recombinant), hydrALAZINE, HYDROmorphone, HYDROmorphone, ipratropium, labetalol, lidocaine HCL 10 mg/ml (1%), lidocaine HCl 2%, lorazepam, midazolam, naloxone, ondansetron, promethazine (PHENERGAN) IVPB, sodium chloride 0.9%, sodium chloride 3%, tiZANidine      Estimated/Assessed Needs    Weight Used For Calorie Calculations: 55.9 kg (123 lb 3.8 oz)  Energy Calorie Requirements (kcal): 5422-6466 kcal  Energy Need Method: Kcal/kg     Protein Requirements: 75-95(1.2-1.5 gm Pro/kg)  Weight Used For Protein Calculations: 62.9 kg (138 lb 10.7 oz)     Estimated Fluid Requirement Method: RDA Method  RDA Method (mL): 1677  CHO Requirement: 200g      Nutrition Prescription Ordered    Current Diet Order: NPO  Current Nutrition Support Formula Ordered: Clinimix E 5/15(with 20% Lipids daily)  Current Nutrition Support Rate Ordered: 20 (ml)  Current Nutrition Support Frequency Ordered: mL/hr  Oral Nutrition Supplement: Glucerna 1.5 @ 30 mL/hr    Evaluation of Received Nutrient/Fluid Intake    Enteral Calories (kcal): 1080  Enteral Protein (gm): 59  Enteral (Free Water) Fluid (mL): 546  Free Water Flush Fluid (mL): 0  Parenteral Calories (kcal): 341  Parenteral Protein (gm): 24  Parenteral Fluid (mL): 480  Lipid Calories (kcals): 212 kcals  GIR (Glucose Infusion Rate) (mg/kg/min): 1 mg/kg/min  Total Calories (kcal): 1114  % Kcal Needs: 65  Total Protein (gm): 83  % Protein Needs: 100  IV Fluid (mL): 0  I/O: reviewed  Energy Calories Required: not meeting  needs  Protein Required: not meeting needs  Fluid Required: (per MD)  Comments: LBM 2/13/19  Tolerance: tolerating     % Intake of Estimated Energy Needs: 50 - 75 %  % Meal Intake: NPO    Nutrition Risk    Level of Risk/Frequency of Follow-up: (2 x week)     Assessment and Plan    Malnutrition in the context of Chronic Illness/Injury    Related to (etiology):  Altered GI Function; Cancer    Signs and Symptoms (as evidenced by):    Energy Intake: <75% intake > 1 week  Body Fat Depletion: mild depletion of orbitals, triceps and thoracic and lumbar region   Muscle Mass Depletion: moderate depletion of temples, clavicle region, interosseous muscle and lower extremities     Interventions  Collaboration with providers    Nutrition Diagnosis Status  Continues       Monitor and Evaluation    Food and Nutrient Intake: energy intake, enteral nutrition intake, parenteral nutrition intake  Food and Nutrient Adminstration: enteral and parenteral nutrition administration, diet order  Physical Activity and Function: nutrition-related ADLs and IADLs  Anthropometric Measurements: weight, weight change  Biochemical Data, Medical Tests and Procedures: electrolyte and renal panel, glucose/endocrine profile  Nutrition-Focused Physical Findings: overall appearance     Malnutrition Assessment  Malnutrition Type: acute illness or injury  Energy Intake: moderate energy intake  Skin (Micronutrient): dry, thinned, turgor reduced  Hair/Scalp (Micronutrient): brittle  Eyes (Micronutrient): none  Extraoral (Micronutrient): none  Neck/Chest (Micronutrient): bony prominence, subcutaneous fat loss       Energy Intake (Malnutrition): less than 75% for greater than 7 days  Subcutaneous Fat (Malnutrition): mild depletion  Muscle Mass (Malnutrition): moderate depletion  Fluid Accumulation (Malnutrition): mild   Orbital Region (Subcutaneous Fat Loss): mild depletion  Upper Arm Region (Subcutaneous Fat Loss): mild depletion  Thoracic and Lumbar Region:  mild depletion   Christianity Region (Muscle Loss): moderate depletion  Clavicle Bone Region (Muscle Loss): moderate depletion  Clavicle and Acromion Bone Region (Muscle Loss): mild depletion  Dorsal Hand (Muscle Loss): mild depletion  Patellar Region (Muscle Loss): moderate depletion  Anterior Thigh Region (Muscle Loss): moderate depletion  Posterior Calf Region (Muscle Loss): moderate depletion       Subcutaneous Fat Loss (Final Summary): mild protein-calorie malnutrition  Muscle Loss Evaluation (Final Summary): moderate protein-calorie malnutrition  Fluid Accumulation Evaluation: mild         Nutrition Follow-Up    RD Follow-up?: Yes

## 2019-02-14 NOTE — PROGRESS NOTES
Problem: Adult Inpatient Plan of Care  Goal: Plan of Care Review  Outcome: Ongoing (interventions implemented as appropriate)    02/13/19 0400   Plan of Care Review   Plan of Care Reviewed With Patient; Safety: call light in reach, patient oriented to room & instructed how to notify nurse if assistance is needed, current questions/concerns addressed, bed in lowest position with wheels locked & side rails up X 3. Pt was educated regarding fall precaution and taking appropriate action. Activity: repositions self independently with minimal assist  Neurological: Oriented x3; frequent reorientation provided Respiratory: On 5L NC o2 sat wnl  Cardiac: BP; stable;  HR stable. Afebrile this shift. Intake/Output: no problem urinating; no bm overnight; NPO maintained;  ngt to feeding tube and tolerated well; no N/V;   Pain: controlled with prn medication and schedule med Skin: wound vac in place and incision site well approximated; Plan: Plan of care and meds reviewed; All questions and concerns were addressed. Education provided will continue to monitor

## 2019-02-14 NOTE — ANESTHESIA POST-OP PAIN MANAGEMENT
Acute Pain Service Progress Note    Kaylee Ngo is a 63 y.o., female, [id number] with PNET s/p reversal of Sandeep-en-Y w/ Jtube placement now post-op day #20.  Patient was complaining of lower back pain on 2/13/19 and trigger point pain was noted 2cm lateral to the L2/L3 spinous process.  She was given 10mL of 0.2% ropivacaine subcutaneous and intramuscular with immediate relief of her pain.  Today she is complaining of persistent pain in the same area.       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       Subjective:     General appearance of alert, oriented, no complaints   Pain with rest: 5/10 LBP        Vitals   Vitals:    02/14/19 1144   BP:    Pulse: (!) 117   Resp: 18   Temp:         Labs    Admission on 01/25/2019   No results displayed because visit has over 200 results.           Meds   Current Facility-Administered Medications   Medication Dose Route Frequency Provider Last Rate Last Dose    0.9%  NaCl infusion (for blood administration)   Intravenous Q24H PRN Cam Ashton MD        acetaminophen oral solution 650 mg  650 mg Per J Tube Q4H PRN  Nash Titus MD   650 mg at 02/13/19 1430    acetaminophen suppository 650 mg  650 mg Rectal Q8H PRN Cam Ashton MD   650 mg at 02/03/19 1456    albuterol sulfate nebulizer solution 2.5 mg  2.5 mg Nebulization PRN Trudi Olivier MD        albuterol-ipratropium 2.5 mg-0.5 mg/3 mL nebulizer solution 3 mL  3 mL Nebulization Q4H Cam Ashton MD   3 mL at 02/14/19 1144    Amino acid 5% - dextrose 15% (CLINIMIX-E) solution with additives (1L  provides 510 kcal/L dextrose, with 50 gm AA, 150 gm CHO, Na 35, K 30, Mg 5, Ca 4.5, Acetate 80, Cl 39, Phos 15)   Intravenous Continuous Nash Titus MD        amitriptyline tablet 50 mg  50 mg Oral QHS Nash Titus MD   50 mg at 02/13/19 2055    artificial tears 0.5 % ophthalmic solution 1 drop  1 drop Both Eyes QID PRN Cam Ashton MD   1 drop at 02/11/19 0444    aspirin chewable tablet 81 mg  81 mg Oral Daily Nash Titus MD   81 mg at 02/14/19 0813    dextrose 50% injection 25 g  25 g Intravenous PRN Cam Ashton MD   25 g at 02/10/19 2251    diphenhydrAMINE injection 12.5 mg  12.5 mg Intravenous Q4H PRN Cam Ashton MD   12.5 mg at 01/31/19 1720    diphenhydrAMINE injection   Intravenous Code/trauma/sedation Med Gucci Hawkins II, MD   50 mg at 02/11/19 1550    enoxaparin injection 40 mg  40 mg Subcutaneous Q24H Trudi Olivier MD   40 mg at 02/14/19 1000    fat emulsion 20 % infusion 250 mL  250 mL Intravenous Daily Cam Ashton MD 10.4 mL/hr at 02/13/19 2256 250 mL at 02/13/19 2256    fentaNYL injection   Intravenous Code/trauma/sedation Med Gucci Hawkins II, MD   50 mcg at 02/11/19 1708    fluconazole 40 mg/ml suspension 200 mg  200 mg Per NG tube Daily Cam Ashton MD   200 mg at 02/14/19 1000    glucagon (human recombinant) injection 1 mg  1 mg Intramuscular PRN Cam Ashton MD        hydrALAZINE injection 5 mg  5 mg Intravenous PRN Cam  MD Daisha        hydromorphone (PF) injection 0.2 mg  0.2 mg Intravenous Q6H PRN Cam Ashton MD   0.2 mg at 02/14/19 1000    hydromorphone (PF) injection   Intravenous Code/trauma/sedation Med Gucci Hawkins II, MD   0.25 mg at 02/11/19 1715    hydrOXYzine 10 mg/5 mL syrup 25 mg  25 mg Per NG tube TID Cam Ashton MD   25 mg at 02/14/19 0820    insulin aspart U-100 pen 1-10 Units  1-10 Units Subcutaneous 6 times per day Cam Ashton MD   6 Units at 02/14/19 1159    insulin detemir U-100 pen 25 Units  25 Units Subcutaneous Daily Trudi Olivier MD   25 Units at 02/14/19 0823    ipratropium 0.02 % nebulizer solution 0.5 mg  0.5 mg Nebulization Q4H PRN Teddy Mtz MD   0.5 mg at 01/28/19 2041    ketorolac injection 15 mg  15 mg Intravenous Q6H Cam Ashton MD   15 mg at 02/14/19 1200    labetalol 20 mg/4 mL (5 mg/mL) IV syring  5 mg Intravenous PRN Cam Ashton MD   5 mg at 02/04/19 1807    lamoTRIgine tablet 150 mg  150 mg Oral BID Nash Titus MD   150 mg at 02/14/19 0813    lidocaine 5 % patch 1 patch  1 patch Transdermal Q24H Trudi Olivier MD   1 patch at 02/14/19 1200    lidocaine HCL 10 mg/ml (1%) injection   Other Code/trauma/sedation Med Gucci Hawkins II, MD   5 mL at 02/11/19 1556    lidocaine HCl 2% oral solution 10 mL  10 mL Mucous Membrane On Call Procedure Nash Titus MD   10 mL at 02/12/19 1403    lorazepam (ATIVAN) injection 1 mg  1 mg Intravenous Q6H PRN Trudi Olivier MD   1 mg at 02/12/19 0241    metoprolol injection 5 mg  5 mg Intravenous Q6H Ira Burrows APRN, ANP   5 mg at 02/14/19 0815    midazolam (VERSED) 1 mg/mL injection    Code/trauma/sedation Med Gucci Hawkins II, MD   1 mg at 02/11/19 1708    mirtazapine disintegrating tablet 15 mg  15 mg Per NG tube Nightly Cam Ashton MD   15 mg at 02/13/19 2055    naloxone 0.4 mg/mL injection 0.02 mg  0.02 mg Intravenous PRN  Cam Ashton MD        ondansetron injection 4 mg  4 mg Intravenous Q6H PRN Cam Ashton MD   4 mg at 02/14/19 0815    pantoprazole injection 40 mg  40 mg Intravenous Daily Nash Titus MD   40 mg at 02/14/19 1200    pregabalin capsule 50 mg  50 mg Per NG tube BID Cam Ashton MD   50 mg at 02/14/19 0813    sodium chloride 0.9% flush 3 mL  3 mL Intravenous PRN Ernesto Kiser MD   3 mL at 02/08/19 2025    sodium chloride 3% nebulizer solution 4 mL  4 mL Nebulization PRN Trudi Olivier MD   4 mL at 01/30/19 1915    tiZANidine tablet 4 mg  4 mg Oral Q8H PRN Nash Titus MD         Facility-Administered Medications Ordered in Other Encounters   Medication Dose Route Frequency Provider Last Rate Last Dose    ropivacaine (PF) 2 mg/ml (0.2%) IVP    PRN Marco Antonio Ricardo MD   10 mL at 02/13/19 1130         Assessment:     Pain control inadequate.    Plan:       Trigger point pain noted 2cm left lateral to L2/L3 spinous process.  10mL of 0.5% Bupivacaine and 133mg of Exparel injected into the subcutaneous and intramuscular area for longer pain control.  Will continue to monitor LBP QD.    Evaluator Marco Antonio Ricardo

## 2019-02-14 NOTE — PLAN OF CARE
Problem: SLP Goal  Goal: SLP Goal  Short Term Goals:  1. Pt will participate in BSS to determine least restrictive diet.-discontinued 2/11  2. Pt will successfully participate in Modified Barium Swallow study to radiographically assess swallow function, rule out aspiration and determine safest/least restrictive diet.- MET 2/11  3. Pt will participate in trial sessions of indirect dysphagia exercises to strengthen musculature for swallowing mechanism with mod-max effort.             Outcome: Ongoing (interventions implemented as appropriate)  2/14: Pt seen this PM for indirect dysphagia tx. Pt completed base of tongue and laryngeal lift exercises with mod-max effort. SLP will continue to follow. Recs: Con't strict NPO, with alternative means of nutrition/hydration/medication, with con't skilled ST services.   CECE Rachel., CCC-SLP  Speech-Language Pathologist

## 2019-02-14 NOTE — PT/OT/SLP PROGRESS
Occupational Therapy      Patient Name:  Kaylee Ngo   MRN:  [id number]    Patient not seen today secondary to  Refused after max encouragement from OT and nurse. Will follow-up tomorrow.    CECY Faust  2/14/2019

## 2019-02-14 NOTE — PROGRESS NOTES
[facility] Surgery  Neuroendocrine Tumor Service  Progress Note     Admission Date: 1/25/2019  Hospital Length of Stay: 20  Principal Problem: Malignant carcinoid tumor of pancreas     Subjective:      Operation:  62 yo F w/ PNET, s/p reversal of Sandeep-en-Y w/ Jtube placement  POD#20    1/25/18  Ex lap, gastro-gastrostomy EEA with 33 mm circular stapler, Pyloroplasty and gastrostomy closure, enteric resection with side to side to anstamosis and restoration of small bowel continuity using Idrive tan load stapler, Liver US, Liver biopsy, Liver resection segment 3, Supraceliac lymphadenectomy, FNA pancreas, Lt salpingo oopherectomy, Meckel diverticulectomy, Feeding jejunostomy 14 Fr malecot, ICG perfusion and Infrared exam of all anastamosis    Interval History:   Patient was moved out of bed and during this time she had a witnessed possible aspiration. She received a trigger point injection yesterday and will get another today. NGT placed and Barney out. Wound vac placed yesterday as well.     Scheduled Meds:   albuterol-ipratropium  3 mL Nebulization Q4H    amitriptyline  50 mg Oral QHS    aspirin  81 mg Oral Daily    enoxparin  40 mg Subcutaneous Q24H    fat emulsion  250 mL Intravenous Daily    fluconazole 40 mg/ml  200 mg Per NG tube Daily    hydrOXYzine  25 mg Per NG tube TID    insulin aspart U-100  1-10 Units Subcutaneous 6 times per day    insulin detemir U-100  25 Units Subcutaneous Daily    ketorolac  15 mg Intravenous Q6H    lamoTRIgine  150 mg Oral BID    lidocaine  1 patch Transdermal Q24H    metoprolol  5 mg Intravenous Q6H    mirtazapine  15 mg Per NG tube Nightly    pantoprazole  40 mg Oral Daily    pregabalin  50 mg Per NG tube BID     Continuous Infusions:   Amino acid 5% - dextrose 15% (CLINIMIX-E) solution with additives (1L  provides 510 kcal/L dextrose, with 50 gm AA, 150 gm CHO, Na 35, K 30, Mg 5, Ca 4.5, Acetate 80, Cl 39, Phos 15) 20 mL/hr at 02/13/19  1100     PRN Meds:.sodium chloride, acetaminophen, acetaminophen, albuterol sulfate, artificial tears, dextrose 50%, diphenhydrAMINE, diphenhydrAMINE, fentaNYL, glucagon (human recombinant), hydrALAZINE, HYDROmorphone, HYDROmorphone, ipratropium, labetalol, lidocaine HCL 10 mg/ml (1%), lidocaine HCl 2%, lorazepam, midazolam, naloxone, ondansetron, sodium chloride 0.9%, sodium chloride 3%, tiZANidine    Objective:      Temp:  [97.5 °F (36.4 °C)-99.3 °F (37.4 °C)] 98 °F (36.7 °C)  Pulse:  [] 114  Resp:  [12-33] 24  SpO2:  [90 %-100 %] 97 %  BP: (119-178)/(57-71) 127/57  Weight change: -0.4 kg (-14.1 oz)    Intake/Output Summary (Last 24 hours) at 2/14/2019 1003  Last data filed at 2/14/2019 1000  Gross per 24 hour   Intake 410 ml   Output 770 ml   Net -360 ml       Lines:  RIJ TLC  NGT    Physical Exam:  GEN:NAD  CV: RRR  PULM: unlabored with 2L NC  ABD: S/NT/ ND, incision with wound vac on superior aspect with no drainage. Inferior aspect with dressing clean and dry  EXT: Peripheral pulses present and equal bilaterally    Recent Labs   Lab 02/14/19  0556      K 3.9   CO2 29      BUN 12   CREATININE 0.6   CALCIUM 8.4*   PROT 6.3   AST 15   ALT 13   ALKPHOS 182*   BILITOT 0.5     Recent Labs   Lab 02/14/19  0556   WBC 10.45   HGB 8.7*   HCT 30.0*   *       Significant Imaging: I have reviewed all pertinent imaging results/findings within the past 24 hours.     Assessment and Plan:   64 yo F w/ PNET, s/p reversal of Sandeep-en-Y w/ Jtube placement  POD#20    1/25/18  Ex lap, gastro-gastrostomy EEA with 33 mm circular stapler, Pyloroplasty and gastrostomy closure, enteric resection with side to side to anstamosis and restoration of small bowel continuity using Idrive tan load stapler, Liver US, Liver biopsy, Liver resection segment 3, Supraceliac lymphadenectomy, FNA pancreas, Lt salpingo oopherectomy, Meckel diverticulectomy, Feeding jejunostomy 14 Fr malecot, ICG perfusion and Infrared exam of all  anastamosis    Neuro - No sedation and AOx3.   FEN/GI - Continue NPO, TPN on, TFs 10 cc/hr, check residuals, replete lytes prn, ordered this AM  ID - cont fluconazole, last dose rocephin yesterday.    Heme: H/H stable  Pulm - On NC. Aggressive chest CPT and acapella. ABGs. Resp Cx growing E Coli she has received  Days of antibiotics.  Renal - Cr good, Whitlock for HD monitoring, good UOP, whitlock removed yesterdady  Surgical - WV in place white sponge then black sponge, change MWF   PPX - continue lovenox, protonix, scd in place  Dispo - Remains in critical condition cont ICU care    Nash Titus MD - [facility]  [phone]

## 2019-02-14 NOTE — NURSING
Dr. Titus at bedside. Verbal order to allow liberal oral swabs with water. Will monitor respiratory status.

## 2019-02-14 NOTE — PT/OT/SLP PROGRESS
"Speech Language Pathology Treatment    Patient Name:  Kaylee Ngo   MRN:  [id number]  Admitting Diagnosis: Malignant carcinoid tumor of pancreas    Recommendations:                 General Recommendations:  Dysphagia therapy  Diet recommendations:  NPO, Liquid Diet Level: NPO   Aspiration Precautions: Continue alternate means of nutrition, Frequent oral care and Strict aspiration precautions   General Precautions: Standard, aspiration, fall, respiratory, NPO  Communication strategies:  none    Subjective     Pt found in bed awake upon SLP entry. Pt agreeable to participate in skilled ST services. However, pt observed to be upset throughout session about SLP's rec for strict NPO with con't tube feedings.     Patient goals: "I'll do whatever you tell me to do, but I am not not going ot eat for weeks! Do you know how crazy that sounds?"  per pt re: SLP recs    Pain/Comfort:  · Pain Rating 1: 0/10    Objective:     Has the patient been evaluated by SLP for swallowing?   Yes  Keep patient NPO? Yes   Current Respiratory Status: nasal cannula      Pt seen this PM for indirect dysphagia tx. Pt completed base of tongue and laryngeal lift exercises with mod-max effort.  Pt completed lingual strengthening exercises (production of hard /g/ and /k/ sounds, sets of 3, 7x each) and resistance exercises (tongue pushing against spoon for 3s 9x) to improve base of tongue musculature. Pt completed laryngeal lift exercises (ooo-eee) to improve laryngeal elevation 10x and pitch glide exercises (/i/) x5. SLP also instructed pt to perform hard swallow exercise-- pt demonstrated slight difficulty but able to complete x3.   SLP re-educated pt on MBSS results and reasoning/purpose of recs. Pt continues to acknowledge but demonstrates limited evidence of learning/understanding.    Assessment:     Kaylee Ngo is a 63 y.o. female with an SLP diagnosis of oropharyngeal Dysphagia. Recs: Con't strict NPO, with alternative means of " nutrition/hydration/medication, with con't skilled ST services.     Goals:   Multidisciplinary Problems     SLP Goals        Problem: SLP Goal    Goal Priority Disciplines Outcome   SLP Goal     SLP Ongoing (interventions implemented as appropriate)   Description:  Short Term Goals:  1. Pt will participate in BSS to determine least restrictive diet.-discontinued 2/11  2. Pt will successfully participate in Modified Barium Swallow study to radiographically assess swallow function, rule out aspiration and determine safest/least restrictive diet.- MET 2/11  3. Pt will participate in trial sessions of indirect dysphagia exercises to strengthen musculature for swallowing mechanism with mod-max effort.                              Plan:     · Patient to be seen:  3 x/week   · Plan of Care expires:  03/08/19  · Plan of Care reviewed with:  patient   · SLP Follow-Up:  Yes       Discharge recommendations:  nursing facility, skilled   Barriers to Discharge:  Level of Skilled Assistance Needed . and Safety Awareness impaired    Time Tracking:     SLP Treatment Date:   02/14/19  Speech Start Time:  1328  Speech Stop Time:  1343     Speech Total Time (min):  15 min    Billable Minutes: Treatment Swallowing Dysfunction 15    QUIANA Rachel, CCC-SLP  02/14/2019

## 2019-02-14 NOTE — PLAN OF CARE
Problem: Device-Related Complication Risk (Enteral Nutrition)  Goal: Safe, Effective Therapy Delivery    Intervention: Prevent Feeding-Related Adverse Events  Recommendation:   1. REC to increase Glucerna 1.5 to goal rate of 50 mL/hr to provide 1800 calories, 99 gm Pro, 910 mL water    2. Continue with 50 mL warm water via NGT every 4 hrs.    3. Wean TPN and lipids once TF at goal rate of 40 mL and patient showing good ASHANTI to rate increase.      Goals: Adequate nutrition to meet >75% of needs  Nutrition Goal Status: progressing towards goal  Communication of RD Recs: reviewed with RN     Nutrition Discharge Planning: Home on TF with PO diet if applicable

## 2019-02-15 LAB
1,3 BETA GLUCAN SPEC-MCNC: 81 PG/ML
ALBUMIN SERPL BCP-MCNC: 2.6 G/DL
ALP SERPL-CCNC: 217 U/L
ALT SERPL W/O P-5'-P-CCNC: 17 U/L
ANION GAP SERPL CALC-SCNC: 8 MMOL/L
ANISOCYTOSIS BLD QL SMEAR: SLIGHT
AST SERPL-CCNC: 23 U/L
BASOPHILS # BLD AUTO: 0.01 K/UL
BASOPHILS NFR BLD: 0.1 %
BILIRUB SERPL-MCNC: 0.7 MG/DL
BUN SERPL-MCNC: 14 MG/DL
CALCIUM SERPL-MCNC: 8.7 MG/DL
CHLORIDE SERPL-SCNC: 105 MMOL/L
CO2 SERPL-SCNC: 29 MMOL/L
CREAT SERPL-MCNC: 0.7 MG/DL
DACRYOCYTES BLD QL SMEAR: ABNORMAL
DIFFERENTIAL METHOD: ABNORMAL
EOSINOPHIL # BLD AUTO: 0.1 K/UL
EOSINOPHIL NFR BLD: 0.6 %
ERYTHROCYTE [DISTWIDTH] IN BLOOD BY AUTOMATED COUNT: 18.2 %
EST. GFR  (AFRICAN AMERICAN): >60 ML/MIN/1.73 M^2
EST. GFR  (NON AFRICAN AMERICAN): >60 ML/MIN/1.73 M^2
GLUCOSE SERPL-MCNC: 203 MG/DL
HCT VFR BLD AUTO: 32.2 %
HGB BLD-MCNC: 9.6 G/DL
HYPOCHROMIA BLD QL SMEAR: ABNORMAL
LYMPHOCYTES # BLD AUTO: 0.6 K/UL
LYMPHOCYTES NFR BLD: 5.8 %
MAGNESIUM SERPL-MCNC: 2 MG/DL
MCH RBC QN AUTO: 28.7 PG
MCHC RBC AUTO-ENTMCNC: 29.8 G/DL
MCV RBC AUTO: 96 FL
MONOCYTES # BLD AUTO: 0.3 K/UL
MONOCYTES NFR BLD: 2.6 %
NEUTROPHILS # BLD AUTO: 8.6 K/UL
NEUTROPHILS NFR BLD: 90.9 %
PHOSPHATE SERPL-MCNC: 2.1 MG/DL
PLATELET # BLD AUTO: 707 K/UL
PLATELET BLD QL SMEAR: ABNORMAL
PMV BLD AUTO: 10 FL
POCT GLUCOSE: 216 MG/DL (ref 70–110)
POCT GLUCOSE: 218 MG/DL (ref 70–110)
POCT GLUCOSE: 270 MG/DL (ref 70–110)
POCT GLUCOSE: 306 MG/DL (ref 70–110)
POCT GLUCOSE: 341 MG/DL (ref 70–110)
POCT GLUCOSE: 370 MG/DL (ref 70–110)
POCT GLUCOSE: 376 MG/DL (ref 70–110)
POIKILOCYTOSIS BLD QL SMEAR: SLIGHT
POLYCHROMASIA BLD QL SMEAR: ABNORMAL
POTASSIUM SERPL-SCNC: 4.7 MMOL/L
PROT SERPL-MCNC: 6.7 G/DL
RBC # BLD AUTO: 3.35 M/UL
SODIUM SERPL-SCNC: 142 MMOL/L
TARGETS BLD QL SMEAR: ABNORMAL
WBC # BLD AUTO: 9.54 K/UL

## 2019-02-15 PROCEDURE — 20000000 HC ICU ROOM

## 2019-02-15 PROCEDURE — 63600175 PHARM REV CODE 636 W HCPCS: Performed by: SURGERY

## 2019-02-15 PROCEDURE — 94761 N-INVAS EAR/PLS OXIMETRY MLT: CPT

## 2019-02-15 PROCEDURE — 25000003 PHARM REV CODE 250: Performed by: SURGERY

## 2019-02-15 PROCEDURE — 94668 MNPJ CHEST WALL SBSQ: CPT

## 2019-02-15 PROCEDURE — B4185 PARENTERAL SOL 10 GM LIPIDS: HCPCS | Performed by: SURGERY

## 2019-02-15 PROCEDURE — 25000003 PHARM REV CODE 250: Performed by: STUDENT IN AN ORGANIZED HEALTH CARE EDUCATION/TRAINING PROGRAM

## 2019-02-15 PROCEDURE — S5571 INSULIN DISPOS PEN 3 ML: HCPCS | Performed by: STUDENT IN AN ORGANIZED HEALTH CARE EDUCATION/TRAINING PROGRAM

## 2019-02-15 PROCEDURE — 94664 DEMO&/EVAL PT USE INHALER: CPT

## 2019-02-15 PROCEDURE — 97110 THERAPEUTIC EXERCISES: CPT

## 2019-02-15 PROCEDURE — 25000242 PHARM REV CODE 250 ALT 637 W/ HCPCS: Performed by: SURGERY

## 2019-02-15 PROCEDURE — G8978 MOBILITY CURRENT STATUS: HCPCS | Mod: CM | Performed by: PHYSICAL THERAPIST

## 2019-02-15 PROCEDURE — 36415 COLL VENOUS BLD VENIPUNCTURE: CPT

## 2019-02-15 PROCEDURE — C9113 INJ PANTOPRAZOLE SODIUM, VIA: HCPCS | Performed by: STUDENT IN AN ORGANIZED HEALTH CARE EDUCATION/TRAINING PROGRAM

## 2019-02-15 PROCEDURE — 83735 ASSAY OF MAGNESIUM: CPT

## 2019-02-15 PROCEDURE — 99900035 HC TECH TIME PER 15 MIN (STAT)

## 2019-02-15 PROCEDURE — 27000221 HC OXYGEN, UP TO 24 HOURS

## 2019-02-15 PROCEDURE — 97110 THERAPEUTIC EXERCISES: CPT | Performed by: PHYSICAL THERAPIST

## 2019-02-15 PROCEDURE — G8979 MOBILITY GOAL STATUS: HCPCS | Mod: CL | Performed by: PHYSICAL THERAPIST

## 2019-02-15 PROCEDURE — 27000646 HC AEROBIKA DEVICE

## 2019-02-15 PROCEDURE — 80053 COMPREHEN METABOLIC PANEL: CPT

## 2019-02-15 PROCEDURE — 63600175 PHARM REV CODE 636 W HCPCS: Performed by: STUDENT IN AN ORGANIZED HEALTH CARE EDUCATION/TRAINING PROGRAM

## 2019-02-15 PROCEDURE — 25000003 PHARM REV CODE 250: Performed by: NURSE PRACTITIONER

## 2019-02-15 PROCEDURE — 97530 THERAPEUTIC ACTIVITIES: CPT | Performed by: PHYSICAL THERAPIST

## 2019-02-15 PROCEDURE — 84100 ASSAY OF PHOSPHORUS: CPT

## 2019-02-15 PROCEDURE — 94640 AIRWAY INHALATION TREATMENT: CPT

## 2019-02-15 RX ORDER — SODIUM,POTASSIUM PHOSPHATES 280-250MG
2 POWDER IN PACKET (EA) ORAL ONCE
Status: COMPLETED | OUTPATIENT
Start: 2019-02-15 | End: 2019-02-15

## 2019-02-15 RX ADMIN — HYDROMORPHONE HYDROCHLORIDE 0.2 MG: 2 INJECTION INTRAMUSCULAR; INTRAVENOUS; SUBCUTANEOUS at 07:02

## 2019-02-15 RX ADMIN — METOPROLOL TARTRATE 5 MG: 1 INJECTION, SOLUTION INTRAVENOUS at 02:02

## 2019-02-15 RX ADMIN — I.V. FAT EMULSION 250 ML: 20 EMULSION INTRAVENOUS at 10:02

## 2019-02-15 RX ADMIN — INSULIN ASPART 4 UNITS: 100 INJECTION, SOLUTION INTRAVENOUS; SUBCUTANEOUS at 04:02

## 2019-02-15 RX ADMIN — INSULIN ASPART 2 UNITS: 100 INJECTION, SOLUTION INTRAVENOUS; SUBCUTANEOUS at 12:02

## 2019-02-15 RX ADMIN — INSULIN ASPART 8 UNITS: 100 INJECTION, SOLUTION INTRAVENOUS; SUBCUTANEOUS at 09:02

## 2019-02-15 RX ADMIN — INSULIN ASPART 5 UNITS: 100 INJECTION, SOLUTION INTRAVENOUS; SUBCUTANEOUS at 11:02

## 2019-02-15 RX ADMIN — LAMOTRIGINE 150 MG: 100 TABLET ORAL at 10:02

## 2019-02-15 RX ADMIN — POTASSIUM & SODIUM PHOSPHATES POWDER PACK 280-160-250 MG 2 PACKET: 280-160-250 PACK at 09:02

## 2019-02-15 RX ADMIN — ASCORBIC ACID, VITAMIN A PALMITATE, CHOLECALCIFEROL, THIAMINE HYDROCHLORIDE, RIBOFLAVIN-5 PHOSPHATE SODIUM, PYRIDOXINE HYDROCHLORIDE, NIACINAMIDE, DEXPANTHENOL, ALPHA-TOCOPHEROL ACETATE, VITAMIN K1, FOLIC ACID, BIOTIN, CYANOCOBALAMIN: 200; 3300; 200; 6; 3.6; 6; 40; 15; 10; 150; 600; 60; 5 INJECTION, SOLUTION INTRAVENOUS at 04:02

## 2019-02-15 RX ADMIN — PROMETHAZINE HYDROCHLORIDE 12.5 MG: 25 INJECTION INTRAMUSCULAR; INTRAVENOUS at 07:02

## 2019-02-15 RX ADMIN — LIDOCAINE 1 PATCH: 50 PATCH TOPICAL at 12:02

## 2019-02-15 RX ADMIN — IPRATROPIUM BROMIDE AND ALBUTEROL SULFATE 3 ML: .5; 3 SOLUTION RESPIRATORY (INHALATION) at 08:02

## 2019-02-15 RX ADMIN — IPRATROPIUM BROMIDE AND ALBUTEROL SULFATE 3 ML: .5; 3 SOLUTION RESPIRATORY (INHALATION) at 12:02

## 2019-02-15 RX ADMIN — HYDROXYZINE HYDROCHLORIDE 25 MG: 10 SOLUTION ORAL at 10:02

## 2019-02-15 RX ADMIN — MIRTAZAPINE 15 MG: 15 TABLET, ORALLY DISINTEGRATING ORAL at 10:02

## 2019-02-15 RX ADMIN — PROMETHAZINE HYDROCHLORIDE 12.5 MG: 25 INJECTION INTRAMUSCULAR; INTRAVENOUS at 11:02

## 2019-02-15 RX ADMIN — HYDROXYZINE HYDROCHLORIDE 25 MG: 10 SOLUTION ORAL at 09:02

## 2019-02-15 RX ADMIN — METOPROLOL TARTRATE 5 MG: 1 INJECTION, SOLUTION INTRAVENOUS at 07:02

## 2019-02-15 RX ADMIN — INSULIN ASPART 6 UNITS: 100 INJECTION, SOLUTION INTRAVENOUS; SUBCUTANEOUS at 04:02

## 2019-02-15 RX ADMIN — METOPROLOL TARTRATE 5 MG: 1 INJECTION, SOLUTION INTRAVENOUS at 03:02

## 2019-02-15 RX ADMIN — PREGABALIN 50 MG: 50 CAPSULE ORAL at 10:02

## 2019-02-15 RX ADMIN — ENOXAPARIN SODIUM 40 MG: 100 INJECTION SUBCUTANEOUS at 09:02

## 2019-02-15 RX ADMIN — IPRATROPIUM BROMIDE AND ALBUTEROL SULFATE 3 ML: .5; 3 SOLUTION RESPIRATORY (INHALATION) at 04:02

## 2019-02-15 RX ADMIN — PREGABALIN 50 MG: 50 CAPSULE ORAL at 09:02

## 2019-02-15 RX ADMIN — ONDANSETRON 4 MG: 2 INJECTION INTRAMUSCULAR; INTRAVENOUS at 04:02

## 2019-02-15 RX ADMIN — INSULIN ASPART 8 UNITS: 100 INJECTION, SOLUTION INTRAVENOUS; SUBCUTANEOUS at 07:02

## 2019-02-15 RX ADMIN — INSULIN DETEMIR 25 UNITS: 100 INJECTION, SOLUTION SUBCUTANEOUS at 09:02

## 2019-02-15 RX ADMIN — FLUCONAZOLE 200 MG: 40 POWDER, FOR SUSPENSION ORAL at 01:02

## 2019-02-15 RX ADMIN — INSULIN ASPART 10 UNITS: 100 INJECTION, SOLUTION INTRAVENOUS; SUBCUTANEOUS at 12:02

## 2019-02-15 RX ADMIN — HYDROXYZINE HYDROCHLORIDE 25 MG: 10 SOLUTION ORAL at 03:02

## 2019-02-15 RX ADMIN — PANTOPRAZOLE SODIUM 40 MG: 40 INJECTION, POWDER, FOR SOLUTION INTRAVENOUS at 09:02

## 2019-02-15 RX ADMIN — LAMOTRIGINE 150 MG: 100 TABLET ORAL at 09:02

## 2019-02-15 RX ADMIN — ONDANSETRON 4 MG: 2 INJECTION INTRAMUSCULAR; INTRAVENOUS at 07:02

## 2019-02-15 RX ADMIN — ACETAMINOPHEN 650 MG: 650 SUPPOSITORY RECTAL at 12:02

## 2019-02-15 RX ADMIN — AMITRIPTYLINE HYDROCHLORIDE 50 MG: 25 TABLET, FILM COATED ORAL at 10:02

## 2019-02-15 RX ADMIN — ASPIRIN 81 MG 81 MG: 81 TABLET ORAL at 09:02

## 2019-02-15 NOTE — PROGRESS NOTES
[facility]  Neuroendocrine Tumor Service  Progress Note     Admission Date: 1/25/2019  Hospital Length of Stay: 21  Principal Problem: Malignant carcinoid tumor of pancreas     Subjective:      Operation:  64 yo F w/ PNET, s/p reversal of Sandeep-en-Y w/ Jtube placement  POD#21    1/25/18  Ex lap, gastro-gastrostomy EEA with 33 mm circular stapler, Pyloroplasty and gastrostomy closure, enteric resection with side to side to anstamosis and restoration of small bowel continuity using Idrive tan load stapler, Liver US, Liver biopsy, Liver resection segment 3, Supraceliac lymphadenectomy, FNA pancreas, Lt salpingo oopherectomy, Meckel diverticulectomy, Feeding jejunostomy 14 Fr malecot, ICG perfusion and Infrared exam of all anastamosis    Interval History:   Back pain somewhat better, still there  Resp status stable, CXR stable yest  Not much OOB, work c PT  WV change yesterday  TFs at 10 tolerated    Scheduled Meds:   albuterol-ipratropium  3 mL Nebulization Q4H    amitriptyline  50 mg Oral QHS    aspirin  81 mg Oral Daily    enoxparin  40 mg Subcutaneous Q24H    fluconazole 40 mg/ml  200 mg Per NG tube Daily    hydrOXYzine  25 mg Per NG tube TID    insulin aspart U-100  1-10 Units Subcutaneous 6 times per day    insulin detemir U-100  25 Units Subcutaneous Daily    lamoTRIgine  150 mg Oral BID    lidocaine  1 patch Transdermal Q24H    metoprolol  5 mg Intravenous Q6H    mirtazapine  15 mg Per NG tube Nightly    pantoprazole  40 mg Intravenous Daily    pregabalin  50 mg Per NG tube BID     Continuous Infusions:   Amino acid 5% - dextrose 15% (CLINIMIX-E) solution with additives (1L  provides 510 kcal/L dextrose, with 50 gm AA, 150 gm CHO, Na 35, K 30, Mg 5, Ca 4.5, Acetate 80, Cl 39, Phos 15) 20 mL/hr at 02/14/19 1355     PRN Meds:.sodium chloride, acetaminophen, acetaminophen, albuterol sulfate, artificial tears, dextrose 50%, diphenhydrAMINE, diphenhydrAMINE, fentaNYL,  glucagon (human recombinant), hydrALAZINE, HYDROmorphone, HYDROmorphone, ipratropium, labetalol, lidocaine HCL 10 mg/ml (1%), lidocaine HCl 2%, lorazepam, midazolam, naloxone, ondansetron, promethazine (PHENERGAN) IVPB, sodium chloride 0.9%, sodium chloride 3%, tiZANidine    Objective:      Temp:  [97.4 °F (36.3 °C)-98.7 °F (37.1 °C)] 97.9 °F (36.6 °C)  Pulse:  [] 105  Resp:  [14-70] 21  SpO2:  [92 %-100 %] 99 %  BP: (119-153)/(55-66) 135/62  Weight change: 0 kg (0 lb)    Intake/Output Summary (Last 24 hours) at 2/15/2019 0832  Last data filed at 2/15/2019 0600  Gross per 24 hour   Intake 990 ml   Output 550 ml   Net 440 ml     Lines:  RIJ TLC  NGT    Physical Exam:  GEN:NAD  CV: RRR  PULM: unlabored with 2L NC  ABD: S/NT/ ND, incision with wound vac on superior aspect with no drainage. Inferior aspect with dressing clean and dry  EXT: Peripheral pulses present and equal bilaterally    Recent Labs   Lab 02/15/19  0708      K 4.7   CO2 29      BUN 14   CREATININE 0.7   CALCIUM 8.7   PROT 6.7   AST 23   ALT 17   ALKPHOS 217*   BILITOT 0.7     Recent Labs   Lab 02/15/19  0708   WBC 9.54   HGB 9.6*   HCT 32.2*   *       Significant Imaging: I have reviewed all pertinent imaging results/findings within the past 24 hours.     Assessment and Plan:   62 yo F w/ PNET, s/p reversal of Sandeep-en-Y w/ Jtube placement  POD#21    1/25/18  Ex lap, gastro-gastrostomy EEA with 33 mm circular stapler, Pyloroplasty and gastrostomy closure, enteric resection with side to side to anstamosis and restoration of small bowel continuity using Idrive tan load stapler, Liver US, Liver biopsy, Liver resection segment 3, Supraceliac lymphadenectomy, FNA pancreas, Lt salpingo oopherectomy, Meckel diverticulectomy, Feeding jejunostomy 14 Fr malecot, ICG perfusion and Infrared exam of all anastamosis    Neuro - No sedatives, aspiration risk  FEN/GI - Continue NPO, TPN on, TFs 10 cc/hr, check residuals, replete lytes prn,  ordered this AM  ID - cont fluconazole, last dose rocephin 2/13/19, fungitell assay pending   Heme: H/H stable  Pulm - On NC. Aggressive chest CPT and acapella. ABGs. Resp Cx growing E Coli she has received full course rocephin  Renal - Cr good, whitlock out, difficult to record accurate I/O/UOP but pt refuse wick   Surgical - WV in place white sponge then black sponge, change MWF   PPX - continue lovenox, protonix, scd in place  Dispo - Remains in critical condition cont ICU care    Colton Olivier MD

## 2019-02-15 NOTE — PT/OT/SLP PROGRESS
Speech Language Pathology      Kaylee Ngo  MRN: [id number]      Multiple attempts to see patient today, first pt ill/nauseated and told speech she could not perform any exercises, 2nd attempt: pt lethargic/dcr'd wakefulness from medication given for nausea. Will continue with POC goals.       QUIANA Herrera, CCC-SLP   2/15/2019

## 2019-02-15 NOTE — PROGRESS NOTES
RE: TF intolerances per sister    Patient is well know to me through Neuroendocrine Tumor Clinic. Patient reported poor tolerance to Glucerna 1.5 TF at home. +nausea, abd pain, diarrhea and stopped using it. She experienced weight loss related to sub optimal oral and TF intake. Her  TF was change to Impact Peptide 1.5 and patient was able to increase administration to 3 cans per day via pump. Sister called [facility] clinic this morning to discuss patient's intolerance to Glucerna 1.5. Spoke to Dr Titus regarding formula change to Peptamen 1.5 with Prebiotic @ 10 mL/hr to increase as ASHANTI to goal rate of 50 mL/hr. Order placed. Note routed to Dr KIM Olivier.

## 2019-02-15 NOTE — PLAN OF CARE
Problem: Skin Injury Risk Increased  Goal: Skin Health and Integrity    Intervention: Optimize Skin Protection  Pt had 101.9 temperature;acetaminophen adminstered. Pt continues on 3 L O2 NC for adequate oxygenation. Adequate UO.  Pt slept most of the day. Contacted Dr Nuno concerning fever and that the patient stated that she would like to be a DNR and please to place it in her file.  VSS.  Will continue to monitor.

## 2019-02-15 NOTE — PT/OT/SLP PROGRESS
Physical Therapy Treatment    Patient Name:  Kaylee Ngo   MRN:  [id number]    Recommendations:     Discharge Recommendations:  nursing facility, skilled   Discharge Equipment Recommendations: (TBD)   Barriers to discharge: Decreased caregiver support    Assessment:     Kaylee Ngo is a 63 y.o. female admitted with a medical diagnosis of Malignant carcinoid tumor of pancreas.  She presents with the following impairments/functional limitations:  weakness, impaired functional mobilty, impaired cardiopulmonary response to activity, impaired endurance, gait instability, impaired balance, decreased lower extremity function, impaired self care skills. Pt sat at EOB briefly with Min assist of 2.    Rehab Prognosis: Fair; patient would benefit from acute skilled PT services to address these deficits and reach maximum level of function.    Recent Surgery: Procedure(s) (LRB):  LAPAROTOMY, EXPLORATORY (N/A)  LYMPHADENECTOMY (N/A)  EXCISION, LIVER (N/A) 21 Days Post-Op    Plan:     During this hospitalization, patient to be seen 5 x/week to address the identified rehab impairments via gait training, therapeutic activities, therapeutic exercises, neuromuscular re-education, wheelchair management/training and progress toward the following goals:    · Plan of Care Expires:  03/13/19    Subjective     Chief Complaint: tired  Patient/Family Comments/goals: get well enough to go home with sister per sister  Pain/Comfort:  · Pain Rating 1: 0/10      Objective:     Communicated with nurse prior to session.  Patient found all lines intact and call button in reach NG tube, oxygen, blood pressure cuff, peripheral IV, telemetry  upon PT entry to room.     General Precautions: Standard, fall, aspiration, respiratory, NPO   Orthopedic Precautions:N/A   Braces:       Functional Mobility:  · Bed Mobility:     · Rolling Right: minimum assistance  · Supine to Sit: minimum assistance and of 2 persons  · Sit to Supine: minimum assistance and  of 2 persons      AM-PAC 6 CLICK MOBILITY  Turning over in bed (including adjusting bedclothes, sheets and blankets)?: 3  Sitting down on and standing up from a chair with arms (e.g., wheelchair, bedside commode, etc.): 2  Moving from lying on back to sitting on the side of the bed?: 2  Moving to and from a bed to a chair (including a wheelchair)?: 2  Need to walk in hospital room?: 2  Climbing 3-5 steps with a railing?: 1  Basic Mobility Total Score: 12       Therapeutic Activities and Exercises:   Pt performed supine BLE AP, hip abd/add, hip flexion/ext with knee flex/ext, hip IR/ER, SAQ all 10 x 2.    Patient left HOB elevated with all lines intact, call button in reach, nurse notified and sister present..    GOALS:   Multidisciplinary Problems     Physical Therapy Goals        Problem: Physical Therapy Goal    Goal Priority Disciplines Outcome Goal Variances Interventions   Physical Therapy Goal     PT, PT/OT Ongoing (interventions implemented as appropriate)     Description:  Goals to be met by: 19     Patient will increase functional independence with mobility by performin. Supine <> sit with Stand-by Assistance  2. Sit to stand transfer with Stand-by Assistance  3. Bed to chair transfer with Stand-by Assistance using Rolling Walker  4. Gait  x 50 feet with Stand-by Assistance using Rolling Walker.              Problem: Physical Therapy Goal    Goal Priority Disciplines Outcome Goal Variances Interventions   Physical Therapy Goal     PT, PT/OT Ongoing (interventions implemented as appropriate)     Description:  Goals to be met by: 3/4/2019     Patient will increase functional independence with mobility by performin. Supine to sit with Moderate Assistance MET 2019  2. Sit to supine with Moderate Assistance   3. Rolling to Left and Right with Moderate Assistance.   4. Sitting at edge of bed x10 minutes with Minimal Assistance and Moderate Assistance  4. Lower extremity exercise program  x5-10 active reps with assistance as needed - met 2/13/19    Updated Goals 2/8/2019:  1. Supine<>sit with SBA  2. Sit to stand with  CGA  3. Bed to chair with CGA  4. Gait x 20 ft withmin A               Problem: Physical Therapy Goal    Goal Priority Disciplines Outcome Goal Variances Interventions   Physical Therapy Goal     PT, PT/OT             Problem: Physical Therapy Goal    Goal Priority Disciplines Outcome Goal Variances Interventions   Physical Therapy Goal     PT, PT/OT             Problem: Physical Therapy Goal    Goal Priority Disciplines Outcome Goal Variances Interventions   Physical Therapy Goal     PT, PT/OT             Problem: Physical Therapy Goal    Goal Priority Disciplines Outcome Goal Variances Interventions   Physical Therapy Goal     PT, PT/OT                      Time Tracking:     PT Received On: 02/15/19  PT Start Time: 1044     PT Stop Time: 1110  PT Total Time (min): 26 min     Billable Minutes: Therapeutic Activity 13 and Therapeutic Exercise 13    Treatment Type: Treatment  PT/PTA: PT     PTA Visit Number: 1     Odilia Hernández, PT  02/15/2019

## 2019-02-15 NOTE — PLAN OF CARE
Problem: Occupational Therapy Goal  Goal: Occupational Therapy Goal  Goals to be met by: 3/7/2019     Patient will increase functional independence with ADLs by performing:    UE Dressing with Minimal Assistance.  LE Dressing with Moderate Assistance.  Grooming while seated with Minimal Assistance.  Toileting from bedside commode with Moderate Assistance for hygiene and clothing management.   Sitting at edge of bed x10 minutes with Moderate Assistance.  Supine to sit with Maximum Assistance.  Increased functional UE strength to 3/5      Outcome: Ongoing (interventions implemented as appropriate)  Pt. Extremely lethargic today and sleeping mostly during OT session.  Nurse notified.  Continue with OT POC.

## 2019-02-15 NOTE — PROGRESS NOTES
Results for NAOMI NAJERA (MRN [id number]) as of 2/15/2019 12:48   Ref. Range 2/15/2019 07:08   Sodium Latest Ref Range: 136 - 145 mmol/L 142   Potassium Latest Ref Range: 3.5 - 5.1 mmol/L 4.7   Chloride Latest Ref Range: 95 - 110 mmol/L 105   CO2 Latest Ref Range: 23 - 29 mmol/L 29   Anion Gap Latest Ref Range: 8 - 16 mmol/L 8   BUN, Bld Latest Ref Range: 8 - 23 mg/dL 14   Creatinine Latest Ref Range: 0.5 - 1.4 mg/dL 0.7   eGFR if non African American Latest Ref Range: >60 mL/min/1.73 m^2 >60   eGFR if  Latest Ref Range: >60 mL/min/1.73 m^2 >60   Glucose Latest Ref Range: 70 - 110 mg/dL 203 (H)   Calcium Latest Ref Range: 8.7 - 10.5 mg/dL 8.7   Phosphorus Latest Ref Range: 2.7 - 4.5 mg/dL 2.1 (L)   Magnesium Latest Ref Range: 1.6 - 2.6 mg/dL 2.0   Alkaline Phosphatase Latest Ref Range: 55 - 135 U/L 217 (H)   Total Protein Latest Ref Range: 6.0 - 8.4 g/dL 6.7   Albumin Latest Ref Range: 3.5 - 5.2 g/dL 2.6 (L)   Total Bilirubin Latest Ref Range: 0.1 - 1.0 mg/dL 0.7   AST Latest Ref Range: 10 - 40 U/L 23   ALT Latest Ref Range: 10 - 44 U/L 17     Reordered Clinimix 5/15 TPN and Fat Emulsion 20%, per protocol. No dose adjustments needed; no supplemental electrolytes added. Labs WNL     Carlene Gutierrez, PharmD  [phone]

## 2019-02-15 NOTE — NURSING
Pt VS wnl afebrile. C/o intermittent nausea a couple times tonight and zofran administered. No emesis. No acute distress.

## 2019-02-15 NOTE — PLAN OF CARE
Problem: Adult Inpatient Plan of Care  Goal: Plan of Care Review  Outcome: Ongoing (interventions implemented as appropriate)  Patient on oxygen with documented flow.  Will attempt to wean per O2 order protocol. The proper method of use, as well as anticipated side effects, of this aerosol treatment are discussed and demonstrated to the patient. Refused due to nausea. Will continue to monitor.

## 2019-02-15 NOTE — PT/OT/SLP PROGRESS
Occupational Therapy   Treatment    Name: Kaylee Ngo  MRN: [id number]  Admitting Diagnosis:  Malignant carcinoid tumor of pancreas  21 Days Post-Op    Recommendations:     Discharge Recommendations: nursing facility, skilled  Discharge Equipment Recommendations:  (TBD)  Barriers to discharge:  None    Assessment:     Kaylee Ngo is a 63 y.o. female with a medical diagnosis of Malignant carcinoid tumor of pancreas.  She presents with extreme lethargy and poor participation in OT session today; slept mostly during session; hard to stay awake.  Continue with OT POC. Performance deficits affecting function are weakness, impaired endurance, impaired self care skills, impaired functional mobilty, gait instability, impaired balance, decreased upper extremity function, decreased lower extremity function, decreased safety awareness, impaired cardiopulmonary response to activity.     Rehab Prognosis:  Fair; patient would benefit from acute skilled OT services to address these deficits and reach maximum level of function.       Plan:     Patient to be seen 5 x/week to address the above listed problems via self-care/home management, therapeutic activities, therapeutic exercises  · Plan of Care Expires: 03/07/19  · Plan of Care Reviewed with: patient, sibling    Subjective     Pain/Comfort:  · Pain Rating 1: 0/10    Objective:     Communicated with: nurse prior to session.  Patient found right sideline with blood pressure cuff, whitlock catheter, NG tube, telemetry, peripheral IV upon OT entry to room.    General Precautions: Standard, aspiration, fall, NPO, respiratory   Orthopedic Precautions:N/A   Braces: N/A     Occupational Performance:     Bed Mobility:    · Patient completed Rolling/Turning to Left with  minimum assistance and with side rail  · Patient completed Scooting/Bridging with total assistance and 2 persons toward HOB with drawsheet.    Activities of Daily Living:  · Grooming: maximal assistance washed face in  bed      [facility] 6 Click ADL: 7    Treatment & Education:  Role of OT and POC  Instructed pt.'s sister to allow her to engage in simple self care tasks like washing her face and cleaning her mouth with oral swab and to performed AROM ex with fingers and hands.  Pt particpated in P/AAROM ex < 25% with BUE all planes x 10 reps 2/2 extreme lethargy.    Patient left left sidelying with all lines intact, call button in reach, nurse notified and sister presentEducation:      GOALS:   Multidisciplinary Problems     Occupational Therapy Goals        Problem: Occupational Therapy Goal    Goal Priority Disciplines Outcome Interventions   Occupational Therapy Goal     OT, PT/OT Ongoing (interventions implemented as appropriate)    Description:  Goals to be met by: 3/7/2019     Patient will increase functional independence with ADLs by performing:    UE Dressing with Minimal Assistance.  LE Dressing with Moderate Assistance.  Grooming while seated with Minimal Assistance.  Toileting from bedside commode with Moderate Assistance for hygiene and clothing management.   Sitting at edge of bed x10 minutes with Moderate Assistance.  Supine to sit with Maximum Assistance.  Increased functional UE strength to 3/5                       Time Tracking:     OT Date of Treatment: 02/15/19  OT Start Time: 1344  OT Stop Time: 1400  OT Total Time (min): 16 min    Billable Minutes:Therapeutic Exercise 16  Total Time 16    Brandy Ramos OT  2/15/2019

## 2019-02-15 NOTE — PLAN OF CARE
Problem: Physical Therapy Goal  Goal: Physical Therapy Goal  Goals to be met by: 3/4/2019     Patient will increase functional independence with mobility by performin. Supine to sit with Moderate Assistance MET 2019  2. Sit to supine with Moderate Assistance   3. Rolling to Left and Right with Moderate Assistance.   4. Sitting at edge of bed x10 minutes with Minimal Assistance and Moderate Assistance  4. Lower extremity exercise program x5-10 active reps with assistance as needed - met 19    Updated Goals 2019:  1. Supine<>sit with SBA  2. Sit to stand with  CGA  3. Bed to chair with CGA  4. Gait x 20 ft withmin A       Outcome: Ongoing (interventions implemented as appropriate)  Pt would benefit from ongoing PT to progress functional mobility.

## 2019-02-16 LAB
ALBUMIN SERPL BCP-MCNC: 2.2 G/DL
ALP SERPL-CCNC: 175 U/L
ALT SERPL W/O P-5'-P-CCNC: 21 U/L
ANION GAP SERPL CALC-SCNC: 8 MMOL/L
AST SERPL-CCNC: 32 U/L
BASOPHILS # BLD AUTO: 0 K/UL
BASOPHILS NFR BLD: 0 %
BILIRUB SERPL-MCNC: 0.4 MG/DL
BUN SERPL-MCNC: 20 MG/DL
CALCIUM SERPL-MCNC: 8.3 MG/DL
CHLORIDE SERPL-SCNC: 105 MMOL/L
CO2 SERPL-SCNC: 29 MMOL/L
CREAT SERPL-MCNC: 0.7 MG/DL
DIFFERENTIAL METHOD: ABNORMAL
EOSINOPHIL # BLD AUTO: 0 K/UL
EOSINOPHIL NFR BLD: 0.2 %
ERYTHROCYTE [DISTWIDTH] IN BLOOD BY AUTOMATED COUNT: 18.1 %
EST. GFR  (AFRICAN AMERICAN): >60 ML/MIN/1.73 M^2
EST. GFR  (NON AFRICAN AMERICAN): >60 ML/MIN/1.73 M^2
GLUCOSE SERPL-MCNC: 283 MG/DL
HCT VFR BLD AUTO: 28.1 %
HGB BLD-MCNC: 8.3 G/DL
LYMPHOCYTES # BLD AUTO: 0.6 K/UL
LYMPHOCYTES NFR BLD: 7.7 %
MAGNESIUM SERPL-MCNC: 2.1 MG/DL
MCH RBC QN AUTO: 28.3 PG
MCHC RBC AUTO-ENTMCNC: 29.5 G/DL
MCV RBC AUTO: 96 FL
MONOCYTES # BLD AUTO: 0.3 K/UL
MONOCYTES NFR BLD: 3.7 %
NEUTROPHILS # BLD AUTO: 7.1 K/UL
NEUTROPHILS NFR BLD: 88.2 %
PHOSPHATE SERPL-MCNC: 2.6 MG/DL
PLATELET # BLD AUTO: 598 K/UL
PMV BLD AUTO: 9.9 FL
POCT GLUCOSE: 126 MG/DL (ref 70–110)
POCT GLUCOSE: 146 MG/DL (ref 70–110)
POCT GLUCOSE: 263 MG/DL (ref 70–110)
POCT GLUCOSE: 294 MG/DL (ref 70–110)
POCT GLUCOSE: 310 MG/DL (ref 70–110)
POTASSIUM SERPL-SCNC: 4.3 MMOL/L
PROT SERPL-MCNC: 6 G/DL
RBC # BLD AUTO: 2.93 M/UL
SODIUM SERPL-SCNC: 142 MMOL/L
WBC # BLD AUTO: 8.03 K/UL

## 2019-02-16 PROCEDURE — 25000003 PHARM REV CODE 250

## 2019-02-16 PROCEDURE — 63600175 PHARM REV CODE 636 W HCPCS: Performed by: STUDENT IN AN ORGANIZED HEALTH CARE EDUCATION/TRAINING PROGRAM

## 2019-02-16 PROCEDURE — 63600175 PHARM REV CODE 636 W HCPCS: Performed by: SURGERY

## 2019-02-16 PROCEDURE — 25000003 PHARM REV CODE 250: Performed by: STUDENT IN AN ORGANIZED HEALTH CARE EDUCATION/TRAINING PROGRAM

## 2019-02-16 PROCEDURE — 94640 AIRWAY INHALATION TREATMENT: CPT

## 2019-02-16 PROCEDURE — 25000003 PHARM REV CODE 250: Performed by: SURGERY

## 2019-02-16 PROCEDURE — 36415 COLL VENOUS BLD VENIPUNCTURE: CPT

## 2019-02-16 PROCEDURE — 85025 COMPLETE CBC W/AUTO DIFF WBC: CPT

## 2019-02-16 PROCEDURE — 27000221 HC OXYGEN, UP TO 24 HOURS

## 2019-02-16 PROCEDURE — 94668 MNPJ CHEST WALL SBSQ: CPT

## 2019-02-16 PROCEDURE — 11000001 HC ACUTE MED/SURG PRIVATE ROOM

## 2019-02-16 PROCEDURE — 25000242 PHARM REV CODE 250 ALT 637 W/ HCPCS: Performed by: SURGERY

## 2019-02-16 PROCEDURE — C9113 INJ PANTOPRAZOLE SODIUM, VIA: HCPCS | Performed by: STUDENT IN AN ORGANIZED HEALTH CARE EDUCATION/TRAINING PROGRAM

## 2019-02-16 PROCEDURE — 87449 NOS EACH ORGANISM AG IA: CPT

## 2019-02-16 PROCEDURE — 25000003 PHARM REV CODE 250: Performed by: NURSE PRACTITIONER

## 2019-02-16 PROCEDURE — 92526 ORAL FUNCTION THERAPY: CPT

## 2019-02-16 PROCEDURE — 80053 COMPREHEN METABOLIC PANEL: CPT

## 2019-02-16 PROCEDURE — 94664 DEMO&/EVAL PT USE INHALER: CPT

## 2019-02-16 PROCEDURE — 99900035 HC TECH TIME PER 15 MIN (STAT)

## 2019-02-16 PROCEDURE — 84100 ASSAY OF PHOSPHORUS: CPT

## 2019-02-16 PROCEDURE — 83735 ASSAY OF MAGNESIUM: CPT

## 2019-02-16 PROCEDURE — 94761 N-INVAS EAR/PLS OXIMETRY MLT: CPT

## 2019-02-16 RX ORDER — METHOCARBAMOL 500 MG/1
1000 TABLET, FILM COATED ORAL ONCE
Status: COMPLETED | OUTPATIENT
Start: 2019-02-16 | End: 2019-02-16

## 2019-02-16 RX ORDER — METHOCARBAMOL 500 MG/1
TABLET, FILM COATED ORAL
Status: COMPLETED
Start: 2019-02-16 | End: 2019-02-16

## 2019-02-16 RX ORDER — METHOCARBAMOL 100 MG/ML
1000 INJECTION, SOLUTION INTRAMUSCULAR; INTRAVENOUS ONCE
Status: DISCONTINUED | OUTPATIENT
Start: 2019-02-16 | End: 2019-02-16 | Stop reason: RX

## 2019-02-16 RX ADMIN — INSULIN ASPART 8 UNITS: 100 INJECTION, SOLUTION INTRAVENOUS; SUBCUTANEOUS at 08:02

## 2019-02-16 RX ADMIN — IPRATROPIUM BROMIDE AND ALBUTEROL SULFATE 3 ML: .5; 3 SOLUTION RESPIRATORY (INHALATION) at 03:02

## 2019-02-16 RX ADMIN — ACETAMINOPHEN 650 MG: 650 SUPPOSITORY RECTAL at 07:02

## 2019-02-16 RX ADMIN — PROMETHAZINE HYDROCHLORIDE 12.5 MG: 25 INJECTION INTRAMUSCULAR; INTRAVENOUS at 07:02

## 2019-02-16 RX ADMIN — IPRATROPIUM BROMIDE AND ALBUTEROL SULFATE 3 ML: .5; 3 SOLUTION RESPIRATORY (INHALATION) at 08:02

## 2019-02-16 RX ADMIN — RETINOL, ERGOCALCIFEROL, .ALPHA.-TOCOPHEROL ACETATE, DL-, PHYTONADIONE, ASCORBIC ACID, NIACINAMIDE, RIBOFLAVIN 5-PHOSPHATE SODIUM, THIAMINE HYDROCHLORIDE, PYRIDOXINE HYDROCHLORIDE, DEXPANTHENOL, BIOTIN, FOLIC ACID, AND CYANOCOBALAMIN: KIT at 06:02

## 2019-02-16 RX ADMIN — HYDROXYZINE HYDROCHLORIDE 25 MG: 10 SOLUTION ORAL at 02:02

## 2019-02-16 RX ADMIN — LAMOTRIGINE 150 MG: 100 TABLET ORAL at 09:02

## 2019-02-16 RX ADMIN — METOPROLOL TARTRATE 5 MG: 1 INJECTION, SOLUTION INTRAVENOUS at 08:02

## 2019-02-16 RX ADMIN — METHOCARBAMOL 1000 MG: 500 TABLET, FILM COATED ORAL at 10:02

## 2019-02-16 RX ADMIN — INSULIN ASPART 3 UNITS: 100 INJECTION, SOLUTION INTRAVENOUS; SUBCUTANEOUS at 04:02

## 2019-02-16 RX ADMIN — ENOXAPARIN SODIUM 40 MG: 100 INJECTION SUBCUTANEOUS at 09:02

## 2019-02-16 RX ADMIN — HYDROMORPHONE HYDROCHLORIDE 0.2 MG: 2 INJECTION INTRAMUSCULAR; INTRAVENOUS; SUBCUTANEOUS at 06:02

## 2019-02-16 RX ADMIN — METOPROLOL TARTRATE 5 MG: 1 INJECTION, SOLUTION INTRAVENOUS at 03:02

## 2019-02-16 RX ADMIN — AMITRIPTYLINE HYDROCHLORIDE 50 MG: 25 TABLET, FILM COATED ORAL at 09:02

## 2019-02-16 RX ADMIN — PREGABALIN 50 MG: 50 CAPSULE ORAL at 08:02

## 2019-02-16 RX ADMIN — PREGABALIN 50 MG: 50 CAPSULE ORAL at 09:02

## 2019-02-16 RX ADMIN — HYDROXYZINE HYDROCHLORIDE 25 MG: 10 SOLUTION ORAL at 08:02

## 2019-02-16 RX ADMIN — MIRTAZAPINE 15 MG: 15 TABLET, ORALLY DISINTEGRATING ORAL at 09:02

## 2019-02-16 RX ADMIN — HYDROMORPHONE HYDROCHLORIDE 0.2 MG: 2 INJECTION INTRAMUSCULAR; INTRAVENOUS; SUBCUTANEOUS at 10:02

## 2019-02-16 RX ADMIN — INSULIN ASPART 6 UNITS: 100 INJECTION, SOLUTION INTRAVENOUS; SUBCUTANEOUS at 11:02

## 2019-02-16 RX ADMIN — IPRATROPIUM BROMIDE AND ALBUTEROL SULFATE 3 ML: .5; 3 SOLUTION RESPIRATORY (INHALATION) at 11:02

## 2019-02-16 RX ADMIN — FLUCONAZOLE 200 MG: 40 POWDER, FOR SUSPENSION ORAL at 09:02

## 2019-02-16 RX ADMIN — LIDOCAINE 1 PATCH: 50 PATCH TOPICAL at 11:02

## 2019-02-16 RX ADMIN — INSULIN DETEMIR 25 UNITS: 100 INJECTION, SOLUTION SUBCUTANEOUS at 08:02

## 2019-02-16 RX ADMIN — METHOCARBAMOL: 500 TABLET ORAL at 11:02

## 2019-02-16 RX ADMIN — ASPIRIN 81 MG 81 MG: 81 TABLET ORAL at 08:02

## 2019-02-16 RX ADMIN — PANTOPRAZOLE SODIUM 40 MG: 40 INJECTION, POWDER, FOR SOLUTION INTRAVENOUS at 08:02

## 2019-02-16 RX ADMIN — IPRATROPIUM BROMIDE AND ALBUTEROL SULFATE 3 ML: .5; 3 SOLUTION RESPIRATORY (INHALATION) at 04:02

## 2019-02-16 RX ADMIN — HYDROXYZINE HYDROCHLORIDE 25 MG: 10 SOLUTION ORAL at 09:02

## 2019-02-16 RX ADMIN — IPRATROPIUM BROMIDE AND ALBUTEROL SULFATE 3 ML: .5; 3 SOLUTION RESPIRATORY (INHALATION) at 12:02

## 2019-02-16 RX ADMIN — METOPROLOL TARTRATE 5 MG: 1 INJECTION, SOLUTION INTRAVENOUS at 10:02

## 2019-02-16 RX ADMIN — METOPROLOL TARTRATE 5 MG: 1 INJECTION, SOLUTION INTRAVENOUS at 01:02

## 2019-02-16 RX ADMIN — LAMOTRIGINE 150 MG: 100 TABLET ORAL at 08:02

## 2019-02-16 NOTE — NURSING TRANSFER
Nursing Transfer Note      2/16/2019     Transfer From: ICU    Transfer via bed    Transfer with O2    Transported by Transport and 2 ICU RN    Medicines sent: insulin and hydroxyine    Chart send with patient: Yes    Notified: sister    Patient reassessed at: 2/16/2019, 1330 (date, time)    Upon arrival to floor: cardiac monitor applied

## 2019-02-16 NOTE — PROGRESS NOTES
[facility]  Neuroendocrine Tumor Service  Progress Note     Admission Date: 1/25/2019  Hospital Length of Stay: 22  Principal Problem: Malignant carcinoid tumor of pancreas     Subjective:      Operation:  62 yo F w/ PNET, s/p reversal of Sandeep-en-Y w/ Jtube placement  POD#22    1/25/18  Ex lap, gastro-gastrostomy EEA with 33 mm circular stapler, Pyloroplasty and gastrostomy closure, enteric resection with side to side to anstamosis and restoration of small bowel continuity using Idrive tan load stapler, Liver US, Liver biopsy, Liver resection segment 3, Supraceliac lymphadenectomy, FNA pancreas, Lt salpingo oopherectomy, Meckel diverticulectomy, Feeding jejunostomy 14 Fr malecot, ICG perfusion and Infrared exam of all anastamosis    Interval History:   New fever this .3  WV change Thursday  TFs at 10 tolerated    Scheduled Meds:   albuterol-ipratropium  3 mL Nebulization Q4H    amitriptyline  50 mg Oral QHS    aspirin  81 mg Oral Daily    enoxparin  40 mg Subcutaneous Q24H    fat emulsion 20%  250 mL Intravenous Daily    fluconazole 40 mg/ml  200 mg Per NG tube Daily    hydrOXYzine  25 mg Per NG tube TID    insulin aspart U-100  1-10 Units Subcutaneous 6 times per day    insulin detemir U-100  25 Units Subcutaneous Daily    lamoTRIgine  150 mg Oral BID    lidocaine  1 patch Transdermal Q24H    metoprolol  5 mg Intravenous Q6H    mirtazapine  15 mg Per NG tube Nightly    pantoprazole  40 mg Intravenous Daily    pregabalin  50 mg Per NG tube BID     Continuous Infusions:   Amino acid 5% - dextrose 15% (CLINIMIX-E) solution with additives (1L  provides 510 kcal/L dextrose, with 50 gm AA, 150 gm CHO, Na 35, K 30, Mg 5, Ca 4.5, Acetate 80, Cl 39, Phos 15) 50 mL/hr at 02/15/19 1623     PRN Meds:.sodium chloride, acetaminophen, acetaminophen, albuterol sulfate, artificial tears, dextrose 50%, diphenhydrAMINE, diphenhydrAMINE, fentaNYL, glucagon (human recombinant),  hydrALAZINE, HYDROmorphone, HYDROmorphone, ipratropium, labetalol, lidocaine HCL 10 mg/ml (1%), lidocaine HCl 2%, lorazepam, midazolam, naloxone, ondansetron, promethazine (PHENERGAN) IVPB, sodium chloride 0.9%, sodium chloride 3%, tiZANidine    Objective:      Temp:  [97.3 °F (36.3 °C)-102.3 °F (39.1 °C)] 102.3 °F (39.1 °C)  Pulse:  [] 106  Resp:  [14-37] 20  SpO2:  [89 %-100 %] 99 %  BP: ()/() 144/63  Weight change: 0.1 kg (3.5 oz)    Intake/Output Summary (Last 24 hours) at 2/16/2019 0936  Last data filed at 2/16/2019 0730  Gross per 24 hour   Intake 2218.34 ml   Output 1250 ml   Net 968.34 ml     Lines:  RIJ TLC  NGT  WV    Physical Exam:  GEN:NAD  CV: RRR  PULM: unlabored with 2L NC  ABD: S/NT/ ND, incision with wound vac on superior aspect with no drainage. Inferior aspect with dressing clean and dry  EXT: Peripheral pulses present and equal bilaterally    Recent Labs   Lab 02/16/19  0416      K 4.3   CO2 29      BUN 20   CREATININE 0.7   CALCIUM 8.3*   PROT 6.0   AST 32   ALT 21   ALKPHOS 175*   BILITOT 0.4     Recent Labs   Lab 02/16/19  0416   WBC 8.03   HGB 8.3*   HCT 28.1*   *       Significant Imaging: I have reviewed all pertinent imaging results/findings within the past 24 hours.     Assessment and Plan:   64 yo F w/ PNET, s/p reversal of Sandeep-en-Y w/ Jtube placement  POD#22    1/25/18  Ex lap, gastro-gastrostomy EEA with 33 mm circular stapler, Pyloroplasty and gastrostomy closure, enteric resection with side to side to anstamosis and restoration of small bowel continuity using Idrive tan load stapler, Liver US, Liver biopsy, Liver resection segment 3, Supraceliac lymphadenectomy, FNA pancreas, Lt salpingo oopherectomy, Meckel diverticulectomy, Feeding jejunostomy 14 Fr malecot, ICG perfusion and Infrared exam of all anastamosis    Neuro - No sedatives, aspiration risk  FEN/GI - Continue NPO, TPN on, TFs 10 cc/hr, check residuals, replete lytes prn, ordered this  AM  ID - cont fluconazole, last dose rocephin 2/13/19, fungitell 81, recheck fungitell, fever 102, check CXR, continuous microaspirations  Heme: H/H stable, thrombocytopenia - daily ASA   Pulm - On NC. Aggressive chest CPT and acapella. ABGs. Resp Cx growing E Coli she has received full course rocephin  Renal - Cr good, whitlock out, difficult to record accurate I/O/UOP but pt refuse wick   Surgical - WV in place white sponge then black sponge, change MWF   PPX - continue lovenox, protonix, scd in place  Dispo - Remains in critical condition cont ICU care    Colton Olivier MD

## 2019-02-16 NOTE — PLAN OF CARE
Problem: Adult Inpatient Plan of Care  Goal: Absence of Hospital-Acquired Illness or Injury    Intervention: Prevent VTE (venous thromboembolism)  Pt VSS; Strict I/O adherence; n/v controlled with anti-emetics.  Pt experienced 102 fever; acetominophen administered and pt was responisve; temperature WNL.  Speech consult; pt failed swallow test.  [facility]

## 2019-02-16 NOTE — PLAN OF CARE
Problem: SLP Goal  Goal: SLP Goal  Short Term Goals:  1. Pt will participate in BSS to determine least restrictive diet.-discontinued 2/11  2. Pt will successfully participate in Modified Barium Swallow study to radiographically assess swallow function, rule out aspiration and determine safest/least restrictive diet.- MET 2/11  3. Pt will participate in trial sessions of indirect dysphagia exercises to strengthen musculature for swallowing mechanism with mod-max effort.             Outcome: Ongoing (interventions implemented as appropriate)  2/16: Swallow re-eval completed this date. Pt trialed tsp sip thin liquid x2 with no overt s/s of aspiration; HOWEVER, per recent MBSS completed on 2/11, pt with mild oral dysphagia and severe-profound pharyngeal dysphagia c/b kim, silent aspiration under fluoro across all consistencies assessed. She continues to be at a HIGH RISK FOR ASPIRATION. ST to f/u for indirect dysphagia tx.

## 2019-02-16 NOTE — PT/OT/SLP PROGRESS
"Speech Language Pathology Treatment & Swallow Re-Eval    Patient Name:  Kaylee Ngo   MRN:  [id number]  Admitting Diagnosis: Malignant carcinoid tumor of pancreas    Recommendations:                 General Recommendations:  Dysphagia therapy  Diet recommendations:  NPO, Liquid Diet Level: NPO   Aspiration Precautions: Continue alternate means of nutrition, Frequent oral care and Strict aspiration precautions   General Precautions: Standard, aspiration, NPO, respiratory, fall  Communication strategies:  none    Subjective     Pt seen at the bedside for re-evaluation of swallow function and swallow tx. ST checked w/ RNAshwini, prior to tx. Per Ashwini, pt w/ high fever yesterday and today, c/o nausea, and generalized weakness. Pt awake/alert upon entry.   Patient goals: When asked how she is doing, pt reported, "Not too good..I have high BP, high blood sugar, fever, nausea, and a bad headache."    Pain/Comfort:  · Pain Rating 1: 9/10  · Location - Orientation 1: generalized  · Location 1: head  · Pain Addressed 1: Nurse notified  · Pain Rating 2: 9/10  · Location - Orientation 2: generalized  · Location 2: back  · Pain Addressed 2: Nurse notified    Objective:     Has the patient been evaluated by SLP for swallowing?   Yes  Keep patient NPO? Yes   Current Respiratory Status: nasal cannula      Pt seated to 90* for PO trials and exercises with nasal cannula in place. Yankeur suction at the bedside, which pt reported she uses often. Upon entry, pt with wet, hypernasal voice quality. Pt instructed to cough/clear throat which improved vocal quality some, but continued to be wet/gurgly t/o visit. She consumed tsp sips thin liquid x2 without overt s/s of aspiration; HOWEVER, per recent MBSS completed on 2/11, pt is at a HIGH risk of aspiration. MBSS revealed: CORY, silent aspiration of thin liquids, nectar thick liquids and honey thick liquids during the swallow. Pt with significantly reduced laryngeal sensation 2/2 pt " required max instruction from SLP to cough to eject aspirated materials. However, pt with weak cough production and unable to effectively eject aspirated materials from airway.     See full MBSS note for extensive oral and pharyngeal phase deficits.    Pt completed OMEs targeting TB strength/mobility to improve retraction and swallow initiation. She produced hard glottal /k/ and /g/ in 3 sets of 10 each with fair-good effort. Pt instructed in effortful swallow x2, completed with fair effort. Volitional cough and swallow both weak, with minimal hyolaryngeal excursion upon palpation. She continues to be hyperverbal and requires frequent redirectional cues to the current task. Education provided re: results of recent MBSS, aspiration risks, swallowing precautions, and need for frequent oral care/OMEs. Pt verbalized understanding, but limited comprehension noted 2/2 cognitive deficits. Pt requested ST terminate session 2/2 pain and inquired if ST could notify nurse of pain.  Nurse notified of pain but reported pt recently given pain medication.     Assessment:     Kaylee Ngo is a 63 y.o. female with an SLP diagnosis of Dysphagia.  She cotninues to present with mild oral dysphagia and severe-profound pharyngeal dysphagia, placing her at HIGH risk of aspiration for ALL consistencies. She requires frequent and aggressive oral care with STRICT aspiration precautions at all times. Diet REC: Remain strict NPO w/ ST to f/u for ongoing dysphagia tx. Reviewed recs w/ RNAshwini, who was in agreement w/ POC.     Goals:   Multidisciplinary Problems     SLP Goals        Problem: SLP Goal    Goal Priority Disciplines Outcome   SLP Goal     SLP Ongoing (interventions implemented as appropriate)   Description:  Short Term Goals:  1. Pt will participate in BSS to determine least restrictive diet.-discontinued 2/11  2. Pt will successfully participate in Modified Barium Swallow study to radiographically assess swallow function, rule  out aspiration and determine safest/least restrictive diet.- MET 2/11  3. Pt will participate in trial sessions of indirect dysphagia exercises to strengthen musculature for swallowing mechanism with mod-max effort.                              Plan:     · Patient to be seen:  3 x/week   · Plan of Care expires:  03/08/19  · Plan of Care reviewed with:  patient   · SLP Follow-Up:  Yes       Discharge recommendations:  other (see comments)(Per POC)   Barriers to Discharge:  Level of Skilled Assistance Needed max A needed at this time    Time Tracking:     SLP Treatment Date:   02/16/19  Speech Start Time:  1053  Speech Stop Time:  1109     Speech Total Time (min):  16 min    Billable Minutes: Treatment Swallowing Dysfunction 16    Kay Faustin CCC-SLP  02/16/2019

## 2019-02-17 LAB
ALBUMIN SERPL BCP-MCNC: 2.1 G/DL
ALP SERPL-CCNC: 208 U/L
ALT SERPL W/O P-5'-P-CCNC: 56 U/L
ANION GAP SERPL CALC-SCNC: 7 MMOL/L
AST SERPL-CCNC: 66 U/L
BASOPHILS # BLD AUTO: 0.01 K/UL
BASOPHILS NFR BLD: 0.1 %
BILIRUB SERPL-MCNC: 0.5 MG/DL
BUN SERPL-MCNC: 16 MG/DL
CALCIUM SERPL-MCNC: 8.3 MG/DL
CHLORIDE SERPL-SCNC: 101 MMOL/L
CO2 SERPL-SCNC: 30 MMOL/L
CREAT SERPL-MCNC: 0.6 MG/DL
DIFFERENTIAL METHOD: ABNORMAL
EOSINOPHIL # BLD AUTO: 0.1 K/UL
EOSINOPHIL NFR BLD: 1.6 %
ERYTHROCYTE [DISTWIDTH] IN BLOOD BY AUTOMATED COUNT: 17.7 %
EST. GFR  (AFRICAN AMERICAN): >60 ML/MIN/1.73 M^2
EST. GFR  (NON AFRICAN AMERICAN): >60 ML/MIN/1.73 M^2
GLUCOSE SERPL-MCNC: 266 MG/DL
HCT VFR BLD AUTO: 27.7 %
HGB BLD-MCNC: 8.2 G/DL
LYMPHOCYTES # BLD AUTO: 1 K/UL
LYMPHOCYTES NFR BLD: 12.2 %
MAGNESIUM SERPL-MCNC: 1.7 MG/DL
MCH RBC QN AUTO: 27.6 PG
MCHC RBC AUTO-ENTMCNC: 29.6 G/DL
MCV RBC AUTO: 93 FL
MONOCYTES # BLD AUTO: 0.7 K/UL
MONOCYTES NFR BLD: 8.4 %
NEUTROPHILS # BLD AUTO: 6.4 K/UL
NEUTROPHILS NFR BLD: 77.2 %
PHOSPHATE SERPL-MCNC: 2.6 MG/DL
PLATELET # BLD AUTO: 524 K/UL
PMV BLD AUTO: 9.8 FL
POCT GLUCOSE: 128 MG/DL (ref 70–110)
POCT GLUCOSE: 155 MG/DL (ref 70–110)
POCT GLUCOSE: 253 MG/DL (ref 70–110)
POCT GLUCOSE: 261 MG/DL (ref 70–110)
POCT GLUCOSE: 269 MG/DL (ref 70–110)
POCT GLUCOSE: 387 MG/DL (ref 70–110)
POCT GLUCOSE: 398 MG/DL (ref 70–110)
POCT GLUCOSE: 408 MG/DL (ref 70–110)
POCT GLUCOSE: 471 MG/DL (ref 70–110)
POTASSIUM SERPL-SCNC: 4.3 MMOL/L
PROT SERPL-MCNC: 6 G/DL
RBC # BLD AUTO: 2.97 M/UL
SODIUM SERPL-SCNC: 138 MMOL/L
WBC # BLD AUTO: 8.31 K/UL

## 2019-02-17 PROCEDURE — 94668 MNPJ CHEST WALL SBSQ: CPT

## 2019-02-17 PROCEDURE — 94640 AIRWAY INHALATION TREATMENT: CPT

## 2019-02-17 PROCEDURE — 83735 ASSAY OF MAGNESIUM: CPT

## 2019-02-17 PROCEDURE — 25000003 PHARM REV CODE 250: Performed by: STUDENT IN AN ORGANIZED HEALTH CARE EDUCATION/TRAINING PROGRAM

## 2019-02-17 PROCEDURE — 25000003 PHARM REV CODE 250: Performed by: NURSE PRACTITIONER

## 2019-02-17 PROCEDURE — 94664 DEMO&/EVAL PT USE INHALER: CPT

## 2019-02-17 PROCEDURE — C9113 INJ PANTOPRAZOLE SODIUM, VIA: HCPCS | Performed by: STUDENT IN AN ORGANIZED HEALTH CARE EDUCATION/TRAINING PROGRAM

## 2019-02-17 PROCEDURE — 63600175 PHARM REV CODE 636 W HCPCS: Performed by: STUDENT IN AN ORGANIZED HEALTH CARE EDUCATION/TRAINING PROGRAM

## 2019-02-17 PROCEDURE — 94761 N-INVAS EAR/PLS OXIMETRY MLT: CPT

## 2019-02-17 PROCEDURE — 80053 COMPREHEN METABOLIC PANEL: CPT

## 2019-02-17 PROCEDURE — 27000221 HC OXYGEN, UP TO 24 HOURS

## 2019-02-17 PROCEDURE — 84100 ASSAY OF PHOSPHORUS: CPT

## 2019-02-17 PROCEDURE — 99900035 HC TECH TIME PER 15 MIN (STAT)

## 2019-02-17 PROCEDURE — 25000242 PHARM REV CODE 250 ALT 637 W/ HCPCS: Performed by: SURGERY

## 2019-02-17 PROCEDURE — 11000001 HC ACUTE MED/SURG PRIVATE ROOM

## 2019-02-17 PROCEDURE — 85025 COMPLETE CBC W/AUTO DIFF WBC: CPT

## 2019-02-17 PROCEDURE — 25000003 PHARM REV CODE 250: Performed by: SURGERY

## 2019-02-17 PROCEDURE — 63600175 PHARM REV CODE 636 W HCPCS: Performed by: SURGERY

## 2019-02-17 RX ORDER — HYDROMORPHONE HYDROCHLORIDE 1 MG/ML
0.2 INJECTION, SOLUTION INTRAMUSCULAR; INTRAVENOUS; SUBCUTANEOUS EVERY 6 HOURS PRN
Status: DISCONTINUED | OUTPATIENT
Start: 2019-02-17 | End: 2019-02-23

## 2019-02-17 RX ORDER — INSULIN ASPART 100 [IU]/ML
10 INJECTION, SOLUTION INTRAVENOUS; SUBCUTANEOUS ONCE
Status: COMPLETED | OUTPATIENT
Start: 2019-02-17 | End: 2019-02-17

## 2019-02-17 RX ORDER — INSULIN ASPART 100 [IU]/ML
15 INJECTION, SOLUTION INTRAVENOUS; SUBCUTANEOUS ONCE
Status: COMPLETED | OUTPATIENT
Start: 2019-02-17 | End: 2019-02-17

## 2019-02-17 RX ORDER — DOCUSATE SODIUM 50 MG/5ML
100 LIQUID ORAL DAILY PRN
Status: DISCONTINUED | OUTPATIENT
Start: 2019-02-17 | End: 2019-02-26 | Stop reason: HOSPADM

## 2019-02-17 RX ADMIN — IPRATROPIUM BROMIDE AND ALBUTEROL SULFATE 3 ML: .5; 3 SOLUTION RESPIRATORY (INHALATION) at 07:02

## 2019-02-17 RX ADMIN — PREGABALIN 50 MG: 50 CAPSULE ORAL at 08:02

## 2019-02-17 RX ADMIN — ENOXAPARIN SODIUM 40 MG: 100 INJECTION SUBCUTANEOUS at 09:02

## 2019-02-17 RX ADMIN — IPRATROPIUM BROMIDE AND ALBUTEROL SULFATE 3 ML: .5; 3 SOLUTION RESPIRATORY (INHALATION) at 03:02

## 2019-02-17 RX ADMIN — INSULIN ASPART 10 UNITS: 100 INJECTION, SOLUTION INTRAVENOUS; SUBCUTANEOUS at 02:02

## 2019-02-17 RX ADMIN — DOCUSATE SODIUM 100 MG: 50 LIQUID ORAL at 06:02

## 2019-02-17 RX ADMIN — ASPIRIN 81 MG 81 MG: 81 TABLET ORAL at 09:02

## 2019-02-17 RX ADMIN — INSULIN ASPART 15 UNITS: 100 INJECTION, SOLUTION INTRAVENOUS; SUBCUTANEOUS at 12:02

## 2019-02-17 RX ADMIN — AMITRIPTYLINE HYDROCHLORIDE 50 MG: 25 TABLET, FILM COATED ORAL at 08:02

## 2019-02-17 RX ADMIN — HYDROXYZINE HYDROCHLORIDE 25 MG: 10 SOLUTION ORAL at 08:02

## 2019-02-17 RX ADMIN — INSULIN DETEMIR 25 UNITS: 100 INJECTION, SOLUTION SUBCUTANEOUS at 09:02

## 2019-02-17 RX ADMIN — IPRATROPIUM BROMIDE AND ALBUTEROL SULFATE 3 ML: .5; 3 SOLUTION RESPIRATORY (INHALATION) at 02:02

## 2019-02-17 RX ADMIN — PANTOPRAZOLE SODIUM 40 MG: 40 INJECTION, POWDER, FOR SOLUTION INTRAVENOUS at 09:02

## 2019-02-17 RX ADMIN — LAMOTRIGINE 150 MG: 100 TABLET ORAL at 08:02

## 2019-02-17 RX ADMIN — LIDOCAINE 1 PATCH: 50 PATCH TOPICAL at 01:02

## 2019-02-17 RX ADMIN — HYDROXYZINE HYDROCHLORIDE 25 MG: 10 SOLUTION ORAL at 03:02

## 2019-02-17 RX ADMIN — METOPROLOL TARTRATE 5 MG: 1 INJECTION, SOLUTION INTRAVENOUS at 06:02

## 2019-02-17 RX ADMIN — TIZANIDINE 4 MG: 4 TABLET ORAL at 06:02

## 2019-02-17 RX ADMIN — HYDROMORPHONE HYDROCHLORIDE 0.2 MG: 1 INJECTION, SOLUTION INTRAMUSCULAR; INTRAVENOUS; SUBCUTANEOUS at 09:02

## 2019-02-17 RX ADMIN — INSULIN ASPART 10 UNITS: 100 INJECTION, SOLUTION INTRAVENOUS; SUBCUTANEOUS at 12:02

## 2019-02-17 RX ADMIN — FLUCONAZOLE 200 MG: 40 POWDER, FOR SUSPENSION ORAL at 10:02

## 2019-02-17 RX ADMIN — METOPROLOL TARTRATE 5 MG: 1 INJECTION, SOLUTION INTRAVENOUS at 09:02

## 2019-02-17 RX ADMIN — INSULIN ASPART 2 UNITS: 100 INJECTION, SOLUTION INTRAVENOUS; SUBCUTANEOUS at 08:02

## 2019-02-17 RX ADMIN — HYDROMORPHONE HYDROCHLORIDE 0.2 MG: 1 INJECTION, SOLUTION INTRAMUSCULAR; INTRAVENOUS; SUBCUTANEOUS at 08:02

## 2019-02-17 RX ADMIN — ONDANSETRON 4 MG: 2 INJECTION INTRAMUSCULAR; INTRAVENOUS at 07:02

## 2019-02-17 RX ADMIN — METOPROLOL TARTRATE 5 MG: 1 INJECTION, SOLUTION INTRAVENOUS at 01:02

## 2019-02-17 RX ADMIN — PROMETHAZINE HYDROCHLORIDE 12.5 MG: 25 INJECTION INTRAMUSCULAR; INTRAVENOUS at 10:02

## 2019-02-17 RX ADMIN — MIRTAZAPINE 15 MG: 15 TABLET, ORALLY DISINTEGRATING ORAL at 08:02

## 2019-02-17 RX ADMIN — INSULIN ASPART 6 UNITS: 100 INJECTION, SOLUTION INTRAVENOUS; SUBCUTANEOUS at 05:02

## 2019-02-17 RX ADMIN — HYDROXYZINE HYDROCHLORIDE 25 MG: 10 SOLUTION ORAL at 09:02

## 2019-02-17 RX ADMIN — PREGABALIN 50 MG: 50 CAPSULE ORAL at 09:02

## 2019-02-17 RX ADMIN — HYDROMORPHONE HYDROCHLORIDE 0.2 MG: 1 INJECTION, SOLUTION INTRAMUSCULAR; INTRAVENOUS; SUBCUTANEOUS at 03:02

## 2019-02-17 RX ADMIN — IPRATROPIUM BROMIDE AND ALBUTEROL SULFATE 3 ML: .5; 3 SOLUTION RESPIRATORY (INHALATION) at 11:02

## 2019-02-17 RX ADMIN — INSULIN ASPART 4 UNITS: 100 INJECTION, SOLUTION INTRAVENOUS; SUBCUTANEOUS at 01:02

## 2019-02-17 RX ADMIN — INSULIN ASPART 10 UNITS: 100 INJECTION, SOLUTION INTRAVENOUS; SUBCUTANEOUS at 09:02

## 2019-02-17 RX ADMIN — ASCORBIC ACID, VITAMIN A PALMITATE, CHOLECALCIFEROL, THIAMINE HYDROCHLORIDE, RIBOFLAVIN-5 PHOSPHATE SODIUM, PYRIDOXINE HYDROCHLORIDE, NIACINAMIDE, DEXPANTHENOL, ALPHA-TOCOPHEROL ACETATE, VITAMIN K1, FOLIC ACID, BIOTIN, CYANOCOBALAMIN: 200; 3300; 200; 6; 3.6; 6; 40; 15; 10; 150; 600; 60; 5 INJECTION, SOLUTION INTRAVENOUS at 08:02

## 2019-02-17 RX ADMIN — LAMOTRIGINE 150 MG: 100 TABLET ORAL at 10:02

## 2019-02-17 NOTE — PLAN OF CARE
Problem: Adult Inpatient Plan of Care  Goal: Plan of Care Review  Outcome: Ongoing (interventions implemented as appropriate)  Pts safety maintained, central line precautions continued. Meds given per MAR per NG tube. TPN infusing, tube feedings continued at 10 mLs/h. Pain relieved with Dilaudid and Robaxin. BG monitored and controlled with sliding scale insuline. Wound vac at 125, no air leak, lower abdomen dressing CDI. Voiding per bed pan. No N/V, SOB, distress. NSR and sinus tach per heart monitor. Vitals stable through the night.

## 2019-02-17 NOTE — PLAN OF CARE
Progress notes reviewed. Evening rounds completed. Introduced self as VN for this shift. Educated pt on VN's role in patient's care.  Plan of care reviewed with patient. Opportunity given for pt's questions.  Bedside RN  I room. Patient with complaints of back pain even after PRN administered.  VN discussed above with Dr Olivier, team to enter orders.  VN to continue to monitor.           02/16/19 2200   Type of Frequent Check   Type Patient Rounds   Safety/Activity   Patient Rounds bed in low position;bed wheels locked;ID band on;visualized patient   Positioning   Body Position turned   Head of Bed (HOB) HOB at 20-30 degrees   Positioning/Transfer Devices pillows   Pain/Comfort/Sleep   Preferred Pain Scale number (Numeric Rating Pain Scale)   Pain Body Location back   Pain Rating (0-10): Rest 7   Headache   Headache No   Nausea/Vomiting   Nausea/Vomiting No Nausea and Vomiting   Assessments (Pre/Post)   Level of Consciousness (AVPU) alert

## 2019-02-17 NOTE — PLAN OF CARE
Problem: Adult Inpatient Plan of Care  Goal: Plan of Care Review  Outcome: Ongoing (interventions implemented as appropriate)  The proper method of use, as well as anticipated side effects, of this aerosol treatment are discussed and demonstrated to the patient.   Will continue to monitor.  Patient on oxygen with documented flow.  Will attempt to wean per O2 order protocol.

## 2019-02-17 NOTE — PROGRESS NOTES
[facility]  Neuroendocrine Tumor Service  Progress Note     Admission Date: 1/25/2019  Hospital Length of Stay: 23  Principal Problem: Malignant carcinoid tumor of pancreas     Subjective:      Operation:  64 yo F w/ PNET, s/p reversal of Sandeep-en-Y w/ Jtube placement  POD#22    1/25/18  Ex lap, gastro-gastrostomy EEA with 33 mm circular stapler, Pyloroplasty and gastrostomy closure, enteric resection with side to side to anstamosis and restoration of small bowel continuity using Idrive tan load stapler, Liver US, Liver biopsy, Liver resection segment 3, Supraceliac lymphadenectomy, FNA pancreas, Lt salpingo oopherectomy, Meckel diverticulectomy, Feeding jejunostomy 14 Fr malecot, ICG perfusion and Infrared exam of all anastamosis    Interval History:   Changed WV  Still c/o back pain, added robaxin    Scheduled Meds:   albuterol-ipratropium  3 mL Nebulization Q4H    amitriptyline  50 mg Oral QHS    aspirin  81 mg Oral Daily    enoxparin  40 mg Subcutaneous Q24H    fluconazole 40 mg/ml  200 mg Per NG tube Daily    hydrOXYzine  25 mg Per NG tube TID    insulin aspart U-100  1-10 Units Subcutaneous 6 times per day    insulin detemir U-100  25 Units Subcutaneous Daily    lamoTRIgine  150 mg Oral BID    lidocaine  1 patch Transdermal Q24H    metoprolol  5 mg Intravenous Q6H    mirtazapine  15 mg Per NG tube Nightly    pantoprazole  40 mg Intravenous Daily    pregabalin  50 mg Per NG tube BID     Continuous Infusions:   Amino acid 5% - dextrose 15% (CLINIMIX-E) solution with additives (1L  provides 510 kcal/L dextrose, with 50 gm AA, 150 gm CHO, Na 35, K 30, Mg 5, Ca 4.5, Acetate 80, Cl 39, Phos 15) 50 mL/hr at 02/16/19 1834     PRN Meds:.sodium chloride, acetaminophen, acetaminophen, albuterol sulfate, artificial tears, dextrose 50%, diphenhydrAMINE, diphenhydrAMINE, fentaNYL, glucagon (human recombinant), hydrALAZINE, HYDROmorphone, HYDROmorphone, ipratropium,  labetalol, lidocaine HCL 10 mg/ml (1%), lidocaine HCl 2%, lorazepam, midazolam, naloxone, ondansetron, promethazine (PHENERGAN) IVPB, sodium chloride 0.9%, sodium chloride 3%, tiZANidine    Objective:      Temp:  [97.7 °F (36.5 °C)-99 °F (37.2 °C)] 97.7 °F (36.5 °C)  Pulse:  [] 104  Resp:  [17-31] 20  SpO2:  [92 %-100 %] 94 %  BP: (127-182)/(61-75) 137/61  Weight change: -2.5 kg (-8.2 oz)    Intake/Output Summary (Last 24 hours) at 2/17/2019 0943  Last data filed at 2/17/2019 0700  Gross per 24 hour   Intake 1765.67 ml   Output 1575 ml   Net 190.67 ml     Lines:  RIJ TLC  NGT  WV    Physical Exam:  GEN:NAD  CV: RRR  PULM: unlabored with 2L NC  ABD: S/NT/ ND, changed WV - good granulation tissue, no infection  EXT: Peripheral pulses present and equal bilaterally    Recent Labs   Lab 02/17/19  0525      K 4.3   CO2 30*      BUN 16   CREATININE 0.6   CALCIUM 8.3*   PROT 6.0   AST 66*   ALT 56*   ALKPHOS 208*   BILITOT 0.5     Recent Labs   Lab 02/17/19  0525   WBC 8.31   HGB 8.2*   HCT 27.7*   *       Significant Imaging: I have reviewed all pertinent imaging results/findings within the past 24 hours.     Assessment and Plan:   64 yo F w/ PNET, s/p reversal of Sandeep-en-Y w/ Jtube placement  POD#23    1/25/18  Ex lap, gastro-gastrostomy EEA with 33 mm circular stapler, Pyloroplasty and gastrostomy closure, enteric resection with side to side to anstamosis and restoration of small bowel continuity using Idrive tan load stapler, Liver US, Liver biopsy, Liver resection segment 3, Supraceliac lymphadenectomy, FNA pancreas, Lt salpingo oopherectomy, Meckel diverticulectomy, Feeding jejunostomy 14 Fr malecot, ICG perfusion and Infrared exam of all anastamosis    Neuro - No sedatives, aspiration risk  FEN/GI - Continue NPO, TPN on, TFs 10 cc/hr, check residuals, replete lytes prn, ordered this AM  ID - cont fluconazole, last dose rocephin 2/13/19, fungitell 81, recheck fungitell, fever 102 recently,  continuous microaspirations  Heme: H/H stable, thrombocytopenia - daily ASA   Pulm - On NC. Aggressive chest CPT and acapella. ABGs. Resp Cx growing E Coli she has received full course rocephin  Renal - Cr good, whitlock out, difficult to record accurate I/O/UOP but pt refuse wick   Surgical - WV in place white sponge then black sponge, change MWF  - changed today  PPX - continue lovenox, protonix, scd in place  Dispo - stepped down, cont inpt care and abx    Colton Olivier MD

## 2019-02-17 NOTE — PLAN OF CARE
VN cued into patients room for rounds, pt asleep in bed. No acute distress noted. Will continue to monitor and intervene PRN.        02/17/19 3476   Type of Frequent Check   Type Other (see comments)  (VN Rounds)   Safety/Activity   Patient Rounds bed in low position;visualized patient   Safety Promotion/Fall Prevention side rails raised x 2   Positioning   Body Position neutral body alignment   Head of Bed (HOB) HOB elevated   Pain/Comfort/Sleep   Preferred Pain Scale FACES (Mac-Bose FACES Pain Rating Scale)   FACES Pain Rating: Rest 0-->no hurt   Assessments (Pre/Post)   Level of Consciousness (AVPU) (pt asleep)

## 2019-02-17 NOTE — PLAN OF CARE
Problem: Adult Inpatient Plan of Care  Goal: Plan of Care Review  Outcome: Ongoing (interventions implemented as appropriate)  Patient is AAOx3, disoriented to the situation. Pt complains of pain on lower back and it was relieved with lidocaine patch. Nausea and vomiting was controlled with Zofran and phenergan. Pt meds is given per NG tube, TPN infusing. Tube feedings infusing, residuals 45 mLs.  Pain is relieved with Dilaudid. Blood glucose monitored. Pt is voiding per bed pan. Pt safety is maintained and will continue to monitor.

## 2019-02-18 LAB
ALBUMIN SERPL BCP-MCNC: 2.3 G/DL
ALP SERPL-CCNC: 225 U/L
ALT SERPL W/O P-5'-P-CCNC: 53 U/L
ANION GAP SERPL CALC-SCNC: 7 MMOL/L
AST SERPL-CCNC: 54 U/L
BASOPHILS # BLD AUTO: 0.01 K/UL
BASOPHILS NFR BLD: 0.1 %
BILIRUB SERPL-MCNC: 0.5 MG/DL
BUN SERPL-MCNC: 21 MG/DL
CALCIUM SERPL-MCNC: 8.7 MG/DL
CHLORIDE SERPL-SCNC: 99 MMOL/L
CO2 SERPL-SCNC: 31 MMOL/L
CREAT SERPL-MCNC: 0.6 MG/DL
DIFFERENTIAL METHOD: ABNORMAL
EOSINOPHIL # BLD AUTO: 0.2 K/UL
EOSINOPHIL NFR BLD: 1.4 %
ERYTHROCYTE [DISTWIDTH] IN BLOOD BY AUTOMATED COUNT: 17.6 %
EST. GFR  (AFRICAN AMERICAN): >60 ML/MIN/1.73 M^2
EST. GFR  (NON AFRICAN AMERICAN): >60 ML/MIN/1.73 M^2
GLUCOSE SERPL-MCNC: 210 MG/DL
HCT VFR BLD AUTO: 28.5 %
HGB BLD-MCNC: 8.7 G/DL
LYMPHOCYTES # BLD AUTO: 1 K/UL
LYMPHOCYTES NFR BLD: 9.3 %
MAGNESIUM SERPL-MCNC: 1.5 MG/DL
MCH RBC QN AUTO: 28 PG
MCHC RBC AUTO-ENTMCNC: 30.5 G/DL
MCV RBC AUTO: 92 FL
MONOCYTES # BLD AUTO: 1 K/UL
MONOCYTES NFR BLD: 8.9 %
NEUTROPHILS # BLD AUTO: 8.8 K/UL
NEUTROPHILS NFR BLD: 79.8 %
PHOSPHATE SERPL-MCNC: 3.4 MG/DL
PLATELET # BLD AUTO: 520 K/UL
PMV BLD AUTO: 9.8 FL
POCT GLUCOSE: 140 MG/DL (ref 70–110)
POCT GLUCOSE: 204 MG/DL (ref 70–110)
POCT GLUCOSE: 211 MG/DL (ref 70–110)
POCT GLUCOSE: 235 MG/DL (ref 70–110)
POCT GLUCOSE: 263 MG/DL (ref 70–110)
POCT GLUCOSE: 285 MG/DL (ref 70–110)
POCT GLUCOSE: 96 MG/DL (ref 70–110)
POTASSIUM SERPL-SCNC: 4.2 MMOL/L
PROT SERPL-MCNC: 6.3 G/DL
RBC # BLD AUTO: 3.11 M/UL
SODIUM SERPL-SCNC: 137 MMOL/L
WBC # BLD AUTO: 11.07 K/UL

## 2019-02-18 PROCEDURE — 94664 DEMO&/EVAL PT USE INHALER: CPT

## 2019-02-18 PROCEDURE — 63600175 PHARM REV CODE 636 W HCPCS: Performed by: STUDENT IN AN ORGANIZED HEALTH CARE EDUCATION/TRAINING PROGRAM

## 2019-02-18 PROCEDURE — 94640 AIRWAY INHALATION TREATMENT: CPT

## 2019-02-18 PROCEDURE — 93005 ELECTROCARDIOGRAM TRACING: CPT

## 2019-02-18 PROCEDURE — 80053 COMPREHEN METABOLIC PANEL: CPT

## 2019-02-18 PROCEDURE — 92526 ORAL FUNCTION THERAPY: CPT

## 2019-02-18 PROCEDURE — 25000003 PHARM REV CODE 250: Performed by: NURSE PRACTITIONER

## 2019-02-18 PROCEDURE — G8979 MOBILITY GOAL STATUS: HCPCS | Mod: CL | Performed by: PHYSICAL THERAPIST

## 2019-02-18 PROCEDURE — G8978 MOBILITY CURRENT STATUS: HCPCS | Mod: CM | Performed by: PHYSICAL THERAPIST

## 2019-02-18 PROCEDURE — 85025 COMPLETE CBC W/AUTO DIFF WBC: CPT

## 2019-02-18 PROCEDURE — 25000003 PHARM REV CODE 250: Performed by: STUDENT IN AN ORGANIZED HEALTH CARE EDUCATION/TRAINING PROGRAM

## 2019-02-18 PROCEDURE — 25000242 PHARM REV CODE 250 ALT 637 W/ HCPCS: Performed by: SURGERY

## 2019-02-18 PROCEDURE — 99900035 HC TECH TIME PER 15 MIN (STAT)

## 2019-02-18 PROCEDURE — 97530 THERAPEUTIC ACTIVITIES: CPT | Performed by: PHYSICAL THERAPIST

## 2019-02-18 PROCEDURE — C9113 INJ PANTOPRAZOLE SODIUM, VIA: HCPCS | Performed by: STUDENT IN AN ORGANIZED HEALTH CARE EDUCATION/TRAINING PROGRAM

## 2019-02-18 PROCEDURE — 25000003 PHARM REV CODE 250: Performed by: SURGERY

## 2019-02-18 PROCEDURE — 84100 ASSAY OF PHOSPHORUS: CPT

## 2019-02-18 PROCEDURE — 83735 ASSAY OF MAGNESIUM: CPT

## 2019-02-18 PROCEDURE — 63600175 PHARM REV CODE 636 W HCPCS: Performed by: SURGERY

## 2019-02-18 PROCEDURE — 97535 SELF CARE MNGMENT TRAINING: CPT

## 2019-02-18 PROCEDURE — 27000221 HC OXYGEN, UP TO 24 HOURS

## 2019-02-18 PROCEDURE — 94761 N-INVAS EAR/PLS OXIMETRY MLT: CPT

## 2019-02-18 PROCEDURE — 36415 COLL VENOUS BLD VENIPUNCTURE: CPT

## 2019-02-18 PROCEDURE — B4185 PARENTERAL SOL 10 GM LIPIDS: HCPCS | Performed by: SURGERY

## 2019-02-18 PROCEDURE — 11000001 HC ACUTE MED/SURG PRIVATE ROOM

## 2019-02-18 RX ORDER — MAGNESIUM SULFATE HEPTAHYDRATE 40 MG/ML
2 INJECTION, SOLUTION INTRAVENOUS ONCE
Status: COMPLETED | OUTPATIENT
Start: 2019-02-18 | End: 2019-02-18

## 2019-02-18 RX ADMIN — TIZANIDINE 4 MG: 4 TABLET ORAL at 02:02

## 2019-02-18 RX ADMIN — FLUCONAZOLE 200 MG: 40 POWDER, FOR SUSPENSION ORAL at 02:02

## 2019-02-18 RX ADMIN — HYDROXYZINE HYDROCHLORIDE 25 MG: 10 SOLUTION ORAL at 08:02

## 2019-02-18 RX ADMIN — ASCORBIC ACID, VITAMIN A PALMITATE, CHOLECALCIFEROL, THIAMINE HYDROCHLORIDE, RIBOFLAVIN-5 PHOSPHATE SODIUM, PYRIDOXINE HYDROCHLORIDE, NIACINAMIDE, DEXPANTHENOL, ALPHA-TOCOPHEROL ACETATE, VITAMIN K1, FOLIC ACID, BIOTIN, CYANOCOBALAMIN: 200; 3300; 200; 6; 3.6; 6; 40; 15; 10; 150; 600; 60; 5 INJECTION, SOLUTION INTRAVENOUS at 07:02

## 2019-02-18 RX ADMIN — AMITRIPTYLINE HYDROCHLORIDE 50 MG: 25 TABLET, FILM COATED ORAL at 08:02

## 2019-02-18 RX ADMIN — METOPROLOL TARTRATE 5 MG: 1 INJECTION, SOLUTION INTRAVENOUS at 04:02

## 2019-02-18 RX ADMIN — IPRATROPIUM BROMIDE AND ALBUTEROL SULFATE 3 ML: .5; 3 SOLUTION RESPIRATORY (INHALATION) at 03:02

## 2019-02-18 RX ADMIN — LIDOCAINE 1 PATCH: 50 PATCH TOPICAL at 02:02

## 2019-02-18 RX ADMIN — METOPROLOL TARTRATE 5 MG: 1 INJECTION, SOLUTION INTRAVENOUS at 08:02

## 2019-02-18 RX ADMIN — INSULIN ASPART 4 UNITS: 100 INJECTION, SOLUTION INTRAVENOUS; SUBCUTANEOUS at 04:02

## 2019-02-18 RX ADMIN — INSULIN ASPART 6 UNITS: 100 INJECTION, SOLUTION INTRAVENOUS; SUBCUTANEOUS at 11:02

## 2019-02-18 RX ADMIN — LAMOTRIGINE 150 MG: 100 TABLET ORAL at 10:02

## 2019-02-18 RX ADMIN — INSULIN ASPART 6 UNITS: 100 INJECTION, SOLUTION INTRAVENOUS; SUBCUTANEOUS at 08:02

## 2019-02-18 RX ADMIN — PANTOPRAZOLE SODIUM 40 MG: 40 INJECTION, POWDER, FOR SOLUTION INTRAVENOUS at 08:02

## 2019-02-18 RX ADMIN — I.V. FAT EMULSION 250 ML: 20 EMULSION INTRAVENOUS at 10:02

## 2019-02-18 RX ADMIN — IPRATROPIUM BROMIDE AND ALBUTEROL SULFATE 3 ML: .5; 3 SOLUTION RESPIRATORY (INHALATION) at 07:02

## 2019-02-18 RX ADMIN — HYDROMORPHONE HYDROCHLORIDE 0.2 MG: 1 INJECTION, SOLUTION INTRAMUSCULAR; INTRAVENOUS; SUBCUTANEOUS at 06:02

## 2019-02-18 RX ADMIN — MIRTAZAPINE 15 MG: 15 TABLET, ORALLY DISINTEGRATING ORAL at 08:02

## 2019-02-18 RX ADMIN — INSULIN DETEMIR 25 UNITS: 100 INJECTION, SOLUTION SUBCUTANEOUS at 08:02

## 2019-02-18 RX ADMIN — HYDROXYZINE HYDROCHLORIDE 25 MG: 10 SOLUTION ORAL at 02:02

## 2019-02-18 RX ADMIN — PROMETHAZINE HYDROCHLORIDE 12.5 MG: 25 INJECTION INTRAMUSCULAR; INTRAVENOUS at 05:02

## 2019-02-18 RX ADMIN — ASPIRIN 81 MG 81 MG: 81 TABLET ORAL at 09:02

## 2019-02-18 RX ADMIN — LAMOTRIGINE 150 MG: 100 TABLET ORAL at 08:02

## 2019-02-18 RX ADMIN — IPRATROPIUM BROMIDE AND ALBUTEROL SULFATE 3 ML: .5; 3 SOLUTION RESPIRATORY (INHALATION) at 08:02

## 2019-02-18 RX ADMIN — METOPROLOL TARTRATE 5 MG: 1 INJECTION, SOLUTION INTRAVENOUS at 02:02

## 2019-02-18 RX ADMIN — HYDROXYZINE HYDROCHLORIDE 25 MG: 10 SOLUTION ORAL at 10:02

## 2019-02-18 RX ADMIN — MAGNESIUM SULFATE IN WATER 2 G: 40 INJECTION, SOLUTION INTRAVENOUS at 06:02

## 2019-02-18 RX ADMIN — PREGABALIN 50 MG: 50 CAPSULE ORAL at 08:02

## 2019-02-18 RX ADMIN — HYDROMORPHONE HYDROCHLORIDE 0.2 MG: 1 INJECTION, SOLUTION INTRAMUSCULAR; INTRAVENOUS; SUBCUTANEOUS at 11:02

## 2019-02-18 RX ADMIN — IPRATROPIUM BROMIDE AND ALBUTEROL SULFATE 3 ML: .5; 3 SOLUTION RESPIRATORY (INHALATION) at 11:02

## 2019-02-18 RX ADMIN — ENOXAPARIN SODIUM 40 MG: 100 INJECTION SUBCUTANEOUS at 10:02

## 2019-02-18 RX ADMIN — PREGABALIN 50 MG: 50 CAPSULE ORAL at 10:02

## 2019-02-18 RX ADMIN — INSULIN ASPART 4 UNITS: 100 INJECTION, SOLUTION INTRAVENOUS; SUBCUTANEOUS at 12:02

## 2019-02-18 RX ADMIN — ONDANSETRON 4 MG: 2 INJECTION INTRAMUSCULAR; INTRAVENOUS at 06:02

## 2019-02-18 NOTE — PT/OT/SLP PROGRESS
Physical Therapy Treatment    Patient Name:  Kaylee Ngo   MRN:  [id number]    Recommendations:     Discharge Recommendations:  nursing facility, skilled   Discharge Equipment Recommendations: (TBD)   Barriers to discharge: Decreased caregiver support    Assessment:     Kaylee Ngo is a 63 y.o. female admitted with a medical diagnosis of Malignant carcinoid tumor of pancreas.  She presents with the following impairments/functional limitations:  weakness, impaired functional mobilty, impaired cardiopulmonary response to activity, impaired endurance, gait instability, impaired balance, decreased lower extremity function, impaired self care skills Pt transferred bed to chair with RW with Mod assist of 1.    Rehab Prognosis: Fair; patient would benefit from acute skilled PT services to address these deficits and reach maximum level of function.    Recent Surgery: Procedure(s) (LRB):  LAPAROTOMY, EXPLORATORY (N/A)  LYMPHADENECTOMY (N/A)  EXCISION, LIVER (N/A) 24 Days Post-Op    Plan:     During this hospitalization, patient to be seen 5 x/week to address the identified rehab impairments via gait training, therapeutic activities, therapeutic exercises, neuromuscular re-education, wheelchair management/training and progress toward the following goals:    · Plan of Care Expires:  02/18/19    Subjective     Chief Complaint: Fatigued post sitting   Patient/Family Comments/goals: not go to a nursing home  Pain/Comfort:  · Pain Rating 1: 0/10      Objective:     Communicated with nurse prior to session.  Patient found all lines intact, call button in reach and bed alarm on blood pressure cuff, whitlock catheter, NG tube, telemetry, peripheral IV  upon PT entry to room.     General Precautions: Standard, aspiration, NPO, respiratory, fall   Orthopedic Precautions:N/A   Braces:       Functional Mobility:  · Bed Mobility:     · Supine to Sit: moderate assistance  · Sit to Supine: maximal assistance  · Transfers:     · Sit to  Stand:  moderate assistance with rolling walker   · Pt required max of 2 for squat pivot transfer post sitting in chair 2 hours.      AM-PAC 6 CLICK MOBILITY  Turning over in bed (including adjusting bedclothes, sheets and blankets)?: 3  Sitting down on and standing up from a chair with arms (e.g., wheelchair, bedside commode, etc.): 2  Moving from lying on back to sitting on the side of the bed?: 2  Moving to and from a bed to a chair (including a wheelchair)?: 2  Need to walk in hospital room?: 2  Climbing 3-5 steps with a railing?: 1  Basic Mobility Total Score: 12       Therapeutic Activities and Exercises:   Pt performed BLE AP, QS and GS x 20 in chair.    Patient left HOB elevated with all lines intact, call button in reach, bed alarm on and nurse notified..    GOALS:   Multidisciplinary Problems     Physical Therapy Goals        Problem: Physical Therapy Goal    Goal Priority Disciplines Outcome Goal Variances Interventions   Physical Therapy Goal     PT, PT/OT             Problem: Physical Therapy Goal    Goal Priority Disciplines Outcome Goal Variances Interventions   Physical Therapy Goal     PT, PT/OT             Problem: Physical Therapy Goal    Goal Priority Disciplines Outcome Goal Variances Interventions   Physical Therapy Goal     PT, PT/OT Ongoing (interventions implemented as appropriate)     Description:  Updated Goals 2/8/2019:  1. Supine<>sit with SBA  2. Sit to stand with  CGA  3. Bed to chair with CGA  4. Gait x 20 ft withmin A                    Time Tracking:     PT Received On: 02/18/19  PT Start Time: 1000   1150  PT Stop Time: 1025    1204  PT Total Time (min):  39 minutes, first session with OT    Billable Minutes: Therapeutic Activity 28    Treatment Type: Treatment  PT/PTA: PT     PTA Visit Number: 0     Odilia Hernández, PT  02/18/2019

## 2019-02-18 NOTE — PLAN OF CARE
Problem: Adult Inpatient Plan of Care  Goal: Plan of Care Review  Outcome: Ongoing (interventions implemented as appropriate)  Pts safety maintained, central line precautions continued. Meds given per MAR per NG tube. TPN infusing, tube feedings continued at 10 mLs/h. Pain relieved with Dilaudid and Flexoril. BG monitored and controlled with sliding scale insulin. Wound vac at 65, no air leak, lower abdomen dressing CDI, both changed by Md yesterday. Voiding per bed pan. No N/V, SOB, distress. NSR and sinus tach per heart monitor. Vitals stable through the night.

## 2019-02-18 NOTE — PLAN OF CARE
VN note: VN cued into pt's room for introduction. VN informed pt that VN would be working closely along side bedside nurse, PCT, and the rest of care team and making rounds throughout the shift. Pt verbalized understanding. Allowed time for questions. VN will continue to be available to patient and intervene prn.       02/18/19 1010   Type of Frequent Check   Type Patient Rounds;Other (see comments)  (VN Rounds)   Safety/Activity   Patient Rounds bed in low position;visualized patient   Safety Promotion/Fall Prevention side rails raised x 2;family to remain at bedside   Activity Management activity adjusted per tolerance   Positioning   Body Position supine   Assessments (Pre/Post)   Level of Consciousness (AVPU) alert

## 2019-02-18 NOTE — PLAN OF CARE
Problem: SLP Goal  Goal: SLP Goal  Short Term Goals:  1. Pt will participate in BSS to determine least restrictive diet.-discontinued 2/11  2. Pt will successfully participate in Modified Barium Swallow study to radiographically assess swallow function, rule out aspiration and determine safest/least restrictive diet.- MET 2/11  3. Pt will participate in trial sessions of indirect dysphagia exercises to strengthen musculature for swallowing mechanism with mod-max effort.             Outcome: Ongoing (interventions implemented as appropriate)  2/18: OMEs completed this date to improve lingual strength & coordination, swallow initiation, and pharyngeal strength. ST to f/u.

## 2019-02-18 NOTE — PLAN OF CARE
Problem: Physical Therapy Goal  Goal: Physical Therapy Goal  Updated Goals 2/8/2019:  1. Supine<>sit with SBA  2. Sit to stand with  CGA  3. Bed to chair with CGA  4. Gait x 20 ft withmin A  Outcome: Ongoing (interventions implemented as appropriate)  Pt would benefit from ongoing skilled PT to progress functional mobility.

## 2019-02-18 NOTE — PLAN OF CARE
Problem: Occupational Therapy Goal  Goal: Occupational Therapy Goal  Goals to be met by: 3/7/2019     Patient will increase functional independence with ADLs by performing:    UE Dressing with Minimal Assistance.  LE Dressing with Moderate Assistance.  Grooming while seated with Minimal Assistance. --MET 2/18  Toileting from bedside commode with Moderate Assistance for hygiene and clothing management.   Sitting at edge of bed x10 minutes with Moderate Assistance.  Supine to sit with Maximum Assistance.  Increased functional UE strength to 3/5      Outcome: Ongoing (interventions implemented as appropriate)  Patient tolerated OOB x ~1 hours this date. Patient will benefit from continued skilled OT to address deficits and improve performance in functional ADL tasks. Cont OT per POC.

## 2019-02-18 NOTE — PROGRESS NOTES
Cued into patient's room per nurse request.  MET called overhead.  Patient c/o mid chest pain 4/10, aching.  Vitals stable.  Dr. Olivier notified; new orders received.  Transferred Dr. Olivier to [facility]A for further details of patient status.

## 2019-02-18 NOTE — PT/OT/SLP PROGRESS
Occupational Therapy   Treatment    Name: Kaylee Ngo  MRN: [id number]  Admitting Diagnosis:  Malignant carcinoid tumor of pancreas  24 Days Post-Op    Recommendations:     Discharge Recommendations: nursing facility, skilled  Discharge Equipment Recommendations:  (Defer to SNF)  Barriers to discharge:  None    Assessment:   Patient tolerated OOB x ~1 hours this date. Patient will benefit from continued skilled OT to address deficits and improve performance in functional ADL tasks. Cont OT per POC.    Kaylee Ngo is a 63 y.o. female with a medical diagnosis of Malignant carcinoid tumor of pancreas. Performance deficits affecting function are weakness, impaired endurance, impaired self care skills, impaired functional mobilty, gait instability, impaired balance, decreased upper extremity function, decreased lower extremity function, decreased safety awareness, decreased ROM, impaired skin, pain.     Rehab Prognosis:  Good; patient would benefit from acute skilled OT services to address these deficits and reach maximum level of function.       Plan:     Patient to be seen 5 x/week to address the above listed problems via self-care/home management, therapeutic activities, therapeutic exercises  · Plan of Care Expires: 03/07/19  · Plan of Care Reviewed with: patient, sibling    Subjective     Pain/Comfort:  · Pain Rating 1: 0/10  · Pain Rating Post-Intervention 1: 0/10    Objective:     Communicated with: nurseElayne prior to session.  Patient found HOB elevated with NG tube, telemetry, central line, oxygen upon OT entry to room.    General Precautions: Standard, aspiration, NPO, respiratory, fall   Orthopedic Precautions:N/A   Braces: N/A     Bed Mobility:    · Patient completed Scooting/Bridging with contact guard assistance  · Patient completed Supine to Sit with minimum assistance and moderate assistance     Functional Mobility/Transfers:  · Patient completed Sit <> Stand Transfer with minimum assistance   with  rolling walker   · Patient completed Bed <> Chair Transfer using Step Transfer technique with contact guard assistance and minimum assistance with rolling walker    Activities of Daily Living:  · Grooming: CGA for facial hygiene, oral care -- (A) for face shaving 2* feeling nervous about NGT      [facility] 6 Click ADL: 10    Treatment & Education:  Patient with bed mob as noted above with PT. T/f into bedside chair and completed BUE AROM therex, 1 x 10 reps bicep curls, 1 x 5 reps sh flexion and horizontal sh abd/add. Completed various G/H tasks from chair level. Patient max (A) to use sudsy shower cap for hair hygiene.     Patient left up in chair with all lines intact, call button in reach, chair alarm on, nurse notified and sister presentEducation:      GOALS:   Multidisciplinary Problems     Occupational Therapy Goals        Problem: Occupational Therapy Goal    Goal Priority Disciplines Outcome Interventions   Occupational Therapy Goal     OT, PT/OT Ongoing (interventions implemented as appropriate)    Description:  Goals to be met by: 3/7/2019     Patient will increase functional independence with ADLs by performing:    UE Dressing with Minimal Assistance.  LE Dressing with Moderate Assistance.  Grooming while seated with Minimal Assistance. --MET 2/18  Toileting from bedside commode with Moderate Assistance for hygiene and clothing management.   Sitting at edge of bed x10 minutes with Moderate Assistance.  Supine to sit with Maximum Assistance.  Increased functional UE strength to 3/5                        Time Tracking:     OT Date of Treatment: 02/18/19  OT Start Time: 1017  OT Stop Time: 1055  OT Total Time (min): 38 mins - partial co-tx with PT    Billable Minutes:Self Care/Home Management 23    TAMIR Martinez/FRANTZ  2/18/2019

## 2019-02-18 NOTE — PLAN OF CARE
Problem: Adult Inpatient Plan of Care  Goal: Plan of Care Review  Outcome: Ongoing (interventions implemented as appropriate)  Patient on oxygen with documented flow. Will attempt to wean per O2 order protocol.  Patient given aerosol treatment with no adverse reactions noted. Patient instructed on proper use.  Will continue to monitor.

## 2019-02-18 NOTE — PT/OT/SLP PROGRESS
"Speech Language Pathology Treatment    Patient Name:  Kaylee Ngo   MRN:  [id number]  Admitting Diagnosis: Malignant carcinoid tumor of pancreas    Recommendations:                 General Recommendations:  Dysphagia therapy  Diet recommendations:  NPO, Liquid Diet Level: NPO   Aspiration Precautions: Continue alternate means of nutrition, Frequent oral care and Strict aspiration precautions   General Precautions: Standard, aspiration, fall, respiratory, NPO  Communication strategies:  none    Subjective     Pt seen at the bedside for ongoing dysphagia tx. Pt's sister present during tx. ST did check w/ RNElayne, prior to visit.   Patient goals: Pt stated, "my throat is so dry"      Pain/Comfort:  · Pain Rating 1: 0/10    Objective:     Has the patient been evaluated by SLP for swallowing?   Yes  Keep patient NPO? Yes   Current Respiratory Status: nasal cannula      Pt seated at 75* in bed for tx this date. Pt oriented to month, ÁNGEL, year, place, and situation ind. Required binary choice to orient to date. Pt reported memory trouble but was able to recall therapy specialty. Prior to exercises, pt coughing on secretions. OMEs completed targeting tongue base retraction and mobility/strength and laryngeal lift/excursion. She produced hard /k/ and /g/ x15 each with fair effort. Pitch glide (ah-ee) produced x10 as voice strained toward end of exercises. Modified Shaker exercises completed by holding stress ball for 22 secs. Pt unwilling to try hold second time. Pt raised and lowered chin to ball x23, given one break. Effortful swallow produced x2 with reduced strength  As pt reported very difficult 2/2 dry mouth. Education provided to pt and sister re: need for frequent and aggressive oral care to decrease risk of infection and remove any coating. Pt left w/ call light in reach and all needs met.     Assessment:     Kaylee Ngo is a 63 y.o. female with an SLP diagnosis of Dysphagia.  She presents with poor " endurance during OMEs, poor secretion management, wet/gurgly voice, and delayed/weak swallow. Improved participation this date with slightly stronger cough reflex. ST to continue to follow up for indirect dysphagia tx.     Goals:   Multidisciplinary Problems     SLP Goals        Problem: SLP Goal    Goal Priority Disciplines Outcome   SLP Goal     SLP Ongoing (interventions implemented as appropriate)   Description:  Short Term Goals:  1. Pt will participate in BSS to determine least restrictive diet.-discontinued 2/11  2. Pt will successfully participate in Modified Barium Swallow study to radiographically assess swallow function, rule out aspiration and determine safest/least restrictive diet.- MET 2/11  3. Pt will participate in trial sessions of indirect dysphagia exercises to strengthen musculature for swallowing mechanism with mod-max effort.                              Plan:     · Patient to be seen:  3 x/week   · Plan of Care expires:  03/08/19  · Plan of Care reviewed with:  patient, sibling   · SLP Follow-Up:  Yes       Discharge recommendations:  other (see comments)(Per POC)   Barriers to Discharge:  Level of Skilled Assistance Needed : continues to require skilled PT, OT, and ST    Time Tracking:     SLP Treatment Date:   02/18/19  Speech Start Time:  1206  Speech Stop Time:  1227     Speech Total Time (min):  21 min    Billable Minutes: Treatment Swallowing Dysfunction 21    Kay Faustin CCC-SLP  02/18/2019

## 2019-02-18 NOTE — PROGRESS NOTES
[facility] General Surgery  Neuroendocrine Tumor Service  Progress Note     Admission Date: 1/25/2019  Hospital Length of Stay: 24  Principal Problem: Malignant carcinoid tumor of pancreas     Subjective:      Operation:  62 yo F w/ PNET, s/p reversal of Sandeep-en-Y w/ Jtube placement  POD#24    1/25/18    Ex lap, gastro-gastrostomy EEA with 33 mm circular stapler, Pyloroplasty and gastrostomy closure, enteric resection with side to side to anstamosis and restoration of small bowel continuity using Idrive tan load stapler, Liver US, Liver biopsy, Liver resection segment 3, Supraceliac lymphadenectomy, FNA pancreas, Lt salpingo oopherectomy, Meckel diverticulectomy, Feeding jejunostomy 14 Fr malecot, ICG perfusion and Infrared exam of all anastamosis    Interval History:   Still c/o back pain, added robaxin. Patient states back pain is back, otherwise no nausea or vomiting but passing flatus and having BM. Patient reports feeling stronger than yesterday.    Scheduled Meds:   albuterol-ipratropium  3 mL Nebulization Q4H    amitriptyline  50 mg Oral QHS    aspirin  81 mg Oral Daily    enoxparin  40 mg Subcutaneous Q24H    fluconazole 40 mg/ml  200 mg Per NG tube Daily    hydrOXYzine  25 mg Per NG tube TID    insulin aspart U-100  1-10 Units Subcutaneous 6 times per day    insulin detemir U-100  25 Units Subcutaneous Daily    lamoTRIgine  150 mg Oral BID    lidocaine  1 patch Transdermal Q24H    metoprolol  5 mg Intravenous Q6H    mirtazapine  15 mg Per NG tube Nightly    pantoprazole  40 mg Intravenous Daily    pregabalin  50 mg Per NG tube BID     Continuous Infusions:   Amino acid 5% - dextrose 15% (CLINIMIX-E) solution with additives (1L  provides 510 kcal/L dextrose, with 50 gm AA, 150 gm CHO, Na 35, K 30, Mg 5, Ca 4.5, Acetate 80, Cl 39, Phos 15) 50 mL/hr at 02/17/19 2040     PRN Meds:.sodium chloride, acetaminophen, acetaminophen, albuterol sulfate, artificial tears, dextrose 50%,  diphenhydrAMINE, docusate, glucagon (human recombinant), hydrALAZINE, HYDROmorphone, ipratropium, labetalol, lidocaine HCl 2%, lorazepam, naloxone, ondansetron, promethazine (PHENERGAN) IVPB, sodium chloride 0.9%, sodium chloride 3%, tiZANidine    Objective:      Temp:  [97.2 °F (36.2 °C)-98.1 °F (36.7 °C)] 97.6 °F (36.4 °C)  Pulse:  [] 105  Resp:  [18-22] 18  SpO2:  [91 %-98 %] 97 %  BP: (109-148)/() 146/71  Weight change: 1.1 kg (2 lb 6.8 oz)    Intake/Output Summary (Last 24 hours) at 2/18/2019 0937  Last data filed at 2/18/2019 0614  Gross per 24 hour   Intake 1928.33 ml   Output 2500 ml   Net -571.67 ml     Lines:  RIJ TLC  NGT  WV    Physical Exam:  GEN:NAD  CV: RRR  PULM: unlabored with 2L NC  ABD: S/NT/ ND, WV - good granulation tissue, no infection  EXT: Peripheral pulses present and equal bilaterally    Recent Labs   Lab 02/18/19  0453      K 4.2   CO2 31*   CL 99   BUN 21   CREATININE 0.6   CALCIUM 8.7   PROT 6.3   AST 54*   ALT 53*   ALKPHOS 225*   BILITOT 0.5     Recent Labs   Lab 02/18/19  0453   WBC 11.07   HGB 8.7*   HCT 28.5*   *       Significant Imaging: I have reviewed all pertinent imaging results/findings within the past 24 hours.     Assessment and Plan:   64 yo F w/ PNET, s/p reversal of Sandeep-en-Y w/ Jtube placement  POD#24    1/25/18  Ex lap, gastro-gastrostomy EEA with 33 mm circular stapler, Pyloroplasty and gastrostomy closure, enteric resection with side to side to anstamosis and restoration of small bowel continuity using Idrive tan load stapler, Liver US, Liver biopsy, Liver resection segment 3, Supraceliac lymphadenectomy, FNA pancreas, Lt salpingo oopherectomy, Meckel diverticulectomy, Feeding jejunostomy 14 Fr malecot, ICG perfusion and Infrared exam of all anastamosis    Neuro - No sedatives, aspiration risk  FEN/GI - Continue NPO, TPN on, TFs 10 cc/hr, check residuals, replete lytes prn, ordered this AM  ID - cont fluconazole, last dose rocephin 2/13/19,  fungitell 81, recheck fungitell, afebrile overnight, continuous microaspirations  Heme: H/H stable, thrombocytopenia - daily ASA   Pulm - On NC. Aggressive chest CPT and acapella. ABGs. Resp Cx growing E Coli she has received full course rocephin, will most likely dc antibiotics tomorrow. F/U with speech regarding swallowing eval.  Renal - Cr good, whitlock out, difficult to record accurate I/O/UOP but pt refuse wick   Surgical - WV in place white sponge then black sponge, change MWF  - will change today  PPX - continue lovenox, protonix, scd in place    Dispo - cont inpt care and abx, most likely discharge to LTAC due to NGT, wound vac, and TPN necessity.    Nash Titus MD   II  [phone]

## 2019-02-18 NOTE — PROGRESS NOTES
Pt complaining of chest pain, unspecified. When asked where it is she stated in in the middle of sternum, and when asked about the quality of pain, stated it hurts. /70, HR went up from 87 to 102. No temp. MET called. EKG, Troponin done per Dr. Olivier order. 0.2 mg of Dilaudid administered, Mg hanged. Will continue to monitor.

## 2019-02-18 NOTE — PHYSICIAN QUERY
PT Name: Kaylee Ngo  MR #: [id number]    Physician Query Form -Systemic Infectious Process Clarification     CDS: Celia Hoover RN     Contact information:  [email or web address]    Phone: [phone]  This form is a permanent document in the medical record.     Query Date: February 18, 2019     By submitting this query, we are merely seeking further clarification of documentation. Please utilize your independent clinical judgment when addressing the question(s) below.    The Medical record contains the following:     Indicators   Supporting Clinical Findings   Location in Medical Record    X HR RR BP Temp  Temp= 100.2 --> 100.5 --> 101   HR= 124 --> 110 --> 124   RR 32 --> 38 --> 35  VS 1/30/19 2301--> 1/31/19 0101 --> 0301   VS 1/30/19 2301--> 1/31/19 0101 --> 0301   VS 1/30/19 2301--> 1/31/19 0101 --> 0301    Lactic Acid             Procalcitonin      X WBC                Bands                     CRP  7.79 --> 14.12 --> 16.74  Lab 1/29/19 --> 1/30/19 --> 1/31/19    X Culture(s)  ESCHERICHIA COLI   Resp Culture 1/31/19    X AMS, Confusion, LOC, etc.   has become more somnolent and having increased work of breathing      Pul PN 1/31/19    X Organ Dysfunction / Failure  Stepped up to ICU 3days ago for concern impending resp failure  BiPAP overnight, worsening ABG, intubated today  RT and Pulm on board      NE PN 1/31/19    Bacteremia or Sepsis / Septic      X Known or Suspected Source of Infection documented  Acute Hypoxic Respiratory failure  - likely secondary to aspiration as well as atelectasis post surgery.   - now with worsening air space disease on the R concerning for likely ongoing aspiration vs developing pneumonia vs pneumonitis  - CT with evidence of aspirated barium - should not cause long term issues with her lungs.       Pulm PN 1/31/19    (Failed) Outpatient Treatment      X Medication ciprofloxacin (CIPRO)400mg/200ml D5W IVPB 400 mg   Dose: 400 mg  Freq: Every 12 hours (non-standard  times) Route: IV  Indications of Use: lower respiratory infections  Start: 01/28/19 2000 End: 02/02/19 1211    metronidazole IVPB 500 mg   Dose: 500 mg  Freq: Every 8 hours (non-standard times) Route: IV  Indications of Use: lower respiratory infections  Start: 01/28/19 2000 End: 02/02/19 1211  MAR                       MAR    Treatment      Other       Provider, please specify diagnosis or diagnoses associated with above clinical findings.      [ xxx  ] Sepsis   [   ] Severe Sepsis with Acute Organ Dysfunction/Failure (please specify  organ dysfunction/failure): ___________________   [   ] Other Infectious Disease (please specify): _________________________________   [   ] Other: __________________________________   [  ]  Clinically Undetermined         Please document in your progress notes daily for the duration of treatment until resolved and include in your discharge summary.

## 2019-02-18 NOTE — PHYSICIAN QUERY
PT Name: Kaylee Ngo  MR #: [id number]    Physician Query Form - Nutrition Clarification     CDS: Celia Hoover RN     Contact information:  [email or web address]    Phone: [phone]    This form is a permanent document in the medical record.     Query Date: February 18, 2019    By submitting this query, we are merely seeking further clarification of documentation.. Please utilize your independent clinical judgment when addressing the question(s) below.    The Medical record contains the following:   Indicators  Supporting Clinical Findings Location in Medical Record    X % of Estimated Energy Intake over a time frame from p.o., TF, or TPN Parenteral Calories (kcal): 852  Parenteral Protein (gm): 60  Parenteral Fluid (mL): 1200  Lipid Calories (kcals): 212 kcals  GIR (Glucose Infusion Rate) (mg/kg/min): 1.8 mg/kg/min  % Kcal Needs: 65%  % Protein Needs: 75%  Energy Calories Required: not meeting needs  Protein Required: not meeting needs  RD Note 1/29/19    Weight Status over a time frame      X Subcutaneous Fat and/or Muscle Loss Subcutaneous Fat Loss (Final Summary): mild protein-calorie malnutrition  Muscle Loss Evaluation (Final Summary): moderate protein-calorie malnutrition        RD Note 1/29/19    Fluid Accumulation or Edema      Reduced  Strength      X Wt / BMI / Usual Body Weight  Weight: 67.4 kg (148 lb 9.4 oz)  BMI (Calculated): 24.8  RD Note 1/29/19    Delayed Wound Healing / Failure to Thrive      X Acute or Chronic Illness 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  I have been trying to manage her condition conservatively.  Attempted G-tube placement.  She also has a tumor mainly in the retroperitoneum and mesentery    Procedure:   1. Ex lap  2. Gastro-gastrostomy EEA with 33 mm circular stapler  3. Pyloroplasty and gastrostomy closure  3. enteric resection with side to side to anstamosis and restoration of small bowel continuity using  Idrive tan load stapler  4 Liver US  5. Liver biopsy  6. Liver resection segment 3  7. Supraceliac lymphadenectomy  8. FNA pancreas  9. Lt salpingo oopherectomy   10 . Meckel diverticulectomy  11. Feeding jejunostomy 14 Fr malecot  12. ICG perfusion and Infrared exam of all anastamosis        H&P 1/25/19               Op Note 1/25/19    Medication      X Treatment  Amino acid 5% - dextrose 15% (CLINIMIX-E) solution with [id number] mL/hr : Intravenous : Continuous  fat emulsion 20% infusion 250 mLDose: 250 mL : 20.8 mL/hr : Intravenous : Daily :         MAR       MAR    X Other Past Medical History  Malnutrition  H&P 1/25/19     AND / [facility]EN Clinical Characteristics (October 2011)  A minimum of two characteristics is recommended for diagnosing either moderate or severe malnutrition   Mild Malnutrition Moderate Malnutrition Severe Malnutrition   Energy Intake from p.o., TF or TPN. < 75% intake of estimated energy needs for less than 7 days < 75% intake of estimated energy needs for greater than 7 days < 50% intake of estimated energy needs for > 5 days   Weight Loss 1-2% in 1 month  5% in 3 months  7.5% in 6 months  10% in 1 year 1-2 % in 1 week  5% in 1 month  7.5% in 3 months  10% in 6 months  20% in 1 year > 2% in 1 week  > 5% in 1 month  > 7.5% in 3 months  > 10% in 6 months  > 20% in 1 year   Physical Findings     None *Mild subcutaneous fat and/or muscle loss  *Mild fluid accumulation  *Stage II decubitus  *Surgical wound or non-healing wound *Mod/severe subcutaneous fat and/or muscle loss  *Mod/severe fluid accumulation  *Stage III or IV decubitus  *Non-healing surgical wound     Provider, please specify diagnosis or diagnoses associated with above clinical findings.    [  ] Mild Protein-Calorie Malnutrition   [  ] Moderate Protein-Calorie Malnutrition   [  ] Abnormal Weight Loss   [ xx ]severe malnutrition Other Nutritional Diagnosis (please specify):    [  ] Other:    [  ] Clinically Undetermined       Please  document in your progress notes daily for the duration of treatment until resolved and include in your discharge summary.

## 2019-02-18 NOTE — PLAN OF CARE
TN discussed case with MD - pt will probably need plcmt post d/c (i.e. LTAC)   TN met with pt -- called and spoke with sister Elisabeth   [phone]    briefly discussed d/c options post d/c (LTAC in the event pt needs TPN/IV abx)   left two lists for sister in pt's room   - [place] area LTACs and SNFs      pt too ill to speak at length -- TN will reassess tomorrow.         02/18/19 8725   Discharge Reassessment   Assessment Type Discharge Planning Reassessment   Provided patient/caregiver education on the expected discharge date and the discharge plan Yes   Do you have any problems affording any of your prescribed medications? Yes   Discharge Plan A Long-term acute care facility (LTAC)   Discharge Plan B Skilled Nursing Facility   DME Needed Upon Discharge  (tbd )

## 2019-02-19 LAB
ALBUMIN SERPL BCP-MCNC: 2.1 G/DL
ALP SERPL-CCNC: 212 U/L
ALT SERPL W/O P-5'-P-CCNC: 39 U/L
ANION GAP SERPL CALC-SCNC: 6 MMOL/L
AST SERPL-CCNC: 33 U/L
BASOPHILS # BLD AUTO: 0.02 K/UL
BASOPHILS NFR BLD: 0.2 %
BILIRUB SERPL-MCNC: 0.4 MG/DL
BUN SERPL-MCNC: 18 MG/DL
CALCIUM SERPL-MCNC: 8.6 MG/DL
CHLORIDE SERPL-SCNC: 98 MMOL/L
CO2 SERPL-SCNC: 32 MMOL/L
CREAT SERPL-MCNC: 0.6 MG/DL
DIFFERENTIAL METHOD: ABNORMAL
EOSINOPHIL # BLD AUTO: 0.2 K/UL
EOSINOPHIL NFR BLD: 1.7 %
ERYTHROCYTE [DISTWIDTH] IN BLOOD BY AUTOMATED COUNT: 17.6 %
EST. GFR  (AFRICAN AMERICAN): >60 ML/MIN/1.73 M^2
EST. GFR  (NON AFRICAN AMERICAN): >60 ML/MIN/1.73 M^2
GLUCOSE SERPL-MCNC: 231 MG/DL
HCT VFR BLD AUTO: 27.7 %
HGB BLD-MCNC: 8.3 G/DL
LYMPHOCYTES # BLD AUTO: 1.2 K/UL
LYMPHOCYTES NFR BLD: 12 %
MAGNESIUM SERPL-MCNC: 1.8 MG/DL
MCH RBC QN AUTO: 27.3 PG
MCHC RBC AUTO-ENTMCNC: 30 G/DL
MCV RBC AUTO: 91 FL
MONOCYTES # BLD AUTO: 0.9 K/UL
MONOCYTES NFR BLD: 9.6 %
NEUTROPHILS # BLD AUTO: 7.2 K/UL
NEUTROPHILS NFR BLD: 74.6 %
PHOSPHATE SERPL-MCNC: 3.3 MG/DL
PLATELET # BLD AUTO: 531 K/UL
PMV BLD AUTO: 9.9 FL
POCT GLUCOSE: 147 MG/DL (ref 70–110)
POCT GLUCOSE: 173 MG/DL (ref 70–110)
POCT GLUCOSE: 178 MG/DL (ref 70–110)
POCT GLUCOSE: 258 MG/DL (ref 70–110)
POCT GLUCOSE: 458 MG/DL (ref 70–110)
POTASSIUM SERPL-SCNC: 3.9 MMOL/L
PROT SERPL-MCNC: 6 G/DL
RBC # BLD AUTO: 3.04 M/UL
SODIUM SERPL-SCNC: 136 MMOL/L
WBC # BLD AUTO: 9.67 K/UL

## 2019-02-19 PROCEDURE — 63600175 PHARM REV CODE 636 W HCPCS: Performed by: SURGERY

## 2019-02-19 PROCEDURE — 94664 DEMO&/EVAL PT USE INHALER: CPT

## 2019-02-19 PROCEDURE — 25000003 PHARM REV CODE 250: Performed by: SURGERY

## 2019-02-19 PROCEDURE — 99900035 HC TECH TIME PER 15 MIN (STAT)

## 2019-02-19 PROCEDURE — 83735 ASSAY OF MAGNESIUM: CPT

## 2019-02-19 PROCEDURE — 63600175 PHARM REV CODE 636 W HCPCS: Performed by: STUDENT IN AN ORGANIZED HEALTH CARE EDUCATION/TRAINING PROGRAM

## 2019-02-19 PROCEDURE — 25000242 PHARM REV CODE 250 ALT 637 W/ HCPCS: Performed by: SURGERY

## 2019-02-19 PROCEDURE — 25000003 PHARM REV CODE 250: Performed by: STUDENT IN AN ORGANIZED HEALTH CARE EDUCATION/TRAINING PROGRAM

## 2019-02-19 PROCEDURE — 85025 COMPLETE CBC W/AUTO DIFF WBC: CPT

## 2019-02-19 PROCEDURE — 84100 ASSAY OF PHOSPHORUS: CPT

## 2019-02-19 PROCEDURE — 97530 THERAPEUTIC ACTIVITIES: CPT | Performed by: PHYSICAL THERAPIST

## 2019-02-19 PROCEDURE — 25000003 PHARM REV CODE 250: Performed by: NURSE PRACTITIONER

## 2019-02-19 PROCEDURE — 27000221 HC OXYGEN, UP TO 24 HOURS

## 2019-02-19 PROCEDURE — 97110 THERAPEUTIC EXERCISES: CPT

## 2019-02-19 PROCEDURE — 94761 N-INVAS EAR/PLS OXIMETRY MLT: CPT

## 2019-02-19 PROCEDURE — 97110 THERAPEUTIC EXERCISES: CPT | Performed by: PHYSICAL THERAPIST

## 2019-02-19 PROCEDURE — 94640 AIRWAY INHALATION TREATMENT: CPT

## 2019-02-19 PROCEDURE — C9113 INJ PANTOPRAZOLE SODIUM, VIA: HCPCS | Performed by: STUDENT IN AN ORGANIZED HEALTH CARE EDUCATION/TRAINING PROGRAM

## 2019-02-19 PROCEDURE — 80053 COMPREHEN METABOLIC PANEL: CPT

## 2019-02-19 PROCEDURE — 11000001 HC ACUTE MED/SURG PRIVATE ROOM

## 2019-02-19 RX ORDER — FENTANYL 25 UG/1
1 PATCH TRANSDERMAL
Status: DISCONTINUED | OUTPATIENT
Start: 2019-02-19 | End: 2019-02-26 | Stop reason: HOSPADM

## 2019-02-19 RX ORDER — HYDROMORPHONE HYDROCHLORIDE 1 MG/ML
0.2 INJECTION, SOLUTION INTRAMUSCULAR; INTRAVENOUS; SUBCUTANEOUS ONCE AS NEEDED
Status: DISCONTINUED | OUTPATIENT
Start: 2019-02-19 | End: 2019-02-22

## 2019-02-19 RX ADMIN — PREGABALIN 50 MG: 50 CAPSULE ORAL at 09:02

## 2019-02-19 RX ADMIN — INSULIN ASPART 6 UNITS: 100 INJECTION, SOLUTION INTRAVENOUS; SUBCUTANEOUS at 08:02

## 2019-02-19 RX ADMIN — METOPROLOL TARTRATE 5 MG: 1 INJECTION, SOLUTION INTRAVENOUS at 10:02

## 2019-02-19 RX ADMIN — ONDANSETRON 4 MG: 2 INJECTION INTRAMUSCULAR; INTRAVENOUS at 02:02

## 2019-02-19 RX ADMIN — LAMOTRIGINE 150 MG: 100 TABLET ORAL at 09:02

## 2019-02-19 RX ADMIN — ENOXAPARIN SODIUM 40 MG: 100 INJECTION SUBCUTANEOUS at 11:02

## 2019-02-19 RX ADMIN — METOPROLOL TARTRATE 5 MG: 1 INJECTION, SOLUTION INTRAVENOUS at 08:02

## 2019-02-19 RX ADMIN — METOPROLOL TARTRATE 5 MG: 1 INJECTION, SOLUTION INTRAVENOUS at 01:02

## 2019-02-19 RX ADMIN — HYDROMORPHONE HYDROCHLORIDE 0.2 MG: 1 INJECTION, SOLUTION INTRAMUSCULAR; INTRAVENOUS; SUBCUTANEOUS at 01:02

## 2019-02-19 RX ADMIN — METOPROLOL TARTRATE 5 MG: 1 INJECTION, SOLUTION INTRAVENOUS at 02:02

## 2019-02-19 RX ADMIN — ONDANSETRON 4 MG: 2 INJECTION INTRAMUSCULAR; INTRAVENOUS at 09:02

## 2019-02-19 RX ADMIN — LIDOCAINE 1 PATCH: 50 PATCH TOPICAL at 01:02

## 2019-02-19 RX ADMIN — IPRATROPIUM BROMIDE AND ALBUTEROL SULFATE 3 ML: .5; 3 SOLUTION RESPIRATORY (INHALATION) at 12:02

## 2019-02-19 RX ADMIN — HYDROMORPHONE HYDROCHLORIDE 0.2 MG: 1 INJECTION, SOLUTION INTRAMUSCULAR; INTRAVENOUS; SUBCUTANEOUS at 12:02

## 2019-02-19 RX ADMIN — IPRATROPIUM BROMIDE AND ALBUTEROL SULFATE 3 ML: .5; 3 SOLUTION RESPIRATORY (INHALATION) at 07:02

## 2019-02-19 RX ADMIN — INSULIN ASPART 1 UNITS: 100 INJECTION, SOLUTION INTRAVENOUS; SUBCUTANEOUS at 12:02

## 2019-02-19 RX ADMIN — MIRTAZAPINE 15 MG: 15 TABLET, ORALLY DISINTEGRATING ORAL at 09:02

## 2019-02-19 RX ADMIN — INSULIN ASPART 10 UNITS: 100 INJECTION, SOLUTION INTRAVENOUS; SUBCUTANEOUS at 11:02

## 2019-02-19 RX ADMIN — HYDROXYZINE HYDROCHLORIDE 25 MG: 10 SOLUTION ORAL at 09:02

## 2019-02-19 RX ADMIN — HYDROXYZINE HYDROCHLORIDE 25 MG: 10 SOLUTION ORAL at 04:02

## 2019-02-19 RX ADMIN — INSULIN DETEMIR 25 UNITS: 100 INJECTION, SOLUTION SUBCUTANEOUS at 09:02

## 2019-02-19 RX ADMIN — AMITRIPTYLINE HYDROCHLORIDE 50 MG: 25 TABLET, FILM COATED ORAL at 09:02

## 2019-02-19 RX ADMIN — IPRATROPIUM BROMIDE AND ALBUTEROL SULFATE 3 ML: .5; 3 SOLUTION RESPIRATORY (INHALATION) at 11:02

## 2019-02-19 RX ADMIN — INSULIN ASPART 2 UNITS: 100 INJECTION, SOLUTION INTRAVENOUS; SUBCUTANEOUS at 04:02

## 2019-02-19 RX ADMIN — HYDROMORPHONE HYDROCHLORIDE 0.2 MG: 1 INJECTION, SOLUTION INTRAMUSCULAR; INTRAVENOUS; SUBCUTANEOUS at 08:02

## 2019-02-19 RX ADMIN — IPRATROPIUM BROMIDE AND ALBUTEROL SULFATE 3 ML: .5; 3 SOLUTION RESPIRATORY (INHALATION) at 03:02

## 2019-02-19 RX ADMIN — FENTANYL 1 PATCH: 25 PATCH, EXTENDED RELEASE TRANSDERMAL at 06:02

## 2019-02-19 RX ADMIN — PANTOPRAZOLE SODIUM 40 MG: 40 INJECTION, POWDER, FOR SOLUTION INTRAVENOUS at 10:02

## 2019-02-19 RX ADMIN — IPRATROPIUM BROMIDE AND ALBUTEROL SULFATE 3 ML: .5; 3 SOLUTION RESPIRATORY (INHALATION) at 05:02

## 2019-02-19 RX ADMIN — FLUCONAZOLE 200 MG: 40 POWDER, FOR SUSPENSION ORAL at 09:02

## 2019-02-19 RX ADMIN — ASCORBIC ACID, VITAMIN A PALMITATE, CHOLECALCIFEROL, THIAMINE HYDROCHLORIDE, RIBOFLAVIN-5 PHOSPHATE SODIUM, PYRIDOXINE HYDROCHLORIDE, NIACINAMIDE, DEXPANTHENOL, ALPHA-TOCOPHEROL ACETATE, VITAMIN K1, FOLIC ACID, BIOTIN, CYANOCOBALAMIN: 200; 3300; 200; 6; 3.6; 6; 40; 15; 10; 150; 600; 60; 5 INJECTION, SOLUTION INTRAVENOUS at 07:02

## 2019-02-19 NOTE — PLAN OF CARE
Problem: Adult Inpatient Plan of Care  Goal: Plan of Care Review  Outcome: Ongoing (interventions implemented as appropriate)  Patient on oxygen with documented flow.  Will attempt to wean per O2 order protocol.  The proper method of use, as well as anticipated side effects, of this aerosol treatment are discussed and demonstrated to the patient.   .cpt  Will continue to monitor.

## 2019-02-19 NOTE — PLAN OF CARE
Recommendations     1. Rec change PN formula to 5/20  @ current rate and add lipids daily to better meet estimated needs and promote protein sparing utilization. To increase kcal to daily intake of 1602 kcal.  2. Advance TF as able to goal of 50 ml/hr.   3. RD to monitor     Goals: Meet > 85% EEN daily  Nutrition Goal Status: progressing towards goal  Communication of RD Recs: (plan of care)

## 2019-02-19 NOTE — PROGRESS NOTES
Pt at risk for impaired skin integrity, no issues noted at this time. Pt refusing to wear foam z-boots inspite of teaching by wound care nurse education as to the importance of wearing them as much as possible. Pt states that she will allow heels to be elevated on pillows. Pt is very thin, sacral area protected by foam dressing and turning q 2 hours. Related preventative orders are in place.

## 2019-02-19 NOTE — PT/OT/SLP PROGRESS
Physical Therapy Treatment    Patient Name:  Kaylee Ngo   MRN:  [id number]    Recommendations:     Discharge Recommendations:  nursing facility, skilled   Discharge Equipment Recommendations: (TBD)   Barriers to discharge: Decreased caregiver support and decreased functional mobility    Assessment:     Kaylee Ngo is a 63 y.o. female admitted with a medical diagnosis of Malignant carcinoid tumor of pancreas.  She presents with the following impairments/functional limitations:  weakness, impaired functional mobilty, impaired cardiopulmonary response to activity, impaired endurance, gait instability, impaired balance, decreased lower extremity function, impaired self care skills. Pt went supine to sit with min assist of 1.    Rehab Prognosis: Fair; patient would benefit from acute skilled PT services to address these deficits and reach maximum level of function.    Recent Surgery: Procedure(s) (LRB):  LAPAROTOMY, EXPLORATORY (N/A)  LYMPHADENECTOMY (N/A)  EXCISION, LIVER (N/A) 25 Days Post-Op    Plan:     During this hospitalization, patient to be seen 5 x/week to address the identified rehab impairments via gait training, therapeutic activities, therapeutic exercises, neuromuscular re-education, wheelchair management/training and progress toward the following goals:    · Plan of Care Expires:  03/19/19    Subjective     Chief Complaint: back pain  Patient/Family Comments/goals: move more  Pain/Comfort:  · Pain Rating 1: 5/10  · Location 1: back  · Pain Addressed 1: Pre-medicate for activity, Reposition, Distraction  · Pain Rating Post-Intervention 1: 3/10      Objective:     Communicated with nurse prior to session.  Patient found all lines intact, call button in reach, bed alarm on and sister present NG tube, telemetry, central line, oxygen  upon PT entry to room.     General Precautions: Standard, fall, aspiration, respiratory, NPO   Orthopedic Precautions:N/A   Braces:       Functional Mobility:  · Bed  Mobility:     · Scooting: maximal assistance and of 2 persons  · Supine to Sit: moderate assistance  · Sit to Supine: contact guard assistance  · Transfers:     · Sit to Stand:  refused attempting with RW and HHA  · Gait: Refused  · Balance: Pt has G sitting balance      AM-PAC 6 CLICK MOBILITY  Turning over in bed (including adjusting bedclothes, sheets and blankets)?: 3  Sitting down on and standing up from a chair with arms (e.g., wheelchair, bedside commode, etc.): 3  Moving from lying on back to sitting on the side of the bed?: 3  Moving to and from a bed to a chair (including a wheelchair)?: 2  Need to walk in hospital room?: 2  Climbing 3-5 steps with a railing?: 1  Basic Mobility Total Score: 14       Therapeutic Activities and Exercises:   Pt sat on EOB 9 minutes, performing seated BLE exercises 15x2.    Patient left HOB elevated with all lines intact, call button in reach, bed alarm on and sister present..    GOALS:   Multidisciplinary Problems     Physical Therapy Goals        Problem: Physical Therapy Goal    Goal Priority Disciplines Outcome Goal Variances Interventions   Physical Therapy Goal     PT, PT/OT             Problem: Physical Therapy Goal    Goal Priority Disciplines Outcome Goal Variances Interventions   Physical Therapy Goal     PT, PT/OT             Problem: Physical Therapy Goal    Goal Priority Disciplines Outcome Goal Variances Interventions   Physical Therapy Goal     PT, PT/OT Ongoing (interventions implemented as appropriate)     Description:  Updated Goals 2/8/2019:  1. Supine<>sit with SBA  2. Sit to stand with  CGA  3. Bed to chair with CGA  4. Gait x 20 ft withmin A           Problem: Physical Therapy Goal    Goal Priority Disciplines Outcome Goal Variances Interventions   Physical Therapy Goal     PT, PT/OT                      Time Tracking:     PT Received On: 02/19/19  PT Start Time: 1125     PT Stop Time: 1158  PT Total Time (min): 33 min     Billable Minutes: Therapeutic  Activity 16 and Therapeutic Exercise 17    Treatment Type: Treatment  PT/PTA: PT     PTA Visit Number: 0     Odilia Hernández, PT  02/19/2019

## 2019-02-19 NOTE — PROGRESS NOTES
[facility] General Surgery  Neuroendocrine Tumor Service  Progress Note     Admission Date: 1/25/2019  Hospital Length of Stay: 25  Principal Problem: Malignant carcinoid tumor of pancreas     Subjective:      Operation:  64 yo F w/ PNET, s/p reversal of Sandeep-en-Y w/ Jtube placement  POD#25    1/25/18    Ex lap, gastro-gastrostomy EEA with 33 mm circular stapler, Pyloroplasty and gastrostomy closure, enteric resection with side to side to anstamosis and restoration of small bowel continuity using Idrive tan load stapler, Liver US, Liver biopsy, Liver resection segment 3, Supraceliac lymphadenectomy, FNA pancreas, Lt salpingo oopherectomy, Meckel diverticulectomy, Feeding jejunostomy 14 Fr malecot, ICG perfusion and Infrared exam of all anastamosis    Interval History:   Still c/o back pain. No nausea or vomiting but passing flatus and having BM. Patient reports feeling stronger than yesterday. Wound vac changed yesterday. Right arm PICC placed yesterday    Scheduled Meds:   albuterol-ipratropium  3 mL Nebulization Q4H    amitriptyline  50 mg Oral QHS    aspirin  81 mg Oral Daily    enoxparin  40 mg Subcutaneous Q24H    fat emulsion 20%  250 mL Intravenous Daily    fluconazole 40 mg/ml  200 mg Per NG tube Daily    hydrOXYzine  25 mg Per NG tube TID    insulin aspart U-100  1-10 Units Subcutaneous 6 times per day    insulin detemir U-100  25 Units Subcutaneous Daily    lamoTRIgine  150 mg Oral BID    lidocaine  1 patch Transdermal Q24H    metoprolol  5 mg Intravenous Q6H    mirtazapine  15 mg Per NG tube Nightly    pantoprazole  40 mg Intravenous Daily    pregabalin  50 mg Per NG tube BID     Continuous Infusions:   Amino acid 5% - dextrose 15% (CLINIMIX-E) solution with additives (1L  provides 510 kcal/L dextrose, with 50 gm AA, 150 gm CHO, Na 35, K 30, Mg 5, Ca 4.5, Acetate 80, Cl 39, Phos 15) 50 mL/hr at 02/18/19 1918     PRN Meds:.sodium chloride, acetaminophen, acetaminophen,  albuterol sulfate, artificial tears, dextrose 50%, diphenhydrAMINE, docusate, glucagon (human recombinant), hydrALAZINE, HYDROmorphone, ipratropium, labetalol, lidocaine HCl 2%, lorazepam, naloxone, ondansetron, promethazine (PHENERGAN) IVPB, sodium chloride 0.9%, sodium chloride 3%, tiZANidine    Objective:      Temp:  [97.8 °F (36.6 °C)-99.6 °F (37.6 °C)] 98.2 °F (36.8 °C)  Pulse:  [] 101  Resp:  [16-25] 25  SpO2:  [95 %-100 %] 95 %  BP: (126-146)/(59-85) 146/85  Weight change: -4.4 kg (-11.2 oz)    Intake/Output Summary (Last 24 hours) at 2/19/2019 0813  Last data filed at 2/19/2019 0644  Gross per 24 hour   Intake 1634.48 ml   Output 1800 ml   Net -165.52 ml     Lines:  RIJ TLC  Right arm PICC  NGT  WV    Physical Exam:  GEN:NAD  CV: RRR  PULM: unlabored with 2L NC  ABD: S/NT/ ND, WV - good granulation tissue with some necrotic tissue at the base  EXT: Peripheral pulses present and equal bilaterally    Recent Labs   Lab 02/19/19  0454      K 3.9   CO2 32*   CL 98   BUN 18   CREATININE 0.6   CALCIUM 8.6*   PROT 6.0   AST 33   ALT 39   ALKPHOS 212*   BILITOT 0.4     Recent Labs   Lab 02/19/19  0454   WBC 9.67   HGB 8.3*   HCT 27.7*   *       Significant Imaging: I have reviewed all pertinent imaging results/findings within the past 24 hours.     Assessment and Plan:   62 yo F w/ PNET, s/p reversal of Sandeep-en-Y w/ Jtube placement  POD#25    1/25/18  Ex lap, gastro-gastrostomy EEA with 33 mm circular stapler, Pyloroplasty and gastrostomy closure, enteric resection with side to side to anstamosis and restoration of small bowel continuity using Idrive tan load stapler, Liver US, Liver biopsy, Liver resection segment 3, Supraceliac lymphadenectomy, FNA pancreas, Lt salpingo oopherectomy, Meckel diverticulectomy, Feeding jejunostomy 14 Fr malecot, ICG perfusion and Infrared exam of all anastamosis    Neuro - No sedatives, aspiration risk  FEN/GI - Continue NPO, TPN on, TFs 10 cc/hr, check residuals,  replete lytes prn, ordered this AM  ID - cont fluconazole, last dose rocephin 2/13/19, fungitell 81, recheck fungitell, afebrile overnight, continuous microaspirations  Heme: H/H stable  Pulm - On NC. Aggressive chest CPT and acapella. ABGs. Resp Cx growing E Coli she has received full course rocephin F/U with speech regarding swallowing eval. Improving but still not safe.  Renal - Cr good, whitlock out, difficult to record accurate I/O/UOP but pt refuse wick   Surgical - WV in place with black sponge, change MWF  - will change today  PPX - continue lovenox, protonix, scd in place    Dispo - cont inpt care and abx, Pending LTAC placement due to NGT, wound vac, and TPN necessity.    Nash Titus MD   II  [phone]

## 2019-02-19 NOTE — PT/OT/SLP PROGRESS
Occupational Therapy   Treatment    Name: Kaylee Ngo  MRN: [id number]  Admitting Diagnosis:  Malignant carcinoid tumor of pancreas  25 Days Post-Op    Recommendations:     Discharge Recommendations: nursing facility, skilled  Discharge Equipment Recommendations:  (TBD)  Barriers to discharge:  None    Assessment:     Kaylee Ngo is a 63 y.o. female with a medical diagnosis of Malignant carcinoid tumor of pancreas.   Performance deficits affecting function are weakness, impaired endurance, impaired self care skills, impaired functional mobilty, gait instability, impaired balance, decreased upper extremity function, decreased lower extremity function, decreased safety awareness, decreased coordination, pain. Pt agreeable to bed therex only with max encouragement. Fair tolerance. Continue OT services to address functional goals, progressing as able.      Rehab Prognosis:  Good; patient would benefit from acute skilled OT services to address these deficits and reach maximum level of function.       Plan:     Patient to be seen 5 x/week to address the above listed problems via self-care/home management, therapeutic activities, therapeutic exercises  · Plan of Care Expires: 03/07/19  · Plan of Care Reviewed with: patient    Subjective     Pain/Comfort:  ·      Objective:     Communicated with: RN prior to session.  Patient found HOB elevated with telemetry, NG tube, central line, oxygen upon OT entry to room.    General Precautions: Standard, fall, aspiration, respiratory, NPO   Orthopedic Precautions:N/A   Braces: N/A     [facility] 6 Click ADL: 10    Treatment & Education:  Pt performed BUE dowel exercises 1-2 x 10 reps for shld flexion, chest press, bicep curls, and overhead tricep extension as well as fisting. Pt tolerated fairly with encouragement.     Patient left HOB elevated with all lines intact and call button in reachEducation:      GOALS:   Multidisciplinary Problems     Occupational Therapy Goals         Problem: Occupational Therapy Goal    Goal Priority Disciplines Outcome Interventions   Occupational Therapy Goal     OT, PT/OT Ongoing (interventions implemented as appropriate)    Description:  Goals to be met by: 3/7/2019     Patient will increase functional independence with ADLs by performing:    UE Dressing with Minimal Assistance.  LE Dressing with Moderate Assistance.  Grooming while seated with Minimal Assistance. --MET 2/18  Toileting from bedside commode with Moderate Assistance for hygiene and clothing management.   Sitting at edge of bed x10 minutes with Moderate Assistance.  Supine to sit with Maximum Assistance.  Increased functional UE strength to 3/5                        Time Tracking:     OT Date of Treatment: 02/19/19  OT Start Time: 1316  OT Stop Time: 1327  OT Total Time (min): 11 min    Billable Minutes:Therapeutic Exercise 11    CECY Faust  2/19/2019

## 2019-02-19 NOTE — PROGRESS NOTES
"[facility]  Adult Nutrition  Progress Note    SUMMARY       Recommendations    1. Rec change PN formula to 5/20  @ current rate and add lipids daily to better meet estimated needs and promote protein sparing utilization. To increase kcal to daily intake of 1602 kcal.  2. Advance TF as able to goal of 50 ml/hr.   3. RD to monitor    Goals: Meet > 85% EEN daily  Nutrition Goal Status: progressing towards goal  Communication of RD Recs: (plan of care)    Reason for Assessment    Reason For Assessment: RD follow-up  Diagnosis: (Malignant Pancreatic carcinoid tumor)  Relevant Medical History: DM, HLD, GERD, Gastric Ulcers  Interdisciplinary Rounds: did not attend    General Information Comments: NGT with peptamen prebio running at 10 mL/hr. Pt reports consistent nausea, states she wants the tube out. Discussed importance of feeding GI tract. Pt states "I feel I am dying". PN support continues. SLP continues NPO rec. NFPE on 2/7/19 with evidence of malnutrition, see malnutrition section for details.     Nutrition Discharge Planning: NHAN at this time.    Nutrition Risk Screen    Nutrition Risk Screen: dysphagia or difficulty swallowing, tube feeding or parenteral nutrition    Nutrition/Diet History    Patient Reported Diet/Restrictions/Preferences: diabetic diet  Typical Food/Fluid Intake: TF at home  Food Preferences: n/a  Spiritual, Cultural Beliefs, Mormonism Practices, Values that Affect Care: no  Supplemental Drinks or Food Habits: Peptaman 1.5  Factors Affecting Nutritional Intake: altered gastrointestinal function, NPO  Nutrition Support Formula Prior to Admit: Impact Peptide 1.5  Nutrition Support Rate Prior to Admit: 240 (ml)  Nutrtion Support Frequency Prior to Admit: pump  Nutrition Support Provision Prior to Admit: 3 cans a day    Anthropometrics    Temp: 98.5 °F (36.9 °C)  Height Method: Stated  Height: 5' 5" (165.1 cm)  Height (inches): 65 in  Weight Method: Bed Scale  Weight: 52.9 kg (116 " lb 10 oz)  Weight (lb): 116.62 lb  Ideal Body Weight (IBW), Female: 125 lb  % Ideal Body Weight, Female (lb): 103 lb  BMI (Calculated): 21.5  BMI Grade: 18.5-24.9 - normal  Weight Loss: unintentional  Usual Body Weight (UBW), k.9 kg(Admit weight on 19)  Weight Change Amount: 11 lb  % Usual Body Weight: 86.19  % Weight Change From Usual Weight: -13.99 %  Weight Loss Since Admission: 21 lb  % Weight Change Since Admission: 16.31       Lab/Procedures/Meds    Pertinent Labs Reviewed: reviewed  Pertinent Labs Comments: alb 2.1  Pertinent Medications Reviewed: reviewed  Pertinent Medications Comments: insulin, pantoprazole      Estimated/Assessed Needs    Weight Used For Calorie Calculations: 55.9 kg (123 lb 3.8 oz)  Energy Calorie Requirements (kcal): 0020-1644 kcal  Energy Need Method: Kcal/kg  Protein Requirements: 75-95(1.2-1.5 gm Pro/kg)  Weight Used For Protein Calculations: 62.9 kg (138 lb 10.7 oz)     Estimated Fluid Requirement Method: RDA Method  RDA Method (mL): 1677  CHO Requirement: 200g      Nutrition Prescription Ordered    Current Diet Order: NPO  Current Nutrition Support Formula Ordered: Clinimix E 15(with 20% Lipids 3 x week)  Current Nutrition Support Rate Ordered: 50 (ml)  Current Nutrition Support Frequency Ordered: mL/hr  Oral Nutrition Supplement: Peptamen prebio @ 10 ml/hr    Evaluation of Received Nutrient/Fluid Intake    Enteral Calories (kcal): 360  Enteral Protein (gm): 16.5  Enteral (Free Water) Fluid (mL): 183.5  Free Water Flush Fluid (mL): 0  Parenteral Calories (kcal): 612  Parenteral Protein (gm): 66  Parenteral Fluid (mL): 1200  Lipid Calories (kcals): 212 kcals  GIR (Glucose Infusion Rate) (mg/kg/min): 2.4 mg/kg/min  Total Calories (kcal): 1184  % Kcal Needs: 65  Total Protein (gm): 83  % Protein Needs: 100  IV Fluid (mL): 0  I/O: reviewed  Energy Calories Required: not meeting needs  Protein Required: meeting needs  Fluid Required: (per MD)  Comments: LBM:  2/18/19  Tolerance: tolerating    % Meal Intake: NPO    Nutrition Risk    Level of Risk/Frequency of Follow-up: (2 x week)     Assessment and Plan    Malnutrition in the context of Chronic Illness/Injury     Related to (etiology):  Altered GI Function; Cancer     Signs and Symptoms (as evidenced by):     Energy Intake: <75% intake > 1 week  Body Fat Depletion: mild depletion of orbitals, triceps and thoracic and lumbar region   Muscle Mass Depletion: moderate depletion of temples, clavicle region, interosseous muscle and lower extremities      Interventions  Collaboration with providers     Nutrition Diagnosis Status  Continues    Monitor and Evaluation    Food and Nutrient Intake: energy intake, enteral nutrition intake, parenteral nutrition intake  Food and Nutrient Adminstration: enteral and parenteral nutrition administration, diet order  Physical Activity and Function: nutrition-related ADLs and IADLs  Anthropometric Measurements: weight, weight change  Biochemical Data, Medical Tests and Procedures: electrolyte and renal panel, glucose/endocrine profile  Nutrition-Focused Physical Findings: overall appearance     Malnutrition Assessment  Malnutrition Type: acute illness or injury  Energy Intake: moderate energy intake  Skin (Micronutrient): dry, thinned, turgor reduced  Hair/Scalp (Micronutrient): brittle  Eyes (Micronutrient): none  Extraoral (Micronutrient): none  Neck/Chest (Micronutrient): bony prominence, subcutaneous fat loss       Energy Intake (Malnutrition): less than 75% for greater than 7 days  Subcutaneous Fat (Malnutrition): mild depletion  Muscle Mass (Malnutrition): moderate depletion  Fluid Accumulation (Malnutrition): mild   Orbital Region (Subcutaneous Fat Loss): mild depletion  Upper Arm Region (Subcutaneous Fat Loss): mild depletion  Thoracic and Lumbar Region: mild depletion   Logan Region (Muscle Loss): moderate depletion  Clavicle Bone Region (Muscle Loss): moderate depletion  Clavicle  and Acromion Bone Region (Muscle Loss): mild depletion  Dorsal Hand (Muscle Loss): mild depletion  Patellar Region (Muscle Loss): moderate depletion  Anterior Thigh Region (Muscle Loss): moderate depletion  Posterior Calf Region (Muscle Loss): moderate depletion       Subcutaneous Fat Loss (Final Summary): mild protein-calorie malnutrition  Muscle Loss Evaluation (Final Summary): moderate protein-calorie malnutrition  Fluid Accumulation Evaluation: mild         Nutrition Follow-Up    RD Follow-up?: Yes

## 2019-02-19 NOTE — PROCEDURES
"Kaylee Ngo is a 63 y.o. female patient.    Temp: 99.6 °F (37.6 °C) (02/18/19 2001)  Pulse: 84 (02/18/19 2053)  Resp: (!) 22 (02/18/19 2053)  BP: (!) 143/77 (02/18/19 2001)  SpO2: 97 % (02/18/19 2053)  Weight: 57.1 kg (125 lb 14.1 oz) (02/18/19 0401)  Height: 5' 5" (165.1 cm) (02/14/19 0900)    PICC  Date/Time: 2/18/2019 10:30 PM  Performed by: Chip Gee RN  Consent Done: Yes  Time out: Immediately prior to procedure a time out was called to verify the correct patient, procedure, equipment, support staff and site/side marked as required  Indications: med administration and vascular access  Anesthesia: local infiltration  Local anesthetic: lidocaine 1% without epinephrine  Anesthetic Total (mL): 5  Description of findings: picc  Preparation: skin prepped with chlorhexidine (without alcohol)  Skin prep agent dried: skin prep agent completely dried prior to procedure  Sterile barriers: all five maximum sterile barriers used - cap, mask, sterile gown, sterile gloves, and large sterile sheet  Hand hygiene: hand hygiene performed prior to central venous catheter insertion  Location details: left basilic  Catheter type: double lumen  Catheter size: 5 Fr  Catheter Length: 41cm    Ultrasound guidance: yes  Vessel Caliber: medium and patent, compressibility normal  Vascular Doppler: not done  Needle advanced into vessel with real time Ultrasound guidance.  Guidewire confirmed in vessel.  Sterile sheath used.  no esophageal manometryNumber of attempts: 1  Post-procedure: blood return through all ports, chlorhexidine patch and sterile dressing applied  Estimated blood loss (mL): 0  Specimens: No  Implants: No  Assessment: placement verified by x-ray, tip termination and successful placement  Complications: none          Chip Gee  2/18/2019  "

## 2019-02-19 NOTE — PT/OT/SLP PROGRESS
Occupational Therapy      Patient Name:  Kaylee Ngo   MRN:  [id number]    Patient not seen today secondary to AM: Pt declined stating she has a headache, chest pain and is SOB. RN notified. Will follow-up at later time.    CECY Faust  2/19/2019

## 2019-02-19 NOTE — PLAN OF CARE
VN note: VN cued into pt's room for introduction. VN informed pt that VN would be working closely along side bedside nurse, PCT, and the rest of care team and making rounds throughout the shift. Bedside Nurse Petra in room. VN will continue to be available to patient and intervene prn.       02/19/19 0908   Type of Frequent Check   Type Patient Rounds;Other (see comments)  (VN Rounds)   Safety/Activity   Patient Rounds bed in low position;visualized patient   Safety Promotion/Fall Prevention side rails raised x 2   Activity Management activity adjusted per tolerance   Positioning   Body Position supine   Head of Bed (HOB) HOB at 60-90 degrees

## 2019-02-19 NOTE — PROGRESS NOTES
TN met with pt and her sister Elisabeth   [phone]    pt wants to stay in the area post discharge and get LTAC services at [facility] - [facility] LEE spoke with Gely in admissions - she will evaluate the pt's info -- for d/c in a few days.       all info sent per [facility] [employer]

## 2019-02-19 NOTE — PLAN OF CARE
Problem: Occupational Therapy Goal  Goal: Occupational Therapy Goal  Goals to be met by: 3/7/2019     Patient will increase functional independence with ADLs by performing:    UE Dressing with Minimal Assistance.  LE Dressing with Moderate Assistance.  Grooming while seated with Minimal Assistance. --MET 2/18  Toileting from bedside commode with Moderate Assistance for hygiene and clothing management.   Sitting at edge of bed x10 minutes with Moderate Assistance.  Supine to sit with Maximum Assistance.  Increased functional UE strength to 3/5       Outcome: Ongoing (interventions implemented as appropriate)  Kaylee Ngo is a 63 y.o. female with a medical diagnosis of Malignant carcinoid tumor of pancreas.   Performance deficits affecting function are weakness, impaired endurance, impaired self care skills, impaired functional mobilty, gait instability, impaired balance, decreased upper extremity function, decreased lower extremity function, decreased safety awareness, decreased coordination, pain. Pt agreeable to bed therex only with max encouragement. Fair tolerance. Continue OT services to address functional goals, progressing as able.    TAMIR Faust/L

## 2019-02-19 NOTE — PLAN OF CARE
Problem: Physical Therapy Goal  Goal: Physical Therapy Goal  Updated Goals 2/8/2019:  1. Supine<>sit with SBA  2. Sit to stand with  CGA  3. Bed to chair with CGA  4. Gait x 20 ft withmin A   Outcome: Ongoing (interventions implemented as appropriate)  Pt would benefit from skilled PT to progress functional mobility.

## 2019-02-20 ENCOUNTER — ANESTHESIA EVENT (OUTPATIENT)
Dept: MEDSURG UNIT | Facility: HOSPITAL | Age: 64
DRG: 826 | End: 2019-02-20
Payer: MEDICARE

## 2019-02-20 ENCOUNTER — ANESTHESIA (OUTPATIENT)
Dept: MEDSURG UNIT | Facility: HOSPITAL | Age: 64
DRG: 826 | End: 2019-02-20
Payer: MEDICARE

## 2019-02-20 PROBLEM — T81.30XA WOUND DEHISCENCE: Status: ACTIVE | Noted: 2019-02-20

## 2019-02-20 LAB
1,3 BETA GLUCAN SPEC-MCNC: <31 PG/ML
ALBUMIN SERPL BCP-MCNC: 2.3 G/DL
ALP SERPL-CCNC: 229 U/L
ALT SERPL W/O P-5'-P-CCNC: 38 U/L
ANION GAP SERPL CALC-SCNC: 6 MMOL/L
AST SERPL-CCNC: 33 U/L
BASOPHILS # BLD AUTO: 0.03 K/UL
BASOPHILS # BLD AUTO: 0.03 K/UL
BASOPHILS # BLD AUTO: 0.04 K/UL
BASOPHILS NFR BLD: 0.3 %
BASOPHILS NFR BLD: 0.3 %
BASOPHILS NFR BLD: 0.4 %
BILIRUB SERPL-MCNC: 0.4 MG/DL
BUN SERPL-MCNC: 18 MG/DL
CALCIUM SERPL-MCNC: 9.2 MG/DL
CHLORIDE SERPL-SCNC: 98 MMOL/L
CO2 SERPL-SCNC: 33 MMOL/L
CREAT SERPL-MCNC: 0.6 MG/DL
DIFFERENTIAL METHOD: ABNORMAL
EOSINOPHIL # BLD AUTO: 0.2 K/UL
EOSINOPHIL NFR BLD: 1.3 %
EOSINOPHIL NFR BLD: 1.4 %
EOSINOPHIL NFR BLD: 1.5 %
ERYTHROCYTE [DISTWIDTH] IN BLOOD BY AUTOMATED COUNT: 17.6 %
ERYTHROCYTE [DISTWIDTH] IN BLOOD BY AUTOMATED COUNT: 17.8 %
ERYTHROCYTE [DISTWIDTH] IN BLOOD BY AUTOMATED COUNT: 18 %
EST. GFR  (AFRICAN AMERICAN): >60 ML/MIN/1.73 M^2
EST. GFR  (NON AFRICAN AMERICAN): >60 ML/MIN/1.73 M^2
GLUCOSE SERPL-MCNC: 295 MG/DL
HCT VFR BLD AUTO: 28 %
HCT VFR BLD AUTO: 28.9 %
HCT VFR BLD AUTO: 29.7 %
HGB BLD-MCNC: 8.1 G/DL
HGB BLD-MCNC: 8.2 G/DL
HGB BLD-MCNC: 9 G/DL
LYMPHOCYTES # BLD AUTO: 1.3 K/UL
LYMPHOCYTES # BLD AUTO: 1.4 K/UL
LYMPHOCYTES # BLD AUTO: 1.5 K/UL
LYMPHOCYTES NFR BLD: 12.8 %
LYMPHOCYTES NFR BLD: 13 %
LYMPHOCYTES NFR BLD: 13 %
MAGNESIUM SERPL-MCNC: 1.7 MG/DL
MCH RBC QN AUTO: 27.7 PG
MCH RBC QN AUTO: 27.7 PG
MCH RBC QN AUTO: 28 PG
MCHC RBC AUTO-ENTMCNC: 28 G/DL
MCHC RBC AUTO-ENTMCNC: 29.3 G/DL
MCHC RBC AUTO-ENTMCNC: 30.3 G/DL
MCV RBC AUTO: 93 FL
MCV RBC AUTO: 95 FL
MCV RBC AUTO: 99 FL
MONOCYTES # BLD AUTO: 0.9 K/UL
MONOCYTES # BLD AUTO: 0.9 K/UL
MONOCYTES # BLD AUTO: 1.2 K/UL
MONOCYTES NFR BLD: 10.1 %
MONOCYTES NFR BLD: 8.5 %
MONOCYTES NFR BLD: 8.5 %
NEUTROPHILS # BLD AUTO: 7.4 K/UL
NEUTROPHILS # BLD AUTO: 7.8 K/UL
NEUTROPHILS # BLD AUTO: 8.2 K/UL
NEUTROPHILS NFR BLD: 71.5 %
NEUTROPHILS NFR BLD: 72.4 %
NEUTROPHILS NFR BLD: 73.6 %
PHOSPHATE SERPL-MCNC: 2.9 MG/DL
PLATELET # BLD AUTO: 490 K/UL
PLATELET # BLD AUTO: 508 K/UL
PLATELET # BLD AUTO: 529 K/UL
PMV BLD AUTO: 9.5 FL
PMV BLD AUTO: 9.6 FL
PMV BLD AUTO: 9.9 FL
POCT GLUCOSE: 144 MG/DL (ref 70–110)
POCT GLUCOSE: 175 MG/DL (ref 70–110)
POCT GLUCOSE: 189 MG/DL (ref 70–110)
POCT GLUCOSE: 193 MG/DL (ref 70–110)
POCT GLUCOSE: 281 MG/DL (ref 70–110)
POCT GLUCOSE: 352 MG/DL (ref 70–110)
POTASSIUM SERPL-SCNC: 4.2 MMOL/L
PROT SERPL-MCNC: 6.6 G/DL
RBC # BLD AUTO: 2.92 M/UL
RBC # BLD AUTO: 2.96 M/UL
RBC # BLD AUTO: 3.21 M/UL
SODIUM SERPL-SCNC: 137 MMOL/L
WBC # BLD AUTO: 10.24 K/UL
WBC # BLD AUTO: 10.55 K/UL
WBC # BLD AUTO: 11.41 K/UL

## 2019-02-20 PROCEDURE — C9290 INJ, BUPIVACAINE LIPOSOME: HCPCS | Performed by: STUDENT IN AN ORGANIZED HEALTH CARE EDUCATION/TRAINING PROGRAM

## 2019-02-20 PROCEDURE — 99900035 HC TECH TIME PER 15 MIN (STAT)

## 2019-02-20 PROCEDURE — 92526 ORAL FUNCTION THERAPY: CPT

## 2019-02-20 PROCEDURE — 63600175 PHARM REV CODE 636 W HCPCS: Performed by: STUDENT IN AN ORGANIZED HEALTH CARE EDUCATION/TRAINING PROGRAM

## 2019-02-20 PROCEDURE — C9113 INJ PANTOPRAZOLE SODIUM, VIA: HCPCS | Performed by: STUDENT IN AN ORGANIZED HEALTH CARE EDUCATION/TRAINING PROGRAM

## 2019-02-20 PROCEDURE — 25000242 PHARM REV CODE 250 ALT 637 W/ HCPCS: Performed by: SURGERY

## 2019-02-20 PROCEDURE — 97535 SELF CARE MNGMENT TRAINING: CPT

## 2019-02-20 PROCEDURE — 97530 THERAPEUTIC ACTIVITIES: CPT

## 2019-02-20 PROCEDURE — 63600175 PHARM REV CODE 636 W HCPCS: Performed by: SURGERY

## 2019-02-20 PROCEDURE — 94664 DEMO&/EVAL PT USE INHALER: CPT

## 2019-02-20 PROCEDURE — B4185 PARENTERAL SOL 10 GM LIPIDS: HCPCS | Performed by: SURGERY

## 2019-02-20 PROCEDURE — 11000001 HC ACUTE MED/SURG PRIVATE ROOM

## 2019-02-20 PROCEDURE — 94668 MNPJ CHEST WALL SBSQ: CPT

## 2019-02-20 PROCEDURE — 94640 AIRWAY INHALATION TREATMENT: CPT

## 2019-02-20 PROCEDURE — 27000646 HC AEROBIKA DEVICE

## 2019-02-20 PROCEDURE — 94761 N-INVAS EAR/PLS OXIMETRY MLT: CPT

## 2019-02-20 PROCEDURE — 25000003 PHARM REV CODE 250: Performed by: STUDENT IN AN ORGANIZED HEALTH CARE EDUCATION/TRAINING PROGRAM

## 2019-02-20 PROCEDURE — 85025 COMPLETE CBC W/AUTO DIFF WBC: CPT

## 2019-02-20 PROCEDURE — 83735 ASSAY OF MAGNESIUM: CPT

## 2019-02-20 PROCEDURE — 25000003 PHARM REV CODE 250: Performed by: SURGERY

## 2019-02-20 PROCEDURE — 36415 COLL VENOUS BLD VENIPUNCTURE: CPT

## 2019-02-20 PROCEDURE — 84100 ASSAY OF PHOSPHORUS: CPT

## 2019-02-20 PROCEDURE — 25000003 PHARM REV CODE 250: Performed by: NURSE PRACTITIONER

## 2019-02-20 PROCEDURE — 97110 THERAPEUTIC EXERCISES: CPT | Performed by: PHYSICAL THERAPIST

## 2019-02-20 PROCEDURE — 80053 COMPREHEN METABOLIC PANEL: CPT

## 2019-02-20 PROCEDURE — 27000221 HC OXYGEN, UP TO 24 HOURS

## 2019-02-20 RX ORDER — TRIPROLIDINE/PSEUDOEPHEDRINE 2.5MG-60MG
600 TABLET ORAL EVERY 6 HOURS PRN
Status: DISCONTINUED | OUTPATIENT
Start: 2019-02-20 | End: 2019-02-26 | Stop reason: HOSPADM

## 2019-02-20 RX ORDER — ENOXAPARIN SODIUM 100 MG/ML
40 INJECTION SUBCUTANEOUS
Status: DISCONTINUED | OUTPATIENT
Start: 2019-02-21 | End: 2019-02-26 | Stop reason: HOSPADM

## 2019-02-20 RX ORDER — MAGNESIUM SULFATE HEPTAHYDRATE 40 MG/ML
2 INJECTION, SOLUTION INTRAVENOUS ONCE
Status: COMPLETED | OUTPATIENT
Start: 2019-02-20 | End: 2019-02-20

## 2019-02-20 RX ADMIN — IPRATROPIUM BROMIDE AND ALBUTEROL SULFATE 3 ML: .5; 3 SOLUTION RESPIRATORY (INHALATION) at 11:02

## 2019-02-20 RX ADMIN — HYDROMORPHONE HYDROCHLORIDE 0.2 MG: 1 INJECTION, SOLUTION INTRAMUSCULAR; INTRAVENOUS; SUBCUTANEOUS at 09:02

## 2019-02-20 RX ADMIN — IPRATROPIUM BROMIDE AND ALBUTEROL SULFATE 3 ML: .5; 3 SOLUTION RESPIRATORY (INHALATION) at 03:02

## 2019-02-20 RX ADMIN — AMITRIPTYLINE HYDROCHLORIDE 50 MG: 25 TABLET, FILM COATED ORAL at 10:02

## 2019-02-20 RX ADMIN — MIRTAZAPINE 15 MG: 15 TABLET, ORALLY DISINTEGRATING ORAL at 10:02

## 2019-02-20 RX ADMIN — METOPROLOL TARTRATE 5 MG: 1 INJECTION, SOLUTION INTRAVENOUS at 09:02

## 2019-02-20 RX ADMIN — TIZANIDINE 4 MG: 4 TABLET ORAL at 10:02

## 2019-02-20 RX ADMIN — METOPROLOL TARTRATE 5 MG: 1 INJECTION, SOLUTION INTRAVENOUS at 02:02

## 2019-02-20 RX ADMIN — INSULIN DETEMIR 25 UNITS: 100 INJECTION, SOLUTION SUBCUTANEOUS at 08:02

## 2019-02-20 RX ADMIN — PREGABALIN 50 MG: 50 CAPSULE ORAL at 10:02

## 2019-02-20 RX ADMIN — LIDOCAINE 1 PATCH: 50 PATCH TOPICAL at 12:02

## 2019-02-20 RX ADMIN — INSULIN ASPART 3 UNITS: 100 INJECTION, SOLUTION INTRAVENOUS; SUBCUTANEOUS at 12:02

## 2019-02-20 RX ADMIN — LAMOTRIGINE 150 MG: 100 TABLET ORAL at 08:02

## 2019-02-20 RX ADMIN — I.V. FAT EMULSION 250 ML: 20 EMULSION INTRAVENOUS at 10:02

## 2019-02-20 RX ADMIN — MAGNESIUM SULFATE IN WATER 2 G: 40 INJECTION, SOLUTION INTRAVENOUS at 12:02

## 2019-02-20 RX ADMIN — HYDROXYZINE HYDROCHLORIDE 25 MG: 10 SOLUTION ORAL at 02:02

## 2019-02-20 RX ADMIN — ASCORBIC ACID, VITAMIN A PALMITATE, CHOLECALCIFEROL, THIAMINE HYDROCHLORIDE, RIBOFLAVIN-5 PHOSPHATE SODIUM, PYRIDOXINE HYDROCHLORIDE, NIACINAMIDE, DEXPANTHENOL, ALPHA-TOCOPHEROL ACETATE, VITAMIN K1, FOLIC ACID, BIOTIN, CYANOCOBALAMIN: 200; 3300; 200; 6; 3.6; 6; 40; 15; 10; 150; 600; 60; 5 INJECTION, SOLUTION INTRAVENOUS at 06:02

## 2019-02-20 RX ADMIN — INSULIN ASPART 10 UNITS: 100 INJECTION, SOLUTION INTRAVENOUS; SUBCUTANEOUS at 12:02

## 2019-02-20 RX ADMIN — IPRATROPIUM BROMIDE AND ALBUTEROL SULFATE 3 ML: .5; 3 SOLUTION RESPIRATORY (INHALATION) at 07:02

## 2019-02-20 RX ADMIN — INSULIN ASPART 2 UNITS: 100 INJECTION, SOLUTION INTRAVENOUS; SUBCUTANEOUS at 10:02

## 2019-02-20 RX ADMIN — METOPROLOL TARTRATE 5 MG: 1 INJECTION, SOLUTION INTRAVENOUS at 10:02

## 2019-02-20 RX ADMIN — FLUCONAZOLE 200 MG: 40 POWDER, FOR SUSPENSION ORAL at 09:02

## 2019-02-20 RX ADMIN — ONDANSETRON 4 MG: 2 INJECTION INTRAMUSCULAR; INTRAVENOUS at 10:02

## 2019-02-20 RX ADMIN — INSULIN ASPART 2 UNITS: 100 INJECTION, SOLUTION INTRAVENOUS; SUBCUTANEOUS at 06:02

## 2019-02-20 RX ADMIN — IPRATROPIUM BROMIDE AND ALBUTEROL SULFATE 3 ML: .5; 3 SOLUTION RESPIRATORY (INHALATION) at 04:02

## 2019-02-20 RX ADMIN — PREGABALIN 50 MG: 50 CAPSULE ORAL at 09:02

## 2019-02-20 RX ADMIN — BUPIVACAINE 20 ML: 13.3 INJECTION, SUSPENSION, LIPOSOMAL INFILTRATION at 10:02

## 2019-02-20 RX ADMIN — IPRATROPIUM BROMIDE AND ALBUTEROL SULFATE 3 ML: .5; 3 SOLUTION RESPIRATORY (INHALATION) at 08:02

## 2019-02-20 RX ADMIN — INSULIN ASPART 2 UNITS: 100 INJECTION, SOLUTION INTRAVENOUS; SUBCUTANEOUS at 08:02

## 2019-02-20 RX ADMIN — LAMOTRIGINE 150 MG: 100 TABLET ORAL at 10:02

## 2019-02-20 RX ADMIN — PANTOPRAZOLE SODIUM 40 MG: 40 INJECTION, POWDER, FOR SOLUTION INTRAVENOUS at 09:02

## 2019-02-20 RX ADMIN — HYDROMORPHONE HYDROCHLORIDE 0.2 MG: 1 INJECTION, SOLUTION INTRAMUSCULAR; INTRAVENOUS; SUBCUTANEOUS at 03:02

## 2019-02-20 RX ADMIN — HYDROXYZINE HYDROCHLORIDE 25 MG: 10 SOLUTION ORAL at 10:02

## 2019-02-20 RX ADMIN — HYDROMORPHONE HYDROCHLORIDE 0.2 MG: 1 INJECTION, SOLUTION INTRAMUSCULAR; INTRAVENOUS; SUBCUTANEOUS at 02:02

## 2019-02-20 RX ADMIN — IBUPROFEN 600 MG: 100 SUSPENSION ORAL at 07:02

## 2019-02-20 RX ADMIN — HYDROXYZINE HYDROCHLORIDE 25 MG: 10 SOLUTION ORAL at 09:02

## 2019-02-20 NOTE — CONSULTS
"[facility]    CC: dysphagia    HPI   Ms. Ngo is a 63 year old woman with new, worsening, severe, solid and liquid dysphagia associated with cough. She reports she had a gastric bypass that was reversed due to her pancreatic neuroendocrine tumor this admission. She notes that the NG tube is so painful and she would like to have it out. She is amenable to a PEG. She denies vomiting, black or bloody stools, fever, but endorses abdominal and back pain.     Collateral obtained from nurse. Chart reviewed and summarized here.    Past Medical History  Depression   DM  GERD  PUD  HTN  pNET  OCD  Gastric bypass    Past Surgical History  Gastric bypass  S/p reversal of gastric bypass  Partial liver resection (segment 3)  Cholecystectomy  Appendectomy    Social History  Denies alcohol and smoking    Family History  Father with colon cancer and mother with pancreatic cancer    Review of Systems  General ROS: negative for chills, fever or weight loss Positive for malaise, fatigue, weakness  Psychological ROS: negative for hallucination, depression or suicidal ideation  Ophthalmic ROS: negative for blurry vision, photophobia or eye pain  ENT ROS: negative for epistaxis, sore throat or rhinorrhea Positive for pain in nose due to NG  Respiratory ROS: no cough, shortness of breath, or wheezing  Cardiovascular ROS: no chest pain or dyspnea on exertion  Gastrointestinal ROS: Positive for dysphagia, abdominal pain, no vomiting or diarrhea  Genito-Urinary ROS: no dysuria, trouble voiding, or hematuria  Musculoskeletal ROS: negative for gait disturbance or muscular weakness Positive for back pain  Neurological ROS: no syncope or seizures; no ataxia  Dermatological ROS: negative for pruritis, rash and jaundice    Physical Examination  BP (!) 158/70 (Patient Position: Lying)   Pulse 100   Temp 98.2 °F (36.8 °C) (Oral)   Resp 18   Ht 5' 5" (1.651 m)   Wt 54.2 kg (119 lb 7.8 oz)   SpO2 100%   Breastfeeding? No   BMI 19.88 " kg/m²   General appearance: alert, cooperative, no distress, thin and ill-appearing, diaphorectic  HENT: Normocephalic, atraumatic, neck symmetrical, no nasal discharge   Eyes: conjunctivae/corneas clear, PERRL, EOM's intact  Lungs: clear to auscultation in all fields, no dullness to percussion bilaterally, no wheeze  Heart: normal rate, regular rhythm without rub; palpable peripheral pulses  Abdomen: soft, TTP, wound vac in place superiorly of midline vertical scar with small packing at inferior border  Extremities: extremities symmetric; no clubbing, cyanosis, or edema  Integument: Skin color, texture, turgor normal; no rashes  Neurologic: Alert and oriented X 3, normal strength, normal coordination  Psychiatric: no pressured speech; normal affect; no evidence of impaired cognition     Labs:  Hg 9  MCV 93  Plt 529    Imaging:  CXR 2/18/2019  PICC noted on left    I have personally reviewed these images    Assessment:   Ms. Ngo is a 63 year old woman with PNET, s/p reversal of Sandeep-en-Y this admission on 1/25/2019 now with dysphagia and inability to get enteral nutrition.    Plan:  - will discuss feasibility of PEG in setting of wound vac    Viji Varghese MD MPH  [facility] PGY 4  [phone] cell  [phone] pager

## 2019-02-20 NOTE — PLAN OF CARE
Problem: Occupational Therapy Goal  Goal: Occupational Therapy Goal  Goals to be met by: 3/7/2019     Patient will increase functional independence with ADLs by performing:    UE Dressing with Minimal Assistance.  LE Dressing with Moderate Assistance.  Grooming while seated with Minimal Assistance. --MET 2/18  Toileting from bedside commode with Moderate Assistance for hygiene and clothing management.   Sitting at edge of bed x10 minutes with Moderate Assistance.  Supine to sit with Maximum Assistance.  Increased functional UE strength to 3/5       Outcome: Ongoing (interventions implemented as appropriate)  Patient with maximum encouragement, but would not agree to EOB activity this date. Minimal insight into deficits and what patient must do to improve status and well-being. Patient will benefit from continued skilled OT to address deficits and improve performance in functional ADL tasks. Cont OT per POC.

## 2019-02-20 NOTE — PT/OT/SLP PROGRESS
Occupational Therapy   Treatment    Name: Kaylee Ngo  MRN: [id number]  Admitting Diagnosis:  Malignant carcinoid tumor of pancreas  26 Days Post-Op    Recommendations:     Discharge Recommendations: (SNF vs LTAC)  Discharge Equipment Recommendations:  (Defer to post-acute placement)  Barriers to discharge:  None    Assessment:   Patient with maximum encouragement, but would not agree to EOB activity this date. Minimal insight into deficits and what patient must do to improve status and well-being. Patient will benefit from continued skilled OT to address deficits and improve performance in functional ADL tasks. Cont OT per POC.    Kaylee Ngo is a 63 y.o. female with a medical diagnosis of Malignant carcinoid tumor of pancreas. Performance deficits affecting function are weakness, impaired endurance, impaired self care skills, impaired functional mobilty, gait instability, impaired balance, impaired cognition, decreased coordination, decreased upper extremity function, decreased lower extremity function, pain, decreased safety awareness, decreased ROM, impaired coordination.     Rehab Prognosis:  Fair; patient would benefit from acute skilled OT services to address these deficits and reach maximum level of function.       Plan:     Patient to be seen 5 x/week to address the above listed problems via self-care/home management, therapeutic activities, therapeutic exercises  · Plan of Care Expires: 03/07/19  · Plan of Care Reviewed with: patient, sibling    Subjective     Pain/Comfort:  · Pain Rating 1: (Reports increased back pain 2* earlier shots, did not rate)    Objective:     Communicated with: Petra kent prior to session.  Patient found HOB elevated with telemetry, NG tube, central line, oxygen upon OT entry to room.    General Precautions: Standard, aspiration, NPO, respiratory, fall   Orthopedic Precautions:N/A   Braces: N/A      [facility] 6 Click ADL: 10    Treatment & Education:  Patient finishing ST  "session. Encouragement provided to patient to attempt EOB sitting, but profusely declined. Patient with minimal insight into deficits and NPO status -- "You all tell me what I can't do. You all have the control!". Patient again educated on reason for NPO status and what negative effects could occur should she eat. Patient reporting increased back pain, declined any type of repositioning. Patient reporting she cannot lift her head off her pillow -- encouraged to complete neck exercises to pick head up. Patient unwilling to get call button (which was appropriately looped through SR and within reach) -- stated "I can't see it!" and refused to turn head to look at side rail. After max prompting, patient reached for call bell.     Patient left HOB elevated with all lines intact, call button in reach, bed alarm on and nurse notifiedEducation:      GOALS:   Multidisciplinary Problems     Occupational Therapy Goals        Problem: Occupational Therapy Goal    Goal Priority Disciplines Outcome Interventions   Occupational Therapy Goal     OT, PT/OT Ongoing (interventions implemented as appropriate)    Description:  Goals to be met by: 3/7/2019     Patient will increase functional independence with ADLs by performing:    UE Dressing with Minimal Assistance.  LE Dressing with Moderate Assistance.  Grooming while seated with Minimal Assistance. --MET 2/18  Toileting from bedside commode with Moderate Assistance for hygiene and clothing management.   Sitting at edge of bed x10 minutes with Moderate Assistance.  Supine to sit with Maximum Assistance.  Increased functional UE strength to 3/5                        Time Tracking:     OT Date of Treatment: 02/20/19  OT Start Time: 1520  OT Stop Time: 1535  OT Total Time (min): 15 min    Billable Minutes:Therapeutic Activity 15    CECY Martinez  2/20/2019    "

## 2019-02-20 NOTE — PT/OT/SLP PROGRESS
"Speech Language Pathology Treatment    Patient Name:  Kaylee Ngo   MRN:  [id number]  Admitting Diagnosis: Malignant carcinoid tumor of pancreas    Recommendations:                 General Recommendations:  Dysphagia therapy  Diet recommendations:  NPO, Liquid Diet Level: NPO   Aspiration Precautions: Continue alternate means of nutrition, Frequent oral care and Strict aspiration precautions   General Precautions: Standard, aspiration, NPO, respiratory, fall  Communication strategies:  none    Subjective     Pt seen at the bedside for ongoing dysphagia tx. ST checked w/ RNPetra, prior to tx. Pt resting in bed with sister present upon arrival.  Patient goals: Pt stated, "I can't do it, I'm in too much pain" during OMEs. Max A and encouragement provided by ST.      Pain/Comfort:  · Pain Rating 1: other (see comments)(Pt reported back pain 2/2 pain management shots this AM. Per RN, pt to be receiving pain medication wihtin 30 mins )    Objective:     Has the patient been evaluated by SLP for swallowing?   Yes  Keep patient NPO? Yes   Current Respiratory Status: nasal cannula      Pt seated to 70* for tx with nasal cannula in place. She refused elevating HOB any higher. Extensive education provided to pt and sister re: swallowing deficits, anatomy and physiology of the swallow, aspiration risks, need for frequent and aggressive oral care, and need for intensive OMEs to improve swallow. Pt's sister verbalized understanding, but limited learning from pt 2/2 minimal insight into deficits and agitation. Pt reported oral care completed this AM with RN. She completed pitch glide exercise (ah-ee) x15 with fair effort. Intermittent breaks in phonation noted. Voice continues to be wet/gurgly despite cough/clear technique. She performed a modified Shaker exercise for ~ 5secs, then stated she could not continue 2/2 pain/weakness. ST attempted lingual and laryngeal exercises; however, pt refused. During tx, poor secretion " management noted, resulting in weak cough attempts. Worksheet of exercises left w/ pt's sister to be completed 3x day as able. No further questions at this time.  Call light left within reach.     Assessment:     Kaylee Ngo is a 63 y.o. female with an SLP diagnosis of Dysphagia.  She continues to present with poor swallow function, poor airway protection, dry mouth, and general oral motor weakness. Motivation and deficit awareness continue to be barriers to effective tx. She is to remain strict NPO with aspiration precautions in place. Frequent and aggressive oral care needed to decrease risk of infection. ST to f/u for ongoing tx.      Goals:   Multidisciplinary Problems     SLP Goals        Problem: SLP Goal    Goal Priority Disciplines Outcome   SLP Goal     SLP Ongoing (interventions implemented as appropriate)   Description:  Short Term Goals:  1. Pt will participate in BSS to determine least restrictive diet.-discontinued 2/11  2. Pt will successfully participate in Modified Barium Swallow study to radiographically assess swallow function, rule out aspiration and determine safest/least restrictive diet.- MET 2/11  3. Pt will participate in trial sessions of indirect dysphagia exercises to strengthen musculature for swallowing mechanism with mod-max effort.                              Plan:     · Patient to be seen:  3 x/week   · Plan of Care expires:  03/08/19  · Plan of Care reviewed with:  patient, sibling   · SLP Follow-Up:  Yes       Discharge recommendations:  other (see comments)(LTACH pending PEG placement )   Barriers to Discharge:  NG tube    Time Tracking:     SLP Treatment Date:   02/20/19  Speech Start Time:  1452  Speech Stop Time:  1515     Speech Total Time (min):  23 min    Billable Minutes: Treatment Swallowing Dysfunction 10 and Seld Care/Home Management Training 13    Kay Faustin CCC-SLP  02/20/2019

## 2019-02-20 NOTE — PROGRESS NOTES
Upper abdominal wound cleaned and vac changed as directed.   Lower abdominal proximal wound cleaned and dressed as directed. Dr. CAROL Titus at bed side at end of wound care to look at lower abdominal wound, distal due to address eschar covering the wound.

## 2019-02-20 NOTE — PROGRESS NOTES
63 F with hx of extensive intrabdominal surgery 1/25/19 for carcinoid tumor. Hx of chronic pain. Seen by anesthesia 2/13/19 for L mid/low back (? Myofascial). Point tenderness with radiation of paraspinal muscles on exam. Trigger point injections performed at that time. Patient states that TP injection gave her significant relief for about a week and pain has since returned. Requests repeat injections today.     Vitals:    02/20/19 0757 02/20/19 1118 02/20/19 1150 02/20/19 1200   BP:   (!) 158/70    BP Location:       Patient Position:   Lying    Pulse: 102 95 97 100   Resp:  18 18    Temp:   36.8 °C (98.2 °F)    TempSrc:   Oral    SpO2:  100%     Weight:       Height:         Exam  A&O, cachectic  Point tenderness with radiation along paraspinal muscles, left throacic/lumbar/sacral region    Consent obtained  Area prepped w chloraprep  10 trigger point injections performed along thoracic lumbar sacral area  2 cc of exparel each point  Tolerated procedure well    Will follow today and tomorrow for effect  Rec lidocaine nebulizer treatment to help with NGT pain.  CRIS attending, primary team informed.

## 2019-02-20 NOTE — PLAN OF CARE
VN note: VN cued into pt's room for introduction. VN informed pt that VN would be working closely along side bedside nurse, PCT, and the rest of care team and making rounds throughout the shift. Pt verbalized understanding. Allowed time for questions. Patient denies any pain or discomfort at this time.   Patient educated on safety to call before getting up, because we don't want the patient to fall and harm themselves in any way. Patient also educated on turning from side to side throughout the shift to prevent pressure injuries. Patient requesting a bedpan.  VN spoke to the  due to the PCT's phone not working. Per the  she spoke to the PCT.  VN will continue to be available to patient and intervene prn.        02/20/19 1006   Type of Frequent Check   Type Patient Rounds  (VN rounding)   Safety/Activity   Patient Rounds visualized patient;ID band on;call light in patient/parent reach   Safety Promotion/Fall Prevention side rails raised x 2   Safety Precautions emergency equipment at bedside   Positioning   Body Position supine   Pain/Comfort/Sleep   Preferred Pain Scale number (Numeric Rating Pain Scale)   Comfort/Acceptable Pain Level 0   Pain Rating (0-10): Rest 0   Pain Rating (0-10): Activity 0       Cardiac/Telemetry Details / Alarms   Cardiac/Telemetry Monitor On Yes   Cardiac/Telemetry Audible Yes   Cardiac/Telemetry Alarms Set Yes   Assessments (Pre/Post)   Level of Consciousness (AVPU) alert

## 2019-02-20 NOTE — PROGRESS NOTES
TN met with pt's sister Elisabeth   - discussed LTAC [facility] post d/c at [facility]       Malignant carcinoid tumor of ileum ; Status post gastric bypass with malabsorption, status post G-tube placement   at the remnant stomach for supplementation with malabsorption and severe nausea   and diarrhea.    s/p reversal of Sandeep-en-Y w/ Jtube placement  POD#26    1/25/18    ex lap, liver rsxn,     pt receiving trigger point injections for back pain today   per MD:  Resp Cx growing E Coli she has received full course rocephin F/U with speech regarding swallowing eval. Improving but still not safe.  wound vac in place -- lower abd wound - distal   remains on TPN, for possible Gtube -- pending GI eval.      disp - to LTAC;  for possible G tube

## 2019-02-20 NOTE — PLAN OF CARE
This is the VN.  The floor [facility] requested I send Dr Titus a message.  The patient in room 508 is asking if she is having her NG tube removed today and a peg tube place.  Per the patient she was told this would happen today.  Message sent to Dr Titus by secure chat.  Awaiting answer.

## 2019-02-20 NOTE — PT/OT/SLP PROGRESS
Physical Therapy Treatment    Patient Name:  Kaylee Ngo   MRN:  [id number]    Recommendations:     Discharge Recommendations:  nursing facility, skilled   Discharge Equipment Recommendations: (TBD)   Barriers to discharge: Decreased caregiver support and decreased functional mobility    Assessment:     Kaylee Ngo is a 63 y.o. female admitted with a medical diagnosis of Malignant carcinoid tumor of pancreas.  She presents with the following impairments/functional limitations:  weakness, impaired functional mobilty, impaired cardiopulmonary response to activity, impaired endurance, gait instability, impaired balance, decreased lower extremity function, impaired self care skills. Pt refused all functional mobility, agreed to BLE exercise only.    Rehab Prognosis: Fair; patient would benefit from acute skilled PT services to address these deficits and reach maximum level of function.    Recent Surgery: Procedure(s) (LRB):  LAPAROTOMY, EXPLORATORY (N/A)  LYMPHADENECTOMY (N/A)  EXCISION, LIVER (N/A) 26 Days Post-Op    Plan:     During this hospitalization, patient to be seen 5 x/week to address the identified rehab impairments via gait training, therapeutic activities, therapeutic exercises, neuromuscular re-education and progress toward the following goals:    · Plan of Care Expires:  03/19/19    Subjective     Chief Complaint: too weak and tired  Patient/Family Comments/goals: can't get up today  Pain/Comfort:  · Pain Rating 1: 4/10  · Location 1: back  · Pain Addressed 1: Pre-medicate for activity, Reposition, Distraction  · Pain Rating Post-Intervention 1: 4/10      Objective:     Communicated with nurse prior to session.  Patient found all lines intact, call button in reach and bed alarm on NG tube, telemetry, central line, oxygen  upon PT entry to room.     General Precautions: Standard, fall, aspiration, respiratory, NPO   Orthopedic Precautions:N/A   Braces:       Functional Mobility:  · Bed Mobility:      · Rolling Left:  moderate assistance  · Rolling Right: moderate assistance      AM-PAC 6 CLICK MOBILITY  Turning over in bed (including adjusting bedclothes, sheets and blankets)?: 3  Sitting down on and standing up from a chair with arms (e.g., wheelchair, bedside commode, etc.): 3  Moving from lying on back to sitting on the side of the bed?: 3  Moving to and from a bed to a chair (including a wheelchair)?: 2  Need to walk in hospital room?: 2  Climbing 3-5 steps with a railing?: 1  Basic Mobility Total Score: 14       Therapeutic Activities and Exercises:   Pt performed BLE AAROM supine hip/knee flex/ext, hip abd/add, hip IR/ER with knee bent, AP, SAQ all 15 x 2    Patient left HOB elevated with all lines intact, call button in reach, bed alarm on and nurse notified..    GOALS:   Multidisciplinary Problems     Physical Therapy Goals        Problem: Physical Therapy Goal    Goal Priority Disciplines Outcome Goal Variances Interventions   Physical Therapy Goal     PT, PT/OT             Problem: Physical Therapy Goal    Goal Priority Disciplines Outcome Goal Variances Interventions   Physical Therapy Goal     PT, PT/OT             Problem: Physical Therapy Goal    Goal Priority Disciplines Outcome Goal Variances Interventions   Physical Therapy Goal     PT, PT/OT Ongoing (interventions implemented as appropriate)     Description:  Updated Goals 2/8/2019:  1. Supine<>sit with SBA  2. Sit to stand with  CGA  3. Bed to chair with CGA  4. Gait x 20 ft withmin A           Problem: Physical Therapy Goal    Goal Priority Disciplines Outcome Goal Variances Interventions   Physical Therapy Goal     PT, PT/OT             Problem: Physical Therapy Goal    Goal Priority Disciplines Outcome Goal Variances Interventions   Physical Therapy Goal     PT, PT/OT                      Time Tracking:     PT Received On: 02/20/19  PT Start Time: 1018     PT Stop Time: 1041  PT Total Time (min): 23 min     Billable Minutes: Therapeutic  Exercise 23    Treatment Type: Treatment  PT/PTA: PT     PTA Visit Number: 0     Odilia Hernández, PT  02/20/2019

## 2019-02-20 NOTE — PLAN OF CARE
Problem: Adult Inpatient Plan of Care  Goal: Plan of Care Review  Outcome: Ongoing (interventions implemented as appropriate)  Pt is aaox4. Patient safety maintained. Pain controlled with hydromorphine. TPN bag changed. Peptamen feeding bag changed.  N&V controled with zofran,  No diarrhea.  Pt complained difficulty of breathing subsided with positioning. Bp and blood sugar monitored.  Will continue to monitor.       Received call from lab for glucose of 600.  Lab recollected and sent to the lab.

## 2019-02-20 NOTE — PLAN OF CARE
Problem: Adult Inpatient Plan of Care  Goal: Plan of Care Review  Outcome: Ongoing (interventions implemented as appropriate)  Pt AAOx4, VSS, NAD noted, no complaints of n/v/d, complaints of 10/10 pain, telebox in place, monitoring blood sugar, TPN infusing per order, tube feed up to 30ml/hr with a goal of 50, bed alarm active, safety has been maintained, will continue to monitor.

## 2019-02-20 NOTE — PLAN OF CARE
Problem: SLP Goal  Goal: SLP Goal  Short Term Goals:  1. Pt will participate in BSS to determine least restrictive diet.-discontinued 2/11  2. Pt will successfully participate in Modified Barium Swallow study to radiographically assess swallow function, rule out aspiration and determine safest/least restrictive diet.- MET 2/11  3. Pt will participate in trial sessions of indirect dysphagia exercises to strengthen musculature for swallowing mechanism with mod-max effort.             Outcome: Ongoing (interventions implemented as appropriate)  2/20: OMEs completed to improve vocal chord adduction and laryngeal lift with limited-fair effort. Pt refused other exercises 2/2 NG tube pain. MD assessing pt's candidacy for PEG placement. Education provided to pt re: need for intensive ST tx to improve swallow function.

## 2019-02-20 NOTE — PLAN OF CARE
VN note: VN spoke with bedside nurse Petra. She reported patient c/o 10/10 back pain. Patient's dilaudid not due until 7:44 pm. VN notified Dr. Olivier. Orders placed. VN notified bedside nurse Petra.

## 2019-02-20 NOTE — PLAN OF CARE
Problem: Adult Inpatient Plan of Care  Goal: Plan of Care Review  Outcome: Ongoing (interventions implemented as appropriate)  Patient on oxygen with documented flow. Will attempt to wean per protocol.  Patient given aerosol treatment with no adverse reactions noted. Patient instructed on proper use.  Will continue to monitor.

## 2019-02-20 NOTE — PROGRESS NOTES
[facility] General Surgery  Neuroendocrine Tumor Service  Progress Note     Admission Date: 1/25/2019  Hospital Length of Stay: 26  Principal Problem: Malignant carcinoid tumor of pancreas     Subjective:      Operation:  62 yo F w/ PNET, s/p reversal of Sandeep-en-Y w/ Jtube placement  POD#26    1/25/18    Ex lap, gastro-gastrostomy EEA with 33 mm circular stapler, Pyloroplasty and gastrostomy closure, enteric resection with side to side to anstamosis and restoration of small bowel continuity using Idrive tan load stapler, Liver US, Liver biopsy, Liver resection segment 3, Supraceliac lymphadenectomy, FNA pancreas, Lt salpingo oopherectomy, Meckel diverticulectomy, Feeding jejunostomy 14 Fr malecot, ICG perfusion and Infrared exam of all anastamosis    Interval History:   Still c/o back pain but states its slightly better. No nausea or vomiting but passing flatus and having BM. Right arm PICC in place with no issues. Central line RIJ removed yesterday. Patient to get trigger point injections today.     Scheduled Meds:   albuterol-ipratropium  3 mL Nebulization Q4H    amitriptyline  50 mg Oral QHS    [START ON 2/21/2019] enoxparin  40 mg Subcutaneous Q24H    fentaNYL  1 patch Transdermal Q72H    fluconazole 40 mg/ml  200 mg Per NG tube Daily    hydrOXYzine  25 mg Per NG tube TID    insulin aspart U-100  1-10 Units Subcutaneous 6 times per day    insulin detemir U-100  25 Units Subcutaneous Daily    lamoTRIgine  150 mg Oral BID    lidocaine  1 patch Transdermal Q24H    metoprolol  5 mg Intravenous Q6H    mirtazapine  15 mg Per NG tube Nightly    pantoprazole  40 mg Intravenous Daily    pregabalin  50 mg Per NG tube BID     Continuous Infusions:   Amino acid 5% - dextrose 15% (CLINIMIX-E) solution with additives (1L  provides 510 kcal/L dextrose, with 50 gm AA, 150 gm CHO, Na 35, K 30, Mg 5, Ca 4.5, Acetate 80, Cl 39, Phos 15) 50 mL/hr at 02/19/19 1940     PRN Meds:.sodium chloride,  acetaminophen, acetaminophen, albuterol sulfate, artificial tears, dextrose 50%, diphenhydrAMINE, docusate, glucagon (human recombinant), hydrALAZINE, HYDROmorphone, HYDROmorphone, ipratropium, labetalol, lidocaine HCl 2%, lorazepam, naloxone, ondansetron, promethazine (PHENERGAN) IVPB, sodium chloride 0.9%, sodium chloride 3%, tiZANidine    Objective:      Temp:  [97.6 °F (36.4 °C)-98.7 °F (37.1 °C)] 98.7 °F (37.1 °C)  Pulse:  [] 102  Resp:  [16-23] 18  SpO2:  [93 %-97 %] 93 %  BP: (133-167)/(67-72) 133/70  Weight change: 1.5 kg (3 lb 4.9 oz)    Intake/Output Summary (Last 24 hours) at 2/20/2019 0909  Last data filed at 2/20/2019 0600  Gross per 24 hour   Intake 65659.34 ml   Output 950 ml   Net 40051.34 ml     Lines:  Right arm PICC  NGT  WV    Physical Exam:  GEN:NAD  CV: RRR  PULM: unlabored with 2L NC  ABD: S/NT/ ND, WV - good granulation tissue with some necrotic tissue at the base  EXT: Peripheral pulses present and equal bilaterally    No results for input(s): NA, K, CO2, CL, BUN, CREATININE, GLUCOSE, CALCIUM, PROT, AST, ALT, ALKPHOS, BILITOT in the last 24 hours.    Invalid input(s):  ALBUMIN  Recent Labs   Lab 02/20/19  0757   WBC 10.24   HGB 9.0*   HCT 29.7*   *       Significant Imaging: I have reviewed all pertinent imaging results/findings within the past 24 hours.     Assessment and Plan:   62 yo F w/ PNET, s/p reversal of Sandeep-en-Y w/ Jtube placement  POD#26    1/25/18  Ex lap, gastro-gastrostomy EEA with 33 mm circular stapler, Pyloroplasty and gastrostomy closure, enteric resection with side to side to anstamosis and restoration of small bowel continuity using Idrive tan load stapler, Liver US, Liver biopsy, Liver resection segment 3, Supraceliac lymphadenectomy, FNA pancreas, Lt salpingo oopherectomy, Meckel diverticulectomy, Feeding jejunostomy 14 Fr malecot, ICG perfusion and Infrared exam of all anastamosis    Neuro - No sedatives, aspiration risk  FEN/GI - Continue NPO, TPN on,  TFs 10 cc/hr, check residuals, replete lytes prn,  ID - cont fluconazole, last dose rocephin 2/13/19, fungitell 81, recheck fungitell, afebrile overnight, continuous microaspirations  Heme: H/H stable  Pulm - On NC. Aggressive chest CPT and acapella. ABGs. Resp Cx growing E Coli she has received full course rocephin F/U with speech regarding swallowing eval. Improving but still not safe.  Renal - Cr good, whitlock out, difficult to record accurate I/O/UOP but pt refuse wick   Surgical - WV in place with black sponge, change MWF  - will change today  PPX - continue lovenox, protonix, scd in place    Dispo - cont inpt care and abx, Pending LTAC placement due to NGT, wound vac, and TPN necessity. Will be getting trigger point injections today per anesthesia and possible Gtube pending GI eval.    Nash Titus MD   II  [phone]

## 2019-02-21 ENCOUNTER — ANESTHESIA EVENT (OUTPATIENT)
Dept: ENDOSCOPY | Facility: HOSPITAL | Age: 64
DRG: 826 | End: 2019-02-21
Payer: MEDICARE

## 2019-02-21 ENCOUNTER — ANESTHESIA (OUTPATIENT)
Dept: ENDOSCOPY | Facility: HOSPITAL | Age: 64
DRG: 826 | End: 2019-02-21
Payer: MEDICARE

## 2019-02-21 LAB
ALBUMIN SERPL BCP-MCNC: 2.3 G/DL
ALP SERPL-CCNC: 204 U/L
ALT SERPL W/O P-5'-P-CCNC: 34 U/L
ANION GAP SERPL CALC-SCNC: 6 MMOL/L
AST SERPL-CCNC: 25 U/L
BASOPHILS # BLD AUTO: 0.02 K/UL
BASOPHILS NFR BLD: 0.2 %
BILIRUB SERPL-MCNC: 0.4 MG/DL
BUN SERPL-MCNC: 16 MG/DL
CALCIUM SERPL-MCNC: 8.7 MG/DL
CHLORIDE SERPL-SCNC: 97 MMOL/L
CO2 SERPL-SCNC: 33 MMOL/L
CREAT SERPL-MCNC: 0.6 MG/DL
DIFFERENTIAL METHOD: ABNORMAL
EOSINOPHIL # BLD AUTO: 0.2 K/UL
EOSINOPHIL NFR BLD: 2.3 %
ERYTHROCYTE [DISTWIDTH] IN BLOOD BY AUTOMATED COUNT: 18.1 %
EST. GFR  (AFRICAN AMERICAN): >60 ML/MIN/1.73 M^2
EST. GFR  (NON AFRICAN AMERICAN): >60 ML/MIN/1.73 M^2
GLUCOSE SERPL-MCNC: 241 MG/DL
HCT VFR BLD AUTO: 27.6 %
HGB BLD-MCNC: 8.2 G/DL
LYMPHOCYTES # BLD AUTO: 1.2 K/UL
LYMPHOCYTES NFR BLD: 12.9 %
MAGNESIUM SERPL-MCNC: 1.8 MG/DL
MCH RBC QN AUTO: 27.8 PG
MCHC RBC AUTO-ENTMCNC: 29.7 G/DL
MCV RBC AUTO: 94 FL
MONOCYTES # BLD AUTO: 0.7 K/UL
MONOCYTES NFR BLD: 7.8 %
NEUTROPHILS # BLD AUTO: 6.9 K/UL
NEUTROPHILS NFR BLD: 73.5 %
PHOSPHATE SERPL-MCNC: 3.3 MG/DL
PLATELET # BLD AUTO: 504 K/UL
PMV BLD AUTO: 9.7 FL
POCT GLUCOSE: 239 MG/DL (ref 70–110)
POCT GLUCOSE: 262 MG/DL (ref 70–110)
POCT GLUCOSE: 265 MG/DL (ref 70–110)
POCT GLUCOSE: 428 MG/DL (ref 70–110)
POCT GLUCOSE: 474 MG/DL (ref 70–110)
POTASSIUM SERPL-SCNC: 4 MMOL/L
PROT SERPL-MCNC: 6.1 G/DL
RBC # BLD AUTO: 2.95 M/UL
SODIUM SERPL-SCNC: 136 MMOL/L
WBC # BLD AUTO: 9.38 K/UL

## 2019-02-21 PROCEDURE — 63600175 PHARM REV CODE 636 W HCPCS: Performed by: STUDENT IN AN ORGANIZED HEALTH CARE EDUCATION/TRAINING PROGRAM

## 2019-02-21 PROCEDURE — 25000003 PHARM REV CODE 250: Performed by: STUDENT IN AN ORGANIZED HEALTH CARE EDUCATION/TRAINING PROGRAM

## 2019-02-21 PROCEDURE — 94640 AIRWAY INHALATION TREATMENT: CPT

## 2019-02-21 PROCEDURE — 43246 EGD PLACE GASTROSTOMY TUBE: CPT | Performed by: INTERNAL MEDICINE

## 2019-02-21 PROCEDURE — 94761 N-INVAS EAR/PLS OXIMETRY MLT: CPT

## 2019-02-21 PROCEDURE — 92526 ORAL FUNCTION THERAPY: CPT

## 2019-02-21 PROCEDURE — 83735 ASSAY OF MAGNESIUM: CPT

## 2019-02-21 PROCEDURE — 84100 ASSAY OF PHOSPHORUS: CPT

## 2019-02-21 PROCEDURE — 63600175 PHARM REV CODE 636 W HCPCS: Performed by: SURGERY

## 2019-02-21 PROCEDURE — 11000001 HC ACUTE MED/SURG PRIVATE ROOM

## 2019-02-21 PROCEDURE — 80053 COMPREHEN METABOLIC PANEL: CPT

## 2019-02-21 PROCEDURE — 37000009 HC ANESTHESIA EA ADD 15 MINS: Performed by: INTERNAL MEDICINE

## 2019-02-21 PROCEDURE — 99900035 HC TECH TIME PER 15 MIN (STAT)

## 2019-02-21 PROCEDURE — 85025 COMPLETE CBC W/AUTO DIFF WBC: CPT

## 2019-02-21 PROCEDURE — 97110 THERAPEUTIC EXERCISES: CPT | Performed by: PHYSICAL THERAPIST

## 2019-02-21 PROCEDURE — 94664 DEMO&/EVAL PT USE INHALER: CPT

## 2019-02-21 PROCEDURE — C9113 INJ PANTOPRAZOLE SODIUM, VIA: HCPCS | Performed by: STUDENT IN AN ORGANIZED HEALTH CARE EDUCATION/TRAINING PROGRAM

## 2019-02-21 PROCEDURE — 25000003 PHARM REV CODE 250: Performed by: NURSE ANESTHETIST, CERTIFIED REGISTERED

## 2019-02-21 PROCEDURE — 63600175 PHARM REV CODE 636 W HCPCS: Performed by: NURSE ANESTHETIST, CERTIFIED REGISTERED

## 2019-02-21 PROCEDURE — 25000003 PHARM REV CODE 250: Performed by: NURSE PRACTITIONER

## 2019-02-21 PROCEDURE — 25000242 PHARM REV CODE 250 ALT 637 W/ HCPCS: Performed by: SURGERY

## 2019-02-21 PROCEDURE — 25000003 PHARM REV CODE 250: Performed by: SURGERY

## 2019-02-21 PROCEDURE — 37000008 HC ANESTHESIA 1ST 15 MINUTES: Performed by: INTERNAL MEDICINE

## 2019-02-21 PROCEDURE — 27000221 HC OXYGEN, UP TO 24 HOURS

## 2019-02-21 PROCEDURE — 27201018 HC KIT, PEG (ANY): Performed by: INTERNAL MEDICINE

## 2019-02-21 RX ORDER — METOPROLOL SUCCINATE 50 MG/1
100 TABLET, EXTENDED RELEASE ORAL 2 TIMES DAILY
Status: DISCONTINUED | OUTPATIENT
Start: 2019-02-21 | End: 2019-02-26 | Stop reason: HOSPADM

## 2019-02-21 RX ORDER — LIDOCAINE HCL/PF 100 MG/5ML
SYRINGE (ML) INTRAVENOUS
Status: DISCONTINUED | OUTPATIENT
Start: 2019-02-21 | End: 2019-02-21

## 2019-02-21 RX ORDER — SODIUM CHLORIDE 9 MG/ML
INJECTION, SOLUTION INTRAVENOUS CONTINUOUS PRN
Status: DISCONTINUED | OUTPATIENT
Start: 2019-02-21 | End: 2019-02-21

## 2019-02-21 RX ORDER — PROPOFOL 10 MG/ML
VIAL (ML) INTRAVENOUS
Status: DISCONTINUED | OUTPATIENT
Start: 2019-02-21 | End: 2019-02-21

## 2019-02-21 RX ORDER — MAGNESIUM SULFATE HEPTAHYDRATE 40 MG/ML
2 INJECTION, SOLUTION INTRAVENOUS ONCE
Status: COMPLETED | OUTPATIENT
Start: 2019-02-21 | End: 2019-02-21

## 2019-02-21 RX ORDER — CEFAZOLIN SODIUM 1 G/3ML
INJECTION, POWDER, FOR SOLUTION INTRAMUSCULAR; INTRAVENOUS
Status: DISCONTINUED | OUTPATIENT
Start: 2019-02-21 | End: 2019-02-21

## 2019-02-21 RX ORDER — PROPOFOL 10 MG/ML
VIAL (ML) INTRAVENOUS CONTINUOUS PRN
Status: DISCONTINUED | OUTPATIENT
Start: 2019-02-21 | End: 2019-02-21

## 2019-02-21 RX ORDER — METOPROLOL SUCCINATE 50 MG/1
100 TABLET, EXTENDED RELEASE ORAL 2 TIMES DAILY
Status: DISCONTINUED | OUTPATIENT
Start: 2019-02-21 | End: 2019-02-21

## 2019-02-21 RX ADMIN — IPRATROPIUM BROMIDE AND ALBUTEROL SULFATE 3 ML: .5; 3 SOLUTION RESPIRATORY (INHALATION) at 03:02

## 2019-02-21 RX ADMIN — IBUPROFEN 600 MG: 100 SUSPENSION ORAL at 10:02

## 2019-02-21 RX ADMIN — MAGNESIUM SULFATE IN WATER 2 G: 40 INJECTION, SOLUTION INTRAVENOUS at 10:02

## 2019-02-21 RX ADMIN — METOPROLOL SUCCINATE 100 MG: 50 TABLET, EXTENDED RELEASE ORAL at 09:02

## 2019-02-21 RX ADMIN — PROPOFOL 50 MG: 10 INJECTION, EMULSION INTRAVENOUS at 01:02

## 2019-02-21 RX ADMIN — HYDROMORPHONE HYDROCHLORIDE 0.2 MG: 1 INJECTION, SOLUTION INTRAMUSCULAR; INTRAVENOUS; SUBCUTANEOUS at 08:02

## 2019-02-21 RX ADMIN — INSULIN ASPART 6 UNITS: 100 INJECTION, SOLUTION INTRAVENOUS; SUBCUTANEOUS at 09:02

## 2019-02-21 RX ADMIN — LAMOTRIGINE 150 MG: 100 TABLET ORAL at 09:02

## 2019-02-21 RX ADMIN — PROPOFOL 150 MCG/KG/MIN: 10 INJECTION, EMULSION INTRAVENOUS at 01:02

## 2019-02-21 RX ADMIN — METOPROLOL TARTRATE 5 MG: 1 INJECTION, SOLUTION INTRAVENOUS at 02:02

## 2019-02-21 RX ADMIN — LAMOTRIGINE 150 MG: 100 TABLET ORAL at 11:02

## 2019-02-21 RX ADMIN — SODIUM PHOSPHATE, MONOBASIC, MONOHYDRATE 30 MMOL: 276; 142 INJECTION, SOLUTION INTRAVENOUS at 02:02

## 2019-02-21 RX ADMIN — FLUCONAZOLE 200 MG: 40 POWDER, FOR SUSPENSION ORAL at 11:02

## 2019-02-21 RX ADMIN — INSULIN ASPART 10 UNITS: 100 INJECTION, SOLUTION INTRAVENOUS; SUBCUTANEOUS at 12:02

## 2019-02-21 RX ADMIN — HYDROXYZINE HYDROCHLORIDE 25 MG: 10 SOLUTION ORAL at 11:02

## 2019-02-21 RX ADMIN — IPRATROPIUM BROMIDE AND ALBUTEROL SULFATE 3 ML: .5; 3 SOLUTION RESPIRATORY (INHALATION) at 07:02

## 2019-02-21 RX ADMIN — INSULIN ASPART 6 UNITS: 100 INJECTION, SOLUTION INTRAVENOUS; SUBCUTANEOUS at 04:02

## 2019-02-21 RX ADMIN — METOPROLOL TARTRATE 5 MG: 1 INJECTION, SOLUTION INTRAVENOUS at 10:02

## 2019-02-21 RX ADMIN — ACETAMINOPHEN 650 MG: 650 SOLUTION ORAL at 07:02

## 2019-02-21 RX ADMIN — INSULIN ASPART 10 UNITS: 100 INJECTION, SOLUTION INTRAVENOUS; SUBCUTANEOUS at 10:02

## 2019-02-21 RX ADMIN — LIDOCAINE 1 PATCH: 50 PATCH TOPICAL at 03:02

## 2019-02-21 RX ADMIN — PANTOPRAZOLE SODIUM 40 MG: 40 INJECTION, POWDER, FOR SOLUTION INTRAVENOUS at 09:02

## 2019-02-21 RX ADMIN — PREGABALIN 50 MG: 50 CAPSULE ORAL at 09:02

## 2019-02-21 RX ADMIN — ASCORBIC ACID, VITAMIN A PALMITATE, CHOLECALCIFEROL, THIAMINE HYDROCHLORIDE, RIBOFLAVIN-5 PHOSPHATE SODIUM, PYRIDOXINE HYDROCHLORIDE, NIACINAMIDE, DEXPANTHENOL, ALPHA-TOCOPHEROL ACETATE, VITAMIN K1, FOLIC ACID, BIOTIN, CYANOCOBALAMIN: 200; 3300; 200; 6; 3.6; 6; 40; 15; 10; 150; 600; 60; 5 INJECTION, SOLUTION INTRAVENOUS at 07:02

## 2019-02-21 RX ADMIN — LIDOCAINE HYDROCHLORIDE 60 MG: 20 INJECTION, SOLUTION INTRAVENOUS at 01:02

## 2019-02-21 RX ADMIN — IPRATROPIUM BROMIDE AND ALBUTEROL SULFATE 3 ML: .5; 3 SOLUTION RESPIRATORY (INHALATION) at 11:02

## 2019-02-21 RX ADMIN — ONDANSETRON 4 MG: 2 INJECTION INTRAMUSCULAR; INTRAVENOUS at 10:02

## 2019-02-21 RX ADMIN — HYDROXYZINE HYDROCHLORIDE 25 MG: 10 SOLUTION ORAL at 05:02

## 2019-02-21 RX ADMIN — INSULIN ASPART 1 UNITS: 100 INJECTION, SOLUTION INTRAVENOUS; SUBCUTANEOUS at 12:02

## 2019-02-21 RX ADMIN — INSULIN ASPART 4 UNITS: 100 INJECTION, SOLUTION INTRAVENOUS; SUBCUTANEOUS at 04:02

## 2019-02-21 RX ADMIN — AMITRIPTYLINE HYDROCHLORIDE 50 MG: 25 TABLET, FILM COATED ORAL at 09:02

## 2019-02-21 RX ADMIN — CEFAZOLIN 1 G: 330 INJECTION, POWDER, FOR SOLUTION INTRAMUSCULAR; INTRAVENOUS at 01:02

## 2019-02-21 RX ADMIN — INSULIN DETEMIR 25 UNITS: 100 INJECTION, SOLUTION SUBCUTANEOUS at 10:02

## 2019-02-21 RX ADMIN — SODIUM CHLORIDE: 0.9 INJECTION, SOLUTION INTRAVENOUS at 01:02

## 2019-02-21 RX ADMIN — TIZANIDINE 4 MG: 4 TABLET ORAL at 09:02

## 2019-02-21 RX ADMIN — HYDROXYZINE HYDROCHLORIDE 25 MG: 10 SOLUTION ORAL at 09:02

## 2019-02-21 RX ADMIN — PREGABALIN 50 MG: 50 CAPSULE ORAL at 11:02

## 2019-02-21 RX ADMIN — MIRTAZAPINE 15 MG: 15 TABLET, ORALLY DISINTEGRATING ORAL at 09:02

## 2019-02-21 NOTE — PROGRESS NOTES
[facility]  Adult Nutrition  Progress Note    SUMMARY       Recommendations    Recommendation:   1. Once PEG available for use, rec starting Diabetasource AC @ 10 mL/hr to increase as ASHANTI by 10 mL every 8 hrs to goal rate of  60 mL/hr to provide 1728 calories, 86 gm Pro, 1177 mL water.   2. If TF not to start with in 48 hrs, rec to INC TPN to 75 mL/hr to better meet pt's estimated calorie and protein needs.      Goals: Meet > 85% EEN daily  Nutrition Goal Status: progressing towards goal  Communication of RD Recs: reviewed with RN    Nutrition Discharge Planning: LTAC with TF     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 Updates not warranted at this time. NGT to LIS in place. Per MD, PEG placement today. TPN infusing at 50 mL/hr. Pt remains NPO per SLP rec.     Nutrition Risk Screen    Nutrition Risk Screen: tube feeding or parenteral nutrition, dysphagia or difficulty swallowing    Nutrition/Diet History    Patient Reported Diet/Restrictions/Preferences: diabetic diet  Typical Food/Fluid Intake: TF at home  Food Preferences: n/a  Spiritual, Cultural Beliefs, Rastafarian Practices, Values that Affect Care: no  Supplemental Drinks or Food Habits: Peptaman 1.5  Factors Affecting Nutritional  "Intake: altered gastrointestinal function, NPO  Nutrition Support Formula Prior to Admit: Impact Peptide 1.5  Nutrition Support Rate Prior to Admit: 240 (ml)  Nutrtion Support Frequency Prior to Admit: pump  Nutrition Support Provision Prior to Admit: 3 cans a day    Anthropometrics    Temp: 99.5 °F (37.5 °C)  Height Method: Stated  Height: 5' 5" (165.1 cm)  Height (inches): 65 in  Weight Method: Bed Scale  Weight: 55.4 kg (122 lb 2.2 oz)  Weight (lb): 122.14 lb  Ideal Body Weight (IBW), Female: 125 lb  % Ideal Body Weight, Female (lb): 97.71 lb  BMI (Calculated): 20.4  BMI Grade: 18.5-24.9 - normal  Weight Loss: unintentional  Usual Body Weight (UBW), k.9 kg(Admit Weight 19)  Weight Change Amount: 0 lb  % Usual Body Weight: 81.76  % Weight Change From Usual Weight: -18.41 %  Weight Loss Since Admission: 27 lb 8.9 oz  % Weight Change Since Admission: 22.56       Lab/Procedures/Meds    Pertinent Labs Reviewed: reviewed  Recent Labs   Lab 19      K 4.0   CL 97   CO2 33*   BUN 16   CREATININE 0.6   MG 1.8     Recent Labs   Lab 19   WBC 9.38   RBC 2.95*   HGB 8.2*   HCT 27.6*   *   MCV 94   MCH 27.8   MCHC 29.7*     Recent Labs   Lab 19   ALT 34   AST 25   ALKPHOS 204*   BILITOT 0.4   PROT 6.1   ALBUMIN 2.3*       Pertinent Medications Reviewed: reviewed  Scheduled Meds:   albuterol-ipratropium  3 mL Nebulization Q4H    amitriptyline  50 mg Oral QHS    enoxparin  40 mg Subcutaneous Q24H    fentaNYL  1 patch Transdermal Q72H    fluconazole 40 mg/ml  200 mg Per NG tube Daily    hydrOXYzine  25 mg Per NG tube TID    insulin aspart U-100  1-10 Units Subcutaneous 6 times per day    insulin detemir U-100  25 Units Subcutaneous Daily    lamoTRIgine  150 mg Oral BID    lidocaine  1 patch Transdermal Q24H    metoprolol  5 mg Intravenous Q6H    mirtazapine  15 mg Per NG tube Nightly    pantoprazole  40 mg Intravenous Daily    pregabalin  50 mg Per NG tube " BID    sodium phosphate IVPB  30 mmol Intravenous Once     Continuous Infusions:   Amino acid 5% - dextrose 15% (CLINIMIX-E) solution with additives (1L  provides 510 kcal/L dextrose, with 50 gm AA, 150 gm CHO, Na 35, K 30, Mg 5, Ca 4.5, Acetate 80, Cl 39, Phos 15) 50 mL/hr at 02/20/19 1836    Amino acid 5% - dextrose 15% (CLINIMIX-E) solution with additives (1L  provides 510 kcal/L dextrose, with 50 gm AA, 150 gm CHO, Na 35, K 30, Mg 5, Ca 4.5, Acetate 80, Cl 39, Phos 15)       PRN Meds:.sodium chloride, acetaminophen, acetaminophen, albuterol sulfate, artificial tears, dextrose 50%, diphenhydrAMINE, docusate, glucagon (human recombinant), hydrALAZINE, HYDROmorphone, HYDROmorphone, ibuprofen, ipratropium, labetalol, lidocaine HCl 2%, lorazepam, naloxone, ondansetron, promethazine (PHENERGAN) IVPB, sodium chloride 0.9%, sodium chloride 3%, tiZANidine      Estimated/Assessed Needs    Weight Used For Calorie Calculations: 55.9 kg (123 lb 3.8 oz)  Energy Calorie Requirements (kcal): 2331-9495 kcal  Energy Need Method: Kcal/kg     Protein Requirements: 75-95(1.2-1.5 gm Pro/kg)  Weight Used For Protein Calculations: 62.9 kg (138 lb 10.7 oz)     Estimated Fluid Requirement Method: RDA Method  RDA Method (mL): 1677  CHO Requirement: 200g      Nutrition Prescription Ordered    Current Diet Order: NPO  Current Nutrition Support Formula Ordered: Clinimix E 5/15(20% Lipids 250 mL MWF)  Current Nutrition Support Rate Ordered: 50 (ml)  Current Nutrition Support Frequency Ordered: mL/hr  Oral Nutrition Supplement: Peptamen prebio @ 10 ml/hr    Evaluation of Received Nutrient/Fluid Intake    Parenteral Calories (kcal): 852  Parenteral Protein (gm): 60  Parenteral Fluid (mL): 1200  Lipid Calories (kcals): 212 kcals  GIR (Glucose Infusion Rate) (mg/kg/min): 2.3 mg/kg/min  Total Calories (kcal): 1184    I/O: reviewed  Energy Calories Required: not meeting needs  Protein Required: meeting needs  Fluid Required: (per  MD)    Comments: JHONY 2/20/19  Tolerance: tolerating    % Intake of Estimated Energy Needs: 50 - 75 %  % Meal Intake: NPO    Nutrition Risk    Level of Risk/Frequency of Follow-up: (2 x week)     Assessment and Plan    Malnutrition in the context of Chronic Illness/Injury     Related to (etiology):  Altered GI Function; Cancer     Signs and Symptoms (as evidenced by):     Energy Intake: <75% intake > 1 week  Body Fat Depletion: mild depletion of orbitals, triceps and thoracic and lumbar region   Muscle Mass Depletion: moderate depletion of temples, clavicle region, interosseous muscle and lower extremities      Interventions  Collaboration with providers     Nutrition Diagnosis Status  Continues         Monitor and Evaluation    Food and Nutrient Intake: energy intake, enteral nutrition intake, parenteral nutrition intake  Food and Nutrient Adminstration: enteral and parenteral nutrition administration, diet order  Physical Activity and Function: nutrition-related ADLs and IADLs  Anthropometric Measurements: weight, weight change  Biochemical Data, Medical Tests and Procedures: electrolyte and renal panel, glucose/endocrine profile  Nutrition-Focused Physical Findings: overall appearance     Malnutrition Assessment  Malnutrition Type: acute illness or injury  Energy Intake: moderate energy intake  Skin (Micronutrient): dry, thinned, turgor reduced  Hair/Scalp (Micronutrient): brittle  Eyes (Micronutrient): none  Extraoral (Micronutrient): none  Neck/Chest (Micronutrient): bony prominence, subcutaneous fat loss       Energy Intake (Malnutrition): less than 75% for greater than 7 days  Subcutaneous Fat (Malnutrition): mild depletion  Muscle Mass (Malnutrition): moderate depletion  Fluid Accumulation (Malnutrition): mild   Orbital Region (Subcutaneous Fat Loss): mild depletion  Upper Arm Region (Subcutaneous Fat Loss): mild depletion  Thoracic and Lumbar Region: mild depletion   [place] Region (Muscle Loss): moderate  depletion  Clavicle Bone Region (Muscle Loss): moderate depletion  Clavicle and Acromion Bone Region (Muscle Loss): mild depletion  Dorsal Hand (Muscle Loss): mild depletion  Patellar Region (Muscle Loss): moderate depletion  Anterior Thigh Region (Muscle Loss): moderate depletion  Posterior Calf Region (Muscle Loss): moderate depletion       Subcutaneous Fat Loss (Final Summary): mild protein-calorie malnutrition  Muscle Loss Evaluation (Final Summary): moderate protein-calorie malnutrition  Fluid Accumulation Evaluation: mild         Nutrition Follow-Up    RD Follow-up?: Yes

## 2019-02-21 NOTE — PLAN OF CARE
Report received from CHANELLE Lugo . NPO status confirmed. Tube feeds held since midnight.  Patent IV access.

## 2019-02-21 NOTE — DISCHARGE INSTRUCTIONS
Laparoscopy, Pelvic (English) View Edit Remove  PICC, Peripherally Inserted Central Catheter (English) View Edit Remove  PICC Line Care (English) View Edit Remove  Flushing Your PICC Line at Home (English) View Edit Remove  Caring for Your Peripherally Inserted Central Catheter (PICC), Discharge Instructions for (English) View Edit Remove

## 2019-02-21 NOTE — PT/OT/SLP PROGRESS
Physical Therapy Treatment    Patient Name:  Kaylee Ngo   MRN:  [id number]    Recommendations:     Discharge Recommendations:  (SNF vs LTAC)   Discharge Equipment Recommendations: (TBD by progress)   Barriers to discharge: Decreased caregiver support and decreased functional mobility    Assessment:     Kaylee Ngo is a 63 y.o. female admitted with a medical diagnosis of Malignant carcinoid tumor of pancreas.  She presents with the following impairments/functional limitations:  weakness, impaired functional mobilty, impaired cardiopulmonary response to activity, impaired endurance, gait instability, impaired balance, decreased lower extremity function, impaired self care skills. Pt refused functional mobility 2/2 pending procedure/nausea. Pt agreed to LE exercise only.    Rehab Prognosis: Fair; patient would benefit from acute skilled PT services to address these deficits and reach maximum level of function.    Recent Surgery: Procedure(s) (LRB):  LAPAROTOMY, EXPLORATORY (N/A)  LYMPHADENECTOMY (N/A)  EXCISION, LIVER (N/A) 27 Days Post-Op    Plan:     During this hospitalization, patient to be seen 5 x/week to address the identified rehab impairments via gait training, therapeutic activities, therapeutic exercises, neuromuscular re-education and progress toward the following goals:    · Plan of Care Expires:  03/19/19    Subjective     Chief Complaint: nausea, nurse aware  Patient/Family Comments/goals: feel better  Pain/Comfort:  · Pain Rating 1: (Pt c/o nausea only)      Objective:     Communicated with nurse prior to session.  Patient found all lines intact, call button in reach and bed alarm on NG tube, telemetry, central line, oxygen  upon PT entry to room.     General Precautions: Standard, fall, aspiration, respiratory, NPO   Orthopedic Precautions:N/A   Braces:       Functional Mobility:   · Pt refused      AM-PAC 6 CLICK MOBILITY  Turning over in bed (including adjusting bedclothes, sheets and  blankets)?: 3  Sitting down on and standing up from a chair with arms (e.g., wheelchair, bedside commode, etc.): 3  Moving from lying on back to sitting on the side of the bed?: 3  Moving to and from a bed to a chair (including a wheelchair)?: 2  Need to walk in hospital room?: 2  Climbing 3-5 steps with a railing?: 1  Basic Mobility Total Score: 14       Therapeutic Activities and Exercises:   Pt performed supine hip/knee flex/ext, hip abd/add, SAQ and AP 15 x 2 and SLR with assist x 10.    Patient left HOB elevated with all lines intact, call button in reach and bed alarm on..    GOALS:   Multidisciplinary Problems     Physical Therapy Goals        Problem: Physical Therapy Goal    Goal Priority Disciplines Outcome Goal Variances Interventions   Physical Therapy Goal     PT, PT/OT             Problem: Physical Therapy Goal    Goal Priority Disciplines Outcome Goal Variances Interventions   Physical Therapy Goal     PT, PT/OT      Description:  Goal: Physical Therapy Goal  Updated Goals 2/8/2019:  1. Supine<>sit with SBA  2. Sit to stand with  CGA  3. Bed to chair with CGA  4. Gait x 20 ft withmin A                     Time Tracking:     PT Received On: 02/21/19  PT Start Time: 0917     PT Stop Time: 0945  PT Total Time (min): 28 min     Billable Minutes: Therapeutic Exercise 28    Treatment Type: Treatment  PT/PTA: PT     PTA Visit Number: 0     Odilia Hernández, PT  02/21/2019

## 2019-02-21 NOTE — PLAN OF CARE
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 Dr. Reddy visited at bedside prior to transfer, discussed findings and recommendations with patient and family member; all questions asked and answered. Verbalized understanding of information given. Copy of procedure report given to sister.

## 2019-02-21 NOTE — PLAN OF CARE
Problem: SLP Goal  Goal: SLP Goal  Short Term Goals:  1. Pt will participate in BSS to determine least restrictive diet.-discontinued 2/11  2. Pt will successfully participate in Modified Barium Swallow study to radiographically assess swallow function, rule out aspiration and determine safest/least restrictive diet.- MET 2/11  3. Pt will participate in trial sessions of indirect dysphagia exercises to strengthen musculature for swallowing mechanism with mod-max effort.             Outcome: Ongoing (interventions implemented as appropriate)  2/21: ongoing indirect dysphagia tx completed. Pt with fair-good effort with OMEs, with improved tolerance of exercises. Minimal oral hygiene performed 2/2 pt gagging/requesting termination of activity. ST to f/u.

## 2019-02-21 NOTE — TRANSFER OF CARE
"Anesthesia Transfer of Care Note    Patient: Kaylee Ngo    Procedure(s) Performed: Procedure(s) (LRB):  EGD, WITH PEG TUBE INSERTION (N/A)    Patient location: GI    Anesthesia Type: MAC    Transport from OR: Transported from OR on 2-3 L/min O2 by NC with adequate spontaneous ventilation    Post pain: adequate analgesia    Post assessment: no apparent anesthetic complications and tolerated procedure well    Post vital signs: stable    Level of consciousness: awake, alert and oriented    Nausea/Vomiting: no nausea/vomiting    Complications: none    Transfer of care protocol was followed      Last vitals:   Visit Vitals  /63   Pulse 99   Temp 36.6 °C (97.9 °F)   Resp 16   Ht 5' 5" (1.651 m)   Wt 55.4 kg (122 lb 2.2 oz)   SpO2 100%   Breastfeeding? No   BMI 20.32 kg/m²     "

## 2019-02-21 NOTE — PLAN OF CARE
Recovery complete. Patient recovered to baseline. Discharge instructions reviewed with patient. VSS. Report called to CHANELLE Lugo.

## 2019-02-21 NOTE — PROGRESS NOTES
[facility] General Surgery  Neuroendocrine Tumor Service  Progress Note     Admission Date: 1/25/2019  Hospital Length of Stay: 27  Principal Problem: Malignant carcinoid tumor of pancreas     Subjective:      Operation:  64 yo F w/ PNET, s/p reversal of Sandeep-en-Y w/ Jtube placement  POD#27    1/25/18    Ex lap, gastro-gastrostomy EEA with 33 mm circular stapler, Pyloroplasty and gastrostomy closure, enteric resection with side to side to anstamosis and restoration of small bowel continuity using Idrive tan load stapler, Liver US, Liver biopsy, Liver resection segment 3, Supraceliac lymphadenectomy, FNA pancreas, Lt salpingo oopherectomy, Meckel diverticulectomy, Feeding jejunostomy 14 Fr malecot, ICG perfusion and Infrared exam of all anastamosis    Interval History:   Still c/o back pain but states its slightly better. No nausea or vomiting but passing flatus and having BM. Right arm PICC in place with no issues. Patient received back injections yesterday and pain is better controlled today    Scheduled Meds:   albuterol-ipratropium  3 mL Nebulization Q4H    amitriptyline  50 mg Oral QHS    enoxparin  40 mg Subcutaneous Q24H    fentaNYL  1 patch Transdermal Q72H    fluconazole 40 mg/ml  200 mg Per NG tube Daily    hydrOXYzine  25 mg Per NG tube TID    insulin aspart U-100  1-10 Units Subcutaneous 6 times per day    insulin detemir U-100  25 Units Subcutaneous Daily    lamoTRIgine  150 mg Oral BID    lidocaine  1 patch Transdermal Q24H    metoprolol  5 mg Intravenous Q6H    mirtazapine  15 mg Per NG tube Nightly    pantoprazole  40 mg Intravenous Daily    pregabalin  50 mg Per NG tube BID    sodium phosphate IVPB  30 mmol Intravenous Once     Continuous Infusions:   Amino acid 5% - dextrose 15% (CLINIMIX-E) solution with additives (1L  provides 510 kcal/L dextrose, with 50 gm AA, 150 gm CHO, Na 35, K 30, Mg 5, Ca 4.5, Acetate 80, Cl 39, Phos 15) 50 mL/hr at 02/20/19 9408     Amino acid 5% - dextrose 15% (CLINIMIX-E) solution with additives (1L  provides 510 kcal/L dextrose, with 50 gm AA, 150 gm CHO, Na 35, K 30, Mg 5, Ca 4.5, Acetate 80, Cl 39, Phos 15)       PRN Meds:.sodium chloride, acetaminophen, acetaminophen, albuterol sulfate, artificial tears, dextrose 50%, diphenhydrAMINE, docusate, glucagon (human recombinant), hydrALAZINE, HYDROmorphone, HYDROmorphone, ibuprofen, ipratropium, labetalol, lidocaine HCl 2%, lorazepam, naloxone, ondansetron, promethazine (PHENERGAN) IVPB, sodium chloride 0.9%, sodium chloride 3%, tiZANidine    Objective:      Temp:  [97.4 °F (36.3 °C)-98.3 °F (36.8 °C)] 97.7 °F (36.5 °C)  Pulse:  [] 96  Resp:  [12-20] 16  SpO2:  [96 %-100 %] 99 %  BP: (112-173)/(58-87) 139/86  Weight change: 1.2 kg (2 lb 10.3 oz)    Intake/Output Summary (Last 24 hours) at 2/21/2019 1502  Last data filed at 2/21/2019 1338  Gross per 24 hour   Intake 1661.2 ml   Output 1811 ml   Net -149.8 ml     Lines:  Right arm PICC  NGT  WV    Physical Exam:  GEN:NAD  CV: RRR  PULM: unlabored with 2L NC  ABD: S/NT/ ND, WV - good granulation tissue with some necrotic tissue at the base  EXT: Peripheral pulses present and equal bilaterally    Recent Labs   Lab 02/21/19  0427      K 4.0   CO2 33*   CL 97   BUN 16   CREATININE 0.6   CALCIUM 8.7   PROT 6.1   AST 25   ALT 34   ALKPHOS 204*   BILITOT 0.4     Recent Labs   Lab 02/21/19  0427   WBC 9.38   HGB 8.2*   HCT 27.6*   *       Significant Imaging: I have reviewed all pertinent imaging results/findings within the past 24 hours.     Assessment and Plan:   64 yo F w/ PNET, s/p reversal of Sandeep-en-Y w/ Jtube placement  POD#27    1/25/18  Ex lap, gastro-gastrostomy EEA with 33 mm circular stapler, Pyloroplasty and gastrostomy closure, enteric resection with side to side to anstamosis and restoration of small bowel continuity using Idrive tan load stapler, Liver US, Liver biopsy, Liver resection segment 3, Supraceliac  lymphadenectomy, FNA pancreas, Lt salpingo oopherectomy, Meckel diverticulectomy, Feeding jejunostomy 14 Fr malecot, ICG perfusion and Infrared exam of all anastamosis    Continue supportive care  To OR for PEG with GI  Continue aggressive respiratory and CPT    Neuro - No sedatives, aspiration risk  FEN/GI - Continue NPO, TPN on, TFs 10 cc/hr, check residuals, replete lytes prn,  ID - cont fluconazole, last dose rocephin 2/13/19, fungitell 81, recheck fungitell, afebrile overnight, continuous microaspirations  Heme: H/H stable  Pulm - On NC. Aggressive chest CPT and acapella. ABGs. Resp Cx growing E Coli she has received full course rocephin F/U with speech regarding swallowing eval. Improving but still not safe.  Renal - Cr good, whitlock out, difficult to record accurate I/O/UOP but pt refuse wick   Surgical - WV in place with black sponge, change MWF  - will change today  PPX - continue lovenox, protonix, scd in place    Dispo - cont inpt care and abx, Pending LTAC placement due to NGT, wound vac, and TPN necessity. Will be receiving PEG today. Hopefully discharge home soon.    Nash Titus MD   II  [phone]

## 2019-02-21 NOTE — PT/OT/SLP PROGRESS
"Speech Language Pathology Treatment    Patient Name:  Kaylee Ngo   MRN:  [id number]  Admitting Diagnosis: Malignant carcinoid tumor of pancreas    Recommendations:                 General Recommendations:  Dysphagia therapy  Diet recommendations:  NPO, Liquid Diet Level: NPO   Aspiration Precautions: Continue alternate means of nutrition, Frequent oral care and Strict aspiration precautions   General Precautions: Standard, aspiration, NPO, fall, respiratory  Communication strategies:  none    Subjective     Pt seen at the bedside for ongoing indirect dysphagia tx. Pt awake/alert with RN upon entry. ST did check w/ RNBrittney, prior to tx.   Pt stated, "my PEG will probably get pushed till tomorrow like everything else." ST notified pt that PEG placement is still scheduled for today.     Pain/Comfort:  · Pain Rating 1: 0/10   · Pt reported nausea. Per RN, pt already received nausea medication this AM.    Objective:     Has the patient been evaluated by SLP for swallowing?   Yes  Keep patient NPO? Yes   Current Respiratory Status: nasal cannula      Pt seated to 75* for tx this date with nasal cannula and NG suction in place. Given RN assist, suction changed to [place]ur suction to complete oral care. Pt completed oral motor exercises targeting BOT retraction, vocal chord adduction, and hyolaryngeal excursion/elevation. She produced hard /k/ and /g/ phonemes with fair-good effort x15 each given min-mod A. Toward end of exercise, pt required swallow between each sound to clear pharynx of secretions. She completed pitch glide exercise (ah-ee) x15 with fair-good effort given min A. Pt completed hard/effortful swallow x3 this visit given mod-max cuing. ST attempted modified Shaker using stress ball; however, pt c/o throat pain, thus activity terminated. Simplified Shaker exercise (bringing chin to chest while upright) held for 12 secs. Fair effort completed with Shaker exercise; however, she continues to present with " poor endurance. Voice remained wet/gurgly throughout session despite several dry swallows completed. Upon SLP palpation, very minimal HLE noted. Pt's tongue continues to be very dry and coated in hard, tan material. Using mouthwash, oral swabs, and Yankeur for suction, ST completed oral hygiene. Minimal tolerance of this activity as pt began gagging/attempting to vomit. Session completed 2/2 nausea.     Assessment:     Kaylee Ngo is a 63 y.o. female with an SLP diagnosis of Dysphagia.  She presents with severe oropharyngeal dysphagia c/b poor secretion management, wet/gurgly voice, weak HLE, and delayed pharyngeal swallow initiation. Endurance and motivation continue to be complications to OMEs. Pt to receive PEG tube this date. ST to f/u.    Goals:   Multidisciplinary Problems     SLP Goals        Problem: SLP Goal    Goal Priority Disciplines Outcome   SLP Goal     SLP Ongoing (interventions implemented as appropriate)   Description:  Short Term Goals:  1. Pt will participate in BSS to determine least restrictive diet.-discontinued 2/11  2. Pt will successfully participate in Modified Barium Swallow study to radiographically assess swallow function, rule out aspiration and determine safest/least restrictive diet.- MET 2/11  3. Pt will participate in trial sessions of indirect dysphagia exercises to strengthen musculature for swallowing mechanism with mod-max effort.                              Plan:     · Patient to be seen:  3 x/week   · Plan of Care expires:  03/08/19  · Plan of Care reviewed with:  patient   · SLP Follow-Up:  Yes       Discharge recommendations:  other (see comments)(Per POC)   Barriers to Discharge:  None    Time Tracking:     SLP Treatment Date:   02/21/19  Speech Start Time:  0944  Speech Stop Time:  1005     Speech Total Time (min):  21 min    Billable Minutes: Treatment Swallowing Dysfunction 21    Kay Faustin CCC-SLP  02/21/2019

## 2019-02-21 NOTE — PLAN OF CARE
Problem: Adult Inpatient Plan of Care  Goal: Plan of Care Review  Outcome: Ongoing (interventions implemented as appropriate)  Pt AAOx4, VSS, NAD noted, complaints of 10/10 pain not relieved with PRN medication, TPN infusing, tube feed restarted, wound vac/telebox in place, bed alarm active, safety has been maintained, will continue to monitor.

## 2019-02-21 NOTE — PLAN OF CARE
Problem: Parenteral Nutrition  Goal: Effective Intravenous Nutrition Therapy Delivery    Intervention: Optimize Nutrition, Fluid and Electrolyte Intake  Recommendation:   1. Once PEG available for use, rec starting Diabetasource AC @ 10 mL/hr to increase as ASHANTI by 10 mL every 8 hrs to goal rate of  60 mL/hr to provide 1728 calories, 86 gm Pro, 1177 mL water.   2. If TF not to start with in 48 hrs, rec to INC TPN to 75 mL/hr to better meet pt's estimated calorie and protein needs.       Goals: Meet > 85% EEN daily  Nutrition Goal Status: progressing towards goal  Communication of RD Recs: reviewed with RN     Nutrition Discharge Planning: LTAC with TF

## 2019-02-21 NOTE — PLAN OF CARE
Cued into patient's room.  Permission received per patient to turn camera to view patient.  Introduced as VN for night shift that will be working with floor nurse and nursing assistant.  Educated patient on VN's role in patient care. Plan of care reviewed with patient. Education per flowsheet.  Opportunity given for questions and questions answered.  C/o lower back pain 10/10; April GRANT RN notified.  Instructed to call for assistance.  Will cont to monitor.

## 2019-02-21 NOTE — PT/OT/SLP PROGRESS
Occupational Therapy  Missed Visit    Patient Name:  Kaylee Ngo   MRN:  [id number]    Patient not seen today secondary to RN advises pt is not feeling well and not appropriate for tx at this time. Will follow-up.  2nd attempt YAJAIRA in procedure.    I certify that I was present in the room directing the student in service delivery and guiding them using my skilled judgment. As the co-signing therapist I have reviewed the students documentation and am responsible for the treatment, assessment, and plan.         SANAM Beebe  2/21/2019

## 2019-02-21 NOTE — PROGRESS NOTES
Pt at risk for impaired skin integrity, no breakdown at this time, pt refusing to turn often. Refuses to wear Z-boots on feet, encouraged to float heels of pillows. Pressure injury prevention orders are in place.

## 2019-02-21 NOTE — ANESTHESIA POSTPROCEDURE EVALUATION
"Anesthesia Post Evaluation    Patient: Kaylee Ngo    Procedure(s) Performed: Procedure(s) (LRB):  EGD, WITH PEG TUBE INSERTION (N/A)    Final Anesthesia Type: MAC  Patient location during evaluation: PACU  Patient participation: Yes- Able to Participate  Level of consciousness: awake and alert, oriented and awake  Post-procedure vital signs: reviewed and stable  Pain management: adequate  Airway patency: patent  PONV status at discharge: No PONV  Anesthetic complications: no      Cardiovascular status: blood pressure returned to baseline  Respiratory status: unassisted and room air  Hydration status: euvolemic  Follow-up not needed.        Visit Vitals  /86   Pulse 96   Temp 36.5 °C (97.7 °F) (Temporal)   Resp 16   Ht 5' 5" (1.651 m)   Wt 55.4 kg (122 lb 2.2 oz)   SpO2 99%   Breastfeeding? No   BMI 20.32 kg/m²       Pain/Geo Score: Pain Rating Prior to Med Admin: 5 (2/21/2019  7:18 AM)  Pain Rating Post Med Admin: 8 (2/20/2019  9:35 PM)  Geo Score: 9 (2/21/2019  2:08 PM)        "

## 2019-02-21 NOTE — PLAN OF CARE
Problem: Adult Inpatient Plan of Care  Goal: Plan of Care Review  Outcome: Ongoing (interventions implemented as appropriate)  Pt is aaox4. Patient safety maintained. Pain controlled with hydromorphone as orderd. NPO mantained after mednight. No N&V,  No diarrhea. No distress noted. Will continue to monitor.

## 2019-02-21 NOTE — [employer] PATIENT INSTRUCTIONS
Discharge Summary/Instructions after an Endoscopic Procedure  Patient Name: Kaylee Ngo  Patient MRN: [id number]  Patient YOB: 1955 Thursday, February 21, 2019  Kermit Reddy MD  RESTRICTIONS:  During your procedure today, you received medications for sedation.  These   medications may affect your judgment, balance and coordination.  Therefore,   for 24 hours, you have the following restrictions:   - DO NOT drive a car, operate machinery, make legal/financial decisions,   sign important papers or drink alcohol.    ACTIVITY:  Today: no heavy lifting, straining or running due to procedural   sedation/anesthesia.  The following day: return to full activity including work.  DIET:  Eat and drink normally unless instructed otherwise.     TREATMENT FOR COMMON SIDE EFFECTS:  - Mild abdominal pain, nausea, belching, bloating or excessive gas:  rest,   eat lightly and use a heating pad.  - Sore Throat: treat with throat lozenges and/or gargle with warm salt   water.  - Because air was used during the procedure, expelling large amounts of air   from your rectum or belching is normal.  - If a bowel prep was taken, you may not have a bowel movement for 1-3 days.    This is normal.  SYMPTOMS TO WATCH FOR AND REPORT TO YOUR PHYSICIAN:  1. Abdominal pain or bloating, other than gas cramps.  2. Chest pain.  3. Back pain.  4. Signs of infection such as: chills or fever occurring within 24 hours   after the procedure.  5. Rectal bleeding, which would show as bright red, maroon, or black stools.   (A tablespoon of blood from the rectum is not serious, especially if   hemorrhoids are present.)  6. Vomiting.  7. Weakness or dizziness.  GO DIRECTLY TO THE NEAREST EMERGENCY ROOM IF YOU HAVE ANY OF THE FOLLOWING:      Difficulty breathing              Chills and/or fever over 101 F   Persistent vomiting and/or vomiting blood   Severe abdominal pain   Severe chest pain   Black, tarry stools   Bleeding- more than one  tablespoon   Any other symptom or condition that you feel may need urgent attention  Your doctor recommends these additional instructions:  If any biopsies were taken, your doctors clinic will contact you in 1 to 2   weeks with any results.  Return patient to [facility] for ongoing care.   OK to use PEG for feeds and medications within 4 hours of placement  Aspiration precautions  PEG site care including washing area with soap and water daily and keeping   it dry.  Daily PPI IV  For questions, problems or results please call your physician - Kermit Reddy MD at Work:  [phone].  EMERGENCY PHONE NUMBER: [phone],  LAB RESULTS: [phone]  IF A COMPLICATION OR EMERGENCY SITUATION ARISES AND YOU ARE UNABLE TO REACH   YOUR PHYSICIAN - GO DIRECTLY TO THE EMERGENCY ROOM.  MD Kermit Martinez MD  2/21/2019 1:44:01 PM  This report has been verified and signed electronically.  [employer]

## 2019-02-21 NOTE — ANESTHESIA PREPROCEDURE EVALUATION
02/21/2019  Kaylee Ngo is a 63 y.o., female for PEJ under MAC. Pt had exploratory laparotomy, reversal of gastric bypass, lymphadenectomy, and excision of liver on 1/25/19.     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     I have reviewed the Nursing Notes.   I have reviewed the Medications.     Review of Systems  Anesthesia Hx:  No problems with previous Anesthesia  History of prior surgery of interest to airway management or planning: gastric bypass. Previous anesthesia: General, MAC   Social:  Non-Smoker, No Alcohol Use    Hematology/Oncology:         -- Anemia: Current/Recent Cancer. Oncology Comments: NET of small bowel    Cardiovascular:   Exercise tolerance: poor Hypertension, poorly controlled hyperlipidemia ECG has been reviewed. States she is able to walk up two flights of stairs w/o stopping   Pulmonary:   Sleep Apnea (Diagnosed prior to gastric bypass and weight loss)    Hepatic/GI:   GERD, well controlled    Musculoskeletal:   Arthritis     Neurological:   Headaches Denies Seizures.    Endocrine:   Diabetes    Psych:   Psychiatric History (BPD) anxiety depression            Physical Exam  General:  Cachexia    Airway/Jaw/Neck:  Airway Findings: Mouth Opening: Normal Tongue: Normal  Mallampati: III  Improves to III with phonation.  TM Distance: Normal,  at least 6 cm  Jaw/Neck Findings:  Neck ROM: Normal ROM      Dental:  Dental Findings: Periodontal disease, Severe   Chest/Lungs:  Chest/Lungs Findings: Clear to auscultation, Normal Respiratory Rate     Heart/Vascular:  Heart Findings: Rate: Normal  Rhythm: Regular Rhythm  Sounds: Normal  Heart murmur: negative       Mental Status:  Mental Status Findings:  Cooperative, Alert and Oriented       Lab Results   Component Value Date    WBC 9.38 02/21/2019    HGB 8.2 (L) 02/21/2019    HCT 27.6 (L) 02/21/2019     (H) 02/21/2019    ALT 34 02/21/2019    AST 25 02/21/2019     02/21/2019    K 4.0 02/21/2019    CL 97 02/21/2019    CREATININE 0.6 02/21/2019    BUN 16 02/21/2019    CO2 33 (H) 02/21/2019    INR 1.0 03/06/2018    HGBA1C 6.1 (H) 12/06/2018           Anesthesia Plan  Type of Anesthesia, risks & benefits discussed:  Anesthesia Type:  MAC  Patient's Preference:   Intra-op Monitoring Plan:   Intra-op Monitoring Plan Comments:   Post Op Pain Control Plan:   Post Op Pain Control Plan Comments:   Induction:   IV  Beta Blocker:         Informed Consent: Patient understands risks and agrees with Anesthesia plan.  Questions answered. Anesthesia consent signed with patient.  ASA Score: 3     Day of Surgery Review of History & Physical:        Anesthesia Plan Notes:             Ready For Surgery From Anesthesia Perspective.

## 2019-02-21 NOTE — PLAN OF CARE
Problem: Physical Therapy Goal  Goal: Physical Therapy Goal  Goal: Physical Therapy Goal  Updated Goals 2/8/2019:  1. Supine<>sit with SBA  2. Sit to stand with  CGA  3. Bed to chair with CGA  4. Gait x 20 ft withmin A   Outcome: Ongoing (interventions implemented as appropriate)  Pt would benefit from ongoing PT to progress functional mobility.

## 2019-02-22 LAB
ALBUMIN SERPL BCP-MCNC: 2.2 G/DL
ALP SERPL-CCNC: 204 U/L
ALT SERPL W/O P-5'-P-CCNC: 26 U/L
ANION GAP SERPL CALC-SCNC: 5 MMOL/L
ANISOCYTOSIS BLD QL SMEAR: SLIGHT
AST SERPL-CCNC: 20 U/L
BASOPHILS # BLD AUTO: 0.01 K/UL
BASOPHILS NFR BLD: 0.1 %
BILIRUB SERPL-MCNC: 0.4 MG/DL
BUN SERPL-MCNC: 15 MG/DL
CALCIUM SERPL-MCNC: 8.4 MG/DL
CHLORIDE SERPL-SCNC: 99 MMOL/L
CO2 SERPL-SCNC: 32 MMOL/L
CREAT SERPL-MCNC: 0.6 MG/DL
DIFFERENTIAL METHOD: ABNORMAL
EOSINOPHIL # BLD AUTO: 0.1 K/UL
EOSINOPHIL NFR BLD: 1.3 %
ERYTHROCYTE [DISTWIDTH] IN BLOOD BY AUTOMATED COUNT: 18.1 %
EST. GFR  (AFRICAN AMERICAN): >60 ML/MIN/1.73 M^2
EST. GFR  (NON AFRICAN AMERICAN): >60 ML/MIN/1.73 M^2
GLUCOSE SERPL-MCNC: 258 MG/DL
HCT VFR BLD AUTO: 27.3 %
HGB BLD-MCNC: 8.1 G/DL
HYPOCHROMIA BLD QL SMEAR: ABNORMAL
LYMPHOCYTES # BLD AUTO: 1.4 K/UL
LYMPHOCYTES NFR BLD: 14.6 %
MAGNESIUM SERPL-MCNC: 2 MG/DL
MCH RBC QN AUTO: 27.8 PG
MCHC RBC AUTO-ENTMCNC: 29.7 G/DL
MCV RBC AUTO: 94 FL
MONOCYTES # BLD AUTO: 0.4 K/UL
MONOCYTES NFR BLD: 4 %
NEUTROPHILS # BLD AUTO: 7.4 K/UL
NEUTROPHILS NFR BLD: 80 %
PHOSPHATE SERPL-MCNC: 3 MG/DL
PLATELET # BLD AUTO: 472 K/UL
PLATELET BLD QL SMEAR: ABNORMAL
PMV BLD AUTO: 9.7 FL
POCT GLUCOSE: 170 MG/DL (ref 70–110)
POCT GLUCOSE: 181 MG/DL (ref 70–110)
POCT GLUCOSE: 198 MG/DL (ref 70–110)
POCT GLUCOSE: 206 MG/DL (ref 70–110)
POCT GLUCOSE: 280 MG/DL (ref 70–110)
POIKILOCYTOSIS BLD QL SMEAR: SLIGHT
POLYCHROMASIA BLD QL SMEAR: ABNORMAL
POTASSIUM SERPL-SCNC: 3.9 MMOL/L
PROT SERPL-MCNC: 6.2 G/DL
RBC # BLD AUTO: 2.91 M/UL
SODIUM SERPL-SCNC: 136 MMOL/L
WBC # BLD AUTO: 9.48 K/UL

## 2019-02-22 PROCEDURE — 97110 THERAPEUTIC EXERCISES: CPT

## 2019-02-22 PROCEDURE — 80053 COMPREHEN METABOLIC PANEL: CPT

## 2019-02-22 PROCEDURE — B4185 PARENTERAL SOL 10 GM LIPIDS: HCPCS | Performed by: SURGERY

## 2019-02-22 PROCEDURE — 63600175 PHARM REV CODE 636 W HCPCS: Performed by: SURGERY

## 2019-02-22 PROCEDURE — 27000221 HC OXYGEN, UP TO 24 HOURS

## 2019-02-22 PROCEDURE — 25000003 PHARM REV CODE 250: Performed by: STUDENT IN AN ORGANIZED HEALTH CARE EDUCATION/TRAINING PROGRAM

## 2019-02-22 PROCEDURE — 97530 THERAPEUTIC ACTIVITIES: CPT

## 2019-02-22 PROCEDURE — 94640 AIRWAY INHALATION TREATMENT: CPT

## 2019-02-22 PROCEDURE — 94664 DEMO&/EVAL PT USE INHALER: CPT

## 2019-02-22 PROCEDURE — 25000003 PHARM REV CODE 250: Performed by: SURGERY

## 2019-02-22 PROCEDURE — 84100 ASSAY OF PHOSPHORUS: CPT

## 2019-02-22 PROCEDURE — 63600175 PHARM REV CODE 636 W HCPCS: Performed by: STUDENT IN AN ORGANIZED HEALTH CARE EDUCATION/TRAINING PROGRAM

## 2019-02-22 PROCEDURE — 85025 COMPLETE CBC W/AUTO DIFF WBC: CPT

## 2019-02-22 PROCEDURE — 11000001 HC ACUTE MED/SURG PRIVATE ROOM

## 2019-02-22 PROCEDURE — 99900035 HC TECH TIME PER 15 MIN (STAT)

## 2019-02-22 PROCEDURE — 97535 SELF CARE MNGMENT TRAINING: CPT

## 2019-02-22 PROCEDURE — 94761 N-INVAS EAR/PLS OXIMETRY MLT: CPT

## 2019-02-22 PROCEDURE — 25000242 PHARM REV CODE 250 ALT 637 W/ HCPCS: Performed by: SURGERY

## 2019-02-22 PROCEDURE — 83735 ASSAY OF MAGNESIUM: CPT

## 2019-02-22 PROCEDURE — C9113 INJ PANTOPRAZOLE SODIUM, VIA: HCPCS | Performed by: STUDENT IN AN ORGANIZED HEALTH CARE EDUCATION/TRAINING PROGRAM

## 2019-02-22 RX ORDER — HYDROMORPHONE HYDROCHLORIDE 1 MG/ML
0.5 INJECTION, SOLUTION INTRAMUSCULAR; INTRAVENOUS; SUBCUTANEOUS ONCE
Status: COMPLETED | OUTPATIENT
Start: 2019-02-22 | End: 2019-02-22

## 2019-02-22 RX ADMIN — HYDROMORPHONE HYDROCHLORIDE 0.5 MG: 1 INJECTION, SOLUTION INTRAMUSCULAR; INTRAVENOUS; SUBCUTANEOUS at 03:02

## 2019-02-22 RX ADMIN — INSULIN DETEMIR 25 UNITS: 100 INJECTION, SOLUTION SUBCUTANEOUS at 09:02

## 2019-02-22 RX ADMIN — METOPROLOL SUCCINATE 100 MG: 50 TABLET, EXTENDED RELEASE ORAL at 08:02

## 2019-02-22 RX ADMIN — IPRATROPIUM BROMIDE AND ALBUTEROL SULFATE 3 ML: .5; 3 SOLUTION RESPIRATORY (INHALATION) at 03:02

## 2019-02-22 RX ADMIN — ASCORBIC ACID, VITAMIN A PALMITATE, CHOLECALCIFEROL, THIAMINE HYDROCHLORIDE, RIBOFLAVIN-5 PHOSPHATE SODIUM, PYRIDOXINE HYDROCHLORIDE, NIACINAMIDE, DEXPANTHENOL, ALPHA-TOCOPHEROL ACETATE, VITAMIN K1, FOLIC ACID, BIOTIN, CYANOCOBALAMIN: 200; 3300; 200; 6; 3.6; 6; 40; 15; 10; 150; 600; 60; 5 INJECTION, SOLUTION INTRAVENOUS at 06:02

## 2019-02-22 RX ADMIN — IBUPROFEN 600 MG: 100 SUSPENSION ORAL at 11:02

## 2019-02-22 RX ADMIN — SODIUM CHLORIDE: 0.9 INJECTION, SOLUTION INTRAVENOUS at 10:02

## 2019-02-22 RX ADMIN — LAMOTRIGINE 150 MG: 100 TABLET ORAL at 08:02

## 2019-02-22 RX ADMIN — PREGABALIN 50 MG: 50 CAPSULE ORAL at 08:02

## 2019-02-22 RX ADMIN — IPRATROPIUM BROMIDE AND ALBUTEROL SULFATE 3 ML: .5; 3 SOLUTION RESPIRATORY (INHALATION) at 11:02

## 2019-02-22 RX ADMIN — PANTOPRAZOLE SODIUM 40 MG: 40 INJECTION, POWDER, FOR SOLUTION INTRAVENOUS at 08:02

## 2019-02-22 RX ADMIN — LIDOCAINE 1 PATCH: 50 PATCH TOPICAL at 12:02

## 2019-02-22 RX ADMIN — HYDROXYZINE HYDROCHLORIDE 25 MG: 10 SOLUTION ORAL at 08:02

## 2019-02-22 RX ADMIN — INSULIN ASPART 10 UNITS: 100 INJECTION, SOLUTION INTRAVENOUS; SUBCUTANEOUS at 12:02

## 2019-02-22 RX ADMIN — FENTANYL 1 PATCH: 25 PATCH, EXTENDED RELEASE TRANSDERMAL at 05:02

## 2019-02-22 RX ADMIN — IPRATROPIUM BROMIDE AND ALBUTEROL SULFATE 3 ML: .5; 3 SOLUTION RESPIRATORY (INHALATION) at 07:02

## 2019-02-22 RX ADMIN — MIRTAZAPINE 15 MG: 15 TABLET, ORALLY DISINTEGRATING ORAL at 08:02

## 2019-02-22 RX ADMIN — ENOXAPARIN SODIUM 40 MG: 100 INJECTION SUBCUTANEOUS at 08:02

## 2019-02-22 RX ADMIN — PROMETHAZINE HYDROCHLORIDE 12.5 MG: 25 INJECTION INTRAMUSCULAR; INTRAVENOUS at 10:02

## 2019-02-22 RX ADMIN — INSULIN ASPART 10 UNITS: 100 INJECTION, SOLUTION INTRAVENOUS; SUBCUTANEOUS at 08:02

## 2019-02-22 RX ADMIN — INSULIN ASPART 4 UNITS: 100 INJECTION, SOLUTION INTRAVENOUS; SUBCUTANEOUS at 12:02

## 2019-02-22 RX ADMIN — HYDROMORPHONE HYDROCHLORIDE 0.2 MG: 1 INJECTION, SOLUTION INTRAMUSCULAR; INTRAVENOUS; SUBCUTANEOUS at 08:02

## 2019-02-22 RX ADMIN — ONDANSETRON 4 MG: 2 INJECTION INTRAMUSCULAR; INTRAVENOUS at 11:02

## 2019-02-22 RX ADMIN — HYDROXYZINE HYDROCHLORIDE 25 MG: 10 SOLUTION ORAL at 03:02

## 2019-02-22 RX ADMIN — HYDROMORPHONE HYDROCHLORIDE 0.2 MG: 1 INJECTION, SOLUTION INTRAMUSCULAR; INTRAVENOUS; SUBCUTANEOUS at 03:02

## 2019-02-22 RX ADMIN — I.V. FAT EMULSION 250 ML: 20 EMULSION INTRAVENOUS at 10:02

## 2019-02-22 RX ADMIN — INSULIN ASPART 6 UNITS: 100 INJECTION, SOLUTION INTRAVENOUS; SUBCUTANEOUS at 04:02

## 2019-02-22 RX ADMIN — INSULIN ASPART 2 UNITS: 100 INJECTION, SOLUTION INTRAVENOUS; SUBCUTANEOUS at 04:02

## 2019-02-22 RX ADMIN — IPRATROPIUM BROMIDE AND ALBUTEROL SULFATE 3 ML: .5; 3 SOLUTION RESPIRATORY (INHALATION) at 08:02

## 2019-02-22 RX ADMIN — AMITRIPTYLINE HYDROCHLORIDE 50 MG: 25 TABLET, FILM COATED ORAL at 08:02

## 2019-02-22 RX ADMIN — INSULIN ASPART 1 UNITS: 100 INJECTION, SOLUTION INTRAVENOUS; SUBCUTANEOUS at 08:02

## 2019-02-22 NOTE — PLAN OF CARE
VN attempted to cue into pt's room for introduction. VN was informed by pt that she was refusing interaction with VN and will be having monitor turned off. VN expressed understanding and respect for patient's wishes. Will cont to be available to patient and intervene prn.

## 2019-02-22 NOTE — PROGRESS NOTES
"[facility]    CC: PEG placement for Dysphagia       Interval Hx: Patient underwent PEG placement yesterday 2/21. Tolerated well. Reported pain at PEG site last night, states this am pain is significantly improved, only mild. Tolerating TF through PEG site. No N/V.     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 No   BMI 19.99 kg/m²   General appearance: alert, cooperative, no distress  HENT: Normocephalic, atraumatic, neck symmetrical, no nasal discharge   Lungs: clear to auscultation bilaterally, no dullness to percussion bilaterally  Heart: regular rate and rhythm without rub; no displacement of the PMI   Abdomen: soft, non-tender; bowel sounds normoactive; no organomegaly  PEG in place, clean and dry, no erythema, no drainage, minimally tender  Extremities: extremities symmetric; no clubbing, cyanosis, or edema  Neurologic: Alert and oriented X 3, normal strength, normal coordination      Assessment:   Ms. Ngo is a 63 year old woman with PNET, s/p reversal of Sandeep-en-Y this admission on 1/25/2019 now with dysphagia and inability to get enteral " nutrition. S/p PEG placement 2/21 with no issues.       Plan:  PEG functioning well with no issues.   Will sign off at this time, please call with any further questions or concerns.     Ann Campbell MD  [facility] PGY 3  Gastroenterology Service

## 2019-02-22 NOTE — PLAN OF CARE
Problem: Adult Inpatient Plan of Care  Goal: Plan of Care Review  Outcome: Ongoing (interventions implemented as appropriate)  Patient AAOx4. VSS. Blood glucose monitored throughout the day, insulin given as needed per order. Patient remained on Nasal cannula 3L, 02 sat 100%. Patient complaining of nausea, zofran injection given. Patient free from falls. Foam dressing placed on sacrum. Dressing changed. Tube feeding restarted at 20ml per MD order. Family at bedside. Safety maintained, will continue to monitor.     Problem: Fall Injury Risk  Goal: Absence of Fall and Fall-Related Injury  Outcome: Ongoing (interventions implemented as appropriate)       Problem: Skin Injury Risk Increased  Goal: Skin Health and Integrity  Outcome: Ongoing (interventions implemented as appropriate)

## 2019-02-22 NOTE — PROGRESS NOTES
Wound vac removed from upper abdominal wound. Upper and lower abdominal wounds cleaned with NS. Dr. JEAN CARLOS Titus at bedside to assess wounds. Wound vac applied to both wounds attached together with one track pad between wounds as directed. Adaptic touch applied to both wound beds, black foam to both wound beds, good seal obtained, set at 125 mmHg. Pt medicated prior and tolerated well.

## 2019-02-22 NOTE — PLAN OF CARE
Problem: Adult Inpatient Plan of Care  Goal: Plan of Care Review  Outcome: Outcome(s) achieved Date Met: 02/22/19  Patient resting in bed, AAOx4. TPN infusing as ordered. Medications administered as ordered. Patient complained of pain through the night, PRN medications administered as ordered. Tube feedings infusing to PEG tube at 20ml/hr as ordered. Frequently repositioned through the night. Encouraged to call with needs or concerns. Will continue to monitor.

## 2019-02-22 NOTE — PLAN OF CARE
Problem: Physical Therapy Goal  Goal: Physical Therapy Goal  Goal: Physical Therapy Goal  Updated Goals 2/8/2019:  1. Supine<>sit with SBA  2. Sit to stand with  CGA  3. Bed to chair with CGA  4. Gait x 20 ft withmin A    Outcome: Ongoing (interventions implemented as appropriate)  PT viist completed with goals addressed as tolerated; max +mod for bed <> chair transfer with limited participation; easily fatigued. Dc Rec; SNF

## 2019-02-22 NOTE — PT/OT/SLP PROGRESS
Occupational Therapy   Treatment    Name: Kaylee Ngo  MRN: [id number]  Admitting Diagnosis:  Malignant carcinoid tumor of pancreas  1 Day Post-Op    Recommendations:     Discharge Recommendations: nursing facility, skilled  Discharge Equipment Recommendations:  (TBD pending pt progress)  Barriers to discharge:  None    Assessment:     Kaylee Ngo is a 63 y.o. female with a medical diagnosis of Malignant carcinoid tumor of pancreas. Performance deficits affecting function are weakness, impaired self care skills, impaired balance, decreased coordination, decreased safety awareness, impaired endurance, impaired functional mobilty, decreased upper extremity function, decreased lower extremity function, gait instability, pain, impaired fine motor, impaired cardiopulmonary response to activity.     Rehab Prognosis:  Fair; patient would benefit from acute skilled OT services to address these deficits and reach maximum level of function.       Plan:     Patient to be seen 5 x/week to address the above listed problems via self-care/home management, therapeutic activities, therapeutic exercises  · Plan of Care Expires: 03/07/19  · Plan of Care Reviewed with: patient    Subjective     Pain/Comfort:  · Pain Rating 1: 0/10    Objective:     Communicated with: Nurse prior to session.  Patient found HOB elevated with telemetry, PEG Tube, pulse ox (continuous), peripheral IV, central line, oxygen upon OT entry to room.    General Precautions: Standard, aspiration, fall, NPO, respiratory   Orthopedic Precautions:N/A   Braces: N/A     Occupational Performance:     Bed Mobility:    · Patient completed Scooting/Bridging with contact guard assistance and minimum assistance  · Patient completed Supine to Sit with contact guard assistance and minimum assistance     Functional Mobility/Transfers:  · Patient completed Sit <> Stand Transfer with contact guard assistance  with  no assistive device and hand-held assist   · Patient  "completed Bed <> Chair Transfer using Step Transfer technique with contact guard assistance and minimum assistance with no assistive device and hand-held assist  · Functional Mobility: Pt required max v/c's to advance feet during t/f. Pt with no LOB.     Activities of Daily Living:  · Grooming: supervision face washing seated in bedside chair      [facility] 6 Click ADL: 10    Treatment & Education:  Pt performed skills as above. Pt with improved participation in therapy on this date. Pt c/o nausea and dizziness seated EOB. Pt's BP measured seated EOB: 118/58. Pt performed 2x10 reps of seated UE therex (sh flex, elb flex, chest press); 1st set with #2 dowel, 2nd set with #1 dowel. Pt noted to have greater AROM R>L and a tenodesis-like  on L hand. Pt c/o "10/10" L UE pain. Pt will benefit from continued skilled OT to address deficits and improve performance in functional ADL tasks. Cont OT per POC.      Patient left up in chair with all lines intact, call button in reach, chair alarm on and nurse notifiedEducation:      GOALS:   Multidisciplinary Problems     Occupational Therapy Goals        Problem: Occupational Therapy Goal    Goal Priority Disciplines Outcome Interventions   Occupational Therapy Goal     OT, PT/OT Ongoing (interventions implemented as appropriate)    Description:  Goals to be met by: 3/7/2019     Patient will increase functional independence with ADLs by performing:    UE Dressing with Minimal Assistance.  LE Dressing with Moderate Assistance.  Grooming while seated with Minimal Assistance. --MET 2/18  Toileting from bedside commode with Moderate Assistance for hygiene and clothing management.   Sitting at edge of bed x10 minutes with Moderate Assistance.  Supine to sit with Maximum Assistance.  Increased functional UE strength to 3/5                        Time Tracking:     OT Date of Treatment: 02/22/19  OT Start Time: 0913  OT Stop Time: 0953  OT Total Time (min): 40 min    Billable " Minutes:Self Care/Home Management 10  Therapeutic Activity 15  Therapeutic Exercise 15    SANAM Beebe     I certify that I was present in the room directing the student in service delivery and guiding them using my skilled judgment. As the co-signing therapist I have reviewed the students documentation and am responsible for the treatment, assessment, and plan.     2/22/2019

## 2019-02-22 NOTE — PROGRESS NOTES
TN spoke with Oly brennan Humanlissa - gave additional info on pt as requested ---   pt has not been approved for LTAC -- MD will have to do a peer to peer on Monday --- MD informed -     pt updated;

## 2019-02-22 NOTE — PLAN OF CARE
Problem: Fall Injury Risk  Goal: Absence of Fall and Fall-Related Injury  Outcome: Ongoing (interventions implemented as appropriate)  Pt AAOX4. Complains of intermittent pain and nausea. Relieved by prn medication. No distress noted. IVF infusing per order. Safety maintained. Will continue to monitor.

## 2019-02-22 NOTE — PROGRESS NOTES
Follow up for wound vac change, assessment and measurements below.       02/22/19 1500       Incision/Site 01/25/19 1348 Abdomen   Date First Assessed/Time First Assessed: 01/25/19 1348   Location: Abdomen   Incision WDL ex   Dressing Appearance (vac removed)   Drainage Amount Scant   Drainage Characteristics/Odor No odor   Periwound Area Intact   Wound Edges Open   Wound Length (cm) 4 cm   Wound Width (cm) 1.5 cm   Wound Depth (cm) 3 cm   Wound Volume (cm^3) 18 cm^3   Wound Surface Area (cm^2) 6 cm^2   Care Cleansed with:;Sterile normal saline   Dressing (changed vac)       Incision/Site 02/22/19 1500 Abdomen lower   Date First Assessed/Time First Assessed: 02/22/19 1500   Location: (c) Abdomen  Orientation: lower  Closure Method: Other (see comments)  Additional Comments: (c) wound vac applied   Incision WDL ex   Dressing Appearance Intact;Moist drainage   Drainage Amount Small   Drainage Characteristics/Odor No odor   Appearance Red;White   Periwound Area Intact   Wound Edges Irregular;Open   Wound Length (cm) 3.2 cm   Wound Width (cm) 1.5 cm   Wound Depth (cm) 1.9 cm   Wound Volume (cm^3) 9.12 cm^3   Wound Surface Area (cm^2) 4.8 cm^2   Care Cleansed with:;Sterile normal saline   Dressing Applied  (black vac foam)       Negative Pressure Wound Therapy    Placement Date/Time: 02/12/19 1530   Orientation: midline  Location: Abdomen   NPWT Type Vacuum Therapy   Therapy Setting NPWT Continuous therapy   Pressure Setting NPWT 125 mmHg   Sponges Inserted NPWT 2   Sponges Removed NPWT 2

## 2019-02-22 NOTE — PROGRESS NOTES
[facility]  Neuroendocrine Tumor Service  Progress Note     Admission Date: 1/25/2019  Hospital Length of Stay: 28  Principal Problem: Malignant carcinoid tumor of pancreas     Subjective:      Operation:  62 yo F w/ PNET, s/p reversal of Sandeep-en-Y w/ Jtube placement  POD#27    1/25/18    Ex lap, gastro-gastrostomy EEA with 33 mm circular stapler, Pyloroplasty and gastrostomy closure, enteric resection with side to side to anstamosis and restoration of small bowel continuity using Idrive tan load stapler, Liver US, Liver biopsy, Liver resection segment 3, Supraceliac lymphadenectomy, FNA pancreas, Lt salpingo oopherectomy, Meckel diverticulectomy, Feeding jejunostomy 14 Fr malecot, ICG perfusion and Infrared exam of all anastamosis    Interval History:   States back pain and throat pain much improved. No nausea or vomiting but passing flatus and having BM. Right arm PICC in place with no issues. Patient received Gtube yesterday and has been tolerating those feeds fine.     Scheduled Meds:   albuterol-ipratropium  3 mL Nebulization Q4H    amitriptyline  50 mg Oral QHS    enoxparin  40 mg Subcutaneous Q24H    fat emulsion 20%  250 mL Intravenous Daily    fentaNYL  1 patch Transdermal Q72H    [COMPLETED] HYDROmorphone  0.5 mg Intravenous Once    hydrOXYzine  25 mg Per NG tube TID    insulin aspart U-100  1-10 Units Subcutaneous 6 times per day    insulin detemir U-100  25 Units Subcutaneous Daily    lamoTRIgine  150 mg Oral BID    lidocaine  1 patch Transdermal Q24H    metoprolol succinate  100 mg Oral BID    mirtazapine  15 mg Per NG tube Nightly    pantoprazole  40 mg Intravenous Daily    pregabalin  50 mg Per NG tube BID     Continuous Infusions:   Amino acid 5% - dextrose 15% (CLINIMIX-E) solution with additives (1L  provides 510 kcal/L dextrose, with 50 gm AA, 150 gm CHO, Na 35, K 30, Mg 5, Ca 4.5, Acetate 80, Cl 39, Phos 15)       PRN Meds:.sodium chloride,  acetaminophen, acetaminophen, albuterol sulfate, artificial tears, dextrose 50%, diphenhydrAMINE, docusate, glucagon (human recombinant), hydrALAZINE, HYDROmorphone, ibuprofen, ipratropium, labetalol, lidocaine HCl 2%, lorazepam, naloxone, ondansetron, promethazine (PHENERGAN) IVPB, sodium chloride 0.9%, sodium chloride 3%, tiZANidine    Objective:      Temp:  [96.1 °F (35.6 °C)-99.5 °F (37.5 °C)] 98.9 °F (37.2 °C)  Pulse:  [] 82  Resp:  [14-20] 16  SpO2:  [96 %-100 %] 100 %  BP: (104-137)/(54-69) 135/58  Weight change: 0 kg (0 lb)    Intake/Output Summary (Last 24 hours) at 2/22/2019 1456  Last data filed at 2/22/2019 1121  Gross per 24 hour   Intake 1611.83 ml   Output 965 ml   Net 646.83 ml     Lines:  Right arm PICC  GTube  WV    Physical Exam:  GEN:NAD  CV: RRR  PULM: unlabored with 2L NC  ABD: S/NT/ ND, WV - good granulation tissue at wound base, lower abdominal incision opened and also wound vac with connecting sponge bridge  EXT: Peripheral pulses present and equal bilaterally    Recent Labs   Lab 02/22/19  0424      K 3.9   CO2 32*   CL 99   BUN 15   CREATININE 0.6   CALCIUM 8.4*   PROT 6.2   AST 20   ALT 26   ALKPHOS 204*   BILITOT 0.4     Recent Labs   Lab 02/22/19  0424   WBC 9.48   HGB 8.1*   HCT 27.3*   *       Significant Imaging: I have reviewed all pertinent imaging results/findings within the past 24 hours.     Assessment and Plan:   62 yo F w/ PNET, s/p reversal of Sandeep-en-Y w/ Jtube placement  POD#27    1/25/18  Ex lap, gastro-gastrostomy EEA with 33 mm circular stapler, Pyloroplasty and gastrostomy closure, enteric resection with side to side to anstamosis and restoration of small bowel continuity using Idrive tan load stapler, Liver US, Liver biopsy, Liver resection segment 3, Supraceliac lymphadenectomy, FNA pancreas, Lt salpingo oopherectomy, Meckel diverticulectomy, Feeding jejunostomy 14 Fr malecot, ICG perfusion and Infrared exam of all anastamosis    Continue supportive  care  Continue aggressive respiratory and CPT  Neuro - No sedatives, aspiration risk  FEN/GI - Continue NPO, TPN on, TFs 30 cc/hr, check residuals, replete lytes prn,  ID - Last dose rocephin 2/13/19, fungitell 81, recheck fungitell, afebrile overnight, continuous microaspirations  Heme: H/H stable  Pulm - On NC. Aggressive chest CPT and acapella. ABGs. Resp Cx growing E Coli she has received full course rocephin F/U with speech regarding swallowing eval. Improving but still not safe.  Renal - Cr good, whitlock out, difficult to record accurate I/O/UOP but pt refuse wick   Surgical - WV in place with black sponge, change MWF  - will change today  PPX - continue lovenox, protonix, scd in place    Dispo - cont inpt care and abx, Pending LTAC placement due to Gtube, wound vac, and TPN necessity. Received Gtube yesterday . Hopefully discharge to LTAC today or tomorrow.     Nash iTtus MD   II  [phone]

## 2019-02-22 NOTE — PT/OT/SLP PROGRESS
Physical Therapy Treatment    Patient Name:  Kaylee Ngo   MRN:  [id number]    Recommendations:     Discharge Recommendations:  nursing facility, skilled   Discharge Equipment Recommendations: (TBD)   Barriers to discharge: Decreased caregiver support and Impaired functional mobility and tolerance    Assessment:     Kaylee Ngo is a 63 y.o. female admitted with a medical diagnosis of Malignant carcinoid tumor of pancreas.  She presents with the following impairments/functional limitations:  weakness, impaired self care skills, impaired balance, impaired skin, impaired joint extensibility, impaired endurance, impaired functional mobilty, pain, gait instability, decreased lower extremity function. Pt requires 2 person Max +Mod assist for chair <> bed transfer with limited participation and impaired activity tolerance.    Rehab Prognosis: Fair; patient would benefit from acute skilled PT services to address these deficits and reach maximum level of function.    Recent Surgery: Procedure(s) (LRB):  EGD, WITH PEG TUBE INSERTION (N/A) 1 Day Post-Op    Plan:     During this hospitalization, patient to be seen 5 x/week to address the identified rehab impairments via gait training, therapeutic activities, therapeutic exercises, neuromuscular re-education and progress toward the following goals:    · Plan of Care Expires:  03/19/19    Subjective     Chief Complaint: tired; unable to help with transfer back to bed  Patient/Family Comments/goals: to get back into bed form chair.  Pain/Comfort:  · Pain Rating 1: 6/10  · Location - Side 1: Right  · Location 1: arm  · Pain Addressed 1: Pre-medicate for activity, Reposition, Distraction  · Pain Rating Post-Intervention 1: 6/10      Objective:     Communicated with RN prior to session.  Patient found sitting OOB in chair and call button in reach and . PEG Tube, PICC line, oxygen  upon PT entry to room.     General Precautions: Standard, fall   Orthopedic Precautions:N/A  "  Braces: N/A     Functional Mobility:  · Bed Mobility:     · Rolling Right: moderate assistance  · Scooting: moderate assistance  · Sit to Supine: maximal assistance  · Transfers:     · Sit to Stand:  moderate assistance, maximal assistance and of 2 persons with hand-held assist  · Bed to Chair: moderate assistance, maximal assistance and of 2 persons with  hand-held assist  using  Squat Pivot  · Gait: 2ft, not functional gait bed<>chair  · Balance: sitting in recliner: fair+; standing: poor      AM-PAC 6 CLICK MOBILITY  Turning over in bed (including adjusting bedclothes, sheets and blankets)?: 2  Sitting down on and standing up from a chair with arms (e.g., wheelchair, bedside commode, etc.): 2  Moving from lying on back to sitting on the side of the bed?: 2  Moving to and from a bed to a chair (including a wheelchair)?: 2  Need to walk in hospital room?: 2  Climbing 3-5 steps with a railing?: 1  Basic Mobility Total Score: 11       Therapeutic Activities and Exercises:  Instructed patient in bed<> chair transfer at Max+mod of 2 persons; Pt voiced little motivation referring to herself as "dead weight"; she requires mod motivation to participate in therapy. Pt instructed to shift weight forward for sit<>stand transition and for increased knee/hip flexion to clear ivan foot off the ground with steppage for safe transfer. Pt unable to maintain sitting balance at EOB post transfer needing max assist for sit>supine and bed positioning. Post transfer, pt r/o nausea and emitted stomach contents into emesis bag. RN notified and physical activity was halted.    Patient left supine with all lines intact, call button in reach, bed alarm on and RN present..    GOALS:   Multidisciplinary Problems     Physical Therapy Goals        Problem: Physical Therapy Goal    Goal Priority Disciplines Outcome Goal Variances Interventions   Physical Therapy Goal     PT, PT/OT             Problem: Physical Therapy Goal    Goal Priority " Disciplines Outcome Goal Variances Interventions   Physical Therapy Goal     PT, PT/OT Ongoing (interventions implemented as appropriate)     Description:  Goal: Physical Therapy Goal  Updated Goals 2/8/2019:  1. Supine<>sit with SBA  2. Sit to stand with  CGA  3. Bed to chair with CGA  4. Gait x 20 ft withmin A                     Time Tracking:     PT Received On: 02/22/19  PT Start Time: 1049     PT Stop Time: 1118  PT Total Time (min): 29 min     Billable Minutes: Therapeutic Activity 29    Treatment Type: Treatment  PT/PTA: PT     PTA Visit Number: 0     Michael Elmore, PT  02/22/2019

## 2019-02-22 NOTE — PLAN OF CARE
Problem: Occupational Therapy Goal  Goal: Occupational Therapy Goal  Goals to be met by: 3/7/2019     Patient will increase functional independence with ADLs by performing:    UE Dressing with Minimal Assistance.  LE Dressing with Moderate Assistance.  Grooming while seated with Minimal Assistance. --MET 2/18  Toileting from bedside commode with Moderate Assistance for hygiene and clothing management.   Sitting at edge of bed x10 minutes with Moderate Assistance.  Supine to sit with Maximum Assistance.  Increased functional UE strength to 3/5       Outcome: Ongoing (interventions implemented as appropriate)  Pt with good participation in tx today. Pt performed UE therex in bedside chair with AROM performance noted to be R>L.  Pt progressing towards goals, cont POC.

## 2019-02-22 NOTE — PROGRESS NOTES
Lipids emulsion to be given every Monday,Wednesday and Friday at 2200.  (per [employer] approved pharmacy protocol)

## 2019-02-23 LAB
ALBUMIN SERPL BCP-MCNC: 2.3 G/DL
ALP SERPL-CCNC: 211 U/L
ALT SERPL W/O P-5'-P-CCNC: 20 U/L
ANION GAP SERPL CALC-SCNC: 6 MMOL/L
AST SERPL-CCNC: 14 U/L
BASOPHILS # BLD AUTO: 0.01 K/UL
BASOPHILS # BLD AUTO: 0.02 K/UL
BASOPHILS NFR BLD: 0.1 %
BASOPHILS NFR BLD: 0.2 %
BILIRUB SERPL-MCNC: 0.4 MG/DL
BUN SERPL-MCNC: 13 MG/DL
CALCIUM SERPL-MCNC: 8.6 MG/DL
CHLORIDE SERPL-SCNC: 98 MMOL/L
CO2 SERPL-SCNC: 32 MMOL/L
CREAT SERPL-MCNC: 0.6 MG/DL
DIFFERENTIAL METHOD: ABNORMAL
DIFFERENTIAL METHOD: ABNORMAL
EOSINOPHIL # BLD AUTO: 0.1 K/UL
EOSINOPHIL # BLD AUTO: 0.1 K/UL
EOSINOPHIL NFR BLD: 0.9 %
EOSINOPHIL NFR BLD: 1.3 %
ERYTHROCYTE [DISTWIDTH] IN BLOOD BY AUTOMATED COUNT: 18.1 %
ERYTHROCYTE [DISTWIDTH] IN BLOOD BY AUTOMATED COUNT: 18.4 %
EST. GFR  (AFRICAN AMERICAN): >60 ML/MIN/1.73 M^2
EST. GFR  (NON AFRICAN AMERICAN): >60 ML/MIN/1.73 M^2
GLUCOSE SERPL-MCNC: 270 MG/DL
HCT VFR BLD AUTO: 29.4 %
HCT VFR BLD AUTO: 30.4 %
HGB BLD-MCNC: 8.7 G/DL
HGB BLD-MCNC: 9.1 G/DL
LYMPHOCYTES # BLD AUTO: 1 K/UL
LYMPHOCYTES # BLD AUTO: 1.2 K/UL
LYMPHOCYTES NFR BLD: 10.7 %
LYMPHOCYTES NFR BLD: 9.4 %
MAGNESIUM SERPL-MCNC: 1.7 MG/DL
MCH RBC QN AUTO: 27.8 PG
MCH RBC QN AUTO: 28 PG
MCHC RBC AUTO-ENTMCNC: 29.6 G/DL
MCHC RBC AUTO-ENTMCNC: 29.9 G/DL
MCV RBC AUTO: 93 FL
MCV RBC AUTO: 95 FL
MONOCYTES # BLD AUTO: 0.6 K/UL
MONOCYTES # BLD AUTO: 0.8 K/UL
MONOCYTES NFR BLD: 6 %
MONOCYTES NFR BLD: 7.5 %
NEUTROPHILS # BLD AUTO: 8.5 K/UL
NEUTROPHILS # BLD AUTO: 8.9 K/UL
NEUTROPHILS NFR BLD: 79.9 %
NEUTROPHILS NFR BLD: 81.9 %
PHOSPHATE SERPL-MCNC: 2.2 MG/DL
PLATELET # BLD AUTO: 522 K/UL
PLATELET # BLD AUTO: 533 K/UL
PMV BLD AUTO: 9.6 FL
PMV BLD AUTO: 9.9 FL
POCT GLUCOSE: 142 MG/DL (ref 70–110)
POCT GLUCOSE: 259 MG/DL (ref 70–110)
POCT GLUCOSE: 287 MG/DL (ref 70–110)
POCT GLUCOSE: 292 MG/DL (ref 70–110)
POCT GLUCOSE: 299 MG/DL (ref 70–110)
POTASSIUM SERPL-SCNC: 4.4 MMOL/L
PROT SERPL-MCNC: 6.4 G/DL
RBC # BLD AUTO: 3.11 M/UL
RBC # BLD AUTO: 3.27 M/UL
SODIUM SERPL-SCNC: 136 MMOL/L
TROPONIN I SERPL DL<=0.01 NG/ML-MCNC: <0.006 NG/ML
WBC # BLD AUTO: 10.36 K/UL
WBC # BLD AUTO: 11.13 K/UL

## 2019-02-23 PROCEDURE — 94761 N-INVAS EAR/PLS OXIMETRY MLT: CPT

## 2019-02-23 PROCEDURE — 85025 COMPLETE CBC W/AUTO DIFF WBC: CPT

## 2019-02-23 PROCEDURE — 63600175 PHARM REV CODE 636 W HCPCS: Performed by: STUDENT IN AN ORGANIZED HEALTH CARE EDUCATION/TRAINING PROGRAM

## 2019-02-23 PROCEDURE — 99900035 HC TECH TIME PER 15 MIN (STAT)

## 2019-02-23 PROCEDURE — 63600175 PHARM REV CODE 636 W HCPCS: Performed by: SURGERY

## 2019-02-23 PROCEDURE — C9113 INJ PANTOPRAZOLE SODIUM, VIA: HCPCS | Performed by: STUDENT IN AN ORGANIZED HEALTH CARE EDUCATION/TRAINING PROGRAM

## 2019-02-23 PROCEDURE — 84484 ASSAY OF TROPONIN QUANT: CPT

## 2019-02-23 PROCEDURE — 25000003 PHARM REV CODE 250: Performed by: STUDENT IN AN ORGANIZED HEALTH CARE EDUCATION/TRAINING PROGRAM

## 2019-02-23 PROCEDURE — B4185 PARENTERAL SOL 10 GM LIPIDS: HCPCS | Performed by: SURGERY

## 2019-02-23 PROCEDURE — 94640 AIRWAY INHALATION TREATMENT: CPT

## 2019-02-23 PROCEDURE — 11000001 HC ACUTE MED/SURG PRIVATE ROOM

## 2019-02-23 PROCEDURE — 83735 ASSAY OF MAGNESIUM: CPT

## 2019-02-23 PROCEDURE — 80053 COMPREHEN METABOLIC PANEL: CPT

## 2019-02-23 PROCEDURE — 84100 ASSAY OF PHOSPHORUS: CPT

## 2019-02-23 PROCEDURE — 25000242 PHARM REV CODE 250 ALT 637 W/ HCPCS: Performed by: SURGERY

## 2019-02-23 PROCEDURE — 94664 DEMO&/EVAL PT USE INHALER: CPT

## 2019-02-23 PROCEDURE — 27000221 HC OXYGEN, UP TO 24 HOURS

## 2019-02-23 PROCEDURE — S5571 INSULIN DISPOS PEN 3 ML: HCPCS | Performed by: STUDENT IN AN ORGANIZED HEALTH CARE EDUCATION/TRAINING PROGRAM

## 2019-02-23 PROCEDURE — 25000003 PHARM REV CODE 250: Performed by: SURGERY

## 2019-02-23 RX ORDER — SCOLOPAMINE TRANSDERMAL SYSTEM 1 MG/1
1 PATCH, EXTENDED RELEASE TRANSDERMAL
Status: DISCONTINUED | OUTPATIENT
Start: 2019-02-23 | End: 2019-02-26 | Stop reason: HOSPADM

## 2019-02-23 RX ORDER — MAGNESIUM SULFATE HEPTAHYDRATE 40 MG/ML
2 INJECTION, SOLUTION INTRAVENOUS ONCE
Status: COMPLETED | OUTPATIENT
Start: 2019-02-23 | End: 2019-02-23

## 2019-02-23 RX ORDER — METOCLOPRAMIDE 10 MG/1
10 TABLET ORAL
Status: DISCONTINUED | OUTPATIENT
Start: 2019-02-24 | End: 2019-02-26 | Stop reason: HOSPADM

## 2019-02-23 RX ORDER — HYDROMORPHONE HYDROCHLORIDE 1 MG/ML
0.4 INJECTION, SOLUTION INTRAMUSCULAR; INTRAVENOUS; SUBCUTANEOUS EVERY 6 HOURS PRN
Status: DISCONTINUED | OUTPATIENT
Start: 2019-02-23 | End: 2019-02-26 | Stop reason: HOSPADM

## 2019-02-23 RX ORDER — DIPHENHYDRAMINE HYDROCHLORIDE 50 MG/ML
50 INJECTION INTRAMUSCULAR; INTRAVENOUS ONCE
Status: COMPLETED | OUTPATIENT
Start: 2019-02-23 | End: 2019-02-23

## 2019-02-23 RX ADMIN — LAMOTRIGINE 150 MG: 100 TABLET ORAL at 09:02

## 2019-02-23 RX ADMIN — AMITRIPTYLINE HYDROCHLORIDE 50 MG: 25 TABLET, FILM COATED ORAL at 09:02

## 2019-02-23 RX ADMIN — PROMETHAZINE HYDROCHLORIDE 12.5 MG: 25 INJECTION INTRAMUSCULAR; INTRAVENOUS at 09:02

## 2019-02-23 RX ADMIN — MIRTAZAPINE 15 MG: 15 TABLET, ORALLY DISINTEGRATING ORAL at 09:02

## 2019-02-23 RX ADMIN — SODIUM PHOSPHATE, MONOBASIC, MONOHYDRATE 30 MMOL: 276; 142 INJECTION, SOLUTION INTRAVENOUS at 09:02

## 2019-02-23 RX ADMIN — METOPROLOL SUCCINATE 100 MG: 50 TABLET, EXTENDED RELEASE ORAL at 09:02

## 2019-02-23 RX ADMIN — ONDANSETRON 4 MG: 2 INJECTION INTRAMUSCULAR; INTRAVENOUS at 11:02

## 2019-02-23 RX ADMIN — INSULIN DETEMIR 25 UNITS: 100 INJECTION, SOLUTION SUBCUTANEOUS at 09:02

## 2019-02-23 RX ADMIN — HYDROXYZINE HYDROCHLORIDE 25 MG: 10 SOLUTION ORAL at 09:02

## 2019-02-23 RX ADMIN — RETINOL, ERGOCALCIFEROL, .ALPHA.-TOCOPHEROL ACETATE, DL-, PHYTONADIONE, ASCORBIC ACID, NIACINAMIDE, RIBOFLAVIN 5-PHOSPHATE SODIUM, THIAMINE HYDROCHLORIDE, PYRIDOXINE HYDROCHLORIDE, DEXPANTHENOL, BIOTIN, FOLIC ACID, AND CYANOCOBALAMIN: KIT at 08:02

## 2019-02-23 RX ADMIN — PROMETHAZINE HYDROCHLORIDE 12.5 MG: 25 INJECTION INTRAMUSCULAR; INTRAVENOUS at 03:02

## 2019-02-23 RX ADMIN — LORAZEPAM 1 MG: 2 INJECTION INTRAMUSCULAR; INTRAVENOUS at 09:02

## 2019-02-23 RX ADMIN — PANTOPRAZOLE SODIUM 40 MG: 40 INJECTION, POWDER, FOR SOLUTION INTRAVENOUS at 09:02

## 2019-02-23 RX ADMIN — MAGNESIUM SULFATE IN WATER 2 G: 40 INJECTION, SOLUTION INTRAVENOUS at 09:02

## 2019-02-23 RX ADMIN — ENOXAPARIN SODIUM 40 MG: 100 INJECTION SUBCUTANEOUS at 09:02

## 2019-02-23 RX ADMIN — HYDROMORPHONE HYDROCHLORIDE 0.2 MG: 1 INJECTION, SOLUTION INTRAMUSCULAR; INTRAVENOUS; SUBCUTANEOUS at 05:02

## 2019-02-23 RX ADMIN — IPRATROPIUM BROMIDE AND ALBUTEROL SULFATE 3 ML: .5; 3 SOLUTION RESPIRATORY (INHALATION) at 08:02

## 2019-02-23 RX ADMIN — IPRATROPIUM BROMIDE AND ALBUTEROL SULFATE 3 ML: .5; 3 SOLUTION RESPIRATORY (INHALATION) at 03:02

## 2019-02-23 RX ADMIN — DIPHENHYDRAMINE HYDROCHLORIDE 50 MG: 50 INJECTION, SOLUTION INTRAMUSCULAR; INTRAVENOUS at 10:02

## 2019-02-23 RX ADMIN — INSULIN ASPART 6 UNITS: 100 INJECTION, SOLUTION INTRAVENOUS; SUBCUTANEOUS at 05:02

## 2019-02-23 RX ADMIN — IPRATROPIUM BROMIDE AND ALBUTEROL SULFATE 3 ML: .5; 3 SOLUTION RESPIRATORY (INHALATION) at 02:02

## 2019-02-23 RX ADMIN — SCOPALAMINE 1 PATCH: 1 PATCH, EXTENDED RELEASE TRANSDERMAL at 09:02

## 2019-02-23 RX ADMIN — LIDOCAINE 1 PATCH: 50 PATCH TOPICAL at 03:02

## 2019-02-23 RX ADMIN — PREGABALIN 50 MG: 50 CAPSULE ORAL at 09:02

## 2019-02-23 RX ADMIN — ONDANSETRON 4 MG: 2 INJECTION INTRAMUSCULAR; INTRAVENOUS at 05:02

## 2019-02-23 RX ADMIN — IPRATROPIUM BROMIDE AND ALBUTEROL SULFATE 3 ML: .5; 3 SOLUTION RESPIRATORY (INHALATION) at 12:02

## 2019-02-23 RX ADMIN — I.V. FAT EMULSION 250 ML: 20 EMULSION INTRAVENOUS at 10:02

## 2019-02-23 RX ADMIN — ONDANSETRON 4 MG: 2 INJECTION INTRAMUSCULAR; INTRAVENOUS at 08:02

## 2019-02-23 RX ADMIN — INSULIN ASPART 6 UNITS: 100 INJECTION, SOLUTION INTRAVENOUS; SUBCUTANEOUS at 03:02

## 2019-02-23 RX ADMIN — HYDROMORPHONE HYDROCHLORIDE 0.2 MG: 1 INJECTION, SOLUTION INTRAMUSCULAR; INTRAVENOUS; SUBCUTANEOUS at 11:02

## 2019-02-23 RX ADMIN — INSULIN ASPART 3 UNITS: 100 INJECTION, SOLUTION INTRAVENOUS; SUBCUTANEOUS at 12:02

## 2019-02-23 RX ADMIN — HYDROXYZINE HYDROCHLORIDE 25 MG: 10 SOLUTION ORAL at 03:02

## 2019-02-23 NOTE — PLAN OF CARE
Problem: Adult Inpatient Plan of Care  Goal: Plan of Care Review  Outcome: Ongoing (interventions implemented as appropriate)  Pt AAOx4. Pt complaints of pain and nausea with mild relief from PRN medications. Pt on 3L NC. PEG tube remains intact, dressing changed today and decreased to 20 mL/hr. Pt remains NPO. L UA PICC remains CDI. TPN infusing at 50 ml/hr. Wound vac remains intact to abdominal incision. Cardiac monitoring continued. Blood glucose monitoring continued with sliding scale given as needed. Bed locked in lowest position, bed alarm set and call bell within reach. Pt verbalized understanding to call for any needs or assistance. Will continue to monitor.

## 2019-02-23 NOTE — NURSING
VN was not able to complete rounds on patient. Monitor was turned off by patient and she is refusing VN. Will cont to be available and intervene prn.

## 2019-02-23 NOTE — PLAN OF CARE
Problem: Adult Inpatient Plan of Care  Goal: Plan of Care Review  Outcome: Ongoing (interventions implemented as appropriate)  Patient on oxygen with documented flow.  Will attempt to wean per O2 order protocol. No distress noted. The proper method of use, as well as anticipated side effects, of this aerosol treatment are discussed and demonstrated to the patient.  Aerobika done. Will continue to monitor.

## 2019-02-23 NOTE — PLAN OF CARE
02/23/19 0735   Type of Frequent Check   Type Patient Rounds;Other (see comments)  (VN Rounds, Refuses VN.)

## 2019-02-23 NOTE — PROGRESS NOTES
[facility]  Neuroendocrine Tumor Service  Progress Note     Admission Date: 1/25/2019  Hospital Length of Stay: 29  Principal Problem: Malignant carcinoid tumor of pancreas     Subjective:      Operation:  62 yo F w/ PNET, s/p reversal of Sandeep-en-Y w/ Jtube placement  POD#28    1/25/18    Ex lap, gastro-gastrostomy EEA with 33 mm circular stapler, Pyloroplasty and gastrostomy closure, enteric resection with side to side to anstamosis and restoration of small bowel continuity using Idrive tan load stapler, Liver US, Liver biopsy, Liver resection segment 3, Supraceliac lymphadenectomy, FNA pancreas, Lt salpingo oopherectomy, Meckel diverticulectomy, Feeding jejunostomy 14 Fr malecot, ICG perfusion and Infrared exam of all anastamosis    Interval History:   States back pain and throat pain improved. Still some nausea or vomiting but passing flatus with no BM. Right arm PICC in place with no issues. Patient tolerating tube feeds with one episode of emesis.      Scheduled Meds:   albuterol-ipratropium  3 mL Nebulization Q4H    amitriptyline  50 mg Oral QHS    enoxparin  40 mg Subcutaneous Q24H    fat emulsion 20%  250 mL Intravenous Daily    fentaNYL  1 patch Transdermal Q72H    hydrOXYzine  25 mg Per NG tube TID    insulin aspart U-100  1-10 Units Subcutaneous 6 times per day    insulin detemir U-100  25 Units Subcutaneous Daily    lamoTRIgine  150 mg Oral BID    lidocaine  1 patch Transdermal Q24H    magnesium sulfate IVPB  2 g Intravenous Once    metoprolol succinate  100 mg Oral BID    mirtazapine  15 mg Per NG tube Nightly    pantoprazole  40 mg Intravenous Daily    pregabalin  50 mg Per NG tube BID    sodium phosphate IVPB  30 mmol Intravenous Once     Continuous Infusions:    PRN Meds:.acetaminophen, acetaminophen, albuterol sulfate, artificial tears, dextrose 50%, diphenhydrAMINE, docusate, glucagon (human recombinant), hydrALAZINE, HYDROmorphone, ibuprofen,  ipratropium, labetalol, lidocaine HCl 2%, lorazepam, naloxone, ondansetron, promethazine (PHENERGAN) IVPB, sodium chloride 0.9%, sodium chloride 3%, tiZANidine    Objective:      Temp:  [97.6 °F (36.4 °C)-98.9 °F (37.2 °C)] 98.1 °F (36.7 °C)  Pulse:  [70-92] 86  Resp:  [14-20] 17  SpO2:  [98 %-100 %] 99 %  BP: (131-144)/(58-65) 143/65  Weight change: -0.9 kg (-15.8 oz)    Intake/Output Summary (Last 24 hours) at 2/23/2019 0931  Last data filed at 2/23/2019 0858  Gross per 24 hour   Intake 878.09 ml   Output 1580 ml   Net -701.91 ml     Lines:  Right arm PICC  GTube  WV    Physical Exam:  GEN:NAD  CV: RRR  PULM: unlabored with 2L NC  ABD: S/NT/ ND, WV - good granulation tissue at wound base, lower abdominal incision opened and also wound vac with connecting sponge bridge  EXT: Peripheral pulses present and equal bilaterally    Recent Labs   Lab 02/23/19  0500      K 4.4   CO2 32*   CL 98   BUN 13   CREATININE 0.6   CALCIUM 8.6*   PROT 6.4   AST 14   ALT 20   ALKPHOS 211*   BILITOT 0.4     Recent Labs   Lab 02/23/19  0500   WBC 10.36   HGB 8.7*   HCT 29.4*   *       Significant Imaging: I have reviewed all pertinent imaging results/findings within the past 24 hours.     Assessment and Plan:   62 yo F w/ PNET, s/p reversal of Sandeep-en-Y w/ Jtube placement  POD#28    1/25/18    Ex lap, gastro-gastrostomy EEA with 33 mm circular stapler, Pyloroplasty and gastrostomy closure, enteric resection with side to side to anstamosis and restoration of small bowel continuity using Idrive tan load stapler, Liver US, Liver biopsy, Liver resection segment 3, Supraceliac lymphadenectomy, FNA pancreas, Lt salpingo oopherectomy, Meckel diverticulectomy, Feeding jejunostomy 14 Fr malecot, ICG perfusion and Infrared exam of all anastamosis    Continue supportive care  Continue aggressive respiratory and CPT  Neuro - No sedatives, aspiration risk  FEN/GI - Continue NPO, TPN overnight, TFs 30 cc/hr, check residuals, replete  lytes prn,  ID - Last dose rocephin 2/13/19, fungitell 81, recheck fungitell, afebrile overnight, continuous microaspirations  Heme: H/H stable  Pulm - On NC. Aggressive chest CPT and acapella. ABGs. Resp Cx growing E Coli she has received full course rocephin F/U with speech regarding swallowing eval. Improving but still not safe.  Renal - Cr good, whitlock out, difficult to record accurate I/O/UOP but pt refuse wick   Surgical - WV in place with black sponge, change MWF  - will change today  PPX - continue lovenox, protonix, scd in place    Dispo - cont inpt care and abx, Pending LTAC placement due to Gtube, wound vac, and TPN necessity . Hopefully discharge to LTAC Monday.     Nash Titus MD   II  [phone]

## 2019-02-23 NOTE — PLAN OF CARE
Problem: Adult Inpatient Plan of Care  Goal: Plan of Care Review  Outcome: Ongoing (interventions implemented as appropriate)  Pt AAOx4. Pt complaints of pain and nausea with mild relief from PRN medications. 1x episode of emesis. Pt on 4L NC. PEG tube remains intact and increased to 30 mg. Pt remains NPO. L UA PICC remains CDI. TPN infusing at 50 ml/hr. Wound vac remains intact to abdominal incision. Cardiac monitoring continued. Blood glucose monitoring continued. Bed locked in lowest position, bed alarm set and call bell within reach. Pt verbalized understanding to call for any needs or assistance. Will continue to monitor.

## 2019-02-23 NOTE — NURSING
Checked residuals on patients PEG. Red, thick blood coming out of patients G-tube. Notified Dr. Titus, advised to clamp and turn off feeding and they would come up and take a look at it. Notified charge nurseAyanna.

## 2019-02-23 NOTE — PLAN OF CARE
02/23/19 1421   Type of Frequent Check   Type Patient Rounds;Other (see comments)  (VN Rounds, Refuses VN.)

## 2019-02-24 LAB
ALBUMIN SERPL BCP-MCNC: 2.4 G/DL
ALP SERPL-CCNC: 190 U/L
ALT SERPL W/O P-5'-P-CCNC: 17 U/L
ANION GAP SERPL CALC-SCNC: 5 MMOL/L
AST SERPL-CCNC: 18 U/L
BASOPHILS # BLD AUTO: 0.01 K/UL
BASOPHILS NFR BLD: 0.1 %
BILIRUB SERPL-MCNC: 0.4 MG/DL
BUN SERPL-MCNC: 12 MG/DL
CALCIUM SERPL-MCNC: 8.7 MG/DL
CHLORIDE SERPL-SCNC: 96 MMOL/L
CO2 SERPL-SCNC: 33 MMOL/L
CREAT SERPL-MCNC: 0.6 MG/DL
DIFFERENTIAL METHOD: ABNORMAL
EOSINOPHIL # BLD AUTO: 0.1 K/UL
EOSINOPHIL NFR BLD: 0.7 %
ERYTHROCYTE [DISTWIDTH] IN BLOOD BY AUTOMATED COUNT: 17.9 %
EST. GFR  (AFRICAN AMERICAN): >60 ML/MIN/1.73 M^2
EST. GFR  (NON AFRICAN AMERICAN): >60 ML/MIN/1.73 M^2
GLUCOSE SERPL-MCNC: 191 MG/DL
HCT VFR BLD AUTO: 29.7 %
HGB BLD-MCNC: 8.9 G/DL
LYMPHOCYTES # BLD AUTO: 1.1 K/UL
LYMPHOCYTES NFR BLD: 9.1 %
MAGNESIUM SERPL-MCNC: 1.9 MG/DL
MCH RBC QN AUTO: 28 PG
MCHC RBC AUTO-ENTMCNC: 30 G/DL
MCV RBC AUTO: 93 FL
MONOCYTES # BLD AUTO: 0.8 K/UL
MONOCYTES NFR BLD: 6.2 %
NEUTROPHILS # BLD AUTO: 10.1 K/UL
NEUTROPHILS NFR BLD: 83.4 %
PHOSPHATE SERPL-MCNC: 2.8 MG/DL
PLATELET # BLD AUTO: 523 K/UL
PMV BLD AUTO: 9.6 FL
POCT GLUCOSE: 121 MG/DL (ref 70–110)
POCT GLUCOSE: 162 MG/DL (ref 70–110)
POCT GLUCOSE: 173 MG/DL (ref 70–110)
POCT GLUCOSE: 211 MG/DL (ref 70–110)
POCT GLUCOSE: 266 MG/DL (ref 70–110)
POCT GLUCOSE: 304 MG/DL (ref 70–110)
POTASSIUM SERPL-SCNC: 4.2 MMOL/L
PROT SERPL-MCNC: 6.6 G/DL
RBC # BLD AUTO: 3.18 M/UL
SODIUM SERPL-SCNC: 134 MMOL/L
WBC # BLD AUTO: 12.15 K/UL

## 2019-02-24 PROCEDURE — 84100 ASSAY OF PHOSPHORUS: CPT

## 2019-02-24 PROCEDURE — C9113 INJ PANTOPRAZOLE SODIUM, VIA: HCPCS | Performed by: STUDENT IN AN ORGANIZED HEALTH CARE EDUCATION/TRAINING PROGRAM

## 2019-02-24 PROCEDURE — 27000221 HC OXYGEN, UP TO 24 HOURS

## 2019-02-24 PROCEDURE — 25000003 PHARM REV CODE 250: Performed by: STUDENT IN AN ORGANIZED HEALTH CARE EDUCATION/TRAINING PROGRAM

## 2019-02-24 PROCEDURE — B4185 PARENTERAL SOL 10 GM LIPIDS: HCPCS | Performed by: SURGERY

## 2019-02-24 PROCEDURE — 25000003 PHARM REV CODE 250: Performed by: SURGERY

## 2019-02-24 PROCEDURE — 63600175 PHARM REV CODE 636 W HCPCS: Performed by: SURGERY

## 2019-02-24 PROCEDURE — 94761 N-INVAS EAR/PLS OXIMETRY MLT: CPT

## 2019-02-24 PROCEDURE — 85025 COMPLETE CBC W/AUTO DIFF WBC: CPT

## 2019-02-24 PROCEDURE — 63600175 PHARM REV CODE 636 W HCPCS: Performed by: STUDENT IN AN ORGANIZED HEALTH CARE EDUCATION/TRAINING PROGRAM

## 2019-02-24 PROCEDURE — 97530 THERAPEUTIC ACTIVITIES: CPT

## 2019-02-24 PROCEDURE — 83735 ASSAY OF MAGNESIUM: CPT

## 2019-02-24 PROCEDURE — 80053 COMPREHEN METABOLIC PANEL: CPT

## 2019-02-24 PROCEDURE — 11000001 HC ACUTE MED/SURG PRIVATE ROOM

## 2019-02-24 PROCEDURE — 99900035 HC TECH TIME PER 15 MIN (STAT)

## 2019-02-24 RX ORDER — DRONABINOL 2.5 MG/1
2.5 CAPSULE ORAL 2 TIMES DAILY
Status: DISCONTINUED | OUTPATIENT
Start: 2019-02-24 | End: 2019-02-26 | Stop reason: HOSPADM

## 2019-02-24 RX ORDER — ESCITALOPRAM OXALATE 5 MG/5ML
10 SOLUTION ORAL DAILY
Status: DISCONTINUED | OUTPATIENT
Start: 2019-02-24 | End: 2019-02-26 | Stop reason: SINTOL

## 2019-02-24 RX ADMIN — LAMOTRIGINE 150 MG: 100 TABLET ORAL at 08:02

## 2019-02-24 RX ADMIN — HYDROMORPHONE HYDROCHLORIDE 0.4 MG: 1 INJECTION, SOLUTION INTRAMUSCULAR; INTRAVENOUS; SUBCUTANEOUS at 11:02

## 2019-02-24 RX ADMIN — ASCORBIC ACID, VITAMIN A PALMITATE, CHOLECALCIFEROL, THIAMINE HYDROCHLORIDE, RIBOFLAVIN-5 PHOSPHATE SODIUM, PYRIDOXINE HYDROCHLORIDE, NIACINAMIDE, DEXPANTHENOL, ALPHA-TOCOPHEROL ACETATE, VITAMIN K1, FOLIC ACID, BIOTIN, CYANOCOBALAMIN: 200; 3300; 200; 6; 3.6; 6; 40; 15; 10; 150; 600; 60; 5 INJECTION, SOLUTION INTRAVENOUS at 05:02

## 2019-02-24 RX ADMIN — HYDROMORPHONE HYDROCHLORIDE 0.4 MG: 1 INJECTION, SOLUTION INTRAMUSCULAR; INTRAVENOUS; SUBCUTANEOUS at 05:02

## 2019-02-24 RX ADMIN — HYDROXYZINE HYDROCHLORIDE 25 MG: 10 SOLUTION ORAL at 04:02

## 2019-02-24 RX ADMIN — LAMOTRIGINE 150 MG: 100 TABLET ORAL at 09:02

## 2019-02-24 RX ADMIN — PROMETHAZINE HYDROCHLORIDE 12.5 MG: 25 INJECTION INTRAMUSCULAR; INTRAVENOUS at 04:02

## 2019-02-24 RX ADMIN — I.V. FAT EMULSION 250 ML: 20 EMULSION INTRAVENOUS at 09:02

## 2019-02-24 RX ADMIN — METOPROLOL SUCCINATE 100 MG: 50 TABLET, EXTENDED RELEASE ORAL at 08:02

## 2019-02-24 RX ADMIN — METOCLOPRAMIDE 10 MG: 10 TABLET ORAL at 04:02

## 2019-02-24 RX ADMIN — METOCLOPRAMIDE 10 MG: 10 TABLET ORAL at 05:02

## 2019-02-24 RX ADMIN — LIDOCAINE 1 PATCH: 50 PATCH TOPICAL at 01:02

## 2019-02-24 RX ADMIN — ESCITALOPRAM OXALATE 10 MG: 5 SOLUTION ORAL at 11:02

## 2019-02-24 RX ADMIN — HYDROMORPHONE HYDROCHLORIDE 0.4 MG: 1 INJECTION, SOLUTION INTRAMUSCULAR; INTRAVENOUS; SUBCUTANEOUS at 04:02

## 2019-02-24 RX ADMIN — INSULIN ASPART 3 UNITS: 100 INJECTION, SOLUTION INTRAVENOUS; SUBCUTANEOUS at 08:02

## 2019-02-24 RX ADMIN — HYDROXYZINE HYDROCHLORIDE 25 MG: 10 SOLUTION ORAL at 09:02

## 2019-02-24 RX ADMIN — DRONABINOL 2.5 MG: 2.5 CAPSULE ORAL at 08:02

## 2019-02-24 RX ADMIN — PREGABALIN 50 MG: 50 CAPSULE ORAL at 09:02

## 2019-02-24 RX ADMIN — ENOXAPARIN SODIUM 40 MG: 100 INJECTION SUBCUTANEOUS at 09:02

## 2019-02-24 RX ADMIN — DRONABINOL 2.5 MG: 2.5 CAPSULE ORAL at 11:02

## 2019-02-24 RX ADMIN — INSULIN DETEMIR 25 UNITS: 100 INJECTION, SOLUTION SUBCUTANEOUS at 09:02

## 2019-02-24 RX ADMIN — INSULIN ASPART 8 UNITS: 100 INJECTION, SOLUTION INTRAVENOUS; SUBCUTANEOUS at 12:02

## 2019-02-24 RX ADMIN — HYDROXYZINE HYDROCHLORIDE 25 MG: 10 SOLUTION ORAL at 08:02

## 2019-02-24 RX ADMIN — AMITRIPTYLINE HYDROCHLORIDE 50 MG: 25 TABLET, FILM COATED ORAL at 08:02

## 2019-02-24 RX ADMIN — MIRTAZAPINE 15 MG: 15 TABLET, ORALLY DISINTEGRATING ORAL at 08:02

## 2019-02-24 RX ADMIN — INSULIN ASPART 4 UNITS: 100 INJECTION, SOLUTION INTRAVENOUS; SUBCUTANEOUS at 05:02

## 2019-02-24 RX ADMIN — INSULIN ASPART 1 UNITS: 100 INJECTION, SOLUTION INTRAVENOUS; SUBCUTANEOUS at 12:02

## 2019-02-24 RX ADMIN — METOPROLOL SUCCINATE 100 MG: 50 TABLET, EXTENDED RELEASE ORAL at 09:02

## 2019-02-24 RX ADMIN — ONDANSETRON 4 MG: 2 INJECTION INTRAMUSCULAR; INTRAVENOUS at 08:02

## 2019-02-24 RX ADMIN — ACETAMINOPHEN 650 MG: 650 SOLUTION ORAL at 08:02

## 2019-02-24 RX ADMIN — PREGABALIN 50 MG: 50 CAPSULE ORAL at 08:02

## 2019-02-24 RX ADMIN — PANTOPRAZOLE SODIUM 40 MG: 40 INJECTION, POWDER, FOR SOLUTION INTRAVENOUS at 09:02

## 2019-02-24 RX ADMIN — METOCLOPRAMIDE 10 MG: 10 TABLET ORAL at 11:02

## 2019-02-24 NOTE — PLAN OF CARE
02/24/19 0857   Type of Frequent Check   Type Patient Rounds;Other (see comments)  (VN Rounds, Patient refuses VN.)

## 2019-02-24 NOTE — PLAN OF CARE
Problem: Adult Inpatient Plan of Care  Goal: Plan of Care Review  Outcome: Ongoing (interventions implemented as appropriate)  AAOx4. Pt complaints of abdominal and lower back pain. PRN dilaudid given per MD orders with no relief. Dr. Ttius notified. One time dose of ativan and benadryl ordered and administered by CHANELLE Conde. Scopolamine patch applied behind left ear. Pt complaints of nausea with no episodes of emesis. Pt resting. Wound vac remains intact. Peg tube remains clamped until AM per MD orders. Pt remains NPO. TPN infusing at 50 ml/hr. Pt on 2L NC. Cardiac monitoring continued. Blood glucose monitoring continued. Sliding scale insulin given. Bed locked in lowest position, bed alarm set and call bell within reach. Pt verbalized understanding to call for any needs or assistance. Will continue to monitor.

## 2019-02-24 NOTE — PLAN OF CARE
Problem: Adult Inpatient Plan of Care  Goal: Plan of Care Review  Outcome: Ongoing (interventions implemented as appropriate)  Pt refused tx; Received pt on 3L NC. No distress noted. Will cont to monitor

## 2019-02-24 NOTE — PROGRESS NOTES
Dr. Titus notified about not being able to give PM meds due to NPO status and not being able to use PEG at this time. Dr. Titus came to see the pt and okay'd to give meds through peg and keep clamped after.

## 2019-02-24 NOTE — PT/OT/SLP PROGRESS
Physical Therapy Treatment    Patient Name:  Kaylee Ngo   MRN:  [id number]    Recommendations:     Discharge Recommendations:  nursing facility, skilled   Discharge Equipment Recommendations: (defer to SNF)   Barriers to discharge: decreased mobility strength and endurance    Assessment:     Kaylee Ngo is a 63 y.o. female admitted with a medical diagnosis of Malignant carcinoid tumor of pancreas.  She presents with the following impairments/functional limitations:  weakness, impaired endurance, impaired functional mobilty, decreased upper extremity function, decreased lower extremity function, pain, decreased ROM, impaired coordination, impaired skin.pt limited by pain and nausea this AM,pt remains weak and will benefit from SNF upon discharge.    Rehab Prognosis: Fair; patient would benefit from acute skilled PT services to address these deficits and reach maximum level of function.    Recent Surgery: Procedure(s) (LRB):  EGD, WITH PEG TUBE INSERTION (N/A) 3 Days Post-Op    Plan:     During this hospitalization, patient to be seen 5 x/week to address the identified rehab impairments via gait training, therapeutic activities, therapeutic exercises, neuromuscular re-education and progress toward the following goals:    · Plan of Care Expires:  03/19/19    Subjective     Chief Complaint: n/a  Patient/Family Comments/goals: pt feels like she has to throw up  Pain/Comfort:  · Pain Rating 1: (no rating)  · Location - Orientation 1: generalized  · Location 1: abdomen  · Pain Addressed 1: Nurse notified      Objective:     Communicated with nsg prior to session.  Patient found all lines intact, call button in reach, bed alarm on and nsg notified oxygen, PICC line, PEG Tube, peripheral IV  upon PT entry to room.     General Precautions: Standard, fall   Orthopedic Precautions:N/A   Braces: N/A     Functional Mobility:  · Gait: n/a      AM-PAC 6 CLICK MOBILITY  Turning over in bed (including adjusting bedclothes,  sheets and blankets)?: 2  Sitting down on and standing up from a chair with arms (e.g., wheelchair, bedside commode, etc.): 2  Moving from lying on back to sitting on the side of the bed?: 2  Moving to and from a bed to a chair (including a wheelchair)?: 2  Need to walk in hospital room?: 2  Climbing 3-5 steps with a railing?: 1  Basic Mobility Total Score: 11       Therapeutic Activities and Exercises: initiated le ex's and then discontinued rx secondary to nausea,issued pt cool rag and informed nsg       Patient left supine with all lines intact, call button in reach, bed alarm on and nsg notified..    GOALS: see general POC  Multidisciplinary Problems     Physical Therapy Goals        Problem: Physical Therapy Goal    Goal Priority Disciplines Outcome Goal Variances Interventions   Physical Therapy Goal     PT, PT/OT             Problem: Physical Therapy Goal    Goal Priority Disciplines Outcome Goal Variances Interventions   Physical Therapy Goal     PT, PT/OT Ongoing (interventions implemented as appropriate)     Description:  Goal: Physical Therapy Goal  Updated Goals 2/8/2019:  1. Supine<>sit with SBA  2. Sit to stand with  CGA  3. Bed to chair with CGA  4. Gait x 20 ft withmin A                     Time Tracking:     PT Received On: 02/24/19  PT Start Time: 0804     PT Stop Time: 0814  PT Total Time (min): 10 min     Billable Minutes: Therapeutic Activity 10    Treatment Type: Treatment  PT/PTA: PTA     PTA Visit Number: 1     Adeel Betts, PTA  02/24/2019

## 2019-02-24 NOTE — PLAN OF CARE
Problem: Physical Therapy Goal  Goal: Physical Therapy Goal  Goal: Physical Therapy Goal  Updated Goals 2/8/2019:  1. Supine<>sit with SBA  2. Sit to stand with  CGA  3. Bed to chair with CGA  4. Gait x 20 ft withmin A    Outcome: Ongoing (interventions implemented as appropriate)  Goals ongoing

## 2019-02-24 NOTE — PROGRESS NOTES
[facility]  Neuroendocrine Tumor Service  Progress Note     Admission Date: 1/25/2019  Hospital Length of Stay: 30  Principal Problem: Malignant carcinoid tumor of pancreas     Subjective:      Operation:  64 yo F w/ PNET, s/p reversal of Sandeep-en-Y w/ Jtube placement  POD#29    1/25/18    Ex lap, gastro-gastrostomy EEA with 33 mm circular stapler, Pyloroplasty and gastrostomy closure, enteric resection with side to side to anstamosis and restoration of small bowel continuity using Idrive tan load stapler, Liver US, Liver biopsy, Liver resection segment 3, Supraceliac lymphadenectomy, FNA pancreas, Lt salpingo oopherectomy, Meckel diverticulectomy, Feeding jejunostomy 14 Fr malecot, ICG perfusion and Infrared exam of all anastamosis    Interval History:   Patient feeling depressed and down. Passing flatus but no BM. No nausea or vomiting. Patient had tube feeds held and still complaining of nausea despite addition of Reglan and scopolamine patch.    Scheduled Meds:   albuterol-ipratropium  3 mL Nebulization Q4H    amitriptyline  50 mg Oral QHS    dronabinol  2.5 mg Oral BID    enoxparin  40 mg Subcutaneous Q24H    escitalopram oxalate  10 mg Oral Daily    fentaNYL  1 patch Transdermal Q72H    hydrOXYzine  25 mg Per NG tube TID    insulin aspart U-100  1-10 Units Subcutaneous 6 times per day    insulin detemir U-100  25 Units Subcutaneous Daily    lamoTRIgine  150 mg Oral BID    lidocaine  1 patch Transdermal Q24H    metoclopramide HCl  10 mg Oral TID AC    metoprolol succinate  100 mg Oral BID    mirtazapine  15 mg Per NG tube Nightly    pantoprazole  40 mg Intravenous Daily    pregabalin  50 mg Per NG tube BID    scopolamine  1 patch Transdermal Q3 Days     Continuous Infusions:   Amino acid 5% - dextrose 15% (CLINIMIX-E) solution with additives (1L  provides 510 kcal/L dextrose, with 50 gm AA, 150 gm CHO, Na 35, K 30, Mg 5, Ca 4.5, Acetate 80, Cl 39, Phos 15) 50  mL/hr at 02/23/19 2040     PRN Meds:.acetaminophen, acetaminophen, albuterol sulfate, artificial tears, dextrose 50%, diphenhydrAMINE, docusate, glucagon (human recombinant), hydrALAZINE, HYDROmorphone, ibuprofen, ipratropium, labetalol, lidocaine HCl 2%, lorazepam, naloxone, ondansetron, promethazine (PHENERGAN) IVPB, sodium chloride 0.9%, sodium chloride 3%, tiZANidine    Objective:      Temp:  [97.2 °F (36.2 °C)-98.5 °F (36.9 °C)] 97.5 °F (36.4 °C)  Pulse:  [77-96] 96  Resp:  [15-20] 18  SpO2:  [96 %-99 %] 97 %  BP: (132-159)/(66-85) 158/71  Weight change: 2.1 kg (4 lb 10.1 oz)    Intake/Output Summary (Last 24 hours) at 2/24/2019 1001  Last data filed at 2/24/2019 0504  Gross per 24 hour   Intake 962.27 ml   Output 1300 ml   Net -337.73 ml     Lines:  Right arm PICC  GTube  WV    Physical Exam:  GEN:NAD  CV: RRR  PULM: unlabored with 2L NC  ABD: S/NT/ ND, WV - good granulation tissue at wound base, lower abdominal incision opened and also wound vac with connecting sponge bridge, J-tube  EXT: Peripheral pulses present and equal bilaterally    Recent Labs   Lab 02/24/19  0434   *   K 4.2   CO2 33*   CL 96   BUN 12   CREATININE 0.6   CALCIUM 8.7   PROT 6.6   AST 18   ALT 17   ALKPHOS 190*   BILITOT 0.4     Recent Labs   Lab 02/24/19  0434   WBC 12.15   HGB 8.9*   HCT 29.7*   *       Significant Imaging: I have reviewed all pertinent imaging results/findings within the past 24 hours.     Assessment and Plan:   64 yo F w/ PNET, s/p reversal of Sandeep-en-Y w/ Jtube placement  POD#29    1/25/18    Ex lap, gastro-gastrostomy EEA with 33 mm circular stapler, Pyloroplasty and gastrostomy closure, enteric resection with side to side to anstamosis and restoration of small bowel continuity using Idrive tan load stapler, Liver US, Liver biopsy, Liver resection segment 3, Supraceliac lymphadenectomy, FNA pancreas, Lt salpingo oopherectomy, Meckel diverticulectomy, Feeding jejunostomy 14 Fr malecot, ICG perfusion and  Infrared exam of all anastamosis    Continue supportive care  Continue aggressive respiratory and CPT  Neuro - No sedatives, aspiration risk  FEN/GI - Continue NPO, TPN overnight, TF held overnight will restart at 10 cc today, check residuals, replete lytes prn, added reglan and scopalamine for nausea control  ID - Last dose rocephin 2/13/19, fungitell 81, recheck fungitell, afebrile overnight, continuous microaspirations  Heme: H/H stable  Pulm - On NC. Aggressive chest CPT and acapella. ABGs. Resp Cx growing E Coli she has received full course rocephin F/U with speech regarding swallowing eval. Improving but still not safe.  Renal - Cr good, whitlock out, difficult to record accurate I/O/UOP but pt refuse wick   Surgical - WV in place with black sponge, change MWF  - will change today  PPX - continue lovenox, protonix, scd in place    Dispo - cont inpt care and abx, Pending LTAC placement due to Gtube, wound vac, and TPN necessity . Hopefully discharge to LTAC Monday.     Nash Titus MD   II  [phone]

## 2019-02-25 PROBLEM — T81.31XA SURGICAL WOUND DEHISCENCE: Status: ACTIVE | Noted: 2019-02-25

## 2019-02-25 LAB
ALBUMIN SERPL BCP-MCNC: 2.4 G/DL
ALP SERPL-CCNC: 203 U/L
ALT SERPL W/O P-5'-P-CCNC: 21 U/L
ANION GAP SERPL CALC-SCNC: 7 MMOL/L
AST SERPL-CCNC: 19 U/L
BASOPHILS # BLD AUTO: 0.01 K/UL
BASOPHILS NFR BLD: 0.1 %
BILIRUB SERPL-MCNC: 0.4 MG/DL
BUN SERPL-MCNC: 18 MG/DL
CALCIUM SERPL-MCNC: 9.1 MG/DL
CHLORIDE SERPL-SCNC: 95 MMOL/L
CO2 SERPL-SCNC: 31 MMOL/L
CREAT SERPL-MCNC: 0.6 MG/DL
DIFFERENTIAL METHOD: ABNORMAL
EOSINOPHIL # BLD AUTO: 0.1 K/UL
EOSINOPHIL NFR BLD: 0.7 %
ERYTHROCYTE [DISTWIDTH] IN BLOOD BY AUTOMATED COUNT: 17.6 %
EST. GFR  (AFRICAN AMERICAN): >60 ML/MIN/1.73 M^2
EST. GFR  (NON AFRICAN AMERICAN): >60 ML/MIN/1.73 M^2
GLUCOSE SERPL-MCNC: 280 MG/DL
HCT VFR BLD AUTO: 30.3 %
HGB BLD-MCNC: 9 G/DL
LYMPHOCYTES # BLD AUTO: 1 K/UL
LYMPHOCYTES NFR BLD: 7 %
MAGNESIUM SERPL-MCNC: 1.8 MG/DL
MCH RBC QN AUTO: 27.7 PG
MCHC RBC AUTO-ENTMCNC: 29.7 G/DL
MCV RBC AUTO: 93 FL
MONOCYTES # BLD AUTO: 0.9 K/UL
MONOCYTES NFR BLD: 6 %
NEUTROPHILS # BLD AUTO: 12.2 K/UL
NEUTROPHILS NFR BLD: 85.6 %
PHOSPHATE SERPL-MCNC: 3.5 MG/DL
PLATELET # BLD AUTO: 490 K/UL
PMV BLD AUTO: 9.2 FL
POCT GLUCOSE: 157 MG/DL (ref 70–110)
POCT GLUCOSE: 259 MG/DL (ref 70–110)
POCT GLUCOSE: 351 MG/DL (ref 70–110)
POCT GLUCOSE: 54 MG/DL (ref 70–110)
POCT GLUCOSE: 93 MG/DL (ref 70–110)
POCT GLUCOSE: 99 MG/DL (ref 70–110)
POTASSIUM SERPL-SCNC: 4.8 MMOL/L
PROT SERPL-MCNC: 6.9 G/DL
RBC # BLD AUTO: 3.25 M/UL
SODIUM SERPL-SCNC: 133 MMOL/L
WBC # BLD AUTO: 14.28 K/UL

## 2019-02-25 PROCEDURE — 25000003 PHARM REV CODE 250: Performed by: STUDENT IN AN ORGANIZED HEALTH CARE EDUCATION/TRAINING PROGRAM

## 2019-02-25 PROCEDURE — 85025 COMPLETE CBC W/AUTO DIFF WBC: CPT

## 2019-02-25 PROCEDURE — 63600175 PHARM REV CODE 636 W HCPCS: Performed by: SURGERY

## 2019-02-25 PROCEDURE — 84100 ASSAY OF PHOSPHORUS: CPT

## 2019-02-25 PROCEDURE — 83735 ASSAY OF MAGNESIUM: CPT

## 2019-02-25 PROCEDURE — 97535 SELF CARE MNGMENT TRAINING: CPT

## 2019-02-25 PROCEDURE — 80053 COMPREHEN METABOLIC PANEL: CPT

## 2019-02-25 PROCEDURE — 25000242 PHARM REV CODE 250 ALT 637 W/ HCPCS: Performed by: SURGERY

## 2019-02-25 PROCEDURE — 94761 N-INVAS EAR/PLS OXIMETRY MLT: CPT

## 2019-02-25 PROCEDURE — 11000001 HC ACUTE MED/SURG PRIVATE ROOM

## 2019-02-25 PROCEDURE — 97530 THERAPEUTIC ACTIVITIES: CPT

## 2019-02-25 PROCEDURE — 94640 AIRWAY INHALATION TREATMENT: CPT

## 2019-02-25 PROCEDURE — 63600175 PHARM REV CODE 636 W HCPCS: Performed by: STUDENT IN AN ORGANIZED HEALTH CARE EDUCATION/TRAINING PROGRAM

## 2019-02-25 PROCEDURE — C9113 INJ PANTOPRAZOLE SODIUM, VIA: HCPCS | Performed by: STUDENT IN AN ORGANIZED HEALTH CARE EDUCATION/TRAINING PROGRAM

## 2019-02-25 PROCEDURE — 99900035 HC TECH TIME PER 15 MIN (STAT)

## 2019-02-25 PROCEDURE — 97110 THERAPEUTIC EXERCISES: CPT

## 2019-02-25 PROCEDURE — 25000003 PHARM REV CODE 250: Performed by: SURGERY

## 2019-02-25 PROCEDURE — 94664 DEMO&/EVAL PT USE INHALER: CPT

## 2019-02-25 PROCEDURE — B4185 PARENTERAL SOL 10 GM LIPIDS: HCPCS | Performed by: SURGERY

## 2019-02-25 PROCEDURE — 27000221 HC OXYGEN, UP TO 24 HOURS

## 2019-02-25 RX ORDER — HYDROMORPHONE HYDROCHLORIDE 1 MG/ML
0.5 INJECTION, SOLUTION INTRAMUSCULAR; INTRAVENOUS; SUBCUTANEOUS ONCE
Status: COMPLETED | OUTPATIENT
Start: 2019-02-25 | End: 2019-02-25

## 2019-02-25 RX ORDER — HYDROMORPHONE HYDROCHLORIDE 1 MG/ML
0.5 INJECTION, SOLUTION INTRAMUSCULAR; INTRAVENOUS; SUBCUTANEOUS EVERY 6 HOURS PRN
Status: DISCONTINUED | OUTPATIENT
Start: 2019-02-25 | End: 2019-02-25

## 2019-02-25 RX ADMIN — PREGABALIN 50 MG: 50 CAPSULE ORAL at 09:02

## 2019-02-25 RX ADMIN — DEXTROSE MONOHYDRATE 25 G: 25 INJECTION, SOLUTION INTRAVENOUS at 07:02

## 2019-02-25 RX ADMIN — HYDROMORPHONE HYDROCHLORIDE 0.4 MG: 1 INJECTION, SOLUTION INTRAMUSCULAR; INTRAVENOUS; SUBCUTANEOUS at 12:02

## 2019-02-25 RX ADMIN — MIRTAZAPINE 15 MG: 15 TABLET, ORALLY DISINTEGRATING ORAL at 09:02

## 2019-02-25 RX ADMIN — ENOXAPARIN SODIUM 40 MG: 100 INJECTION SUBCUTANEOUS at 09:02

## 2019-02-25 RX ADMIN — HYDROXYZINE HYDROCHLORIDE 25 MG: 10 SOLUTION ORAL at 09:02

## 2019-02-25 RX ADMIN — LAMOTRIGINE 150 MG: 100 TABLET ORAL at 09:02

## 2019-02-25 RX ADMIN — METOCLOPRAMIDE 10 MG: 10 TABLET ORAL at 06:02

## 2019-02-25 RX ADMIN — INSULIN ASPART 10 UNITS: 100 INJECTION, SOLUTION INTRAVENOUS; SUBCUTANEOUS at 12:02

## 2019-02-25 RX ADMIN — TIZANIDINE 4 MG: 4 TABLET ORAL at 09:02

## 2019-02-25 RX ADMIN — HYDROXYZINE HYDROCHLORIDE 25 MG: 10 SOLUTION ORAL at 02:02

## 2019-02-25 RX ADMIN — INSULIN DETEMIR 25 UNITS: 100 INJECTION, SOLUTION SUBCUTANEOUS at 09:02

## 2019-02-25 RX ADMIN — AMITRIPTYLINE HYDROCHLORIDE 50 MG: 25 TABLET, FILM COATED ORAL at 09:02

## 2019-02-25 RX ADMIN — DRONABINOL 2.5 MG: 2.5 CAPSULE ORAL at 09:02

## 2019-02-25 RX ADMIN — METOPROLOL SUCCINATE 100 MG: 50 TABLET, EXTENDED RELEASE ORAL at 09:02

## 2019-02-25 RX ADMIN — METOCLOPRAMIDE 10 MG: 10 TABLET ORAL at 05:02

## 2019-02-25 RX ADMIN — I.V. FAT EMULSION 250 ML: 20 EMULSION INTRAVENOUS at 11:02

## 2019-02-25 RX ADMIN — METOCLOPRAMIDE 10 MG: 10 TABLET ORAL at 12:02

## 2019-02-25 RX ADMIN — INSULIN ASPART 4 UNITS: 100 INJECTION, SOLUTION INTRAVENOUS; SUBCUTANEOUS at 06:02

## 2019-02-25 RX ADMIN — INSULIN HUMAN 10 UNITS: 100 INJECTION, SOLUTION PARENTERAL at 02:02

## 2019-02-25 RX ADMIN — IPRATROPIUM BROMIDE AND ALBUTEROL SULFATE 3 ML: .5; 3 SOLUTION RESPIRATORY (INHALATION) at 09:02

## 2019-02-25 RX ADMIN — LIDOCAINE 1 PATCH: 50 PATCH TOPICAL at 12:02

## 2019-02-25 RX ADMIN — PANTOPRAZOLE SODIUM 40 MG: 40 INJECTION, POWDER, FOR SOLUTION INTRAVENOUS at 09:02

## 2019-02-25 RX ADMIN — INSULIN ASPART 1 UNITS: 100 INJECTION, SOLUTION INTRAVENOUS; SUBCUTANEOUS at 11:02

## 2019-02-25 RX ADMIN — HYDROMORPHONE HYDROCHLORIDE 0.5 MG: 1 INJECTION, SOLUTION INTRAMUSCULAR; INTRAVENOUS; SUBCUTANEOUS at 04:02

## 2019-02-25 RX ADMIN — HYDROMORPHONE HYDROCHLORIDE 0.4 MG: 1 INJECTION, SOLUTION INTRAMUSCULAR; INTRAVENOUS; SUBCUTANEOUS at 06:02

## 2019-02-25 RX ADMIN — ASCORBIC ACID, VITAMIN A PALMITATE, CHOLECALCIFEROL, THIAMINE HYDROCHLORIDE, RIBOFLAVIN-5 PHOSPHATE SODIUM, PYRIDOXINE HYDROCHLORIDE, NIACINAMIDE, DEXPANTHENOL, ALPHA-TOCOPHEROL ACETATE, VITAMIN K1, FOLIC ACID, BIOTIN, CYANOCOBALAMIN: 200; 3300; 200; 6; 3.6; 6; 40; 15; 10; 150; 600; 60; 5 INJECTION, SOLUTION INTRAVENOUS at 09:02

## 2019-02-25 RX ADMIN — FENTANYL 1 PATCH: 25 PATCH, EXTENDED RELEASE TRANSDERMAL at 04:02

## 2019-02-25 RX ADMIN — ONDANSETRON 4 MG: 2 INJECTION INTRAMUSCULAR; INTRAVENOUS at 05:02

## 2019-02-25 RX ADMIN — ESCITALOPRAM OXALATE 10 MG: 5 SOLUTION ORAL at 09:02

## 2019-02-25 NOTE — PT/OT/SLP PROGRESS
Physical Therapy Treatment    Patient Name:  Kaylee Ngo   MRN:  [id number]    Recommendations:     Discharge Recommendations:  nursing facility, skilled   Discharge Equipment Recommendations: (defer to SNF)   Barriers to discharge: decreased mobility,strength and motivation    Assessment:     Kaylee Ngo is a 63 y.o. female admitted with a medical diagnosis of Malignant carcinoid tumor of pancreas.  She presents with the following impairments/functional limitations:  weakness, impaired endurance, impaired functional mobilty, decreased upper extremity function, decreased lower extremity function, pain, decreased ROM, impaired coordination, impaired skin,pt with decreased mobility,endurance and strength,pt requires increased motivation to progress rx,pt will benefit from continuing PT services upon discharge.    Rehab Prognosis: Fair; patient would benefit from acute skilled PT services to address these deficits and reach maximum level of function.    Recent Surgery: Procedure(s) (LRB):  EGD, WITH PEG TUBE INSERTION (N/A) 4 Days Post-Op    Plan:     During this hospitalization, patient to be seen 5 x/week to address the identified rehab impairments via gait training, therapeutic activities, therapeutic exercises and progress toward the following goals:    · Plan of Care Expires:  03/19/19    Subjective     Chief Complaint: n/a  Patient/Family Comments/goals: pt will vomit if she trys to sit up  Pain/Comfort:  · Pain Rating 1: 10/10  · Location - Orientation 1: generalized  · Location 1: back  · Pain Addressed 1: Nurse notified  · Pain Rating Post-Intervention 1: 10/10      Objective:     Communicated with nsg prior to session.  Patient found all lines intact, call button in reach, bed alarm on, nsg notified and family present oxygen, peripheral IV, PICC line, wound vac  upon PT entry to room.     General Precautions: Standard, fall   Orthopedic Precautions:N/A   Braces: N/A     Functional Mobility:  · Gait:  n/a      AM-PAC 6 CLICK MOBILITY  Turning over in bed (including adjusting bedclothes, sheets and blankets)?: 2  Sitting down on and standing up from a chair with arms (e.g., wheelchair, bedside commode, etc.): 2  Moving from lying on back to sitting on the side of the bed?: 2  Moving to and from a bed to a chair (including a wheelchair)?: 2  Need to walk in hospital room?: 2  Climbing 3-5 steps with a railing?: 1  Basic Mobility Total Score: 11       Therapeutic Activities and Exercises: le supine ex's X 10-12 reps inc: ap,qs,hs,abd/add,slr with CGA,pt refused to sit EOB.       Patient left supine with all lines intact, call button in reach, bed alarm on, nsg notified and family present..    GOALS: see general POC  Multidisciplinary Problems     Physical Therapy Goals        Problem: Physical Therapy Goal    Goal Priority Disciplines Outcome Goal Variances Interventions   Physical Therapy Goal     PT, PT/OT             Problem: Physical Therapy Goal    Goal Priority Disciplines Outcome Goal Variances Interventions   Physical Therapy Goal     PT, PT/OT Ongoing (interventions implemented as appropriate)     Description:  Goal: Physical Therapy Goal  Updated Goals 2/8/2019:  1. Supine<>sit with SBA  2. Sit to stand with  CGA  3. Bed to chair with CGA  4. Gait x 20 ft withmin A                     Time Tracking:     PT Received On: 02/25/19  PT Start Time: 1052     PT Stop Time: 1104  PT Total Time (min): 12 min     Billable Minutes: Therapeutic Exercise 11    Treatment Type: Treatment  PT/PTA: PTA     PTA Visit Number: 2     Adeel Betts, FABIENNE  02/25/2019

## 2019-02-25 NOTE — PLAN OF CARE
Problem: Adult Inpatient Plan of Care  Goal: Plan of Care Review  Outcome: Ongoing (interventions implemented as appropriate)  Pt refused tx; still feeling nauseous. Sp02 100 on 3L NC. Will cont to monitor

## 2019-02-25 NOTE — PROGRESS NOTES
Dr. CAROL Titus at bedside, wound vac removed, wounds cleaned and dressed as directed. Pt to be discharge tomorrow to Rehab, Dr. Titus is requesting new wound care consult after discharge.

## 2019-02-25 NOTE — PLAN OF CARE
Problem: Adult Inpatient Plan of Care  Goal: Plan of Care Review  Outcome: Ongoing (interventions implemented as appropriate)  Pt AAOx4. Pt complaints of pain and nausea with mild relief from PRN medications. Pt on 3L NC. PEG tube remains intact, dressing changed today and decreased to 10 mL/hr with goal of 30 ml/hr. Pt remains NPO. L UA PICC remains CDI. TPN infusing at 50 ml/hr. Wound vac remains intact to abdominal incision. Cardiac monitoring continued. Blood glucose monitoring continued with sliding scale given as needed. Bed locked in lowest position, bed alarm set and call bell within reach. Pt verbalized understanding to call for any needs or assistance. Will continue to monitor.

## 2019-02-25 NOTE — PLAN OF CARE
Problem: Adult Inpatient Plan of Care  Goal: Plan of Care Review  Outcome: Ongoing (interventions implemented as appropriate)  Pt AAOx4, family members at bedside for half of shift. Pt complains of constant pain and intermittent nausea and emesis x1. Pt remains NPO except meds, TPN and tube feedings infusing per order set. PICC line dressing CDI. Wound vac intact to midline abd incision. Encouraged pt to turn in bed, pt refusing. Cardiac monitoring and blood glucose checks continued. Safety maintained, bed alarm on - will cont to monitor.

## 2019-02-25 NOTE — PLAN OF CARE
VN attempted to cue into room but patient refusing virtual nurse interaction. Camera and monitor in room off.

## 2019-02-25 NOTE — PROGRESS NOTES
TN met with MD fishman to do peer to peer with David today from 2pm - 3:30 pm     Lawrence at [facility]  p #  [phone] notified - TN gave her Surgery MD's name and phone #

## 2019-02-25 NOTE — PROGRESS NOTES
[facility]  Neuroendocrine Tumor Service  Progress Note     Admission Date: 1/25/2019  Hospital Length of Stay: 31  Principal Problem: Malignant carcinoid tumor of pancreas     Subjective:      Operation:  62 yo F w/ PNET, s/p reversal of Sandeep-en-Y w/ Jtube placement  POD#30    1/25/18    Ex lap, gastro-gastrostomy EEA with 33 mm circular stapler, Pyloroplasty and gastrostomy closure, enteric resection with side to side to anstamosis and restoration of small bowel continuity using Idrive tan load stapler, Liver US, Liver biopsy, Liver resection segment 3, Supraceliac lymphadenectomy, FNA pancreas, Lt salpingo oopherectomy, Meckel diverticulectomy, Feeding jejunostomy 14 Fr malecot, ICG perfusion and Infrared exam of all anastamosis    Interval History:   Patient feeling a little better but still complaining of back pain and inability to swallow well. She states she does not feel well. She states she still feels nauseous and had 3 episodes of emesis although it is not documented.    Scheduled Meds:   albuterol-ipratropium  3 mL Nebulization Q4H    amitriptyline  50 mg Oral QHS    dronabinol  2.5 mg Oral BID    enoxparin  40 mg Subcutaneous Q24H    escitalopram oxalate  10 mg Oral Daily    fat emulsion 20%  250 mL Intravenous Daily    fentaNYL  1 patch Transdermal Q72H    hydrOXYzine  25 mg Per NG tube TID    insulin aspart U-100  1-10 Units Subcutaneous 6 times per day    insulin detemir U-100  25 Units Subcutaneous Daily    lamoTRIgine  150 mg Oral BID    lidocaine  1 patch Transdermal Q24H    metoclopramide HCl  10 mg Oral TID AC    metoprolol succinate  100 mg Oral BID    mirtazapine  15 mg Per NG tube Nightly    pantoprazole  40 mg Intravenous Daily    pregabalin  50 mg Per NG tube BID    scopolamine  1 patch Transdermal Q3 Days     Continuous Infusions:   Amino acid 5% - dextrose 15% (CLINIMIX-E) solution with additives (1L  provides 510 kcal/L dextrose,  with 50 gm AA, 150 gm CHO, Na 35, K 30, Mg 5, Ca 4.5, Acetate 80, Cl 39, Phos 15) 50 mL/hr at 02/24/19 1748    Amino acid 5% - dextrose 15% (CLINIMIX-E) solution with additives (1L  provides 510 kcal/L dextrose, with 50 gm AA, 150 gm CHO, Na 35, K 30, Mg 5, Ca 4.5, Acetate 80, Cl 39, Phos 15)       PRN Meds:.acetaminophen, acetaminophen, albuterol sulfate, artificial tears, dextrose 50%, diphenhydrAMINE, docusate, glucagon (human recombinant), hydrALAZINE, HYDROmorphone, ibuprofen, ipratropium, labetalol, lidocaine HCl 2%, lorazepam, naloxone, ondansetron, promethazine (PHENERGAN) IVPB, sodium chloride 0.9%, sodium chloride 3%, tiZANidine    Objective:      Temp:  [96.9 °F (36.1 °C)-99.3 °F (37.4 °C)] 98.4 °F (36.9 °C)  Pulse:  [] 84  Resp:  [15-20] 16  SpO2:  [95 %-100 %] 97 %  BP: (101-136)/(51-65) 129/63  Weight change: -0.7 kg (-8.7 oz)    Intake/Output Summary (Last 24 hours) at 2/25/2019 1228  Last data filed at 2/25/2019 0600  Gross per 24 hour   Intake 1830 ml   Output 1775 ml   Net 55 ml     Lines:  Right arm PICC  GTube  WV    Physical Exam:  GEN:NAD  CV: RRR  PULM: unlabored with 2L NC  ABD: S/NT/ ND, WV - good granulation tissue at wound base, lower abdominal incision opened and also wound vac with connecting sponge bridge, J-tube  EXT: Peripheral pulses present and equal bilaterally    Recent Labs   Lab 02/25/19  0620   *   K 4.8   CO2 31*   CL 95   BUN 18   CREATININE 0.6   CALCIUM 9.1   PROT 6.9   AST 19   ALT 21   ALKPHOS 203*   BILITOT 0.4     Recent Labs   Lab 02/25/19  0620   WBC 14.28*   HGB 9.0*   HCT 30.3*   *       Significant Imaging: I have reviewed all pertinent imaging results/findings within the past 24 hours.     Assessment and Plan:   62 yo F w/ PNET, s/p reversal of Sandeep-en-Y w/ Jtube placement  POD#30    1/25/18    Ex lap, gastro-gastrostomy EEA with 33 mm circular stapler, Pyloroplasty and gastrostomy closure, enteric resection with side to side to anstamosis and  restoration of small bowel continuity using Idrive tan load stapler, Liver US, Liver biopsy, Liver resection segment 3, Supraceliac lymphadenectomy, FNA pancreas, Lt salpingo oopherectomy, Meckel diverticulectomy, Feeding jejunostomy 14 Fr malecot, ICG perfusion and Infrared exam of all anastamosis    Continue supportive care  Continue aggressive respiratory and CPT  Neuro - No sedatives, aspiration risk  FEN/GI - Continue NPO, TPN overnight, TF held overnight will restart at 10 cc today, check residuals, replete lytes prn, added reglan and scopalamine for nausea control. Patient with continued nausea and emesis held TF for now. Persistent hyperglycemia will adjust basal insulin  ID - Last dose rocephin 2/13/19, fungitell 81, recheck fungitell, afebrile overnight, continuous microaspirations, patient with new leukocytosis WBC: of 14.28. Will continue to monitor  Heme: H/H stable  Pulm - On NC. Aggressive chest CPT and acapella. ABGs. Resp Cx growing E Coli she has received full course rocephin F/U with speech regarding swallowing eval.  Renal - Cr good, whitlock out, difficult to record accurate I/O/UOP but pt refuse wick   Surgical - WV in place with black sponge, change MWF  - will change today  PPX - continue lovenox, protonix, scd in place    Dispo - cont inpt care and abx, Pending LTAC placement due to Gtube, wound vac, and TPN necessity .    Nash Titus MD   II  [phone]

## 2019-02-25 NOTE — PLAN OF CARE
Problem: Occupational Therapy Goal  Goal: Occupational Therapy Goal  Goals to be met by: 3/7/2019     Patient will increase functional independence with ADLs by performing:    UE Dressing with Minimal Assistance.  LE Dressing with Moderate Assistance.  Grooming while seated with Minimal Assistance. --MET 2/18  Toileting from bedside commode with Moderate Assistance for hygiene and clothing management.   Sitting at edge of bed x10 minutes with Moderate Assistance. --MET 2/25  Supine to sit with Maximum Assistance. --MET 2/25  Increased functional UE strength to 3/5       Outcome: Ongoing (interventions implemented as appropriate)  Patient with improved affect and participation this date. Still presents with decreased strength and endurance. Patient will benefit from continued skilled OT to address deficits and improve performance in functional ADL tasks. Cont OT per POC.

## 2019-02-25 NOTE — PROGRESS NOTES
Reorder Clinimix-E 5/15% with MVI and Trace elements at 50 ml/hr and Fat Emulsion 20% 250 ml at 20 ml/hr x 12 hrs per pharmacy protocol

## 2019-02-25 NOTE — PT/OT/SLP PROGRESS
Occupational Therapy   Treatment    Name: Kaylee Ngo  MRN: [id number]  Admitting Diagnosis:  Malignant carcinoid tumor of pancreas  4 Days Post-Op    Recommendations:     Discharge Recommendations: nursing facility, skilled  Discharge Equipment Recommendations:  (Defer to SNF)  Barriers to discharge:  None    Assessment:   Patient with improved affect and participation this date. Still presents with decreased strength and endurance. Patient will benefit from continued skilled OT to address deficits and improve performance in functional ADL tasks. Cont OT per POC.    Kaylee Nog is a 63 y.o. female with a medical diagnosis of Malignant carcinoid tumor of pancreas. Performance deficits affecting function are weakness, impaired endurance, impaired self care skills, impaired functional mobilty, gait instability, impaired balance, impaired cognition, decreased coordination, decreased safety awareness, decreased ROM, pain, decreased upper extremity function, decreased lower extremity function, impaired skin, impaired cardiopulmonary response to activity.     Rehab Prognosis:  Fair+; patient would benefit from acute skilled OT services to address these deficits and reach maximum level of function.       Plan:     Patient to be seen 5 x/week to address the above listed problems via self-care/home management, therapeutic activities, therapeutic exercises  · Plan of Care Expires: 03/07/19  · Plan of Care Reviewed with: patient    Subjective     Pain/Comfort:  · Pain Rating 1: 5/10(back and abdomen)    Objective:     Communicated with: nurseTrudi prior to session.  Patient found HOB elevated with oxygen, PICC line, PEG Tube, wound vac upon OT entry to room.    General Precautions: Standard, fall   Orthopedic Precautions:N/A   Braces: N/A     Bed Mobility:    · Patient completed Scooting/Bridging with CGA and increased time to EOB; Max (A) of 2 to HOB via drawsheet  · Patient completed Supine to Sit with minimum  assistance, with side rail and increased time/VCs  · Patient completed Sit to Supine with minimum assistance, with side rail, with leg lift and increased time/VCs     Functional Mobility/Transfers:  · N/A    Activities of Daily Living:  · Grooming: supervision      [facility] 6 Click ADL: 12    Treatment & Education:  Patient reporting nausea and vomiting earlier this date. After encouragement, patient was agreeable to EOB activity. Patient with bed mob as noted above. Difficulty scooting forward to place feet on the floor. Patient sat EOB and had conversation with nephew and GARNETT with SBA. VCs to keep head up, retract scap and utilize core/trunk muscles. Patient began to fatigue ~20 minutes and returned supine via log-roll with min (A). Patient HOB elevated and given oral swabs dipped in ice water to moisten mouth. B heels floated and patient educated on importance of keeping heels off the bed.     Patient left HOB elevated with all lines intact, call button in reach, bed alarm on, nurse notified and nephew presentEducation:      GOALS:   Multidisciplinary Problems     Occupational Therapy Goals        Problem: Occupational Therapy Goal    Goal Priority Disciplines Outcome Interventions   Occupational Therapy Goal     OT, PT/OT Ongoing (interventions implemented as appropriate)    Description:  Goals to be met by: 3/7/2019     Patient will increase functional independence with ADLs by performing:    UE Dressing with Minimal Assistance.  LE Dressing with Moderate Assistance.  Grooming while seated with Minimal Assistance. --MET 2/18  Toileting from bedside commode with Moderate Assistance for hygiene and clothing management.   Sitting at edge of bed x10 minutes with Moderate Assistance. --MET 2/25  Supine to sit with Maximum Assistance. --MET 2/25  Increased functional UE strength to 3/5                         Time Tracking:     OT Date of Treatment: 02/25/19  OT Start Time: 1410  OT Stop Time: 1450  OT Total Time  (min): 40 min    Billable Minutes:Self Care/Home Management 10  Therapeutic Activity 30    CECY Martinez  2/25/2019

## 2019-02-25 NOTE — PLAN OF CARE
Problem: Adult Inpatient Plan of Care  Goal: Plan of Care Review  Outcome: Ongoing (interventions implemented as appropriate)  Patient found on RA, o2 sats notated. Will continue to monitor.

## 2019-02-26 VITALS
SYSTOLIC BLOOD PRESSURE: 120 MMHG | BODY MASS INDEX: 20.94 KG/M2 | RESPIRATION RATE: 18 BRPM | TEMPERATURE: 98 F | DIASTOLIC BLOOD PRESSURE: 56 MMHG | WEIGHT: 125.69 LBS | HEIGHT: 65 IN | HEART RATE: 81 BPM | OXYGEN SATURATION: 97 %

## 2019-02-26 PROBLEM — D64.9 ANEMIA: Status: ACTIVE | Noted: 2019-02-26

## 2019-02-26 LAB
ALBUMIN SERPL BCP-MCNC: 2.2 G/DL
ALP SERPL-CCNC: 187 U/L
ALT SERPL W/O P-5'-P-CCNC: 20 U/L
ANION GAP SERPL CALC-SCNC: 6 MMOL/L
AST SERPL-CCNC: 19 U/L
BACTERIA #/AREA URNS HPF: ABNORMAL /HPF
BASOPHILS # BLD AUTO: 0.01 K/UL
BASOPHILS NFR BLD: 0.1 %
BILIRUB SERPL-MCNC: 0.4 MG/DL
BILIRUB UR QL STRIP: NEGATIVE
BUN SERPL-MCNC: 16 MG/DL
CALCIUM SERPL-MCNC: 8.8 MG/DL
CHLORIDE SERPL-SCNC: 96 MMOL/L
CLARITY UR: CLEAR
CO2 SERPL-SCNC: 32 MMOL/L
COLOR UR: YELLOW
CREAT SERPL-MCNC: 0.6 MG/DL
DIFFERENTIAL METHOD: ABNORMAL
EOSINOPHIL # BLD AUTO: 0.1 K/UL
EOSINOPHIL NFR BLD: 0.9 %
ERYTHROCYTE [DISTWIDTH] IN BLOOD BY AUTOMATED COUNT: 17.5 %
EST. GFR  (AFRICAN AMERICAN): >60 ML/MIN/1.73 M^2
EST. GFR  (NON AFRICAN AMERICAN): >60 ML/MIN/1.73 M^2
GLUCOSE SERPL-MCNC: 252 MG/DL
GLUCOSE UR QL STRIP: ABNORMAL
HCT VFR BLD AUTO: 28.9 %
HGB BLD-MCNC: 8.7 G/DL
HGB UR QL STRIP: NEGATIVE
KETONES UR QL STRIP: NEGATIVE
LEUKOCYTE ESTERASE UR QL STRIP: ABNORMAL
LYMPHOCYTES # BLD AUTO: 1.1 K/UL
LYMPHOCYTES NFR BLD: 11.3 %
MAGNESIUM SERPL-MCNC: 1.6 MG/DL
MCH RBC QN AUTO: 28.1 PG
MCHC RBC AUTO-ENTMCNC: 30.1 G/DL
MCV RBC AUTO: 93 FL
MICROSCOPIC COMMENT: ABNORMAL
MONOCYTES # BLD AUTO: 0.7 K/UL
MONOCYTES NFR BLD: 7.5 %
NEUTROPHILS # BLD AUTO: 7.8 K/UL
NEUTROPHILS NFR BLD: 79.6 %
NITRITE UR QL STRIP: NEGATIVE
PH UR STRIP: 6 [PH] (ref 5–8)
PHOSPHATE SERPL-MCNC: 2.8 MG/DL
PLATELET # BLD AUTO: 500 K/UL
PMV BLD AUTO: 9.5 FL
POCT GLUCOSE: 209 MG/DL (ref 70–110)
POCT GLUCOSE: 385 MG/DL (ref 70–110)
POCT GLUCOSE: 413 MG/DL (ref 70–110)
POTASSIUM SERPL-SCNC: 4.3 MMOL/L
PROT SERPL-MCNC: 6.6 G/DL
PROT UR QL STRIP: NEGATIVE
RBC # BLD AUTO: 3.1 M/UL
SODIUM SERPL-SCNC: 134 MMOL/L
SP GR UR STRIP: 1.02 (ref 1–1.03)
SQUAMOUS #/AREA URNS HPF: 6 /HPF
URN SPEC COLLECT METH UR: ABNORMAL
UROBILINOGEN UR STRIP-ACNC: NEGATIVE EU/DL
WBC # BLD AUTO: 9.77 K/UL
WBC #/AREA URNS HPF: 25 /HPF (ref 0–5)
WBC CLUMPS URNS QL MICRO: ABNORMAL
YEAST URNS QL MICRO: ABNORMAL

## 2019-02-26 PROCEDURE — 25000003 PHARM REV CODE 250: Performed by: SURGERY

## 2019-02-26 PROCEDURE — 87186 SC STD MICRODIL/AGAR DIL: CPT

## 2019-02-26 PROCEDURE — 87088 URINE BACTERIA CULTURE: CPT

## 2019-02-26 PROCEDURE — 27000221 HC OXYGEN, UP TO 24 HOURS

## 2019-02-26 PROCEDURE — 94761 N-INVAS EAR/PLS OXIMETRY MLT: CPT

## 2019-02-26 PROCEDURE — 85025 COMPLETE CBC W/AUTO DIFF WBC: CPT

## 2019-02-26 PROCEDURE — 94664 DEMO&/EVAL PT USE INHALER: CPT

## 2019-02-26 PROCEDURE — 83735 ASSAY OF MAGNESIUM: CPT

## 2019-02-26 PROCEDURE — 81000 URINALYSIS NONAUTO W/SCOPE: CPT

## 2019-02-26 PROCEDURE — 63600175 PHARM REV CODE 636 W HCPCS: Performed by: STUDENT IN AN ORGANIZED HEALTH CARE EDUCATION/TRAINING PROGRAM

## 2019-02-26 PROCEDURE — 80053 COMPREHEN METABOLIC PANEL: CPT

## 2019-02-26 PROCEDURE — 27000646 HC AEROBIKA DEVICE

## 2019-02-26 PROCEDURE — 87086 URINE CULTURE/COLONY COUNT: CPT

## 2019-02-26 PROCEDURE — 84100 ASSAY OF PHOSPHORUS: CPT

## 2019-02-26 PROCEDURE — 99900035 HC TECH TIME PER 15 MIN (STAT)

## 2019-02-26 PROCEDURE — 25000003 PHARM REV CODE 250: Performed by: STUDENT IN AN ORGANIZED HEALTH CARE EDUCATION/TRAINING PROGRAM

## 2019-02-26 PROCEDURE — C9113 INJ PANTOPRAZOLE SODIUM, VIA: HCPCS | Performed by: STUDENT IN AN ORGANIZED HEALTH CARE EDUCATION/TRAINING PROGRAM

## 2019-02-26 PROCEDURE — 94640 AIRWAY INHALATION TREATMENT: CPT

## 2019-02-26 PROCEDURE — 63600175 PHARM REV CODE 636 W HCPCS: Performed by: SURGERY

## 2019-02-26 PROCEDURE — 25000242 PHARM REV CODE 250 ALT 637 W/ HCPCS: Performed by: SURGERY

## 2019-02-26 PROCEDURE — 87077 CULTURE AEROBIC IDENTIFY: CPT

## 2019-02-26 RX ORDER — PREGABALIN 50 MG/1
50 CAPSULE ORAL 2 TIMES DAILY
Qty: 60 CAPSULE | Refills: 6 | Status: ON HOLD | OUTPATIENT
Start: 2019-02-26 | End: 2019-04-16 | Stop reason: SDUPTHER

## 2019-02-26 RX ORDER — ESCITALOPRAM OXALATE 5 MG/5ML
10 SOLUTION ORAL DAILY
Qty: 300 ML | Refills: 11 | Status: ON HOLD | OUTPATIENT
Start: 2019-02-26 | End: 2019-04-16 | Stop reason: SDUPTHER

## 2019-02-26 RX ORDER — PANTOPRAZOLE SODIUM 40 MG/10ML
40 INJECTION, POWDER, LYOPHILIZED, FOR SOLUTION INTRAVENOUS DAILY
Qty: 1200 MG | Refills: 11 | Status: SHIPPED | OUTPATIENT
Start: 2019-02-27 | End: 2020-02-27

## 2019-02-26 RX ORDER — IPRATROPIUM BROMIDE AND ALBUTEROL SULFATE 2.5; .5 MG/3ML; MG/3ML
3 SOLUTION RESPIRATORY (INHALATION) EVERY 4 HOURS
Qty: 1 BOX | Refills: 0 | Status: ON HOLD | OUTPATIENT
Start: 2019-02-26 | End: 2019-04-16 | Stop reason: SDUPTHER

## 2019-02-26 RX ORDER — FLUCONAZOLE 2 MG/ML
200 INJECTION, SOLUTION INTRAVENOUS DAILY
Qty: 1000 ML | Refills: 0 | Status: SHIPPED | OUTPATIENT
Start: 2019-02-26 | End: 2019-03-01

## 2019-02-26 RX ORDER — FENTANYL 25 UG/1
1 PATCH TRANSDERMAL
Qty: 24 PATCH | Refills: 0 | Status: SHIPPED | OUTPATIENT
Start: 2019-02-26

## 2019-02-26 RX ORDER — DOCUSATE SODIUM 50 MG/5ML
100 LIQUID ORAL DAILY PRN
Qty: 200 ML | Refills: 0 | Status: SHIPPED | OUTPATIENT
Start: 2019-02-26

## 2019-02-26 RX ORDER — DRONABINOL 2.5 MG/1
2.5 CAPSULE ORAL 2 TIMES DAILY
Qty: 40 CAPSULE | Refills: 0 | Status: ON HOLD | OUTPATIENT
Start: 2019-02-26 | End: 2019-04-16 | Stop reason: SDUPTHER

## 2019-02-26 RX ORDER — CIPROFLOXACIN 2 MG/ML
200 INJECTION, SOLUTION INTRAVENOUS
Status: DISCONTINUED | OUTPATIENT
Start: 2019-02-26 | End: 2019-02-26 | Stop reason: HOSPADM

## 2019-02-26 RX ORDER — ACETAMINOPHEN 650 MG/1
650 SUPPOSITORY RECTAL EVERY 8 HOURS PRN
Refills: 0 | COMMUNITY
Start: 2019-02-26

## 2019-02-26 RX ORDER — ONDANSETRON 2 MG/ML
4 INJECTION INTRAMUSCULAR; INTRAVENOUS EVERY 6 HOURS PRN
Qty: 36 ML | Refills: 3 | Status: SHIPPED | OUTPATIENT
Start: 2019-02-26

## 2019-02-26 RX ORDER — METOCLOPRAMIDE 10 MG/1
10 TABLET ORAL
Qty: 24 TABLET | Refills: 3 | Status: ON HOLD | OUTPATIENT
Start: 2019-02-26 | End: 2019-04-16 | Stop reason: SDUPTHER

## 2019-02-26 RX ORDER — FLUCONAZOLE 2 MG/ML
200 INJECTION, SOLUTION INTRAVENOUS
Status: DISCONTINUED | OUTPATIENT
Start: 2019-02-26 | End: 2019-02-26 | Stop reason: HOSPADM

## 2019-02-26 RX ORDER — DIPHENHYDRAMINE HYDROCHLORIDE 50 MG/ML
12.5 INJECTION INTRAMUSCULAR; INTRAVENOUS EVERY 4 HOURS PRN
Qty: 200 ML | Refills: 0 | Status: SHIPPED | OUTPATIENT
Start: 2019-02-26

## 2019-02-26 RX ORDER — INSULIN ASPART 100 [IU]/ML
10 INJECTION, SOLUTION INTRAVENOUS; SUBCUTANEOUS ONCE
Status: DISCONTINUED | OUTPATIENT
Start: 2019-02-26 | End: 2019-02-26 | Stop reason: HOSPADM

## 2019-02-26 RX ADMIN — IBUPROFEN 600 MG: 100 SUSPENSION ORAL at 09:02

## 2019-02-26 RX ADMIN — LIDOCAINE 1 PATCH: 50 PATCH TOPICAL at 12:02

## 2019-02-26 RX ADMIN — LAMOTRIGINE 150 MG: 100 TABLET ORAL at 09:02

## 2019-02-26 RX ADMIN — METOCLOPRAMIDE 10 MG: 10 TABLET ORAL at 12:02

## 2019-02-26 RX ADMIN — INSULIN ASPART 10 UNITS: 100 INJECTION, SOLUTION INTRAVENOUS; SUBCUTANEOUS at 09:02

## 2019-02-26 RX ADMIN — ENOXAPARIN SODIUM 40 MG: 100 INJECTION SUBCUTANEOUS at 09:02

## 2019-02-26 RX ADMIN — PROMETHAZINE HYDROCHLORIDE 12.5 MG: 25 INJECTION INTRAMUSCULAR; INTRAVENOUS at 09:02

## 2019-02-26 RX ADMIN — ONDANSETRON 4 MG: 2 INJECTION INTRAMUSCULAR; INTRAVENOUS at 04:02

## 2019-02-26 RX ADMIN — PREGABALIN 50 MG: 50 CAPSULE ORAL at 09:02

## 2019-02-26 RX ADMIN — HYDROMORPHONE HYDROCHLORIDE 0.4 MG: 1 INJECTION, SOLUTION INTRAMUSCULAR; INTRAVENOUS; SUBCUTANEOUS at 12:02

## 2019-02-26 RX ADMIN — PANTOPRAZOLE SODIUM 40 MG: 40 INJECTION, POWDER, FOR SOLUTION INTRAVENOUS at 09:02

## 2019-02-26 RX ADMIN — DRONABINOL 2.5 MG: 2.5 CAPSULE ORAL at 09:02

## 2019-02-26 RX ADMIN — IPRATROPIUM BROMIDE AND ALBUTEROL SULFATE 3 ML: .5; 3 SOLUTION RESPIRATORY (INHALATION) at 04:02

## 2019-02-26 RX ADMIN — IPRATROPIUM BROMIDE AND ALBUTEROL SULFATE 3 ML: .5; 3 SOLUTION RESPIRATORY (INHALATION) at 12:02

## 2019-02-26 RX ADMIN — INSULIN ASPART 2 UNITS: 100 INJECTION, SOLUTION INTRAVENOUS; SUBCUTANEOUS at 04:02

## 2019-02-26 RX ADMIN — INSULIN ASPART 10 UNITS: 100 INJECTION, SOLUTION INTRAVENOUS; SUBCUTANEOUS at 12:02

## 2019-02-26 RX ADMIN — METOPROLOL SUCCINATE 100 MG: 50 TABLET, EXTENDED RELEASE ORAL at 09:02

## 2019-02-26 RX ADMIN — HYDROMORPHONE HYDROCHLORIDE 0.4 MG: 1 INJECTION, SOLUTION INTRAMUSCULAR; INTRAVENOUS; SUBCUTANEOUS at 06:02

## 2019-02-26 RX ADMIN — ESCITALOPRAM OXALATE 10 MG: 5 SOLUTION ORAL at 01:02

## 2019-02-26 RX ADMIN — HYDROXYZINE HYDROCHLORIDE 25 MG: 10 SOLUTION ORAL at 09:02

## 2019-02-26 RX ADMIN — METOCLOPRAMIDE 10 MG: 10 TABLET ORAL at 09:02

## 2019-02-26 NOTE — PROGRESS NOTES
V/O Dr Titus Discontinue Escitalopram while receiving Ciprofloxacin and Fluconazole due to possible adverse effect QT -prolongation

## 2019-02-26 NOTE — PLAN OF CARE
[facility]    FACILITY TRANSFER ORDERS      Patient Name: Kaylee Ngo  YOB: 1955    PCP: Robert Garcia MD   PCP Address: [address] [place] [address] / [facility] [address]  PCP Phone Number: [phone]  PCP Fax: [phone]    Encounter Date: 02/26/2019    Admit to: [facility]    Vital Signs:  Routine    Diagnoses:   Active Hospital Problems    Diagnosis  POA    *Malignant carcinoid tumor of pancreas [C7A.098]  Yes    Surgical wound dehiscence [T81.31XA]  Yes    Dysphagia [R13.10]  Yes    Wound dehiscence [T81.30XA]  Yes    Hypernatremia [E87.0]  No    Bipolar affective disorder [F31.9]  Yes    E. 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       Allergies:  Review of patient's allergies indicates:   Allergen Reactions    Calcium carbonate Nausea And Vomiting     The liquid form    Codeine Hives    Contrast media      Oral and IV    Darvocet a500 [propoxyphene n-acetaminophen]     Epinephrine      Neuroendocrine Tumor patient    Neuroendocrine Tumor patient    Morphine     Propoxyphene napsylate     Sulfa (sulfonamide antibiotics)     Erythromycin Rash    Erythromycin base Rash    Iodinated contrast- oral and iv dye Rash and Other (See Comments)     Copper taste in mouth-Benadryl all that is needed for scans  Copper taste in  "mouth-Benadryl all that is needed for scans    Pcn [penicillins] Rash     "It looks like measles."       Diet: tube feedings: Continuous Impact Peptide at 20 mL per hour for 24 hours per day    Activities: Activity as tolerated    Nursing: Continue aggressive and supportive care     Labs: CAL M NAZ SILVA     CONSULTS:    Physical Therapy to evaluate and treat. , Occupational Therapy to evaluate and treat. and Speech Therapy to evaluate and treat for Swallowing.    MISCELLANEOUS CARE:  PEG Care: Clean site every 24 hours. , Routine Skin for Bedridden Patients: Apply moisture barrier cream to all skin folds and wet areas in perineal area daily and after baths and all bowel movements. and Diabetes Care:   SN to perform and educate Diabetic management with blood glucose monitoring: and Report CBG < 60 or > 350 to physician.    WOUND CARE ORDERS  Yes: Surgical Wound:  Location: Midline evaluation and treat with possible wound vac    Consult ET nurse        Apply the following to wound:   Other: Place xeroform with 4x4 gauze overlying the xeroform to change daily  (frequency)    Medications: Review discharge medications with patient and family and provide education.      Current Discharge Medication List      CONTINUE these medications which have NOT CHANGED    Details   metoprolol succinate (TOPROL-XL) 100 MG 24 hr tablet Take 100 mg by mouth 2 (two) times daily.      ACCU-CHEK SHU CONTROL SOLN Soln       ACCU-CHEK SHU PLUS METER Misc       ACCU-CHEK SHU PLUS TEST STRP Strp       amitriptyline (ELAVIL) 50 MG tablet Take 50 mg by mouth every evening.      amLODIPine (NORVASC) 5 MG tablet Take 5 mg by mouth once daily.       bisacodyl (DULCOLAX) 5 mg EC tablet Take by mouth 2 tablets  at 12 noon  the day prior to surgery  Qty: 2 tablet, Refills: 0      calcium carbonate (OS-BEST) 600 mg calcium (1,500 mg) Tab Take 600 mg by mouth once.      chlorhexidine (HIBICLENS) 4 % external liquid wash surgical area twice daily up to " three days prior to surgery  and morning of surgery  Qty: 118 mL, Refills: 0      clonazePAM (KLONOPIN) 0.5 MG tablet Take 0.5 mg by mouth 2 (two) times daily as needed for Anxiety.      cyanocobalamin 1,000 mcg/mL injection 1,000 mcg every 28 days.      dexamethasone 1.5 mg (27 tabs) DsPk Take 1.5 mg by mouth once daily. Tapering dose as directed  Qty: 27 tablet, Refills: 0    Associated Diagnoses: Malignant carcinoid tumor of unknown primary site; Secondary neuroendocrine tumor of distant lymph nodes; Nausea      diclofenac (CATAFLAM) 50 MG tablet as needed.       diphenoxylate-atropine 2.5-0.025 mg (LOMOTIL) 2.5-0.025 mg per tablet Take 1 tablet by mouth 4 (four) times daily as needed for Diarrhea.      DULoxetine (CYMBALTA) 60 MG capsule Take 60 mg by mouth once daily.      FLUARIX QUAD [id number], PF, 60 mcg (15 mcg x 4)/0.5 mL Syrg vaccine       GLUCAGON EMERGENCY KIT, HUMAN, 1 mg injection       hydrOXYzine pamoate (VISTARIL) 25 MG Cap Take 25 mg by mouth 3 (three) times daily.       insulin aspart U-100 (NOVOLOG FLEXPEN U-100 INSULIN) 100 unit/mL InPn pen 2 units bid prn      insulin degludec (TRESIBA FLEXTOUCH U-100) 100 unit/mL (3 mL) InPn Inject 15 Units into the skin once daily.       lamoTRIgine (LAMICTAL) 150 MG Tab       lancets 30 gauge Misc       lancing device Misc       lanreotide (SOMATULINE DEPOT) 60 mg/0.2 mL Syrg inject 0.2 milliliter by subcutaneous route once a month      Listerine Liqd swish and spit twice daily  up to 2 days prior to surgery  Qty: 95 mL, Refills: 0      losartan (COZAAR) 100 MG tablet Take 1 tablet (100 mg total) by mouth once daily.  Qty: 90 tablet, Refills: 3      magnesium citrate solution drink entire bottle at 2pm  the day prior to surgery  Qty: 296 mL, Refills: 0      !! metoclopramide HCl (REGLAN) 5 mg/5 mL Soln TAKE 10ML BY MOUTH THREE TIMES A DAY  Qty: 420 mL, Refills: 1      !! metoclopramide HCl (REGLAN) 5 mg/5 mL Soln TAKE 10ML BY MOUTH THREE TIMES A DAY  Qty:  "420 mL, Refills: 1      metroNIDAZOLE (FLAGYL) 500 MG tablet take 2 tablets by mouth at 1pm, 4pm and 10pm the day prior to surgery  Qty: 6 tablet, Refills: 0      multivitamin capsule Take 1 capsule by mouth once daily.      neomycin (MYCIFRADIN) 500 mg Tab take 2 tablets by mouth at 1pm , 3pm and 10 pm the day prior to surgery  Qty: 6 tablet, Refills: 0      omeprazole (PRILOSEC) 40 MG capsule Take 40 mg by mouth 2 (two) times daily before meals.       ondansetron (ZOFRAN) 8 MG tablet Take 8 mg by mouth every 8 (eight) hours as needed.       potassium chloride (KLOR-CON) 20 mEq Pack Take 20 mEq by mouth once.       !! promethazine (PHENERGAN) 25 MG tablet Take 25 mg by mouth.      !! promethazine (PHENERGAN) 25 MG tablet Take 1 tablet (25 mg total) by mouth every 6 (six) hours as needed for Nausea.  Qty: 60 tablet, Refills: 1    Associated Diagnoses: Malignant carcinoid tumor of unknown primary site; Secondary neuroendocrine tumor of distant lymph nodes; Nausea      scopolamine (TRANSDERM-SCOP) 1.3-1.5 mg (1 mg over 3 days) Place 1 patch onto the skin Every 3 (three) days.  Qty: 10 patch, Refills: 2      SURE COMFORT PEN NEEDLE 32 gauge x 5/32" Ndle       tiZANidine (ZANAFLEX) 4 MG tablet as needed.       vitamin D 1000 units Tab Take 1,000 Units by mouth once daily.       !! - Potential duplicate medications found. Please discuss with provider.               _________________________________  Nash Titus MD  02/26/2019          "

## 2019-02-26 NOTE — PROGRESS NOTES
"[facility]  Adult Nutrition  Progress Note    SUMMARY       Recommendations    1. Current PN/TF regimen meeting 100% of estimated needs    2. Advance TF as able to 50 ml/hr.   -Fluid flushes for normal intake: 150 ml Q 4 hrs or per MD.   -TF to provide: 1800 kcal, 82 g pro, 924 ml fluid;   -note for better BG control rec Diabetisource to goal rate of 60 ml/hr with 140 ml QID or per MD.   - wean TPN with TF advancement.   3. Monitor weight weekly   4. RD to monitor    Goals: Meet > 85% EEN daily  Nutrition Goal Status: goal met  Communication of RD Recs: reviewed with RN    Reason for Assessment    Reason For Assessment: RD follow-up  Diagnosis: (Malignant Pancreatic carcinoid tumor)  Relevant Medical History: DM, HLD, GERD, Gastric Ulcers  Interdisciplinary Rounds: did not attend  General Information Comments: TF running @ 20 mL/hr. Pt reports emesis daily. Pt remains NPO; c/o mouth sores/dryness. PN support continues. NFPE on 2/7/19; see malnutrition section for details.  Nutrition Discharge Planning: LTAC with TF     Nutrition Risk Screen    Nutrition Risk Screen: no indicators present    Nutrition/Diet History    Patient Reported Diet/Restrictions/Preferences: diabetic diet  Typical Food/Fluid Intake: TF at home  Food Preferences: n/a  Spiritual, Cultural Beliefs, Methodist Practices, Values that Affect Care: no  Supplemental Drinks or Food Habits: Peptaman 1.5  Factors Affecting Nutritional Intake: altered gastrointestinal function, NPO  Nutrition Support Formula Prior to Admit: Impact Peptide 1.5  Nutrition Support Rate Prior to Admit: 240 (ml)  Nutrtion Support Frequency Prior to Admit: pump  Nutrition Support Provision Prior to Admit: 3 cans a day    Anthropometrics    Temp: 98.1 °F (36.7 °C)  Height Method: Stated  Height: 5' 5" (165.1 cm)  Height (inches): 65 in  Weight Method: Bed Scale  Weight: 57 kg (125 lb 10.6 oz)  Weight (lb): 125.66 lb  Ideal Body Weight (IBW), Female: 125 lb  % " Ideal Body Weight, Female (lb): 97.71 lb  BMI (Calculated): 20.4  BMI Grade: 18.5-24.9 - normal  Weight Loss: unintentional  Usual Body Weight (UBW), k.9 kg(Admit Weight 19)  Weight Change Amount: 0 lb  % Usual Body Weight: 81.76  % Weight Change From Usual Weight: -18.41 %  Weight Loss Since Admission: 27 lb 8.9 oz  % Weight Change Since Admission: 22.56       Lab/Procedures/Meds    Pertinent Labs Reviewed: reviewed  Pertinent Labs Comments: alb 2.1  Pertinent Medications Reviewed: reviewed  Pertinent Medications Comments: insulin, pantoprazole    Estimated/Assessed Needs    Weight Used For Calorie Calculations: 55.9 kg (123 lb 3.8 oz)  Energy Calorie Requirements (kcal): 9014-3251 kcal  Energy Need Method: Kcal/kg  Protein Requirements: 75-95(1.2-1.5 gm Pro/kg)  Weight Used For Protein Calculations: 62.9 kg (138 lb 10.7 oz)     Estimated Fluid Requirement Method: RDA Method  RDA Method (mL): 1677  CHO Requirement: 200g      Nutrition Prescription Ordered    Current Diet Order: NPO  Current Nutrition Support Formula Ordered: Clinimix E 5/15(20% Lipids 250 mL MWF)  Current Nutrition Support Rate Ordered: 50 (ml)  Current Nutrition Support Frequency Ordered: mL/hr  Oral Nutrition Supplement: Peptamen prebio @ 20 ml/hr    Evaluation of Received Nutrient/Fluid Intake    Enteral Calories (kcal): 720  Enteral Protein (gm): 33  Enteral (Free Water) Fluid (mL): 367  Free Water Flush Fluid (mL): 0  Parenteral Calories (kcal): 852  Parenteral Protein (gm): 60  Parenteral Fluid (mL): 1200  Lipid Calories (kcals): 212 kcals  GIR (Glucose Infusion Rate) (mg/kg/min): 2.3 mg/kg/min  Total Calories (kcal): 1784  % Kcal Needs: 100%  Total Protein (gm): 99  % Protein Needs: 100  IV Fluid (mL): 0  I/O: reviewed  Energy Calories Required: meeting needs  Protein Required: meeting needs  Fluid Required: (per MD)  Comments: LBM:   Tolerance: tolerating    % Meal Intake: NPO    Nutrition Risk    Level of Risk/Frequency of  Follow-up: (2 x week)     Assessment and Plan    Malnutrition in the context of Chronic Illness/Injury     Related to (etiology):  Altered GI Function; Cancer     Signs and Symptoms (as evidenced by):     Energy Intake: <75% intake > 1 week  Body Fat Depletion: mild depletion of orbitals, triceps and thoracic and lumbar region   Muscle Mass Depletion: moderate depletion of temples, clavicle region, interosseous muscle and lower extremities      Interventions  Collaboration with providers     Nutrition Diagnosis Status  Continues           Monitor and Evaluation    Food and Nutrient Intake: energy intake, enteral nutrition intake, parenteral nutrition intake  Food and Nutrient Adminstration: enteral and parenteral nutrition administration, diet order  Physical Activity and Function: nutrition-related ADLs and IADLs  Anthropometric Measurements: weight, weight change  Biochemical Data, Medical Tests and Procedures: electrolyte and renal panel, glucose/endocrine profile  Nutrition-Focused Physical Findings: overall appearance     Malnutrition Assessment  Malnutrition Type: acute illness or injury  Energy Intake: moderate energy intake  Skin (Micronutrient): dry, thinned, turgor reduced  Hair/Scalp (Micronutrient): brittle  Eyes (Micronutrient): none  Extraoral (Micronutrient): none  Neck/Chest (Micronutrient): bony prominence, subcutaneous fat loss       Energy Intake (Malnutrition): less than 75% for greater than 7 days  Subcutaneous Fat (Malnutrition): mild depletion  Muscle Mass (Malnutrition): moderate depletion  Fluid Accumulation (Malnutrition): mild   Orbital Region (Subcutaneous Fat Loss): mild depletion  Upper Arm Region (Subcutaneous Fat Loss): mild depletion  Thoracic and Lumbar Region: mild depletion   Stewart Region (Muscle Loss): moderate depletion  Clavicle Bone Region (Muscle Loss): moderate depletion  Clavicle and Acromion Bone Region (Muscle Loss): mild depletion  Dorsal Hand (Muscle Loss): mild  depletion  Patellar Region (Muscle Loss): moderate depletion  Anterior Thigh Region (Muscle Loss): moderate depletion  Posterior Calf Region (Muscle Loss): moderate depletion       Subcutaneous Fat Loss (Final Summary): mild protein-calorie malnutrition  Muscle Loss Evaluation (Final Summary): moderate protein-calorie malnutrition  Fluid Accumulation Evaluation: mild         Nutrition Follow-Up    RD Follow-up?: Yes

## 2019-02-26 NOTE — PLAN OF CARE
Problem: Adult Inpatient Plan of Care  Goal: Plan of Care Review  Outcome: Ongoing (interventions implemented as appropriate)  Received pt on 3L NC; no distress noted. Will cont to monitor

## 2019-02-26 NOTE — PLAN OF CARE
pt stable for d/c to [facility] today    TN called and advised pt's sister -- TN met with pt - she is aware that she will be transferred out to LTAC today.     transportation per [facility]      02/26/19 3097   Final Note   Anticipated Discharge Disposition LTAC  ([facility] )   What phone number can be called within the next 1-3 days to see how you are doing after discharge? [phone]   Hospital Follow Up  Appt(s) scheduled? Yes   Discharge plans and expectations educations in teach back method with documentation complete? Yes   Right Care Referral Info   Post Acute Recommendation Other   Referral Type (LTAC   )   Facility Name ([facility] )

## 2019-02-26 NOTE — PROGRESS NOTES
Report given to Dante at [facility]. Pt transported via [facility] with all belongings.

## 2019-02-26 NOTE — PT/OT/SLP PROGRESS
Physical Therapy      Patient Name:  Kaylee Ngo   MRN:  [id number]    Patient not seen today secondary to (pt having wound care and discharge within the hour). Will follow-up n/a.    Adeel Betts PTA

## 2019-02-26 NOTE — PLAN OF CARE
AVS and educational attachments prepared for bedside nurse to print and place in transfer envelop to be transported with patient to new facility. Notified Bedside nurse that all paperwork was ready for discharge,

## 2019-02-26 NOTE — PLAN OF CARE
VN attempted to cue into room, but since patient refusing virtual nurse interaction, unable to do so. Camera and monitor in room off.

## 2019-02-26 NOTE — NURSING
VN note: Round deferred d/t patient refusing virtual nurse rounds.  Camera and monitor in room off.

## 2019-02-26 NOTE — DISCHARGE SUMMARY
[facility]  General Surgery  Discharge Summary      Patient Name: Kaylee Ngo  MRN: [id number]  Admission Date: 1/25/2019  Hospital Length of Stay: 32 days  Discharge Date and Time: 2/26/2019  2:29 PM  Attending Physician: Cam Ashton MD   Discharging Provider: Nash Titus MD  Primary Care Provider: Robert Garcia MD     HPI: Ms. Kaylee Ngo is a 62 y/o female with PMHx of diabetes and status post gastric bypass. Evaluation was done of the patient's carcinoid tumor. Patient had recurrent episodes of nausea and vomiting, so she underwent CAT scan, which showed retroperitoneal adenopathy. A biopsy was done, which showed a carcinoid tumor (primary could not be identified).  Patient had surgery on 1/25/19 where an exp lap, gastro-gastrostomy, pyloroplasty, gastrotomy closure, enteric resection with side to side anastomosis and restoration of small bowel continuity was performed (s/p reversal of Sandeep-en-Y).     Procedure(s) (LRB):  EGD, WITH PEG TUBE INSERTION (N/A)     Hospital Course: Patient underwent ex-lap with reverse gastric bypass for PNET on 1/25/2019. Patient was admitted to the ICU and was placed on dPCA and complaining of generalized pain and low BP. She was placed on TPN and given supportive care. Patient was tachycardic and tachypnic and on 1/28 after trying supportive care on the floor after a CT of the lungs showed worsening atelectasis and pleural effusions in the Right lobe the patient was admitted to the ICU. Patient was on BiPAP OV and then placed on HFNC in the am. Barney was replaced and IR was consulted for thoracentesis on 1/29. On 1/31 the patient was intubated and sedated but no pressors were started. Patient was spiking fevers and still somnolent off sedation. Patient had a prolonged and slow recovery in the unit. She continued to have persistent fevers which were treated with a cooling blanket. She was extubated on 2/4. Her pulmonary cultures grew GNR  sensitive to Cipro and her antibiotics were cultures as appropriate. She was having problems tolerating the tube feeds due to nausea and so TPN was initiated. There were multiple issues with her tube getting clogged. She was taken by IR for declotting of the feeding tube which was successful the first time however the second time they were not able to re cannulate the feeding tube and so it was removed. Patient had persistent issues tolerating tube feeds greater than 30cc/hr. She was never able to safely resume PO feeds due to high aspiration risk. BY 2/10 the patient was more alert and talkative but still recovering slowly. Patient also underwent multiple trigger point injections for her chronic back pain by anesthesia. On 2/14 her midline incision was noted to have dehisced slightly and this was treated with a negative pressure wound vac therapy. Patient moved to the floor on 2/20. On 2/26 the patient was transferred to [facility] for further long term rehabilitation and medical management.    Consults:   Anesthesia for multiple trigger point injections  Cardiology for sinus tachycardia  GI for G tube placement  Pulmonology for vent management and aspiration pneumonia  Consults (From admission, onward)        Status Ordering Provider     Inpatient consult to Anesthesiology  Once     Provider:  Sohan Woodall MD    Acknowledged ASHELY LEVI     Inpatient consult to Anesthesiology  Once     Provider:  Sohan Woodall MD    Acknowledged ASHELY LEVI     Inpatient consult to Anesthesiology  Once     Provider:  Vicente Vergara MD    Acknowledged KERMIT MANZANO     Inpatient consult to Cardiology[facility]  Once     Provider:  Logan Davidson MD    Completed ISAIAS ABDALLA     Inpatient consult to Gastroenterology-[facility]  Once     Provider:  Kermit Manzano MD    Completed ISAIAS ABDALLA     Inpatient consult to Interventional Radiology  Once     Provider:  Gucci Hawkins II, MD     Completed ISAIAS ABDALLA     Inpatient consult to Interventional Radiology  Once     Provider:  Gucci Hawkins II, MD    Completed GINNY DAO     Inpatient consult to Interventional Radiology  Once     Provider:  Gucci Hawkins II, MD    Completed ISAIAS ABDALLA     [facility]  Once     Provider:  (Not yet assigned)    Acknowledged ASHELY LEVI     Inpatient consult to Pulmonology  Once     Provider:  Tati Moran MD    Completed ISAIAS ABDALLA     Inpatient consult to Social Work  Once     Provider:  (Not yet assigned)    Acknowledged ISAIAS ABDALLA          Significant Diagnostic Studies: Labs:   BMP:   Recent Labs   Lab 02/26/19  0628 02/27/19  0425   * 222*   * 133*   K 4.3 4.2   CL 96 94*   CO2 32* 32*   BUN 16 21   CREATININE 0.6 0.6   CALCIUM 8.8 8.6*   MG 1.6 1.5*     Radiology: CT scan: CT ABDOMEN PELVIS WITH CONTRAST: No results found for this visit on 01/25/19.    Pending Diagnostic Studies:     Procedure Component Value Units Date/Time    IR GJ tube replacement [[id number]]     Order Status:  Sent Lab Status:  No result         Final Active Diagnoses:    Diagnosis Date Noted POA    PRINCIPAL PROBLEM:  Malignant carcinoid tumor of pancreas [C7A.098] 01/25/2019 Yes    Surgical wound dehiscence [T81.31XA] 02/25/2019 Yes    Dysphagia [R13.10]  Yes    Wound dehiscence [T81.30XA] 02/20/2019 Yes    Hypernatremia [E87.0] 02/03/2019 No    Bipolar affective disorder [F31.9] 02/03/2019 Yes    E. 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 Date: 03/24/20     Comprehensive metabolic panel   Standing Status: Future Standing Exp. Date: 03/24/20     Diet NPO     Notify your health care provider if you experience any of the following:  increased confusion or weakness     Notify your health care provider if you experience any of the following:  persistent dizziness, light-headedness, or visual disturbances     Notify your health care provider if you experience any of the following:  worsening rash     Notify your health care provider if you experience any of the following:  severe persistent headache     Notify your health care provider if you experience any of the following:  difficulty breathing or increased cough     Notify your health care provider if you experience any of the following:  redness, tenderness, or signs of infection (pain, swelling, redness, odor or green/yellow discharge around incision site)     Notify your health care provider if you experience any of the following:  severe uncontrolled pain     Notify your health care provider if you experience any of the following:  persistent nausea and vomiting or diarrhea     Notify your health care provider if you experience any of the following:  temperature >100.4     Change dressing (specify)   Order Comments: Dressing change: one  times per day using xeroform and dry gauze.     Tube Feedings/Formulas   Order Comments: Flush with 30 cc of free water q 6 hours     Order Specific Question Answer Comments    Select Adult Formula: Impact Peptide 1.5    Route: Gastrostomy    Formula Rate (mL/hr): 20      Type And Screen Preop   Standing Status: Future Standing Exp. Date: 03/24/20     Activity as tolerated     Medications:  Transfer Medications (for Discharge Readmit only):   No current facility-administered medications for this encounter.      No current outpatient medications on file.     Facility-Administered Medications Ordered in Other Encounters   Medication Dose Route Frequency Provider Last Rate Last Dose    acetaminophen oral solution 650 mg  650 mg Per J Tube Q4H PRN Lena Marshall NP        albuterol-ipratropium 2.5 mg-0.5 mg/3 mL nebulizer solution 3 mL  3 mL Nebulization Q6H Lena Marshall NP   3 mL at 02/27/19 1414    Amino acid 4.25% - dextrose 10% (CLINIMIX-E) solution with additives (1L provides 42.5 gm AA, 100 gm CHO (340 kcal/L dextrose), Na 35, K 30, Mg 5, Ca 4.5, Acetate 70, Cl 39, Phos 15)   Intravenous Continuous Lena Marshall NP        amitriptyline tablet 50 mg  50 mg Oral QHS Lena Marshall NP   50 mg at 02/26/19 2032    ciprofloxacin (CIPRO) 200mg/100ml D5W IVPB IVPB 200 mg  200 mg Intravenous Q12H Lena Marshall  mL/hr at 02/27/19 1659 200 mg at 02/27/19 1659    clonazePAM tablet 0.5 mg  0.5 mg Oral BID PRN Lena Marshall NP        [START ON 2/28/2019] collagenase ointment   Topical (Top) Daily Lena Marshall NP        dextrose 50% injection 12.5 g  12.5 g Intravenous PRN Lena Marshall NP   12.5 g at 02/26/19 1836    diphenhydrAMINE injection 12.5 mg  12.5 mg Intravenous Q4H PRN Lena Marshall NP        docusate 50 mg/5 mL liquid 100 mg  100 mg Per J Tube Daily PRN Lena Marshall NP        dronabinol capsule 2.5 mg  2.5 mg Per J Tube BID Lena Marshall NP   2.5 mg at 02/27/19 0908    DULoxetine DR capsule 60 mg  60 mg Per J Tube Daily Lena Marshall, NP   60 mg at 02/27/19 0909    enoxaparin injection 40 mg  40 mg Subcutaneous Q24H Lena  LALITO Marshall, NP   40 mg at 02/27/19 0908    fat emulsion 20% infusion 250 mL  250 mL Intravenous Daily Lena Marshall, LAUREANO        fentaNYL 25 mcg/hr 1 patch  1 patch Transdermal Q72H Lena Marshall, NP   1 patch at 02/27/19 1330    fluconazole (DIFLUCAN) IVPB 200 mg 100 mL  200 mg Intravenous Q24H Lena Marshall, NP   200 mg at 02/27/19 1659    glucagon (human recombinant) injection 1 mg  1 mg Intramuscular PRN Lena Marshall, NP        hydrALAZINE injection 5 mg  5 mg Intravenous PRN Lena Marshall, NP        hydromorphone (PF) injection 0.3 mg  0.3 mg Intravenous Q6H PRN Lena Marshall, NP        hydrOXYzine HCl tablet 25 mg  25 mg Per J Tube TID Lena Marshall, NP   25 mg at 02/27/19 1600    ibuprofen 100 mg/5 mL suspension 600 mg  600 mg Per J Tube Q6H PRN Lena Marshall, NP        insulin aspart U-100 pen 1-10 Units  1-10 Units Subcutaneous Q4H PRN Lena Marshall, NP   4 Units at 02/27/19 0606    insulin detemir U-100 pen 35 Units  35 Units Subcutaneous Daily Lena Marshall, NP   35 Units at 02/27/19 0911    labetalol injection 5 mg  5 mg Intravenous Q4H PRN Lena Marshall, NP        lamoTRIgine tablet 150 mg  150 mg Per J Tube BID Lena Marshall, NP   150 mg at 02/27/19 0908    lidocaine 5 % patch 1 patch  1 patch Transdermal Q24H Lena Marshall, NP   1 patch at 02/27/19 1326    magnesium oxide tablet 400 mg  400 mg Oral TID Lena Marshall, NP        metoclopramide HCl tablet 10 mg  10 mg Per J Tube TID AC Lena Marshall, NP   10 mg at 02/27/19 1720    metoprolol succinate (TOPROL-XL) 24 hr tablet 100 mg  100 mg Oral BID Lena Marshall, NP   100 mg at 02/27/19 0908    mirtazapine disintegrating tablet 15 mg  15 mg Per G Tube Nightly Lena Marshall, NP   15 mg at 02/26/19 2032    ondansetron injection 4 mg  4 mg Intravenous Q6H PRN Lena Marshall, LAUREANO   4 mg at 02/26/19 2052    pantoprazole injection 40 mg  40 mg Intravenous Daily Lena Marshall, LAUREANO   40 mg  at 02/27/19 0907    polyvinyl alcohol (artificial tears) 1.4 % ophthalmic solution 1 drop  1 drop Both Eyes QID PRN Lena Marshall NP        pregabalin capsule 50 mg  50 mg Per J Tube BID Lena Marshall NP   50 mg at 02/27/19 0909    promethazine (PHENERGAN) 6.25 mg in dextrose 5 % 50 mL IVPB  6.25 mg Intravenous Q6H PRN Lena Marshall  mL/hr at 02/26/19 2347 6.25 mg at 02/26/19 2347    scopolamine 1.3-1.5 mg (1 mg over 3 days) 1 patch  1 patch Transdermal Q3 Days Lena Marshall NP   1 patch at 02/26/19 2300    sodium chloride 0.9% flush 3 mL  3 mL Intravenous PRN Lena Marshall NP        tiZANidine tablet 4 mg  4 mg Per J Tube Q8H PRN LAUREANO Howard MD  General Surgery  [facility]

## 2019-02-26 NOTE — PLAN OF CARE
02/26/19 1412   Post-Acute Status   Post-Acute Authorization Placement   Post-Acute Placement Status Authorization Obtained      received phone call from [facility] liaison, Cindy.  She gave permission for patient to admit to room 226 and nurse to call report to charge nurse, Dante [phone].  Reji request that ambulance be arranged for  now.       arrange ambulance transportation for  at 3:00pm via patient flow center.   then made phone contact with Donny at the patient flow center and changed time form 3:00pm to a now .    Bedside nurse, Rain informed patient is ready for discharge.  She can call report to number located on front of ambulance packet.  Also, ambulance is scheduled for  now.

## 2019-02-26 NOTE — PROGRESS NOTES
Follow-Up       Follow-up With  Details  Why  Contact Info   Cam Ashton MD  On 4/1/2019  1:30 pm   [address]  [address]  [place] [address]  [phone]   [facility]  On 4/2/2019  4:40 pm   [address]  [address]  [place] [id number]  [phone]

## 2019-02-27 PROBLEM — S31.109A OPEN WOUND OF ABDOMEN: Status: ACTIVE | Noted: 2019-02-27

## 2019-02-27 PROBLEM — E43 SEVERE MALNUTRITION: Chronic | Status: ACTIVE | Noted: 2019-02-27

## 2019-02-28 LAB — BACTERIA UR CULT: NORMAL

## 2019-03-06 ENCOUNTER — TELEPHONE (OUTPATIENT)
Dept: NEUROLOGY | Facility: HOSPITAL | Age: 64
End: 2019-03-06

## 2019-03-06 NOTE — TELEPHONE ENCOUNTER
----- Message from Ale Sherwood sent at 3/6/2019  3:56 PM CST -----  Contact: Emily, patients sister  DYLAN- Patient would like an update on the patients care. Call Emily at [phone].

## 2019-03-06 NOTE — TELEPHONE ENCOUNTER
Returned pts sister call to inform that Dr. Levy will call LTAC tomorrow for an update and he will then call sister w/ an update. Sister verbalizes understanding.

## 2019-03-12 ENCOUNTER — TELEPHONE (OUTPATIENT)
Dept: NEUROLOGY | Facility: HOSPITAL | Age: 64
End: 2019-03-12

## 2019-03-12 NOTE — TELEPHONE ENCOUNTER
----- Message from Ale Sherwood sent at 3/12/2019 11:31 AM CDT -----  Contact: Emily patients sister  RR/DYLAN- Emily, patients sister called because the patient will be discharging from the skilled nursing facility soon and patient wants to know if the doctors would like to see her before she is transferred back home. Emily is not sure if the patient will be able to come back for the appointments at the beginning of April. Call Emily at [phone].

## 2019-03-12 NOTE — TELEPHONE ENCOUNTER
----- Message from Ale Sherwood sent at 3/12/2019 11:31 AM CDT -----  Contact: Emily patients sister  RR/DYLAN- Emily, patients sister called because the patient will be discharging from the skilled nursing facility soon and patient wants to know if the doctors would like to see her before she is transferred back home. Emily is not sure if the patient will be able to come back for the appointments at the beginning of April. Call Emily at [phone].

## 2019-03-14 ENCOUNTER — OFFICE VISIT (OUTPATIENT)
Dept: NEUROLOGY | Facility: HOSPITAL | Age: 64
End: 2019-03-14
Attending: SURGERY
Payer: MEDICARE

## 2019-03-14 VITALS
DIASTOLIC BLOOD PRESSURE: 58 MMHG | BODY MASS INDEX: 19.97 KG/M2 | SYSTOLIC BLOOD PRESSURE: 83 MMHG | TEMPERATURE: 98 F | HEIGHT: 64 IN | WEIGHT: 117 LBS | HEART RATE: 78 BPM

## 2019-03-14 DIAGNOSIS — Z09 POSTOP CHECK: Primary | ICD-10-CM

## 2019-03-14 DIAGNOSIS — R13.10 DYSPHAGIA, UNSPECIFIED TYPE: Primary | ICD-10-CM

## 2019-03-14 PROCEDURE — 99215 OFFICE O/P EST HI 40 MIN: CPT | Performed by: SURGERY

## 2019-03-14 NOTE — PROGRESS NOTES
Here for f/u    meds reviewed  On tube feed intermittently    abd soft wound open but looks good  Waiting for swallow study    Continue physical therapy  Labs from today noted  Will need TPN at night time    Continue present meds, physical therapy, wound care

## 2019-03-15 ENCOUNTER — CLINICAL SUPPORT (OUTPATIENT)
Dept: SPEECH THERAPY | Facility: HOSPITAL | Age: 64
End: 2019-03-15
Attending: HOSPITALIST
Payer: MEDICARE

## 2019-03-15 DIAGNOSIS — R13.10 DYSPHAGIA, UNSPECIFIED TYPE: ICD-10-CM

## 2019-03-15 PROCEDURE — 92611 MOTION FLUOROSCOPY/SWALLOW: CPT | Mod: GN | Performed by: SPEECH-LANGUAGE PATHOLOGIST

## 2019-03-15 NOTE — Clinical Note
Dr. Barker,  Prior to MBS, Ms. Ngo expressed feelings of hopelessness and concerned me that she may be at risk for self-harm; just wanted her primary team to be aware.  Of note, she appeared to be in better spirits after finding out her swallow study went well and that if approved by team she should be able to initiate a PO diet again.  The MBS note should be complete within the hour.  Nallely Jj

## 2019-03-15 NOTE — PROGRESS NOTES
"Referring provider: Dr. Jeff Barker  Reason for visit:  Modified barium swallow study (CPT 84924)    Report Summary   --Date: 03/15/2019  --Purpose: to evaluate anatomy and physiology of the oropharyngeal swallow, to determine effectiveness of rehabilitation strategies, and to determine diet consistency and intervention recommendations.   --Diet recommendations: mechanical soft/pureed diet as tolerated; thin liquids ok with very small sips only; crush/embed pills in puree    Subjective / History    Kaylee Ngo is a 63 y.o. female referred by Dr. Barker for Modified Barium Swallow Study (36233).  She is currently NPO, following an MBSS on 2/11/19 which was interpreted as the following: "Pt presents with severe-profound pharyngeal dysphagia c/b significantly reduced laryngeal elevation/excursion, along with poor epiglottic inversion and poor TVC (true vocal cord) closure, which resulted in CORY, silent aspiration of thin liquids, nectar thick liquids and honey thick liquids during the swallow. Pt with significantly reduced laryngeal sensation 2/2 pt required max instruction from SLP to cough to eject aspirated materials. However, pt with weak cough production and unable to effectively eject aspirated materials from airway."    Hospital course per d/c note on 3/1/19: "Patient underwent ex-lap with reverse gastric bypass for PNET on 1/25/2019. Patient was admitted to the ICU and was placed on dPCA and complaining of generalized pain and low BP. She was placed on TPN and given supportive care. Patient was tachycardic and tachypnic and on 1/28 after trying supportive care on the floor after a CT of the lungs showed worsening atelectasis and pleural effusions in the Right lobe the patient was admitted to the ICU. Patient was on BiPAP OV and then placed on HFNC in the am. Barney was replaced and IR was consulted for thoracentesis on 1/29. On 1/31 the patient was intubated and sedated but no pressors were started. " "Patient was spiking fevers and still somnolent off sedation. Patient had a prolonged and slow recovery in the unit. She continued to have persistent fevers which were treated with a cooling blanket. She was extubated on 2/4. Her pulmonary cultures grew GNR sensitive to Cipro and her antibiotics were cultures as appropriate. She was having problems tolerating the tube feeds due to nausea and so TPN was initiated. There were multiple issues with her tube getting clogged. She was taken by IR for declotting of the feeding tube which was successful the first time however the second time they were not able to re cannulate the feeding tube and so it was removed. Patient had persistent issues tolerating tube feeds greater than 30cc/hr. She was never able to safely resume PO feeds due to high aspiration risk. BY 2/10 the patient was more alert and talkative but still recovering slowly. Patient also underwent multiple trigger point injections for her chronic back pain by anesthesia. On 2/14 her midline incision was noted to have dehisced slightly and this was treated with a negative pressure wound vac therapy. Patient moved to the floor on 2/20. On 2/26 the patient was transferred to [facility] for further long term rehabilitation and medical management."    She has been at [facility] since 2/26 and receiving PT/OT/ST.  Treating SLP reports doing "indirect" swallowing therapy due to pt's NPO status and documented history of silent aspiration.      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    Neuroendocrine tumor 4/9/2018    Obsessive compulsive " disorder     Sleep apnea      Current Facility-Administered Medications on File Prior to Visit   Medication Dose Route Frequency Provider Last Rate Last Dose    acetaminophen oral solution 650 mg  650 mg Per J Tube Q4H PRN Lena Marshall NP   650 mg at 03/06/19 1829    albuterol-ipratropium 2.5 mg-0.5 mg/3 mL nebulizer solution 3 mL  3 mL Nebulization Q6H Lena Marshall NP   3 mL at 03/15/19 0109    amitriptyline tablet 50 mg  50 mg Oral QHS Lena Marshall NP   50 mg at 03/14/19 2156    [COMPLETED] barium sulfare 700 mg tablet Tab 700 mg  1 tablet Oral ONCE PRN Jeff Barker MD   1 tablet at 03/15/19 0828    clonazePAM tablet 0.5 mg  0.5 mg Oral BID PRN Lena Marshall NP   0.5 mg at 03/13/19 2027    dextrose 50% injection 12.5 g  12.5 g Intravenous PRN Lena Marshall NP   12.5 g at 03/15/19 0000    diphenhydrAMINE injection 12.5 mg  12.5 mg Intravenous Q4H PRN Lena Marshall NP        docusate 50 mg/5 mL liquid 100 mg  100 mg Per J Tube Daily PRN Lena Marshall NP        DULoxetine DR capsule 60 mg  60 mg Per J Tube Daily Lena Marshall NP   60 mg at 03/14/19 0817    enoxaparin injection 40 mg  40 mg Subcutaneous Q24H Lena Marshall NP   40 mg at 03/14/19 0830    fentaNYL 50 mcg/hr 1 patch  1 patch Transdermal Q72H Mendez Werner MD   1 patch at 03/12/19 1627    ferrous sulfate 300 mg (60 mg iron)/5 mL syrup 300 mg  300 mg Oral Daily Diana Grady NP   300 mg at 03/14/19 0816    glucagon (human recombinant) injection 1 mg  1 mg Intramuscular PRN Lena Marshall NP        hydrALAZINE injection 5 mg  5 mg Intravenous PRN Lena Marshall NP        hydromorphone (PF) injection 0.5 mg  0.5 mg Intravenous Q6H PRN Mendez Werner MD   0.5 mg at 03/15/19 0520    hydrOXYzine HCl tablet 25 mg  25 mg Per J Tube TID Lena Marshall NP   25 mg at 03/14/19 2156    ibuprofen 100 mg/5 mL suspension 600 mg  600 mg Per J Tube Q6H PRN Lena Marshall NP   600 mg at  03/12/19 0845    insulin aspart U-100 pen 1-10 Units  1-10 Units Subcutaneous Q4H PRN Lena Marshall NP   4 Units at 03/15/19 0627    insulin detemir U-100 pen 10 Units  10 Units Subcutaneous QHS Maninder Parks, NP   10 Units at 03/13/19 2130    insulin detemir U-100 pen 24 Units  24 Units Subcutaneous Daily Maninder Parks, NP   24 Units at 03/14/19 0816    labetalol injection 5 mg  5 mg Intravenous Q4H PRN Lena Marshall NP        lamoTRIgine tablet 150 mg  150 mg Per J Tube BID Lena Marshall NP   150 mg at 03/14/19 2156    lidocaine 5 % patch 1 patch  1 patch Transdermal Q24H Lena Marshall NP   1 patch at 03/14/19 1455    magnesium oxide tablet 400 mg  400 mg Oral TID Lena Marshall NP   400 mg at 03/14/19 2156    metoclopramide HCl tablet 10 mg  10 mg Per J Tube TID AC Lena Marshall NP   10 mg at 03/15/19 0628    metoprolol succinate (TOPROL-XL) 24 hr tablet 100 mg  100 mg Oral BID Lena Marshall NP   100 mg at 03/14/19 2157    miconazole nitrate 2% ointment   Topical (Top) BID Freya Taylor MD        mirtazapine disintegrating tablet 15 mg  15 mg Per G Tube Nightly Lena Marshall NP   15 mg at 03/14/19 2157    ondansetron injection 4 mg  4 mg Intravenous Q6H PRN Lena Marshall NP   4 mg at 03/15/19 0859    pantoprazole injection 40 mg  40 mg Intravenous Daily Lena Marshall NP   40 mg at 03/14/19 0816    polyvinyl alcohol (artificial tears) 1.4 % ophthalmic solution 1 drop  1 drop Both Eyes QID PRN Lena Marshall NP        potassium, sodium phosphates 280-160-250 mg packet 1 packet  1 packet Per G Tube QID (WM & HS) Mariaelena Ashraf, NP   1 packet at 03/14/19 2157    pregabalin capsule 50 mg  50 mg Per J Tube BID Lena Marshall NP   50 mg at 03/14/19 2157    promethazine (PHENERGAN) 6.25 mg in dextrose 5 % 50 mL IVPB  6.25 mg Intravenous Q6H PRN Lena Marshall,  mL/hr at 03/13/19 1032 6.25 mg at 03/13/19 1032    scopolamine 1.3-1.5 mg (1 mg  over 3 days) 1 patch  1 patch Transdermal Q3 Days Lena Marshall, LAUREANO   1 patch at 03/13/19 2224    sodium chloride 0.9% flush 10 mL  10 mL Intravenous Q6H Mariaelena Ashraf, NP   10 mL at 03/15/19 0530    And    sodium chloride 0.9% flush 10 mL  10 mL Intravenous PRN Mariaelena Ashraf, NP        sodium chloride 0.9% flush 3 mL  3 mL Intravenous PRN Lena Marshall, LAUREANO        tiZANidine tablet 4 mg  4 mg Per J Tube Q8H PRN Lena Marshall, NP   4 mg at 03/09/19 1441    [COMPLETED] varibar pudding Ba Sulfate 4 mL  4 mL Oral ONCE PRN Jeff Barker MD   4 mL at 03/15/19 0827    [COMPLETED] varibar thin liquid ba sulfate 30 mL  30 mL Oral ONCE PRN Jeff Barker MD   30 mL at 03/15/19 0828     Current Outpatient Medications on File Prior to Visit   Medication Sig Dispense Refill    ACCU-CHEK SHU CONTROL SOLN Soln       ACCU-CHEK SHU PLUS METER Misc       ACCU-CHEK SHU PLUS TEST STRP Strp       acetaminophen (TYLENOL) 650 MG Supp Place 1 suppository (650 mg total) rectally every 8 (eight) hours as needed (101).  0    albuterol-ipratropium (DUO-NEB) 2.5 mg-0.5 mg/3 mL nebulizer solution Take 3 mLs by nebulization every 4 (four) hours. Rescue 1 Box 0    amitriptyline (ELAVIL) 50 MG tablet Take 50 mg by mouth every evening.      amLODIPine (NORVASC) 5 MG tablet Take 5 mg by mouth once daily.       artificial tears (ISOPTO TEARS) 0.5 % ophthalmic solution Place 1 drop into both eyes 4 (four) times daily as needed.      calcium carbonate (OS-BEST) 600 mg calcium (1,500 mg) Tab Take 600 mg by mouth once.      ciprofloxacin lactate in dextrose 5 % infusion Inject 125 mLs (250 mg total) into the vein every 12 (twelve) hours. 1000 mL 3    clonazePAM (KLONOPIN) 0.5 MG tablet Take 0.5 mg by mouth 2 (two) times daily as needed for Anxiety.      cyanocobalamin 1,000 mcg/mL injection 1,000 mcg every 28 days.      dexamethasone 1.5 mg (27 tabs) DsPk Take 1.5 mg by mouth once daily. Tapering dose as  directed 27 tablet 0    diclofenac (CATAFLAM) 50 MG tablet as needed.       diphenhydrAMINE (BENADRYL) 50 mg/mL injection Inject 0.25 mLs (12.5 mg total) into the vein every 4 (four) hours as needed for Itching. 200 mL 0    diphenoxylate-atropine 2.5-0.025 mg (LOMOTIL) 2.5-0.025 mg per tablet Take 1 tablet by mouth 4 (four) times daily as needed for Diarrhea.      docusate (COLACE) 50 mg/5 mL liquid Take 10 mLs (100 mg total) by mouth daily as needed. 200 mL 0    dronabinol (MARINOL) 2.5 MG capsule Take 1 capsule (2.5 mg total) by mouth 2 (two) times daily. 40 capsule 0    DULoxetine (CYMBALTA) 60 MG capsule Take 60 mg by mouth once daily.      escitalopram oxalate (LEXAPRO) 5 mg/5 mL Soln Take 10 mLs (10 mg total) by mouth once daily. 300 mL 11    fentaNYL (DURAGESIC) 25 mcg/hr Place 1 patch onto the skin every 72 hours. 24 patch 0    FLUARIX QUAD 4916-4139, PF, 60 mcg (15 mcg x 4)/0.5 mL Syrg vaccine       GLUCAGON EMERGENCY KIT, HUMAN, 1 mg injection       hydrOXYzine pamoate (VISTARIL) 25 MG Cap Take 25 mg by mouth 3 (three) times daily.       insulin degludec (TRESIBA FLEXTOUCH U-100) 100 unit/mL (3 mL) InPn Inject 15 Units into the skin once daily.       lamoTRIgine (LAMICTAL) 150 MG Tab       lancets 30 gauge Misc       lancing device Misc       lanreotide (SOMATULINE DEPOT) 60 mg/0.2 mL Syrg inject 0.2 milliliter by subcutaneous route once a month      losartan (COZAAR) 100 MG tablet Take 1 tablet (100 mg total) by mouth once daily. 90 tablet 3    metoclopramide HCl (REGLAN) 10 MG tablet Take 1 tablet (10 mg total) by mouth 3 (three) times daily before meals. 24 tablet 3    metoclopramide HCl (REGLAN) 5 mg/5 mL Soln TAKE 10ML BY MOUTH THREE TIMES A  mL 1    metoprolol succinate (TOPROL-XL) 100 MG 24 hr tablet Take 100 mg by mouth 2 (two) times daily.      multivitamin capsule Take 1 capsule by mouth once daily.      omeprazole (PRILOSEC) 40 MG capsule Take 40 mg by mouth 2 (two)  "times daily before meals.       ondansetron (ZOFRAN) 8 MG tablet Take 8 mg by mouth every 8 (eight) hours as needed.       ondansetron 4 mg/2 mL Soln Inject 4 mg into the vein every 6 (six) hours as needed. 36 mL 3    pantoprazole (PROTONIX) 40 mg injection Inject 40 mg into the vein once daily. 1200 mg 11    potassium chloride (KLOR-CON) 20 mEq Pack Take 20 mEq by mouth once.       pregabalin (LYRICA) 50 MG capsule 1 capsule (50 mg total) by Per G Tube route 2 (two) times daily. 60 capsule 6    promethazine (PHENERGAN) 25 MG tablet Take 1 tablet (25 mg total) by mouth every 6 (six) hours as needed for Nausea. 60 tablet 1    scopolamine (TRANSDERM-SCOP) 1.3-1.5 mg (1 mg over 3 days) Place 1 patch onto the skin Every 3 (three) days. 10 patch 2    SURE COMFORT PEN NEEDLE 32 gauge x 5/32" Ndle       tiZANidine (ZANAFLEX) 4 MG tablet as needed.       vitamin D 1000 units Tab Take 1,000 Units by mouth once daily.         Objective   Lateral videofluorographic views were obtained. The radiologist and speech pathologist were present for the entire procedure.     Consistencies assessed / method of administration  Thin liquid/tsp  Thin liquid/4 mL  Thin liquid/cup sip  Applesauce/tsp  Cracker  1/2 Barium tablet embedded in applesauce    Oral phase  - Adequate lip closure  - Reduced tongue control during bolus hold  - Adequate bolus preparation  - Adequate bolus transport/lingual motion  - Mild posterior oral residue     Pharyngeal phase  - Delayed initiation: to pyriforms  - Adequate soft palate elevation  - Adequate laryngeal elevation and slightly reduced anterior hyoid excursion  - Adequate tongue base retraction  - Complete epiglottic movement  - Mildly reduced laryngeal vestibular closure: flash pen with thin liquids during the swallow  - Reduced pharyngeal stripping wave  - Mild-moderate pharyngeal residue: valleculae, pyriforms, lining structures; increased residue with small bite cracker trial  - Mildly " reduced PE segment opening but non-obstructive    Strategies/therapeutic interventions attempted  Chin tuck.  Strategies were effective in decreasing/eliminating risk for pharyngeal residue.   Multiple swallows per bolus.  Strategies were effective in decreasing/eliminating risk for pharyngeal residue.   Effortful swallow.  Strategies were effective in decreasing/eliminating risk for pharyngeal residue.     Rosenbek Penetration-Aspiration Scale (Rosenbek et al 1996)  Best Trial: 1. Contrast does not enter airway.   Worst Trial: 2. Contrast enters airway but remains above TVF, no residue.     Functional goals  Length Status Goal   Long term Initiated  Patient will maintain adequate hydration/nutrition with optimum safety and efficiency of swallowing function on least restrictive PO diet level without overt signs and symptoms of aspiration.    Long term Initiated  Patient will utilize compensatory strategies with optimum safety and efficiency of swallowing function on least restrictive PO diet level without overt signs and symptoms of aspiration.    Short term Initiated  Patient will demonstrate diet upgrade trials without s/s of aspiration with 10/10 trials.    Short term Initiated  Patient will independently demonstrate adequate use of effortful swallow technique to eliminate s/s of aspiration with consistency on 8/10 trials.   Short term Initiated  Patient will correctly demonstrate pharyngeal swallow strengthening exercises on 10/10 trials.   Short term Initiated   Patient will demonstrate the ability to adequately self-monitor swallowing skills and perform appropriate compensatory techniques to reduce s/s of aspiration.    Short term Initiated  Patient will independently alternate liquids and solids to clear any oral cavity residue on 8/10 trials.       Assessment / Impressions   The results of this MBSS indicate that patient demonstrates mild swallowing dysfunction c/b silent penetration of thin liquids and  mild-moderate residue of all consistencies after the swallow.  Prognosis for improvement of swallowing with therapy is good.      Recommendations / POC   -Diet: recommend pureed/soft and mechanical soft diet with regular thin liquids (SMALL sips only); crush/embed pills in puree when possible  -Therapeutic technique: recommend effortful swallow and multiple swallows per bolus to clear residue  -Dysphagia therapy, for 4-8 sessions over the course of 1-4 weeks, in order to increase tongue base retraction, increase pharyngeal contraction, increase laryngeal excursion and airway closure and increase swallow safety by implementing therapeutic maneuvers  -Recommend pt trial PO diet for 3-4 weeks prior to removal of PEG to ensure pt is able to safely and efficiently maintain weight/nutrition with PO diet

## 2019-03-22 NOTE — PLAN OF CARE
Problem: Adult Inpatient Plan of Care  Goal: Plan of Care Review  Outcome: Ongoing (interventions implemented as appropriate)  O2 requirements decreasing, weaned from venti mask to 2 L n/c. Pt remains febrile on cooling blanket. Turned q 2 hours to prevent skin breakdown. Unable to swallow at this time for speech eval. Speech continuing to follow. Continuous feeding through J tube for now. Plan of care reviewed with patient and family.        Continue Regimen: Allegra QAM\\nZyrtec QPM\\nHydroxyzine PRN for flares Detail Level: Zone Continue Regimen: Protopic ointment QD/BID until clear, then continue for an additional 2 weeks

## 2019-04-01 PROBLEM — C7A.8 PRIMARY MALIGNANT NEUROENDOCRINE TUMOR OF PANCREAS: Status: ACTIVE | Noted: 2019-01-25

## 2019-04-04 ENCOUNTER — HOSPITAL ENCOUNTER (INPATIENT)
Facility: HOSPITAL | Age: 64
LOS: 10 days | Discharge: ANOTHER HOSPITAL | DRG: 843 | End: 2019-04-14
Attending: HOSPITALIST | Admitting: HOSPITALIST
Payer: MEDICARE

## 2019-04-04 DIAGNOSIS — R53.81 DEBILITY: ICD-10-CM

## 2019-04-04 DIAGNOSIS — C7A.00 MALIGNANT CARCINOID TUMOR OF UNKNOWN PRIMARY SITE: ICD-10-CM

## 2019-04-04 DIAGNOSIS — C7A.098 MALIGNANT CARCINOID TUMOR OF PANCREAS: ICD-10-CM

## 2019-04-04 DIAGNOSIS — C7B.8 SECONDARY NEUROENDOCRINE TUMOR OF DISTANT LYMPH NODES: ICD-10-CM

## 2019-04-04 LAB
POCT GLUCOSE: 128 MG/DL (ref 70–110)
POCT GLUCOSE: 160 MG/DL (ref 70–110)
POCT GLUCOSE: 34 MG/DL (ref 70–110)

## 2019-04-04 PROCEDURE — 25000242 PHARM REV CODE 250 ALT 637 W/ HCPCS: Performed by: NURSE PRACTITIONER

## 2019-04-04 PROCEDURE — S5571 INSULIN DISPOS PEN 3 ML: HCPCS | Performed by: NURSE PRACTITIONER

## 2019-04-04 PROCEDURE — 63600175 PHARM REV CODE 636 W HCPCS: Performed by: NURSE PRACTITIONER

## 2019-04-04 PROCEDURE — 94640 AIRWAY INHALATION TREATMENT: CPT

## 2019-04-04 PROCEDURE — 11000004 HC SNF PRIVATE

## 2019-04-04 PROCEDURE — A4216 STERILE WATER/SALINE, 10 ML: HCPCS | Performed by: NURSE PRACTITIONER

## 2019-04-04 PROCEDURE — 25000003 PHARM REV CODE 250: Performed by: NURSE PRACTITIONER

## 2019-04-04 RX ORDER — FERROUS SULFATE 300 MG/5ML
300 LIQUID (ML) ORAL DAILY
Status: DISCONTINUED | OUTPATIENT
Start: 2019-04-05 | End: 2019-04-06

## 2019-04-04 RX ORDER — PANTOPRAZOLE SODIUM 40 MG/10ML
40 INJECTION, POWDER, LYOPHILIZED, FOR SOLUTION INTRAVENOUS DAILY
Status: DISCONTINUED | OUTPATIENT
Start: 2019-04-05 | End: 2019-04-05

## 2019-04-04 RX ORDER — HYDRALAZINE HYDROCHLORIDE 25 MG/1
25 TABLET, FILM COATED ORAL EVERY 8 HOURS PRN
Status: DISCONTINUED | OUTPATIENT
Start: 2019-04-04 | End: 2019-04-09

## 2019-04-04 RX ORDER — CLONAZEPAM 0.5 MG/1
0.5 TABLET ORAL 2 TIMES DAILY PRN
Status: DISCONTINUED | OUTPATIENT
Start: 2019-04-04 | End: 2019-04-15 | Stop reason: HOSPADM

## 2019-04-04 RX ORDER — LAMOTRIGINE 150 MG/1
150 TABLET ORAL 2 TIMES DAILY
Status: DISCONTINUED | OUTPATIENT
Start: 2019-04-04 | End: 2019-04-09

## 2019-04-04 RX ORDER — SODIUM CHLORIDE 0.9 % (FLUSH) 0.9 %
10 SYRINGE (ML) INJECTION EVERY 6 HOURS
Status: DISCONTINUED | OUTPATIENT
Start: 2019-04-04 | End: 2019-04-10

## 2019-04-04 RX ORDER — METOCLOPRAMIDE 10 MG/1
10 TABLET ORAL
Status: DISCONTINUED | OUTPATIENT
Start: 2019-04-05 | End: 2019-04-09

## 2019-04-04 RX ORDER — SCOLOPAMINE TRANSDERMAL SYSTEM 1 MG/1
1 PATCH, EXTENDED RELEASE TRANSDERMAL
Status: DISCONTINUED | OUTPATIENT
Start: 2019-04-06 | End: 2019-04-15 | Stop reason: HOSPADM

## 2019-04-04 RX ORDER — HYDRALAZINE HYDROCHLORIDE 20 MG/ML
5 INJECTION INTRAMUSCULAR; INTRAVENOUS
Status: DISCONTINUED | OUTPATIENT
Start: 2019-04-04 | End: 2019-04-04

## 2019-04-04 RX ORDER — MAG HYDROX/ALUMINUM HYD/SIMETH 200-200-20
60 SUSPENSION, ORAL (FINAL DOSE FORM) ORAL EVERY 6 HOURS PRN
Status: DISCONTINUED | OUTPATIENT
Start: 2019-04-04 | End: 2019-04-15 | Stop reason: HOSPADM

## 2019-04-04 RX ORDER — LANOLIN ALCOHOL/MO/W.PET/CERES
800 CREAM (GRAM) TOPICAL 2 TIMES DAILY
Status: DISCONTINUED | OUTPATIENT
Start: 2019-04-04 | End: 2019-04-15 | Stop reason: HOSPADM

## 2019-04-04 RX ORDER — LIDOCAINE 50 MG/G
1 PATCH TOPICAL
Status: DISCONTINUED | OUTPATIENT
Start: 2019-04-05 | End: 2019-04-15 | Stop reason: HOSPADM

## 2019-04-04 RX ORDER — METOPROLOL SUCCINATE 50 MG/1
100 TABLET, EXTENDED RELEASE ORAL 2 TIMES DAILY
Status: DISCONTINUED | OUTPATIENT
Start: 2019-04-04 | End: 2019-04-15 | Stop reason: HOSPADM

## 2019-04-04 RX ORDER — ONDANSETRON 4 MG/1
4 TABLET, ORALLY DISINTEGRATING ORAL EVERY 8 HOURS PRN
Status: DISCONTINUED | OUTPATIENT
Start: 2019-04-04 | End: 2019-04-15 | Stop reason: HOSPADM

## 2019-04-04 RX ORDER — SODIUM CHLORIDE 0.9 % (FLUSH) 0.9 %
3 SYRINGE (ML) INJECTION
Status: DISCONTINUED | OUTPATIENT
Start: 2019-04-04 | End: 2019-04-15 | Stop reason: HOSPADM

## 2019-04-04 RX ORDER — ONDANSETRON 4 MG/1
4 TABLET, ORALLY DISINTEGRATING ORAL EVERY 8 HOURS
Status: DISCONTINUED | OUTPATIENT
Start: 2019-04-04 | End: 2019-04-04

## 2019-04-04 RX ORDER — SODIUM,POTASSIUM PHOSPHATES 280-250MG
1 POWDER IN PACKET (EA) ORAL
Status: DISCONTINUED | OUTPATIENT
Start: 2019-04-04 | End: 2019-04-09

## 2019-04-04 RX ORDER — DRONABINOL 2.5 MG/1
2.5 CAPSULE ORAL 2 TIMES DAILY
Status: DISCONTINUED | OUTPATIENT
Start: 2019-04-04 | End: 2019-04-15 | Stop reason: HOSPADM

## 2019-04-04 RX ORDER — SODIUM CHLORIDE 0.9 % (FLUSH) 0.9 %
10 SYRINGE (ML) INJECTION
Status: DISCONTINUED | OUTPATIENT
Start: 2019-04-04 | End: 2019-04-10

## 2019-04-04 RX ORDER — TIZANIDINE 2 MG/1
4 TABLET ORAL EVERY 8 HOURS PRN
Status: DISCONTINUED | OUTPATIENT
Start: 2019-04-04 | End: 2019-04-09

## 2019-04-04 RX ORDER — IPRATROPIUM BROMIDE AND ALBUTEROL SULFATE 2.5; .5 MG/3ML; MG/3ML
3 SOLUTION RESPIRATORY (INHALATION) EVERY 6 HOURS
Status: DISCONTINUED | OUTPATIENT
Start: 2019-04-04 | End: 2019-04-07

## 2019-04-04 RX ORDER — FENTANYL 50 UG/1
1 PATCH TRANSDERMAL
Status: DISCONTINUED | OUTPATIENT
Start: 2019-04-05 | End: 2019-04-15 | Stop reason: HOSPADM

## 2019-04-04 RX ORDER — DOCUSATE SODIUM 50 MG/5ML
100 LIQUID ORAL DAILY PRN
Status: DISCONTINUED | OUTPATIENT
Start: 2019-04-04 | End: 2019-04-09

## 2019-04-04 RX ORDER — DULOXETIN HYDROCHLORIDE 60 MG/1
60 CAPSULE, DELAYED RELEASE ORAL DAILY
Status: DISCONTINUED | OUTPATIENT
Start: 2019-04-05 | End: 2019-04-09

## 2019-04-04 RX ORDER — PREGABALIN 50 MG/1
50 CAPSULE ORAL 2 TIMES DAILY
Status: DISCONTINUED | OUTPATIENT
Start: 2019-04-04 | End: 2019-04-09

## 2019-04-04 RX ORDER — HYDROXYZINE HYDROCHLORIDE 25 MG/1
25 TABLET, FILM COATED ORAL 3 TIMES DAILY
Status: DISCONTINUED | OUTPATIENT
Start: 2019-04-04 | End: 2019-04-09

## 2019-04-04 RX ORDER — ONDANSETRON 2 MG/ML
4 INJECTION INTRAMUSCULAR; INTRAVENOUS EVERY 8 HOURS PRN
Status: DISCONTINUED | OUTPATIENT
Start: 2019-04-04 | End: 2019-04-05

## 2019-04-04 RX ORDER — NAPROXEN 500 MG/1
500 TABLET ORAL 2 TIMES DAILY PRN
Status: DISCONTINUED | OUTPATIENT
Start: 2019-04-04 | End: 2019-04-15 | Stop reason: HOSPADM

## 2019-04-04 RX ORDER — ACETAMINOPHEN 650 MG/20.3ML
650 LIQUID ORAL EVERY 4 HOURS PRN
Status: DISCONTINUED | OUTPATIENT
Start: 2019-04-04 | End: 2019-04-09

## 2019-04-04 RX ORDER — GLUCAGON 1 MG
1 KIT INJECTION
Status: DISCONTINUED | OUTPATIENT
Start: 2019-04-04 | End: 2019-04-08

## 2019-04-04 RX ORDER — INSULIN ASPART 100 [IU]/ML
1-10 INJECTION, SOLUTION INTRAVENOUS; SUBCUTANEOUS EVERY 4 HOURS PRN
Status: DISCONTINUED | OUTPATIENT
Start: 2019-04-04 | End: 2019-04-08

## 2019-04-04 RX ORDER — AMITRIPTYLINE HYDROCHLORIDE 25 MG/1
50 TABLET, FILM COATED ORAL NIGHTLY
Status: DISCONTINUED | OUTPATIENT
Start: 2019-04-04 | End: 2019-04-15 | Stop reason: HOSPADM

## 2019-04-04 RX ORDER — MIRTAZAPINE 15 MG/1
15 TABLET, ORALLY DISINTEGRATING ORAL NIGHTLY
Status: DISCONTINUED | OUTPATIENT
Start: 2019-04-04 | End: 2019-04-09

## 2019-04-04 RX ORDER — NALOXONE HCL 0.4 MG/ML
0.4 VIAL (ML) INJECTION
Status: DISCONTINUED | OUTPATIENT
Start: 2019-04-04 | End: 2019-04-15 | Stop reason: HOSPADM

## 2019-04-04 RX ORDER — ENOXAPARIN SODIUM 100 MG/ML
40 INJECTION SUBCUTANEOUS
Status: DISCONTINUED | OUTPATIENT
Start: 2019-04-05 | End: 2019-04-15 | Stop reason: HOSPADM

## 2019-04-04 RX ORDER — DIPHENHYDRAMINE HYDROCHLORIDE 50 MG/ML
12.5 INJECTION INTRAMUSCULAR; INTRAVENOUS EVERY 4 HOURS PRN
Status: DISCONTINUED | OUTPATIENT
Start: 2019-04-04 | End: 2019-04-15 | Stop reason: HOSPADM

## 2019-04-04 RX ORDER — HYDROMORPHONE HYDROCHLORIDE 2 MG/ML
0.5 INJECTION, SOLUTION INTRAMUSCULAR; INTRAVENOUS; SUBCUTANEOUS EVERY 6 HOURS PRN
Status: DISCONTINUED | OUTPATIENT
Start: 2019-04-04 | End: 2019-04-04

## 2019-04-04 RX ADMIN — DRONABINOL 2.5 MG: 2.5 CAPSULE ORAL at 09:04

## 2019-04-04 RX ADMIN — POTASSIUM & SODIUM PHOSPHATES POWDER PACK 280-160-250 MG 1 PACKET: 280-160-250 PACK at 06:04

## 2019-04-04 RX ADMIN — Medication 10 ML: at 06:04

## 2019-04-04 RX ADMIN — POTASSIUM & SODIUM PHOSPHATES POWDER PACK 280-160-250 MG 1 PACKET: 280-160-250 PACK at 09:04

## 2019-04-04 RX ADMIN — IPRATROPIUM BROMIDE AND ALBUTEROL SULFATE 3 ML: .5; 3 SOLUTION RESPIRATORY (INHALATION) at 07:04

## 2019-04-04 RX ADMIN — MICONAZOLE NITRATE: 20 OINTMENT TOPICAL at 09:04

## 2019-04-04 RX ADMIN — LAMOTRIGINE 150 MG: 150 TABLET ORAL at 09:04

## 2019-04-04 RX ADMIN — METOPROLOL SUCCINATE 100 MG: 50 TABLET, EXTENDED RELEASE ORAL at 09:04

## 2019-04-04 RX ADMIN — INSULIN ASPART 1 UNITS: 100 INJECTION, SOLUTION INTRAVENOUS; SUBCUTANEOUS at 09:04

## 2019-04-04 RX ADMIN — AMITRIPTYLINE HYDROCHLORIDE 50 MG: 25 TABLET, FILM COATED ORAL at 09:04

## 2019-04-04 RX ADMIN — Medication 800 MG: at 09:04

## 2019-04-04 RX ADMIN — MIRTAZAPINE 15 MG: 15 TABLET, ORALLY DISINTEGRATING ORAL at 09:04

## 2019-04-04 RX ADMIN — HYDROXYZINE HYDROCHLORIDE 25 MG: 25 TABLET, FILM COATED ORAL at 09:04

## 2019-04-04 RX ADMIN — PREGABALIN 50 MG: 50 CAPSULE ORAL at 09:04

## 2019-04-04 RX ADMIN — DEXTROSE MONOHYDRATE 12.5 G: 25 INJECTION, SOLUTION INTRAVENOUS at 05:04

## 2019-04-04 RX ADMIN — INSULIN DETEMIR 12 UNITS: 100 INJECTION, SOLUTION SUBCUTANEOUS at 09:04

## 2019-04-04 NOTE — NURSING
Patient arrived on the unit per wheelchair accompanied by transferring nurse and nurse aid to room 324, awake and alert with no distress nor difficulty noted. Able to communicate needs and to be understood. oreintated to surrounding, call light usage, toilet, etc. Consent form signed,lying supine in bed with HOB @45%, call light within reach, bed low &  Locked.

## 2019-04-05 LAB
POCT GLUCOSE: 144 MG/DL (ref 70–110)
POCT GLUCOSE: 239 MG/DL (ref 70–110)
POCT GLUCOSE: 275 MG/DL (ref 70–110)
POCT GLUCOSE: 34 MG/DL (ref 70–110)
POCT GLUCOSE: 52 MG/DL (ref 70–110)
POCT GLUCOSE: 92 MG/DL (ref 70–110)

## 2019-04-05 PROCEDURE — 94640 AIRWAY INHALATION TREATMENT: CPT

## 2019-04-05 PROCEDURE — 94761 N-INVAS EAR/PLS OXIMETRY MLT: CPT

## 2019-04-05 PROCEDURE — 97110 THERAPEUTIC EXERCISES: CPT

## 2019-04-05 PROCEDURE — 63600175 PHARM REV CODE 636 W HCPCS: Performed by: NURSE PRACTITIONER

## 2019-04-05 PROCEDURE — 97535 SELF CARE MNGMENT TRAINING: CPT

## 2019-04-05 PROCEDURE — 25000242 PHARM REV CODE 250 ALT 637 W/ HCPCS: Performed by: NURSE PRACTITIONER

## 2019-04-05 PROCEDURE — 97161 PT EVAL LOW COMPLEX 20 MIN: CPT

## 2019-04-05 PROCEDURE — 25000003 PHARM REV CODE 250: Performed by: NURSE PRACTITIONER

## 2019-04-05 PROCEDURE — 97116 GAIT TRAINING THERAPY: CPT

## 2019-04-05 PROCEDURE — S5571 INSULIN DISPOS PEN 3 ML: HCPCS | Performed by: NURSE PRACTITIONER

## 2019-04-05 PROCEDURE — 97165 OT EVAL LOW COMPLEX 30 MIN: CPT

## 2019-04-05 PROCEDURE — A4216 STERILE WATER/SALINE, 10 ML: HCPCS | Performed by: NURSE PRACTITIONER

## 2019-04-05 PROCEDURE — 11000004 HC SNF PRIVATE

## 2019-04-05 PROCEDURE — 97802 MEDICAL NUTRITION INDIV IN: CPT

## 2019-04-05 RX ORDER — POTASSIUM CHLORIDE 20 MEQ/1
20 TABLET, EXTENDED RELEASE ORAL ONCE
Status: COMPLETED | OUTPATIENT
Start: 2019-04-05 | End: 2019-04-05

## 2019-04-05 RX ORDER — IBUPROFEN 200 MG
16 TABLET ORAL
Status: DISCONTINUED | OUTPATIENT
Start: 2019-04-05 | End: 2019-04-08

## 2019-04-05 RX ORDER — PANTOPRAZOLE SODIUM 40 MG/1
40 TABLET, DELAYED RELEASE ORAL DAILY
Status: DISCONTINUED | OUTPATIENT
Start: 2019-04-05 | End: 2019-04-15 | Stop reason: HOSPADM

## 2019-04-05 RX ORDER — PROMETHAZINE HYDROCHLORIDE 12.5 MG/1
12.5 TABLET ORAL EVERY 6 HOURS PRN
Status: DISCONTINUED | OUTPATIENT
Start: 2019-04-05 | End: 2019-04-15 | Stop reason: HOSPADM

## 2019-04-05 RX ORDER — IBUPROFEN 200 MG
24 TABLET ORAL
Status: DISCONTINUED | OUTPATIENT
Start: 2019-04-05 | End: 2019-04-08

## 2019-04-05 RX ORDER — PANTOPRAZOLE SODIUM 40 MG/1
40 TABLET, DELAYED RELEASE ORAL DAILY
Status: DISCONTINUED | OUTPATIENT
Start: 2019-04-05 | End: 2019-04-05

## 2019-04-05 RX ADMIN — Medication 10 ML: at 12:04

## 2019-04-05 RX ADMIN — HYDROXYZINE HYDROCHLORIDE 25 MG: 25 TABLET, FILM COATED ORAL at 09:04

## 2019-04-05 RX ADMIN — Medication 10 ML: at 06:04

## 2019-04-05 RX ADMIN — AMITRIPTYLINE HYDROCHLORIDE 50 MG: 25 TABLET, FILM COATED ORAL at 09:04

## 2019-04-05 RX ADMIN — METOPROLOL SUCCINATE 100 MG: 50 TABLET, EXTENDED RELEASE ORAL at 09:04

## 2019-04-05 RX ADMIN — MINERAL SUPPLEMENT IRON 300 MG / 5 ML STRENGTH LIQUID 100 PER BOX UNFLAVORED 300 MG: at 09:04

## 2019-04-05 RX ADMIN — LIDOCAINE 1 PATCH: 50 PATCH TOPICAL at 12:04

## 2019-04-05 RX ADMIN — HYDROXYZINE HYDROCHLORIDE 25 MG: 25 TABLET, FILM COATED ORAL at 02:04

## 2019-04-05 RX ADMIN — TIZANIDINE 4 MG: 2 TABLET ORAL at 10:04

## 2019-04-05 RX ADMIN — INSULIN ASPART 4 UNITS: 100 INJECTION, SOLUTION INTRAVENOUS; SUBCUTANEOUS at 09:04

## 2019-04-05 RX ADMIN — ENOXAPARIN SODIUM 40 MG: 100 INJECTION SUBCUTANEOUS at 09:04

## 2019-04-05 RX ADMIN — IPRATROPIUM BROMIDE AND ALBUTEROL SULFATE 3 ML: .5; 3 SOLUTION RESPIRATORY (INHALATION) at 01:04

## 2019-04-05 RX ADMIN — IPRATROPIUM BROMIDE AND ALBUTEROL SULFATE 3 ML: .5; 3 SOLUTION RESPIRATORY (INHALATION) at 07:04

## 2019-04-05 RX ADMIN — Medication 800 MG: at 09:04

## 2019-04-05 RX ADMIN — METOCLOPRAMIDE 10 MG: 10 TABLET ORAL at 06:04

## 2019-04-05 RX ADMIN — MICONAZOLE NITRATE: 20 OINTMENT TOPICAL at 09:04

## 2019-04-05 RX ADMIN — GLUCAGON HYDROCHLORIDE 1 MG: KIT at 11:04

## 2019-04-05 RX ADMIN — METOCLOPRAMIDE 10 MG: 10 TABLET ORAL at 12:04

## 2019-04-05 RX ADMIN — MIRTAZAPINE 15 MG: 15 TABLET, ORALLY DISINTEGRATING ORAL at 09:04

## 2019-04-05 RX ADMIN — DULOXETINE 60 MG: 60 CAPSULE, DELAYED RELEASE ORAL at 09:04

## 2019-04-05 RX ADMIN — INSULIN DETEMIR 10 UNITS: 100 INJECTION, SOLUTION SUBCUTANEOUS at 09:04

## 2019-04-05 RX ADMIN — INSULIN DETEMIR 12 UNITS: 100 INJECTION, SOLUTION SUBCUTANEOUS at 09:04

## 2019-04-05 RX ADMIN — PANTOPRAZOLE SODIUM 40 MG: 40 TABLET, DELAYED RELEASE ORAL at 12:04

## 2019-04-05 RX ADMIN — POTASSIUM CHLORIDE 20 MEQ: 1500 TABLET, EXTENDED RELEASE ORAL at 12:04

## 2019-04-05 RX ADMIN — PREGABALIN 50 MG: 50 CAPSULE ORAL at 09:04

## 2019-04-05 RX ADMIN — DRONABINOL 2.5 MG: 2.5 CAPSULE ORAL at 09:04

## 2019-04-05 RX ADMIN — FENTANYL 1 PATCH: 50 PATCH, EXTENDED RELEASE TRANSDERMAL at 05:04

## 2019-04-05 RX ADMIN — POTASSIUM & SODIUM PHOSPHATES POWDER PACK 280-160-250 MG 1 PACKET: 280-160-250 PACK at 05:04

## 2019-04-05 RX ADMIN — METOCLOPRAMIDE 10 MG: 10 TABLET ORAL at 05:04

## 2019-04-05 RX ADMIN — Medication 10 ML: at 05:04

## 2019-04-05 RX ADMIN — LAMOTRIGINE 150 MG: 150 TABLET ORAL at 09:04

## 2019-04-05 RX ADMIN — POTASSIUM & SODIUM PHOSPHATES POWDER PACK 280-160-250 MG 1 PACKET: 280-160-250 PACK at 12:04

## 2019-04-05 RX ADMIN — POTASSIUM & SODIUM PHOSPHATES POWDER PACK 280-160-250 MG 1 PACKET: 280-160-250 PACK at 09:04

## 2019-04-05 NOTE — CONSULTS
"  [facility] - Skilled Nursing Care  Adult Nutrition  Consult Note    SUMMARY     Recommendations  Recommend boost plus TID to avoid hypoglycemia  Recommendation/Intervention: Continue night time feedings, increase rate to 83ml/hr if tolerated, Continue regular diet, RD following  Goals: PO and enteral feedings contribute to meet 85% of EEN/EPN  Nutrition Goal Status: new  Communication of RD Recs: other (comment)(POC)    Reason for Assessment    Reason For Assessment: consult  Diagnosis: (Debility, cancer of pancreas)  Relevant Medical History: DN2, bipolar, gastric bypass, PEG 19, dysphagia,   Interdisciplinary Rounds: did not attend  General Information Comments:  on night feedings via pump to PEG from 6 PM to 6 AM  Nutrition Discharge Planning: DC on diabetic diet with texture per ST and PEG feedings TBD    Nutrition Risk Screen    Nutrition Risk Screen: tube feeding or parenteral nutrition    Nutrition/Diet History    Patient Reported Diet/Restrictions/Preferences: diabetic diet  Typical Food/Fluid Intake: tube feedings at home Peptamen 1.5  Spiritual, Cultural Beliefs, Caodaism Practices, Values that Affect Care: no  Food Allergies: NKFA  Factors Affecting Nutritional Intake: NPO, difficulty/impaired swallowing    Anthropometrics    Temp: 97.8 °F (36.6 °C)  Height Method: Stated  Height: 5' 5.5" (166.4 cm)  Height (inches): 65.5 in  Weight Method: Standard Scale  Weight: 54.1 kg (119 lb 4.3 oz)  Weight (lb): 119.27 lb  Ideal Body Weight (IBW), Female: 127.5 lb  % Ideal Body Weight, Female (lb): 93.55 lb  BMI (Calculated): 19.6  BMI Grade: 18.5-24.9 - normal  Weight Loss: unintentional  Usual Body Weight (UBW), k.9 kg  % Usual Body Weight: 79.84  % Weight Change From Usual Weight: -20.32 %       Lab/Procedures/Meds    Pertinent Labs Reviewed: reviewed  Pertinent Labs Comments: PAB 11, albumn 2.6, K 3.2, glucose 54,   Pertinent Medications Reviewed: reviewed  Pertinent Medications Comments: Fe insulin, " Mg, pantoprazole, insulin, dronabinol    Physical Findings/Assessment 4/5/19         Estimated/Assessed Needs    Weight Used For Calorie Calculations: 54.1 kg (119 lb 4.3 oz)  Energy Calorie Requirements (kcal): 1623- 1894  Energy Need Method: Kcal/kg(30-35kcal/kg)  Protein Requirements: 82g  Weight Used For Protein Calculations: 54.1 kg (119 lb 4.3 oz)(x 1.5gm/lg)  Fluid Requirements (mL): per MD or 1ml/kcal  Estimated Fluid Requirement Method: RDA Method  RDA Method (mL): 1623  CHO Requirement: 50% of total calories      Nutrition Prescription Ordered    Current Diet Order: Regular  Nutrition Order Comments: PO 25-50%  Current Nutrition Support Formula Ordered: Peptamen 1.5 w/Prebio  Current Nutrition Support Rate Ordered: 60 (ml)  Current Nutrition Support Frequency Ordered: 12 hr via pump to peg    Evaluation of Received Nutrient/Fluid Intake    Enteral Calories (kcal): 1080  Enteral Protein (gm): 49  Enteral (Free Water) Fluid (mL): 555  Free Water Flush Fluid (mL): 600  Energy Calories Required: not meeting needs  Protein Required: not meeting needs  Fluid Required: not meeting needs  Tolerance: tolerating  % Intake of Estimated Energy Needs: 50 - 75 %  % Meal Intake: 25 - 50 %    Nutrition Risk    Level of Risk/Frequency of Follow-up: high     Assessment and Plan    Severe malnutrition  Malnutrition in the context of Chronic Illness/Injury    Related to (etiology):  Inadequate intake in the presence of increased needs due to GI distress    Signs and Symptoms (as evidenced by):  Energy Intake: <75% of estimated energy requirement for > 3 months  Body Fat Depletion: severe depletion of orbitals, triceps and thoracic and lumbar region   Muscle Mass Depletion: moderate depletion of temples, clavicle region, scapular region, interosseous muscle and lower extremities   Weight Loss: 12%% x < 3 months      Interventions/Recommendations (treatment strategy):  Enteral feedings- 12 hr Peptimen 1.5 @ 60 ml/hr  Regular  healthful diet  Commercial beverage- Boost plus TID- calories and protein        Nutrition Diagnosis Status:  New           Monitor and Evaluation    Food and Nutrient Intake: energy intake, food and beverage intake, enteral nutrition intake  Food and Nutrient Adminstration: diet order, enteral and parenteral nutrition administration  Physical Activity and Function: nutrition-related ADLs and IADLs  Anthropometric Measurements: weight change  Biochemical Data, Medical Tests and Procedures: electrolyte and renal panel, gastrointestinal profile, glucose/endocrine profile, inflammatory profile  Nutrition-Focused Physical Findings: overall appearance     Malnutrition Assessment  Malnutrition Type: chronic illness  Energy Intake: moderate energy intake  Skin (Micronutrient): turgor reduced  Hair/Scalp (Micronutrient): sparse  Neck/Chest (Micronutrient): subcutaneous fat loss, muscle wasting, bony prominence  Musculoskeletal/Lower Extremities: subcutaneous fat loss, muscle wasting       Weight Loss (Malnutrition): greater than 7.5% in 3 months  Energy Intake (Malnutrition): less than 75% for greater than or equal to 1 month  Subcutaneous Fat (Malnutrition): severe depletion  Muscle Mass (Malnutrition): moderate depletion   Orbital Region (Subcutaneous Fat Loss): moderate depletion  Upper Arm Region (Subcutaneous Fat Loss): severe depletion  Thoracic and Lumbar Region: severe depletion   [place] Region (Muscle Loss): moderate depletion  Clavicle Bone Region (Muscle Loss): moderate depletion  Clavicle and Acromion Bone Region (Muscle Loss): severe depletion  Scapular Bone Region (Muscle Loss): moderate depletion  Dorsal Hand (Muscle Loss): moderate depletion  Patellar Region (Muscle Loss): moderate depletion  Anterior Thigh Region (Muscle Loss): moderate depletion  Posterior Calf Region (Muscle Loss): moderate depletion            Severe Weight Loss (Malnutrition): greater than 7.5% in 3 months    Nutrition Follow-Up      Yes

## 2019-04-05 NOTE — PLAN OF CARE
Problem: Adult Inpatient Plan of Care  Goal: Plan of Care Review  Outcome: Ongoing (interventions implemented as appropriate)    Severe malnutrition  Malnutrition in the context of Chronic Illness/Injury     Related to (etiology):  Inadequate intake in the presence of increased needs due to GI distress     Signs and Symptoms (as evidenced by):  Energy Intake: <75% of estimated energy requirement for > 3 months  Body Fat Depletion: severe depletion of orbitals, triceps and thoracic and lumbar region   Muscle Mass Depletion: moderate depletion of temples, clavicle region, scapular region, interosseous muscle and lower extremities   Weight Loss: 12%% x < 3 months    Recommend boost plus TID to avoid hypoglycemia  Recommendation/Intervention: Continue night time feedings, increase rate to 83ml/hr if tolerated, Continue regular diet, RD following

## 2019-04-05 NOTE — PT/OT/SLP EVAL
PhysicalTherapy   Evaluation/tx    Kaylee Ngo   MRN: [id number]     PT Received On: 04/05/19  PT Start Time: 0845     PT Stop Time: 0945    PT Total Time (min): 60 min       Billable Minutes:  Evaluation 15, Gait Training 15, Therapeutic Activity 15 and Therapeutic Exercise 15    Diagnosis: Primary pancreatic carcinoid tumor  S/p exploratory laparotomy  lymphadenectomy  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    Neuroendocrine tumor 4/9/2018    Obsessive compulsive disorder     Sleep apnea       Past Surgical History:   Procedure Laterality Date    APPENDECTOMY      BIOPSY-LIVER  2/28/2018    [facility]    CHOLECYSTECTOMY      EGD, WITH PEG TUBE INSERTION N/A 2/21/2019    [facility] by [facility]it [facility]dy [facility] at [facility] ENDO    ESOPHAGOGASTRODUODENOSCOPY (EGD) N/A 1/29/2018    [facility] ENDO    EXCISION, LIVER N/A 1/25/2019    [facility]    GASTRIC BYPASS      INSERTION OF PERCUTANEOUS ENDOSCOPIC GASTROSTOMY (PEG) FEEDING TUBE  02/21/2019    INSERTION-TUBE-GASTROSTOMY-LAPAROSCOPIC  with gastrogram N/A 2/28/2018    [facility]    LAPAROTOMY, EXPLORATORY N/A 1/25/2019    [facility]    LYMPHADENECTOMY N/A 1/25/2019    [facility]    TOTAL KNEE ARTHROPLASTY Left          General Precautions: Standard, aspiration, fall  Orthopedic Precautions: N/A   Braces: N/A    Spiritual, Cultural Beliefs, Jew  Practices, Values that Affect Care: no    Patient History:  Lives With: alone  Living Arrangements: mobile home  Home Accessibility: stairs to enter home(4 BOSSMAN,  B HR)  Transportation Anticipated: car, drives self, family or friend will provide  Living Environment Comment: Lives alone on same property as her sister (~200 yards away). Sister works from home, can assist.  Pt drives to grocery only. Has a puppy to care for.  Broke her L hip ~6 mos ago. Sister and nephew can help as needed. Owns SPC, rollator, RW, shower chair. Has life alert. Pt cooks, cleans. Maid comes every other Monday. Pt sleeps in regular bed.  Equipment Currently Used at Home: walker, rolling, rollator, shower chair, cane, straight(WC was ordered from LT[facility]. Unsure of its location)  DME owned (not currently used): none    Previous Level of Function:  Ambulation Skills: independent  Transfer Skills: independent  ADL Skills: independent  Work/Leisure Activity: independent    Subjective:  Communicated with nurse prior to session.  Agreeable to PT services.   Chief Complaint: Loss of weight over time.  Patient goals: To be able to go home.    Pain/Comfort  Pain Rating 1: 0/10    Objective:  Patient found seated on toilet. with Patient found with: PEG Tube   Pt educated that she needs an assistive device to go to restroom. We decided that she can wheel herself via WC to restroom and stand pivot to bedside commode using the grab bar. White board updated as such.    Cognitive Exam:  Oriented to: Person, Place, Time and Situation  Follows Commands/attention: Follows multistep  Commands, distractible; verbose.  Communication: clear/fluent  Safety awareness/insight to disability: intact    Physical Exam:  Postural examination/scapula alignment: -       Rounded shoulders  -       Forward head    Skin integrity: Visible skin intact  Edema: None noted     Sensation:   -       Intact    Upper Extremity Range of Motion:  Right Upper Extremity: WNL  Left Upper  Extremity: WFL except shoulder flexion-- 140 degrees    Upper Extremity Strength:  Right Upper Extremity: WNL  Left Upper Extremity: WFL except shoulder flexion 3+/5\    Lower Extremity Range of Motion:  Right Lower Extremity: WNL  Left Lower Extremity: WNL    Lower Extremity Strength:  Right Lower Extremity: WNL  Left Lower Extremity: WNL     Fine motor coordination:  -       Intact  Left hand, finger to nose, Right hand, finger to nose, Left hand thumb/finger opposition skills and Right hand thumb/finger opposition skills    Gross motor coordination: WFL      AM-PAC 6 CLICK MOBILITY  Total Score:17    Bed Mobility:  Sit>Supine:SBA on mat  Supine>Sit: SBA on mat    Transfers:  Sit<>Stand: CGA  Stand Pivot Transfer: CGA, WC<>mat    Gait:  Amb 40 feet x 4 trials with RW (seated rest break in between trials) with CGA.  Amb 40 feet with no AD, CGA needed due to instability/fear of falling.    10 meter (6 meter) Walk Test:   Patient Instructions: Pt is instructed to walk a set distance (6 meters or 10 meters). Distance covered is divided by the time it took to walk that distance. Calculate Average of 3 trials. Assistive device can be used but must be kept consistent. If patient requires assistance to walk this test is not appropriate.    Gait Speed = 0.61 m/s with rolling walker assistive device at preferred speed    Interpretation:   Small meaningful change=.05 m/s  Substantial meaningful change=.13 m/s    < 0.4 m/s were more likely to be household ambulators  0.4 - 0.8 m/s limited community ambulators  > 0.8 m/s were community ambulators    Wheelchair Mobility:  Patient propels w/30 feet with use of BUE, supervision; able to manage brakes for restroom use.    Therex:  Bridges 10  SAQ 20x2 trials  Hip abduction 10  Heel slides 10  Quad sets 20  Gluteal sets 20    Balance:  Needs CGA and preferably UE support while standing- RW, grab bar, hand-held assistance.    Additional Treatment:  White board updated with (A)  level.  Educated on frequency of therapies, safety of mobility while on the unit; educated about family training as well upon DC.    Patient left up in chair with call button in reach.    Assessment:  Kaylee Ngo is a 63 y.o. female with a medical diagnosis of primary pancreatic carcinoid tumor. Pt presents with instability with ambulation/standing activity as well as weakness in her lower extremities and impaired endurance. She will thus benefit from skilled PT services to ensure that she will be able to improve her overall functional mobility prior to discharging home alone.     Rehab identified problem list/impairments: weakness, impaired endurance, impaired self care skills, impaired functional mobilty, gait instability, impaired balance, decreased upper extremity function, decreased lower extremity function    Rehab potential is good.    Activity tolerance: Good    Discharge recommendations: home health PT     Barriers to discharge: None    Equipment recommendations: wheelchair, manual, commode     GOALS:   Multidisciplinary Problems     Physical Therapy Goals        Problem: Physical Therapy Goal    Goal Priority Disciplines Outcome Goal Variances Interventions   Physical Therapy Goal     PT, PT/OT Ongoing (interventions implemented as appropriate)     Description:  Goals to be met by:      Patient will increase functional independence with mobility by performin. Supine to sit with Modified [place]. Not met  2. Sit to supine with Modified [place]. Not met  3. Sit to stand transfer with Modified [place]. Not met  4. Bed to chair transfer with Modified [place] using Rolling Walker/rollator. Not met  5. Gait  x 150 feet with Supervision using Rolling Walker/rollator.  Not met  6. Wheelchair propulsion x 150 feet with Supervision using bilateral upper extremities. Not met  7. Ascend/descend 4 stair with bilateral Handrails Stand-by Assistance using Rolling Walker.  Not met  8.  Stand for 3 minutes with Stand-by Assistance using unilateral UE support and perform a dynamic activity. Not met  9. Lower extremity exercise program x 20 reps per handout, with independence. Not met  10. Pt will improve her gait speed from 0.61 m/s to 0.74 m/s with use of rolling walker/rollator to denote improvement in balance, functional mobility, and a lower fall risk.  Not met                      PLAN:    Patient to be seen 5 x/week  to address the above listed problems via gait training, therapeutic activities, therapeutic exercises, neuromuscular re-education, wheelchair management/training  Plan of Care Expires: 05/05/19    Mercy Bryson, PT 4/5/2019

## 2019-04-05 NOTE — PLAN OF CARE
Problem: Skin Injury Risk Increased  Goal: Skin Health and Integrity    Intervention: Optimize Skin Protection     04/05/19 1821   Prevent Additional Skin Injury   Head of Bed (HOB) HOB at 30 degrees   Pressure Reduction Devices positioning supports utilized   Pressure Reduction Techniques frequent weight shift encouraged;rest period provided between sit times   Monitor and Manage Hypervolemia   Skin Protection incontinence pads utilized

## 2019-04-05 NOTE — PLAN OF CARE
Problem: Occupational Therapy Goal  Goal: Occupational Therapy Goal  Goals to be met by: 4/16 /19     Patient will increase functional independence with ADLs by performing:    UE Dressing with Modified [place].  LE Dressing with Modified [place].  Grooming while standing with Supervision.  Toileting from bedside commode with Supervision for hygiene and clothing management.   Bathing from  edge of bed with Supervision.  Rolling to Bilateral with Modified [place].   Supine to sit with Modified [place].  Stand pivot transfers with Modified [place].  Caregiver will be competent with assisting with self care and functional transfers.    Outcome: Ongoing (interventions implemented as appropriate)  Steven Soto, AKIRAR/L      4/5/2019

## 2019-04-05 NOTE — ASSESSMENT & PLAN NOTE
Malnutrition in the context of Chronic Illness/Injury    Related to (etiology):  Inadequate intake in the presence of increased needs due to GI distress    Signs and Symptoms (as evidenced by):  Energy Intake: <75% of estimated energy requirement for > 3 months  Body Fat Depletion: severe depletion of orbitals, triceps and thoracic and lumbar region   Muscle Mass Depletion: moderate depletion of temples, clavicle region, scapular region, interosseous muscle and lower extremities   Weight Loss: 12%% x < 3 months      Interventions/Recommendations (treatment strategy):  Enteral feedings- 12 hr Peptimen 1.5 @ 60 ml/hr  Regular healthful diet  Commercial beverage- Boost plus TID- calories and protein        Nutrition Diagnosis Status:  New

## 2019-04-05 NOTE — PLAN OF CARE
Problem: Physical Therapy Goal  Goal: Physical Therapy Goal  Goals to be met by:      Patient will increase functional independence with mobility by performin. Supine to sit with Modified [place]. Not met  2. Sit to supine with Modified [place]. Not met  3. Sit to stand transfer with Modified [place]. Not met  4. Bed to chair transfer with Modified [place] using Rolling Walker/rollator. Not met  5. Gait  x 150 feet with Supervision using Rolling Walker/rollator.  Not met  6. Wheelchair propulsion x 150 feet with Supervision using bilateral upper extremities. Not met  7. Ascend/descend 4 stair with bilateral Handrails Stand-by Assistance using Rolling Walker.  Not met  8. Stand for 3 minutes with Stand-by Assistance using unilateral UE support and perform a dynamic activity. Not met  9. Lower extremity exercise program x 20 reps per handout, with independence. Not met  10. Pt will improve her gait speed from 0.61 m/s to 0.74 m/s with use of rolling walker/rollator to denote improvement in balance, functional mobility, and a lower fall risk.  Not met    Outcome: Ongoing (interventions implemented as appropriate)  Goals set based on presentation today during evaluation and prior level of function.

## 2019-04-05 NOTE — PROGRESS NOTES
[facility]                                  Skilled Nursing Facility                   Progress Note     Admit Date: 4/4/2019  DODIE TBD   Principal Problem:  Malignant carcinoid tumor of unknown primary site   HPI obtained from patient interview and chart review     Chief Complaint: Establish Care/ Initial Visit     HPI:   Ms. Kaylee Ngo is a 63 year old female with PMHx of diabetes, depression, bipolar, GERD, PUD, malnutrition, Gastric bypass S/p reversal of gastric bypass, s/p : Partial liver resection (segment 3), Cholecystectomy, Appendectomy, Malignant carcinoid tumor, neuroendocrine tumor of pancreas who presents to SNF following a prolonged hospitalization following the surgeries listed below; she then went to [facility] from 2/26 until 4/4.     She underwent surgery on 1/25/2019:   1.  Exploratory laparotomy.  2.  Gastrogastrostomy with EEA 33 mm circular stapler.  3.  Pyloroplasty and gastrostomy closure.  4.  Enteric resection with side-to-side anastomosis and restoration of small   bowel continuity using iDrive tan load stapler.  5.  Liver ultrasound.  6.  Liver biopsy.  7.  Liver resection, segment 3.  8.  Supraceliac lymphadenectomy.  9.  FNA of the pancreas.  10.  Left salpingo-oophorectomy.  11.  Meckel diverticulectomy.  12.  Feeding jejunostomy 14-[place].  13.  ICG perfusion and infrared examination of all anastomosis.    Patient suffers with intermittent nausea and uncontrolled blood glucoses.  She has been advanced to a mechanical soft diet along with nocturnal tube feeds to help improve her nutrition.  Her abdominal wound continues to heal well.  Patient will be treated at [facility] with PT and OT to improve functional status and ability to perform ADLs.     Past Medical History: Patient has a past medical history of Bipolar disease, chronic, Depression, Diabetes, Drug therapy, Gastric ulcer, GERD  (gastroesophageal reflux disease), HTN (hypertension), antineoplastic chemotherapy (06/2017), Hyperlipemia, Malnutrition, Migraines, Nausea (8/24/2018), Neuroendocrine cancer (01/2017), Neuroendocrine carcinoma of small bowel (01/2017), Neuroendocrine tumor (4/9/2018), Obsessive compulsive disorder, and Sleep apnea.    Past Surgical History: Patient has a past surgical history that includes Appendectomy; Cholecystectomy; Gastric bypass; Total knee arthroplasty (Left); LAPAROTOMY, EXPLORATORY (N/A, 1/25/2019); Insertion of percutaneous endoscopic gastrostomy (PEG) feeding tube (02/21/2019); and Esophagogastroduodenoscopy w/ PEG (N/A, 2/21/2019).    Social History: Patient reports that she has never smoked. She has never used smokeless tobacco. She reports that she does not drink alcohol or use drugs.    Family History: family history includes Cancer in her father and mother.    Allergies: Patient is allergic to calcium carbonate; codeine; contrast media; darvocet a500 [propoxyphene n-acetaminophen]; epinephrine; morphine; propoxyphene napsylate; sulfa (sulfonamide antibiotics); erythromycin; erythromycin base; iodinated contrast- oral and iv dye; and pcn [penicillins].    ROS  Constitutional: Negative for fever. + malaise/fatigue.   Eyes: Negative for blurred vision, double vision and discharge.   Respiratory: Negative for cough, shortness of breath and wheezing.    Cardiovascular: Negative for chest pain, palpitations, claudication, leg swelling and PND.   Gastrointestinal: +for abdominal pain, N/ V. Neg for constipation, diarrhea.   Genitourinary: Negative for dysuria, frequency and urgency.   Musculoskeletal:  + generalized weakness. Negative for back pain and myalgias.   Skin: Negative for itching and rash.   Neurological: Negative for dizziness, speech change, seizures, and headaches.   Psychiatric/Behavioral: Negative for depression. The patient is not nervous/anxious.      PEx  Temp:  [97.4 °F (36.3 °C)-98.4  °F (36.9 °C)]   Pulse:  [60-83]   Resp:  [16-24]   BP: (112-160)/(64-71)   SpO2:  [94 %-98 %]      Constitutional: Patient appears well-developed and well-nourished.   HENT:   Head: Normocephalic and atraumatic.   Eyes: Pupils are equal, round, and reactive to light.   Neck: Normal range of motion. Neck supple.   Cardiovascular: Normal rate, regular rhythm and normal heart sounds.    Pulmonary/Chest: Effort normal and breath sounds are clear  Abdominal: Soft. Bowel sounds are normal.   Musculoskeletal: Normal range of motion.   Neurological: Alert and oriented to person, place, and time.   Skin: Skin is warm and dry. abdominal wound x2 with dresses in place CDI.   Psychiatric: Normal mood and affect. Behavior is normal.       Assessment and Plan:     Malignant carcinoid tumor of unknown primary site  -s/p exp lap 1/25  -3/15 Patient saw Dr Christine, neuroendocrine surgeon for post op appt and follow up on cancer treatment. MD stated that cancer tx is not appropriate at this time due to nutritional status and overall condition. He will follow up with her once she is back at her baseline as an outpatient.    Surgical wound dehiscence  - wound Care nurse following  -3/4 wound vac removed   - 0.5 Dilaudid q.6 hours p.r.n. for pain  - MWF- clean upper and lower abdominal wounds with wound cleanser, pack with Aquacel Ag packing strip and cover with foam dressing  - 4/2 WC saw pt yesterday:Proximal abd wound is pink with scan drain. No s&s pf infection. Distal abd wound is almost completely closed up. Cleaned both wounds with wound cleanser. Applied silver alginate and covered with foam.    Migraine headache  -continue amitriptyline to 75 mg p.o. q.h.s. for uncontrolled migraines  -continue naproxen 500 mg p.o. 2 times daily p.r.n. migraine headache     Nausea and vomiting   -Stable with intermittent nausea that responds well to antiemetics  -continue scheduled Phenergan 6.25 q.8 hours, scopolamine patch every 3 days, Zofran 4  mg q.8 hours, Reglan 10 mg t.i.d.     Diarrhea  -3/24 reporting watery stools and decreased output, KUB ordered to r/o ileus.    -3/25- no obstruction seen on KUB     Thrombocytosis  -Elevated platelets at Thrombocytosis which as been fluctuating daily    Hypomagnesemia  -monitor BMP  -continue Mag oxide to 800 b.i.d.     Hypokalemia  - monitor BMP     Dysphagia  -consult SLP   - 3/15 MBSS complete, diet advanced per recs  - 4/1 continue regular diet with thin liquids and tube feedings at nighttime     Diabetes mellitus type 2  -consult dietitian  -accu checks Q 4hours  -moderate SSI Aspart   - 4/5 decreased insulin detemir U-100 pen 10 Units in the am and 10 Units in the pm    Protein calorie Malnutrition  hypoalbuminemia  -consult dietitian  -reportedly can not tolerate tube feeds at rate >30 mL/h or Glucerna   -prealbumin level Q mondays  - nocturnal feeds from 6:00 p.m. to 6:00 a.m. with Peptamen with rate of 60 mils per hour - current tube feeding on back order, will initiate order for vital 1.5 for now     Chronic low back pain  -lidocaine patch daily  -fentanyl patch (increased 3/2)  -Dilaudid PRN for breakthrough  -amitriptyline 75 mg qhs (increased on 04/01 for uncontrolled migraine headache), duloxetine 60 mg daily, pregabalin 50 mg BID   -consider weaning opioid for discharge unless we can find a prescriber willing to refill or she has referral to pain management clinic     Essential hypertension  -continue toprol XL 24 hours 100 mg BID      Bipolar affective disorder  -continue Cymbalta, amitriptyline QHS( increased dose on 4/1 due to migraine unrelieved by other medications), and lamotrigine 150 mg BID    F/U PCP, surgery (Dr. Christine), Neuroendocrine physician Dr. Prieto and Daisha, Follow up with oncology  Follow up with pain management      I certify that SNF services are required to be given on an inpatient basis because Kaylee Ngo needs for skilled nursing care and/or skilled  rehabilitation are required on a daily basis and such services can only practically be provided in a skilled nursing facility setting and are for an ongoing condition for which she received inpatient care in the hospital.     68 minutes spent in the care of the patient (Greater than 1/2 spent in non direct face-to-face contact)    36 of 68 minutes spent on documentation and counseling patient on clinical conditions and therapies provided. The remainder of the time was spent in direct patient care.     Diana Grady, NP

## 2019-04-05 NOTE — PLAN OF CARE
Problem: Adult Inpatient Plan of Care  Goal: Plan of Care Review  Outcome: Ongoing (interventions implemented as appropriate)  Plan of care reviewed with patient. Tolerating continuous tubefeeding peptamen at 60cc/hr. Denies c/o pain. Min assist needed with transfer. Independent of toileting. No acute disress noted Call light in reach.

## 2019-04-05 NOTE — PT/OT/SLP EVAL
Occupational Therapy  Evaluation /Treatment    Kaylee Ngo   MRN: [id number]   Admitting Diagnosis: Malignant carcinoid tumor of unknown primary site     OT Date of Treatment: 04/05/19   OT Start Time: 0730  OT Stop Time: 0830  OT Total Time (min): 60 min    Billable Minutes:  Evaluation 20  Self Care/Home Management 40    Diagnosis: Malignant carcinoid tumor of unknown primary site    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    Neuroendocrine tumor 4/9/2018    Obsessive compulsive disorder     Sleep apnea       Past Surgical History:   Procedure Laterality Date    APPENDECTOMY      BIOPSY-LIVER  2/28/2018    [facility]    CHOLECYSTECTOMY      EGD, WITH PEG TUBE INSERTION N/A 2/21/2019    [facility]ermit [facility]dy [facility] ENDO    ESOPHAGOGASTRODUODENOSCOPY (EGD) N/A 1/29/2018    [facility] ENDO    EXCISION, LIVER N/A 1/25/2019    [facility] OR    GASTRIC BYPASS      INSERTION OF PERCUTANEOUS ENDOSCOPIC GASTROSTOMY (PEG) FEEDING TUBE  02/21/2019    INSERTION-TUBE-GASTROSTOMY-LAPAROSCOPIC  with gastrogram N/A 2/28/2018    [facility] OR    LAPAROTOMY, EXPLORATORY N/A 1/25/2019    [facility] OR    LYMPHADENECTOMY N/A 1/25/2019    [facility] OR    TOTAL KNEE ARTHROPLASTY Left          General Precautions: Standard, aspiration, fall  Orthopedic Precautions: N/A  Braces: N/A    Spiritual, Cultural Beliefs,  Restorationism Practices, Values that Affect Care: no     Patient History:  Lives With: alone  Living Arrangements: mobile home  Home Accessibility: stairs to enter home(4 BOSSMAN)  Home Layout: Able to live on 1st floor  Stair Railings at Home: outside, present on left side  Transportation Anticipated: car, drives self, family or friend will provide  Living Environment Comment: Pt lives alone in mobile home  in [place] near her sister. Pt has 4 BOSSMAN at rear entrance with (B) HRs.  She was (I) PTA and has tub shower combo with shower chair  for safety.. She also has a miaid who comes by every other Monday to clean.  Equipment Currently Used at Home: rollator, shower chair, cane, straight    Prior level of function:    Bed Mobility/Transfers: needs device(Rollator)  Grooming: independent  Bathing: needs device(shower chair)  Upper Body Dressing: independent  Lower Body Dressing: independent  Toileting: independent        Dominant hand: right    Subjective:  Communicated with nurse prior to session.    Chief Complaint:  Pt with no complaints  Patient/Family stated goals: Pt wants to return to PLOF    Pain/Comfort  Pain Rating 1: 0/10    Objective:   Patient found with: PEG Tube    Cognitive Exam:  Oriented to: Person, Place, Time and Situation  Follows Commands/attention: Follows multistep  commands  Communication: clear/fluent  Memory:  No Deficits noted  Safety awareness/insight to disability: intact  Coping skills/emotional control: Appropriate to situation    Visual/perceptual:  Intact    Physical Exam:  Postural examination/scapula alignment:    -       Rounded shoulders  Skin integrity: Visible skin intact  Edema: None noted (B) UEs    Sensation:      -       Intact    Upper Extremity Range of Motion:  Right Upper Extremity: WFL with shld Flexion and Abduction 0-150 degs  Left Upper Extremity: WFL except Shld flexion and Abduction with both 0-110 degs AROM and 0-150 degs A/AROM    Upper Extremity Strength:  Right Upper  Extremity: Grossly 4/5 throughout  Left Upper Extremity: Grossly 4/5 throughout   Strength: WFLS    Fine motor coordination:      -       Intact    Gross motor coordination: WFL    Occupational Performance:    Bed Mobility:    · Patient completed Rolling/Turning to Left with  modified independence  · Patient completed Rolling/Turning to Right with modified independence  · Patient completed Scooting/Bridging with supervision  · Patient completed Supine to Sit with stand by assistance     Functional Mobility/Transfers:  · Patient completed Sit <> Stand Transfer with contact guard assistance  with  rolling walker   · Patient completed Bed <> Chair Transfer using Stand Pivot technique with contact guard assistance with rolling walker  · Patient completed Toilet Transfer Stand Pivot technique with contact guard assistance with  rolling walker  · Functional Mobility: Pt ambulated from EOB to toilet in restroom with RW a distance of 13 ft with SBA    Activities of Daily Living:  · Feeding:  dependence NPO with PEG tube in place  · Grooming: supervision seated sinkside  · Bathing: contact guard assistance when standing to was rear and eulogio area.  · Upper Body Dressing: supervision set up only.  · Lower Body Dressing: contact guard assistance when standing to manage clothing over hips. Pt donning pants, socks and slippers.  · Toileting: contact guard assistance when standing to manage clothing over hips.      Additional Treatment:  Pt edu on Plan of care,  safety when performing functional transfers, self care tasks and functional standing activities.  - White board updated  - Self care tasks completed-- as noted above   -  She performed dynamic sitting activity while sitting unsupported EOB and working on self care tasks.    Patient left up in chair with all lines intact, call button in reach and nurse notified    [facility] 6 Click:  [facility] Total Score: 19    Assessment:  Kaylee Ngo is a 63 y.o. female with a medical  diagnosis of Malignant carcinoid tumor of unknown primary site .  Pt presents with performance deficits of Physical skills including impaired balance, functional mobility, strength, functional endurance, functional standing balance, and  functional use of ( B ) LEs and (L) UE . Pt also demonstrating decreased functional performance of Functional Activities and self care activities as well as functional mobility. Pt is motivated and would benefit from OT intervention to further her functional (I)ce and safety.      Rehab identified problem list/impairments: weakness, impaired endurance, impaired self care skills, impaired functional mobilty, gait instability, impaired balance, decreased lower extremity function, decreased upper extremity function, decreased safety awareness, impaired cardiopulmonary response to activity    Rehab potential is good    Activity tolerance: Good    Discharge recommendations: home with home health     Barriers to discharge: Decreased caregiver support     Equipment recommendations: (TBD)     GOALS:   Multidisciplinary Problems     Occupational Therapy Goals        Problem: Occupational Therapy Goal    Goal Priority Disciplines Outcome Interventions   Occupational Therapy Goal     OT, PT/OT Ongoing (interventions implemented as appropriate)    Description:  Goals to be met by: 4/16 /19     Patient will increase functional independence with ADLs by performing:    UE Dressing with Modified [place].  LE Dressing with Modified [place].  Grooming while standing with Supervision.  Toileting from bedside commode with Supervision for hygiene and clothing management.   Bathing from  edge of bed with Supervision.  Rolling to Bilateral with Modified [place].   Supine to sit with Modified [place].  Stand pivot transfers with Modified [place].  Caregiver will be competent with assisting with self care and functional transfers.                      PLAN: Patient to be seen 5  x/week to address the above listed problems via self-care/home management, therapeutic activities, therapeutic exercises  Plan of Care expires: 05/05/19  Plan of Care reviewed with: patient    Steven Soto OTR/FRANTZ  04/05/2019

## 2019-04-06 LAB
POCT GLUCOSE: 126 MG/DL (ref 70–110)
POCT GLUCOSE: 140 MG/DL (ref 70–110)
POCT GLUCOSE: 156 MG/DL (ref 70–110)
POCT GLUCOSE: 160 MG/DL (ref 70–110)
POCT GLUCOSE: 177 MG/DL (ref 70–110)
POCT GLUCOSE: 264 MG/DL (ref 70–110)
POCT GLUCOSE: 286 MG/DL (ref 70–110)

## 2019-04-06 PROCEDURE — A4216 STERILE WATER/SALINE, 10 ML: HCPCS | Performed by: NURSE PRACTITIONER

## 2019-04-06 PROCEDURE — 97116 GAIT TRAINING THERAPY: CPT

## 2019-04-06 PROCEDURE — 97110 THERAPEUTIC EXERCISES: CPT

## 2019-04-06 PROCEDURE — 63600175 PHARM REV CODE 636 W HCPCS: Performed by: NURSE PRACTITIONER

## 2019-04-06 PROCEDURE — 25000242 PHARM REV CODE 250 ALT 637 W/ HCPCS: Performed by: NURSE PRACTITIONER

## 2019-04-06 PROCEDURE — 97535 SELF CARE MNGMENT TRAINING: CPT

## 2019-04-06 PROCEDURE — 25000003 PHARM REV CODE 250: Performed by: NURSE PRACTITIONER

## 2019-04-06 PROCEDURE — 97530 THERAPEUTIC ACTIVITIES: CPT

## 2019-04-06 PROCEDURE — 11000004 HC SNF PRIVATE

## 2019-04-06 PROCEDURE — S5571 INSULIN DISPOS PEN 3 ML: HCPCS | Performed by: NURSE PRACTITIONER

## 2019-04-06 PROCEDURE — 94640 AIRWAY INHALATION TREATMENT: CPT

## 2019-04-06 RX ORDER — FERROUS SULFATE 325(65) MG
325 TABLET, DELAYED RELEASE (ENTERIC COATED) ORAL DAILY
Status: DISCONTINUED | OUTPATIENT
Start: 2019-04-07 | End: 2019-04-15 | Stop reason: HOSPADM

## 2019-04-06 RX ADMIN — MINERAL SUPPLEMENT IRON 300 MG / 5 ML STRENGTH LIQUID 100 PER BOX UNFLAVORED 300 MG: at 10:04

## 2019-04-06 RX ADMIN — INSULIN ASPART 1 UNITS: 100 INJECTION, SOLUTION INTRAVENOUS; SUBCUTANEOUS at 09:04

## 2019-04-06 RX ADMIN — HYDROXYZINE HYDROCHLORIDE 25 MG: 25 TABLET, FILM COATED ORAL at 10:04

## 2019-04-06 RX ADMIN — IPRATROPIUM BROMIDE AND ALBUTEROL SULFATE 3 ML: .5; 3 SOLUTION RESPIRATORY (INHALATION) at 07:04

## 2019-04-06 RX ADMIN — METOPROLOL SUCCINATE 100 MG: 50 TABLET, EXTENDED RELEASE ORAL at 08:04

## 2019-04-06 RX ADMIN — IPRATROPIUM BROMIDE AND ALBUTEROL SULFATE 3 ML: .5; 3 SOLUTION RESPIRATORY (INHALATION) at 08:04

## 2019-04-06 RX ADMIN — DRONABINOL 2.5 MG: 2.5 CAPSULE ORAL at 08:04

## 2019-04-06 RX ADMIN — MIRTAZAPINE 15 MG: 15 TABLET, ORALLY DISINTEGRATING ORAL at 08:04

## 2019-04-06 RX ADMIN — PREGABALIN 50 MG: 50 CAPSULE ORAL at 10:04

## 2019-04-06 RX ADMIN — HYDROXYZINE HYDROCHLORIDE 25 MG: 25 TABLET, FILM COATED ORAL at 08:04

## 2019-04-06 RX ADMIN — AMITRIPTYLINE HYDROCHLORIDE 50 MG: 25 TABLET, FILM COATED ORAL at 08:04

## 2019-04-06 RX ADMIN — TIZANIDINE 4 MG: 2 TABLET ORAL at 09:04

## 2019-04-06 RX ADMIN — MICONAZOLE NITRATE: 20 OINTMENT TOPICAL at 10:04

## 2019-04-06 RX ADMIN — IPRATROPIUM BROMIDE AND ALBUTEROL SULFATE 3 ML: .5; 3 SOLUTION RESPIRATORY (INHALATION) at 01:04

## 2019-04-06 RX ADMIN — DRONABINOL 2.5 MG: 2.5 CAPSULE ORAL at 11:04

## 2019-04-06 RX ADMIN — DULOXETINE 60 MG: 60 CAPSULE, DELAYED RELEASE ORAL at 10:04

## 2019-04-06 RX ADMIN — POTASSIUM & SODIUM PHOSPHATES POWDER PACK 280-160-250 MG 1 PACKET: 280-160-250 PACK at 08:04

## 2019-04-06 RX ADMIN — LAMOTRIGINE 150 MG: 150 TABLET ORAL at 08:04

## 2019-04-06 RX ADMIN — PREGABALIN 50 MG: 50 CAPSULE ORAL at 08:04

## 2019-04-06 RX ADMIN — PANTOPRAZOLE SODIUM 40 MG: 40 TABLET, DELAYED RELEASE ORAL at 10:04

## 2019-04-06 RX ADMIN — HYDROXYZINE HYDROCHLORIDE 25 MG: 25 TABLET, FILM COATED ORAL at 02:04

## 2019-04-06 RX ADMIN — ENOXAPARIN SODIUM 40 MG: 100 INJECTION SUBCUTANEOUS at 10:04

## 2019-04-06 RX ADMIN — POTASSIUM & SODIUM PHOSPHATES POWDER PACK 280-160-250 MG 1 PACKET: 280-160-250 PACK at 11:04

## 2019-04-06 RX ADMIN — INSULIN DETEMIR 10 UNITS: 100 INJECTION, SOLUTION SUBCUTANEOUS at 08:04

## 2019-04-06 RX ADMIN — METOCLOPRAMIDE 10 MG: 10 TABLET ORAL at 06:04

## 2019-04-06 RX ADMIN — Medication 10 ML: at 12:04

## 2019-04-06 RX ADMIN — METOCLOPRAMIDE 10 MG: 10 TABLET ORAL at 05:04

## 2019-04-06 RX ADMIN — Medication 800 MG: at 08:04

## 2019-04-06 RX ADMIN — LIDOCAINE 1 PATCH: 50 PATCH TOPICAL at 02:04

## 2019-04-06 RX ADMIN — POTASSIUM & SODIUM PHOSPHATES POWDER PACK 280-160-250 MG 1 PACKET: 280-160-250 PACK at 05:04

## 2019-04-06 RX ADMIN — Medication 10 ML: at 05:04

## 2019-04-06 RX ADMIN — METOCLOPRAMIDE 10 MG: 10 TABLET ORAL at 11:04

## 2019-04-06 RX ADMIN — LAMOTRIGINE 150 MG: 150 TABLET ORAL at 10:04

## 2019-04-06 RX ADMIN — SCOPALAMINE 1 PATCH: 1 PATCH, EXTENDED RELEASE TRANSDERMAL at 09:04

## 2019-04-06 RX ADMIN — MICONAZOLE NITRATE: 20 OINTMENT TOPICAL at 09:04

## 2019-04-06 RX ADMIN — Medication 800 MG: at 10:04

## 2019-04-06 RX ADMIN — METOPROLOL SUCCINATE 100 MG: 50 TABLET, EXTENDED RELEASE ORAL at 10:04

## 2019-04-06 NOTE — PLAN OF CARE
Problem: Occupational Therapy Goal  Goal: Occupational Therapy Goal  Goals to be met by: 4/16 /19     Patient will increase functional independence with ADLs by performing:    UE Dressing with Modified [place].  LE Dressing with Modified [place].  Grooming while standing with Supervision.  Toileting from bedside commode with Supervision for hygiene and clothing management.   Bathing from  edge of bed with Supervision.  Rolling to Bilateral with Modified [place].   Supine to sit with Modified [place].  Stand pivot transfers with Modified [place].  Caregiver will be competent with assisting with self care and functional transfers.     Outcome: Ongoing (interventions implemented as appropriate)  Steven Soto, OTR/L      4/6/2019

## 2019-04-06 NOTE — PLAN OF CARE
Problem: Fall Injury Risk  Goal: Absence of Fall and Fall-Related Injury  Outcome: Ongoing (interventions implemented as appropriate)  Fall Risk: Encouraged patient to continue to call for staff assistance with all transfers. Explained to patient due to her recent change in medical condition, she is at increased risk for falls. Verbalized understanding. Will maintain safety precautions and follow up as needed.

## 2019-04-06 NOTE — PT/OT/SLP PROGRESS
Occupational Therapy  Treatment    Kaylee Ngo   MRN: [id number]   Admitting Diagnosis: Malignant carcinoid tumor of unknown primary site    OT Date of Treatment: 04/05/19       Billable Minutes:  Self Care/Home Management 18, Therapeutic Activity 15 and Therapeutic Exercise 15    General Precautions: Standard, aspiration, fall  Orthopedic Precautions: N/A  Braces: N/A    Spiritual, Cultural Beliefs, Anabaptism Practices, Values that Affect Care: no    Subjective:  Communicated with nurse prior to session.      Pain/Comfort  Pain Rating 1: 0/10  Pain Rating Post-Intervention 1: 0/10    Objective:  Patient found with: PEG Tube    Occupational Performance:    Bed Mobility:    · Pt seated in W/C at onset of therapy session.    Functional Mobility/Transfers:  · Patient completed Sit <> Stand Transfer with supervision  with  rolling walker   · Patient completed Bed <> Chair Transfer using Stand Pivot technique with supervision with rolling walker  · Functional Mobility: Pt ambulated from therapy gym to her room with RW a distance of 138ft with SBA    Activities of Daily Living:  · Grooming: contact guard assistance standing sinkside to perform grooming. Pt standing without A/D for up to 5 min 19 sec.  · Lower Body Dressing: stand by assistance donning clothing sitting in W/C.      OT Exercises: UE Ergometer performed 15 minutes on UBE with Min resistance. UE exercises performed to increase functional endurance and strength.  Strengthening required in order increase independence when performing self care tasks, functional ambulation, W/C propulsion , functional standing activities as well as when performing functional tasks.    Pt worked on functional standing activity consisting of standing with RW while reaching in all planes , crossing of midline and reaching to varying heights to facilitate (B) wt shifting and stability in standing to increase (I)ce when performing self care, and functional activities with standing  component.  Pt tolerated up to  5 min, 4 min 51 sec, and 2 min 40 sec respectively  in standing with   No A/D but SBA for safety.    Additional Treatment:  Pt edu on Plan of care,  safety when performing functional transfers, self care tasks and functional standing activities.  - White board updated  - Self care tasks completed-- as noted above     Patient left up in chair with all lines intact, call button in reach and nurse notified    [facility] 6 Click:  [facility] Total Score: 19    ASSESSMENT:  Kaylee Ngo is a 63 y.o. female with a medical diagnosis of Malignant carcinoid tumor of unknown primary site .  Pt was agreeable to OT and tolerated Tx without incidence.   She continues to present with deficits affecting (I)ce with functional transfers, functional standing balance and self care tasks with standing component. She is making progress but continues to requires SBA and at times (A) to perform functional activities to completion.   OT continues to be recommended to further her functional (I)ce and safety. Goals remain appropriate and continued OT is recommended.        Rehab identified problem list/impairments: weakness, impaired endurance, impaired self care skills, impaired functional mobilty, gait instability, impaired balance, decreased lower extremity function, decreased upper extremity function, decreased safety awareness, impaired cardiopulmonary response to activity    Rehab potential is good    Activity tolerance: Good    Discharge recommendations: home with home health     Barriers to discharge: Decreased caregiver support     Equipment recommendations: (TBD)     GOALS:   Multidisciplinary Problems     Occupational Therapy Goals        Problem: Occupational Therapy Goal    Goal Priority Disciplines Outcome Interventions   Occupational Therapy Goal     OT, PT/OT Ongoing (interventions implemented as appropriate)    Description:  Goals to be met by: 4/16 /19     Patient will increase functional  independence with ADLs by performing:    UE Dressing with Modified [place].  LE Dressing with Modified [place].  Grooming while standing with Supervision.  Toileting from bedside commode with Supervision for hygiene and clothing management.   Bathing from  edge of bed with Supervision.  Rolling to Bilateral with Modified [place].   Supine to sit with Modified [place].  Stand pivot transfers with Modified [place].  Caregiver will be competent with assisting with self care and functional transfers.                      Plan:  Patient to be seen 5 x/week to address the above listed problems via self-care/home management, therapeutic activities, therapeutic exercises  Plan of Care expires: 05/05/19  Plan of Care reviewed with: patient    Steven Soto OTR/FRANTZ  04/06/2019

## 2019-04-06 NOTE — PT/OT/SLP PROGRESS
Physical Therapy  Treatment    Kaylee Ngo   MRN: [id number]   Admitting Diagnosis: Malignant carcinoid tumor of unknown primary site    PT Received On: 04/06/19        Billable Minutes:  Gait Training 13, Therapeutic Activity 25, Therapeutic Exercise 15 and Total Time 53    Treatment Type: Treatment  PT/PTA: PT     PTA Visit Number: 0       General Precautions: Standard, aspiration, fall  Orthopedic Precautions: N/A   Braces: N/A    Spiritual, Cultural Beliefs, Confucianist Practices, Values that Affect Care: no    Subjective:  Communicated with patient prior to session.  Pt agreeable to session.    Pain/Comfort  Pain Rating 1: 0/10    Objective:  Patient found UIC. Patient found with: PEG Tube     AM-PAC 6 CLICK MOBILITY  Total Score:18    Bed Mobility:  Not performed    Transfers:  Sit<>Stand: to/from w/c (4 trials) w/ RW and CGA for safety  Stand Pivot Transfer: bedside chair<>w/c and w/c<>nustep; all w/o AD w/ CGA for safety    Gait:  Amb 2 trials (120ft each) w/ RW and CGA  Cueing to remain inside RW and to keep all legs of RW on floor     Advanced Gait:  Stairs: asc/luis 4 steps w/ BHR and CGA  Cueing for sequencing    Balance:  Standing card matching activity [id number] w/ RW and SBA for safety    Additional Treatment:  Recumbent cross , level 3, x15min to inc BUE/BLE strength/endurance    Patient left up in chair with all lines intact and call button in reach.    Assessment:  Kaylee Ngo is a 63 y.o. female with a medical diagnosis of Malignant carcinoid tumor of unknown primary site.  Pt lopez session well w/ good participation. She is progressing well w/ mobility and will continue PT POC.    Rehab identified problem list/impairments: weakness, impaired endurance, impaired self care skills, impaired functional mobilty, gait instability, impaired balance, decreased upper extremity function, decreased lower extremity function    Rehab potential is good.    Activity tolerance: Good    Discharge  recommendations: home health PT     Barriers to discharge: None    Equipment recommendations: wheelchair, manual, commode     GOALS:   Multidisciplinary Problems     Physical Therapy Goals        Problem: Physical Therapy Goal    Goal Priority Disciplines Outcome Goal Variances Interventions   Physical Therapy Goal     PT, PT/OT Ongoing (interventions implemented as appropriate)     Description:  Goals to be met by:      Patient will increase functional independence with mobility by performin. Supine to sit with Modified [place]. Not met  2. Sit to supine with Modified [place]. Not met  3. Sit to stand transfer with Modified [place]. Not met  4. Bed to chair transfer with Modified [place] using Rolling Walker/rollator. Not met  5. Gait  x 150 feet with Supervision using Rolling Walker/rollator.  Not met  6. Wheelchair propulsion x 150 feet with Supervision using bilateral upper extremities. Not met  7. Ascend/descend 4 stair with bilateral Handrails Stand-by Assistance using Rolling Walker.  Not met  8. Stand for 3 minutes with Stand-by Assistance using unilateral UE support and perform a dynamic activity. Not met  9. Lower extremity exercise program x 20 reps per handout, with independence. Not met  10. Pt will improve her gait speed from 0.61 m/s to 0.74 m/s with use of rolling walker/rollator to denote improvement in balance, functional mobility, and a lower fall risk.  Not met                      PLAN:    Patient to be seen 5 x/week  to address the above listed problems via gait training, therapeutic activities, therapeutic exercises, neuromuscular re-education, wheelchair management/training  Plan of Care expires: 19  Plan of Care reviewed with: patient    Sabine SANTANA Matias, PT  2019

## 2019-04-06 NOTE — PLAN OF CARE
Problem: Physical Therapy Goal  Goal: Physical Therapy Goal  Goals to be met by:      Patient will increase functional independence with mobility by performin. Supine to sit with Modified [place]. Not met  2. Sit to supine with Modified [place]. Not met  3. Sit to stand transfer with Modified [place]. Not met  4. Bed to chair transfer with Modified [place] using Rolling Walker/rollator. Not met  5. Gait  x 150 feet with Supervision using Rolling Walker/rollator.  Not met  6. Wheelchair propulsion x 150 feet with Supervision using bilateral upper extremities. Not met  7. Ascend/descend 4 stair with bilateral Handrails Stand-by Assistance using Rolling Walker.  Not met  8. Stand for 3 minutes with Stand-by Assistance using unilateral UE support and perform a dynamic activity. Not met  9. Lower extremity exercise program x 20 reps per handout, with independence. Not met  10. Pt will improve her gait speed from 0.61 m/s to 0.74 m/s with use of rolling walker/rollator to denote improvement in balance, functional mobility, and a lower fall risk.  Not met     Outcome: Ongoing (interventions implemented as appropriate)  LTGs remain appropriate. Pt will continue PT POC.    Sabine Salcedo, PT  2019

## 2019-04-07 LAB
POCT GLUCOSE: 107 MG/DL (ref 70–110)
POCT GLUCOSE: 114 MG/DL (ref 70–110)
POCT GLUCOSE: 274 MG/DL (ref 70–110)
POCT GLUCOSE: 285 MG/DL (ref 70–110)
POCT GLUCOSE: 355 MG/DL (ref 70–110)

## 2019-04-07 PROCEDURE — 63600175 PHARM REV CODE 636 W HCPCS: Performed by: NURSE PRACTITIONER

## 2019-04-07 PROCEDURE — 94640 AIRWAY INHALATION TREATMENT: CPT

## 2019-04-07 PROCEDURE — 11000004 HC SNF PRIVATE

## 2019-04-07 PROCEDURE — 25000003 PHARM REV CODE 250: Performed by: NURSE PRACTITIONER

## 2019-04-07 PROCEDURE — 25000242 PHARM REV CODE 250 ALT 637 W/ HCPCS: Performed by: NURSE PRACTITIONER

## 2019-04-07 PROCEDURE — A4216 STERILE WATER/SALINE, 10 ML: HCPCS | Performed by: NURSE PRACTITIONER

## 2019-04-07 PROCEDURE — 99900035 HC TECH TIME PER 15 MIN (STAT)

## 2019-04-07 RX ORDER — IPRATROPIUM BROMIDE AND ALBUTEROL SULFATE 2.5; .5 MG/3ML; MG/3ML
3 SOLUTION RESPIRATORY (INHALATION) EVERY 12 HOURS
Status: DISCONTINUED | OUTPATIENT
Start: 2019-04-07 | End: 2019-04-11

## 2019-04-07 RX ADMIN — ENOXAPARIN SODIUM 40 MG: 100 INJECTION SUBCUTANEOUS at 09:04

## 2019-04-07 RX ADMIN — METOPROLOL SUCCINATE 100 MG: 50 TABLET, EXTENDED RELEASE ORAL at 10:04

## 2019-04-07 RX ADMIN — INSULIN DETEMIR 10 UNITS: 100 INJECTION, SOLUTION SUBCUTANEOUS at 10:04

## 2019-04-07 RX ADMIN — INSULIN ASPART 5 UNITS: 100 INJECTION, SOLUTION INTRAVENOUS; SUBCUTANEOUS at 10:04

## 2019-04-07 RX ADMIN — MICONAZOLE NITRATE: 20 OINTMENT TOPICAL at 10:04

## 2019-04-07 RX ADMIN — POTASSIUM & SODIUM PHOSPHATES POWDER PACK 280-160-250 MG 1 PACKET: 280-160-250 PACK at 10:04

## 2019-04-07 RX ADMIN — MICONAZOLE NITRATE: 20 OINTMENT TOPICAL at 09:04

## 2019-04-07 RX ADMIN — POTASSIUM & SODIUM PHOSPHATES POWDER PACK 280-160-250 MG 1 PACKET: 280-160-250 PACK at 04:04

## 2019-04-07 RX ADMIN — IPRATROPIUM BROMIDE AND ALBUTEROL SULFATE 3 ML: .5; 3 SOLUTION RESPIRATORY (INHALATION) at 12:04

## 2019-04-07 RX ADMIN — PREGABALIN 50 MG: 50 CAPSULE ORAL at 10:04

## 2019-04-07 RX ADMIN — Medication 10 ML: at 12:04

## 2019-04-07 RX ADMIN — METOCLOPRAMIDE 10 MG: 10 TABLET ORAL at 11:04

## 2019-04-07 RX ADMIN — METOCLOPRAMIDE 10 MG: 10 TABLET ORAL at 05:04

## 2019-04-07 RX ADMIN — IPRATROPIUM BROMIDE AND ALBUTEROL SULFATE 3 ML: .5; 3 SOLUTION RESPIRATORY (INHALATION) at 07:04

## 2019-04-07 RX ADMIN — PREGABALIN 50 MG: 50 CAPSULE ORAL at 09:04

## 2019-04-07 RX ADMIN — Medication 800 MG: at 10:04

## 2019-04-07 RX ADMIN — Medication 10 ML: at 01:04

## 2019-04-07 RX ADMIN — INSULIN ASPART 3 UNITS: 100 INJECTION, SOLUTION INTRAVENOUS; SUBCUTANEOUS at 06:04

## 2019-04-07 RX ADMIN — MIRTAZAPINE 15 MG: 15 TABLET, ORALLY DISINTEGRATING ORAL at 10:04

## 2019-04-07 RX ADMIN — Medication 10 ML: at 06:04

## 2019-04-07 RX ADMIN — Medication 800 MG: at 09:04

## 2019-04-07 RX ADMIN — METOPROLOL SUCCINATE 100 MG: 50 TABLET, EXTENDED RELEASE ORAL at 09:04

## 2019-04-07 RX ADMIN — LAMOTRIGINE 150 MG: 150 TABLET ORAL at 09:04

## 2019-04-07 RX ADMIN — PROMETHAZINE HYDROCHLORIDE 12.5 MG: 12.5 TABLET ORAL at 05:04

## 2019-04-07 RX ADMIN — DRONABINOL 2.5 MG: 2.5 CAPSULE ORAL at 09:04

## 2019-04-07 RX ADMIN — POTASSIUM & SODIUM PHOSPHATES POWDER PACK 280-160-250 MG 1 PACKET: 280-160-250 PACK at 01:04

## 2019-04-07 RX ADMIN — HYDROXYZINE HYDROCHLORIDE 25 MG: 25 TABLET, FILM COATED ORAL at 04:04

## 2019-04-07 RX ADMIN — DULOXETINE 60 MG: 60 CAPSULE, DELAYED RELEASE ORAL at 09:04

## 2019-04-07 RX ADMIN — Medication 10 ML: at 05:04

## 2019-04-07 RX ADMIN — AMITRIPTYLINE HYDROCHLORIDE 50 MG: 25 TABLET, FILM COATED ORAL at 10:04

## 2019-04-07 RX ADMIN — LIDOCAINE 1 PATCH: 50 PATCH TOPICAL at 09:04

## 2019-04-07 RX ADMIN — METOCLOPRAMIDE 10 MG: 10 TABLET ORAL at 04:04

## 2019-04-07 RX ADMIN — FERROUS SULFATE TAB EC 325 MG (65 MG FE EQUIVALENT) 325 MG: 325 (65 FE) TABLET DELAYED RESPONSE at 09:04

## 2019-04-07 RX ADMIN — HYDROXYZINE HYDROCHLORIDE 25 MG: 25 TABLET, FILM COATED ORAL at 09:04

## 2019-04-07 RX ADMIN — HYDROXYZINE HYDROCHLORIDE 25 MG: 25 TABLET, FILM COATED ORAL at 10:04

## 2019-04-07 RX ADMIN — DRONABINOL 2.5 MG: 2.5 CAPSULE ORAL at 10:04

## 2019-04-07 RX ADMIN — LAMOTRIGINE 150 MG: 150 TABLET ORAL at 10:04

## 2019-04-07 RX ADMIN — POTASSIUM & SODIUM PHOSPHATES POWDER PACK 280-160-250 MG 1 PACKET: 280-160-250 PACK at 09:04

## 2019-04-07 RX ADMIN — PANTOPRAZOLE SODIUM 40 MG: 40 TABLET, DELAYED RELEASE ORAL at 09:04

## 2019-04-07 NOTE — PLAN OF CARE
Problem: Adult Inpatient Plan of Care  Goal: Plan of Care Review  Outcome: Revised  Repositions independently,. No new skin breakdowns noted. Mid abd drsg dry and intact, bruising noted. Clamped PEG tube intact, no redness to insertion site. Afebrile. Monitored for pain and safety. Safety maintained. Pain meds effective.

## 2019-04-08 LAB
ANION GAP SERPL CALC-SCNC: 8 MMOL/L (ref 8–16)
BUN SERPL-MCNC: 16 MG/DL (ref 8–23)
CALCIUM SERPL-MCNC: 9.3 MG/DL (ref 8.7–10.5)
CHLORIDE SERPL-SCNC: 98 MMOL/L (ref 95–110)
CO2 SERPL-SCNC: 32 MMOL/L (ref 23–29)
CREAT SERPL-MCNC: 0.6 MG/DL (ref 0.5–1.4)
EST. GFR  (AFRICAN AMERICAN): >60 ML/MIN/1.73 M^2
EST. GFR  (NON AFRICAN AMERICAN): >60 ML/MIN/1.73 M^2
GLUCOSE SERPL-MCNC: 108 MG/DL (ref 70–110)
MAGNESIUM SERPL-MCNC: 1.8 MG/DL (ref 1.6–2.6)
PHOSPHATE SERPL-MCNC: 3.5 MG/DL (ref 2.7–4.5)
POCT GLUCOSE: 129 MG/DL (ref 70–110)
POCT GLUCOSE: 232 MG/DL (ref 70–110)
POCT GLUCOSE: 235 MG/DL (ref 70–110)
POCT GLUCOSE: 305 MG/DL (ref 70–110)
POCT GLUCOSE: 59 MG/DL (ref 70–110)
POCT GLUCOSE: 98 MG/DL (ref 70–110)
POTASSIUM SERPL-SCNC: 4.2 MMOL/L (ref 3.5–5.1)
PREALB SERPL-MCNC: 12 MG/DL (ref 20–43)
SODIUM SERPL-SCNC: 138 MMOL/L (ref 136–145)

## 2019-04-08 PROCEDURE — 97803 MED NUTRITION INDIV SUBSEQ: CPT

## 2019-04-08 PROCEDURE — A4216 STERILE WATER/SALINE, 10 ML: HCPCS | Performed by: NURSE PRACTITIONER

## 2019-04-08 PROCEDURE — 80048 BASIC METABOLIC PNL TOTAL CA: CPT

## 2019-04-08 PROCEDURE — 97535 SELF CARE MNGMENT TRAINING: CPT

## 2019-04-08 PROCEDURE — 97530 THERAPEUTIC ACTIVITIES: CPT

## 2019-04-08 PROCEDURE — 84134 ASSAY OF PREALBUMIN: CPT

## 2019-04-08 PROCEDURE — 94761 N-INVAS EAR/PLS OXIMETRY MLT: CPT

## 2019-04-08 PROCEDURE — 97110 THERAPEUTIC EXERCISES: CPT

## 2019-04-08 PROCEDURE — 25000003 PHARM REV CODE 250: Performed by: NURSE PRACTITIONER

## 2019-04-08 PROCEDURE — 25000242 PHARM REV CODE 250 ALT 637 W/ HCPCS: Performed by: NURSE PRACTITIONER

## 2019-04-08 PROCEDURE — 97116 GAIT TRAINING THERAPY: CPT

## 2019-04-08 PROCEDURE — 11000004 HC SNF PRIVATE

## 2019-04-08 PROCEDURE — 63600175 PHARM REV CODE 636 W HCPCS: Performed by: NURSE PRACTITIONER

## 2019-04-08 PROCEDURE — 84100 ASSAY OF PHOSPHORUS: CPT

## 2019-04-08 PROCEDURE — 94640 AIRWAY INHALATION TREATMENT: CPT

## 2019-04-08 PROCEDURE — 36415 COLL VENOUS BLD VENIPUNCTURE: CPT

## 2019-04-08 PROCEDURE — 83735 ASSAY OF MAGNESIUM: CPT

## 2019-04-08 RX ORDER — IBUPROFEN 200 MG
16 TABLET ORAL
Status: DISCONTINUED | OUTPATIENT
Start: 2019-04-08 | End: 2019-04-15 | Stop reason: HOSPADM

## 2019-04-08 RX ORDER — IBUPROFEN 200 MG
24 TABLET ORAL
Status: DISCONTINUED | OUTPATIENT
Start: 2019-04-08 | End: 2019-04-15 | Stop reason: HOSPADM

## 2019-04-08 RX ORDER — INSULIN ASPART 100 [IU]/ML
0-5 INJECTION, SOLUTION INTRAVENOUS; SUBCUTANEOUS
Status: DISCONTINUED | OUTPATIENT
Start: 2019-04-08 | End: 2019-04-15 | Stop reason: HOSPADM

## 2019-04-08 RX ORDER — GLUCAGON 1 MG
1 KIT INJECTION
Status: DISCONTINUED | OUTPATIENT
Start: 2019-04-08 | End: 2019-04-15 | Stop reason: HOSPADM

## 2019-04-08 RX ADMIN — HYDROXYZINE HYDROCHLORIDE 25 MG: 25 TABLET, FILM COATED ORAL at 02:04

## 2019-04-08 RX ADMIN — DULOXETINE 60 MG: 60 CAPSULE, DELAYED RELEASE ORAL at 10:04

## 2019-04-08 RX ADMIN — ENOXAPARIN SODIUM 40 MG: 100 INJECTION SUBCUTANEOUS at 10:04

## 2019-04-08 RX ADMIN — METOPROLOL SUCCINATE 100 MG: 50 TABLET, EXTENDED RELEASE ORAL at 10:04

## 2019-04-08 RX ADMIN — Medication 10 ML: at 06:04

## 2019-04-08 RX ADMIN — METOCLOPRAMIDE 10 MG: 10 TABLET ORAL at 12:04

## 2019-04-08 RX ADMIN — Medication 10 ML: at 12:04

## 2019-04-08 RX ADMIN — POTASSIUM & SODIUM PHOSPHATES POWDER PACK 280-160-250 MG 1 PACKET: 280-160-250 PACK at 12:04

## 2019-04-08 RX ADMIN — AMITRIPTYLINE HYDROCHLORIDE 50 MG: 25 TABLET, FILM COATED ORAL at 09:04

## 2019-04-08 RX ADMIN — LAMOTRIGINE 150 MG: 150 TABLET ORAL at 09:04

## 2019-04-08 RX ADMIN — IPRATROPIUM BROMIDE AND ALBUTEROL SULFATE 3 ML: .5; 3 SOLUTION RESPIRATORY (INHALATION) at 08:04

## 2019-04-08 RX ADMIN — METOCLOPRAMIDE 10 MG: 10 TABLET ORAL at 06:04

## 2019-04-08 RX ADMIN — POTASSIUM & SODIUM PHOSPHATES POWDER PACK 280-160-250 MG 1 PACKET: 280-160-250 PACK at 04:04

## 2019-04-08 RX ADMIN — PANTOPRAZOLE SODIUM 40 MG: 40 TABLET, DELAYED RELEASE ORAL at 10:04

## 2019-04-08 RX ADMIN — MICONAZOLE NITRATE: 20 OINTMENT TOPICAL at 09:04

## 2019-04-08 RX ADMIN — LIDOCAINE 1 PATCH: 50 PATCH TOPICAL at 02:04

## 2019-04-08 RX ADMIN — POTASSIUM & SODIUM PHOSPHATES POWDER PACK 280-160-250 MG 1 PACKET: 280-160-250 PACK at 09:04

## 2019-04-08 RX ADMIN — PREGABALIN 50 MG: 50 CAPSULE ORAL at 09:04

## 2019-04-08 RX ADMIN — INSULIN DETEMIR 10 UNITS: 100 INJECTION, SOLUTION SUBCUTANEOUS at 09:04

## 2019-04-08 RX ADMIN — HYDROXYZINE HYDROCHLORIDE 25 MG: 25 TABLET, FILM COATED ORAL at 10:04

## 2019-04-08 RX ADMIN — Medication 16 G: at 04:04

## 2019-04-08 RX ADMIN — FENTANYL 1 PATCH: 50 PATCH, EXTENDED RELEASE TRANSDERMAL at 05:04

## 2019-04-08 RX ADMIN — INSULIN ASPART 2 UNITS: 100 INJECTION, SOLUTION INTRAVENOUS; SUBCUTANEOUS at 09:04

## 2019-04-08 RX ADMIN — Medication 800 MG: at 09:04

## 2019-04-08 RX ADMIN — METOPROLOL SUCCINATE 100 MG: 50 TABLET, EXTENDED RELEASE ORAL at 09:04

## 2019-04-08 RX ADMIN — FERROUS SULFATE TAB EC 325 MG (65 MG FE EQUIVALENT) 325 MG: 325 (65 FE) TABLET DELAYED RESPONSE at 10:04

## 2019-04-08 RX ADMIN — PREGABALIN 50 MG: 50 CAPSULE ORAL at 10:04

## 2019-04-08 RX ADMIN — INSULIN ASPART 4 UNITS: 100 INJECTION, SOLUTION INTRAVENOUS; SUBCUTANEOUS at 12:04

## 2019-04-08 RX ADMIN — DRONABINOL 2.5 MG: 2.5 CAPSULE ORAL at 10:04

## 2019-04-08 RX ADMIN — METOCLOPRAMIDE 10 MG: 10 TABLET ORAL at 04:04

## 2019-04-08 RX ADMIN — Medication 800 MG: at 10:04

## 2019-04-08 RX ADMIN — LAMOTRIGINE 150 MG: 150 TABLET ORAL at 10:04

## 2019-04-08 RX ADMIN — HYDROXYZINE HYDROCHLORIDE 25 MG: 25 TABLET, FILM COATED ORAL at 09:04

## 2019-04-08 RX ADMIN — MIRTAZAPINE 15 MG: 15 TABLET, ORALLY DISINTEGRATING ORAL at 09:04

## 2019-04-08 RX ADMIN — DRONABINOL 2.5 MG: 2.5 CAPSULE ORAL at 09:04

## 2019-04-08 RX ADMIN — IPRATROPIUM BROMIDE AND ALBUTEROL SULFATE 3 ML: .5; 3 SOLUTION RESPIRATORY (INHALATION) at 06:04

## 2019-04-08 NOTE — PLAN OF CARE
Problem: Physical Therapy Goal  Goal: Physical Therapy Goal  Goals to be met by:      Patient will increase functional independence with mobility by performin. Supine to sit with Modified [place]. Not met  2. Sit to supine with Modified [place]. Not met  3. Sit to stand transfer with Modified [place]. Not met  4. Bed to chair transfer with Modified [place] using Rolling Walker/rollator. Not met  5. Gait  x 150 feet with Supervision using Rolling Walker/rollator.  Not met  6. Wheelchair propulsion x 150 feet with Supervision using bilateral upper extremities. Not met  7. Ascend/descend 4 stair with bilateral Handrails Stand-by Assistance using Rolling Walker.  Not met  8. Stand for 3 minutes with Stand-by Assistance using unilateral UE support and perform a dynamic activity. Not met  9. Lower extremity exercise program x 20 reps per handout, with independence. Not met  10. Pt will improve her gait speed from 0.61 m/s to 0.74 m/s with use of rolling walker/rollator to denote improvement in balance, functional mobility, and a lower fall risk.  Not met     Outcome: Ongoing (interventions implemented as appropriate)  Goals remain appropriate

## 2019-04-08 NOTE — PT/OT/SLP PROGRESS
Occupational Therapy  Treatment    Kaylee Ngo   MRN: [id number]   Admitting Diagnosis: Malignant carcinoid tumor of unknown primary site    OT Date of Treatment: 04/08/19       Billable Minutes:  Self Care/Home Management 30, Therapeutic Activity 10 and Therapeutic Exercise 15    General Precautions: Standard, aspiration, fall  Orthopedic Precautions: N/A  Braces: N/A    Spiritual, Cultural Beliefs, Confucianist Practices, Values that Affect Care: no    Subjective:  Communicated with nurse prior to session.      Pain/Comfort  Pain Rating 1: 0/10  Pain Rating Post-Intervention 1: 0/10    Objective:  Patient found with: PEG Tube    Occupational Performance:    Bed Mobility:    · Patient completed Rolling/Turning to Right with modified independence  · Patient completed Scooting/Bridging with modified independence  · Patient completed Supine to Sit with supervision     Functional Mobility/Transfers:  · Patient completed Sit <> Stand Transfer with modified independence  with  rolling walker   · Patient completed Bed <> Chair Transfer using Stand Pivot technique with supervision with rolling walker  · Patient completed Toilet Transfer Stand Pivot technique with supervision with  rolling walker  · Patient completed  Shower Transfer Stand Pivot technique with supervision with rolling walker  · Functional Mobility: Pt ambulated from her room to therapy gym and then back to her room from the gym using RW and with (S) . Pt ambulating 142 ft each way with RW and (S).    Activities of Daily Living:  · Grooming: modified independence seated with (S) when standing to groom.  · Bathing: supervision from shower seat. (S) when standing.  · Upper Body Dressing: supervision set up only. Pt doning pullover shirt in standing .  · Lower Body Dressing: supervision when standing. Pt donning/doffing pants, socks and slippers.  · Toileting: supervision with toileting performed from raised toilet with RW when standing.      OT Exercises: UE  Ergometer performed 15 minutes on UBE with Mod resistance. UE exercises performed to increase functional endurance and strength.  Strengthening required in order increase independence when performing self care tasks, functional ambulation, W/C propulsion , functional standing activities as well as when performing functional tasks.      Additional Treatment:  Pt edu on Plan of care,  safety when performing functional transfers, self care tasks and functional standing activities.  - White board updated  - Self care tasks completed-- as noted above   - She performed dynamic sitting activity while sitting unsupported EOB and on W/C and working on self care tasks.    Patient left up in chair with all lines intact, call button in reach and nurse notified    [facility] 6 Click:  [facility] Total Score: 19    ASSESSMENT:  Kaylee Ngo is a 63 y.o. female with a medical diagnosis of Malignant carcinoid tumor of unknown primary site .  Pt was agreeable to OT and tolerated Tx without incidence.  She continues to present with deficits affecting (I)ce with functional transfers, functional standing balance and self care tasks with standing component.  She is making progress but continues to requires SBA and at times (A) to perform functional activities to completion.   OT continues to be recommended to further her functional (I)ce and safety. Goals remain appropriate and continued OT is recommended.        Rehab identified problem list/impairments: weakness, impaired endurance, impaired self care skills, impaired functional mobilty, gait instability, impaired balance, decreased lower extremity function, decreased upper extremity function, decreased safety awareness, impaired cardiopulmonary response to activity    Rehab potential is good    Activity tolerance: Good    Discharge recommendations: home with home health     Barriers to discharge: Decreased caregiver support     Equipment recommendations: (TBD)     GOALS:   Multidisciplinary  Problems     Occupational Therapy Goals        Problem: Occupational Therapy Goal    Goal Priority Disciplines Outcome Interventions   Occupational Therapy Goal     OT, PT/OT Ongoing (interventions implemented as appropriate)    Description:  Goals to be met by: 4/16 /19     Patient will increase functional independence with ADLs by performing:    UE Dressing with Modified [place].  LE Dressing with Modified [place].  Grooming while standing with Supervision.  Toileting from bedside commode with Supervision for hygiene and clothing management.   Bathing from  edge of bed with Supervision.  Rolling to Bilateral with Modified [place].   Supine to sit with Modified [place].  Stand pivot transfers with Modified [place].  Caregiver will be competent with assisting with self care and functional transfers.                      Plan:  Patient to be seen 5 x/week to address the above listed problems via self-care/home management, therapeutic activities, therapeutic exercises  Plan of Care expires: 05/05/19  Plan of Care reviewed with: patient    Steven Soto OTR/L  04/08/2019

## 2019-04-08 NOTE — PROGRESS NOTES
[facility]                                  Skilled Nursing Facility                   Progress Note     Admit Date: 4/4/2019  DODIE 4/18/2019  Principal Problem:  Malignant carcinoid tumor of unknown primary site   HPI obtained from patient interview and chart review     Chief Complaint: nursing reports labial BG    HPI:   Pt having labial BG during SNF stay. Pt on regular diet with nocturnal tube feeds from 6 PM to 6:00 AM. 24 hr range from 107-355. Pt currently taking levemir 10units in the AM and PM and moderate dose sliding scale. Pt states she feels much better today.  She was having episodes of hypoglycemia last week, which she states makes her feel very ill; however, she denies any hypoglycemic events over the weekend. Labs done today and reviewed; no replacement needed.      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    Past Surgical History: Patient has a past surgical history that includes Appendectomy; Cholecystectomy; Gastric bypass; Total knee arthroplasty (Left); LAPAROTOMY, EXPLORATORY (N/A, 1/25/2019); Insertion of percutaneous endoscopic gastrostomy (PEG) feeding tube (02/21/2019); and Esophagogastroduodenoscopy w/ PEG (N/A, 2/21/2019).    Social History: Patient reports that she has never smoked. She has never used smokeless tobacco. She reports that she does not drink alcohol or use drugs.    Family History: family history includes Cancer in her father and mother.    Allergies: Patient is allergic to calcium  carbonate; codeine; contrast media; darvocet a500 [propoxyphene n-acetaminophen]; epinephrine; morphine; propoxyphene napsylate; sulfa (sulfonamide antibiotics); erythromycin; erythromycin base; iodinated contrast- oral and iv dye; and pcn [penicillins].    ROS  Constitutional: Negative for fever. + malaise/fatigue.   Eyes: Negative for blurred vision, double vision and discharge.   Respiratory: Negative for cough, shortness of breath and wheezing.    Cardiovascular: Negative for chest pain, palpitations, claudication, leg swelling and PND.   Gastrointestinal: Neg for abdominal pain, N/ V. Neg for constipation, diarrhea.   Genitourinary: Negative for dysuria, frequency and urgency.   Musculoskeletal:  + generalized weakness. Negative for back pain and myalgias.   Skin: Negative for itching and rash.   Neurological: Negative for dizziness, speech change, seizures, and headaches.   Psychiatric/Behavioral: Negative for depression. The patient is not nervous/anxious.      PEx  Temp:  [98 °F (36.7 °C)-98.5 °F (36.9 °C)]   Pulse:  [64-68]   Resp:  [18-20]   BP: (135-149)/(67-71)   SpO2:  [92 %-97 %]      Constitutional: Patient appears well-developed and well-nourished.   HENT:   Head: Normocephalic and atraumatic.   Eyes: Pupils are equal, round, and reactive to light.   Neck: Normal range of motion. Neck supple.   Cardiovascular: Normal rate, regular rhythm and normal heart sounds.    Pulmonary/Chest: Effort normal and breath sounds are clear  Abdominal: Soft. Bowel sounds are normal.   Musculoskeletal: Normal range of motion.   Neurological: Alert and oriented to person, place, and time.   Skin: Skin is warm and dry. abdominal wound x2 with dresses in place CDI.   Psychiatric: Normal mood and affect. Behavior is normal.       Assessment and Plan:    Diabetes mellitus type 2  - 4/8 accu checks changed to AC/HS  - continue moderate SSI Aspart for now-may switch to low dose  - 4/5 decreased insulin detemir U-100 pen 10  Units in the am and 10 Units in the pm    CONTINUE     Malignant carcinoid tumor of unknown primary site  -s/p exp lap 1/25  -3/15 Patient saw Dr Christine, neuroendocrine surgeon for post op appt and follow up on cancer treatment. MD stated that cancer tx is not appropriate at this time due to nutritional status and overall condition. He will follow up with her once she is back at her baseline as an outpatient.    Surgical wound dehiscence  - wound Care nurse following  -3/4 wound vac removed   - 0.5 Dilaudid q.6 hours p.r.n. for pain  - MWF- clean upper and lower abdominal wounds with wound cleanser, pack with Aquacel Ag packing strip and cover with foam dressing  - 4/2 WC saw pt yesterday:Proximal abd wound is pink with scan drain. No s&s pf infection. Distal abd wound is almost completely closed up. Cleaned both wounds with wound cleanser. Applied silver alginate and covered with foam.    Migraine headache  -continue amitriptyline to 75 mg p.o. q.h.s. for uncontrolled migraines  -continue naproxen 500 mg p.o. 2 times daily p.r.n. migraine headache     Nausea and vomiting   -Stable with intermittent nausea that responds well to antiemetics  -continue scheduled Phenergan 6.25 q.8 hours, scopolamine patch every 3 days, Zofran 4 mg q.8 hours, Reglan 10 mg t.i.d.     Diarrhea  -3/24 reporting watery stools and decreased output, KUB ordered to r/o ileus.    -3/25- no obstruction seen on KUB     Thrombocytosis  -Elevated platelets at Thrombocytosis which as been fluctuating daily    Hypomagnesemia  -monitor BMP  -continue Mag oxide to 800 b.i.d.     Hypokalemia  - monitor BMP     Dysphagia  -consult SLP   - 3/15 MBSS complete, diet advanced per recs  - 4/1 continue regular diet with thin liquids and tube feedings at nighttime     Protein calorie Malnutrition  hypoalbuminemia  -consult dietitian  -reportedly can not tolerate tube feeds at rate >30 mL/h or Glucerna   -prealbumin level Q mondays  - nocturnal feeds from 6:00  p.m. to 6:00 a.m. with Peptamen with rate of 60 mils per hour - current tube feeding on back order, will initiate order for vital 1.5 for now     Chronic low back pain  -lidocaine patch daily  -fentanyl patch (increased 3/2)  -Dilaudid PRN for breakthrough  -amitriptyline 75 mg qhs (increased on 04/01 for uncontrolled migraine headache), duloxetine 60 mg daily, pregabalin 50 mg BID   -consider weaning opioid for discharge unless we can find a prescriber willing to refill or she has referral to pain management clinic     Essential hypertension  -continue toprol XL 24 hours 100 mg BID      Bipolar affective disorder  -continue Cymbalta, amitriptyline QHS( increased dose on 4/1 due to migraine unrelieved by other medications), and lamotrigine 150 mg BID    F/U PCP, surgery (Dr. Christine), Neuroendocrine physician Dr. Prieto and Daisha, Follow up with oncology  Follow up with pain management      Diana Grady NP

## 2019-04-08 NOTE — PROGRESS NOTES
Nurse practitioner Diana informed of pt's blood glucose 59 at this time. Pt asymptomatic. This nurse to give dextrose tablets prn and will recheck blood sugar.

## 2019-04-08 NOTE — PROGRESS NOTES
"[facility] - Skilled Nursing Care  Adult Nutrition  Progress Note    SUMMARY   Recommendations    Recommendation/Intervention: Continue night time feedings as po and oral supplement use is 50%  Goals: PO and enteral feedings contribute to meet 85% of EEN/EPN  Nutrition Goal Status: progressing towards goal  Communication of RD Recs: other (comment)(POC)    Reason for Assessment    Reason For Assessment: RD follow-up  Diagnosis: (Debility, cancer of pancreas)  Relevant Medical History: DN2, bipolar, gastric bypass, PEG 19, dysphagia,   Interdisciplinary Rounds: attended  General Information Comments: PO 25-50%  Nutrition Discharge Planning: DC on diabetic diet with texture per ST and PEG feedings TBD    Nutrition Risk Screen    Nutrition Risk Screen: tube feeding or parenteral nutrition    Nutrition/Diet History    Patient Reported Diet/Restrictions/Preferences: diabetic diet  Typical Food/Fluid Intake: tube feedings at home Peptamen 1.5  Spiritual, Cultural Beliefs, Buddhism Practices, Values that Affect Care: no  Food Allergies: NKFA  Factors Affecting Nutritional Intake: NPO, difficulty/impaired swallowing    Anthropometrics    Temp: 98 °F (36.7 °C)  Height Method: Stated  Height: 5' 5.5" (166.4 cm)  Height (inches): 65.5 in  Weight Method: Standard Scale  Weight: 54.7 kg (120 lb 9.5 oz)  Weight (lb): 120.59 lb  Ideal Body Weight (IBW), Female: 127.5 lb  % Ideal Body Weight, Female (lb): 93.55 lb  BMI (Calculated): 19.6  BMI Grade: 18.5-24.9 - normal  Weight Loss: unintentional  Usual Body Weight (UBW), k.9 kg  % Usual Body Weight: 79.84  % Weight Change From Usual Weight: -20.32 %       Lab/Procedures/Meds    Pertinent Labs Reviewed: reviewed  Pertinent Labs Comments: PAB 11, albumn 2.6, K 3.2, glucose 54,   Pertinent Medications Reviewed: reviewed  Pertinent Medications Comments: Fe insulin, Mg, pantoprazole, insulin, dronabinol    Physical Findings/Assessment19         Estimated/Assessed " Needs    Weight Used For Calorie Calculations: 54.1 kg (119 lb 4.3 oz)  Energy Calorie Requirements (kcal): 1623- 1894  Energy Need Method: Kcal/kg(30-35kcal/kg)  Protein Requirements: 82g  Weight Used For Protein Calculations: 54.1 kg (119 lb 4.3 oz)(x 1.5gm/lg)  Fluid Requirements (mL): per MD or 1ml/kcal  Estimated Fluid Requirement Method: RDA Method  RDA Method (mL): 1623  CHO Requirement: 50% of total calories      Nutrition Prescription Ordered    Current Diet Order: reg  Nutrition Order Comments: PO 25-50%  Current Nutrition Support Formula Ordered: Peptamen 1.5 w/Prebio  Current Nutrition Support Rate Ordered: 60 (ml)  Current Nutrition Support Frequency Ordered: 12 hr via pump to peg  Oral Nutrition Supplement: boost plus tid    Evaluation of Received Nutrient/Fluid Intake    Enteral Calories (kcal): 1080  Enteral Protein (gm): 49  Enteral (Free Water) Fluid (mL): 555  Free Water Flush Fluid (mL): 600  Energy Calories Required: not meeting needs  Protein Required: not meeting needs  Fluid Required: meeting needs  Tolerance: tolerating  % Intake of Estimated Energy Needs: 75 - 100 %  % Meal Intake: 25 - 50 %    Nutrition Risk    Level of Risk/Frequency of Follow-up: low     Assessment and Plan     Severe malnutrition  Malnutrition in the context of Chronic Illness/Injury     Related to (etiology):  Inadequate intake in the presence of increased needs due to GI distress     Signs and Symptoms (as evidenced by):  Energy Intake: <75% of estimated energy requirement for > 3 months  Body Fat Depletion: severe depletion of orbitals, triceps and thoracic and lumbar region   Muscle Mass Depletion: moderate depletion of temples, clavicle region, scapular region, interosseous muscle and lower extremities   Weight Loss: 12%% x < 3 months     Monitor and Evaluation    Food and Nutrient Intake: energy intake, food and beverage intake, enteral nutrition intake  Food and Nutrient Adminstration: diet order, enteral and  parenteral nutrition administration  Physical Activity and Function: nutrition-related ADLs and IADLs  Anthropometric Measurements: weight change  Biochemical Data, Medical Tests and Procedures: electrolyte and renal panel, gastrointestinal profile, glucose/endocrine profile, inflammatory profile  Nutrition-Focused Physical Findings: overall appearance     Malnutrition Assessment  Malnutrition Type: chronic illness  Energy Intake: moderate energy intake  Skin (Micronutrient): turgor reduced  Hair/Scalp (Micronutrient): sparse  Neck/Chest (Micronutrient): subcutaneous fat loss, muscle wasting, bony prominence  Musculoskeletal/Lower Extremities: subcutaneous fat loss, muscle wasting       Weight Loss (Malnutrition): greater than 7.5% in 3 months  Energy Intake (Malnutrition): less than 75% for greater than or equal to 1 month  Subcutaneous Fat (Malnutrition): severe depletion  Muscle Mass (Malnutrition): moderate depletion   Orbital Region (Subcutaneous Fat Loss): moderate depletion  Upper Arm Region (Subcutaneous Fat Loss): severe depletion  Thoracic and Lumbar Region: severe depletion   Restorationist Region (Muscle Loss): moderate depletion  Clavicle Bone Region (Muscle Loss): moderate depletion  Clavicle and Acromion Bone Region (Muscle Loss): severe depletion  Scapular Bone Region (Muscle Loss): moderate depletion  Dorsal Hand (Muscle Loss): moderate depletion  Patellar Region (Muscle Loss): moderate depletion  Anterior Thigh Region (Muscle Loss): moderate depletion  Posterior Calf Region (Muscle Loss): moderate depletion            Severe Weight Loss (Malnutrition): greater than 7.5% in 3 months    Nutrition Follow-Up    RD Follow-up?: Yes

## 2019-04-08 NOTE — PLAN OF CARE
Problem: Occupational Therapy Goal  Goal: Occupational Therapy Goal  Goals to be met by: 4/16 /19     Patient will increase functional independence with ADLs by performing:    UE Dressing with Modified [place].  LE Dressing with Modified [place].  Grooming while standing with Supervision.  Toileting from bedside commode with Supervision for hygiene and clothing management.   Bathing from  edge of bed with Supervision.  Rolling to Bilateral with Modified [place].   Supine to sit with Modified [place].  Stand pivot transfers with Modified [place].  Caregiver will be competent with assisting with self care and functional transfers.     Outcome: Ongoing (interventions implemented as appropriate)  Steven Soto, OTR/L      4/8/2019

## 2019-04-08 NOTE — PT/OT/SLP PROGRESS
"Physical Therapy  Treatment    Kaylee Ngo   MRN: [id number]   Admitting Diagnosis: Malignant carcinoid tumor of unknown primary site    PT Received On: 04/08/19          Billable Minutes:  Gait Training 15, Therapeutic Activity 10 and Therapeutic Exercise 28    Treatment Type: Treatment  PT/PTA: PTA     PTA Visit Number: 1       General Precautions: Standard, aspiration, fall  Orthopedic Precautions: N/A   Braces: N/A    Spiritual, Cultural Beliefs, Presybeterian Practices, Values that Affect Care: no    Subjective:  "vomitting earlier, better now"    Pain/Comfort  Pain Rating 1: 0/10  Pain Rating Post-Intervention 1: 0/10    Objective:  Patient found in      AM-PAC 6 CLICK MOBILITY  Total Score:18    Transfers:  Sit<>Stand: with RW CG/SBA  Stand Pivot Transfer: no AD CG/SBA WC>BSC    Gait:  Amb with RW CG/SBA ~ 141 ft and 164 ft pre/post session no LOB    Advanced Gait:  Stairs: asc/luis 4 steps with BHR CG/SBA    Therex:  2x10 reps AP,GS,LAQ,hip flex,abd/add    Additional Treatment:  recumbent cross  x 15 min L-3    Patient left up in chair with call button in reach and belongings in chin.    Assessment:  Kaylee Ngo is a 63 y.o. female with a medical diagnosis of Malignant carcinoid tumor of unknown primary site.  Pt tolerated well, no N/V during session, pt would continue to benefit from skilled PT services to improve overall functional mobility, strength and endurance.  .    Rehab identified problem list/impairments: weakness, impaired endurance, impaired self care skills, impaired functional mobilty, gait instability, impaired balance, decreased upper extremity function, decreased lower extremity function    Rehab potential is good.    Activity tolerance: Fair    Discharge recommendations: home health PT     Barriers to discharge: None    Equipment recommendations: wheelchair, manual, commode     GOALS:   Multidisciplinary Problems     Physical Therapy Goals        Problem: Physical Therapy Goal    " Goal Priority Disciplines Outcome Goal Variances Interventions   Physical Therapy Goal     PT, PT/OT Ongoing (interventions implemented as appropriate)     Description:  Goals to be met by:      Patient will increase functional independence with mobility by performin. Supine to sit with Modified [place]. Not met  2. Sit to supine with Modified [place]. Not met  3. Sit to stand transfer with Modified [place]. Not met  4. Bed to chair transfer with Modified [place] using Rolling Walker/rollator. Not met  5. Gait  x 150 feet with Supervision using Rolling Walker/rollator.  Not met  6. Wheelchair propulsion x 150 feet with Supervision using bilateral upper extremities. Not met  7. Ascend/descend 4 stair with bilateral Handrails Stand-by Assistance using Rolling Walker.  Not met  8. Stand for 3 minutes with Stand-by Assistance using unilateral UE support and perform a dynamic activity. Not met  9. Lower extremity exercise program x 20 reps per handout, with independence. Not met  10. Pt will improve her gait speed from 0.61 m/s to 0.74 m/s with use of rolling walker/rollator to denote improvement in balance, functional mobility, and a lower fall risk.  Not met                      PLAN:    Patient to be seen 5 x/week  to address the above listed problems via gait training, therapeutic activities, therapeutic exercises, neuromuscular re-education, wheelchair management/training  Plan of Care expires: 19  Plan of Care reviewed with: patient    Christina English, PTA  2019

## 2019-04-09 LAB
POCT GLUCOSE: 201 MG/DL (ref 70–110)
POCT GLUCOSE: 225 MG/DL (ref 70–110)
POCT GLUCOSE: 98 MG/DL (ref 70–110)

## 2019-04-09 PROCEDURE — 99306 PR NURSING FACILITY CARE, INIT, HIGH SEVERITY: ICD-10-PCS | Mod: ,,, | Performed by: HOSPITALIST

## 2019-04-09 PROCEDURE — 97530 THERAPEUTIC ACTIVITIES: CPT

## 2019-04-09 PROCEDURE — 11000004 HC SNF PRIVATE

## 2019-04-09 PROCEDURE — 94761 N-INVAS EAR/PLS OXIMETRY MLT: CPT

## 2019-04-09 PROCEDURE — 25000242 PHARM REV CODE 250 ALT 637 W/ HCPCS: Performed by: NURSE PRACTITIONER

## 2019-04-09 PROCEDURE — 25000003 PHARM REV CODE 250: Performed by: NURSE PRACTITIONER

## 2019-04-09 PROCEDURE — 94640 AIRWAY INHALATION TREATMENT: CPT

## 2019-04-09 PROCEDURE — 25000003 PHARM REV CODE 250: Performed by: PHYSICIAN ASSISTANT

## 2019-04-09 PROCEDURE — 97110 THERAPEUTIC EXERCISES: CPT

## 2019-04-09 PROCEDURE — 97116 GAIT TRAINING THERAPY: CPT

## 2019-04-09 PROCEDURE — 99306 1ST NF CARE HIGH MDM 50: CPT | Mod: ,,, | Performed by: HOSPITALIST

## 2019-04-09 PROCEDURE — 63600175 PHARM REV CODE 636 W HCPCS: Performed by: NURSE PRACTITIONER

## 2019-04-09 RX ORDER — SODIUM,POTASSIUM PHOSPHATES 280-250MG
1 POWDER IN PACKET (EA) ORAL
Status: DISCONTINUED | OUTPATIENT
Start: 2019-04-09 | End: 2019-04-15 | Stop reason: HOSPADM

## 2019-04-09 RX ORDER — DULOXETIN HYDROCHLORIDE 60 MG/1
60 CAPSULE, DELAYED RELEASE ORAL DAILY
Status: DISCONTINUED | OUTPATIENT
Start: 2019-04-10 | End: 2019-04-15 | Stop reason: HOSPADM

## 2019-04-09 RX ORDER — HYDRALAZINE HYDROCHLORIDE 25 MG/1
25 TABLET, FILM COATED ORAL EVERY 8 HOURS PRN
Status: DISCONTINUED | OUTPATIENT
Start: 2019-04-09 | End: 2019-04-15 | Stop reason: HOSPADM

## 2019-04-09 RX ORDER — ACETAMINOPHEN 650 MG/20.3ML
650 LIQUID ORAL EVERY 4 HOURS PRN
Status: DISCONTINUED | OUTPATIENT
Start: 2019-04-09 | End: 2019-04-15 | Stop reason: HOSPADM

## 2019-04-09 RX ORDER — TIZANIDINE 2 MG/1
4 TABLET ORAL EVERY 8 HOURS PRN
Status: DISCONTINUED | OUTPATIENT
Start: 2019-04-09 | End: 2019-04-15 | Stop reason: HOSPADM

## 2019-04-09 RX ORDER — METOCLOPRAMIDE 10 MG/1
10 TABLET ORAL
Status: DISCONTINUED | OUTPATIENT
Start: 2019-04-09 | End: 2019-04-15 | Stop reason: HOSPADM

## 2019-04-09 RX ORDER — MIRTAZAPINE 15 MG/1
15 TABLET, ORALLY DISINTEGRATING ORAL NIGHTLY
Status: DISCONTINUED | OUTPATIENT
Start: 2019-04-09 | End: 2019-04-15 | Stop reason: HOSPADM

## 2019-04-09 RX ORDER — HYDROXYZINE HYDROCHLORIDE 25 MG/1
25 TABLET, FILM COATED ORAL 3 TIMES DAILY
Status: DISCONTINUED | OUTPATIENT
Start: 2019-04-09 | End: 2019-04-15 | Stop reason: HOSPADM

## 2019-04-09 RX ORDER — DOCUSATE SODIUM 100 MG/1
100 CAPSULE, LIQUID FILLED ORAL DAILY PRN
Status: DISCONTINUED | OUTPATIENT
Start: 2019-04-09 | End: 2019-04-15 | Stop reason: HOSPADM

## 2019-04-09 RX ORDER — LAMOTRIGINE 150 MG/1
150 TABLET ORAL 2 TIMES DAILY
Status: DISCONTINUED | OUTPATIENT
Start: 2019-04-09 | End: 2019-04-15 | Stop reason: HOSPADM

## 2019-04-09 RX ORDER — PREGABALIN 50 MG/1
50 CAPSULE ORAL 2 TIMES DAILY
Status: DISCONTINUED | OUTPATIENT
Start: 2019-04-09 | End: 2019-04-15 | Stop reason: HOSPADM

## 2019-04-09 RX ADMIN — IPRATROPIUM BROMIDE AND ALBUTEROL SULFATE 3 ML: .5; 3 SOLUTION RESPIRATORY (INHALATION) at 06:04

## 2019-04-09 RX ADMIN — METOPROLOL SUCCINATE 100 MG: 50 TABLET, EXTENDED RELEASE ORAL at 09:04

## 2019-04-09 RX ADMIN — TIZANIDINE 4 MG: 2 TABLET ORAL at 05:04

## 2019-04-09 RX ADMIN — INSULIN DETEMIR 10 UNITS: 100 INJECTION, SOLUTION SUBCUTANEOUS at 09:04

## 2019-04-09 RX ADMIN — INSULIN ASPART 2 UNITS: 100 INJECTION, SOLUTION INTRAVENOUS; SUBCUTANEOUS at 11:04

## 2019-04-09 RX ADMIN — LAMOTRIGINE 150 MG: 150 TABLET ORAL at 09:04

## 2019-04-09 RX ADMIN — HYDROXYZINE HYDROCHLORIDE 25 MG: 25 TABLET, FILM COATED ORAL at 04:04

## 2019-04-09 RX ADMIN — INSULIN ASPART 1 UNITS: 100 INJECTION, SOLUTION INTRAVENOUS; SUBCUTANEOUS at 09:04

## 2019-04-09 RX ADMIN — POTASSIUM & SODIUM PHOSPHATES POWDER PACK 280-160-250 MG 1 PACKET: 280-160-250 PACK at 08:04

## 2019-04-09 RX ADMIN — METOCLOPRAMIDE 10 MG: 10 TABLET ORAL at 04:04

## 2019-04-09 RX ADMIN — POTASSIUM & SODIUM PHOSPHATES POWDER PACK 280-160-250 MG 1 PACKET: 280-160-250 PACK at 05:04

## 2019-04-09 RX ADMIN — FERROUS SULFATE TAB EC 325 MG (65 MG FE EQUIVALENT) 325 MG: 325 (65 FE) TABLET DELAYED RESPONSE at 09:04

## 2019-04-09 RX ADMIN — IPRATROPIUM BROMIDE AND ALBUTEROL SULFATE 3 ML: .5; 3 SOLUTION RESPIRATORY (INHALATION) at 07:04

## 2019-04-09 RX ADMIN — METOCLOPRAMIDE 10 MG: 10 TABLET ORAL at 11:04

## 2019-04-09 RX ADMIN — AMITRIPTYLINE HYDROCHLORIDE 50 MG: 25 TABLET, FILM COATED ORAL at 09:04

## 2019-04-09 RX ADMIN — MICONAZOLE NITRATE: 20 OINTMENT TOPICAL at 09:04

## 2019-04-09 RX ADMIN — MIRTAZAPINE 15 MG: 15 TABLET, ORALLY DISINTEGRATING ORAL at 09:04

## 2019-04-09 RX ADMIN — SCOPALAMINE 1 PATCH: 1 PATCH, EXTENDED RELEASE TRANSDERMAL at 10:04

## 2019-04-09 RX ADMIN — HYDROXYZINE HYDROCHLORIDE 25 MG: 25 TABLET, FILM COATED ORAL at 09:04

## 2019-04-09 RX ADMIN — LIDOCAINE 1 PATCH: 50 PATCH TOPICAL at 01:04

## 2019-04-09 RX ADMIN — ENOXAPARIN SODIUM 40 MG: 100 INJECTION SUBCUTANEOUS at 09:04

## 2019-04-09 RX ADMIN — Medication 800 MG: at 09:04

## 2019-04-09 RX ADMIN — PROMETHAZINE HYDROCHLORIDE 12.5 MG: 12.5 TABLET ORAL at 12:04

## 2019-04-09 RX ADMIN — DRONABINOL 2.5 MG: 2.5 CAPSULE ORAL at 09:04

## 2019-04-09 RX ADMIN — PREGABALIN 50 MG: 50 CAPSULE ORAL at 09:04

## 2019-04-09 RX ADMIN — DULOXETINE 60 MG: 60 CAPSULE, DELAYED RELEASE ORAL at 09:04

## 2019-04-09 RX ADMIN — METOCLOPRAMIDE 10 MG: 10 TABLET ORAL at 06:04

## 2019-04-09 RX ADMIN — POTASSIUM & SODIUM PHOSPHATES POWDER PACK 280-160-250 MG 1 PACKET: 280-160-250 PACK at 11:04

## 2019-04-09 RX ADMIN — ONDANSETRON 4 MG: 4 TABLET, ORALLY DISINTEGRATING ORAL at 08:04

## 2019-04-09 RX ADMIN — PANTOPRAZOLE SODIUM 40 MG: 40 TABLET, DELAYED RELEASE ORAL at 09:04

## 2019-04-09 RX ADMIN — POTASSIUM & SODIUM PHOSPHATES POWDER PACK 280-160-250 MG 1 PACKET: 280-160-250 PACK at 09:04

## 2019-04-09 NOTE — PT/OT/SLP PROGRESS
Occupational Therapy  Treatment    Kaylee Ngo   MRN: [id number]   Admitting Diagnosis: Malignant carcinoid tumor of unknown primary site    OT Date of Treatment: 04/09/19       Billable Minutes:  Therapeutic Activity 33 and Therapeutic Exercise 15    General Precautions: Standard, aspiration, fall  Orthopedic Precautions: N/A  Braces: N/A    Spiritual, Cultural Beliefs, Advent Practices, Values that Affect Care: no    Subjective:  Communicated with nurse prior to session.      Pain/Comfort  Pain Rating 1: 0/10  Pain Rating Post-Intervention 1: 0/10    Objective:  Patient found with: PEG Tube    Occupational Performance:    Bed Mobility:    · Pt seated in W/C at onset of therapy session.     Functional Mobility/Transfers:  · Patient completed Sit <> Stand Transfer with supervision  with  rolling walker   · Patient completed Bed <> Chair Transfer using Stand Pivot technique with supervision with rolling walker  · Functional Mobility: Pt ambulated from bedside chair to W/C without A/D 10ft with SBA.      OT Exercises: UE Ergometer performed 15 minutes on UBE with mod resistance . UE exercises performed to increase functional endurance and strength.  Strengthening required in order increase independence when performing self care tasks, functional ambulation, W/C propulsion , functional standing activities as well as when performing functional tasks.      Additional Treatment:  Pt worked on functional standing activity consisting of standing with RW while reaching in all planes , crossing of midline and reaching to varying heights to facilitate (B) wt shifting and stability in standing to increase (I)ce when performing self care, and functional activities with standing component.  Pt tolerated up to 16 Min.37 sec in standing with  SBA and RW to steady.    Patient left up in chair with call button in reach and nurse notified    [facility] 6 Click:  [facility] Total Score: 20    ASSESSMENT:  Kaylee Ngo is a 63 y.o. female  "with a medical diagnosis of Malignant carcinoid tumor of unknown primary site . Pt indicating that sh just got Pherergan and felt a little "loopy" but was agreeable to OT and tolerated Tx without incidence.   She continues to present with deficits affecting (I)ce with functional transfers, functional standing balance and self care tasks with standing component.  She is making progress but continues to require SBA and at times (A) to perform functional activities to completion.   OT continues to be recommended to further her functional (I)ce and safety. Goals remain appropriate and continued OT is recommended.      Rehab identified problem list/impairments: weakness, impaired endurance, impaired self care skills, impaired functional mobilty, gait instability, impaired balance, decreased lower extremity function, decreased upper extremity function, decreased safety awareness, impaired cardiopulmonary response to activity    Rehab potential is good    Activity tolerance: Good    Discharge recommendations: home with home health     Barriers to discharge: Decreased caregiver support     Equipment recommendations: (TBD)     GOALS:   Multidisciplinary Problems     Occupational Therapy Goals        Problem: Occupational Therapy Goal    Goal Priority Disciplines Outcome Interventions   Occupational Therapy Goal     OT, PT/OT Ongoing (interventions implemented as appropriate)    Description:  Goals to be met by: 4/16 /19     Patient will increase functional independence with ADLs by performing:    UE Dressing with Modified [place].- Met  LE Dressing with Modified [place].  Grooming while standing with Supervision.  Toileting from bedside commode with Supervision for hygiene and clothing management. -Met  Bathing from  edge of bed with Supervision. -Met  Rolling to Bilateral with Modified [place]. -Met  Supine to sit with Modified [place].  Stand pivot transfers with Modified [place].  Caregiver will " be competent with assisting with self care and functional transfers.                       Plan:  Patient to be seen 5 x/week to address the above listed problems via self-care/home management, therapeutic activities, therapeutic exercises  Plan of Care expires: 05/05/19  Plan of Care reviewed with: patient    Steven Soto OTR/FRANTZ  04/09/2019

## 2019-04-09 NOTE — PLAN OF CARE
Problem: Physical Therapy Goal  Goal: Physical Therapy Goal  Goals to be met by:      Patient will increase functional independence with mobility by performin. Supine to sit with Modified [place]. Not met  2. Sit to supine with Modified [place]. Not met  3. Sit to stand transfer with Modified [place]. Not met  4. Bed to chair transfer with Modified [place] using Rolling Walker/rollator. Not met  5. Gait  x 150 feet with Supervision using Rolling Walker/rollator.  Not met  6. Wheelchair propulsion x 150 feet with Supervision using bilateral upper extremities. Not met  7. Ascend/descend 4 stair with bilateral Handrails Stand-by Assistance using Rolling Walker.  Not met  8. Stand for 3 minutes with Stand-by Assistance using unilateral UE support and perform a dynamic activity.  met  9. Lower extremity exercise program x 20 reps per handout, with independence. Not met  10. Pt will improve her gait speed from 0.61 m/s to 0.74 m/s with use of rolling walker/rollator to denote improvement in balance, functional mobility, and a lower fall risk.  Not met     Outcome: Ongoing (interventions implemented as appropriate)  Goals remain appropriate

## 2019-04-09 NOTE — H&P
[facility]  Hospital Medicine  History and Physical Exam    Admit Date: 4/4/2019  Principal Problem:  Malignant carcinoid tumor of unknown primary site   Primary care Physician: Robert Garcia MD  Code status: Full Code    HPI:    63 yr o woman with metatastic carcinoid s/p Ex Lap on 1/25/19 with gastro-gastrostomy, pyloroplasty, gastrotomy closure, enteric resection with side to side anastomosis and restoration of small bowel continuity was performed (s/p reversal of Sandeep-en-Y). She was admitted to the  LTAC on 2/26 for  wound care/wound vac, nutrition, TPN and TFs, PT/OT/ST, O2 weaning, and IV antibiotics. The only issue in the LTAC was hypoglycemia attributed to a combination of her decreased appetite from N/V from metastatic ca, multiple extensive GI re-routing surgeries, and TF at night time, reducing her appetite during the day.    Patient ransferred to [facility] with PT and OT to improve functional status and ability to perform ADLs.     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    Past Surgical History: Patient has a past surgical history that includes Appendectomy; Cholecystectomy; Gastric bypass; Total knee arthroplasty (Left); LAPAROTOMY, EXPLORATORY (N/A, 1/25/2019); Insertion of percutaneous endoscopic gastrostomy (PEG) feeding tube (02/21/2019); and Esophagogastroduodenoscopy w/ PEG (N/A, 2/21/2019).    Social History: Patient reports that she has never smoked. She has never used smokeless tobacco. She reports that she does not drink alcohol or use drugs.    Family History: family history  includes Cancer in her father and mother.    Medications: reviewed     Allergies: Patient is allergic to calcium carbonate; codeine; contrast media; darvocet a500 [propoxyphene n-acetaminophen]; epinephrine; morphine; propoxyphene napsylate; sulfa (sulfonamide antibiotics); erythromycin; erythromycin base; iodinated contrast- oral and iv dye; and pcn [penicillins].    ROS    Constitutional: positive for weakness  Respiratory: no cough or shortness of breath  Cardiovascular: no chest pain or palpitations  Gastrointestinal: chronic nausea which the patient attributes to high blood glucose  Genitourinary: no hematuria or dysuria  Integument/Breast: no rash or pruritis  Hematologic/Lymphatic: no easy bruising or lymphadenopathy  Musculoskeletal: no arthralgias or myalgias  Neurological: no seizures or tremors  Behavioral/Psych: no depression or anxiety    PEx  Temp:  [97.4 °F (36.3 °C)-98.2 °F (36.8 °C)]   Pulse:  [65-68]   Resp:  [16-18]   BP: (139)/(68-73)   SpO2:  [92 %-94 %]   Body mass index is 19.76 kg/m².     General appearance: no distress  Mental status: Alert and oriented x 3  HEENT:  conjunctivae/corneas clear, PERRL  Neck: supple, thyroid not enlarged  Pulm:   normal respiratory effort, CTA B, no c/w/r  Card: RRR, no murmur  Abd: mild tenderness to palpation. Gastrostomy in place. Surgical wounds  Ext: no edema  Pulses: 2+, symmetric  Skin: color, texture, turgor normal. No rashes or lesions  Neuro: CN II-XII grossly intact, no focal numbness or weakness, normal strength and tone       Assessment and Plan:    Diabetes mellitus type 2  - Uncontrolled with BG 50 - 350   - highly sensitive to low dose SSI ( > 50 w 4 U reg insulin)  - currently insulin 10/ 10 long acting w low dose ISS  - consider increasing long acting and holding SSI  - On TF plus Boost     Malignant carcinoid tumor of unknown primary site  - s/p exp lap 1/25  - follow up w surgical team at [facility].     Surgical wound dehiscence  - Wound  care  - Pain control  - Follow up w carcinoid team at [facility]     Migraine headache  -continue amitriptyline to 75 mg p.o. q.h.s. for uncontrolled migraines  -continue naproxen 500 mg p.o. 2 times daily p.r.n. migraine headache     Nausea and vomiting   -Stable with intermittent nausea that responds well to antiemetics  -continue scheduled Phenergan 6.25 q.8 hours, scopolamine patch every 3 days, Zofran 4 mg q.8 hours, Reglan 10 mg t.i.d.  - control BG     Hypomagnesemia  -monitor BMP  -continue Mag oxide to 800 b.i.d.     Hypokalemia  - monitor BMP     Protein calorie Malnutrition  hypoalbuminemia  -consult dietitian  -reportedly can not tolerate tube feeds at rate >30 mL/h or Glucerna   -prealbumin level Q mondays     Chronic low back pain  -lidocaine patch daily  -fentanyl patch (increased 3/2)  -Dilaudid PRN for breakthrough     Essential hypertension  -continue toprol XL 24 hours 100 mg BID      Bipolar affective disorder  -continue Cymbalta, amitriptyline QHS( increased dose on 4/1 due to migraine unrelieved by other medications), and lamotrigine 150 mg BID     F/U PCP, surgery (Dr. Christine), Neuroendocrine physician Dr. Prieto and Daisha, Follow up with oncology  Follow up with pain management     I certify that SNF services are required to be given on an inpatient basis because Benita Rosales needs for skilled nursing care and/or skilled rehabilitation are required on a daily basis and such services can only practically be provided in a skilled nursing facility setting and are for an ongoing condition for which she received inpatient care in the hospital.     Time (minutes) spent in care of the patient (Greater than 1/2 spent in direct face-to-face contact) 35    Jeff Barker MD, Staff Physician, [facility],

## 2019-04-09 NOTE — CLINICAL REVIEW
"Clinical Pharmacy Chart Review Note      Admit Date: 4/4/2019   LOS: 5 days       Kaylee Ngo is a 63 y.o. female admitted to SNF for PT/OT after hospitalization for malignant carcinoid of unknown primary site.    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     Review of patient's allergies indicates:   Allergen Reactions    Calcium carbonate Nausea And Vomiting     The liquid form    Codeine Hives    Contrast media      Oral and IV    Darvocet a500 [propoxyphene n-acetaminophen]     Epinephrine      Neuroendocrine Tumor patient    Neuroendocrine Tumor patient    Morphine     Propoxyphene napsylate     Sulfa (sulfonamide antibiotics)     Erythromycin Rash    Erythromycin base Rash    Iodinated contrast- oral and iv dye Rash and Other (See Comments)     Copper taste in mouth-Benadryl all that is needed for scans  Copper taste in mouth-Benadryl all that is needed for scans    Pcn [penicillins] Rash     "It looks like measles."     Patient Active Problem List    Diagnosis Date Noted    Malignant carcinoid tumor of pancreas 04/04/2019    Hypoxemia requiring supplemental oxygen     Debility     Open wound of abdomen 02/27/2019    Severe malnutrition 02/27/2019    Anemia 02/26/2019    Surgical wound dehiscence 02/25/2019    Dysphagia     Wound dehiscence 02/20/2019    Hypernatremia 02/03/2019    Bipolar affective disorder 02/03/2019    E. 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/09/2018    Acute bilateral low back pain     Essential hypertension 03/26/2018    Chronic migraine without aura without status migrainosus, not intractable 03/26/2018    Moderate malnutrition 03/25/2018    Malnutrition compromising bodily function 03/01/2018    Secondary neuroendocrine tumor of distant lymph nodes 03/01/2018    Acquired gastric outlet stenosis 01/29/2018    Malignant carcinoid tumor of unknown primary site 12/13/2017       Scheduled Meds:    albuterol-ipratropium  3 mL Nebulization Q12H    amitriptyline  50 mg Oral QHS    dronabinol  2.5 mg Oral BID    [START ON 4/10/2019] DULoxetine  60 mg Oral Daily    enoxparin  40 mg Subcutaneous Q24H    fentaNYL  1 patch Transdermal Q72H    ferrous sulfate  325 mg Oral Daily    hydrOXYzine HCl  25 mg Oral TID    insulin detemir U-100  10 Units Subcutaneous Daily    insulin detemir U-100  10 Units Subcutaneous QHS    lamoTRIgine  150 mg Oral BID    lidocaine  1 patch Transdermal Q24H    magnesium oxide  800 mg Oral BID    metoclopramide HCl  10 mg Oral TID AC    metoprolol succinate  100 mg Oral BID    miconazole nitrate 2%   Topical (Top) BID    mirtazapine  15 mg Oral Nightly    pantoprazole  40 mg Oral Daily    potassium, sodium phosphates  1 packet Oral QID (WM & HS)    pregabalin  50 mg Oral BID    scopolamine  1 patch Transdermal Q3 Days    sodium chloride 0.9%  10 mL Intravenous Q6H     Continuous Infusions:   PRN Meds: acetaminophen, aluminum-magnesium hydroxide-simethicone, artificial tears,  clonazePAM, dextrose 50%, dextrose 50%, diphenhydrAMINE, docusate sodium, glucagon (human recombinant), glucose, glucose, hydrALAZINE, insulin aspart U-100, naloxone, naproxen, ondansetron, promethazine, Flushing PICC Protocol **AND** sodium chloride 0.9% **AND** sodium chloride 0.9%, sodium chloride 0.9%, tiZANidine    OBJECTIVE:     Vital Signs (Last 24H)  Temp:  [97.4 °F (36.3 °C)-98.2 °F (36.8 °C)]   Pulse:  [65-68]   Resp:  [16-18]   BP: (139)/(68-73)   SpO2:  [92 %-94 %]     Laboratory:  CBC:   Recent Labs   Lab 04/04/19 0415   WBC 4.76   RBC 3.69*   HGB 9.9*   HCT 33.5*   *   MCV 91   MCH 26.8*   MCHC 29.6*     BMP:   Recent Labs   Lab 04/04/19 0415 04/08/19  0532   GLU 54* 108    138   K 3.2* 4.2    98   CO2 34* 32*   BUN 12 16   CREATININE 0.6 0.6   CALCIUM 8.8 9.3   MG 1.7 1.8     CMP:   Recent Labs   Lab 04/04/19 0415 04/08/19  0532   GLU 54* 108   CALCIUM 8.8 9.3   ALBUMIN 2.6*  --    PROT 6.2  --     138   K 3.2* 4.2   CO2 34* 32*    98   BUN 12 16   CREATININE 0.6 0.6   ALKPHOS 148*  --    ALT 11  --    AST 15  --    BILITOT 0.2  --      LFTs:   Recent Labs   Lab 04/04/19 0415   ALT 11   AST 15   ALKPHOS 148*   BILITOT 0.2   PROT 6.2   ALBUMIN 2.6*         ASSESSMENT/PLAN:     I have reviewed the medications in compliance with CMS Regulation F329 of the [place] Appendix PP. No irregularities were noted at this time.    Medications will be reviewed and monitored by Pharm. D.        Chuck Cannon D.  Clinical Pharmacist  [facility]term

## 2019-04-09 NOTE — PT/OT/SLP PROGRESS
"Physical Therapy  Treatment    Kaylee Ngo   MRN: [id number]   Admitting Diagnosis: Malignant carcinoid tumor of unknown primary site    PT Received On: 04/09/19          Billable Minutes:  Gait Training 10, Therapeutic Activity 10 and Therapeutic Exercise 25    Treatment Type: Treatment  PT/PTA: PTA     PTA Visit Number: 2       General Precautions: Standard, aspiration, fall  Orthopedic Precautions: N/A   Braces: N/A    Spiritual, Cultural Beliefs, Orthodox Practices, Values that Affect Care: no    Subjective:  "little nausea" agreeable to therapy      Pain/Comfort  Pain Rating 1: 0/10  Pain Rating Post-Intervention 1: 0/10    Objective:   Patient found with: PEG Tube     AM-PAC 6 CLICK MOBILITY  Total Score:18    Transfers:  Sit<>Stand: with RW SBA  Stand Pivot Transfer:no AD SBA nustep>WC    Gait:  Amb with RW SBA ~ 251 ft no LOB, slow/fair bety     Therex:  2x15 AP,GS,LAQ,hip flex,abd/add    Balance:  Dyn standing bal act with RW did not use , no UE support SBA tapping balloon with BUE ~ 3:24 sec    Additional Treatment:  Recumbent cross  x 15 min L-3    Patient left up in chair with call button in reach and belongings in reach.    Assessment:  Kaylee Ngo is a 63 y.o. female with a medical diagnosis of Malignant carcinoid tumor of unknown primary site.  Pt tolerated well, pt would continue to benefit from skilled PT services to improve overall functional mobility, strength and endurance.  .    Rehab identified problem list/impairments: weakness, impaired endurance, impaired self care skills, impaired functional mobilty, gait instability, impaired balance, decreased upper extremity function, decreased lower extremity function    Rehab potential is good.    Activity tolerance: Fair    Discharge recommendations: home health PT     Barriers to discharge: None    Equipment recommendations: wheelchair, manual, commode     GOALS:   Multidisciplinary Problems     Physical Therapy Goals        Problem: " Physical Therapy Goal    Goal Priority Disciplines Outcome Goal Variances Interventions   Physical Therapy Goal     PT, PT/OT Ongoing (interventions implemented as appropriate)     Description:  Goals to be met by:      Patient will increase functional independence with mobility by performin. Supine to sit with Modified [place]. Not met  2. Sit to supine with Modified [place]. Not met  3. Sit to stand transfer with Modified [place]. Not met  4. Bed to chair transfer with Modified [place] using Rolling Walker/rollator. Not met  5. Gait  x 150 feet with Supervision using Rolling Walker/rollator.  Not met  6. Wheelchair propulsion x 150 feet with Supervision using bilateral upper extremities. Not met  7. Ascend/descend 4 stair with bilateral Handrails Stand-by Assistance using Rolling Walker.  Not met  8. Stand for 3 minutes with Stand-by Assistance using unilateral UE support and perform a dynamic activity.  met  9. Lower extremity exercise program x 20 reps per handout, with independence. Not met  10. Pt will improve her gait speed from 0.61 m/s to 0.74 m/s with use of rolling walker/rollator to denote improvement in balance, functional mobility, and a lower fall risk.  Not met                       PLAN:    Patient to be seen 5 x/week  to address the above listed problems via gait training, therapeutic activities, therapeutic exercises, neuromuscular re-education, wheelchair management/training  Plan of Care expires: 19  Plan of Care reviewed with: patient    Christina English, PTA  2019

## 2019-04-09 NOTE — PLAN OF CARE
Problem: Occupational Therapy Goal  Goal: Occupational Therapy Goal  Goals to be met by: 4/16 /19     Patient will increase functional independence with ADLs by performing:    UE Dressing with Modified [place].- Met  LE Dressing with Modified [place].  Grooming while standing with Supervision.  Toileting from bedside commode with Supervision for hygiene and clothing management. -Met  Bathing from  edge of bed with Supervision. -Met  Rolling to Bilateral with Modified [place]. -Met  Supine to sit with Modified [place].  Stand pivot transfers with Modified [place].  Caregiver will be competent with assisting with self care and functional transfers.     Outcome: Ongoing (interventions implemented as appropriate)  Steven Soto, OTR/L      4/9/2019

## 2019-04-10 PROBLEM — Z00.00 PREVENTATIVE HEALTH CARE: Status: ACTIVE | Noted: 2019-04-10

## 2019-04-10 LAB
ALBUMIN SERPL BCP-MCNC: 2.4 G/DL (ref 3.5–5.2)
ALP SERPL-CCNC: 157 U/L (ref 55–135)
ALT SERPL W/O P-5'-P-CCNC: 12 U/L (ref 10–44)
ANION GAP SERPL CALC-SCNC: 7 MMOL/L (ref 8–16)
AST SERPL-CCNC: 17 U/L (ref 10–40)
BASOPHILS # BLD AUTO: 0.03 K/UL (ref 0–0.2)
BASOPHILS NFR BLD: 0.6 % (ref 0–1.9)
BILIRUB SERPL-MCNC: 0.2 MG/DL (ref 0.1–1)
BUN SERPL-MCNC: 14 MG/DL (ref 8–23)
CALCIUM SERPL-MCNC: 9 MG/DL (ref 8.7–10.5)
CHLORIDE SERPL-SCNC: 100 MMOL/L (ref 95–110)
CO2 SERPL-SCNC: 32 MMOL/L (ref 23–29)
CREAT SERPL-MCNC: 0.6 MG/DL (ref 0.5–1.4)
DIFFERENTIAL METHOD: ABNORMAL
EOSINOPHIL # BLD AUTO: 0.1 K/UL (ref 0–0.5)
EOSINOPHIL NFR BLD: 2.4 % (ref 0–8)
ERYTHROCYTE [DISTWIDTH] IN BLOOD BY AUTOMATED COUNT: 14.8 % (ref 11.5–14.5)
EST. GFR  (AFRICAN AMERICAN): >60 ML/MIN/1.73 M^2
EST. GFR  (NON AFRICAN AMERICAN): >60 ML/MIN/1.73 M^2
GLUCOSE SERPL-MCNC: 264 MG/DL (ref 70–110)
HCT VFR BLD AUTO: 34.7 % (ref 37–48.5)
HGB BLD-MCNC: 10.2 G/DL (ref 12–16)
IMM GRANULOCYTES # BLD AUTO: 0.01 K/UL (ref 0–0.04)
IMM GRANULOCYTES NFR BLD AUTO: 0.2 % (ref 0–0.5)
LYMPHOCYTES # BLD AUTO: 1.5 K/UL (ref 1–4.8)
LYMPHOCYTES NFR BLD: 29 % (ref 18–48)
MAGNESIUM SERPL-MCNC: 1.5 MG/DL (ref 1.6–2.6)
MCH RBC QN AUTO: 26.6 PG (ref 27–31)
MCHC RBC AUTO-ENTMCNC: 29.4 G/DL (ref 32–36)
MCV RBC AUTO: 91 FL (ref 82–98)
MONOCYTES # BLD AUTO: 0.6 K/UL (ref 0.3–1)
MONOCYTES NFR BLD: 10.9 % (ref 4–15)
NEUTROPHILS # BLD AUTO: 3 K/UL (ref 1.8–7.7)
NEUTROPHILS NFR BLD: 56.9 % (ref 38–73)
NRBC BLD-RTO: 0 /100 WBC
PHOSPHATE SERPL-MCNC: 3.6 MG/DL (ref 2.7–4.5)
PLATELET # BLD AUTO: 391 K/UL (ref 150–350)
PMV BLD AUTO: 9.9 FL (ref 9.2–12.9)
POCT GLUCOSE: 176 MG/DL (ref 70–110)
POCT GLUCOSE: 216 MG/DL (ref 70–110)
POCT GLUCOSE: 236 MG/DL (ref 70–110)
POCT GLUCOSE: 274 MG/DL (ref 70–110)
POCT GLUCOSE: 281 MG/DL (ref 70–110)
POTASSIUM SERPL-SCNC: 4.1 MMOL/L (ref 3.5–5.1)
PROT SERPL-MCNC: 6.1 G/DL (ref 6–8.4)
RBC # BLD AUTO: 3.83 M/UL (ref 4–5.4)
SODIUM SERPL-SCNC: 139 MMOL/L (ref 136–145)
WBC # BLD AUTO: 5.31 K/UL (ref 3.9–12.7)

## 2019-04-10 PROCEDURE — 97530 THERAPEUTIC ACTIVITIES: CPT

## 2019-04-10 PROCEDURE — 80053 COMPREHEN METABOLIC PANEL: CPT

## 2019-04-10 PROCEDURE — 11000004 HC SNF PRIVATE

## 2019-04-10 PROCEDURE — 83735 ASSAY OF MAGNESIUM: CPT

## 2019-04-10 PROCEDURE — 85025 COMPLETE CBC W/AUTO DIFF WBC: CPT

## 2019-04-10 PROCEDURE — 97116 GAIT TRAINING THERAPY: CPT

## 2019-04-10 PROCEDURE — 94761 N-INVAS EAR/PLS OXIMETRY MLT: CPT

## 2019-04-10 PROCEDURE — 63600175 PHARM REV CODE 636 W HCPCS: Performed by: NURSE PRACTITIONER

## 2019-04-10 PROCEDURE — 36415 COLL VENOUS BLD VENIPUNCTURE: CPT

## 2019-04-10 PROCEDURE — 97535 SELF CARE MNGMENT TRAINING: CPT

## 2019-04-10 PROCEDURE — 84100 ASSAY OF PHOSPHORUS: CPT

## 2019-04-10 PROCEDURE — 25000242 PHARM REV CODE 250 ALT 637 W/ HCPCS: Performed by: NURSE PRACTITIONER

## 2019-04-10 PROCEDURE — 25000003 PHARM REV CODE 250: Performed by: NURSE PRACTITIONER

## 2019-04-10 PROCEDURE — 97110 THERAPEUTIC EXERCISES: CPT

## 2019-04-10 PROCEDURE — 94640 AIRWAY INHALATION TREATMENT: CPT

## 2019-04-10 RX ORDER — PROMETHAZINE HYDROCHLORIDE 12.5 MG/1
12.5 TABLET ORAL
Status: DISCONTINUED | OUTPATIENT
Start: 2019-04-11 | End: 2019-04-15 | Stop reason: HOSPADM

## 2019-04-10 RX ORDER — LANOLIN ALCOHOL/MO/W.PET/CERES
800 CREAM (GRAM) TOPICAL ONCE
Status: COMPLETED | OUTPATIENT
Start: 2019-04-10 | End: 2019-04-10

## 2019-04-10 RX ADMIN — PREGABALIN 50 MG: 50 CAPSULE ORAL at 10:04

## 2019-04-10 RX ADMIN — DRONABINOL 2.5 MG: 2.5 CAPSULE ORAL at 10:04

## 2019-04-10 RX ADMIN — INSULIN ASPART 3 UNITS: 100 INJECTION, SOLUTION INTRAVENOUS; SUBCUTANEOUS at 08:04

## 2019-04-10 RX ADMIN — POTASSIUM & SODIUM PHOSPHATES POWDER PACK 280-160-250 MG 1 PACKET: 280-160-250 PACK at 05:04

## 2019-04-10 RX ADMIN — ACETAMINOPHEN 650 MG: 650 SOLUTION ORAL at 02:04

## 2019-04-10 RX ADMIN — FERROUS SULFATE TAB EC 325 MG (65 MG FE EQUIVALENT) 325 MG: 325 (65 FE) TABLET DELAYED RESPONSE at 08:04

## 2019-04-10 RX ADMIN — MIRTAZAPINE 15 MG: 15 TABLET, ORALLY DISINTEGRATING ORAL at 10:04

## 2019-04-10 RX ADMIN — Medication 800 MG: at 02:04

## 2019-04-10 RX ADMIN — LIDOCAINE 1 PATCH: 50 PATCH TOPICAL at 02:04

## 2019-04-10 RX ADMIN — METOCLOPRAMIDE 10 MG: 10 TABLET ORAL at 11:04

## 2019-04-10 RX ADMIN — METOCLOPRAMIDE 10 MG: 10 TABLET ORAL at 06:04

## 2019-04-10 RX ADMIN — MICONAZOLE NITRATE: 20 OINTMENT TOPICAL at 10:04

## 2019-04-10 RX ADMIN — POTASSIUM & SODIUM PHOSPHATES POWDER PACK 280-160-250 MG 1 PACKET: 280-160-250 PACK at 10:04

## 2019-04-10 RX ADMIN — MICONAZOLE NITRATE: 20 OINTMENT TOPICAL at 08:04

## 2019-04-10 RX ADMIN — IPRATROPIUM BROMIDE AND ALBUTEROL SULFATE 3 ML: .5; 3 SOLUTION RESPIRATORY (INHALATION) at 07:04

## 2019-04-10 RX ADMIN — HYDROXYZINE HYDROCHLORIDE 25 MG: 25 TABLET, FILM COATED ORAL at 08:04

## 2019-04-10 RX ADMIN — Medication 800 MG: at 08:04

## 2019-04-10 RX ADMIN — DRONABINOL 2.5 MG: 2.5 CAPSULE ORAL at 08:04

## 2019-04-10 RX ADMIN — HYDROXYZINE HYDROCHLORIDE 25 MG: 25 TABLET, FILM COATED ORAL at 10:04

## 2019-04-10 RX ADMIN — INSULIN ASPART 2 UNITS: 100 INJECTION, SOLUTION INTRAVENOUS; SUBCUTANEOUS at 11:04

## 2019-04-10 RX ADMIN — ENOXAPARIN SODIUM 40 MG: 100 INJECTION SUBCUTANEOUS at 08:04

## 2019-04-10 RX ADMIN — LAMOTRIGINE 150 MG: 150 TABLET ORAL at 10:04

## 2019-04-10 RX ADMIN — PREGABALIN 50 MG: 50 CAPSULE ORAL at 08:04

## 2019-04-10 RX ADMIN — Medication 800 MG: at 10:04

## 2019-04-10 RX ADMIN — PANTOPRAZOLE SODIUM 40 MG: 40 TABLET, DELAYED RELEASE ORAL at 08:04

## 2019-04-10 RX ADMIN — PROMETHAZINE HYDROCHLORIDE 12.5 MG: 12.5 TABLET ORAL at 11:04

## 2019-04-10 RX ADMIN — LAMOTRIGINE 150 MG: 150 TABLET ORAL at 08:04

## 2019-04-10 RX ADMIN — DULOXETINE 60 MG: 60 CAPSULE, DELAYED RELEASE ORAL at 08:04

## 2019-04-10 RX ADMIN — METOPROLOL SUCCINATE 100 MG: 50 TABLET, EXTENDED RELEASE ORAL at 10:04

## 2019-04-10 RX ADMIN — NAPROXEN 500 MG: 500 TABLET ORAL at 08:04

## 2019-04-10 RX ADMIN — AMITRIPTYLINE HYDROCHLORIDE 50 MG: 25 TABLET, FILM COATED ORAL at 10:04

## 2019-04-10 RX ADMIN — INSULIN ASPART 1 UNITS: 100 INJECTION, SOLUTION INTRAVENOUS; SUBCUTANEOUS at 10:04

## 2019-04-10 RX ADMIN — HYDROXYZINE HYDROCHLORIDE 25 MG: 25 TABLET, FILM COATED ORAL at 02:04

## 2019-04-10 RX ADMIN — POTASSIUM & SODIUM PHOSPHATES POWDER PACK 280-160-250 MG 1 PACKET: 280-160-250 PACK at 08:04

## 2019-04-10 RX ADMIN — TIZANIDINE 4 MG: 2 TABLET ORAL at 06:04

## 2019-04-10 RX ADMIN — METOCLOPRAMIDE 10 MG: 10 TABLET ORAL at 04:04

## 2019-04-10 NOTE — PLAN OF CARE
Problem: Adult Inpatient Plan of Care  Goal: Plan of Care Review  Outcome: Ongoing (interventions implemented as appropriate)  Interventions documented on Flow sheet and on MAR.  Patient taking po meds with out any difficulty.  Tolerating TF.

## 2019-04-10 NOTE — PLAN OF CARE
Problem: Adult Inpatient Plan of Care  Goal: Plan of Care Review  POC reviewed with patient. Peptamin remain infusing to peg tube at 60/hr from 6p to 6A. Tolerating well. Afebrile. Independent of toileting. Ambulates to bathroom. Unsteady gait noted. Instructed patient to call staff before getting out of bed. Patient verbalized understanding. C/o of headache relived with Prn Tylenol. No falls or injury noted this shift. All safety measures maintained.

## 2019-04-10 NOTE — PT/OT/SLP PROGRESS
Physical Therapy  Treatment    Kaylee Ngo   MRN: [id number]   Admitting Diagnosis: Malignant carcinoid tumor of unknown primary site    PT Received On: 04/10/19  Total Time (min): (--)       Billable Minutes:  Gait Training 10, Therapeutic Activity 15 and Therapeutic Exercise 20       PT/PTA: PTA     PTA Visit Number: 3       General Precautions: Standard, aspiration, fall  Orthopedic Precautions: N/A   Braces: N/A    Spiritual, Cultural Beliefs, Jainism Practices, Values that Affect Care: no    Subjective:  Communicated with nursing prior to session.  Pt agreed to work with therapy.     Pain/Comfort  Pain Rating 1: 0/10  Pain Rating Post-Intervention 1: 0/10    Objective:  Patient found seated bedside chair.   Patient found with: PEG Tube     AM-PAC 6 CLICK MOBILITY  Total Score:18    Bed Mobility:  Sit>Supine: not performed  Supine>Sit: not performed    Transfers:  Sit<>Stand: to/from bedside chair, to/from w/c w/ RW and SBA  Stand Pivot Transfer: mat to w/c w/ RW and SBA      Gait:  Amb x2 trials of ~220 ft and 180ft w/ RW and SBA. No LOB. Slow/fair bety.      Advanced Gait:  Stairs: ascend/descend 4 steps w/ BHR and CGA for pt safety.        Therex:  B LE therex x20 reps:   -AP   -LAQ   -Hip flexion    -GS      Additional Treatment:  Recumbent stepper x15 min L3    Patient left up in chair with call button in reach.    Assessment:  Kaylee Ngo is a 63 y.o. female with a medical diagnosis of Malignant carcinoid tumor of unknown primary site.  Pt tolerated treatment well, and will continue to benefit from PT services at this time. Continue with PT POC as indicated.    Rehab identified problem list/impairments: weakness, impaired endurance, impaired self care skills, impaired functional mobilty, gait instability, impaired balance, decreased upper extremity function, decreased lower extremity function    Rehab potential is good.    Activity tolerance: Fair    Discharge recommendations: home health PT      Barriers to discharge: None    Equipment recommendations: wheelchair, manual, commode     GOALS:   Multidisciplinary Problems     Physical Therapy Goals        Problem: Physical Therapy Goal    Goal Priority Disciplines Outcome Goal Variances Interventions   Physical Therapy Goal     PT, PT/OT Ongoing (interventions implemented as appropriate)     Description:  Goals to be met by:      Patient will increase functional independence with mobility by performin. Supine to sit with Modified [place]. Not met  2. Sit to supine with Modified [place]. Not met  3. Sit to stand transfer with Modified [place]. Not met  4. Bed to chair transfer with Modified [place] using Rolling Walker/rollator. Not met  5. Gait  x 150 feet with Supervision using Rolling Walker/rollator.  Not met  6. Wheelchair propulsion x 150 feet with Supervision using bilateral upper extremities. Not met  7. Ascend/descend 4 stair with bilateral Handrails Stand-by Assistance using Rolling Walker.  Not met  8. Stand for 3 minutes with Stand-by Assistance using unilateral UE support and perform a dynamic activity.  met  9. Lower extremity exercise program x 20 reps per handout, with independence. Not met  10. Pt will improve her gait speed from 0.61 m/s to 0.74 m/s with use of rolling walker/rollator to denote improvement in balance, functional mobility, and a lower fall risk.  Not met                       PLAN:    Patient to be seen 5 x/week  to address the above listed problems via gait training, therapeutic activities, therapeutic exercises, neuromuscular re-education, wheelchair management/training  Plan of Care expires: 19  Plan of Care reviewed with: patient    Lashaun Sheth, PTA  04/10/2019

## 2019-04-10 NOTE — PROGRESS NOTES
Wound care consult for suspected DTI.   Upon assessment no DTI noted. Small area of peeling skin. Patient with bony coccyx area. Patient on EHOB overlay with cushion for chair. Foam dressing in place for protection. Patient has been using AF cream to the perineal area. LDA removed for DTI.     Wounds to abdomen appear to be healing as little drainage noted and new tissue at edges. Nursing to change dressing today.     Wound care orders are in place.    Aquacel Ag to abd  Foam to coccyx area. May use Triad as moisture barrier.     Sunshine Zimmer RN [facility]   [phone]           04/10/19 1611        Incision/Site 01/25/19 1348 Abdomen upper   Date First Assessed/Time First Assessed: 01/25/19 1348   Location: Abdomen  Orientation: upper   Wound Image    Incision WDL ex   Dressing Appearance Intact   Drainage Amount None   Appearance Other (see comments)  (scab)   Periwound Area Scar tissue   Wound Edges Open   Wound Length (cm) 1.2 cm   Wound Width (cm) 1.2 cm   Wound Depth (cm) 0.1 cm   Wound Volume (cm^3) 0.14 cm^3   Wound Surface Area (cm^2) 1.44 cm^2   Dressing Reinforced;Foam        Incision/Site 02/22/19 1500 Abdomen lower   Date First Assessed/Time First Assessed: 02/22/19 1500   Location: (c) Abdomen  Orientation: lower  Closure Method: Other (see comments)  Additional Comments: (c) wound 2: wound vac applied   Wound Image    Incision WDL ex   Dressing Appearance other (see comments)  (wet from shower)   Drainage Amount Scant   Drainage Characteristics/Odor No odor   Appearance Pink;Fibrin   Red (%), Wound Tissue Color 90 %   Yellow (%), Wound Tissue Color 10 %   Periwound Area Scar tissue   Wound Edges Open   Wound Length (cm) 1 cm   Wound Width (cm) 0.3 cm   Wound Depth (cm) 0.2 cm   Wound Volume (cm^3) 0.06 cm^3   Wound Surface Area (cm^2) 0.3 cm^2        Wound 04/10/19 1652 Moisture associated dermatitis Coccyx   Date First Assessed/Time First Assessed: 04/10/19 1652   Pre-existing: Yes  Primary Wound  Type: Moisture associated dermatitis  Location: Coccyx   Wound Image    Wound WDL ex   Periwound Area Other (see comments)  (mildly excoriated)

## 2019-04-10 NOTE — PLAN OF CARE
Problem: Occupational Therapy Goal  Goal: Occupational Therapy Goal  Goals to be met by: 4/16 /19     Patient will increase functional independence with ADLs by performing:    UE Dressing with Modified [place].- Met  LE Dressing with Modified [place].  Grooming while standing with Supervision.  Toileting from bedside commode with Supervision for hygiene and clothing management. -Met  Bathing from  edge of bed with Supervision. -Met  Rolling to Bilateral with Modified [place]. -Met  Supine to sit with Modified [place].  Stand pivot transfers with Modified [place].  Caregiver will be competent with assisting with self care and functional transfers.      Outcome: Ongoing (interventions implemented as appropriate)  Steven Soto OTR/L      4/10/2019

## 2019-04-10 NOTE — PLAN OF CARE
Problem: Physical Therapy Goal  Goal: Physical Therapy Goal  Goals to be met by:      Patient will increase functional independence with mobility by performin. Supine to sit with Modified [place]. Not met  2. Sit to supine with Modified [place]. Not met  3. Sit to stand transfer with Modified [place]. Not met  4. Bed to chair transfer with Modified [place] using Rolling Walker/rollator. Not met  5. Gait  x 150 feet with Supervision using Rolling Walker/rollator.  Not met  6. Wheelchair propulsion x 150 feet with Supervision using bilateral upper extremities. Not met  7. Ascend/descend 4 stair with bilateral Handrails Stand-by Assistance using Rolling Walker.  Not met  8. Stand for 3 minutes with Stand-by Assistance using unilateral UE support and perform a dynamic activity.  met  9. Lower extremity exercise program x 20 reps per handout, with independence. Not met  10. Pt will improve her gait speed from 0.61 m/s to 0.74 m/s with use of rolling walker/rollator to denote improvement in balance, functional mobility, and a lower fall risk.  Not met      Goals remain appropriate. Continue with PT POC as indicated.

## 2019-04-10 NOTE — PROGRESS NOTES
[facility]                                  Skilled Nursing Facility                   Progress Note     Admit Date: 4/4/2019  DODIE 4/18/2019  Principal Problem:  Malignant carcinoid tumor of unknown primary site   HPI obtained from patient interview and chart review     Chief Complaint: Revaluation of medical treatment and therapy status: Lab review    HPI:   All labs reviewed. Blood glucose at 281, increased detemir to 12 units b.i.d. magnesium at 1 point Mikie initiated replacement. Patient progessing well with PT/OT. Patient medically stable. Continuing to follow and treat all acute and chronic conditions.     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    Past Surgical History: Patient has a past surgical history that includes Appendectomy; Cholecystectomy; Gastric bypass; Total knee arthroplasty (Left); LAPAROTOMY, EXPLORATORY (N/A, 1/25/2019); Insertion of percutaneous endoscopic gastrostomy (PEG) feeding tube (02/21/2019); and Esophagogastroduodenoscopy w/ PEG (N/A, 2/21/2019).    Social History: Patient reports that she has never smoked. She has never used smokeless tobacco. She reports that she does not drink alcohol or use drugs.    Family History: family history includes Cancer in her father and mother.    Allergies: Patient is allergic to calcium carbonate; codeine; contrast media; darvocet a500 [propoxyphene n-acetaminophen]; epinephrine; morphine; propoxyphene napsylate; sulfa (sulfonamide antibiotics); erythromycin;  erythromycin base; iodinated contrast- oral and iv dye; and pcn [penicillins].    ROS  Constitutional: Negative for fever. + malaise/fatigue.   Eyes: Negative for blurred vision, double vision and discharge.   Respiratory: Negative for cough, shortness of breath and wheezing.    Cardiovascular: Negative for chest pain, palpitations, claudication, leg swelling and PND.   Gastrointestinal: Neg for abdominal pain, N/ V. Neg for constipation, diarrhea.   Genitourinary: Negative for dysuria, frequency and urgency.   Musculoskeletal:  + generalized weakness. Negative for back pain and myalgias.   Skin: Negative for itching and rash.   Neurological: Negative for dizziness, speech change, seizures, and headaches.   Psychiatric/Behavioral: Negative for depression. The patient is not nervous/anxious.      PEx  Temp:  [97.4 °F (36.3 °C)-98.2 °F (36.8 °C)]   Pulse:  [56-82]   Resp:  [16-18]   BP: (100-150)/(49-72)   SpO2:  [92 %-98 %]      Constitutional: Patient appears well-developed and well-nourished.   HENT:   Head: Normocephalic and atraumatic.   Eyes: Pupils are equal, round, and reactive to light.   Neck: Normal range of motion. Neck supple.   Cardiovascular: Normal rate, regular rhythm and normal heart sounds.    Pulmonary/Chest: Effort normal and breath sounds are clear  Abdominal: Soft. Bowel sounds are normal.   Musculoskeletal: Normal range of motion.   Neurological: Alert and oriented to person, place, and time.   Skin: Skin is warm and dry. abdominal wound x2 with dresses in place CDI.   Psychiatric: Normal mood and affect. Behavior is normal.       Assessment and Plan:    Diabetes mellitus type 2  - 4/8 accu checks changed to AC/HS  - continue moderate SSI Aspart for now-may switch to low dose  - 4/5 decreased insulin detemir U-100 pen 10 Units in the am and 10 Units in the pm  4/10 increased detemir to 12 units b.i.d.    Hypomagnesemia  · Continue Mag-Ox  · 4/10 Initiated 1 time dose magnesium 800 mg  p.o.    CONTINUE     Malignant carcinoid tumor of unknown primary site  -s/p exp lap 1/25  -3/15 Patient saw Dr Christine, neuroendocrine surgeon for post op appt and follow up on cancer treatment. MD stated that cancer tx is not appropriate at this time due to nutritional status and overall condition. He will follow up with her once she is back at her baseline as an outpatient.    Surgical wound dehiscence  - wound Care nurse following  -3/4 wound vac removed   - 0.5 Dilaudid q.6 hours p.r.n. for pain  - MWF- clean upper and lower abdominal wounds with wound cleanser, pack with Aquacel Ag packing strip and cover with foam dressing  - 4/2 WC saw pt yesterday:Proximal abd wound is pink with scan drain. No s&s pf infection. Distal abd wound is almost completely closed up. Cleaned both wounds with wound cleanser. Applied silver alginate and covered with foam.    Migraine headache  -continue amitriptyline to 75 mg p.o. q.h.s. for uncontrolled migraines  -continue naproxen 500 mg p.o. 2 times daily p.r.n. migraine headache     Nausea and vomiting   -Stable with intermittent nausea that responds well to antiemetics  -continue scheduled Phenergan 6.25 q.8 hours, scopolamine patch every 3 days, Zofran 4 mg q.8 hours, Reglan 10 mg t.i.d.     Diarrhea  -3/24 reporting watery stools and decreased output, KUB ordered to r/o ileus.    -3/25- no obstruction seen on KUB     Thrombocytosis  -Elevated platelets at Thrombocytosis which as been fluctuating daily    Hypomagnesemia  -monitor BMP  -continue Mag oxide to 800 b.i.d.     Hypokalemia  - monitor BMP     Dysphagia  -consult SLP   - 3/15 MBSS complete, diet advanced per recs  - 4/1 continue regular diet with thin liquids and tube feedings at nighttime     Protein calorie Malnutrition  hypoalbuminemia  -consult dietitian  -reportedly can not tolerate tube feeds at rate >30 mL/h or Glucerna   -prealbumin level Q mondays  - nocturnal feeds from 6:00 p.m. to 6:00 a.m. with Peptamen with  rate of 60 mils per hour - current tube feeding on back order, will initiate order for vital 1.5 for now     Chronic low back pain  -lidocaine patch daily  -fentanyl patch (increased 3/2)  -Dilaudid PRN for breakthrough  -amitriptyline 75 mg qhs (increased on 04/01 for uncontrolled migraine headache), duloxetine 60 mg daily, pregabalin 50 mg BID   -consider weaning opioid for discharge unless we can find a prescriber willing to refill or she has referral to pain management clinic     Essential hypertension  -continue toprol XL 24 hours 100 mg BID      Bipolar affective disorder  -continue Cymbalta, amitriptyline QHS( increased dose on 4/1 due to migraine unrelieved by other medications), and lamotrigine 150 mg BID    F/U PCP, surgery (Dr. Christine), Neuroendocrine physician Dr. Prieto and Daisha, Follow up with oncology  Follow up with pain management      Maninder Parks NP

## 2019-04-10 NOTE — PT/OT/SLP PROGRESS
Occupational Therapy  Treatment    Kaylee Ngo   MRN: [id number]   Admitting Diagnosis: Malignant carcinoid tumor of unknown primary site    OT Date of Treatment: 04/10/19       Billable Minutes:  Self Care/Home Management 20, Therapeutic Activity 15 and Therapeutic Exercise 15    General Precautions: Standard, aspiration, fall  Orthopedic Precautions: N/A  Braces: N/A    Spiritual, Cultural Beliefs, Taoism Practices, Values that Affect Care: no    Subjective:  Communicated with nurse prior to session.      Pain/Comfort  Pain Rating 1: 0/10  Pain Rating Post-Intervention 1: 0/10    Objective:  Patient found with: PEG Tube    Occupational Performance:    Bed Mobility:     Pt seated in W/C at onset of therapy session.     Functional Mobility/Transfers:  · Patient completed Sit <> Stand Transfer with supervision  with  rolling walker   · Patient completed Bed <> Chair Transfer using Stand Pivot technique with supervision with rolling walker      Activities of Daily Living:  · Upper Body Dressing: supervision standing to don robe  · Lower Body Dressing: supervision with no (A) to don pants and socks.      OT Exercises: UE Ergometer performed 15 minutes on UBE with Min resistance. UE exercises performed to increase functional endurance and strength.  Strengthening required in order increase independence when performing self care tasks, functional ambulation, W/C propulsion , functional standing activities as well as when performing functional tasks.    Pt worked on functional standing activity consisting of standing with RW while reaching in all planes , crossing of midline and reaching to varying heights to facilitate (B) wt shifting and stability in standing to increase (I)ce when performing self care, and functional activities with standing component.  Pt tolerated up to 12 Min.40 sec in standing with  (S) and RW to steady.    Additional Treatment:  Pt edu on Plan of care,  safety when performing functional  transfers, self care tasks and functional standing activities.  - White board updated  - Self care tasks completed-- as noted above     Patient left up in chair with all lines intact, call button in reach and nurse notified    [facility] 6 Click:  [facility] Total Score: 20    ASSESSMENT:  Kaylee Ngo is a 63 y.o. female with a medical diagnosis of Malignant carcinoid tumor of unknown primary site .  Pt was agreeable to OT and tolerated Tx without incidence.   She continues to present with deficits affecting (I)ce with functional transfers, functional standing balance and self care tasks with standing component. She is making progress but continues to require SBA and at times (A) to perform functional activities to completion.   OT continues to be recommended to further her functional (I)ce and safety. Goals remain appropriate and continued OT is recommended.        Rehab identified problem list/impairments: weakness, impaired endurance, impaired self care skills, impaired functional mobilty, gait instability, impaired balance, decreased lower extremity function, decreased upper extremity function, decreased safety awareness, impaired cardiopulmonary response to activity    Rehab potential is good    Activity tolerance: Good    Discharge recommendations: home with home health     Barriers to discharge: Decreased caregiver support     Equipment recommendations: (TBD)     GOALS:   Multidisciplinary Problems     Occupational Therapy Goals        Problem: Occupational Therapy Goal    Goal Priority Disciplines Outcome Interventions   Occupational Therapy Goal     OT, PT/OT Ongoing (interventions implemented as appropriate)    Description:  Goals to be met by: 4/16 /19     Patient will increase functional independence with ADLs by performing:    UE Dressing with Modified [place].- Met  LE Dressing with Modified [place].  Grooming while standing with Supervision.  Toileting from bedside commode with Supervision for  hygiene and clothing management. -Met  Bathing from  edge of bed with Supervision. -Met  Rolling to Bilateral with Modified [place]. -Met  Supine to sit with Modified [place].  Stand pivot transfers with Modified [place].  Caregiver will be competent with assisting with self care and functional transfers.                       Plan:  Patient to be seen 5 x/week to address the above listed problems via self-care/home management, therapeutic activities, therapeutic exercises  Plan of Care expires: 05/05/19  Plan of Care reviewed with: patient    Steven Soto OTR/FRANTZ  04/10/2019

## 2019-04-11 LAB
POCT GLUCOSE: 106 MG/DL (ref 70–110)
POCT GLUCOSE: 181 MG/DL (ref 70–110)
POCT GLUCOSE: 217 MG/DL (ref 70–110)
POCT GLUCOSE: 358 MG/DL (ref 70–110)

## 2019-04-11 PROCEDURE — 11000004 HC SNF PRIVATE

## 2019-04-11 PROCEDURE — 25000003 PHARM REV CODE 250: Performed by: NURSE PRACTITIONER

## 2019-04-11 PROCEDURE — 99900058 HC 022 PAID UNDER SNF PPS

## 2019-04-11 PROCEDURE — 97530 THERAPEUTIC ACTIVITIES: CPT

## 2019-04-11 PROCEDURE — 63600175 PHARM REV CODE 636 W HCPCS: Performed by: NURSE PRACTITIONER

## 2019-04-11 PROCEDURE — 97110 THERAPEUTIC EXERCISES: CPT

## 2019-04-11 RX ORDER — IPRATROPIUM BROMIDE AND ALBUTEROL SULFATE 2.5; .5 MG/3ML; MG/3ML
3 SOLUTION RESPIRATORY (INHALATION) EVERY 6 HOURS PRN
Status: DISCONTINUED | OUTPATIENT
Start: 2019-04-11 | End: 2019-04-15 | Stop reason: HOSPADM

## 2019-04-11 RX ADMIN — METOPROLOL SUCCINATE 100 MG: 50 TABLET, EXTENDED RELEASE ORAL at 08:04

## 2019-04-11 RX ADMIN — DRONABINOL 2.5 MG: 2.5 CAPSULE ORAL at 08:04

## 2019-04-11 RX ADMIN — HYDROXYZINE HYDROCHLORIDE 25 MG: 25 TABLET, FILM COATED ORAL at 08:04

## 2019-04-11 RX ADMIN — MIRTAZAPINE 15 MG: 15 TABLET, ORALLY DISINTEGRATING ORAL at 09:04

## 2019-04-11 RX ADMIN — NAPROXEN 500 MG: 500 TABLET ORAL at 09:04

## 2019-04-11 RX ADMIN — INSULIN ASPART 2 UNITS: 100 INJECTION, SOLUTION INTRAVENOUS; SUBCUTANEOUS at 12:04

## 2019-04-11 RX ADMIN — POTASSIUM & SODIUM PHOSPHATES POWDER PACK 280-160-250 MG 1 PACKET: 280-160-250 PACK at 09:04

## 2019-04-11 RX ADMIN — LAMOTRIGINE 150 MG: 150 TABLET ORAL at 09:04

## 2019-04-11 RX ADMIN — LIDOCAINE 1 PATCH: 50 PATCH TOPICAL at 12:04

## 2019-04-11 RX ADMIN — FENTANYL 1 PATCH: 50 PATCH, EXTENDED RELEASE TRANSDERMAL at 06:04

## 2019-04-11 RX ADMIN — HYDROXYZINE HYDROCHLORIDE 25 MG: 25 TABLET, FILM COATED ORAL at 04:04

## 2019-04-11 RX ADMIN — HYDROXYZINE HYDROCHLORIDE 25 MG: 25 TABLET, FILM COATED ORAL at 09:04

## 2019-04-11 RX ADMIN — MICONAZOLE NITRATE: 20 OINTMENT TOPICAL at 09:04

## 2019-04-11 RX ADMIN — PROMETHAZINE HYDROCHLORIDE 12.5 MG: 12.5 TABLET ORAL at 09:04

## 2019-04-11 RX ADMIN — PANTOPRAZOLE SODIUM 40 MG: 40 TABLET, DELAYED RELEASE ORAL at 08:04

## 2019-04-11 RX ADMIN — POTASSIUM & SODIUM PHOSPHATES POWDER PACK 280-160-250 MG 1 PACKET: 280-160-250 PACK at 12:04

## 2019-04-11 RX ADMIN — METOPROLOL SUCCINATE 100 MG: 50 TABLET, EXTENDED RELEASE ORAL at 09:04

## 2019-04-11 RX ADMIN — POTASSIUM & SODIUM PHOSPHATES POWDER PACK 280-160-250 MG 1 PACKET: 280-160-250 PACK at 08:04

## 2019-04-11 RX ADMIN — DRONABINOL 2.5 MG: 2.5 CAPSULE ORAL at 09:04

## 2019-04-11 RX ADMIN — Medication 800 MG: at 09:04

## 2019-04-11 RX ADMIN — METOCLOPRAMIDE 10 MG: 10 TABLET ORAL at 06:04

## 2019-04-11 RX ADMIN — MICONAZOLE NITRATE: 20 OINTMENT TOPICAL at 08:04

## 2019-04-11 RX ADMIN — LAMOTRIGINE 150 MG: 150 TABLET ORAL at 08:04

## 2019-04-11 RX ADMIN — ENOXAPARIN SODIUM 40 MG: 100 INJECTION SUBCUTANEOUS at 08:04

## 2019-04-11 RX ADMIN — FERROUS SULFATE TAB EC 325 MG (65 MG FE EQUIVALENT) 325 MG: 325 (65 FE) TABLET DELAYED RESPONSE at 08:04

## 2019-04-11 RX ADMIN — METOCLOPRAMIDE 10 MG: 10 TABLET ORAL at 10:04

## 2019-04-11 RX ADMIN — INSULIN ASPART 3 UNITS: 100 INJECTION, SOLUTION INTRAVENOUS; SUBCUTANEOUS at 09:04

## 2019-04-11 RX ADMIN — PREGABALIN 50 MG: 50 CAPSULE ORAL at 08:04

## 2019-04-11 RX ADMIN — DULOXETINE 60 MG: 60 CAPSULE, DELAYED RELEASE ORAL at 08:04

## 2019-04-11 RX ADMIN — Medication 800 MG: at 08:04

## 2019-04-11 RX ADMIN — AMITRIPTYLINE HYDROCHLORIDE 50 MG: 25 TABLET, FILM COATED ORAL at 09:04

## 2019-04-11 RX ADMIN — METOCLOPRAMIDE 10 MG: 10 TABLET ORAL at 04:04

## 2019-04-11 RX ADMIN — PROMETHAZINE HYDROCHLORIDE 12.5 MG: 12.5 TABLET ORAL at 06:04

## 2019-04-11 RX ADMIN — PREGABALIN 50 MG: 50 CAPSULE ORAL at 09:04

## 2019-04-11 RX ADMIN — POTASSIUM & SODIUM PHOSPHATES POWDER PACK 280-160-250 MG 1 PACKET: 280-160-250 PACK at 06:04

## 2019-04-11 NOTE — PT/OT/SLP PROGRESS
Physical Therapy  Treatment    Kaylee Ngo   MRN: [id number]   Admitting Diagnosis: Malignant carcinoid tumor of unknown primary site    PT Received On: 04/11/19        Billable Minutes:  Therapeutic Activity 13 and Therapeutic Exercise 10    Treatment Type: Treatment  PT/PTA: PT     PTA Visit Number: 0       General Precautions: Standard, aspiration, fall  Orthopedic Precautions: N/A   Braces: N/A    Spiritual, Cultural Beliefs, Jew Practices, Values that Affect Care: no    Subjective:  Communicated with pt prior to session.  Pt agreed to PT session even though her L knee was hurting.    Pain/Comfort  Pain Rating 1: 9/10  Location - Side 1: Left  Location - Orientation 1: generalized  Location 1: knee  Pain Addressed 1: Reposition  Pain Rating Post-Intervention 1: 8/10    Objective:  Patient found sitting in bedside chair with Patient found with: PEG Tube; ice on left knee.       AM-PAC 6 CLICK MOBILITY  Total Score:18    Bed Mobility:  Sit>Supine: NP  Supine>Sit: NP    Transfers:  Sit<>Stand: Taiwo to rise from bedside chair x 1, CGA to/from w/c x 1 trial; CGA w/ RW to bedside chair.  Stand Pivot Transfer: CGA w/ RW bedside chair <> w/c  V/c for technique; cues for technique for decreased weight bearing LLE in transfers and stepping in transfers.     Therex:  Quad sets,   Glute sets,   Ankle  Pumps,   3x15 reps w/ assist as needed    Additional Treatment:  Education on PT POC going forward, pending results of L knee X-ray    Patient left up in chair with call button in reach.    Assessment:  Kaylee Ngo is a 63 y.o. female with a medical diagnosis of Malignant carcinoid tumor of unknown primary site. Pt was unable to participate in all planned PT interventions today due to pain in L knee and awaiting imaging. Pt will continue w/ PT POC once L knee has been assessed. Patient will benefit from continued physical therapy to address deficits and improve safety and functional mobility. Continue with physical  therapy plan of care.     Rehab identified problem list/impairments: weakness, impaired endurance, impaired self care skills, impaired functional mobilty, gait instability, impaired balance, decreased upper extremity function, decreased lower extremity function    Rehab potential is good.    Activity tolerance: Good    Discharge recommendations: home health PT     Barriers to discharge: None    Equipment recommendations: wheelchair, manual, commode     GOALS:   Multidisciplinary Problems     Physical Therapy Goals        Problem: Physical Therapy Goal    Goal Priority Disciplines Outcome Goal Variances Interventions   Physical Therapy Goal     PT, PT/OT Ongoing (interventions implemented as appropriate)     Description:  Goals to be met by:      Patient will increase functional independence with mobility by performin. Supine to sit with Modified [place]. Not met  2. Sit to supine with Modified [place]. Not met  3. Sit to stand transfer with Modified [place]. Not met  4. Bed to chair transfer with Modified [place] using Rolling Walker/rollator. Not met  5. Gait  x 150 feet with Supervision using Rolling Walker/rollator.  Not met  6. Wheelchair propulsion x 150 feet with Supervision using bilateral upper extremities. Not met  7. Ascend/descend 4 stair with bilateral Handrails Stand-by Assistance using Rolling Walker.  Not met  8. Stand for 3 minutes with Stand-by Assistance using unilateral UE support and perform a dynamic activity.  met  9. Lower extremity exercise program x 20 reps per handout, with independence. Not met  10. Pt will improve her gait speed from 0.61 m/s to 0.74 m/s with use of rolling walker/rollator to denote improvement in balance, functional mobility, and a lower fall risk.  Not met                       PLAN:    Patient to be seen 5 x/week  to address the above listed problems via gait training, therapeutic activities, therapeutic exercises, neuromuscular  re-education, wheelchair management/training  Plan of Care expires: 05/05/19  Plan of Care reviewed with: patient    Keanu Cazares, SPT  04/11/2019   I certify that I was present in the room directing the student in service delivery and guiding them using my skilled judgment. As the co-signing therapist I have reviewed the students documentation and am responsible for the treatment, assessment, and plan.    Helena Caicedo, PT   4/11/2019

## 2019-04-11 NOTE — PT/OT/SLP PROGRESS
Occupational Therapy  Treatment    Kaylee Ngo   MRN: [id number]   Admitting Diagnosis: Malignant carcinoid tumor of unknown primary site    OT Date of Treatment: 04/11/19       Billable Minutes:  Therapeutic Activity 39    General Precautions: Standard, aspiration, fall  Orthopedic Precautions: N/A  Braces: N/A    Spiritual, Cultural Beliefs, Latter day Practices, Values that Affect Care: no    Subjective:  Communicated with nurse prior to session.      Pain/Comfort  Pain Rating 1: 4/10  Location - Side 1: Left  Location - Orientation 1: generalized  Location 1: abdomen  Pain Addressed 1: Reposition, Distraction, Pre-medicate for activity  Pain Rating Post-Intervention 1: 4/10    Objective:  Patient found with: PEG Tube    Occupational Performance:    Bed Mobility:    · Pt seated in W/C at onset of therapy session.     Functional Mobility/Transfers:  · Patient completed Sit <> Stand Transfer with stand by assistance  with  rolling walker       OT Exercises:Pt performed dowel exercises with 2 pound dowel 2 set 10 reps performing shld Flex/ Extn, Horizontal Ab/Adduction, and chest presses.  Min (A) needed to complete Shld flexion and Horizontal Ab/Adduction exercises secondary to limitations in Shld Flexion (L) UE. Brief rest breaks provided between sets.    Additional Treatment:  Ice pack placed on Pt's (L) knee for 20 minutes to address (L) knee swelling and pain  With Pt reporting decrease in (L) knee pain after placement of ice pack.    Patient left up in chair with call button in reach and nurse notified    [facility] 6 Click:  [facility] Total Score: 20    ASSESSMENT:  Kaylee Ngo is a 63 y.o. female with a medical diagnosis of Malignant carcinoid tumor of unknown primary site . Pt tolerated Tx without incident but only conservative treatment provided secondary to Pt waiting for x ray to taken on (L) knee. Pt tolerated Tx well with focus to continue following POC..    Rehab identified problem list/impairments:  weakness, impaired endurance, impaired self care skills, impaired functional mobilty, gait instability, impaired balance, decreased lower extremity function, decreased upper extremity function, decreased safety awareness, impaired cardiopulmonary response to activity    Rehab potential is good    Activity tolerance: Good    Discharge recommendations: home with home health     Barriers to discharge: Decreased caregiver support     Equipment recommendations: (TBD)     GOALS:   Multidisciplinary Problems     Occupational Therapy Goals        Problem: Occupational Therapy Goal    Goal Priority Disciplines Outcome Interventions   Occupational Therapy Goal     OT, PT/OT Ongoing (interventions implemented as appropriate)    Description:  Goals to be met by: 4/16 /19     Patient will increase functional independence with ADLs by performing:    UE Dressing with Modified [place].- Met  LE Dressing with Modified [place].  Grooming while standing with Supervision.  Toileting from bedside commode with Supervision for hygiene and clothing management. -Met  Bathing from  edge of bed with Supervision. -Met  Rolling to Bilateral with Modified [place]. -Met  Supine to sit with Modified [place].  Stand pivot transfers with Modified [place].  Caregiver will be competent with assisting with self care and functional transfers.                       Plan:  Patient to be seen 5 x/week to address the above listed problems via self-care/home management, therapeutic activities, therapeutic exercises  Plan of Care expires: 05/05/19  Plan of Care reviewed with: patient    SHARRON Blank/FRANTZ  04/11/2019

## 2019-04-11 NOTE — PLAN OF CARE
Problem: Adult Inpatient Plan of Care  Goal: Plan of Care Review  Outcome: Ongoing (interventions implemented as appropriate)     04/11/19 0507   Plan of Care Review   Plan of Care Reviewed With patient       Problem: Fall Injury Risk  Goal: Absence of Fall and Fall-Related Injury    Intervention: Identify and Manage Contributors to Fall Injury Risk     04/11/19 0507   Manage Acute Allergic Reaction   Medication Review/Management medications reviewed   Identify and Manage Contributors to Fall Injury Risk   Self-Care Promotion BADL personal objects within reach;BADL personal routines maintained

## 2019-04-11 NOTE — PLAN OF CARE
Problem: Physical Therapy Goal  Goal: Physical Therapy Goal  Goals to be met by:      Patient will increase functional independence with mobility by performin. Supine to sit with Modified [place]. Not met  2. Sit to supine with Modified [place]. Not met  3. Sit to stand transfer with Modified [place]. Not met  4. Bed to chair transfer with Modified [place] using Rolling Walker/rollator. Not met  5. Gait  x 150 feet with Supervision using Rolling Walker/rollator.  Not met  6. Wheelchair propulsion x 150 feet with Supervision using bilateral upper extremities. Not met  7. Ascend/descend 4 stair with bilateral Handrails Stand-by Assistance using Rolling Walker.  Not met  8. Stand for 3 minutes with Stand-by Assistance using unilateral UE support and perform a dynamic activity.  met  9. Lower extremity exercise program x 20 reps per handout, with independence. Not met  10. Pt will improve her gait speed from 0.61 m/s to 0.74 m/s with use of rolling walker/rollator to denote improvement in balance, functional mobility, and a lower fall risk.  Not met      Goals remain appropriate. Continue with Physical therapy Plan of Care. Keanu Cazares, SPT 2019

## 2019-04-11 NOTE — PROGRESS NOTES
[facility]                                  Skilled Nursing Facility                   Progress Note     Admit Date: 4/4/2019  DODIE 4/18/2019  Principal Problem:  Malignant carcinoid tumor of unknown primary site   HPI obtained from patient interview and chart review     Chief Complaint:  Patient reports swollen left knee    HPI:   Patient's left knee appears swollen.  She has a history of knee arthroplasty to that knee.  Knee is warm to the touch.  Patient reports tenderness to palpation.  Patient states that her knee became acutely swollen after her physical therapy session.     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    Past Surgical History: Patient has a past surgical history that includes Appendectomy; Cholecystectomy; Gastric bypass; Total knee arthroplasty (Left); LAPAROTOMY, EXPLORATORY (N/A, 1/25/2019); Insertion of percutaneous endoscopic gastrostomy (PEG) feeding tube (02/21/2019); and Esophagogastroduodenoscopy w/ PEG (N/A, 2/21/2019).    Social History: Patient reports that she has never smoked. She has never used smokeless tobacco. She reports that she does not drink alcohol or use drugs.    Family History: family history includes Cancer in her father and mother.    Allergies: Patient is allergic to calcium carbonate; codeine; contrast media; darvocet a500 [propoxyphene n-acetaminophen]; epinephrine; morphine; propoxyphene napsylate; sulfa (sulfonamide antibiotics); erythromycin; erythromycin base; iodinated  contrast- oral and iv dye; and pcn [penicillins].    ROS  Constitutional: Negative for fever. + malaise/fatigue.   Eyes: Negative for blurred vision, double vision and discharge.   Respiratory: Negative for cough, shortness of breath and wheezing.    Cardiovascular: Negative for chest pain, palpitations, claudication, leg swelling and PND.   Gastrointestinal: Neg for abdominal pain, constipation, diarrhea.  +nausea/ vomiting, chronic and intermittent  Genitourinary: Negative for dysuria, frequency and urgency.   Musculoskeletal:  + generalized weakness, left knee pain and swelling. Negative for back pain and myalgias.   Skin: Negative for itching and rash.   Neurological: Negative for dizziness, speech change, seizures, and headaches.   Psychiatric/Behavioral: Negative for depression. The patient is not nervous/anxious.      PEx  Temp:  [98.1 °F (36.7 °C)-99.1 °F (37.3 °C)]   Pulse:  [66-82]   Resp:  [18]   BP: (146-151)/(68-70)   SpO2:  [94 %-99 %]      Constitutional: Patient appears well-developed and well-nourished.   HENT:   Head: Normocephalic and atraumatic.   Eyes: Pupils are equal, round, and reactive to light.   Neck: Normal range of motion. Neck supple.   Cardiovascular: Normal rate, regular rhythm and normal heart sounds.    Pulmonary/Chest: Effort normal and breath sounds are clear  Abdominal: Soft. Bowel sounds are normal.   Musculoskeletal: Normal range of motion.  Left knee swollen and hot  Neurological: Alert and oriented to person, place, and time.   Skin: Skin is warm and dry. abdominal wound x2 with dresses in place CDI.   Psychiatric: Normal mood and affect. Behavior is normal.       Assessment and Plan:    Swollen left knee  - 4/11 initiated ice pack to knee in ordered a three view x-ray of left knee    CONTINUE     Diabetes mellitus type 2  - 4/8 accu checks changed to AC/HS  - continue moderate SSI Aspart for now-may switch to low dose  - 4/5 decreased insulin detemir U-100 pen 10 Units in  the am and 10 Units in the pm  4/10 increased detemir to 12 units b.i.d.    Hypomagnesemia  · Continue Mag-Ox  · 4/10 Initiated 1 time dose magnesium 800 mg p.o.    Malignant carcinoid tumor of unknown primary site  -s/p exp lap 1/25  -3/15 Patient saw Dr Christine, neuroendocrine surgeon for post op appt and follow up on cancer treatment. MD stated that cancer tx is not appropriate at this time due to nutritional status and overall condition. He will follow up with her once she is back at her baseline as an outpatient.    Surgical wound dehiscence  - wound Care nurse following  -3/4 wound vac removed   - 0.5 Dilaudid q.6 hours p.r.n. for pain  - MWF- clean upper and lower abdominal wounds with wound cleanser, pack with Aquacel Ag packing strip and cover with foam dressing  - 4/2 WC saw pt yesterday:Proximal abd wound is pink with scan drain. No s&s pf infection. Distal abd wound is almost completely closed up. Cleaned both wounds with wound cleanser. Applied silver alginate and covered with foam.    Migraine headache  -continue amitriptyline to 75 mg p.o. q.h.s. for uncontrolled migraines  -continue naproxen 500 mg p.o. 2 times daily p.r.n. migraine headache     Nausea and vomiting   -Stable with intermittent nausea that responds well to antiemetics  -continue scheduled Phenergan 6.25 q.8 hours, scopolamine patch every 3 days, Zofran 4 mg q.8 hours, Reglan 10 mg t.i.d.     Diarrhea  -3/24 reporting watery stools and decreased output, KUB ordered to r/o ileus.    -3/25- no obstruction seen on KUB     Thrombocytosis  -Elevated platelets at Thrombocytosis which as been fluctuating daily    Hypomagnesemia  -monitor BMP  -continue Mag oxide to 800 b.i.d.     Hypokalemia  - monitor BMP     Dysphagia  -consult SLP   - 3/15 MBSS complete, diet advanced per recs  - 4/1 continue regular diet with thin liquids and tube feedings at nighttime     Protein calorie Malnutrition  hypoalbuminemia  -consult dietitian  -reportedly can not  tolerate tube feeds at rate >30 mL/h or Glucerna   -prealbumin level Q mondays  - nocturnal feeds from 6:00 p.m. to 6:00 a.m. with Peptamen with rate of 60 mils per hour - current tube feeding on back order, will initiate order for vital 1.5 for now     Chronic low back pain  -lidocaine patch daily  -fentanyl patch (increased 3/2)  -Dilaudid PRN for breakthrough  -amitriptyline 75 mg qhs (increased on 04/01 for uncontrolled migraine headache), duloxetine 60 mg daily, pregabalin 50 mg BID   -consider weaning opioid for discharge unless we can find a prescriber willing to refill or she has referral to pain management clinic     Essential hypertension  -continue toprol XL 24 hours 100 mg BID      Bipolar affective disorder  -continue Cymbalta, amitriptyline QHS( increased dose on 4/1 due to migraine unrelieved by other medications), and lamotrigine 150 mg BID    F/U PCP, surgery (Dr. Christine), Neuroendocrine physician Dr. Prieto and Daisha, Follow up with oncology  Follow up with pain management      Diana Grady, LAUREANO

## 2019-04-12 LAB
BASOPHILS # BLD AUTO: 0.03 K/UL (ref 0–0.2)
BASOPHILS NFR BLD: 0.6 % (ref 0–1.9)
CRP SERPL-MCNC: 34.1 MG/L (ref 0–8.2)
DIFFERENTIAL METHOD: ABNORMAL
EOSINOPHIL # BLD AUTO: 0.1 K/UL (ref 0–0.5)
EOSINOPHIL NFR BLD: 2.9 % (ref 0–8)
ERYTHROCYTE [DISTWIDTH] IN BLOOD BY AUTOMATED COUNT: 14.7 % (ref 11.5–14.5)
ERYTHROCYTE [SEDIMENTATION RATE] IN BLOOD BY WESTERGREN METHOD: 76 MM/HR (ref 0–36)
HCT VFR BLD AUTO: 33.6 % (ref 37–48.5)
HGB BLD-MCNC: 10 G/DL (ref 12–16)
IMM GRANULOCYTES # BLD AUTO: 0.02 K/UL (ref 0–0.04)
IMM GRANULOCYTES NFR BLD AUTO: 0.4 % (ref 0–0.5)
LYMPHOCYTES # BLD AUTO: 1.7 K/UL (ref 1–4.8)
LYMPHOCYTES NFR BLD: 34.7 % (ref 18–48)
MCH RBC QN AUTO: 26.1 PG (ref 27–31)
MCHC RBC AUTO-ENTMCNC: 29.8 G/DL (ref 32–36)
MCV RBC AUTO: 88 FL (ref 82–98)
MONOCYTES # BLD AUTO: 0.5 K/UL (ref 0.3–1)
MONOCYTES NFR BLD: 11.2 % (ref 4–15)
NEUTROPHILS # BLD AUTO: 2.4 K/UL (ref 1.8–7.7)
NEUTROPHILS NFR BLD: 50.2 % (ref 38–73)
NRBC BLD-RTO: 0 /100 WBC
PLATELET # BLD AUTO: 393 K/UL (ref 150–350)
PMV BLD AUTO: 9.9 FL (ref 9.2–12.9)
POCT GLUCOSE: 133 MG/DL (ref 70–110)
POCT GLUCOSE: 246 MG/DL (ref 70–110)
POCT GLUCOSE: 265 MG/DL (ref 70–110)
POCT GLUCOSE: 46 MG/DL (ref 70–110)
POCT GLUCOSE: 48 MG/DL (ref 70–110)
POCT GLUCOSE: 51 MG/DL (ref 70–110)
POCT GLUCOSE: 94 MG/DL (ref 70–110)
RBC # BLD AUTO: 3.83 M/UL (ref 4–5.4)
WBC # BLD AUTO: 4.81 K/UL (ref 3.9–12.7)

## 2019-04-12 PROCEDURE — 97530 THERAPEUTIC ACTIVITIES: CPT

## 2019-04-12 PROCEDURE — 25000003 PHARM REV CODE 250: Performed by: NURSE PRACTITIONER

## 2019-04-12 PROCEDURE — 85652 RBC SED RATE AUTOMATED: CPT

## 2019-04-12 PROCEDURE — 86140 C-REACTIVE PROTEIN: CPT

## 2019-04-12 PROCEDURE — 97116 GAIT TRAINING THERAPY: CPT

## 2019-04-12 PROCEDURE — 36415 COLL VENOUS BLD VENIPUNCTURE: CPT

## 2019-04-12 PROCEDURE — 85025 COMPLETE CBC W/AUTO DIFF WBC: CPT

## 2019-04-12 PROCEDURE — 97110 THERAPEUTIC EXERCISES: CPT

## 2019-04-12 PROCEDURE — 11000004 HC SNF PRIVATE

## 2019-04-12 PROCEDURE — 25000003 PHARM REV CODE 250: Performed by: PHYSICIAN ASSISTANT

## 2019-04-12 PROCEDURE — 63600175 PHARM REV CODE 636 W HCPCS: Performed by: NURSE PRACTITIONER

## 2019-04-12 PROCEDURE — 97535 SELF CARE MNGMENT TRAINING: CPT

## 2019-04-12 RX ORDER — DICLOFENAC SODIUM 10 MG/G
GEL TOPICAL DAILY
Status: DISCONTINUED | OUTPATIENT
Start: 2019-04-12 | End: 2019-04-15 | Stop reason: HOSPADM

## 2019-04-12 RX ADMIN — NAPROXEN 500 MG: 500 TABLET ORAL at 03:04

## 2019-04-12 RX ADMIN — DRONABINOL 2.5 MG: 2.5 CAPSULE ORAL at 10:04

## 2019-04-12 RX ADMIN — PREGABALIN 50 MG: 50 CAPSULE ORAL at 09:04

## 2019-04-12 RX ADMIN — PANTOPRAZOLE SODIUM 40 MG: 40 TABLET, DELAYED RELEASE ORAL at 09:04

## 2019-04-12 RX ADMIN — METOCLOPRAMIDE 10 MG: 10 TABLET ORAL at 11:04

## 2019-04-12 RX ADMIN — SCOPALAMINE 1 PATCH: 1 PATCH, EXTENDED RELEASE TRANSDERMAL at 10:04

## 2019-04-12 RX ADMIN — PROMETHAZINE HYDROCHLORIDE 12.5 MG: 12.5 TABLET ORAL at 10:04

## 2019-04-12 RX ADMIN — DULOXETINE 60 MG: 60 CAPSULE, DELAYED RELEASE ORAL at 09:04

## 2019-04-12 RX ADMIN — LAMOTRIGINE 150 MG: 150 TABLET ORAL at 10:04

## 2019-04-12 RX ADMIN — Medication 24 G: at 11:04

## 2019-04-12 RX ADMIN — LAMOTRIGINE 150 MG: 150 TABLET ORAL at 09:04

## 2019-04-12 RX ADMIN — FERROUS SULFATE TAB EC 325 MG (65 MG FE EQUIVALENT) 325 MG: 325 (65 FE) TABLET DELAYED RESPONSE at 09:04

## 2019-04-12 RX ADMIN — MIRTAZAPINE 15 MG: 15 TABLET, ORALLY DISINTEGRATING ORAL at 10:04

## 2019-04-12 RX ADMIN — MICONAZOLE NITRATE: 20 OINTMENT TOPICAL at 09:04

## 2019-04-12 RX ADMIN — LIDOCAINE 1 PATCH: 50 PATCH TOPICAL at 12:04

## 2019-04-12 RX ADMIN — HYDROXYZINE HYDROCHLORIDE 25 MG: 25 TABLET, FILM COATED ORAL at 09:04

## 2019-04-12 RX ADMIN — INSULIN ASPART 2 UNITS: 100 INJECTION, SOLUTION INTRAVENOUS; SUBCUTANEOUS at 01:04

## 2019-04-12 RX ADMIN — AMITRIPTYLINE HYDROCHLORIDE 50 MG: 25 TABLET, FILM COATED ORAL at 10:04

## 2019-04-12 RX ADMIN — Medication 800 MG: at 09:04

## 2019-04-12 RX ADMIN — DICLOFENAC: 10 GEL TOPICAL at 12:04

## 2019-04-12 RX ADMIN — PREGABALIN 50 MG: 50 CAPSULE ORAL at 10:04

## 2019-04-12 RX ADMIN — ONDANSETRON 4 MG: 4 TABLET, ORALLY DISINTEGRATING ORAL at 09:04

## 2019-04-12 RX ADMIN — POTASSIUM & SODIUM PHOSPHATES POWDER PACK 280-160-250 MG 1 PACKET: 280-160-250 PACK at 10:04

## 2019-04-12 RX ADMIN — POTASSIUM & SODIUM PHOSPHATES POWDER PACK 280-160-250 MG 1 PACKET: 280-160-250 PACK at 12:04

## 2019-04-12 RX ADMIN — POTASSIUM & SODIUM PHOSPHATES POWDER PACK 280-160-250 MG 1 PACKET: 280-160-250 PACK at 04:04

## 2019-04-12 RX ADMIN — POTASSIUM & SODIUM PHOSPHATES POWDER PACK 280-160-250 MG 1 PACKET: 280-160-250 PACK at 08:04

## 2019-04-12 RX ADMIN — ENOXAPARIN SODIUM 40 MG: 100 INJECTION SUBCUTANEOUS at 09:04

## 2019-04-12 RX ADMIN — DRONABINOL 2.5 MG: 2.5 CAPSULE ORAL at 09:04

## 2019-04-12 RX ADMIN — TIZANIDINE 4 MG: 2 TABLET ORAL at 05:04

## 2019-04-12 RX ADMIN — Medication 800 MG: at 10:04

## 2019-04-12 RX ADMIN — PROMETHAZINE HYDROCHLORIDE 12.5 MG: 12.5 TABLET ORAL at 06:04

## 2019-04-12 RX ADMIN — HYDROXYZINE HYDROCHLORIDE 25 MG: 25 TABLET, FILM COATED ORAL at 03:04

## 2019-04-12 RX ADMIN — METOCLOPRAMIDE 10 MG: 10 TABLET ORAL at 04:04

## 2019-04-12 RX ADMIN — METOPROLOL SUCCINATE 100 MG: 50 TABLET, EXTENDED RELEASE ORAL at 10:04

## 2019-04-12 RX ADMIN — METOCLOPRAMIDE 10 MG: 10 TABLET ORAL at 06:04

## 2019-04-12 RX ADMIN — INSULIN ASPART 1 UNITS: 100 INJECTION, SOLUTION INTRAVENOUS; SUBCUTANEOUS at 10:04

## 2019-04-12 RX ADMIN — HYDROXYZINE HYDROCHLORIDE 25 MG: 25 TABLET, FILM COATED ORAL at 10:04

## 2019-04-12 RX ADMIN — NAPROXEN 500 MG: 500 TABLET ORAL at 10:04

## 2019-04-12 NOTE — NURSING
NP notified of patient's blood sugar of 46. NP orders followed to recheck blood sugar with POCT device and not

## 2019-04-12 NOTE — PROGRESS NOTES
[facility]                                  Skilled Nursing Facility                   Progress Note     Admit Date: 4/4/2019  DODIE 4/18/2019  Principal Problem:  Malignant carcinoid tumor of unknown primary site   HPI obtained from patient interview and chart review     Chief Complaint:  Re- evaluation of  swollen left knee    HPI:   Patient's left knee appears swollen.  She has a history of knee arthroplasty to that knee.  Knee is warm to the touch.  Patient reports tenderness to palpation.  Patient states that her knee became acutely swollen after her physical therapy session.  Spoke with patient's sister who stated that patient has a history of a septic left knee that had required emergency surgery.  Patient has remained afebrile and without leukocytosis.  Her CRP was 34 and ESR was 76.  The knee appears slightly less swollen today.  Knee x-ray was reviewed and revealed modest suprapatellar and infrapatellar soft tissue swelling but no conventional radiographic evidence of recent fracture or focal lytic destructive processes observed.     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    Past Surgical History: Patient has a past surgical history that includes Appendectomy; Cholecystectomy; Gastric bypass; Total knee arthroplasty (Left); LAPAROTOMY, EXPLORATORY (N/A, 1/25/2019); Insertion of percutaneous endoscopic gastrostomy (PEG) feeding tube (02/21/2019); and Esophagogastroduodenoscopy w/ PEG (N/A, 2/21/2019).    Social History:  Patient reports that she has never smoked. She has never used smokeless tobacco. She reports that she does not drink alcohol or use drugs.    Family History: family history includes Cancer in her father and mother.    Allergies: Patient is allergic to calcium carbonate; codeine; contrast media; darvocet a500 [propoxyphene n-acetaminophen]; epinephrine; morphine; propoxyphene napsylate; sulfa (sulfonamide antibiotics); erythromycin; erythromycin base; iodinated contrast- oral and iv dye; and pcn [penicillins].    ROS  Constitutional: Negative for fever. + malaise/fatigue.   Eyes: Negative for blurred vision, double vision and discharge.   Respiratory: Negative for cough, shortness of breath and wheezing.    Cardiovascular: Negative for chest pain, palpitations, claudication, leg swelling and PND.   Gastrointestinal: Neg for abdominal pain, constipation, diarrhea.  +nausea/ vomiting, chronic and intermittent  Genitourinary: Negative for dysuria, frequency and urgency.   Musculoskeletal:  + generalized weakness, left knee pain and swelling. Negative for back pain and myalgias.   Skin: Negative for itching and rash.   Neurological: Negative for dizziness, speech change, seizures, and headaches.   Psychiatric/Behavioral: Negative for depression. The patient is not nervous/anxious.      PEx  Temp:  [97.3 °F (36.3 °C)-98.2 °F (36.8 °C)]   Pulse:  [55-74]   Resp:  [18]   BP: (102-188)/(53-86)   SpO2:  [94 %-98 %]      Constitutional: Patient appears well-developed and well-nourished.   HENT:   Head: Normocephalic and atraumatic.   Eyes: Pupils are equal, round, and reactive to light.   Neck: Normal range of motion. Neck supple.   Cardiovascular: Normal rate, regular rhythm and normal heart sounds.    Pulmonary/Chest: Effort normal and breath sounds are clear  Abdominal: Soft. Bowel sounds are normal.   Musculoskeletal: Normal range of motion.  Left knee swollen and hot  Neurological: Alert and oriented to person, place, and  time.   Skin: Skin is warm and dry. abdominal wound x2 with dresses in place CDI.   Psychiatric: Normal mood and affect. Behavior is normal.       Assessment and Plan:    Swollen left knee  History of septic left knee s/p washout  - 4/11 initiated ice pack to knee in ordered a three view x-ray of left knee  - 4/12 xray revealed modest suprapatellar and infrapatellar soft tissue swelling but no conventional radiographic evidence of recent fracture or focal lytic destructive processes observed. Her CRP was 34 and ESR was 76.  Patient has remained afebrile and without leukocytosis.  - will order for another set of infectious labs on Monday 4/15      CONTINUE     Diabetes mellitus type 2  - 4/8 accu checks changed to AC/HS  - continue moderate SSI Aspart for now-may switch to low dose  - 4/5 decreased insulin detemir U-100 pen 10 Units in the am and 10 Units in the pm  4/10 increased detemir to 12 units b.i.d.    Hypomagnesemia  · Continue Mag-Ox  · 4/10 Initiated 1 time dose magnesium 800 mg p.o.    Malignant carcinoid tumor of unknown primary site  -s/p exp lap 1/25  -3/15 Patient saw Dr Christine, neuroendocrine surgeon for post op appt and follow up on cancer treatment. MD stated that cancer tx is not appropriate at this time due to nutritional status and overall condition. He will follow up with her once she is back at her baseline as an outpatient.    Surgical wound dehiscence  - wound Care nurse following  -3/4 wound vac removed   - 0.5 Dilaudid q.6 hours p.r.n. for pain  - MWF- clean upper and lower abdominal wounds with wound cleanser, pack with Aquacel Ag packing strip and cover with foam dressing  - 4/2 WC saw pt yesterday:Proximal abd wound is pink with scan drain. No s&s pf infection. Distal abd wound is almost completely closed up. Cleaned both wounds with wound cleanser. Applied silver alginate and covered with foam.    Migraine headache  -continue amitriptyline to 75 mg p.o. q.h.s. for uncontrolled  migraines  -continue naproxen 500 mg p.o. 2 times daily p.r.n. migraine headache     Nausea and vomiting   -Stable with intermittent nausea that responds well to antiemetics  -continue scheduled Phenergan 6.25 q.8 hours, scopolamine patch every 3 days, Zofran 4 mg q.8 hours, Reglan 10 mg t.i.d.     Diarrhea  -3/24 reporting watery stools and decreased output, KUB ordered to r/o ileus.    -3/25- no obstruction seen on KUB     Thrombocytosis  -Elevated platelets at Thrombocytosis which as been fluctuating daily    Hypomagnesemia  -monitor BMP  -continue Mag oxide to 800 b.i.d.     Hypokalemia  - monitor BMP     Dysphagia  -consult SLP   - 3/15 MBSS complete, diet advanced per recs  - 4/1 continue regular diet with thin liquids and tube feedings at nighttime     Protein calorie Malnutrition  hypoalbuminemia  -consult dietitian  -reportedly can not tolerate tube feeds at rate >30 mL/h or Glucerna   -prealbumin level Q mondays  - nocturnal feeds from 6:00 p.m. to 6:00 a.m. with Peptamen with rate of 60 mils per hour - current tube feeding on back order, will initiate order for vital 1.5 for now     Chronic low back pain  -lidocaine patch daily  -fentanyl patch (increased 3/2)  -Dilaudid PRN for breakthrough  -amitriptyline 75 mg qhs (increased on 04/01 for uncontrolled migraine headache), duloxetine 60 mg daily, pregabalin 50 mg BID   -consider weaning opioid for discharge unless we can find a prescriber willing to refill or she has referral to pain management clinic     Essential hypertension  -continue toprol XL 24 hours 100 mg BID      Bipolar affective disorder  -continue Cymbalta, amitriptyline QHS( increased dose on 4/1 due to migraine unrelieved by other medications), and lamotrigine 150 mg BID    F/U PCP, surgery (Dr. Christine), Neuroendocrine physician Dr. Prieto and Daisha, Follow up with oncology  Follow up with pain management      Diana Grady, LAUREANO

## 2019-04-12 NOTE — PT/OT/SLP PROGRESS
Physical Therapy  Treatment    Kaylee Ngo   MRN: [id number]   Admitting Diagnosis: Malignant carcinoid tumor of unknown primary site    PT Received On: 04/12/19          Billable Minutes:  Gait Training 8, Therapeutic Activity 15 and Therapeutic Exercise 22=45    Treatment Type: Treatment  PT/PTA: PT     PTA Visit Number: 0       General Precautions: Standard, aspiration, fall  Orthopedic Precautions: N/A   Braces: N/A    Spiritual, Cultural Beliefs, Mosque Practices, Values that Affect Care: no    Subjective:  Communicated with patient prior to session.  Patient agreeable to session.    Pain/Comfort  Pain Rating 1: 9/10  Location - Side 1: Left  Location - Orientation 1: generalized  Location 1: knee  Pain Addressed 1: Reposition, Distraction  Pain Rating Post-Intervention 1: 7/10    Objective:  Patient found seated in w/c in gym, acewrap on left knee with       AM-PAC 6 CLICK MOBILITY  Total Score:18    Bed Mobility:  Sit>Supine:on mat w/ SBA  Supine>Sit: on mat w/ SBA    Transfers:  Sit<>Stand: to/from w/c w/ RW and CGA x2 trials; to/from mat w/ RW and CGA  Stand Pivot Transfer: w/c>chair, walking ~5 feet w/ RW and CGA. Patient w/ decreased weight acceptance on LLE in gait, maintaining weight on BUEs      Gait:  Amb w/ RW and CGA approx 5 feet in room w/ patient using PWB technique due to soreness LLE     Advanced Gait:  Stairs: np  Curb Step: np    Wheelchair Mobility:  Patient propels w/c w/ BUEs and  feet     Therex:  Quad sets,   Glute sets,   Ankle  Pumps,   hip abduction/adduction,  heelslides,   SAQ,   LAQ     x20 reps w/ assist as needed, extra time and assistance as needed LLE      Additional Treatment:  Pedal mini elliptical x10 minutes w/ RLE only for strength and endurance    Patient left up in chair with call button in reach.    Assessment:  Kaylee Ngo is a 63 y.o. female with a medical diagnosis of Malignant carcinoid tumor of unknown primary site.  Patient had left knee xray today  d/t swelling and warmth and reports she was informed that no injury noted. Patient tolerated session fairly well, but needed extra time for activity and exercises. She is maintaining her left knee is approx 10-15* flexion. Patient will benefit from continued physical therapy to address deficits and improve safety and functional mobility. Continue with physical therapy plan of care. .    Rehab identified problem list/impairments: weakness, impaired endurance, impaired self care skills, impaired functional mobilty, gait instability, impaired balance, decreased upper extremity function, decreased lower extremity function    Rehab potential is good.    Activity tolerance: Good    Discharge recommendations: home health PT     Barriers to discharge: None    Equipment recommendations: wheelchair, manual, commode     GOALS:   Multidisciplinary Problems     Physical Therapy Goals        Problem: Physical Therapy Goal    Goal Priority Disciplines Outcome Goal Variances Interventions   Physical Therapy Goal     PT, PT/OT Ongoing (interventions implemented as appropriate)     Description:  Goals to be met by:      Patient will increase functional independence with mobility by performin. Supine to sit with Modified [place]. Not met  2. Sit to supine with Modified [place]. Not met  3. Sit to stand transfer with Modified [place]. Not met  4. Bed to chair transfer with Modified [place] using Rolling Walker/rollator. Not met  5. Gait  x 150 feet with Supervision using Rolling Walker/rollator.  Not met  6. Wheelchair propulsion x 150 feet with Supervision using bilateral upper extremities. Not met  7. Ascend/descend 4 stair with bilateral Handrails Stand-by Assistance using Rolling Walker.  Not met  8. Stand for 3 minutes with Stand-by Assistance using unilateral UE support and perform a dynamic activity.  met  9. Lower extremity exercise program x 20 reps per handout, with independence. Not met  10. Pt  will improve her gait speed from 0.61 m/s to 0.74 m/s with use of rolling walker/rollator to denote improvement in balance, functional mobility, and a lower fall risk.  Not met                       PLAN:    Patient to be seen 5 x/week  to address the above listed problems via gait training, therapeutic activities, therapeutic exercises, neuromuscular re-education, wheelchair management/training  Plan of Care expires: 05/05/19  Plan of Care reviewed with: patient    Helena Caicedo, PT  04/12/2019

## 2019-04-12 NOTE — PLAN OF CARE
Problem: Physical Therapy Goal  Goal: Physical Therapy Goal  Goals to be met by:      Patient will increase functional independence with mobility by performin. Supine to sit with Modified [place]. Not met  2. Sit to supine with Modified [place]. Not met  3. Sit to stand transfer with Modified [place]. Not met  4. Bed to chair transfer with Modified [place] using Rolling Walker/rollator. Not met  5. Gait  x 150 feet with Supervision using Rolling Walker/rollator.  Not met  6. Wheelchair propulsion x 150 feet with Supervision using bilateral upper extremities. Not met  7. Ascend/descend 4 stair with bilateral Handrails Stand-by Assistance using Rolling Walker.  Not met  8. Stand for 3 minutes with Stand-by Assistance using unilateral UE support and perform a dynamic activity.  met  9. Lower extremity exercise program x 20 reps per handout, with independence. Not met  10. Pt will improve her gait speed from 0.61 m/s to 0.74 m/s with use of rolling walker/rollator to denote improvement in balance, functional mobility, and a lower fall risk.  Not met      Goals remain appropriate. Continue with Physical therapy Plan of Care. Helena Caicedo, PT 2019

## 2019-04-12 NOTE — PLAN OF CARE
Problem: Occupational Therapy Goal  Goal: Occupational Therapy Goal  Goals to be met by: 4/16 /19     Patient will increase functional independence with ADLs by performing:    UE Dressing with Modified [place].- Met  LE Dressing with Modified [place].  Grooming while standing with Supervision.  Toileting from bedside commode with Supervision for hygiene and clothing management. -Met  Bathing from  edge of bed with Supervision. -Met  Rolling to Bilateral with Modified [place]. -Met  Supine to sit with Modified [place].  Stand pivot transfers with Modified [place].  Caregiver will be competent with assisting with self care and functional transfers.      Outcome: Ongoing (interventions implemented as appropriate)  Steven Soto, OTR/L      4/12/2019

## 2019-04-12 NOTE — PLAN OF CARE
Problem: Fall Injury Risk  Goal: Absence of Fall and Fall-Related Injury    Intervention: Promote Injury-Free Environment     04/12/19 1812   Optimize [place] and Functional Mobility   Environmental Safety Modification assistive device/personal items within reach;clutter free environment maintained;lighting adjusted;room organization consistent;mobility aid in reach   Optimize Balance and Safe Activity   Safety Promotion/Fall Prevention assistive device/personal item within reach;Fall Risk reviewed with patient/family;medications reviewed;nonskid shoes/socks when out of bed

## 2019-04-12 NOTE — PT/OT/SLP PROGRESS
Occupational Therapy  Treatment    Kaylee Ngo   MRN: [id number]   Admitting Diagnosis: Malignant carcinoid tumor of unknown primary site    OT Date of Treatment: 04/12/19       Billable Minutes:  Self Care/Home Management 10, Therapeutic Activity 20 and Therapeutic Exercise 15    General Precautions: Standard, aspiration, fall  Orthopedic Precautions: N/A  Braces: N/A    Spiritual, Cultural Beliefs, Taoist Practices, Values that Affect Care: no    Subjective:  Communicated with nurse prior to session.      Pain/Comfort  Pain Rating 1: 3/10  Location - Side 1: Left  Location - Orientation 1: generalized  Location 1: leg  Pain Addressed 1: Reposition, Distraction  Pain Rating Post-Intervention 1: 3/10    Objective:  Patient found with: PEG Tube    Occupational Performance:    Bed Mobility:    · Patient completed Rolling/Turning to Right with modified independence  · Patient completed Scooting/Bridging with supervision  · Patient completed Supine to Sit with supervision     Functional Mobility/Transfers:  · Patient completed Sit <> Stand Transfer with stand by assistance  with  rolling walker   · Patient completed Bed <> Chair Transfer using Stand Pivot technique with stand by assistance with rolling walker  · Functional Mobility: Pt propelled W/C in therapy gym using (B) UEs and LEs participating in [employer] Egg Hunt. Propelled         a distance of 77ft total with (S).     Activities of Daily Living:  · Lower Body Dressing: stand by assistance when performing LBD from W/C .    [facility] 6 Click:  AMPAC Total Score: 20    OT Exercises: UE Ergometer Performed 15 minutes on UBE with Mod resistance. UE exercises performed to increase functional endurance and strength.  Strengthening required in order increase independence when performing self care tasks, functional ambulation, W/C propulsion , functional standing activities as well as when performing functional tasks.    Patient left up in chair with all lines intact and  PT present and initiating Tx session.    ASSESSMENT:  Kaylee Ngo is a 63 y.o. female with a medical diagnosis of Malignant carcinoid tumor of unknown primary site . Pt was agreeable to OT and tolerated Tx without incidence.   She continues to present with deficits affecting (I)ce with functional transfers, functional standing balance and self care tasks with standing component. She is making progress but continues to require SBA and at times (A) to perform functional activities to completion.   OT continues to be recommended to further her functional (I)ce and safety. Goals remain appropriate and continued OT is recommended.        Rehab identified problem list/impairments: weakness, impaired endurance, impaired self care skills, impaired functional mobilty, gait instability, impaired balance, decreased lower extremity function, decreased upper extremity function, decreased safety awareness, impaired cardiopulmonary response to activity    Rehab potential is good    Activity tolerance: Good    Discharge recommendations: home with home health     Barriers to discharge: Decreased caregiver support     Equipment recommendations: (TBD)     GOALS:   Multidisciplinary Problems     Occupational Therapy Goals        Problem: Occupational Therapy Goal    Goal Priority Disciplines Outcome Interventions   Occupational Therapy Goal     OT, PT/OT Ongoing (interventions implemented as appropriate)    Description:  Goals to be met by: 4/16 /19     Patient will increase functional independence with ADLs by performing:    UE Dressing with Modified [place].- Met  LE Dressing with Modified [place].  Grooming while standing with Supervision.  Toileting from bedside commode with Supervision for hygiene and clothing management. -Met  Bathing from  edge of bed with Supervision. -Met  Rolling to Bilateral with Modified [place]. -Met  Supine to sit with Modified [place].  Stand pivot transfers with Modified  [place].  Caregiver will be competent with assisting with self care and functional transfers.                       Plan:  Patient to be seen 5 x/week to address the above listed problems via self-care/home management, therapeutic activities, therapeutic exercises  Plan of Care expires: 05/05/19  Plan of Care reviewed with: patient    Steven Soto OTR/FRANTZ  04/12/2019

## 2019-04-13 LAB
POCT GLUCOSE: 101 MG/DL (ref 70–110)
POCT GLUCOSE: 200 MG/DL (ref 70–110)
POCT GLUCOSE: 201 MG/DL (ref 70–110)
POCT GLUCOSE: 221 MG/DL (ref 70–110)
POCT GLUCOSE: 52 MG/DL (ref 70–110)
POCT GLUCOSE: 65 MG/DL (ref 70–110)
POCT GLUCOSE: 73 MG/DL (ref 70–110)
POCT GLUCOSE: 88 MG/DL (ref 70–110)
POCT GLUCOSE: 90 MG/DL (ref 70–110)

## 2019-04-13 PROCEDURE — 63600175 PHARM REV CODE 636 W HCPCS: Performed by: NURSE PRACTITIONER

## 2019-04-13 PROCEDURE — 25000003 PHARM REV CODE 250: Performed by: NURSE PRACTITIONER

## 2019-04-13 PROCEDURE — 11000004 HC SNF PRIVATE

## 2019-04-13 RX ADMIN — LAMOTRIGINE 150 MG: 150 TABLET ORAL at 09:04

## 2019-04-13 RX ADMIN — METOCLOPRAMIDE 10 MG: 10 TABLET ORAL at 06:04

## 2019-04-13 RX ADMIN — PREGABALIN 50 MG: 50 CAPSULE ORAL at 10:04

## 2019-04-13 RX ADMIN — Medication 16 G: at 10:04

## 2019-04-13 RX ADMIN — Medication 800 MG: at 09:04

## 2019-04-13 RX ADMIN — LIDOCAINE 1 PATCH: 50 PATCH TOPICAL at 01:04

## 2019-04-13 RX ADMIN — MICONAZOLE NITRATE: 20 OINTMENT TOPICAL at 01:04

## 2019-04-13 RX ADMIN — INSULIN ASPART 2 UNITS: 100 INJECTION, SOLUTION INTRAVENOUS; SUBCUTANEOUS at 09:04

## 2019-04-13 RX ADMIN — PROMETHAZINE HYDROCHLORIDE 12.5 MG: 12.5 TABLET ORAL at 06:04

## 2019-04-13 RX ADMIN — FERROUS SULFATE TAB EC 325 MG (65 MG FE EQUIVALENT) 325 MG: 325 (65 FE) TABLET DELAYED RESPONSE at 09:04

## 2019-04-13 RX ADMIN — POTASSIUM & SODIUM PHOSPHATES POWDER PACK 280-160-250 MG 1 PACKET: 280-160-250 PACK at 11:04

## 2019-04-13 RX ADMIN — HYDROXYZINE HYDROCHLORIDE 25 MG: 25 TABLET, FILM COATED ORAL at 10:04

## 2019-04-13 RX ADMIN — METOPROLOL SUCCINATE 100 MG: 50 TABLET, EXTENDED RELEASE ORAL at 09:04

## 2019-04-13 RX ADMIN — METOPROLOL SUCCINATE 100 MG: 50 TABLET, EXTENDED RELEASE ORAL at 10:04

## 2019-04-13 RX ADMIN — NAPROXEN 500 MG: 500 TABLET ORAL at 10:04

## 2019-04-13 RX ADMIN — POTASSIUM & SODIUM PHOSPHATES POWDER PACK 280-160-250 MG 1 PACKET: 280-160-250 PACK at 05:04

## 2019-04-13 RX ADMIN — DULOXETINE 60 MG: 60 CAPSULE, DELAYED RELEASE ORAL at 09:04

## 2019-04-13 RX ADMIN — NAPROXEN 500 MG: 500 TABLET ORAL at 02:04

## 2019-04-13 RX ADMIN — DICLOFENAC: 10 GEL TOPICAL at 09:04

## 2019-04-13 RX ADMIN — LAMOTRIGINE 150 MG: 150 TABLET ORAL at 10:04

## 2019-04-13 RX ADMIN — PREGABALIN 50 MG: 50 CAPSULE ORAL at 09:04

## 2019-04-13 RX ADMIN — MIRTAZAPINE 15 MG: 15 TABLET, ORALLY DISINTEGRATING ORAL at 10:04

## 2019-04-13 RX ADMIN — METOCLOPRAMIDE 10 MG: 10 TABLET ORAL at 05:04

## 2019-04-13 RX ADMIN — METOCLOPRAMIDE 10 MG: 10 TABLET ORAL at 11:04

## 2019-04-13 RX ADMIN — AMITRIPTYLINE HYDROCHLORIDE 50 MG: 25 TABLET, FILM COATED ORAL at 10:04

## 2019-04-13 RX ADMIN — DRONABINOL 2.5 MG: 2.5 CAPSULE ORAL at 09:04

## 2019-04-13 RX ADMIN — Medication 800 MG: at 10:04

## 2019-04-13 RX ADMIN — POTASSIUM & SODIUM PHOSPHATES POWDER PACK 280-160-250 MG 1 PACKET: 280-160-250 PACK at 09:04

## 2019-04-13 RX ADMIN — TIZANIDINE 4 MG: 2 TABLET ORAL at 10:04

## 2019-04-13 RX ADMIN — PANTOPRAZOLE SODIUM 40 MG: 40 TABLET, DELAYED RELEASE ORAL at 09:04

## 2019-04-13 RX ADMIN — POTASSIUM & SODIUM PHOSPHATES POWDER PACK 280-160-250 MG 1 PACKET: 280-160-250 PACK at 10:04

## 2019-04-13 RX ADMIN — ACETAMINOPHEN 650 MG: 650 SOLUTION ORAL at 06:04

## 2019-04-13 RX ADMIN — ENOXAPARIN SODIUM 40 MG: 100 INJECTION SUBCUTANEOUS at 09:04

## 2019-04-13 RX ADMIN — HYDROXYZINE HYDROCHLORIDE 25 MG: 25 TABLET, FILM COATED ORAL at 03:04

## 2019-04-13 RX ADMIN — HYDROXYZINE HYDROCHLORIDE 25 MG: 25 TABLET, FILM COATED ORAL at 09:04

## 2019-04-13 RX ADMIN — DRONABINOL 2.5 MG: 2.5 CAPSULE ORAL at 10:04

## 2019-04-13 RX ADMIN — PROMETHAZINE HYDROCHLORIDE 12.5 MG: 12.5 TABLET ORAL at 09:04

## 2019-04-13 RX ADMIN — MICONAZOLE NITRATE: 20 OINTMENT TOPICAL at 09:04

## 2019-04-14 ENCOUNTER — HOSPITAL ENCOUNTER (INPATIENT)
Facility: HOSPITAL | Age: 64
LOS: 2 days | Discharge: HOME HEALTH | DRG: 391 | End: 2019-04-16
Attending: SURGERY | Admitting: SURGERY
Payer: MEDICARE

## 2019-04-14 ENCOUNTER — HOSPITAL ENCOUNTER (EMERGENCY)
Facility: HOSPITAL | Age: 64
Discharge: ANOTHER HOSPITAL | End: 2019-04-14
Attending: EMERGENCY MEDICINE
Payer: MEDICARE

## 2019-04-14 VITALS
SYSTOLIC BLOOD PRESSURE: 188 MMHG | TEMPERATURE: 97 F | DIASTOLIC BLOOD PRESSURE: 88 MMHG | RESPIRATION RATE: 18 BRPM | HEART RATE: 66 BPM | OXYGEN SATURATION: 94 % | HEIGHT: 66 IN | BODY MASS INDEX: 20.09 KG/M2 | WEIGHT: 125 LBS

## 2019-04-14 VITALS
DIASTOLIC BLOOD PRESSURE: 98 MMHG | SYSTOLIC BLOOD PRESSURE: 186 MMHG | HEIGHT: 65 IN | WEIGHT: 125 LBS | RESPIRATION RATE: 18 BRPM | BODY MASS INDEX: 20.83 KG/M2 | OXYGEN SATURATION: 97 % | TEMPERATURE: 97 F | HEART RATE: 86 BPM

## 2019-04-14 DIAGNOSIS — R14.0 ABDOMINAL DISTENSION, GASEOUS: ICD-10-CM

## 2019-04-14 DIAGNOSIS — K56.609 SMALL BOWEL OBSTRUCTION: Primary | ICD-10-CM

## 2019-04-14 DIAGNOSIS — R11.0 NAUSEA: ICD-10-CM

## 2019-04-14 DIAGNOSIS — R11.2 NON-INTRACTABLE VOMITING WITH NAUSEA, UNSPECIFIED VOMITING TYPE: ICD-10-CM

## 2019-04-14 DIAGNOSIS — K56.609 SMALL BOWEL OBSTRUCTION: ICD-10-CM

## 2019-04-14 DIAGNOSIS — C7B.8 SECONDARY NEUROENDOCRINE TUMOR OF DISTANT LYMPH NODES: ICD-10-CM

## 2019-04-14 DIAGNOSIS — C7A.00 MALIGNANT CARCINOID TUMOR OF UNKNOWN PRIMARY SITE: ICD-10-CM

## 2019-04-14 DIAGNOSIS — C7A.098 MALIGNANT CARCINOID TUMOR OF PANCREAS: Primary | ICD-10-CM

## 2019-04-14 LAB
ALBUMIN SERPL BCP-MCNC: 3 G/DL (ref 3.5–5.2)
ALP SERPL-CCNC: 257 U/L (ref 55–135)
ALT SERPL W/O P-5'-P-CCNC: 32 U/L (ref 10–44)
ANION GAP SERPL CALC-SCNC: 10 MMOL/L (ref 8–16)
AST SERPL-CCNC: 35 U/L (ref 10–40)
BILIRUB SERPL-MCNC: 0.2 MG/DL (ref 0.1–1)
BUN SERPL-MCNC: 11 MG/DL (ref 8–23)
CALCIUM SERPL-MCNC: 9.6 MG/DL (ref 8.7–10.5)
CHLORIDE SERPL-SCNC: 98 MMOL/L (ref 95–110)
CO2 SERPL-SCNC: 33 MMOL/L (ref 23–29)
CREAT SERPL-MCNC: 0.7 MG/DL (ref 0.5–1.4)
EST. GFR  (AFRICAN AMERICAN): >60 ML/MIN/1.73 M^2
EST. GFR  (NON AFRICAN AMERICAN): >60 ML/MIN/1.73 M^2
GLUCOSE SERPL-MCNC: 174 MG/DL (ref 70–110)
LACTATE SERPL-SCNC: 0.7 MMOL/L (ref 0.5–2.2)
POCT GLUCOSE: 135 MG/DL (ref 70–110)
POTASSIUM SERPL-SCNC: 4.1 MMOL/L (ref 3.5–5.1)
PROT SERPL-MCNC: 7.3 G/DL (ref 6–8.4)
SODIUM SERPL-SCNC: 141 MMOL/L (ref 136–145)

## 2019-04-14 PROCEDURE — 99284 EMERGENCY DEPT VISIT MOD MDM: CPT | Mod: 25

## 2019-04-14 PROCEDURE — 99285 EMERGENCY DEPT VISIT HI MDM: CPT | Mod: 27

## 2019-04-14 PROCEDURE — 83605 ASSAY OF LACTIC ACID: CPT

## 2019-04-14 PROCEDURE — 80053 COMPREHEN METABOLIC PANEL: CPT

## 2019-04-14 PROCEDURE — 99285 EMERGENCY DEPT VISIT HI MDM: CPT | Mod: ,,, | Performed by: EMERGENCY MEDICINE

## 2019-04-14 PROCEDURE — 85025 COMPLETE CBC W/AUTO DIFF WBC: CPT

## 2019-04-14 PROCEDURE — 11000001 HC ACUTE MED/SURG PRIVATE ROOM

## 2019-04-14 PROCEDURE — 99285 PR EMERGENCY DEPT VISIT,LEVEL V: ICD-10-PCS | Mod: ,,, | Performed by: EMERGENCY MEDICINE

## 2019-04-14 PROCEDURE — 18500000 HC LEAVE OF ABSENCE HOSPITAL SERVICES

## 2019-04-14 PROCEDURE — 82962 GLUCOSE BLOOD TEST: CPT

## 2019-04-14 RX ORDER — HYDROMORPHONE HYDROCHLORIDE 1 MG/ML
INJECTION, SOLUTION INTRAMUSCULAR; INTRAVENOUS; SUBCUTANEOUS
Status: DISPENSED
Start: 2019-04-14 | End: 2019-04-14

## 2019-04-14 RX ORDER — BUTALBITAL, ACETAMINOPHEN AND CAFFEINE 50; 325; 40 MG/1; MG/1; MG/1
TABLET ORAL
Status: DISPENSED
Start: 2019-04-14 | End: 2019-04-15

## 2019-04-14 RX ORDER — METOCLOPRAMIDE HYDROCHLORIDE 5 MG/ML
INJECTION INTRAMUSCULAR; INTRAVENOUS
Status: DISPENSED
Start: 2019-04-14 | End: 2019-04-14

## 2019-04-14 RX ADMIN — SODIUM CHLORIDE: 0.9 INJECTION, SOLUTION INTRAVENOUS at 07:04

## 2019-04-14 NOTE — NURSING
Notified on call provider that patient has a change in mental status and unstable blood glucose. Patient lethargic vital 188/88 66 18 97.4 Sats 94%. New orders given to send patient to ED for evaluation. Report called to ED charge nurse

## 2019-04-15 PROBLEM — K56.609 SMALL BOWEL OBSTRUCTION: Status: ACTIVE | Noted: 2019-04-15

## 2019-04-15 LAB
ANION GAP SERPL CALC-SCNC: 12 MMOL/L (ref 8–16)
BASOPHILS # BLD AUTO: 0.02 K/UL (ref 0–0.2)
BASOPHILS # BLD AUTO: 0.03 K/UL (ref 0–0.2)
BASOPHILS NFR BLD: 0.4 % (ref 0–1.9)
BASOPHILS NFR BLD: 0.5 % (ref 0–1.9)
BUN SERPL-MCNC: 10 MG/DL (ref 8–23)
CALCIUM SERPL-MCNC: 9 MG/DL (ref 8.7–10.5)
CHLORIDE SERPL-SCNC: 102 MMOL/L (ref 95–110)
CO2 SERPL-SCNC: 26 MMOL/L (ref 23–29)
CREAT SERPL-MCNC: 0.6 MG/DL (ref 0.5–1.4)
DIFFERENTIAL METHOD: ABNORMAL
DIFFERENTIAL METHOD: ABNORMAL
EOSINOPHIL # BLD AUTO: 0.1 K/UL (ref 0–0.5)
EOSINOPHIL # BLD AUTO: 0.1 K/UL (ref 0–0.5)
EOSINOPHIL NFR BLD: 1.8 % (ref 0–8)
EOSINOPHIL NFR BLD: 2.1 % (ref 0–8)
ERYTHROCYTE [DISTWIDTH] IN BLOOD BY AUTOMATED COUNT: 14.9 % (ref 11.5–14.5)
ERYTHROCYTE [DISTWIDTH] IN BLOOD BY AUTOMATED COUNT: 14.9 % (ref 11.5–14.5)
EST. GFR  (AFRICAN AMERICAN): >60 ML/MIN/1.73 M^2
EST. GFR  (NON AFRICAN AMERICAN): >60 ML/MIN/1.73 M^2
GLUCOSE SERPL-MCNC: 208 MG/DL (ref 70–110)
HCT VFR BLD AUTO: 36.7 % (ref 37–48.5)
HCT VFR BLD AUTO: 45.9 % (ref 37–48.5)
HGB BLD-MCNC: 10.9 G/DL (ref 12–16)
HGB BLD-MCNC: 13 G/DL (ref 12–16)
IMM GRANULOCYTES # BLD AUTO: 0.02 K/UL (ref 0–0.04)
IMM GRANULOCYTES NFR BLD AUTO: 0.3 % (ref 0–0.5)
LYMPHOCYTES # BLD AUTO: 1.5 K/UL (ref 1–4.8)
LYMPHOCYTES # BLD AUTO: 1.8 K/UL (ref 1–4.8)
LYMPHOCYTES NFR BLD: 23.3 % (ref 18–48)
LYMPHOCYTES NFR BLD: 37 % (ref 18–48)
MCH RBC QN AUTO: 26.1 PG (ref 27–31)
MCH RBC QN AUTO: 26.4 PG (ref 27–31)
MCHC RBC AUTO-ENTMCNC: 28.3 G/DL (ref 32–36)
MCHC RBC AUTO-ENTMCNC: 29.7 G/DL (ref 32–36)
MCV RBC AUTO: 88 FL (ref 82–98)
MCV RBC AUTO: 93 FL (ref 82–98)
MONOCYTES # BLD AUTO: 0.3 K/UL (ref 0.3–1)
MONOCYTES # BLD AUTO: 0.4 K/UL (ref 0.3–1)
MONOCYTES NFR BLD: 5.3 % (ref 4–15)
MONOCYTES NFR BLD: 5.9 % (ref 4–15)
NEUTROPHILS # BLD AUTO: 2.7 K/UL (ref 1.8–7.7)
NEUTROPHILS # BLD AUTO: 4.5 K/UL (ref 1.8–7.7)
NEUTROPHILS NFR BLD: 54.7 % (ref 38–73)
NEUTROPHILS NFR BLD: 68.5 % (ref 38–73)
NRBC BLD-RTO: 0 /100 WBC
PLATELET # BLD AUTO: 463 K/UL (ref 150–350)
PLATELET # BLD AUTO: 491 K/UL (ref 150–350)
PMV BLD AUTO: 10.2 FL (ref 9.2–12.9)
PMV BLD AUTO: 9.5 FL (ref 9.2–12.9)
POCT GLUCOSE: 206 MG/DL (ref 70–110)
POTASSIUM SERPL-SCNC: 4.6 MMOL/L (ref 3.5–5.1)
RBC # BLD AUTO: 4.17 M/UL (ref 4–5.4)
RBC # BLD AUTO: 4.93 M/UL (ref 4–5.4)
SODIUM SERPL-SCNC: 140 MMOL/L (ref 136–145)
WBC # BLD AUTO: 4.95 K/UL (ref 3.9–12.7)
WBC # BLD AUTO: 6.57 K/UL (ref 3.9–12.7)

## 2019-04-15 PROCEDURE — 25000003 PHARM REV CODE 250: Performed by: STUDENT IN AN ORGANIZED HEALTH CARE EDUCATION/TRAINING PROGRAM

## 2019-04-15 PROCEDURE — 80048 BASIC METABOLIC PNL TOTAL CA: CPT

## 2019-04-15 PROCEDURE — 11000001 HC ACUTE MED/SURG PRIVATE ROOM

## 2019-04-15 PROCEDURE — 97530 THERAPEUTIC ACTIVITIES: CPT

## 2019-04-15 PROCEDURE — 85025 COMPLETE CBC W/AUTO DIFF WBC: CPT

## 2019-04-15 PROCEDURE — 36415 COLL VENOUS BLD VENIPUNCTURE: CPT

## 2019-04-15 PROCEDURE — 63600175 PHARM REV CODE 636 W HCPCS: Performed by: SURGERY

## 2019-04-15 PROCEDURE — 97165 OT EVAL LOW COMPLEX 30 MIN: CPT

## 2019-04-15 PROCEDURE — 97802 MEDICAL NUTRITION INDIV IN: CPT

## 2019-04-15 PROCEDURE — 25000003 PHARM REV CODE 250: Performed by: SURGERY

## 2019-04-15 PROCEDURE — 63600175 PHARM REV CODE 636 W HCPCS: Performed by: STUDENT IN AN ORGANIZED HEALTH CARE EDUCATION/TRAINING PROGRAM

## 2019-04-15 RX ORDER — LOSARTAN POTASSIUM 50 MG/1
100 TABLET ORAL DAILY
Status: DISCONTINUED | OUTPATIENT
Start: 2019-04-15 | End: 2019-04-16 | Stop reason: HOSPADM

## 2019-04-15 RX ORDER — METOPROLOL SUCCINATE 50 MG/1
100 TABLET, EXTENDED RELEASE ORAL DAILY
Status: DISCONTINUED | OUTPATIENT
Start: 2019-04-15 | End: 2019-04-16 | Stop reason: HOSPADM

## 2019-04-15 RX ORDER — METOCLOPRAMIDE HYDROCHLORIDE 5 MG/ML
10 INJECTION INTRAMUSCULAR; INTRAVENOUS EVERY 6 HOURS PRN
Status: DISCONTINUED | OUTPATIENT
Start: 2019-04-15 | End: 2019-04-16 | Stop reason: HOSPADM

## 2019-04-15 RX ORDER — BISACODYL 10 MG
10 SUPPOSITORY, RECTAL RECTAL DAILY PRN
Status: DISCONTINUED | OUTPATIENT
Start: 2019-04-15 | End: 2019-04-16 | Stop reason: HOSPADM

## 2019-04-15 RX ORDER — ENOXAPARIN SODIUM 100 MG/ML
40 INJECTION SUBCUTANEOUS EVERY 24 HOURS
Status: DISCONTINUED | OUTPATIENT
Start: 2019-04-15 | End: 2019-04-16 | Stop reason: HOSPADM

## 2019-04-15 RX ORDER — HYDROMORPHONE HYDROCHLORIDE 1 MG/ML
0.5 INJECTION, SOLUTION INTRAMUSCULAR; INTRAVENOUS; SUBCUTANEOUS EVERY 4 HOURS PRN
Status: DISCONTINUED | OUTPATIENT
Start: 2019-04-15 | End: 2019-04-16 | Stop reason: HOSPADM

## 2019-04-15 RX ORDER — ONDANSETRON 8 MG/1
8 TABLET, ORALLY DISINTEGRATING ORAL EVERY 8 HOURS PRN
Status: DISCONTINUED | OUTPATIENT
Start: 2019-04-15 | End: 2019-04-16 | Stop reason: HOSPADM

## 2019-04-15 RX ORDER — AMLODIPINE BESYLATE 5 MG/1
5 TABLET ORAL DAILY
Status: DISCONTINUED | OUTPATIENT
Start: 2019-04-15 | End: 2019-04-16 | Stop reason: HOSPADM

## 2019-04-15 RX ORDER — SODIUM CHLORIDE 9 MG/ML
INJECTION, SOLUTION INTRAVENOUS CONTINUOUS
Status: DISCONTINUED | OUTPATIENT
Start: 2019-04-15 | End: 2019-04-15

## 2019-04-15 RX ADMIN — METOPROLOL SUCCINATE 100 MG: 50 TABLET, EXTENDED RELEASE ORAL at 08:04

## 2019-04-15 RX ADMIN — METOCLOPRAMIDE 10 MG: 5 INJECTION, SOLUTION INTRAMUSCULAR; INTRAVENOUS at 03:04

## 2019-04-15 RX ADMIN — HYDROMORPHONE HYDROCHLORIDE 0.5 MG: 1 INJECTION, SOLUTION INTRAMUSCULAR; INTRAVENOUS; SUBCUTANEOUS at 03:04

## 2019-04-15 RX ADMIN — LOSARTAN POTASSIUM 100 MG: 50 TABLET, FILM COATED ORAL at 08:04

## 2019-04-15 RX ADMIN — METOCLOPRAMIDE 10 MG: 5 INJECTION, SOLUTION INTRAMUSCULAR; INTRAVENOUS at 10:04

## 2019-04-15 RX ADMIN — HYDROMORPHONE HYDROCHLORIDE 0.5 MG: 1 INJECTION, SOLUTION INTRAMUSCULAR; INTRAVENOUS; SUBCUTANEOUS at 11:04

## 2019-04-15 RX ADMIN — SODIUM CHLORIDE: 0.9 INJECTION, SOLUTION INTRAVENOUS at 05:04

## 2019-04-15 RX ADMIN — PROMETHAZINE HYDROCHLORIDE 12.5 MG: 25 INJECTION INTRAMUSCULAR; INTRAVENOUS at 02:04

## 2019-04-15 RX ADMIN — PROMETHAZINE HYDROCHLORIDE 12.5 MG: 25 INJECTION INTRAMUSCULAR; INTRAVENOUS at 10:04

## 2019-04-15 RX ADMIN — ONDANSETRON 8 MG: 8 TABLET, ORALLY DISINTEGRATING ORAL at 06:04

## 2019-04-15 RX ADMIN — HYDROMORPHONE HYDROCHLORIDE 0.5 MG: 1 INJECTION, SOLUTION INTRAMUSCULAR; INTRAVENOUS; SUBCUTANEOUS at 09:04

## 2019-04-15 RX ADMIN — Medication 10 MG: at 03:04

## 2019-04-15 RX ADMIN — ENOXAPARIN SODIUM 40 MG: 100 INJECTION SUBCUTANEOUS at 05:04

## 2019-04-15 RX ADMIN — AMLODIPINE BESYLATE 5 MG: 5 TABLET ORAL at 08:04

## 2019-04-15 NOTE — HOSPITAL COURSE
Patient prematurely discharged from the SNF after concerns for lethargy and no longer having bowel movements.  She was sent to [facility] ED for evaluation of suspected small-bowel obstruction.  During her stay at the SNF, patient's left knee became swollen after a physical therapy encounter on 4/11.  Patient's sister reported that patient has a history of a septic left knee that had required surgery.  Three-view x-ray of the left knee was ordered, and showed soft tissue swelling. Patient remained afebrile without leukocytosis.  CRP and ESR only slightly elevated.  Swelling had reduced in size by about half by 4/12 with the patient elevating leg and applying ice.

## 2019-04-15 NOTE — NURSING
Pt blood pressure elevated. States she has not taken any of her blood pressure medications all day. Dr. Dos Santos notified. Stated to resume home medications for blood pressure.

## 2019-04-15 NOTE — DISCHARGE SUMMARY
[facility]  Department of Hospital Medicine  Discharge Summary      Patient Name: Kaylee Ngo  MRN: [id number]  Admission Date: 4/4/2019  Hospital Length of Stay: 10 days  Discharge Date and Time:  04/14/2019   Attending Physician: Anneliese att. providers found   Discharging Provider: Diana Grady NP  Primary Care Provider: Robert Garcia MD    HPI:   63 yr o woman with metatastic carcinoid s/p Ex Lap on 1/25/19 with gastro-gastrostomy, pyloroplasty, gastrotomy closure, enteric resection with side to side anastomosis and restoration of small bowel continuity was performed (s/p reversal of Sandeep-en-Y). She was admitted to the  LTAC on 2/26 for  wound care/wound vac, nutrition, TPN and TFs, PT/OT/ST, O2 weaning, and IV antibiotics. The only issue in the LTAC was hypoglycemia attributed to a combination of her decreased appetite from N/V from metastatic ca, multiple extensive GI re-routing surgeries, and TF at night time, reducing her appetite during the day.     Patient ransferred to [facility] with PT and OT to improve functional status and ability to perform ADLs.         * No surgery found *      Hospital Course:   Patient prematurely discharged from the SNF after concerns for lethargy and no longer having bowel movements.  She was sent to [facility] ED for evaluation of suspected small-bowel obstruction.  During her stay at the SNF, patient's left knee became swollen after a physical therapy encounter on 4/11.  Patient's sister reported that patient has a history of a septic left knee that had required surgery.  Three-view x-ray of the left knee was ordered, and showed soft tissue swelling. Patient remained afebrile without leukocytosis.  CRP and ESR only slightly elevated.  Swelling had reduced in size by about half by 4/12 with the patient elevating leg and applying ice.      Consults (From admission, onward)        Status Ordering Provider     Inpatient consult to Registered  Dietitian/Nutritionist  Once     Provider:  (Not yet assigned)    Completed WESLEY SEVERINO     Inpatient consult to [facility]  Once     Provider:  (Not yet assigned)    Completed MANUEL ALSTON          Significant Diagnostic Studies: Labs:   BMP:   Recent Labs   Lab 04/14/19  0430 04/15/19  0605   * 208*    140   K 4.1 4.6   CL 98 102   CO2 33* 26   BUN 11 10   CREATININE 0.7 0.6   CALCIUM 9.6 9.0    and CBC   Recent Labs   Lab 04/14/19  0430 04/15/19  0605   WBC 6.57 4.95   HGB 13.0 10.9*   HCT 45.9 36.7*   * 463*       Pending Diagnostic Studies:     None        Final Active Diagnoses:    Diagnosis Date Noted POA    PRINCIPAL PROBLEM:  Malignant carcinoid tumor of unknown primary site [C7A.00] 12/13/2017 Yes    Preventative health care [Z00.00] 04/10/2019 Not Applicable    Malignant carcinoid tumor of pancreas [C7A.098] 04/04/2019 Yes    Debility [R53.81]  Yes    Open wound of abdomen [S31.109A] 02/27/2019 Yes    Severe malnutrition [E43] 02/27/2019 Yes     Chronic    Nausea [R11.0] 08/24/2018 Yes    Essential hypertension [I10] 03/26/2018 Yes    Malnutrition compromising bodily function [E46] 03/01/2018 Yes    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  Medications:  Reconciled Home Medications:      Medication List      ASK your doctor about these medications    ACCU-CHEK SHU CONTROL SOLN Soln  Generic drug:  blood glucose control high,low     ACCU-CHEK SHU PLUS METER Misc  Generic drug:  blood-glucose meter     ACCU-CHEK SHU PLUS TEST STRP Strp  Generic drug:  blood sugar diagnostic     acetaminophen 650 MG Supp  Commonly known as:  TYLENOL  Place 1 suppository (650 mg total) rectally  every 8 (eight) hours as needed (101).     albuterol-ipratropium 2.5 mg-0.5 mg/3 mL nebulizer solution  Commonly known as:  DUO-NEB  Take 3 mLs by nebulization every 4 (four) hours. Rescue     amitriptyline 50 MG tablet  Commonly known as:  ELAVIL  Take 50 mg by mouth every evening.     amLODIPine 5 MG tablet  Commonly known as:  NORVASC  Take 5 mg by mouth once daily.     artificial tears 0.5 % ophthalmic solution  Commonly known as:  ISOPTO TEARS  Place 1 drop into both eyes 4 (four) times daily as needed.     calcium carbonate 600 mg calcium (1,500 mg) Tab  Commonly known as:  OS-BEST  Take 600 mg by mouth once.     ciprofloxacin lactate in dextrose 5 % infusion  Inject 125 mLs (250 mg total) into the vein every 12 (twelve) hours.     clonazePAM 0.5 MG tablet  Commonly known as:  KLONOPIN  Take 0.5 mg by mouth 2 (two) times daily as needed for Anxiety.     cyanocobalamin 1,000 mcg/mL injection  1,000 mcg every 28 days.     dexamethasone 1.5 mg (27 tabs) Dspk  Take 1.5 mg by mouth once daily. Tapering dose as directed     diclofenac 50 MG tablet  Commonly known as:  CATAFLAM  as needed.     diphenhydrAMINE 50 mg/mL injection  Commonly known as:  BENADRYL  Inject 0.25 mLs (12.5 mg total) into the vein every 4 (four) hours as needed for Itching.     docusate 50 mg/5 mL liquid  Commonly known as:  COLACE  Take 10 mLs (100 mg total) by mouth daily as needed.     dronabinol 2.5 MG capsule  Commonly known as:  MARINOL  Take 1 capsule (2.5 mg total) by mouth 2 (two) times daily.     DULoxetine 60 MG capsule  Commonly known as:  CYMBALTA  Take 60 mg by mouth once daily.     escitalopram oxalate 5 mg/5 mL Soln  Commonly known as:  LEXAPRO  Take 10 mLs (10 mg total) by mouth once daily.     fentaNYL 25 mcg/hr  Commonly known as:  DURAGESIC  Place 1 patch onto the skin every 72 hours.     FLUARIX QUAD 1211-8505 (PF) 60 mcg (15 mcg x 4)/0.5 mL Syrg vaccine  Generic drug:  influenza     GLUCAGON EMERGENCY KIT (HUMAN) 1 mg  "Solr  Generic drug:  glucagon (human recombinant)     lamoTRIgine 150 MG Tab  Commonly known as:  LAMICTAL     lancets 30 gauge Misc     lancing device Misc     LOMOTIL 2.5-0.025 mg per tablet  Generic drug:  diphenoxylate-atropine 2.5-0.025 mg  Take 1 tablet by mouth 4 (four) times daily as needed for Diarrhea.     losartan 100 MG tablet  Commonly known as:  COZAAR  Take 1 tablet (100 mg total) by mouth once daily.     * metoclopramide HCl 5 mg/5 mL Soln  Commonly known as:  REGLAN  TAKE 10ML BY MOUTH THREE TIMES A DAY     * metoclopramide HCl 10 MG tablet  Commonly known as:  REGLAN  Take 1 tablet (10 mg total) by mouth 3 (three) times daily before meals.     metoprolol succinate 100 MG 24 hr tablet  Commonly known as:  TOPROL-XL  Take 100 mg by mouth 2 (two) times daily.     multivitamin capsule  Take 1 capsule by mouth once daily.     omeprazole 40 MG capsule  Commonly known as:  PRILOSEC  Take 40 mg by mouth 2 (two) times daily before meals.     ondansetron 4 mg/2 mL Soln  Inject 4 mg into the vein every 6 (six) hours as needed.     ondansetron 8 MG tablet  Commonly known as:  ZOFRAN  Take 8 mg by mouth every 8 (eight) hours as needed.     pantoprazole 40 mg injection  Commonly known as:  PROTONIX  Inject 40 mg into the vein once daily.     potassium chloride 20 mEq Pack  Commonly known as:  KLOR-CON  Take 20 mEq by mouth once.     pregabalin 50 MG capsule  Commonly known as:  LYRICA  1 capsule (50 mg total) by Per G Tube route 2 (two) times daily.     promethazine 25 MG tablet  Commonly known as:  PHENERGAN  Take 1 tablet (25 mg total) by mouth every 6 (six) hours as needed for Nausea.     scopolamine 1.3-1.5 mg (1 mg over 3 days)  Commonly known as:  TRANSDERM-SCOP  Place 1 patch onto the skin Every 3 (three) days.     SOMATULINE DEPOT 60 mg/0.2 mL Syrg  Generic drug:  lanreotide  inject 0.2 milliliter by subcutaneous route once a month     SURE COMFORT PEN NEEDLE 32 gauge x 5/32" Ndle  Generic drug:  pen " needle, diabetic     tiZANidine 4 MG tablet  Commonly known as:  ZANAFLEX  as needed.     TRESIBA FLEXTOUCH U-100 100 unit/mL (3 mL) Inpn  Generic drug:  insulin degludec  Inject 15 Units into the skin once daily.     VISTARIL 25 MG Cap  Generic drug:  hydrOXYzine pamoate  Take 25 mg by mouth 3 (three) times daily.     vitamin D 1000 units Tab  Commonly known as:  VITAMIN D3  Take 1,000 Units by mouth once daily.         * This list has 2 medication(s) that are the same as other medications prescribed for you. Read the directions carefully, and ask your doctor or other care provider to review them with you.              Time spent on the discharge of patient: 35 minutes    Diana Grady NP  Department of Hospital Medicine  [facility] [place] [facility]

## 2019-04-15 NOTE — PLAN OF CARE
VN informed by charge nurse that pt is feeling nauseous and does not want the prn zofran available and instead is requesting phenergan. VN messaged Dr. Hartman and ok to place orders for Phenergan 12.5 mg Q8hrs prn. Orders placed. Bedside nurse notified. Will cont to be available as needed.

## 2019-04-15 NOTE — PROGRESS NOTES
Physical Therapy Discharge Summary    Name: Kaylee Ngo  MRN: [id number]   Principal Problem: Malignant carcinoid tumor of unknown primary site     Patient Discharged from acute Physical Therapy on 19.  Please refer to prior PT noted date on 19 for functional status.     Assessment:     discharge to ED w/ change in status    Objective:     GOALS:   Multidisciplinary Problems     Physical Therapy Goals        Problem: Physical Therapy Goal    Goal Priority Disciplines Outcome Goal Variances Interventions   Physical Therapy Goal     PT, PT/OT Ongoing (interventions implemented as appropriate)     Description:  Goals to be met by:      Patient will increase functional independence with mobility by performin. Supine to sit with Modified [place]. Not met  2. Sit to supine with Modified [place]. Not met  3. Sit to stand transfer with Modified [place]. Not met  4. Bed to chair transfer with Modified [place] using Rolling Walker/rollator. Not met  5. Gait  x 150 feet with Supervision using Rolling Walker/rollator.  Not met  6. Wheelchair propulsion x 150 feet with Supervision using bilateral upper extremities. Not met  7. Ascend/descend 4 stair with bilateral Handrails Stand-by Assistance using Rolling Walker.  Not met  8. Stand for 3 minutes with Stand-by Assistance using unilateral UE support and perform a dynamic activity.  met  9. Lower extremity exercise program x 20 reps per handout, with independence. Not met  10. Pt will improve her gait speed from 0.61 m/s to 0.74 m/s with use of rolling walker/rollator to denote improvement in balance, functional mobility, and a lower fall risk.  Not met                       Reasons for Discontinuation of Therapy Services  Transfer to alternate level of care.      Plan:     Patient Discharged to: [facility] and [facility] w/ change in medical status.    Helena Caicedo, PT  4/15/2019

## 2019-04-15 NOTE — PLAN OF CARE
Recommendations    1. Continue current Regular diet.   2. Offer Boost Plus if intake <50%.  Goals: Pt to meet >/=75% of EEN/EPN  Nutrition Goal Status: new

## 2019-04-15 NOTE — PT/OT/SLP EVAL
Occupational Therapy   Evaluation/treatment    Name: Kaylee Ngo  MRN: [id number]  Admitting Diagnosis:  Malignant carcinoid tumor of pancreas   The primary encounter diagnosis was Malignant carcinoid tumor of pancreas. Diagnoses of Nausea and Small bowel obstruction were also pertinent to this visit.      Recommendations:     Discharge Recommendations: home with home health, home health OT  Discharge Equipment Recommendations:  wheelchair, manual  Barriers to discharge:  None    Assessment:     Kaylee Ngo is a 63 y.o. female with a medical diagnosis of Malignant carcinoid tumor of pancreas.  She presents with nausea and dry heaving; decline in ADLS.  Pt. was recently discharge from SNF. DME recommendations for home:  wc. Continue with OT POC.   . Performance deficits affecting function: weakness, impaired functional mobilty, impaired balance, impaired self care skills, impaired endurance, gait instability, decreased safety awareness.      Rehab Prognosis: Good; patient would benefit from acute skilled OT services to address these deficits and reach maximum level of function.       Plan:     Patient to be seen 5 x/week to address the above listed problems via self-care/home management, therapeutic exercises, therapeutic activities  · Plan of Care Expires: 05/15/19  · Plan of Care Reviewed with: patient, sibling    Subjective     Chief Complaint: abdominal pain at site of PEG tube bag drain  Patient/Family Comments/goals: to get back to her home.    Occupational Profile:  Living Environment: pt. Discharged from SNF recently.  Pt. Lives alone in double wide trailer with 4 steps with B hand rails (back entrance which she uses)only on first 3 steps; has front entrance with 7 steps with BHR but does not use. Pt. has walk n shower with shower stool (without a back). Pt keeps 3n1 frame over toilet.    Previous level of function: pt. been in hospital since Jan 2019; prior to that was [place] - indep with ADLS, IADLS   and ambulated with rollator; kept SW in bathroom to use in bathroom 2/2 rollator did not fit through bathroom door.  Roles and Routines: sedentary  Equipment Used at Home:  shower chair, rollator, cane, straight, walker, standard, 3-in-1 commode(no bucket for 3n1 keeps over toilet)  Assistance upon Discharge: sister to assist as needed    Pain/Comfort:  · Pain Rating 1: 5/10  · Location - Side 1: Bilateral  · Location - Orientation 1: generalized  · Location 1: abdomen(site of PEG tube with drain bag only when she moves)  · Pain Addressed 1: Pre-medicate for activity, Reposition, Distraction, Nurse notified  · Pain Rating Post-Intervention 1: (same)    Patients cultural, spiritual, Scientologist conflicts given the current situation: no    Objective:     Communicated with:  Nurse prior to session.  Patient found HOB elevated with bed alarm, PEG Tube(with drain bag) upon OT entry to room.    General Precautions: Standard, fall, NPO   Orthopedic Precautions:N/A   Braces: N/A     Occupational Performance:    Bed Mobility:    · Patient completed Scooting/Bridging with independence  · Patient completed Supine to Sit with independence  · Patient completed Sit to Supine with independence    Functional Mobility/Transfers:  · Patient completed Sit <> Stand Transfer with supervision  with  rolling walker   · Patient completed Toilet Transfer Step Transfer technique with supervision with  rolling walker  · Functional Mobility: ambulated with supervision using RW to bathroom , ink and back to bd 2/2 nausea and dry heaving.    Activities of Daily Living:  · Feeding:  NPO; has PEG tube buy drain bag attached .  · Grooming: supervision standing with RW at sink for hand hygiene only 2/2 nausea and dry heaving onset.  · Lower Body Dressing: modified independence donned/doffed socks seated EOB crossed legs  · Toileting: supervision with RW for safety    Cognitive/Visual Perceptual:  Cognitive/Psychosocial Skills:     -       Oriented  to: Person, Place, Time and Situation   -       Follows Commands/attention:Follows one-step commands  -       Communication: clear/fluent  -       Memory: Impaired STM  -       Safety awareness/insight to disability: impaired   -       Mood/Affect/Coping skills/emotional control: Appropriate to situation  Visual/Perceptual:      -Intact .    Physical Exam:  Balance:    -       sitting:  good  dynamic; good -   standing;  good-  dynamic;  fair plus  Postural examination/scapula alignment:    -       Rounded shoulders  Skin integrity: Visible skin intact  Edema:  Mild L knee with ace wrap bandage  Dominant hand:    -       right  Upper Extremity Range of Motion:     -       Right Upper Extremity: WFL  -       Left Upper Extremity: WFL  Upper Extremity Strength:    -       Right Upper Extremity: WFL  -       Left Upper Extremity: WFL except shld 3+/5    AMPAC 6 Click ADL:  AMPAC Total Score: 22    Treatment & Education:  Role of oT and PC  Fx ambulation with RW and safety ed. ambulating to bathroom, sink and back to bed; sister present for observation.  Education:    Patient left HOB elevated with all lines intact, call button in reach, nurse  notified and sister present    GOALS:   Multidisciplinary Problems     Occupational Therapy Goals        Problem: Occupational Therapy Goal    Goal Priority Disciplines Outcome Interventions   Occupational Therapy Goal     OT, PT/OT Ongoing (interventions implemented as appropriate)    Description:  Goals to be met by: 4/30/2019    Patient will increase functional independence with ADLs by performing:    Grooming while standing at sink with Modified [place].  Step transfer with Modified [place]  Toilet transfer to toilet with Modified [place].  Upper extremity exercise program x10 reps per handout, with independence.                      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    Neuroendocrine tumor 4/9/2018    Obsessive compulsive disorder     Sleep apnea        Past Surgical History:   Procedure Laterality Date    APPENDECTOMY      BIOPSY-LIVER  2/28/2018    [facility]    CHOLECYSTECTOMY      EGD, WITH PEG TUBE INSERTION N/A 2/21/2019    [facility] ENDO    ESOPHAGOGASTRODUODENOSCOPY (EGD) N/A 1/29/2018    [facility]    EXCISION, LIVER N/A 1/25/2019    [facility]    GASTRIC BYPASS      INSERTION OF PERCUTANEOUS ENDOSCOPIC GASTROSTOMY (PEG) FEEDING TUBE  02/21/2019    INSERTION-TUBE-GASTROSTOMY-LAPAROSCOPIC  with gastrogram N/A 2/28/2018    [facility]    LAPAROTOMY, EXPLORATORY N/A 1/25/2019    [facility]    LYMPHADENECTOMY N/A 1/25/2019    [facility]    TOTAL KNEE ARTHROPLASTY Left        Time Tracking:     OT Date of Treatment: 04/15/19  OT Start Time: 1131  OT Stop Time: 1159  OT Total Time (min): 28 min    Billable Minutes:Evaluation 15  Therapeutic Activity 13  Total Time 28    Brandy Ramos OT  4/15/2019

## 2019-04-15 NOTE — PROGRESS NOTES
.Pharmacy New Medication Education    Patient accepted medication education.    Pharmacy educated patient on name and purpose of medications and possible side effects, using the teach-back method.     Current Scheduled Medications   Hide   (From admission, onward)   Ordered   Start Stop   04/15/19 1351  enoxaparin injection 40 mg 40 mg, Subcutaneous, Daily      04/15/19 1700 --   04/15/19 0151  amLODIPine tablet 5 mg 5 mg, Oral, Daily      04/15/19 0900 --   04/15/19 0151  losartan tablet 100 mg 100 mg, Oral, Daily      04/15/19 0900 --   04/15/19 0151  metoprolol succinate (TOPROL-XL) 24 hr tablet 100 mg 100 mg, Oral, Daily      04/15/19 0900 --   Current PRN Medications   Hide   (From admission, onward)   Ordered   Start Stop   04/15/19 1454  bisacodyl suppository 10 mg 10 mg, Rectal, Daily PRN      04/15/19 1554 --   04/15/19 1350  promethazine (PHENERGAN) 12.5 mg in dextrose 5 % 50 mL IVPB 12.5 mg, Intravenous, 150 mL/hr, Every 8 hours PRN      04/15/19 1449 --   04/15/19 0629  ondansetron disintegrating tablet 8 mg 8 mg, Oral, Every 8 hours PRN      04/15/19 0726 --   04/15/19 0008  metoclopramide HCl injection 10 mg 10 mg, Intravenous, Every 6 hours PRN      04/15/19 0106 --   04/15/19 0008  HYDROmorphone injection 0.5 mg 0.5 mg, Intravenous, Every 4 hours PRN              Learners of pharmacy medication education included:  Patient    Patient +/- learner response:  Verbalized Understanding, Teachback    Tita Titus, MauriD, BCPS

## 2019-04-15 NOTE — PLAN OF CARE
Problem: Adult Inpatient Plan of Care  Goal: Plan of Care Review  Outcome: Ongoing (interventions implemented as appropriate)  Patient alert and oriented x 4. Patient safety maintained. IV fluids initiated. Patient nausea relieved with prn medications. Patient blood pressure elevated. Doctor notified. No complaints of pain throughout shift. Will continue to monitor.

## 2019-04-15 NOTE — PLAN OF CARE
Problem: Adult Inpatient Plan of Care  Goal: Plan of Care Review  Outcome: Ongoing (interventions implemented as appropriate)  Pt A+Ox4, ambulatory with standby assist, compliant with safety precautions. BP more controlled this shift, see flowsheets. Pain controlled with PRN medications. Pt continues to c/o nausea, medicated per MAR, diet not advanced per pt request r/t nausea. G tube clamped. Teds and lovenox for DVT prophylaxis. Will continue to monitor progress.

## 2019-04-15 NOTE — NURSING
Patient with complaints of nausea. Patient requesting phenergan. VN will call for orders. Will continue to monitor.

## 2019-04-15 NOTE — CONSULTS
"  [facility]  Adult Nutrition  Consult Note    SUMMARY     Recommendations    1. Continue current Regular diet.   2. Offer Boost Plus if intake <50%.  Goals: Pt to meet >/=75% of EEN/EPN  Nutrition Goal Status: new  Communication of RD Recs: (plan of care)    Reason for Assessment    Reason For Assessment: consult(g tube, NPO for bowl obstruction)  Diagnosis: (emesis)  Relevant Medical History: PEG (2/21/19), DM2, gastric ulcer, GERD, HTN, HLD, malnutrition, nausea, neuroendocrine tumor  General Information Comments: Diet upgraded to Regular today. Daughter reports pt still eating small amounts but tolerating meals well. NFPE completed-Moderate malnutrtition.  Nutrition Discharge Planning: too soon to determine    Nutrition Risk Screen    Nutrition Risk Screen: other (see comments)(NPO)    Nutrition/Diet History    Spiritual, Cultural Beliefs, Gnosticist Practices, Values that Affect Care: no  Food Allergies: NKFA    Anthropometrics    Temp: 98.4 °F (36.9 °C)  Height Method: Stated  Height: 5' 4.5" (163.8 cm)  Height (inches): 64.5 in  Weight Method: Bed Scale  Weight: 58 kg (127 lb 13.9 oz)  Weight (lb): 127.87 lb  Ideal Body Weight (IBW), Female: 122.5 lb  % Ideal Body Weight, Female (lb): 103.49 lb  BMI (Calculated): 21.5  BMI Grade: 18.5-24.9 - normal     Lab/Procedures/Meds    Pertinent Labs Reviewed: reviewed  Pertinent Labs Comments: Glu 208, Alb 3.0, POCT glu 135  Pertinent Medications Reviewed: reviewed  Pertinent Medications Comments: metoprolol    Estimated/Assessed Needs    Weight Used For Calorie Calculations: 58 kg (127 lb 13.9 oz)  Energy Calorie Requirements (kcal): 1416 kcal daily  Energy Need Method: (x1.25)  Protein Requirements: 70-87 gm daily  Weight Used For Protein Calculations: 58 kg (127 lb 13.9 oz)(1.2-1.5 gm daily)  RDA Method (mL): 1416  CHO Requirement: 177 gm daily    Nutrition Prescription Ordered    Current Diet Order: Regular     Evaluation of Received " Nutrient/Fluid Intake    Comments: LBM 4/12  % Intake of Estimated Energy Needs: 25 - 50 %  % Meal Intake: 25 - 50 %    Nutrition Risk    Level of Risk/Frequency of Follow-up: (f/u 1x/weekly)     Assessment and Plan    Severe malnutrition  Severe Malnutrition in the context of Chronic Illness/Injury    Related to (etiology):  cancer    Signs and Symptoms (as evidenced by):  Energy Intake: <75% of estimated energy requirement for > 3 months  Body Fat Depletion: moderate depletion of orbitals, triceps and thoracic and lumbar region   Muscle Mass Depletion: severe depletion of temples, clavicle region, scapular region, interosseous muscle and lower extremities   Weight Loss:12% x 3 months     Interventions:  Collaboration with other providers    Nutrition Diagnosis Status:  New    Monitor and Evaluation    Food and Nutrient Intake: energy intake  Food and Nutrient Adminstration: diet order  Knowledge/Beliefs/Attitudes: food and nutrition knowledge/skill  Physical Activity and Function: nutrition-related ADLs and IADLs  Anthropometric Measurements: weight, weight change  Biochemical Data, Medical Tests and Procedures: electrolyte and renal panel, lipid profile, gastrointestinal profile, glucose/endocrine profile, inflammatory profile  Nutrition-Focused Physical Findings: overall appearance     Malnutrition Assessment  Malnutrition Type: chronic illness  Energy Intake: moderate energy intake  Skin (Micronutrient): turgor reduced   Weight Loss (Malnutrition): greater than 7.5% in 3 months  Energy Intake (Malnutrition): less than or equal to 75% for greater than or equal to 1 month  Subcutaneous Fat (Malnutrition): severe depletion  Muscle Mass (Malnutrition): moderate depletion   Orbital Region (Subcutaneous Fat Loss): moderate depletion  Upper Arm Region (Subcutaneous Fat Loss): severe depletion  Thoracic and Lumbar Region: severe depletion   Taoism Region (Muscle Loss): moderate depletion  Clavicle Bone Region (Muscle  Loss): moderate depletion  Clavicle and Acromion Bone Region (Muscle Loss): severe depletion  Scapular Bone Region (Muscle Loss): moderate depletion  Dorsal Hand (Muscle Loss): moderate depletion  Patellar Region (Muscle Loss): moderate depletion  Anterior Thigh Region (Muscle Loss): moderate depletion  Posterior Calf Region (Muscle Loss): moderate depletion   Subcutaneous Fat Loss (Final Summary): moderate protein-calorie malnutrition  Muscle Loss Evaluation (Final Summary): severe protein-calorie malnutrition      Nutrition Follow-Up    RD Follow-up?: Yes

## 2019-04-15 NOTE — PLAN OF CARE
Problem: Occupational Therapy Goal  Goal: Occupational Therapy Goal  Goals to be met by: 4/30/2019    Patient will increase functional independence with ADLs by performing:    Grooming while standing at sink with Modified [place].  Step transfer with Modified [place]  Toilet transfer to toilet with Modified [place].  Upper extremity exercise program x10 reps per handout, with independence.    Outcome: Ongoing (interventions implemented as appropriate)  OT initial eval completed and treatment initiated.  Pt. limited by nausea and dry heaving. DME recommendations for home:  wc. Continue with OT POC.

## 2019-04-15 NOTE — PLAN OF CARE
"VN rounds: VN cued into pt's room. Pt did not give VN permission to turn camera and stated " I don't want to talk to you right now". VN acknowledged pts request and informed pt to let us know if she needs anything. Pt stated she does not need anything. Will cont to be available and intervene as needed.        04/15/19 1034   Type of Frequent Check   Type Patient Rounds;Other (see comments)  (VN rounds: pt refused VN)     "

## 2019-04-15 NOTE — PLAN OF CARE
63F with hx NET, remote gastric bypass, s/p RYGB reversal and PEG placement in Jan 2019. Presented to OSH ER for evaluation of N/V. Imaging concerning for pSBO. Patient transferred to [place] for admission. Afebrile, hemodynamically stable, with no leukocytosis.     Plan:   Admit  NPO  G tube to gravity  IVFs    H&P in paper chart. Full H&P to follow.

## 2019-04-15 NOTE — PROGRESS NOTES
Surgery Progress Note    S:  NAEO. Patient with some nausea, no vomiting overnight.  Gtube put out 350.  Patient not having bowel function.  Tolerating sips of clears.      O:  Temp:  [97.7 °F (36.5 °C)-98.8 °F (37.1 °C)] 97.7 °F (36.5 °C)  Pulse:  [76-84] 84  Resp:  [17-20] 18  SpO2:  [95 %] 95 %  BP: (115-177)/(58-82) 177/81    Physical Exam:  Gen: no acute distress. Alert and oriented x3.  HEENT: normocephalic and atraumatic. EOMI.   Resp: unlabored respirations  CV: regular rate  Abd: soft, nondistended, nontender  Ext: warm and well perfused  MSK: normal range of motion, normal strength  Neuro: no focal deficits, normal sensation      A/P:  62 yo F with concern for a SBO, s/p RYGB and reversal, PEG tube placement, now with NET    -Will cap NGT  -Will give regular diet  -Continue IVF until tolerating diet  -Monitor pain control  -Antiemetics    Akash Hartman MD  [employer] General Surgery

## 2019-04-15 NOTE — H&P
[facility] - Neuroendocrine Surgery Service  History and Physical / Consult Note    Attending Physician: MD Cam  Consulting Physician: -    Chief Complaint  Emesis     HPI  Kaylee Ngo is a 63 y.o. female presenting with hx NET, remote gastric bypass, s/p RYGB reversal and PEG placement in Jan 2019. Presented to OSH ER for evaluation of N/V. Imaging concerning for pSBO. Patient transferred to [place] for admission. Afebrile, hemodynamically stable, with no leukocytosis.     ROS  Review of Systems   Constitutional: Negative for chills, diaphoresis, fever, malaise/fatigue and weight loss.   HENT: Negative.    Eyes: Negative.    Respiratory: Negative.  Negative for cough, sputum production and shortness of breath.    Cardiovascular: Positive for leg swelling (L knee swelling). Negative for chest pain and palpitations.   Gastrointestinal: Positive for nausea and vomiting. Negative for abdominal pain, blood in stool, constipation, diarrhea, heartburn and melena.   Genitourinary: Negative.    Musculoskeletal: Negative.    Skin: Negative for itching and rash.   Neurological: Negative.    Endo/Heme/Allergies: Negative.    Psychiatric/Behavioral: Negative.         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    Neuroendocrine tumor 4/9/2018    Obsessive compulsive disorder     Sleep apnea         PSH  Past Surgical History:   Procedure Laterality Date    APPENDECTOMY      BIOPSY-LIVER  2/28/2018    [facility]    CHOLECYSTECTOMY      EGD, WITH PEG TUBE INSERTION N/A 2/21/2019     Per[facility] at [facility] ENDO    ESOPHAGOGASTRODUODENOSCOPY (EGD) N/A 2018    Performed by [facility]it [facility]dy [facility] at [facility] ENDO    EXCISION, LIVER N/A 2019    [facility] by Cam [facility] at [facility] OR    GASTRIC BYPASS      INSERTION OF PERCUTANEOUS ENDOSCOPIC GASTROSTOMY (PEG) FEEDING TUBE  2019    INSERTION-TUBE-GASTROSTOMY-LAPAROSCOPIC  with gastrogram N/A 2018    Performed by Cam [facility]hton MD at [facility]    LAPAROTOMY, EXPLORATORY N/A 2019    Performed by Cam [facility]hton MD at [facility]    LYMPHADENECTOMY N/A 2019    [facility] by Cam [facility] at [facility] OR    TOTAL KNEE ARTHROPLASTY Left         Medications  Current Facility-Administered Medications on File Prior to Encounter   Medication Dose Route Frequency Provider Last Rate Last Dose    [] HYDROmorphone (DILAUDID) 1 mg/mL injection             [] metoclopramide HCl (REGLAN) 5 mg/mL injection             [DISCONTINUED] acetaminophen oral solution 650 mg  650 mg Oral Q4H PRN Diana Grady NP   650 mg at 19 0620    [DISCONTINUED] albuterol-ipratropium 2.5 mg-0.5 mg/3 mL nebulizer solution 3 mL  3 mL Nebulization Q6H PRN Diana Grady NP        [DISCONTINUED] aluminum-magnesium hydroxide-simethicone 200-200-20 mg/5 mL suspension 60 mL  60 mL Oral Q6H PRN Mariaelena Ashraf NP        [DISCONTINUED] amitriptyline tablet 50 mg  50 mg Oral QHS Mariaelena Ashraf, NP   50 mg at 19 2250    [DISCONTINUED] artificial tears 0.5 % ophthalmic solution 1 drop  1 drop Both Eyes QID PRN Mariaelena Ashraf NP        [DISCONTINUED] clonazePAM tablet 0.5 mg  0.5 mg Oral BID PRN Mariaelena Ashraf NP        [DISCONTINUED] dextrose 50% injection 12.5 g  12.5 g Intravenous PRN Diana Grady NP        [DISCONTINUED] dextrose 50% injection 25 g  25 g Intravenous PRN Diana Grady NP        [DISCONTINUED] diclofenac sodium 1 % gel   Topical (Top) Daily  Diana Grady NP        [DISCONTINUED] diphenhydrAMINE injection 12.5 mg  12.5 mg Intravenous Q4H PRN Mariaelena Ashraf NP        [DISCONTINUED] docusate sodium capsule 100 mg  100 mg Oral Daily PRN iDana Grady NP        [DISCONTINUED] dronabinol capsule 2.5 mg  2.5 mg Oral BID Mariaelena Ashraf NP   2.5 mg at 04/13/19 2251    [DISCONTINUED] DULoxetine DR capsule 60 mg  60 mg Oral Daily Diana Grady NP   60 mg at 04/13/19 0942    [DISCONTINUED] enoxaparin injection 40 mg  40 mg Subcutaneous Q24H Mariaelena Ashraf, LAUREANO   40 mg at 04/13/19 0943    [DISCONTINUED] fentaNYL 50 mcg/hr 1 patch  1 patch Transdermal Q72H Mariaelena Ashraf, NP   1 patch at 04/11/19 1816    [DISCONTINUED] ferrous sulfate EC tablet 325 mg  325 mg Oral Daily Mariaelena Ashraf NP   325 mg at 04/13/19 0942    [DISCONTINUED] glucagon (human recombinant) injection 1 mg  1 mg Intramuscular PRN Diana Grady NP        [DISCONTINUED] glucose chewable tablet 16 g  16 g Oral PRN Diana Grady NP   16 g at 04/13/19 2240    [DISCONTINUED] glucose chewable tablet 24 g  24 g Oral PRN Diana Grady NP   24 g at 04/12/19 1128    [DISCONTINUED] hydrALAZINE tablet 25 mg  25 mg Oral Q8H PRN Diana Grady NP        [DISCONTINUED] hydrOXYzine HCl tablet 25 mg  25 mg Oral TID Diana Grady NP   25 mg at 04/13/19 2249    [DISCONTINUED] insulin aspart U-100 pen 0-5 Units  0-5 Units Subcutaneous QID (AC + HS) PRN Diana Grady NP   2 Units at 04/13/19 0943    [DISCONTINUED] insulin detemir U-100 pen 10 Units  10 Units Subcutaneous BID Maninder Parks NP   10 Units at 04/13/19 0943    [DISCONTINUED] lamoTRIgine tablet 150 mg  150 mg Oral BID Diana Grady NP   150 mg at 04/13/19 2249    [DISCONTINUED] lidocaine 5 % patch 1 patch  1 patch Transdermal Q24H Mariaelena Ashraf, NP   1 patch at 04/13/19 1352    [DISCONTINUED] magnesium oxide tablet 800 mg  800 mg Oral BID Mariaelena Ashraf, NP   800 mg at 04/13/19 2250     [DISCONTINUED] metoclopramide HCl tablet 10 mg  10 mg Oral TID AC Diana Grady NP   10 mg at 04/13/19 1705    [DISCONTINUED] metoprolol succinate (TOPROL-XL) 24 hr tablet 100 mg  100 mg Oral BID Mariaelena Ashraf, NP   100 mg at 04/13/19 2200    [DISCONTINUED] miconazole nitrate 2% ointment   Topical (Top) BID Mariaelena Ashraf, NP        [DISCONTINUED] mirtazapine disintegrating tablet 15 mg  15 mg Oral Nightly Diana Grady NP   15 mg at 04/13/19 2250    [DISCONTINUED] naloxone 0.4 mg/mL injection 0.4 mg  0.4 mg Intravenous PRN Stevo Dodson PA-C        [DISCONTINUED] naproxen tablet 500 mg  500 mg Oral BID PRN Mariaelena Ashraf, NP   500 mg at 04/13/19 2247    [DISCONTINUED] ondansetron disintegrating tablet 4 mg  4 mg Oral Q8H PRN Stevo Dodson PA-C   4 mg at 04/12/19 2124    [DISCONTINUED] pantoprazole EC tablet 40 mg  40 mg Oral Daily Diana Grady NP   40 mg at 04/13/19 0943    [DISCONTINUED] potassium, sodium phosphates 280-160-250 mg packet 1 packet  1 packet Oral QID (WM & HS) Diana Grady NP   1 packet at 04/13/19 2200    [DISCONTINUED] pregabalin capsule 50 mg  50 mg Oral BID Diana Grady NP   50 mg at 04/13/19 2250    [DISCONTINUED] promethazine tablet 12.5 mg  12.5 mg Oral Q6H PRN Diana Grady NP   12.5 mg at 04/13/19 2108    [DISCONTINUED] promethazine tablet 12.5 mg  12.5 mg Oral Before breakfast Maninder Parks NP   12.5 mg at 04/13/19 0620    [DISCONTINUED] scopolamine 1.3-1.5 mg (1 mg over 3 days) 1 patch  1 patch Transdermal Q3 Days Mariaelena Ashraf NP   1 patch at 04/12/19 2222    [DISCONTINUED] sodium chloride 0.9% flush 3 mL  3 mL Intravenous PRN Mariaelena Ashraf, NP        [DISCONTINUED] tiZANidine tablet 4 mg  4 mg Oral Q8H PRN Diana Grady NP   4 mg at 04/13/19 2250     Current Outpatient Medications on File Prior to Encounter   Medication Sig Dispense Refill    ACCU-CHEK SHU CONTROL SOLN Crystal       ACCU-CHEK SHU PLUS METER [facility]        ACCU-CHEK SHU PLUS TEST STRP Strp       acetaminophen (TYLENOL) 650 MG Supp Place 1 suppository (650 mg total) rectally every 8 (eight) hours as needed (101).  0    albuterol-ipratropium (DUO-NEB) 2.5 mg-0.5 mg/3 mL nebulizer solution Take 3 mLs by nebulization every 4 (four) hours. Rescue 1 Box 0    amitriptyline (ELAVIL) 50 MG tablet Take 50 mg by mouth every evening.      amLODIPine (NORVASC) 5 MG tablet Take 5 mg by mouth once daily.       artificial tears (ISOPTO TEARS) 0.5 % ophthalmic solution Place 1 drop into both eyes 4 (four) times daily as needed.      calcium carbonate (OS-BEST) 600 mg calcium (1,500 mg) Tab Take 600 mg by mouth once.      ciprofloxacin lactate in dextrose 5 % infusion Inject 125 mLs (250 mg total) into the vein every 12 (twelve) hours. 1000 mL 3    clonazePAM (KLONOPIN) 0.5 MG tablet Take 0.5 mg by mouth 2 (two) times daily as needed for Anxiety.      cyanocobalamin 1,000 mcg/mL injection 1,000 mcg every 28 days.      dexamethasone 1.5 mg (27 tabs) DsPk Take 1.5 mg by mouth once daily. Tapering dose as directed 27 tablet 0    diclofenac (CATAFLAM) 50 MG tablet as needed.       diphenhydrAMINE (BENADRYL) 50 mg/mL injection Inject 0.25 mLs (12.5 mg total) into the vein every 4 (four) hours as needed for Itching. 200 mL 0    diphenoxylate-atropine 2.5-0.025 mg (LOMOTIL) 2.5-0.025 mg per tablet Take 1 tablet by mouth 4 (four) times daily as needed for Diarrhea.      docusate (COLACE) 50 mg/5 mL liquid Take 10 mLs (100 mg total) by mouth daily as needed. 200 mL 0    dronabinol (MARINOL) 2.5 MG capsule Take 1 capsule (2.5 mg total) by mouth 2 (two) times daily. 40 capsule 0    DULoxetine (CYMBALTA) 60 MG capsule Take 60 mg by mouth once daily.      escitalopram oxalate (LEXAPRO) 5 mg/5 mL Soln Take 10 mLs (10 mg total) by mouth once daily. 300 mL 11    fentaNYL (DURAGESIC) 25 mcg/hr Place 1 patch onto the skin every 72 hours. 24 patch 0    FLUARIX QUAD [id number], PF, 60  "mcg (15 mcg x 4)/0.5 mL Syrg vaccine       GLUCAGON EMERGENCY KIT, HUMAN, 1 mg injection       hydrOXYzine pamoate (VISTARIL) 25 MG Cap Take 25 mg by mouth 3 (three) times daily.       insulin degludec (TRESIBA FLEXTOUCH U-100) 100 unit/mL (3 mL) InPn Inject 15 Units into the skin once daily.       lamoTRIgine (LAMICTAL) 150 MG Tab       lancets 30 gauge Misc       lancing device Misc       lanreotide (SOMATULINE DEPOT) 60 mg/0.2 mL Syrg inject 0.2 milliliter by subcutaneous route once a month      losartan (COZAAR) 100 MG tablet Take 1 tablet (100 mg total) by mouth once daily. 90 tablet 3    metoclopramide HCl (REGLAN) 10 MG tablet Take 1 tablet (10 mg total) by mouth 3 (three) times daily before meals. 24 tablet 3    metoclopramide HCl (REGLAN) 5 mg/5 mL Soln TAKE 10ML BY MOUTH THREE TIMES A  mL 1    metoprolol succinate (TOPROL-XL) 100 MG 24 hr tablet Take 100 mg by mouth 2 (two) times daily.      multivitamin capsule Take 1 capsule by mouth once daily.      omeprazole (PRILOSEC) 40 MG capsule Take 40 mg by mouth 2 (two) times daily before meals.       ondansetron (ZOFRAN) 8 MG tablet Take 8 mg by mouth every 8 (eight) hours as needed.       ondansetron 4 mg/2 mL Soln Inject 4 mg into the vein every 6 (six) hours as needed. 36 mL 3    pantoprazole (PROTONIX) 40 mg injection Inject 40 mg into the vein once daily. 1200 mg 11    potassium chloride (KLOR-CON) 20 mEq Pack Take 20 mEq by mouth once.       pregabalin (LYRICA) 50 MG capsule 1 capsule (50 mg total) by Per G Tube route 2 (two) times daily. 60 capsule 6    promethazine (PHENERGAN) 25 MG tablet Take 1 tablet (25 mg total) by mouth every 6 (six) hours as needed for Nausea. 60 tablet 1    scopolamine (TRANSDERM-SCOP) 1.3-1.5 mg (1 mg over 3 days) Place 1 patch onto the skin Every 3 (three) days. 10 patch 2    SURE COMFORT PEN NEEDLE 32 gauge x 5/32" Ndle       tiZANidine (ZANAFLEX) 4 MG tablet as needed.       vitamin D 1000 " "units Tab Take 1,000 Units by mouth once daily.          Allergies  Review of patient's allergies indicates:   Allergen Reactions    Calcium carbonate Nausea And Vomiting     The liquid form    Codeine Hives    Contrast media      Oral and IV    Darvocet a500 [propoxyphene n-acetaminophen]     Epinephrine      Neuroendocrine Tumor patient    Neuroendocrine Tumor patient    Morphine     Propoxyphene napsylate     Sulfa (sulfonamide antibiotics)     Erythromycin Rash    Erythromycin base Rash    Iodinated contrast- oral and iv dye Rash and Other (See Comments)     Copper taste in mouth-Benadryl all that is needed for scans  Copper taste in mouth-Benadryl all that is needed for scans    Pcn [penicillins] Rash     "It looks like measles."        Social History  Social History     Socioeconomic History    Marital status: Single     Spouse name: Not on file    Number of children: Not on file    Years of education: Not on file    Highest education level: Not on file   Occupational History    Not on file   Social Needs    Financial resource strain: Not on file    Food insecurity:     Worry: Not on file     Inability: Not on file    Transportation needs:     Medical: Not on file     Non-medical: Not on file   Tobacco Use    Smoking status: Never Smoker    Smokeless tobacco: Never Used   Substance and Sexual Activity    Alcohol use: No    Drug use: No    Sexual activity: Not on file   Lifestyle    Physical activity:     Days per week: Not on file     Minutes per session: Not on file    Stress: Not on file   Relationships    Social connections:     Talks on phone: Not on file     Gets together: Not on file     Attends Sikh service: Not on file     Active member of club or organization: Not on file     Attends meetings of clubs or organizations: Not on file     Relationship status: Not on file   Other Topics Concern    Not on file   Social History Narrative    Not on file        Family " History  Family History   Problem Relation Age of Onset    Cancer Mother         pancreas    Cancer Father         colon        Scheduled Meds:   amLODIPine  5 mg Oral Daily    losartan  100 mg Oral Daily    metoprolol succinate  100 mg Oral Daily     Continuous Infusions:   sodium chloride 0.9% 125 mL/hr at 04/15/19 0525     PRN Meds:HYDROmorphone, metoclopramide HCl, ondansetron     Objective  AFVSS   Physical Exam   Constitutional: She is oriented to person, place, and time. She appears well-developed and well-nourished. No distress.   HENT:   Head: Normocephalic and atraumatic.   Eyes: Pupils are equal, round, and reactive to light. EOM are normal. No scleral icterus.   Neck: Normal range of motion. Neck supple.   Cardiovascular: Normal rate, regular rhythm and intact distal pulses.   Pulmonary/Chest: Effort normal and breath sounds normal. No respiratory distress. She exhibits no tenderness.   Abdominal: Soft. Bowel sounds are normal. She exhibits no distension and no mass. There is no tenderness. There is no rebound and no guarding. No hernia.   PEG tube in place, to gravity, collecting thin green gastric contents.   Midline incision healing well without evidence of infection   Musculoskeletal: Normal range of motion. She exhibits deformity (L knee s/p TKA).   Neurological: She is alert and oriented to person, place, and time.   Skin: Skin is warm and dry. She is not diaphoretic.   Psychiatric: She has a normal mood and affect. Her behavior is normal. Judgment and thought content normal.   Vitals reviewed.       Labs  Recent Labs     04/14/19  0430   WBC 6.57   HGB 13.0   HCT 45.9   *     Recent Labs     04/14/19  0430      K 4.1   CL 98   CO2 33*   BUN 11   CREATININE 0.7   *   CALCIUM 9.6   PROT 7.3   ALBUMIN 3.0*   BILITOT 0.2   AST 35   ALKPHOS 257*   ALT 32     Imaging  CTAP 4/14: Mild dilation of proximal loops of small bowel. Post-surgical gastric anatomy consistent with  reversal of RYGB. Extensive mesenteric LAD consistent with hx NET.    Assessment/Plan   63F with hx NET, remote gastric bypass, s/p RYGB reversal and PEG placement in Jan 2019. Admitted 2/2 concern SBO.    Admit  NPO  G tube to gravity  IVFs       Ramirez Dos Santos MD  [facility] General Surgery, PGY4  c [phone]  4/14/19

## 2019-04-16 VITALS
SYSTOLIC BLOOD PRESSURE: 176 MMHG | DIASTOLIC BLOOD PRESSURE: 84 MMHG | WEIGHT: 120.38 LBS | RESPIRATION RATE: 20 BRPM | HEIGHT: 65 IN | HEART RATE: 88 BPM | BODY MASS INDEX: 20.06 KG/M2 | TEMPERATURE: 96 F

## 2019-04-16 PROBLEM — K56.609 SMALL BOWEL OBSTRUCTION: Status: RESOLVED | Noted: 2019-04-15 | Resolved: 2019-04-16

## 2019-04-16 LAB — POCT GLUCOSE: 224 MG/DL (ref 70–110)

## 2019-04-16 PROCEDURE — 97530 THERAPEUTIC ACTIVITIES: CPT

## 2019-04-16 PROCEDURE — 97161 PT EVAL LOW COMPLEX 20 MIN: CPT

## 2019-04-16 PROCEDURE — 25000003 PHARM REV CODE 250: Performed by: STUDENT IN AN ORGANIZED HEALTH CARE EDUCATION/TRAINING PROGRAM

## 2019-04-16 PROCEDURE — 63600175 PHARM REV CODE 636 W HCPCS: Performed by: STUDENT IN AN ORGANIZED HEALTH CARE EDUCATION/TRAINING PROGRAM

## 2019-04-16 PROCEDURE — 63600175 PHARM REV CODE 636 W HCPCS: Performed by: SURGERY

## 2019-04-16 PROCEDURE — 25000003 PHARM REV CODE 250: Performed by: SURGERY

## 2019-04-16 RX ORDER — PREGABALIN 50 MG/1
50 CAPSULE ORAL 2 TIMES DAILY
Qty: 60 CAPSULE | Refills: 0 | Status: SHIPPED | OUTPATIENT
Start: 2019-04-16 | End: 2019-11-12

## 2019-04-16 RX ORDER — DICLOFENAC POTASSIUM 50 MG/1
50 TABLET, FILM COATED ORAL
Qty: 15 TABLET | Refills: 0 | Status: SHIPPED | OUTPATIENT
Start: 2019-04-16 | End: 2019-07-30 | Stop reason: RX

## 2019-04-16 RX ORDER — DULOXETIN HYDROCHLORIDE 60 MG/1
60 CAPSULE, DELAYED RELEASE ORAL DAILY
Qty: 30 CAPSULE | Refills: 0 | Status: SHIPPED | OUTPATIENT
Start: 2019-04-16

## 2019-04-16 RX ORDER — DRONABINOL 2.5 MG/1
2.5 CAPSULE ORAL 2 TIMES DAILY
Qty: 40 CAPSULE | Refills: 0 | Status: SHIPPED | OUTPATIENT
Start: 2019-04-16 | End: 2019-05-07 | Stop reason: SDUPTHER

## 2019-04-16 RX ORDER — IPRATROPIUM BROMIDE AND ALBUTEROL SULFATE 2.5; .5 MG/3ML; MG/3ML
3 SOLUTION RESPIRATORY (INHALATION) EVERY 4 HOURS
Qty: 1 BOX | Refills: 0 | Status: SHIPPED | OUTPATIENT
Start: 2019-04-16 | End: 2020-04-15

## 2019-04-16 RX ORDER — ESCITALOPRAM OXALATE 5 MG/5ML
10 SOLUTION ORAL DAILY
Qty: 300 ML | Refills: 0 | Status: SHIPPED | OUTPATIENT
Start: 2019-04-16 | End: 2020-04-15

## 2019-04-16 RX ORDER — LIDOCAINE 50 MG/G
1 PATCH TOPICAL DAILY
Qty: 30 PATCH | Refills: 3 | Status: SHIPPED | OUTPATIENT
Start: 2019-04-16

## 2019-04-16 RX ORDER — INSULIN DEGLUDEC 100 U/ML
15 INJECTION, SOLUTION SUBCUTANEOUS DAILY
Qty: 15 ML | Refills: 0 | Status: SHIPPED | OUTPATIENT
Start: 2019-04-16

## 2019-04-16 RX ORDER — POTASSIUM CHLORIDE 20 MEQ/1
20 TABLET, EXTENDED RELEASE ORAL DAILY
Qty: 14 TABLET | Refills: 0 | Status: SHIPPED | OUTPATIENT
Start: 2019-04-16

## 2019-04-16 RX ORDER — HYDROXYZINE PAMOATE 25 MG/1
25 CAPSULE ORAL 3 TIMES DAILY
Status: DISCONTINUED | OUTPATIENT
Start: 2019-04-16 | End: 2019-04-16 | Stop reason: HOSPADM

## 2019-04-16 RX ORDER — METOPROLOL SUCCINATE 100 MG/1
100 TABLET, EXTENDED RELEASE ORAL 2 TIMES DAILY
Qty: 60 TABLET | Refills: 0 | Status: SHIPPED | OUTPATIENT
Start: 2019-04-16

## 2019-04-16 RX ORDER — AMITRIPTYLINE HYDROCHLORIDE 50 MG/1
50 TABLET, FILM COATED ORAL NIGHTLY
Qty: 30 TABLET | Refills: 0 | Status: SHIPPED | OUTPATIENT
Start: 2019-04-16

## 2019-04-16 RX ORDER — AMLODIPINE BESYLATE 5 MG/1
5 TABLET ORAL DAILY
Qty: 30 TABLET | Refills: 0 | Status: SHIPPED | OUTPATIENT
Start: 2019-04-16 | End: 2020-04-15

## 2019-04-16 RX ORDER — HYDROXYZINE PAMOATE 25 MG/1
25 CAPSULE ORAL EVERY 8 HOURS PRN
Qty: 90 CAPSULE | Refills: 0 | Status: SHIPPED | OUTPATIENT
Start: 2019-04-16

## 2019-04-16 RX ORDER — LAMOTRIGINE 150 MG/1
150 TABLET ORAL 2 TIMES DAILY
Qty: 20 TABLET | Refills: 0 | Status: SHIPPED | OUTPATIENT
Start: 2019-04-16

## 2019-04-16 RX ORDER — TIZANIDINE 4 MG/1
4 TABLET ORAL EVERY 8 HOURS
Qty: 90 TABLET | Refills: 0 | Status: SHIPPED | OUTPATIENT
Start: 2019-04-16

## 2019-04-16 RX ORDER — METOCLOPRAMIDE 10 MG/1
10 TABLET ORAL
Qty: 24 TABLET | Refills: 3 | Status: SHIPPED | OUTPATIENT
Start: 2019-04-16

## 2019-04-16 RX ORDER — LOSARTAN POTASSIUM 100 MG/1
100 TABLET ORAL DAILY
Qty: 30 TABLET | Refills: 0 | Status: SHIPPED | OUTPATIENT
Start: 2019-04-16 | End: 2020-04-15

## 2019-04-16 RX ORDER — CYANOCOBALAMIN 1000 UG/ML
1000 INJECTION, SOLUTION INTRAMUSCULAR; SUBCUTANEOUS DAILY
Status: DISCONTINUED | OUTPATIENT
Start: 2019-04-16 | End: 2019-04-16 | Stop reason: HOSPADM

## 2019-04-16 RX ORDER — DULOXETIN HYDROCHLORIDE 30 MG/1
60 CAPSULE, DELAYED RELEASE ORAL DAILY
Status: DISCONTINUED | OUTPATIENT
Start: 2019-04-16 | End: 2019-04-16 | Stop reason: HOSPADM

## 2019-04-16 RX ORDER — HYDROXYZINE PAMOATE 25 MG/1
25 CAPSULE ORAL 3 TIMES DAILY
Status: DISCONTINUED | OUTPATIENT
Start: 2019-04-16 | End: 2019-04-16

## 2019-04-16 RX ORDER — SCOLOPAMINE TRANSDERMAL SYSTEM 1 MG/1
1 PATCH, EXTENDED RELEASE TRANSDERMAL
Qty: 10 PATCH | Refills: 2 | Status: SHIPPED | OUTPATIENT
Start: 2019-04-16

## 2019-04-16 RX ORDER — PROMETHAZINE HYDROCHLORIDE 25 MG/1
25 TABLET ORAL EVERY 6 HOURS PRN
Qty: 90 TABLET | Refills: 0 | Status: SHIPPED | OUTPATIENT
Start: 2019-04-16

## 2019-04-16 RX ORDER — ONDANSETRON HYDROCHLORIDE 8 MG/1
8 TABLET, FILM COATED ORAL EVERY 8 HOURS PRN
Qty: 10 TABLET | Refills: 0 | Status: SHIPPED | OUTPATIENT
Start: 2019-04-16

## 2019-04-16 RX ORDER — CLONAZEPAM 0.5 MG/1
0.5 TABLET ORAL 2 TIMES DAILY PRN
Qty: 14 TABLET | Refills: 0 | Status: SHIPPED | OUTPATIENT
Start: 2019-04-16

## 2019-04-16 RX ADMIN — HYDROXYZINE PAMOATE 25 MG: 25 CAPSULE ORAL at 10:04

## 2019-04-16 RX ADMIN — METOPROLOL SUCCINATE 100 MG: 50 TABLET, EXTENDED RELEASE ORAL at 08:04

## 2019-04-16 RX ADMIN — HYDROMORPHONE HYDROCHLORIDE 0.5 MG: 1 INJECTION, SOLUTION INTRAMUSCULAR; INTRAVENOUS; SUBCUTANEOUS at 06:04

## 2019-04-16 RX ADMIN — METOCLOPRAMIDE 10 MG: 5 INJECTION, SOLUTION INTRAMUSCULAR; INTRAVENOUS at 08:04

## 2019-04-16 RX ADMIN — PROMETHAZINE HYDROCHLORIDE 12.5 MG: 25 INJECTION INTRAMUSCULAR; INTRAVENOUS at 02:04

## 2019-04-16 RX ADMIN — AMLODIPINE BESYLATE 5 MG: 5 TABLET ORAL at 08:04

## 2019-04-16 RX ADMIN — CYANOCOBALAMIN 1000 MCG: 1000 INJECTION, SOLUTION INTRAMUSCULAR; SUBCUTANEOUS at 08:04

## 2019-04-16 RX ADMIN — LOSARTAN POTASSIUM 100 MG: 50 TABLET, FILM COATED ORAL at 08:04

## 2019-04-16 RX ADMIN — PROMETHAZINE HYDROCHLORIDE 12.5 MG: 25 INJECTION INTRAMUSCULAR; INTRAVENOUS at 06:04

## 2019-04-16 RX ADMIN — DULOXETINE 60 MG: 30 CAPSULE, DELAYED RELEASE ORAL at 10:04

## 2019-04-16 RX ADMIN — ONDANSETRON 8 MG: 8 TABLET, ORALLY DISINTEGRATING ORAL at 12:04

## 2019-04-16 RX ADMIN — METOCLOPRAMIDE 10 MG: 5 INJECTION, SOLUTION INTRAMUSCULAR; INTRAVENOUS at 02:04

## 2019-04-16 RX ADMIN — Medication 10 MG: at 03:04

## 2019-04-16 RX ADMIN — ONDANSETRON 8 MG: 8 TABLET, ORALLY DISINTEGRATING ORAL at 11:04

## 2019-04-16 RX ADMIN — HYDROXYZINE PAMOATE 25 MG: 25 CAPSULE ORAL at 05:04

## 2019-04-16 RX ADMIN — SODIUM PHOSPHATE, DIBASIC AND SODIUM PHOSPHATE, MONOBASIC 1 ENEMA: 7; 19 ENEMA RECTAL at 08:04

## 2019-04-16 RX ADMIN — IRON SUCROSE 100 MG: 20 INJECTION, SOLUTION INTRAVENOUS at 08:04

## 2019-04-16 NOTE — PHYSICIAN QUERY
PT Name: Kaylee Ngo  MR #: [id number]    Physician Query Form - Consultant Diagnosis Clarification     CDS/: Mariaelena Cheek RN, CDS               Contact information: [email or web address]                                                                                                                         [phone]  This form is a permanent document in the medical record.     Query Date: April 16, 2019      By submitting this query, we are merely seeking further clarification of documentation.  Please utilize your independent clinical judgment when addressing the question(s) below.      The Medical record contains the following:   Diagnosis Supporting Clinical Information Location in Medical Record    Severe malnutrition  Severe Malnutrition in the context of Chronic Illness/Injury    Energy Intake: <75% of estimated energy requirement for > 3 months    Body Fat Depletion: moderate depletion of orbitals, triceps and thoracic and lumbar region     Muscle Mass Depletion: severe depletion of temples, clavicle region, scapular region, interosseous muscle and lower extremities     Weight Loss:12% x 3 months        BMI (Calculated): 20.4   RD Consult 4/16    63 y.o. female presenting with hx NET, remote gastric bypass, s/p RYGB reversal and PEG placement in Jan 2019. Presented to OSH ER for evaluation of N/V. Imaging concerning for pSBO.   H&P 4/14    Rec TF of Glucerna: 4 cans/day goal to meet 75% of estimated needs   RD Consult 4/16           Do you agree with the Consultants diagnosis of ____Severe malnutrition_____?    [ cc ] Yes   [  ] No   [  ] Other/Clarification of findings:   [  ] Clinically undetermined

## 2019-04-16 NOTE — DISCHARGE SUMMARY
[facility]  General Surgery  Discharge Summary      Patient Name: Kaylee Ngo  MRN: [id number]  Admission Date: 4/14/2019  Hospital Length of Stay: 2 days  Discharge Date and Time:  04/16/2019 9:00 AM  Attending Physician: Isaias Ashton MD   Discharging Provider: Marjorie Leo MD  Primary Care Provider: Robert Garcia MD     HPI: Kaylee Ngo is a 63 y.o. female presenting with hx NET, remote gastric bypass, s/p RYGB reversal and PEG placement in Jan 2019. Presented to [facility] ER for evaluation of N/V. Imaging concerning for pSBO. Patient transferred to [facility] for admission. Afebrile, hemodynamically stable, with no leukocytosis.    Hospital Course: Patient was admitted for concerns of pSBO and made NPO, with NGT placed, and started on IVFs.  She improved overnight.  NGT was capped and later removed.  She was provided with liquid diet, tolerated well, and advanced.  Patient received suppository with relief.  On day of discharge, patient reports feeling immproved and requesting to go home.  Patient to be discharged home with home health and PT/OT.      Consults:   Consults (From admission, onward)        Status Ordering Provider     Inpatient consult to Registered Dietitian/Nutritionist  Once     Provider:  (Not yet assigned)    Completed ISAIAS ASHTON     Inpatient consult to Registered Dietitian/Nutritionist  Once     Provider:  (Not yet assigned)    Acknowledged ISAIAS ASHTON     Inpatient consult to Social Work  Once     Provider:  (Not yet assigned)    Acknowledged ISAIAS ASHTON     Inpatient consult to Social Work  Once     Provider:  (Not yet assigned)    Acknowledged ISAIAS ASHTON          Significant Diagnostic Studies: Labs:   CMP   Recent Labs   Lab 04/15/19  0605      K 4.6      CO2 26   *   BUN 10   CREATININE 0.6   CALCIUM 9.0   ANIONGAP 12   ESTGFRAFRICA >60   EGFRNONAA >60    and CBC   Recent Labs   Lab  04/15/19  0605   WBC 4.95   HGB 10.9*   HCT 36.7*   *       Pending Diagnostic Studies:     None        Final Active Diagnoses:    Diagnosis Date Noted POA    PRINCIPAL PROBLEM:  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    Specialty:  Family Medicine  Why:  BP evaluation   Contact information:  [address]  [place] [address]  [phone]                 Patient Instructions:      Diet diabetic     Notify your health care provider if you experience any of the following:  temperature >100.4     Notify your health care provider if you experience any of the following:  persistent nausea and vomiting or diarrhea     Notify your health care provider if you experience any of the following:  severe uncontrolled pain     Notify your health care provider if you experience any of the following:  redness, tenderness, or signs of infection (pain, swelling, redness, odor or green/yellow discharge around incision site)     Notify your health care provider if you experience any of the following:  difficulty breathing or increased cough     Activity as tolerated     Medications:  Reconciled Home Medications:      Medication List      CONTINUE taking these medications    ACCU-CHEK SHU CONTROL SOLN Soln  Generic drug:  blood glucose control high,low     ACCU-CHEK SHU PLUS METER Misc  Generic drug:  blood-glucose meter     ACCU-CHEK SHU PLUS TEST STRP Strp  Generic drug:  blood sugar diagnostic     acetaminophen 650 MG Supp  Commonly known as:  TYLENOL  Place 1 suppository (650 mg total) rectally every 8 (eight) hours as needed (101).     albuterol-ipratropium 2.5 mg-0.5 mg/3 mL nebulizer  solution  Commonly known as:  DUO-NEB  Take 3 mLs by nebulization every 4 (four) hours. Rescue     amitriptyline 50 MG tablet  Commonly known as:  ELAVIL  Take 50 mg by mouth every evening.     amLODIPine 5 MG tablet  Commonly known as:  NORVASC  Take 5 mg by mouth once daily.     artificial tears 0.5 % ophthalmic solution  Commonly known as:  ISOPTO TEARS  Place 1 drop into both eyes 4 (four) times daily as needed.     calcium carbonate 600 mg calcium (1,500 mg) Tab  Commonly known as:  OS-BEST  Take 600 mg by mouth once.     ciprofloxacin lactate in dextrose 5 % infusion  Inject 125 mLs (250 mg total) into the vein every 12 (twelve) hours.     clonazePAM 0.5 MG tablet  Commonly known as:  KLONOPIN  Take 0.5 mg by mouth 2 (two) times daily as needed for Anxiety.     cyanocobalamin 1,000 mcg/mL injection  1,000 mcg every 28 days.     dexamethasone 1.5 mg (27 tabs) Dspk  Take 1.5 mg by mouth once daily. Tapering dose as directed     diclofenac 50 MG tablet  Commonly known as:  CATAFLAM  as needed.     diphenhydrAMINE 50 mg/mL injection  Commonly known as:  BENADRYL  Inject 0.25 mLs (12.5 mg total) into the vein every 4 (four) hours as needed for Itching.     docusate 50 mg/5 mL liquid  Commonly known as:  COLACE  Take 10 mLs (100 mg total) by mouth daily as needed.     dronabinol 2.5 MG capsule  Commonly known as:  MARINOL  Take 1 capsule (2.5 mg total) by mouth 2 (two) times daily.     DULoxetine 60 MG capsule  Commonly known as:  CYMBALTA  Take 60 mg by mouth once daily.     escitalopram oxalate 5 mg/5 mL Soln  Commonly known as:  LEXAPRO  Take 10 mLs (10 mg total) by mouth once daily.     fentaNYL 25 mcg/hr  Commonly known as:  DURAGESIC  Place 1 patch onto the skin every 72 hours.     FLUARIX QUAD 9029-2891 (PF) 60 mcg (15 mcg x 4)/0.5 mL Syrg vaccine  Generic drug:  influenza     GLUCAGON EMERGENCY KIT (HUMAN) 1 mg Solr  Generic drug:  glucagon (human recombinant)     lamoTRIgine 150 MG Tab  Commonly known as:   "LAMICTAL     lancets 30 gauge Misc     lancing device Misc     LOMOTIL 2.5-0.025 mg per tablet  Generic drug:  diphenoxylate-atropine 2.5-0.025 mg  Take 1 tablet by mouth 4 (four) times daily as needed for Diarrhea.     losartan 100 MG tablet  Commonly known as:  COZAAR  Take 1 tablet (100 mg total) by mouth once daily.     * metoclopramide HCl 5 mg/5 mL Soln  Commonly known as:  REGLAN  TAKE 10ML BY MOUTH THREE TIMES A DAY     * metoclopramide HCl 10 MG tablet  Commonly known as:  REGLAN  Take 1 tablet (10 mg total) by mouth 3 (three) times daily before meals.     metoprolol succinate 100 MG 24 hr tablet  Commonly known as:  TOPROL-XL  Take 100 mg by mouth 2 (two) times daily.     multivitamin capsule  Take 1 capsule by mouth once daily.     omeprazole 40 MG capsule  Commonly known as:  PRILOSEC  Take 40 mg by mouth 2 (two) times daily before meals.     ondansetron 4 mg/2 mL Soln  Inject 4 mg into the vein every 6 (six) hours as needed.     ondansetron 8 MG tablet  Commonly known as:  ZOFRAN  Take 8 mg by mouth every 8 (eight) hours as needed.     pantoprazole 40 mg injection  Commonly known as:  PROTONIX  Inject 40 mg into the vein once daily.     potassium chloride 20 mEq Pack  Commonly known as:  KLOR-CON  Take 20 mEq by mouth once.     pregabalin 50 MG capsule  Commonly known as:  LYRICA  1 capsule (50 mg total) by Per G Tube route 2 (two) times daily.     promethazine 25 MG tablet  Commonly known as:  PHENERGAN  Take 1 tablet (25 mg total) by mouth every 6 (six) hours as needed for Nausea.     scopolamine 1.3-1.5 mg (1 mg over 3 days)  Commonly known as:  TRANSDERM-SCOP  Place 1 patch onto the skin Every 3 (three) days.     SOMATULINE DEPOT 60 mg/0.2 mL Syrg  Generic drug:  lanreotide  inject 0.2 milliliter by subcutaneous route once a month     SURE COMFORT PEN NEEDLE 32 gauge x 5/32" Ndle  Generic drug:  pen needle, diabetic     tiZANidine 4 MG tablet  Commonly known as:  ZANAFLEX  as needed.     TRESIBA " FLEXTOUCH U-100 100 unit/mL (3 mL) Inpn  Generic drug:  insulin degludec  Inject 15 Units into the skin once daily.     VISTARIL 25 MG Cap  Generic drug:  hydrOXYzine pamoate  Take 25 mg by mouth 3 (three) times daily.     vitamin D 1000 units Tab  Commonly known as:  VITAMIN D3  Take 1,000 Units by mouth once daily.         * This list has 2 medication(s) that are the same as other medications prescribed for you. Read the directions carefully, and ask your doctor or other care provider to review them with you.                Marjorie Leo MD  General Surgery  [facility]

## 2019-04-16 NOTE — PROGRESS NOTES
Surgery Progress Note    S: Patient's BP elevated overnight, has been restarted on home antihypertensives.  Reports ongoing nausea    O:  Temp:  [97.6 °F (36.4 °C)-98.7 °F (37.1 °C)] 97.9 °F (36.6 °C)  Pulse:  [] 92  Resp:  [18-20] 20  BP: (143-185)/(71-87) 185/86    Physical Exam:  Gen: no acute distress. Alert and oriented x3.  HEENT: normocephalic and atraumatic. EOMI.   Resp: unlabored respirations  CV: regular rate  Abd: soft, nondistended, nontender  Ext: warm and well perfused  MSK: normal range of motion, normal strength  Neuro: no focal deficits, normal sensation    A/P:  64 yo F with concern for a SBO, s/p RYGB and reversal, PEG tube placement, now with NET     NGT removed   Tolerating regular diet  Pain control and antiemetics  Likely discharge home today     Marjorie Leo MD  [facility] [facility] PGY2

## 2019-04-16 NOTE — PLAN OF CARE
Problem: Occupational Therapy Goal  Goal: Occupational Therapy Goal  Goals to be met by: 4/30/2019    Patient will increase functional independence with ADLs by performing:    Grooming while standing at sink with Modified [place].  Step transfer with Modified [place]  Toilet transfer to toilet with Modified [place].  Upper extremity exercise program x10 reps per handout, with independence.     Outcome: Ongoing (interventions implemented as appropriate)  Patient completing transfers with sup. Patient limited by nausea this date. Recommend  OT/PT upon D/C.

## 2019-04-16 NOTE — PROGRESS NOTES
Ambulatory referral to Home Health [[id number]] (Order [id number])   Outpatient Referral   Date and Time: 4/16/2019 10:57 AM Department: [facility] Rel By/Authorizing: Marjorie Leo MD   Dept Order Information     Date Department   4/16/2019 [facility]   Order Information     Order Date/Time Release Date/Time Start Date/Time End Date/Time   04/16/19 10:57 AM None 4/16/2019 None   Order Details     Frequency Duration Priority Order Class   None None Routine Internal Referral   Quantity     Ordering Quantity   1   Quantity     Ordering Quantity Ordering Quantity   1 1   Order Details     Order ID   [id number]   Comments     Surgery [facility] ORDERS  FACE TO FACE ENCOUNTER    Patient Name: Kaylee Ngo  Date of Birth:  1955    PCP: Robert Garcia MD   PCP Address: [address] [place], [place] [address]  PCP Phone Number: [phone]  PCP Fax: [phone]    Encounter Date: 04/16/2019    Admit to Home Health    Diagnoses:  Active Hospital Problems    *Malignant carcinoid tumor of pancreas [C7A.098]     POA: Yes      Resolved Hospital Problems    Small bowel obstruction [K56.609]     POA: Yes         Date Resolved: 04/16/2019          I have seen and examined this patient face to face today. My clinical findings that support the need for the home health skilled services and home bound status are the following:  Weakness/numbness causing balance and gait disturbance due to Weakness/Debility and Malignancy/Cancer making it taxing to leave home.  Requiring assistive device to leave home due to unsteady gait caused by Weakness/Debility and Malignancy/Cancer.    Allergies:Review of patient's allergies indicates:   -- Calcium carbonate -- Nausea And Vomiting    --  The liquid form   -- Codeine -- Hives   -- Contrast media     --  Oral and IV   -- Darvocet a500 [propoxyphene n-acetaminophen]    -- Epinephrine     --  " Neuroendocrine Tumor patient             Neuroendocrine Tumor patient   -- Morphine    -- Propoxyphene napsylate    -- Sulfa (sulfonamide antibiotics)    -- Erythromycin -- Rash   -- Erythromycin base -- Rash   -- Iodinated contrast- oral and iv dye -- Rash and Other (See Comments)    --  Copper taste in mouth-Benadryl all that is needed             for scans             Copper taste in mouth-Benadryl all that is needed             for scans   -- Pcn [penicillins] -- Rash    --  "It looks like measles."    Diet: enteral  G-Tube formula type Impact Peptide 1.5 or equivalent Bolus 3 times daily    Activities: activity as tolerated    Nursing:   SN to complete comprehensive assessment including routine vital signs. Instruct on disease process and s/s of complications to report to MD. Review/verify medication list sent home with the patient at time of discharge  and instruct patient/caregiver as needed. Frequency may be adjusted depending on start of care date. If patient has enteral feeding tube (NG, PEG, J-tube, G-tube), flush tube before and after feeding and/or medication administration with 20-30 mL of water.    Notify MD if SBP > 160 or < 90; DBP > 90 or < 50; HR > 120 or < 50; Temp > 101       CONSULTS:    Physical Therapy to evaluate and treat. Evaluate for home safety and equipment needs; Establish/upgrade home exercise program. Perform / instruct on therapeutic exercises, gait training, transfer training, and Range of Motion.  Occupational Therapy to evaluate and treat. Evaluate home environment for safety and equipment needs. Perform/Instruct on transfers, ADL training, ROM, and therapeutic exercises.  Aide to provide assistance with personal care, ADLs, and vital signs.    MISCELLANEOUS CARE:  N/A    WOUND CARE ORDERS  no      Medications: Review discharge medications with patient and family and provide education.      Current Discharge Medication List    CONTINUE these medications which have NOT " CHANGED    ACCU-CHEK SHU CONTROL SOLN Soln      ACCU-CHEK SHU PLUS METER Misc      ACCU-CHEK SHU PLUS TEST STRP Strp      acetaminophen (TYLENOL) 650 MG Supp  Place 1 suppository (650 mg total) rectally every 8 (eight) hours as needed (101).  Refills: 0    albuterol-ipratropium (DUO-NEB) 2.5 mg-0.5 mg/3 mL nebulizer solution  Take 3 mLs by nebulization every 4 (four) hours. Rescue  Qty: 1 Box Refills: 0    amitriptyline (ELAVIL) 50 MG tablet  Take 50 mg by mouth every evening.    amLODIPine (NORVASC) 5 MG tablet  Take 5 mg by mouth once daily.     artificial tears (ISOPTO TEARS) 0.5 % ophthalmic solution  Place 1 drop into both eyes 4 (four) times daily as needed.    calcium carbonate (OS-BEST) 600 mg calcium (1,500 mg) Tab  Take 600 mg by mouth once.    ciprofloxacin lactate in dextrose 5 % infusion  Inject 125 mLs (250 mg total) into the vein every 12 (twelve) hours.  Qty: 1000 mL Refills: 3    clonazePAM (KLONOPIN) 0.5 MG tablet  Take 0.5 mg by mouth 2 (two) times daily as needed for Anxiety.    cyanocobalamin 1,000 mcg/mL injection  1,000 mcg every 28 days.    dexamethasone 1.5 mg (27 tabs) DsPk  Take 1.5 mg by mouth once daily. Tapering dose as directed  Qty: 27 tablet Refills: 0  Associated Diagnoses:Malignant carcinoid tumor of unknown primary site; Secondary neuroendocrine tumor of distant lymph nodes; Nausea    diclofenac (CATAFLAM) 50 MG tablet  as needed.     diphenhydrAMINE (BENADRYL) 50 mg/mL injection  Inject 0.25 mLs (12.5 mg total) into the vein every 4 (four) hours as needed for Itching.  Qty: 200 mL Refills: 0    diphenoxylate-atropine 2.5-0.025 mg (LOMOTIL) 2.5-0.025 mg per tablet  Take 1 tablet by mouth 4 (four) times daily as needed for Diarrhea.    docusate (COLACE) 50 mg/5 mL liquid  Take 10 mLs (100 mg total) by mouth daily as needed.  Qty: 200 mL Refills: 0    dronabinol (MARINOL) 2.5 MG capsule  Take 1 capsule (2.5 mg total) by mouth 2 (two) times daily.  Qty: 40 capsule Refills:  0    DULoxetine (CYMBALTA) 60 MG capsule  Take 60 mg by mouth once daily.    escitalopram oxalate (LEXAPRO) 5 mg/5 mL Soln  Take 10 mLs (10 mg total) by mouth once daily.  Qty: 300 mL Refills: 11    fentaNYL (DURAGESIC) 25 mcg/hr  Place 1 patch onto the skin every 72 hours.  Qty: 24 patch Refills: 0    FLUARIX QUAD 5329-1925, PF, 60 mcg (15 mcg x 4)/0.5 mL Syrg vaccine      GLUCAGON EMERGENCY KIT, HUMAN, 1 mg injection      hydrOXYzine pamoate (VISTARIL) 25 MG Cap  Take 25 mg by mouth 3 (three) times daily.     insulin degludec (TRESIBA FLEXTOUCH U-100) 100 unit/mL (3 mL) InPn  Inject 15 Units into the skin once daily.     lamoTRIgine (LAMICTAL) 150 MG Tab      lancets 30 gauge Misc      lancing device Misc      lanreotide (SOMATULINE DEPOT) 60 mg/0.2 mL Syrg  inject 0.2 milliliter by subcutaneous route once a month    losartan (COZAAR) 100 MG tablet  Take 1 tablet (100 mg total) by mouth once daily.  Qty: 90 tablet Refills: 3    metoclopramide HCl (REGLAN) 10 MG tablet  Take 1 tablet (10 mg total) by mouth 3 (three) times daily before meals.  Qty: 24 tablet Refills: 3    metoclopramide HCl (REGLAN) 5 mg/5 mL Soln  TAKE 10ML BY MOUTH THREE TIMES A DAY  Qty: 420 mL Refills: 1    metoprolol succinate (TOPROL-XL) 100 MG 24 hr tablet  Take 100 mg by mouth 2 (two) times daily.    multivitamin capsule  Take 1 capsule by mouth once daily.    omeprazole (PRILOSEC) 40 MG capsule  Take 40 mg by mouth 2 (two) times daily before meals.     ondansetron (ZOFRAN) 8 MG tablet  Take 8 mg by mouth every 8 (eight) hours as needed.     ondansetron 4 mg/2 mL Soln  Inject 4 mg into the vein every 6 (six) hours as needed.  Qty: 36 mL Refills: 3    pantoprazole (PROTONIX) 40 mg injection  Inject 40 mg into the vein once daily.  Qty: 1200 mg Refills: 11    potassium chloride (KLOR-CON) 20 mEq Pack  Take 20 mEq by mouth once.     pregabalin (LYRICA) 50 MG capsule  1 capsule (50 mg total) by Per G Tube route 2 (two) times daily.  Qty: 60  "capsule Refills: 6    promethazine (PHENERGAN) 25 MG tablet  Take 1 tablet (25 mg total) by mouth every 6 (six) hours as needed for Nausea.  Qty: 60 tablet Refills: 1  Associated Diagnoses:Malignant carcinoid tumor of unknown primary site; Secondary neuroendocrine tumor of distant lymph nodes; Nausea    scopolamine (TRANSDERM-SCOP) 1.3-1.5 mg (1 mg over 3 days)  Place 1 patch onto the skin Every 3 (three) days.  Qty: 10 patch Refills: 2    SURE COMFORT PEN NEEDLE 32 gauge x " Ndle      tiZANidine (ZANAFLEX) 4 MG tablet  as needed.     vitamin D 1000 units Tab  Take 1,000 Units by mouth once daily.          I certify that this patient is confined to her home and needs intermittent skilled nursing care, physical therapy and occupational therapy.          Reprint Order Requisition     Ambulatory referral to Home Health (Order #[id number]) on 19   Associated Diagnoses      ICD-10-CM ICD-9-CM   Malignant carcinoid tumor of pancreas  - Primary     C7A.098 209.29   Collection Information     Acknowledgement Info     For At Acknowledged By Acknowledged On   Placing Order 19 1057 Betty Smith RN 19 1110   Patient Details   MRN:[id number]   Name Gender Identity  SSN   Kaylee Najera Female 1955       Address Phone Email [address] [facility] [place] [address] Home: [phone]   Work:    Cell: [phone]    [email or web address]  [email or web address] KAYLEE NAJERA      Inpatient Unit Inpatient Room Inpatient Bed    [facility] MEDICAL SURGICAL UNIT ACUTE K510 K510 A       PCP Allergies     Robert Garcia MD Calcium Carbonate, Codeine, Contrast Media, Epinephrine, Morphine, Propoxyphene N-acetaminophen, Propoxyphene Napsylate, Sulfa (Sulfonamide Antibiotics), Erythromycin, Erythromycin Base, Iodinated Contrast- Oral And Iv Dye, Penicillins      Primary Visit Coverage     Payer Plan Sponsor Code Group Number Group Name   HUMANA MANAGED MEDICARE HUMANA MEDICARE PPO  [id number]  "   Primary Visit Coverage subscriber     Subscriber ID Subscriber Name Subscriber Address   [id number] KAYLEE NAJERA [address] [place] [address]   Additional Information     Associated Reports   Priority and Order Details   ADT-Related Order Information    Encounter     View Encounter             Order Provider Info         Office phone Pager E-mail   Ordering User Marjorie Leo [phone] -- --   Authorizing Provider Marjorie Leo [phone] -- --   Attending Provider Cam Ashton [phone] -- --   Ambulatory referral to Home Health [[id number]]     Electronically signed by: Marjorie Leo MD on 04/16/19 1057 Status: Active   Ordering user: Marjorie Leo MD 04/16/19 1057 Ordering provider: Marjorie Leo MD   Authorized by: Marjorie Leo MD Ordering mode: Standard   Frequency:  04/16/19 -     Acknowledged: Betty Smith RN 04/16/19 1110 for Placing Order   Diagnoses  Malignant carcinoid tumor of pancreas [C7A.098]   Order comments: Surgery [facility] ORDERS FACE TO FACE ENCOUNTER Patient Name: Kaylee Najera Date of Birth:  1955 PCP: Robert Garcia MD PCP Address: [address] [place] [id number] PCP Phone Number: [phone] PCP Fax: [phone] Encounter Date: 04/16/2019 Admit to Home Health Diagnoses: Active Hospital Problems   *Malignant carcinoid tumor of pancreas [C7A.098]    POA: Yes Resolved Hospital Problems   Small bowel obstruction [K56.609]    POA: Yes        Date Resolved: 04/16/2019 I have seen and examined this patient face to face today. My clinical findings that support the need for the home health skilled services and home bound status are the following: Weakness/numbness causing balance and gait disturbance due to Weakness/Debility and Malignancy/Cancer making it taxing to leave home. Requiring assistive device to leave home due to unsteady gait caused  "by Weakness/Debility and Malignancy/Cancer. Allergies:Review of patient's allergies indicates:  -- Calcium carbonate -- Nausea And Vomiting   --  The liquid form  -- Codeine -- Hives  -- Contrast media   --  Oral and IV  -- Darvocet a500 [propoxyphene n-acetaminophen]  -- Epinephrine   --  Neuroendocrine Tumor patient            Neuroendocrine Tumor patient  -- Morphine  -- Propoxyphene napsylate  -- Sulfa (sulfonamide antibiotics)  -- Erythromycin -- Rash  -- Erythromycin base -- Rash  -- Iodinated contrast- oral and iv dye -- Rash and Other (See Comments)   --  Copper taste in mouth-Benadryl all that is needed            for scans            Copper taste in mouth-Benadryl all that is needed            for scans  -- Pcn [penicillins] -- Rash   --  "It looks like measles." Diet: enteral  G-Tube formula type Impact Peptide 1.5 or equivalent Bolus 3 times daily Activities: activity as tolerated Nursing: SN to complete comprehensive assessment including routine vital signs. Instruct on disease process and s/s of complications to report to MD. Review/verify medication list sent home with the patient at time of discharge  and instruct patient/caregiver as needed. Frequency may be adjusted depending on start of care date. If patient has enteral feeding tube (NG, PEG, J-tube, G-tube), flush tube before and after feeding and/or medication administration with 20-30 mL of water. Notify MD if SBP > 160 or < 90; DBP > 90 or < 50; HR > 120 or < 50; Temp > 101 CONSULTS:   Physical Therapy to evaluate and treat. Evaluate for home safety and equipment needs; Establish/upgrade home exercise program. Perform / instruct on therapeutic exercises, gait training, transfer training, and Range of Motion. Occupational Therapy to evaluate and treat. Evaluate home environment for safety and equipment needs. Perform/Instruct on transfers, ADL training, ROM, and therapeutic exercises. Aide to provide assistance with personal care, ADLs, and " vital signs. MISCELLANEOUS CARE: N/A WOUND CARE ORDERS no Medications: Review discharge medications with patient and family and provide education.   Current Discharge Medication List CONTINUE these medications which have NOT CHANGED ACCU-CHEK SHU CONTROL SOLN Soln ACCU-CHEK SHU PLUS METER Misc ACCU-CHEK SHU PLUS TEST STRP Strp acetaminophen (TYLENOL) 650 MG Supp Place 1 suppository (650 mg total) rectally every 8 (eight) hours as needed (101). Refills: 0 albuterol-ipratropium (DUO-NEB) 2.5 mg-0.5 mg/3 mL nebulizer solution Take 3 mLs by nebulization every 4 (four) hours. Rescue Qty: 1 Box Refills: 0 amitriptyline (ELAVIL) 50 MG tablet Take 50 mg by mouth every evening. amLODIPine (NORVASC) 5 MG tablet Take 5 mg by mouth once daily. artificial tears (ISOPTO TEARS) 0.5 % ophthalmic solution Place 1 drop into both eyes 4 (four) times daily as needed. calcium carbonate (OS-BEST) 600 mg calcium (1,500 mg) Tab Take 600 mg by mouth once. ciprofloxacin lactate in dextrose 5 % infusion Inject 125 mLs (250 mg total) into the vein every 12 (twelve) hours. Qty: 1000 mL Refills: 3 clonazePAM (KLONOPIN) 0.5 MG tablet Take 0.5 mg by mouth 2 (two) times daily as needed for Anxiety. cyanocobalamin 1,000 mcg/mL injection 1,000 mcg every 28 days. dexamethasone 1.5 mg (27 tabs) DsPk Take 1.5 mg by mouth once daily. Tapering dose as directed Qty: 27 tablet Refills: 0 Associated Diagnoses:Malignant carcinoid tumor of unknown primary site; Secondary neuroendocrine tumor of distant lymph nodes; Nausea diclofenac (CATAFLAM) 50 MG tablet as needed. diphenhydrAMINE (BENADRYL) 50 mg/mL injection Inject 0.25 mLs (12.5 mg total) into the vein every 4 (four) hours as needed for Itching. Qty: 200 mL Refills: 0 diphenoxylate-atropine 2.5-0.025 mg (LOMOTIL) 2.5-0.025 mg per tablet Take 1 tablet by mouth 4 (four) times daily as needed for Diarrhea. docusate (COLACE) 50 mg/5 mL liquid Take 10 mLs (100 mg total) by mouth daily as needed. Qty: 200  mL Refills: 0 dronabinol (MARINOL) 2.5 MG capsule Take 1 capsule (2.5 mg total) by mouth 2 (two) times daily. Qty: 40 capsule Refills: 0 DULoxetine (CYMBALTA) 60 MG capsule Take 60 mg by mouth once daily. escitalopram oxalate (LEXAPRO) 5 mg/5 mL Soln Take 10 mLs (10 mg total) by mouth once daily. Qty: 300 mL Refills: 11 fentaNYL (DURAGESIC) 25 mcg/hr Place 1 patch onto the skin every 72 hours. Qty: 24 patch Refills: 0 FLUARIX QUAD 6552-1134, PF, 60 mcg (15 mcg x 4)/0.5 mL Syrg vaccine GLUCAGON EMERGENCY KIT, HUMAN, 1 mg injection hydrOXYzine pamoate (VISTARIL) 25 MG Cap Take 25 mg by mouth 3 (three) times daily. insulin degludec (TRESIBA FLEXTOUCH U-100) 100 unit/mL (3 mL) InPn Inject 15 Units into the skin once daily. lamoTRIgine (LAMICTAL) 150 MG Tab lancets 30 gauge Misc lancing device Misc lanreotide (SOMATULINE DEPOT) 60 mg/0.2 mL Syrg inject 0.2 milliliter by subcutaneous route once a month losartan (COZAAR) 100 MG tablet Take 1 tablet (100 mg total) by mouth once daily. Qty: 90 tablet Refills: 3 metoclopramide HCl (REGLAN) 10 MG tablet Take 1 tablet (10 mg total) by mouth 3 (three) times daily before meals. Qty: 24 tablet Refills: 3 metoclopramide HCl (REGLAN) 5 mg/5 mL Soln TAKE 10ML BY MOUTH THREE TIMES A DAY Qty: 420 mL Refills: 1 metoprolol succinate (TOPROL-XL) 100 MG 24 hr tablet Take 100 mg by mouth 2 (two) times daily. multivitamin capsule Take 1 capsule by mouth once daily. omeprazole (PRILOSEC) 40 MG capsule Take 40 mg by mouth 2 (two) times daily before meals. ondansetron (ZOFRAN) 8 MG tablet Take 8 mg by mouth every 8 (eight) hours as needed. ondansetron 4 mg/2 mL Soln Inject 4 mg into the vein every 6 (six) hours as needed. Qty: 36 mL Refills: 3 pantoprazole (PROTONIX) 40 mg injection Inject 40 mg into the vein once daily. Qty: 1200 mg Refills: 11 potassium chloride (KLOR-CON) 20 mEq Pack Take 20 mEq by mouth once. pregabalin (LYRICA) 50 MG capsule 1 capsule (50 mg total) by Per G Tube route 2  "(two) times daily. Qty: 60 capsule Refills: 6 promethazine (PHENERGAN) 25 MG tablet Take 1 tablet (25 mg total) by mouth every 6 (six) hours as needed for Nausea. Qty: 60 tablet Refills: 1 Associated Diagnoses:Malignant carcinoid tumor of unknown primary site; Secondary neuroendocrine tumor of distant lymph nodes; Nausea scopolamine (TRANSDERM-SCOP) 1.3-1.5 mg (1 mg over 3 days) Place 1 patch onto the skin Every 3 (three) days. Qty: 10 patch Refills: 2 SURE COMFORT PEN NEEDLE 32 gauge x 5/32" Ndle tiZANidine (ZANAFLEX) 4 MG tablet as needed. vitamin D 1000 units Tab Take 1,000 Units by mouth once daily. I certify that this patient is confined to her home and needs intermittent skilled nursing care, physical therapy and occupational therapy.   Modified from: Ambulatory referral to Home Health [[id number]]   Order Diagnosis: Malignant carcinoid tumor of pancreas [C7A.098 (ICD-10-CM)] CPT:                Electronically signed by: Marjorie Leo MD Lic # [id number] NPI: [id number]       "

## 2019-04-16 NOTE — PROGRESS NOTES
Kaylee Ngo #[id number] (CSN: [id number]) (63 y.o. F) (Adm: 04/14/19)   [facility] [id number]   PCP     NICOLA RO   Date of Birth     1955   Demographics     Address: Home Phone: Work Phone: Mobile Phone:     [address] [place] [address] [phone]    SSN: Insurance: Marital Status: Sabianist:      HUMANA MANAGED MEDICARE Single [employer]    Admission Dx      Small bowel obstruction    Malignant carcinoid tumor of pancreas   Chief Complaint     None   Documents Filed to Patient     Power of  Living Will Clinical Unknown Study Attachment Consent Form ABN Waiver After Visit Summary Lab Result Scan Code Status Patient Portal Status   Not on File Not on File Not on File Not on File Filed Not on File Filed Not on File DNR [Updated on 04/15/19 0008] Active   Auth/Cert Information      Open Auth/Cert for Hospital Account [id number]      Admission Information     Attending Provider Admitting Provider Admission Type Admission Date/Time   MD Cam Joel MD Emergency 04/14/19 2053   Discharge Date Hospital Service Auth/Cert Status Service Area    Emergency Medicine Incomplete Hospitals in [place]   Unit Room/Bed Admission Status    [facility] MEDICAL SURGICAL UNIT ACUTE K510/K510 A Admission (Confirmed)    Hospital Account     Name Acct ID Class Status Primary Coverage   Kaylee Ngo [id number] IP- Inpatient Open HUMANA MANAGED MEDICARE - HUMANA MEDICARE PPO          Guarantor Account (for Hospital Account #[id number])     Name Relation to Pt Service Area Active? Acct Type   Kaylee Ngo Self OHSSA Yes Personal/Family   Address Phone     [address] [place] [address] [phone](H)  [phone](O)            Coverage Information (for Hospital Account #[id number])     F/O Payor/Plan Precert #   HUMANA MANAGED MEDICARE/HUMANA MEDICARE PPO    Subscriber Subscriber #   Kaylee Ngo [id number]   Address Phone   [address]  [address]  [place] [address] [phone]          Emergency Contact Information     Name: Emily Pickard Relationship: Sister   Address:     City:  State:  Zip:  Phone: [phone]    Business phone:

## 2019-04-16 NOTE — PROGRESS NOTES
"  WHEELCHAIR FOR HOME USE [] (Order [id number])   General Supply   Date and Time: 2019 10:58 AM Department: [facility] Acute Rel By/Authorizing: Marjorie Leo MD   Dept Order Information     Date Department   2019 [facility]   Order Information     Order Date/Time Release Date/Time Start Date/Time End Date/Time   19 10:58 AM None 2019 None   Order Details     Frequency Duration Priority Order Class   None None Routine Clinic Performed   Quantity     Ordering Quantity   1   Quantity     Ordering Quantity Ordering Quantity   1 1   Order Details     Order ID   [id number]   Reprint Order Requisition     WHEELCHAIR FOR HOME USE (Order #[id number]) on 19   Associated Diagnoses      ICD-10-CM ICD-9-CM   Malignant carcinoid tumor of pancreas  - Primary     C7A.098 209.29   Order Questions     Question Answer Comment   Hours in W/C per day: 3    Type of Wheelchair: Standard    Size(Width): 18"(STD adult)    Leg Support: STD footrests    Lap Belt: Velcro    Cushion: Basic    Height: 5' 4.5" (1.638 m)    Weight: 54.6 kg (120 lb 5.9 oz)    Does patient have medical equipment at home? nutrition supplies     feeding device    Length of need (1-99 months): 99    Please check all that apply: Patient mobility limitations cannot be sufficiently resolved by the use of other ambulatory therapies.           Process Instructions     Only use the "Resulting Agency" field below if you are sending DME for a patient during a [facility], [facility], or [facility] visit.      Collection Information     Patient Details   MRN:[id number]   Name Gender Identity  SSKaylee Munson Female 1955       Address Phone Email [address]   [place] [address] Home: [phone]   Work:    Cell: [phone]    [email or web address] KAYLEE NAJERA      Inpatient Unit Inpatient Room Inpatient Bed    [facility] MEDICAL SURGICAL UNIT ACUTE K510 " "K510 A       PCP Allergies     Robert Garcia MD Calcium Carbonate, Codeine, Contrast Media, Epinephrine, Morphine, Propoxyphene N-acetaminophen, Propoxyphene Napsylate, Sulfa (Sulfonamide Antibiotics), Erythromycin, Erythromycin Base, Iodinated Contrast- Oral And Iv Dye, Penicillins      Primary Visit Coverage     Payer Plan Sponsor Code Group Number Group Name   HUMANA MANAGED MEDICARE HUMANA MEDICARE PPO  [id number]    Primary Visit Coverage subscriber     Subscriber ID Subscriber Name Subscriber Address   [id number] NAOMI NAJERA [address] [place] [address]   Additional Information     Associated Reports   Priority and Order Details   ADT-Related Order Information    Encounter     View Encounter             Order Provider Info         Office phone Pager E-mail   Ordering User Marjorie Leo [phone] -- --   Authorizing Provider Marjorie Leo [phone] -- --   Attending Provider Cam Ashton [phone] -- --   WHEELCHAIR FOR HOME USE [[id number]]     Electronically signed by: Marjorie Leo MD on 04/16/19 1058 Status: Active   Ordering user: Marjorie Leo MD 04/16/19 1058 Ordering provider: Marjorie Leo MD   Authorized by: Marjorie Leo MD Ordering mode: Standard   Frequency:  04/16/19 -     Diagnoses  Malignant carcinoid tumor of pancreas [C7A.098]   Questionnaire     Question Answer   Hours in W/C per day: 3   Type of Wheelchair: Standard   Size(Width): 18"(STD adult)   Leg Support: STD footrests   Lap Belt: Velcro   Cushion: Basic   Height: 5' 4.5" (1.638 m)   Weight: 54.6 kg (120 lb 5.9 oz)   Does patient have medical equipment at home? nutrition supplies  feeding device   Length of need (1-99 months): 99   Please check all that apply: Patient mobility limitations cannot be sufficiently resolved by the use of other ambulatory therapies.   Order Diagnosis: Malignant carcinoid tumor of pancreas [C7A.098 (ICD-10-CM)] " CPT:                Electronically signed by: Marjorie Leo MD Lic # [id number] NPI: [id number]

## 2019-04-16 NOTE — PLAN OF CARE
Problem: Adult Inpatient Plan of Care  Goal: Plan of Care Review  Outcome: Ongoing (interventions implemented as appropriate)  Pt is aaox4. Patient safety maintained. Pain controlled with hydromorphone as ordered. Pt c/p of N&V  meds given as ordered, no  diarrhea. Pt's family called to discuss about pt over the phone. Pt family unable to provde password. Family informed of pt privacy policy that pt  information can not be discussed over the phone without providing password. Pt family called the doctor. The doctor said it is okay to give the second suppository within 12 hours. Suppository given will continue to monitor.

## 2019-04-16 NOTE — CONSULTS
[facility]  Adult Nutrition  Consult Note    SUMMARY     Recommendations    1. Rec TF to meet 50-75% of needs; adjust as needed depending on po intake/wt.    2. Rec TF of Glucerna: 4 cans/day goal to meet 75% of estimated needs.    -Pt can bolus as tolerated  1 can at a time or less depending on tolerance;   -can provide portion via nocturnal feeds with bolus during day ( 8 pm- 8 am @ 40 ml/hr (2 cans) + 2 cans bolus throughout day as tolerated).    -Fluid flushes to total ~750 ml/day to match 1 ml/kcal and depending on po intake.   - TF to provide 1500 kcal, 82 g pro, 750 ml fluid   3. Can adjust TF intake down or up depending on tolerance/po intake and weight; can adjust formula for tolerance issues (peptamen prebio at same rate will provide partially hydrolyzed formula)   4. Diet as tolerated   5. Monitor weight weekly   6. RD to monitor    Goals: Pt to meet >/=75% of EEN/EPN  Nutrition Goal Status: new  Communication of RD Recs: reviewed with RN(POC)    Reason for Assessment    Reason For Assessment: consult  Diagnosis: (emesis)  Relevant Medical History: PEG (2/21/19), DM2, gastric ulcer, GERD, HTN, HLD, malnutrition, nausea, neuroendocrine tumor    General Information Comments: Consulted for TF recs for home. Pt receiving nocturnal feeds at [facility]-reports difficulty tolerating feeds. Pt states she does better with small feeds throughout the day. Pt tolerates small amts of po orally.  Pt reports wt has been somewhat steady at 124#. Prev wt of 61 kg noted in 1/2019.  Pt unsure of prev TF formula that she tolerated at home prior to hospitalization. Pt was receiving peptamen prebio at [facility], hx/o Impact peptide use with previous admissions (not preferential for LT formula). NFPE 4/15: sx/o malnutrition present, see malnutrition section for details.     Nutrition Discharge Planning: Reg diet with supplemental TF.    Nutrition Risk Screen    Nutrition Risk Screen: other (see  "comments)(NPO)    Nutrition/Diet History    Spiritual, Cultural Beliefs, Baptist Practices, Values that Affect Care: no  Food Allergies: NKFA  Factors Affecting Nutritional Intake: altered gastrointestinal function, nausea/vomiting    Anthropometrics    Temp: 97.9 °F (36.6 °C)  Height Method: Stated  Height: 5' 4.5" (163.8 cm)  Height (inches): 64.5 in  Weight Method: Bed Scale  Weight: 54.6 kg (120 lb 5.9 oz)  Weight (lb): 120.37 lb  Ideal Body Weight (IBW), Female: 122.5 lb  % Ideal Body Weight, Female (lb): 98.26 lb  BMI (Calculated): 20.4  BMI Grade: 18.5-24.9 - normal  Weight Loss: unintentional  Usual Body Weight (UBW), k kg()  % Usual Body Weight: 89.7  % Weight Change From Usual Weight: -10.49 %       Lab/Procedures/Meds    Pertinent Labs Reviewed: reviewed  Pertinent Labs Comments: Glu 208, Alb 3.0, POCT glu 135  Pertinent Medications Reviewed: reviewed  Pertinent Medications Comments: B12, Fe    Estimated/Assessed Needs    Weight Used For Calorie Calculations: 58 kg (127 lb 13.9 oz)  Energy Calorie Requirements (kcal): 6284-9647 kcal  Energy Need Method: Kcal/kg  Protein Requirements: 70-87 gm daily  Weight Used For Protein Calculations: 58 kg (127 lb 13.9 oz)(1.2-1.5 gm daily)     Estimated Fluid Requirement Method: RDA Method  RDA Method (mL): 1740  CHO Requirement: 177 gm daily      Nutrition Prescription Ordered    Current Diet Order: Regular     Evaluation of Received Nutrient/Fluid Intake    I/O: reviewed  Energy Calories Required: not meeting needs  Protein Required: not meeting needs  Fluid Required: (per MD)  Comments: LBM: 4/15 (formed)  % Intake of Estimated Energy Needs: 0 - 25 %  % Meal Intake: 0 - 25 %    Nutrition Risk    Level of Risk/Frequency of Follow-up: (f/u 1x/weekly)     Assessment and Plan    Severe malnutrition  Severe Malnutrition in the context of Chronic Illness/Injury    Related to (etiology):  cancer    Signs and Symptoms (as evidenced by):  Energy Intake: <75% of " estimated energy requirement for > 3 months  Body Fat Depletion: moderate depletion of orbitals, triceps and thoracic and lumbar region   Muscle Mass Depletion: severe depletion of temples, clavicle region, scapular region, interosseous muscle and lower extremities   Weight Loss:12% x 3 months     Interventions:  Collaboration with other providers    Nutrition Diagnosis Status:  Continues       Monitor and Evaluation    Food and Nutrient Intake: energy intake  Food and Nutrient Adminstration: diet order  Knowledge/Beliefs/Attitudes: food and nutrition knowledge/skill  Physical Activity and Function: nutrition-related ADLs and IADLs  Anthropometric Measurements: weight, weight change  Biochemical Data, Medical Tests and Procedures: electrolyte and renal panel, lipid profile, gastrointestinal profile, glucose/endocrine profile, inflammatory profile  Nutrition-Focused Physical Findings: overall appearance     Malnutrition Assessment  Malnutrition Type: chronic illness  Energy Intake: moderate energy intake  Skin (Micronutrient): turgor reduced       Weight Loss (Malnutrition): greater than 7.5% in 3 months  Energy Intake (Malnutrition): less than or equal to 75% for greater than or equal to 1 month  Subcutaneous Fat (Malnutrition): severe depletion  Muscle Mass (Malnutrition): moderate depletion   Orbital Region (Subcutaneous Fat Loss): moderate depletion  Upper Arm Region (Subcutaneous Fat Loss): severe depletion  Thoracic and Lumbar Region: severe depletion   [place] Region (Muscle Loss): moderate depletion  Clavicle Bone Region (Muscle Loss): moderate depletion  Clavicle and Acromion Bone Region (Muscle Loss): severe depletion  Scapular Bone Region (Muscle Loss): moderate depletion  Dorsal Hand (Muscle Loss): moderate depletion  Patellar Region (Muscle Loss): moderate depletion  Anterior Thigh Region (Muscle Loss): moderate depletion  Posterior Calf Region (Muscle Loss): moderate depletion       Subcutaneous Fat Loss  (Final Summary): moderate protein-calorie malnutrition  Muscle Loss Evaluation (Final Summary): severe protein-calorie malnutrition         Nutrition Follow-Up    RD Follow-up?: Yes

## 2019-04-16 NOTE — PLAN OF CARE
Recommendations     1. Rec TF to meet 50-75% of needs; adjust as needed depending on po intake/wt.    2. Rec TF of Glucerna: 4 cans/day goal to meet 75% of estimated needs.    -Pt can bolus as tolerated  1 can at a time or less depending on tolerance;   -can provide portion via nocturnal feeds with bolus during day ( 8 pm- 8 am @ 40 ml/hr (2 cans) + 2 cans bolus throughout day as tolerated).    -Fluid flushes to total ~750 ml/day to match 1 ml/kcal and depending on po intake.   - TF to provide 1500 kcal, 82 g pro, 750 ml fluid   3. Can adjust TF intake down or up depending on tolerance/po intake and weight; can adjust formula for tolerance issues (peptamen prebio at same rate will provide partially hydrolyzed formula)   4. Diet as tolerated   5. Monitor weight weekly   6. RD to monitor     Goals: Pt to meet >/=75% of EEN/EPN  Nutrition Goal Status: new  Communication of RD Recs: reviewed with RN(POC)

## 2019-04-16 NOTE — PLAN OF CARE
VN reviewed discharge instructions with pt and sister. AVS printed and handed to pt by bedside nurse. Reviewed follow-up appointments, medications, diet, and importance of medication compliance. Reviewed home care instructions: treatment plan, self-management, and when to seek medical attention. Allowed time for questions. All questions answered. Patient/sister verbalized complete understanding of discharge instructions and voices no concerns.    Discharge instructions complete. Bedside delivery done. Transport/wheelchair requested. Bedside nurse notified.

## 2019-04-16 NOTE — PROGRESS NOTES
Kaylee Ngo #[id number] (CSN: [id number]) (63 y.o. F) (Adm: 04/14/19)   [facility] [id number]   PCP     NICOLA RO   Date of Birth     1955   Demographics     Address: Home Phone: Work Phone: Mobile Phone:     [address] [place] [address] [phone]    SSN: Insurance: Marital Status: Yarsani:      HUMANA MANAGED MEDICARE Single Religious    Admission Dx      Small bowel obstruction    Malignant carcinoid tumor of pancreas   Chief Complaint     None   Documents Filed to Patient     Power of  Living Will Clinical Unknown Study Attachment Consent Form ABN Waiver After Visit Summary Lab Result Scan Code Status Patient Portal Status   Not on File Not on File Not on File Not on File Filed Not on File Filed Not on File DNR [Updated on 04/15/19 0008] Active   Auth/Cert Information      Open Auth/Cert for Hospital Account [id number]      Admission Information     Attending Provider Admitting Provider Admission Type Admission Date/Time   MD Cam Joel MD Emergency 04/14/19 2053   Discharge Date Hospital Service Auth/Cert Status Service Area    Emergency Medicine Incomplete [facility]   Unit Room/Bed Admission Status    [facility] MEDICAL SURGICAL UNIT ACUTE K510/K510 A Admission (Confirmed)    Hospital Account     Name Acct ID Class Status Primary Coverage   Kaylee Ngo [id number] IP- Inpatient Open HUMANA MANAGED MEDICARE - HUMANA MEDICARE PPO          Guarantor Account (for Hospital Account #[id number])     Name Relation to Pt Service Area Active? Acct Type   Kaylee Ngo Self OHSSA Yes Personal/Family   Address Phone     [address] [place] [address] [phone](H)  [phone](O)            Coverage Information (for Hospital Account #[id number])     F/O Payor/Plan Precert #   HUMANA MANAGED MEDICARE/HUMANA MEDICARE PPO    Subscriber Subscriber #   Kaylee Ngo [id number]   Address Phone   [address]  [address]  [place] [address] [phone]          Emergency Contact Information     Name: Emily Pickard Relationship: Sister   Address:     City:  State:  Zip:  Phone: [phone]    Business phone:

## 2019-04-16 NOTE — PLAN OF CARE
04/16/19 1016   Type of Frequent Check   Type Patient Rounds;Other (see comments)  (VN rounds:pt refuses VN; will cont to be available as needed)

## 2019-04-16 NOTE — PHYSICIAN QUERY
PT Name: Kaylee Ngo  MR #: [id number]     Physician Query Form - Bowel Obstruction Clarification     CDS/: Mariaelena Cheek RN, CDS               Contact information:[email or web address]                                                                                                                        [phone]  This form is a permanent document in the medical record.     Query Date: April 16, 2019    By submitting this query, we are merely seeking further clarification of documentation to reflect the severity of illness of your patient. Please utilize your independent clinical judgment when addressing the question(s) below.    The Medical record reflects the following:     Indicators   Supporting Clinical Findings Location in Medical Record   x Bowel obstruction, intestinal obstruction, LBO or SBO documented Small bowel obstruction [K56.609] DC Summary 4/16   x Radiology findings Partial obstruction at the proximal jejunum in the left upper quadrant near the previous bowel resection...Mesenteric mass, retroperitoneal adenopathy, and numerous sclerotic bone lesions, increased from prior.  There also new subcutaneous masses in the anterior abdominal wall, all concerning for metastatic disease   CT Abdomen 4/14   x Treatment/Medication made NPO, with NGT placed   DC Summary 4/16    Procedure/Surgery     x Other Patient was admitted for concerns of pSBO and made NPO, with NGT placed, and started on IVFs.  She improved overnight.  NGT was capped and later removed.  She was provided with liquid diet, tolerated well, and advanced.  Patient received suppository with relief.  On day of discharge, patient reports feeling immproved and requesting to go home.  Patient to be discharged home with home health and PT/OT    63 y.o. female presenting with hx NET, remote gastric bypass, s/p RYGB reversal and PEG placement in Jan 2019. Presented to OSH ER for evaluation of N/V. Imaging concerning for  pSBO    Past Surgical History:  CHOLECYSTECTOMY  EXCISION, LIVER  GASTRIC BYPASS  INSERTION-TUBE-GASTROSTOMY-LAPAROSCOPICLAPAROTOMY, EXPLORATORY   DC Summary 4/16                    DC Summary 4/16        H&P 4/14     Provider, please further specify the bowel obstruction diagnosis:  [   ] Partial or incomplete intestinal obstruction, due to adhesions   [   ] Partial or incomplete intestinal obstruction, due to neoplasm   [   ] Partial or incomplete intestinal obstruction, due to other (please specify): ____________   [   ] Partial or incomplete intestinal obstruction, unknown or unspecified etiology   [xx   ] Other intestinal condition (please specify): _no obstruction____________________   [   ] Clinically Undetermined     Please document in your progress notes daily for the duration of treatment until resolved, and include in your discharge summary.

## 2019-04-16 NOTE — PROGRESS NOTES
Ambulatory referral to Home Health [[id number]] (Order [id number])   Outpatient Referral   Date and Time: 4/16/2019 10:57 AM Department: [facility] Acute Rel By/Authorizing: Marjorie Leo MD   Dept Order Information     Date Department   4/16/2019 [facility] Acute   Order Information     Order Date/Time Release Date/Time Start Date/Time End Date/Time   04/16/19 10:57 AM None 4/16/2019 None   Order Details     Frequency Duration Priority Order Class   None None Routine Internal Referral   Quantity     Ordering Quantity   1   Quantity     Ordering Quantity Ordering Quantity   1 1   Order Details     Order ID   [id number]   Comments     Surgery [facility] ORDERS  FACE TO FACE ENCOUNTER    Patient Name: Kaylee Ngo  Date of Birth:  1955    PCP: Robert Garcia MD   PCP Address: [address] [place], [place] [address]  PCP Phone Number: [phone]  PCP Fax: [phone]    Encounter Date: 04/16/2019    Admit to Home Health    Diagnoses:  Active Hospital Problems    *Malignant carcinoid tumor of pancreas [C7A.098]     POA: Yes      Resolved Hospital Problems    Small bowel obstruction [K56.609]     POA: Yes         Date Resolved: 04/16/2019          I have seen and examined this patient face to face today. My clinical findings that support the need for the home health skilled services and home bound status are the following:  Weakness/numbness causing balance and gait disturbance due to Weakness/Debility and Malignancy/Cancer making it taxing to leave home.  Requiring assistive device to leave home due to unsteady gait caused by Weakness/Debility and Malignancy/Cancer.    Allergies:Review of patient's allergies indicates:   -- Calcium carbonate -- Nausea And Vomiting    --  The liquid form   -- Codeine -- Hives   -- Contrast media     --  Oral and IV   -- Darvocet a500 [propoxyphene n-acetaminophen]    -- Epinephrine     --  " Neuroendocrine Tumor patient             Neuroendocrine Tumor patient   -- Morphine    -- Propoxyphene napsylate    -- Sulfa (sulfonamide antibiotics)    -- Erythromycin -- Rash   -- Erythromycin base -- Rash   -- Iodinated contrast- oral and iv dye -- Rash and Other (See Comments)    --  Copper taste in mouth-Benadryl all that is needed             for scans             Copper taste in mouth-Benadryl all that is needed             for scans   -- Pcn [penicillins] -- Rash    --  "It looks like measles."    Diet: enteral  G-Tube formula type Impact Peptide 1.5 or equivalent Bolus 3 times daily    Activities: activity as tolerated    Nursing:   SN to complete comprehensive assessment including routine vital signs. Instruct on disease process and s/s of complications to report to MD. Review/verify medication list sent home with the patient at time of discharge  and instruct patient/caregiver as needed. Frequency may be adjusted depending on start of care date. If patient has enteral feeding tube (NG, PEG, J-tube, G-tube), flush tube before and after feeding and/or medication administration with 20-30 mL of water.    Notify MD if SBP > 160 or < 90; DBP > 90 or < 50; HR > 120 or < 50; Temp > 101       CONSULTS:    Physical Therapy to evaluate and treat. Evaluate for home safety and equipment needs; Establish/upgrade home exercise program. Perform / instruct on therapeutic exercises, gait training, transfer training, and Range of Motion.  Occupational Therapy to evaluate and treat. Evaluate home environment for safety and equipment needs. Perform/Instruct on transfers, ADL training, ROM, and therapeutic exercises.  Aide to provide assistance with personal care, ADLs, and vital signs.    MISCELLANEOUS CARE:  N/A    WOUND CARE ORDERS  no      Medications: Review discharge medications with patient and family and provide education.      Current Discharge Medication List    CONTINUE these medications which have NOT " CHANGED    ACCU-CHEK SHU CONTROL SOLN Soln      ACCU-CHEK SHU PLUS METER Misc      ACCU-CHEK SHU PLUS TEST STRP Strp      acetaminophen (TYLENOL) 650 MG Supp  Place 1 suppository (650 mg total) rectally every 8 (eight) hours as needed (101).  Refills: 0    albuterol-ipratropium (DUO-NEB) 2.5 mg-0.5 mg/3 mL nebulizer solution  Take 3 mLs by nebulization every 4 (four) hours. Rescue  Qty: 1 Box Refills: 0    amitriptyline (ELAVIL) 50 MG tablet  Take 50 mg by mouth every evening.    amLODIPine (NORVASC) 5 MG tablet  Take 5 mg by mouth once daily.     artificial tears (ISOPTO TEARS) 0.5 % ophthalmic solution  Place 1 drop into both eyes 4 (four) times daily as needed.    calcium carbonate (OS-BEST) 600 mg calcium (1,500 mg) Tab  Take 600 mg by mouth once.    ciprofloxacin lactate in dextrose 5 % infusion  Inject 125 mLs (250 mg total) into the vein every 12 (twelve) hours.  Qty: 1000 mL Refills: 3    clonazePAM (KLONOPIN) 0.5 MG tablet  Take 0.5 mg by mouth 2 (two) times daily as needed for Anxiety.    cyanocobalamin 1,000 mcg/mL injection  1,000 mcg every 28 days.    dexamethasone 1.5 mg (27 tabs) DsPk  Take 1.5 mg by mouth once daily. Tapering dose as directed  Qty: 27 tablet Refills: 0  Associated Diagnoses:Malignant carcinoid tumor of unknown primary site; Secondary neuroendocrine tumor of distant lymph nodes; Nausea    diclofenac (CATAFLAM) 50 MG tablet  as needed.     diphenhydrAMINE (BENADRYL) 50 mg/mL injection  Inject 0.25 mLs (12.5 mg total) into the vein every 4 (four) hours as needed for Itching.  Qty: 200 mL Refills: 0    diphenoxylate-atropine 2.5-0.025 mg (LOMOTIL) 2.5-0.025 mg per tablet  Take 1 tablet by mouth 4 (four) times daily as needed for Diarrhea.    docusate (COLACE) 50 mg/5 mL liquid  Take 10 mLs (100 mg total) by mouth daily as needed.  Qty: 200 mL Refills: 0    dronabinol (MARINOL) 2.5 MG capsule  Take 1 capsule (2.5 mg total) by mouth 2 (two) times daily.  Qty: 40 capsule Refills:  0    DULoxetine (CYMBALTA) 60 MG capsule  Take 60 mg by mouth once daily.    escitalopram oxalate (LEXAPRO) 5 mg/5 mL Soln  Take 10 mLs (10 mg total) by mouth once daily.  Qty: 300 mL Refills: 11    fentaNYL (DURAGESIC) 25 mcg/hr  Place 1 patch onto the skin every 72 hours.  Qty: 24 patch Refills: 0    FLUARIX QUAD [id number], PF, 60 mcg (15 mcg x 4)/0.5 mL Syrg vaccine      GLUCAGON EMERGENCY KIT, HUMAN, 1 mg injection      hydrOXYzine pamoate (VISTARIL) 25 MG Cap  Take 25 mg by mouth 3 (three) times daily.     insulin degludec (TRESIBA FLEXTOUCH U-100) 100 unit/mL (3 mL) InPn  Inject 15 Units into the skin once daily.     lamoTRIgine (LAMICTAL) 150 MG Tab      lancets 30 gauge Misc      lancing device Misc      lanreotide (SOMATULINE DEPOT) 60 mg/0.2 mL Syrg  inject 0.2 milliliter by subcutaneous route once a month    losartan (COZAAR) 100 MG tablet  Take 1 tablet (100 mg total) by mouth once daily.  Qty: 90 tablet Refills: 3    metoclopramide HCl (REGLAN) 10 MG tablet  Take 1 tablet (10 mg total) by mouth 3 (three) times daily before meals.  Qty: 24 tablet Refills: 3    metoclopramide HCl (REGLAN) 5 mg/5 mL Soln  TAKE 10ML BY MOUTH THREE TIMES A DAY  Qty: 420 mL Refills: 1    metoprolol succinate (TOPROL-XL) 100 MG 24 hr tablet  Take 100 mg by mouth 2 (two) times daily.    multivitamin capsule  Take 1 capsule by mouth once daily.    omeprazole (PRILOSEC) 40 MG capsule  Take 40 mg by mouth 2 (two) times daily before meals.     ondansetron (ZOFRAN) 8 MG tablet  Take 8 mg by mouth every 8 (eight) hours as needed.     ondansetron 4 mg/2 mL Soln  Inject 4 mg into the vein every 6 (six) hours as needed.  Qty: 36 mL Refills: 3    pantoprazole (PROTONIX) 40 mg injection  Inject 40 mg into the vein once daily.  Qty: 1200 mg Refills: 11    potassium chloride (KLOR-CON) 20 mEq Pack  Take 20 mEq by mouth once.     pregabalin (LYRICA) 50 MG capsule  1 capsule (50 mg total) by Per G Tube route 2 (two) times daily.  Qty: 60  "capsule Refills: 6    promethazine (PHENERGAN) 25 MG tablet  Take 1 tablet (25 mg total) by mouth every 6 (six) hours as needed for Nausea.  Qty: 60 tablet Refills: 1  Associated Diagnoses:Malignant carcinoid tumor of unknown primary site; Secondary neuroendocrine tumor of distant lymph nodes; Nausea    scopolamine (TRANSDERM-SCOP) 1.3-1.5 mg (1 mg over 3 days)  Place 1 patch onto the skin Every 3 (three) days.  Qty: 10 patch Refills: 2    SURE COMFORT PEN NEEDLE 32 gauge x " Ndle      tiZANidine (ZANAFLEX) 4 MG tablet  as needed.     vitamin D 1000 units Tab  Take 1,000 Units by mouth once daily.          I certify that this patient is confined to her home and needs intermittent skilled nursing care, physical therapy and occupational therapy.          Reprint Order Requisition     Ambulatory referral to Home Health (Order #[id number]) on 19   Associated Diagnoses      ICD-10-CM ICD-9-CM   Malignant carcinoid tumor of pancreas  - Primary     C7A.098 209.29   Collection Information     Acknowledgement Info     For At Acknowledged By Acknowledged On   Placing Order 19 1057 Betty Smith RN 19 1110   Patient Details   MRN:[id number]   Name Gender Identity  SSN   Kaylee Najera Female 1955       Address Phone Email [address]ibe[address] Pat[address] [facility] [place] [address] Home: [phone]   Work:    Cell: [phone]    [email or web address]  [email or web address] KAYLEE NAJERA      Inpatient Unit Inpatient Room Inpatient Bed    [facility] MEDICAL SURGICAL UNIT ACUTE K510 K510 A       PCP Allergies     Robert Garcia MD Calcium Carbonate, Codeine, Contrast Media, Epinephrine, Morphine, Propoxyphene N-acetaminophen, Propoxyphene Napsylate, Sulfa (Sulfonamide Antibiotics), Erythromycin, Erythromycin Base, Iodinated Contrast- Oral And Iv Dye, Penicillins      Primary Visit Coverage     Payer Plan Sponsor Code Group Number Group Name   HU[employer] MANAGED MEDICARE HU[employer] MEDICARE PPO  [id number]  "   Primary Visit Coverage subscriber     Subscriber ID Subscriber Name Subscriber Address   [id number] KAYLEE NAJERA [address] [facility] [place] [place] [address]   Additional Information     Associated Reports   Priority and Order Details   ADT-Related Order Information    Encounter     View Encounter             Order Provider Info         Office phone Pager E-mail   Ordering User Marjorie Leo [phone] -- --   Authorizing Provider Marjorie Leo [phone] -- --   Attending Provider Cam Ashton [phone] -- --   Ambulatory referral to Home Health [[id number]]     Electronically signed by: Marjorie Leo MD on 04/16/19 1057 Status: Active   Ordering user: Marjorie Leo MD 04/16/19 1057 Ordering provider: Marjorie Leo MD   Authorized by: Marjorie Leo MD Ordering mode: Standard   Frequency:  04/16/19 -     Acknowledged: Betty Smith RN 04/16/19 1110 for Placing Order   Diagnoses  Malignant carcinoid tumor of pancreas [C7A.098]   Order comments: Surgery [facility] ORDERS FACE TO FACE ENCOUNTER Patient Name: Kaylee Najera Date of Birth:  1955 PCP: Robert Garcia MD PCP Address: [address] [place] [id number] PCP Phone Number: [phone] PCP Fax: [phone] Encounter Date: 04/16/2019 Admit to Home Health Diagnoses: Active Hospital Problems   *Malignant carcinoid tumor of pancreas [C7A.098]    POA: Yes Resolved Hospital Problems   Small bowel obstruction [K56.609]    POA: Yes        Date Resolved: 04/16/2019 I have seen and examined this patient face to face today. My clinical findings that support the need for the home health skilled services and home bound status are the following: Weakness/numbness causing balance and gait disturbance due to Weakness/Debility and Malignancy/Cancer making it taxing to leave home. Requiring assistive device to leave home due to unsteady gait caused  "by Weakness/Debility and Malignancy/Cancer. Allergies:Review of patient's allergies indicates:  -- Calcium carbonate -- Nausea And Vomiting   --  The liquid form  -- Codeine -- Hives  -- Contrast media   --  Oral and IV  -- Darvocet a500 [propoxyphene n-acetaminophen]  -- Epinephrine   --  Neuroendocrine Tumor patient            Neuroendocrine Tumor patient  -- Morphine  -- Propoxyphene napsylate  -- Sulfa (sulfonamide antibiotics)  -- Erythromycin -- Rash  -- Erythromycin base -- Rash  -- Iodinated contrast- oral and iv dye -- Rash and Other (See Comments)   --  Copper taste in mouth-Benadryl all that is needed            for scans            Copper taste in mouth-Benadryl all that is needed            for scans  -- Pcn [penicillins] -- Rash   --  "It looks like measles." Diet: enteral  G-Tube formula type Impact Peptide 1.5 or equivalent Bolus 3 times daily Activities: activity as tolerated Nursing: SN to complete comprehensive assessment including routine vital signs. Instruct on disease process and s/s of complications to report to MD. Review/verify medication list sent home with the patient at time of discharge  and instruct patient/caregiver as needed. Frequency may be adjusted depending on start of care date. If patient has enteral feeding tube (NG, PEG, J-tube, G-tube), flush tube before and after feeding and/or medication administration with 20-30 mL of water. Notify MD if SBP > 160 or < 90; DBP > 90 or < 50; HR > 120 or < 50; Temp > 101 CONSULTS:   Physical Therapy to evaluate and treat. Evaluate for home safety and equipment needs; Establish/upgrade home exercise program. Perform / instruct on therapeutic exercises, gait training, transfer training, and Range of Motion. Occupational Therapy to evaluate and treat. Evaluate home environment for safety and equipment needs. Perform/Instruct on transfers, ADL training, ROM, and therapeutic exercises. Aide to provide assistance with personal care, ADLs, and " vital signs. MISCELLANEOUS CARE: N/A WOUND CARE ORDERS no Medications: Review discharge medications with patient and family and provide education.   Current Discharge Medication List CONTINUE these medications which have NOT CHANGED ACCU-CHEK SHU CONTROL SOLN Soln ACCU-CHEK SHU PLUS METER Misc ACCU-CHEK SHU PLUS TEST STRP Strp acetaminophen (TYLENOL) 650 MG Supp Place 1 suppository (650 mg total) rectally every 8 (eight) hours as needed (101). Refills: 0 albuterol-ipratropium (DUO-NEB) 2.5 mg-0.5 mg/3 mL nebulizer solution Take 3 mLs by nebulization every 4 (four) hours. Rescue Qty: 1 Box Refills: 0 amitriptyline (ELAVIL) 50 MG tablet Take 50 mg by mouth every evening. amLODIPine (NORVASC) 5 MG tablet Take 5 mg by mouth once daily. artificial tears (ISOPTO TEARS) 0.5 % ophthalmic solution Place 1 drop into both eyes 4 (four) times daily as needed. calcium carbonate (OS-BEST) 600 mg calcium (1,500 mg) Tab Take 600 mg by mouth once. ciprofloxacin lactate in dextrose 5 % infusion Inject 125 mLs (250 mg total) into the vein every 12 (twelve) hours. Qty: 1000 mL Refills: 3 clonazePAM (KLONOPIN) 0.5 MG tablet Take 0.5 mg by mouth 2 (two) times daily as needed for Anxiety. cyanocobalamin 1,000 mcg/mL injection 1,000 mcg every 28 days. dexamethasone 1.5 mg (27 tabs) DsPk Take 1.5 mg by mouth once daily. Tapering dose as directed Qty: 27 tablet Refills: 0 Associated Diagnoses:Malignant carcinoid tumor of unknown primary site; Secondary neuroendocrine tumor of distant lymph nodes; Nausea diclofenac (CATAFLAM) 50 MG tablet as needed. diphenhydrAMINE (BENADRYL) 50 mg/mL injection Inject 0.25 mLs (12.5 mg total) into the vein every 4 (four) hours as needed for Itching. Qty: 200 mL Refills: 0 diphenoxylate-atropine 2.5-0.025 mg (LOMOTIL) 2.5-0.025 mg per tablet Take 1 tablet by mouth 4 (four) times daily as needed for Diarrhea. docusate (COLACE) 50 mg/5 mL liquid Take 10 mLs (100 mg total) by mouth daily as needed. Qty: 200  mL Refills: 0 dronabinol (MARINOL) 2.5 MG capsule Take 1 capsule (2.5 mg total) by mouth 2 (two) times daily. Qty: 40 capsule Refills: 0 DULoxetine (CYMBALTA) 60 MG capsule Take 60 mg by mouth once daily. escitalopram oxalate (LEXAPRO) 5 mg/5 mL Soln Take 10 mLs (10 mg total) by mouth once daily. Qty: 300 mL Refills: 11 fentaNYL (DURAGESIC) 25 mcg/hr Place 1 patch onto the skin every 72 hours. Qty: 24 patch Refills: 0 FLUARIX QUAD [id number], PF, 60 mcg (15 mcg x 4)/0.5 mL Syrg vaccine GLUCAGON EMERGENCY KIT, HUMAN, 1 mg injection hydrOXYzine pamoate (VISTARIL) 25 MG Cap Take 25 mg by mouth 3 (three) times daily. insulin degludec (TRESIBA FLEXTOUCH U-100) 100 unit/mL (3 mL) InPn Inject 15 Units into the skin once daily. lamoTRIgine (LAMICTAL) 150 MG Tab lancets 30 gauge Misc lancing device Misc lanreotide (SOMATULINE DEPOT) 60 mg/0.2 mL Syrg inject 0.2 milliliter by subcutaneous route once a month losartan (COZAAR) 100 MG tablet Take 1 tablet (100 mg total) by mouth once daily. Qty: 90 tablet Refills: 3 metoclopramide HCl (REGLAN) 10 MG tablet Take 1 tablet (10 mg total) by mouth 3 (three) times daily before meals. Qty: 24 tablet Refills: 3 metoclopramide HCl (REGLAN) 5 mg/5 mL Soln TAKE 10ML BY MOUTH THREE TIMES A DAY Qty: 420 mL Refills: 1 metoprolol succinate (TOPROL-XL) 100 MG 24 hr tablet Take 100 mg by mouth 2 (two) times daily. multivitamin capsule Take 1 capsule by mouth once daily. omeprazole (PRILOSEC) 40 MG capsule Take 40 mg by mouth 2 (two) times daily before meals. ondansetron (ZOFRAN) 8 MG tablet Take 8 mg by mouth every 8 (eight) hours as needed. ondansetron 4 mg/2 mL Soln Inject 4 mg into the vein every 6 (six) hours as needed. Qty: 36 mL Refills: 3 pantoprazole (PROTONIX) 40 mg injection Inject 40 mg into the vein once daily. Qty: 1200 mg Refills: 11 potassium chloride (KLOR-CON) 20 mEq Pack Take 20 mEq by mouth once. pregabalin (LYRICA) 50 MG capsule 1 capsule (50 mg total) by Per G Tube route 2  "(two) times daily. Qty: 60 capsule Refills: 6 promethazine (PHENERGAN) 25 MG tablet Take 1 tablet (25 mg total) by mouth every 6 (six) hours as needed for Nausea. Qty: 60 tablet Refills: 1 Associated Diagnoses:Malignant carcinoid tumor of unknown primary site; Secondary neuroendocrine tumor of distant lymph nodes; Nausea scopolamine (TRANSDERM-SCOP) 1.3-1.5 mg (1 mg over 3 days) Place 1 patch onto the skin Every 3 (three) days. Qty: 10 patch Refills: 2 SURE COMFORT PEN NEEDLE 32 gauge x 5/32" Ndle tiZANidine (ZANAFLEX) 4 MG tablet as needed. vitamin D 1000 units Tab Take 1,000 Units by mouth once daily. I certify that this patient is confined to her home and needs intermittent skilled nursing care, physical therapy and occupational therapy.   Modified from: Ambulatory referral to Home Health [[id number]]   Order Diagnosis: Malignant carcinoid tumor of pancreas [C7A.098 (ICD-10-CM)] CPT:                Electronically signed by: Marjorie Leo MD Lic # [id number] NPI: [id number]       "

## 2019-04-16 NOTE — PLAN OF CARE
pt for d/c to home today   TN met with pt and spoke with sister Emily  [phone]   pt will need home TF - bolus   WC and HH/PT/OT     Cuauhtemoc with CPP will meet with pt - per sister - pt had home tube feedings in the past - with [facility]   Referral sent per [facility] to [facility] (per pt's sister - this is pt's HH agency)     [facility] rec'd call from [facility] - pt accepted     Order for WC sent to [facility]   ok to pull per Elizabeth -item delivered to pt room.           04/16/19 1120   Final Note   Assessment Type Final Discharge Note   Anticipated Discharge Disposition Home-Health  ([facility] -- [facility] for enteral feeding )   What phone number can be called within the next 1-3 days to see how you are doing after discharge? [phone]   Hospital Follow Up  Appt(s) scheduled?   (pt will make own f/u apt )   Discharge plans and expectations educations in teach back method with documentation complete? Yes   [facility] Referral Info   Post Acute Recommendation SNF / Sub-Acute Rehab   Referral Type   (home care )

## 2019-04-16 NOTE — PT/OT/SLP EVAL
"Physical Therapy Evaluation    Patient Name:  Kaylee Ngo   MRN:  [id number]    Recommendations:     Discharge Recommendations:  home health PT, home health OT   Discharge Equipment Recommendations: wheelchair, manual   Barriers to discharge: Decreased caregiver support    Assessment:     Kaylee Ngo is a 63 y.o. female admitted with a medical diagnosis of Malignant carcinoid tumor of pancreas.  She presents with the following impairments/functional limitations:  weakness, impaired endurance, impaired self care skills, impaired functional mobilty, gait instability, impaired balance, decreased safety awareness. Pt ambulated 65 ft with RW and supervision with further gait/mobility limited by pt's reports of nausea. Recommending HH PT/OT upon d/c, DME needs: manual w/c (was delivered to pt's room today).    Rehab Prognosis: Good; patient would benefit from acute skilled PT services to address these deficits and reach maximum level of function.    Recent Surgery: * No surgery found *      Plan:     During this hospitalization, patient to be seen 5 x/week to address the identified rehab impairments via gait training, therapeutic activities, therapeutic exercises and progress toward the following goals:    · Plan of Care Expires:  05/16/19    Subjective     Chief Complaint: nausea when ambulation  Patient/Family Comments/goals: "I'll do what you need me to do"  Pain/Comfort:  · Pain Rating 1: 0/10(only c/o nausea)  · Pain Rating Post-Intervention 1: 0/10    Patients cultural, spiritual, [employer]ar[employer] conflicts given the current situation: no    Living Environment:  Pt lives alone in a mobile home with 4 BOSSMAN with B rails and WIS with shower stool.  Prior to admission, patients level of function was mod I with rollator prior to hospital admission in January, since then has been in hospital and SNF. Pt was using standard walker in her bathroom because the rollator would not fit.  Equipment used at home:  .  DME owned " (not currently used): none.  Upon discharge, patient will have assistance from her sister lives next door and will be providing pt with meals and intermittent daily supervision/assist.    Objective:     Communicated with CHANELLE Hernández prior to session.  Patient found HOB elevated with bed alarm, PEG Tube  upon PT entry to room.    General Precautions: Standard, fall   Orthopedic Precautions:N/A   Braces: N/A     Exams:  · Postural Exam:  Patient presented with the following abnormalities:    · -       Rounded shoulders  · Sensation:    · -       Intact  · Skin Integrity/Edema:      · -       Skin integrity: PEG tube   · RLE ROM: WFL  · RLE Strength: WFL except hip flexion 4/5  · LLE ROM: WFL  · LLE Strength: WFL except hip flexion 4-/5    Functional Mobility:  · Bed Mobility:     · Scooting: supervision  · Supine to Sit: modified independence  · Transfers:     · Sit to Stand:  supervision with rolling walker  · Bed to Chair: stand by assistance and contact guard assistance with  no AD  using  Step Transfer  · Toilet Transfer: supervision with  rolling walker  using  Step Transfer  · Gait: 10 ft with no AD and SBA/CGA with instability ambulating bed>chair. 25 ft and 65 ft with RW and supervision - cues for shoulder relaxation and pursed lip breathing with activity.      Therapeutic Activities and Exercises:  Pt ambulated to sit in chair, c/o nausea as soon as she sat in chair- no vomiting.  Ambulated to toilet with supervision and completed toileting and hand hygiene with distance supervision.  Ambulated in halls then back to bed, limited by nausea. Pt dry heaving but no vomiting. RN present and aware.  Left up in bed with HOB elevated.    AM-PAC 6 CLICK MOBILITY  Total Score:18     Patient left HOB elevated with all lines intact, call button in reach, bed alarm on and RN pt's sister present.    GOALS:   Multidisciplinary Problems     Physical Therapy Goals        Problem: Physical Therapy Goal    Goal Priority  Disciplines Outcome Goal Variances Interventions   Physical Therapy Goal     PT, PT/OT Ongoing (interventions implemented as appropriate)     Description:  Goals to be met by: 19     Patient will increase functional independence with mobility by performin. Supine <> sit with Modified [place]  2. Sit to stand transfer with Modified [place]  3. Bed to chair transfer with Modified [place] using Rolling Walker  4. Gait  x 100 feet with Modified [place] using Rolling Walker.  5. Assess step negotiation when able                       History:     Past Medical History:   Diagnosis Date    Bipolar disease, chronic     Depression     Diabetes     Drug therapy     Lanreotide    Gastric ulcer     GERD (gastroesophageal reflux disease)     HTN (hypertension)     Hx antineoplastic chemotherapy 2017    [employer]    Hyperlipemia     Malnutrition     Migraines     Nausea 2018    Neuroendocrine cancer 2017    Neuroendocrine carcinoma of small bowel 2017    Neuroendocrine tumor 2018    Obsessive compulsive disorder     Sleep apnea        Past Surgical History:   Procedure Laterality Date    APPENDECTOMY      BIOPSY-LIVER  2018    [facility]    CHOLECYSTECTOMY      EGD, WITH PEG TUBE INSERTION N/A 2019    [facility]dy [facility] ENDO    ESOPHAGOGASTRODUODENOSCOPY (EGD) N/A 2018    [facility] ENDO    EXCISION, LIVER N/A 2019    [facility] at [facility] OR    GASTRIC BYPASS      INSERTION OF PERCUTANEOUS ENDOSCOPIC GASTROSTOMY (PEG) FEEDING TUBE  2019    INSERTION-TUBE-GASTROSTOMY-LAPAROSCOPIC  with gastrogram N/A 2018    [facility] at [facility] OR    LAPAROTOMY, EXPLORATORY N/A 2019    [facility] at [facility] OR    LYMPHADENECTOMY N/A 2019    [facility] by [facility]    TOTAL KNEE ARTHROPLASTY Left        Time Tracking:     PT Received On: 04/16/19  PT Start Time: 1355     PT Stop Time: 1412  PT Total Time (min): 17 min     Billable Minutes: Evaluation 15      Zeinab Zhang, PT  04/16/2019

## 2019-04-16 NOTE — PLAN OF CARE
Problem: Physical Therapy Goal  Goal: Physical Therapy Goal  Goals to be met by: 19     Patient will increase functional independence with mobility by performin. Supine <> sit with Modified [place]  2. Sit to stand transfer with Modified [place]  3. Bed to chair transfer with Modified [place] using Rolling Walker  4. Gait  x 100 feet with Modified [place] using Rolling Walker.  5. Assess step negotiation when able     Outcome: Ongoing (interventions implemented as appropriate)  PT evaluation completed, note to follow. Pt ambulated 65 ft with RW and supervision with further gait/mobility limited by pt's reports of nausea. Recommending HH PT/OT upon d/c, DME needs: manual w/c (was delivered to pt's room today).

## 2019-04-16 NOTE — PLAN OF CARE
pt known to [place]   d/c'd 2/26 - [facility] then SNF 4/4-14   transferred  SNF to [facility]    dx: SBO   hx of pancreatic NET     d/c disp tbd - SNF vs home with home tube feedings.        04/15/19 1917   Discharge Assessment   Assessment Type Discharge Planning Assessment   Confirmed/corrected address and phone number on facesheet? Yes   Assessment information obtained from? Patient;Medical Record   Expected Length of Stay (days) 3   Communicated expected length of stay with patient/caregiver yes   Prior to hospitilization cognitive status: Alert/Oriented   Prior to hospitalization functional status: Independent   Current cognitive status: Alert/Oriented   Current Functional Status: Independent   Facility Arrived From:   ([facility] )   Lives With sibling(s)   Able to Return to Prior Arrangements   (TBD )   Is patient able to care for self after discharge? No   Who are your caregiver(s) and their phone number(s)?   (sister Emily  [phone] )   Patient's perception of discharge disposition   (tbd  home vs SNF )   Readmission Within the Last 30 Days no previous admission in last 30 days   Patient currently being followed by outpatient case management? No   Patient currently receives any other outside agency services? No   Is it the patient/care giver preference to resume care with the current outside agency? No   Equipment Currently Used at Home nutrition supplies;feeding device   Do you have any problems affording any of your prescribed medications? TBD   Is the patient taking medications as prescribed? yes   Does the patient receive services at the [facility]? No   Discharge Plan A   (tbd - home vs. return to snf )   DME Needed Upon Discharge    (tbd )   Patient/Family in Agreement with Plan yes

## 2019-04-16 NOTE — PT/OT/SLP PROGRESS
Occupational Therapy   Treatment    Name: Kaylee Ngo  MRN: [id number]  Admitting Diagnosis:  Malignant carcinoid tumor of pancreas       Recommendations:     Discharge Recommendations: home health PT, home health OT  Discharge Equipment Recommendations:  wheelchair, manual  Barriers to discharge:  None    Assessment:   Patient completing transfers with sup. Patient limited by nausea this date. Recommend  OT/PT upon D/C.     Kaylee Ngo is a 63 y.o. female with a medical diagnosis of Malignant carcinoid tumor of pancreas.Performance deficits affecting function are weakness, impaired endurance, impaired self care skills, impaired functional mobilty, gait instability, impaired balance, decreased safety awareness.     Rehab Prognosis:  Good and Fair; patient would benefit from acute skilled OT services to address these deficits and reach maximum level of function.       Plan:     Patient to be seen 5 x/week to address the above listed problems via self-care/home management, therapeutic activities, therapeutic exercises  · Plan of Care Expires: 05/15/19  · Plan of Care Reviewed with: patient    Subjective     Pain/Comfort:  · Pain Rating 1: 0/10(no pain, just nausea)  · Pain Rating Post-Intervention 1: 0/10    Objective:     Communicated with: nurseBetty prior to session.  Patient found HOB elevated with bed alarm, PEG Tube upon OT entry to room.    General Precautions: Standard, fall, NPO   Orthopedic Precautions:N/A   Braces: N/A      Bed Mobility:    · Patient completed Scooting/Bridging with modified independence  · Patient completed Supine to Sit with modified independence  · Patient completed Sit to Supine with supervision     Functional Mobility/Transfers:  · Patient completed Sit <> Stand Transfer with supervision  with  no assistive device   · Patient completed Bed <> Chair Transfer using Step Transfer technique with supervision with no assistive device    Activities of Daily Living:  · Lower Body  Dressing: modified independence from EOB to cherie slippers    [facility] 6 Click ADL: 22    Treatment & Education:  Patient required encouragement for therapy participation. Bed mob as noted above. Donned slippers EOB in fig 4. Stepped to bedside chair and BLEs elevated for comfort. Patient with increased c/o nausea and feeling like she will vomit. RN notified and upon returning to room patient requesting to return to bed. Tolerated OOB to chair ~3 mins.     Patient left HOB elevated with all lines intact, call button in reach, bed alarm on and nurse notifiedEducation:      GOALS:   Multidisciplinary Problems     Occupational Therapy Goals        Problem: Occupational Therapy Goal    Goal Priority Disciplines Outcome Interventions   Occupational Therapy Goal     OT, PT/OT Ongoing (interventions implemented as appropriate)    Description:  Goals to be met by: 4/30/2019    Patient will increase functional independence with ADLs by performing:    Grooming while standing at sink with Modified [place].  Step transfer with Modified [place]  Toilet transfer to toilet with Modified [place].  Upper extremity exercise program x10 reps per handout, with independence.                      Time Tracking:     OT Date of Treatment: 04/16/19  OT Start Time: 1110  OT Stop Time: 1133  OT Total Time (min): 23 min    Billable Minutes:Therapeutic Activity 23    TAMIR Martinez/FRANTZ  4/16/2019

## 2019-04-17 NOTE — PT/OT/SLP DISCHARGE
Physical Therapy Discharge Summary    Name: Kaylee Ngo  MRN: [id number]   Principal Problem: Malignant carcinoid tumor of pancreas     Patient Discharged from acute Physical Therapy on 19.  Please refer to prior PT noted date on 19 for functional status.     Assessment:     Patient appropriate for care in another setting.    Objective:     GOALS:   Multidisciplinary Problems     Physical Therapy Goals        Problem: Physical Therapy Goal    Goal Priority Disciplines Outcome Goal Variances Interventions   Physical Therapy Goal     PT, PT/OT Ongoing (interventions implemented as appropriate)     Description:  Goals to be met by: 19     Patient will increase functional independence with mobility by performin. Supine <> sit with Modified [place]  2. Sit to stand transfer with Modified [place]  3. Bed to chair transfer with Modified [place] using Rolling Walker  4. Gait  x 100 feet with Modified [place] using Rolling Walker.  5. Assess step negotiation when able                       Reasons for Discontinuation of Therapy Services  Transfer to alternate level of care.      Plan:     Patient Discharged to: Home with Home Health Service.    Zeinab Zhang, PT  2019

## 2019-04-23 ENCOUNTER — TELEPHONE (OUTPATIENT)
Dept: NEUROLOGY | Facility: HOSPITAL | Age: 64
End: 2019-04-23

## 2019-04-23 NOTE — TELEPHONE ENCOUNTER
----- Message from Teodoro Braga sent at 4/23/2019 12:13 PM CDT -----  Contact: [phone]  DYLAN - patient sister mai requested to speak with the nurse because the patient doctor in [place], [place] did not want to give the patient the injections.

## 2019-04-24 ENCOUNTER — TELEPHONE (OUTPATIENT)
Dept: NEUROLOGY | Facility: HOSPITAL | Age: 64
End: 2019-04-24

## 2019-04-24 NOTE — TELEPHONE ENCOUNTER
----- Message from Lizzy Pam sent at 4/23/2019  3:08 PM CDT -----  Contact: Emily, , [phone]  Called in returning Vidya's call. Please advise.

## 2019-05-07 NOTE — TELEPHONE ENCOUNTER
Emily, sister, notified Marinol refill sent to Dr. Christine for approval.  Emily acknowledged understanding.

## 2019-05-07 NOTE — TELEPHONE ENCOUNTER
----- Message from Shellie Zhang sent at 5/6/2019  3:22 PM CDT -----  Contact: sister Emily [phone]  DYLAN  Type:  RX Refill Request    Who Called: sister  Refill or New Rx: Refill  RX Name and Strength: dronabinol (MARINOL) 2.5 MG capsule  How is the patient currently taking it? (ex. 1XDay): Take 1 capsule (2.5 mg total) by mouth 2 (two) times daily. - Oral  Is this a 30 day or 90 day RX: 30 day  Preferred Pharmacy with phone number: [facility]  Local or Mail Order: mail order  Ordering Provider: Dr. Levy  Would the patient rather a call back or a response via [facility]? Call back  Best Call Back Number: [phone]  Additional Information: it needs to be switched to [facility] mail order due to state restrictions

## 2019-05-09 RX ORDER — DRONABINOL 2.5 MG/1
2.5 CAPSULE ORAL 2 TIMES DAILY
Qty: 40 CAPSULE | Refills: 0 | Status: SHIPPED | OUTPATIENT
Start: 2019-05-09 | End: 2019-06-28 | Stop reason: SDUPTHER

## 2019-06-04 NOTE — PT/OT/SLP PROGRESS
"Speech Language Pathology Treatment    Patient Name:  Kaylee Ngo   MRN:  [id number]  Admitting Diagnosis: Malignant carcinoid tumor of pancreas    Recommendations:                 General Recommendations:  Modified barium swallow study  Diet recommendations:  NPO, Liquid Diet Level: NPO   Aspiration Precautions: Frequent oral care and Strict aspiration precautions   General Precautions: Standard, aspiration, fall, NPO, respiratory  Communication strategies:  go to room if call light pushed    Subjective     Pt found in bed awake with pt's sister at bedside upon SLP entry. Respiratory therapist recently provided deep suctioning to pt -- pt demonstrated clear vocal quality prior to PO trials. Pt agreeable to participate in skilled ST session.     Patient goals: "Not too good" per pt when asked how she was feeling by SLP.     Pain/Comfort:  · Pain Rating 1: 0/10    Objective:     Has the patient been evaluated by SLP for swallowing?   Yes  Keep patient NPO? Yes   Current Respiratory Status: nasal cannula      Pt seen for ongoing swallow eval. Pt continues to demonstrate overt wet, gurgly s/p swallow limited trials of thin liquids x1 and nectar thick liquids x1, which SLP suspects 2/2 dcr'd laryngeal elevation/excursion noted per palpation. Pt also with delayed (weak) coughing s/p swallow both trials of liquids, with limited success in clearing suspected residuals, as pt still demonstrated overt wet vocal quality, despite multiple coughs produced. Pt continues to demonstrate loud, audible swallow across PO trials.    SLP deferred further PO trials 2/2 pt's high risk of aspiration and limited success in clearing suspected residuals from limited PO trials.   Pt did demonstrate slightly stronger vocal quality and limited episodes of pitch breaks during conversation was noted, as compared to previous sessions.   SLP educated pt and pt's sister on purpose of ongoing swallow eval, con't NPO recs and TF as primary means of " nutrition, continue frequent oral care, as well as explained purpose and reasoning for MBSS. Pt and pt's sister acknowledged and confirmed understanding.     Assessment:     Kaylee Ngo is a 63 y.o. female with an SLP diagnosis of Dysphagia.  She presents with overt s/s of aspiration and pharyngeal dysphagia c/b overt wet vocal quality and delayed (weak) coughing s/p swallow across all trials.   SLP recs: Con't NPO and alternative means of nutrition/hydration and medication via TF at this time-- Pt is still not safe for an oral diet. However, given pt's improvement in mentation and overall status, SLP believes pt would benefit from participating in Modified Barium Swallow Study next date (2/11/19, pending pt is appropriate) to objectively r/o aspiration and determine safest PO diet. SLP notified CHANELLE Brooks and MD Titus of results/recs. MD Titus in agreement with SLP recs for POC.    Goals:   Multidisciplinary Problems     SLP Goals        Problem: SLP Goal    Goal Priority Disciplines Outcome   SLP Goal     SLP Ongoing (interventions implemented as appropriate)   Description:  Short Term Goals:  1. Pt will participate in BSS to determine least restrictive diet.-ongoing  2. Pt will successfully participate in Modified Barium Swallow study to radiographically assess swallow function, rule out aspiration and determine safest/least restrictive diet.                          Plan:     · Patient to be seen:  3 x/week   · Plan of Care expires:  03/08/19  · Plan of Care reviewed with:  patient, sibling(CHANELLE Brooks and MD Titus)   · SLP Follow-Up:  Yes       Discharge recommendations:  nursing facility, skilled   Barriers to Discharge:  Level of Skilled Assistance Needed . and Safety Awareness impaired    Time Tracking:     SLP Treatment Date:   02/10/19  Speech Start Time:  1303  Speech Stop Time:  1317     Speech Total Time (min):  14 min    Billable Minutes: Treatment Swallowing Dysfunction 14    QUIANA Rachel,  CCC-SLP  02/10/2019   none

## 2019-06-28 DIAGNOSIS — G89.3 CANCER RELATED PAIN: Primary | ICD-10-CM

## 2019-06-28 DIAGNOSIS — E43 SEVERE MALNUTRITION: Chronic | ICD-10-CM

## 2019-06-28 RX ORDER — TRAMADOL HYDROCHLORIDE 50 MG/1
50 TABLET ORAL EVERY 6 HOURS PRN
Qty: 90 TABLET | Refills: 0 | Status: SHIPPED | OUTPATIENT
Start: 2019-06-28

## 2019-06-28 RX ORDER — DRONABINOL 2.5 MG/1
2.5 CAPSULE ORAL 2 TIMES DAILY
Qty: 40 CAPSULE | Refills: 0 | Status: SHIPPED | OUTPATIENT
Start: 2019-06-28

## 2019-06-28 NOTE — TELEPHONE ENCOUNTER
Spoke to patient's sister, she is out of marinol and tramadol.  I will ask Dr. Prieto to fill this.

## 2019-06-28 NOTE — TELEPHONE ENCOUNTER
----- Message from Chari Kaiser sent at 6/27/2019 11:35 AM CDT -----  Contact: Emily(sister) [phone]  Type:  RX Refill Request    Who Called: Emily  Refill or New Rx:Refill  RX Name and Strength:dronabinol (MARINOL) 2.5 MG capsule  How is the patient currently taking it? (ex. 1XDay):1X Day  Is this a 30 day or 90 day RX: 30day  Preferred Pharmacy with phone number:[facility] [address]  Local or Mail Order:Mail Order  Ordering Provider:Dr. Levy  Would the patient rather a call back or a response via [facility]? Call back  Best Call Back Number:[phone]  Additional Information:She also requested a pain medication to be called in by the doctor to her local pharmacy.  Please call for specific pharmacy information

## 2019-07-15 PROBLEM — Z00.00 PREVENTATIVE HEALTH CARE: Status: RESOLVED | Noted: 2019-04-10 | Resolved: 2019-07-15

## 2019-07-30 RX ORDER — DICLOFENAC SODIUM 50 MG/1
50 TABLET, DELAYED RELEASE ORAL
Qty: 30 TABLET | Refills: 0 | Status: SHIPPED | OUTPATIENT
Start: 2019-07-30

## 2019-07-30 NOTE — TELEPHONE ENCOUNTER
----- Message from Kamran Zimmer sent at 7/29/2019 10:50 AM CDT -----  Contact: [facility]   Pharmacy Calling    Reason for call: diclofenac (CATAFLAM) 50 MG tablet is on back order ask for a prescription for diclofenac sodium     Pharmacy Name: [id number]    Prescription Name: Diclofenac    Phone Number: [phone]    Additional Information: n/a

## 2019-07-30 NOTE — TELEPHONE ENCOUNTER
Pharmacy called to state that the medication is on  backorder. Stated that the diclofenac sodium is the same just absorbed slower, can use same strength and directions if appropriate.     Will forward to MD for approval or denial. Spoke with the pharmacy.

## 2019-11-03 NOTE — PLAN OF CARE
Problem: Patient Care Overview  Goal: Plan of Care Review  Outcome: Ongoing (interventions implemented as appropriate)  Pt AAOx4. NAD noted. comaplints of pain to the left upper abdomen with slight relief from diaudid. IV fluids infusing per order.  Pt down to IR with PEg tube replacement; Peg tube with whitlock bag for dependent drainage.  Dressing changed due to drainage. Clear liquid diet with little intake due to nausea.  poct glucose checked with insulin given. Pt assisted with turning. Safety maintained; will continue to monitor.        Home

## 2020-05-21 NOTE — TELEPHONE ENCOUNTER
----- Message from Ale Sherwood sent at 6/1/2018 10:49 AM CDT -----  Contact: Patients Emily mauro  EAW- Patients sisterEmily  needs a copy of recent labs and an Ultrasound order faxed to Dr Marie Gibson  [phone]. Emily can be reached at [phone].  
Confirmed orders for u/s and labs were faxed to Dr. Gibson on 5/28/18.  Re sent via Epic fax per request.  
none

## 2022-04-20 NOTE — PLAN OF CARE
Abdominal ultrasound report reviewed  Recommend patient follow a low-fat low-carbohydrate diet and follow-up with ordering physician  Problem: Adult Inpatient Plan of Care  Goal: Plan of Care Review  Outcome: Ongoing (interventions implemented as appropriate)  Pt is aaox4. Patient safety maintained. Pain controlled with hydromorphine. TPN and lipid maintained. Blood sugar monitored. Telemetry maintainted. No N&V,  No . diarrhea. No distress noted. Will continue to monitor.

## 2022-12-21 NOTE — PLAN OF CARE
Returned from IR, upper portion of mid line incision draining light green thin drainage; dressing applied and Dr. Titus, made aware, on the unit and viewed drainage.   Complex Repair And Graft Additional Text (Will Appearing After The Standard Complex Repair Text): The complex repair was not sufficient to completely close the primary defect. The remaining additional defect was repaired with the graft mentioned below.

## 2023-02-10 NOTE — PROGRESS NOTES
"NOLANETS:  [facility]  A collaboration between [facility] and [facility]      PATIENT: Kaylee Ngo  MRN: [id number]  DATE: 1/3/2019    Subjective:      Chief Complaint: Follow-up (Follow Up Visit )   I saw her on 12/06/2018.  She the plan is to watch in nutrition and then tissue status is reasonable plan for surgery.    She states that she is eating better a taking at least 2 cans through the feeding tube.  Has had activity has improved she is able to be more ambulatory.  She has some episodes of nausea and diarrhea because of her diabetes and post gastric bypass    Vitals:   Vitals:    01/03/19 1437   BP: 125/77   BP Location: Right arm   Pulse: 60   Temp: 99.3 °F (37.4 °C)   TempSrc: Oral   Weight: 60.3 kg (133 lb 0.8 oz)   Height: 5' 5" (1.651 m)        Karnofsky Score:     Diagnosis:   1. 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    Neuroendocrine tumor 4/9/2018    Obsessive compulsive disorder     Sleep apnea        Past Surgical History:  Past Surgical History:   Procedure Laterality Date    APPENDECTOMY      BIOPSY-LIVER  2/28/2018    [facility] OR    CHOLECYSTECTOMY      ESOPHAGOGASTRODUODENOSCOPY (EGD) N/A 1/29/2018    Performed by Kermit Reddy [facility] at [facility] ENDO    GASTRIC BYPASS      INSERTION-TUBE-GASTROSTOMY-LAPAROSCOPIC  with gastrogram N/A 2/28/2018    Performed by " "[facility]    TOTAL KNEE ARTHROPLASTY Left        Family History:  Family History   Problem Relation Age of Onset    Cancer Mother         pancreas    Cancer Father         colon       Allergies:  Review of patient's allergies indicates:   Allergen Reactions    Acetaminophen     Calcium carbonate Nausea And Vomiting     The liquid form    Codeine Hives    Contrast media      Oral and IV    Darvocet a500 [propoxyphene n-acetaminophen]     Epinephrine      Neuroendocrine Tumor patient    Neuroendocrine Tumor patient    Morphine     Propoxyphene napsylate     Sulfa (sulfonamide antibiotics)     Erythromycin Rash    Erythromycin base Rash    Iodinated contrast- oral and iv dye Rash and Other (See Comments)     Copper taste in mouth-Benadryl all that is needed for scans  Copper taste in mouth-Benadryl all that is needed for scans    Pcn [penicillins] Rash     "It looks like measles."       Medications:  Current Outpatient Medications   Medication Sig Dispense Refill    ACCU-CHEK SHU CONTROL SOLN Soln       ACCU-CHEK SHU PLUS METER Misc       ACCU-CHEK SHU PLUS TEST STRP Strp       amitriptyline (ELAVIL) 50 MG tablet Take 50 mg by mouth every evening.      amLODIPine (NORVASC) 5 MG tablet Take 2.5 mg by mouth.      calcium carbonate (OS-BEST) 600 mg calcium (1,500 mg) Tab Take 600 mg by mouth once.      clonazePAM (KLONOPIN) 0.5 MG tablet Take 0.5 mg by mouth 2 (two) times daily as needed for Anxiety.      cyanocobalamin 1,000 mcg/mL injection 1,000 mcg every 28 days.      dexamethasone 1.5 mg (27 tabs) DsPk Take 1.5 mg by mouth once daily. Tapering dose as directed 27 tablet 0    diclofenac (CATAFLAM) 50 MG tablet as needed.       diphenoxylate-atropine 2.5-0.025 mg (LOMOTIL) 2.5-0.025 mg per tablet Take 1 tablet by mouth 4 (four) times daily as needed for Diarrhea.      DULoxetine (CYMBALTA) 60 MG capsule Take 60 mg by mouth once daily.      FLUARIX QUAD " "[id number], PF, 60 mcg (15 mcg x 4)/0.5 mL Syrg vaccine       GLUCAGON EMERGENCY KIT, HUMAN, 1 mg injection       hydrOXYzine pamoate (VISTARIL) 25 MG Cap Take 25 mg by mouth 3 (three) times daily.       insulin aspart U-100 (NOVOLOG FLEXPEN U-100 INSULIN) 100 unit/mL InPn pen 2 units bid prn      insulin degludec (TRESIBA FLEXTOUCH U-100) 100 unit/mL (3 mL) InPn Inject 15 Units into the skin once daily.       lamoTRIgine (LAMICTAL) 150 MG Tab       lancets 30 gauge Misc       lancing device Misc       lanreotide (SOMATULINE DEPOT) 60 mg/0.2 mL Syrg inject 0.2 milliliter by subcutaneous route once a month      losartan (COZAAR) 100 MG tablet Take 1 tablet (100 mg total) by mouth once daily. 90 tablet 3    metoclopramide HCl (REGLAN) 5 mg/5 mL Soln TAKE 10ML BY MOUTH THREE TIMES A  mL 1    metoclopramide HCl (REGLAN) 5 mg/5 mL Soln TAKE 10ML BY MOUTH THREE TIMES A  mL 1    metoprolol succinate (TOPROL-XL) 100 MG 24 hr tablet Take 100 mg by mouth 2 (two) times daily.      multivitamin capsule Take 1 capsule by mouth once daily.      omeprazole (PRILOSEC) 40 MG capsule Take 40 mg by mouth 2 (two) times daily before meals.       ondansetron (ZOFRAN) 8 MG tablet Take 8 mg by mouth every 8 (eight) hours as needed.       potassium chloride (KLOR-CON) 20 mEq Pack Take 20 mEq by mouth once.       promethazine (PHENERGAN) 25 MG tablet Take 25 mg by mouth.      promethazine (PHENERGAN) 25 MG tablet Take 1 tablet (25 mg total) by mouth every 6 (six) hours as needed for Nausea. 60 tablet 1    scopolamine (TRANSDERM-SCOP) 1.3-1.5 mg (1 mg over 3 days) Place 1 patch onto the skin Every 3 (three) days. 10 patch 2    SURE COMFORT PEN NEEDLE 32 gauge x 5/32" Ndle       tiZANidine (ZANAFLEX) 4 MG tablet as needed.       vitamin D 1000 units Tab Take 1,000 Units by mouth once daily.       No current facility-administered medications for this visit.        Review of Systems   Constitutional: Positive " for fatigue. Negative for appetite change, diaphoresis, fever and unexpected weight change.   HENT: Negative for congestion, ear discharge, ear pain, facial swelling, hearing loss, mouth sores, nosebleeds, postnasal drip, rhinorrhea, sinus pressure and sinus pain.    Eyes: Negative for photophobia, discharge, itching and visual disturbance.   Respiratory: Negative for apnea, cough, wheezing and stridor.    Cardiovascular: Negative.  Negative for palpitations and leg swelling.   Gastrointestinal: Positive for diarrhea and nausea.   Endocrine: Negative.    Genitourinary: Negative.    Musculoskeletal: Negative.    Skin: Negative.    Allergic/Immunologic: Negative.    Neurological: Negative.    Hematological: Negative.    Psychiatric/Behavioral: Negative.       Objective:      Physical Exam   Constitutional: She is oriented to person, place, and time. No distress.   HENT:   Head: Normocephalic and atraumatic.   Right Ear: External ear normal.   Left Ear: External ear normal.   Eyes: EOM are normal. Pupils are equal, round, and reactive to light.   Neck: Normal range of motion.   Cardiovascular: Normal rate and regular rhythm.   Pulmonary/Chest: Effort normal and breath sounds normal.   Abdominal: Soft. Bowel sounds are normal. There is no rebound and no guarding. No hernia.   G site looks reasonably okay   Musculoskeletal: Normal range of motion.   Neurological: She is alert and oriented to person, place, and time.   Skin: Skin is warm. She is not diaphoretic.   Psychiatric: She has a normal mood and affect. Her behavior is normal.      Assessment:       1. Malignant carcinoid tumor of ileum        Laboratory Data:  Results for NAOMI NAJERA (MRN [id number]) as of 1/3/2019 20:56   Ref. Range 11/5/2018 00:00 12/6/2018 13:38   WBC Latest Ref Range: 3.90 - 12.70 K/uL  7.55   RBC Latest Ref Range: 4.00 - 5.40 M/uL  3.88 (L)   Hemoglobin Latest Ref Range: 12.0 - 16.0 g/dL  11.0 (L)   Hematocrit Latest Ref Range: 37.0 - 48.5  %  35.6 (L)   MCV Latest Ref Range: 82 - 98 fL  92   MCH Latest Ref Range: 27.0 - 31.0 pg  28.4   MCHC Latest Ref Range: 32.0 - 36.0 g/dL  30.9 (L)   RDW Latest Ref Range: 11.5 - 14.5 %  13.6   Platelets Latest Ref Range: 150 - 350 K/uL  408 (H)   MPV Latest Ref Range: 9.2 - 12.9 fL  9.0 (L)   Gran% Latest Ref Range: 38.0 - 73.0 %  69.9   Gran # (ANC) Latest Ref Range: 1.8 - 7.7 K/uL  5.3   Lymph% Latest Ref Range: 18.0 - 48.0 %  22.8   Lymph # Latest Ref Range: 1.0 - 4.8 K/uL  1.7   Mono% Latest Ref Range: 4.0 - 15.0 %  5.6   Mono # Latest Ref Range: 0.3 - 1.0 K/uL  0.4   Eosinophil% Latest Ref Range: 0.0 - 8.0 %  1.1   Eos # Latest Ref Range: 0.0 - 0.5 K/uL  0.1   Basophil% Latest Ref Range: 0.0 - 1.9 %  0.3   Baso # Latest Ref Range: 0.00 - 0.20 K/uL  0.02   Differential Method Unknown  Automated   Sodium Latest Ref Range: 136 - 145 mmol/L  140   Potassium Latest Ref Range: 3.5 - 5.1 mmol/L  3.4 (L)   Chloride Latest Ref Range: 95 - 110 mmol/L  102   CO2 Latest Ref Range: 23 - 29 mmol/L  27   Anion Gap Latest Ref Range: 8 - 16 mmol/L  11   BUN, Bld Latest Ref Range: 8 - 23 mg/dL  27 (H)   Creatinine Latest Ref Range: 0.5 - 1.4 mg/dL  1.1   eGFR if non African American Latest Ref Range: >60 mL/min/1.73 m^2  54 (A)   eGFR if African American Latest Ref Range: >60 mL/min/1.73 m^2  >60   Glucose Latest Ref Range: 70 - 110 mg/dL  156 (H)   Calcium Latest Ref Range: 8.7 - 10.5 mg/dL  9.3   Alkaline Phosphatase Latest Ref Range: 55 - 135 U/L  123   Total Protein Latest Ref Range: 6.0 - 8.4 g/dL  6.8   Albumin Latest Ref Range: 3.5 - 5.2 g/dL  3.3 (L)   Prealbumin Latest Ref Range: 20 - 43 mg/dL 20 18 (L)   Total Bilirubin Latest Ref Range: 0.1 - 1.0 mg/dL  0.3   AST Latest Ref Range: 10 - 40 U/L  21   ALT Latest Ref Range: 10 - 44 U/L  19   CRP Latest Ref Range: 0.0 - 8.2 mg/L 6.2 (A) 7.7   Hemoglobin A1C Latest Ref Range: 4.0 - 5.6 %  6.1 (H)   Estimated Avg Glucose Latest Ref Range: 68 - 131 mg/dL  128   EXT 5 HIAA  BLOOD Latest Ref Range: 0 - 22 pg/ml 11    EXT Albumin Latest Ref Range: 3.6 - 4.8 g/dl 3.8    EXT Alkaline Phosphatase Latest Ref Range: 39 - 117 iu/l 97    EXT ALT Latest Ref Range: 0 - 32 iu/l 38 (A)    EXT AST Latest Ref Range: 0 - 40 iu/l 21    EXT BilirubiN Total Latest Ref Range: 0.0 - 1.2 mg/dl 0.2    EXT BUN Latest Ref Range: 8 - 27 mg/dl 19    EXT Calcium Latest Ref Range: 8.7 - 10.3 mg/dl 8.3 (A)    EXT Chloride Latest Ref Range: 96 - 106 mmol/l 103    EXT CHROMOGRANIN A Latest Ref Range: 0 - 5 nmol/l 4    EXT CO2 Latest Ref Range: 20 - 29 mmol/l 25    EXT Creatinine Latest Ref Range: 0.57 - 1.00 mg/dl 0.67    EXT Glucose Latest Ref Range: 65 - 99 mg/dl 235 (A)    EXT Hematocrit Latest Ref Range: 34.0 - 46.6 % 32.9 (A)    EXT Hemoglobin Latest Ref Range: 11.1 - 15.9 g/dl 10.3 (A)    EXT NEUROKININ A RANJITH Latest Ref Range: 0 - 40.0 pg/ml <10.0    EXT PANCREASTATIN RANJITH Latest Ref Range: 10 - 135 pg/ml 663 (A)    EXT Platelets Latest Ref Range: 150 - 379 x10e3/ul 348    EXT Potassium Latest Ref Range: 3.5 - 5.2 mmol/l 4.9    EXT Protein total Latest Ref Range: 6.0 - 8.5 g/dl 6.0    EXT SEROTONIN Latest Ref Range: 0 - 420 ng/ml 9    EXT Sodium Latest Ref Range: 134 - 144 mmol/l 143    EXT WBC Latest Ref Range: 3.4 - 10.8 x0e3/ul 5.9      Impression:  Neuroendocrine tumor of unknown 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  Her tumor is gallium negative. She does not have any disease in the liver.  She is status post gastric bypass with diabetes    She is able to maintain her weight her nutrition is adjusted borderline at is being monitored for a long time.  Again she is not in the negative balance now.  She was accompanied by her sister.  The plan will be to proceed with the surgery.  I doctor about the high risk nature of the surgery and also went over with her about the tumor condition and that and on the benefits and risk of having a procedure. Next by specialist I  specifically addressed to her the the the nature of the tumor, her tumor has been stable with minimal change and therefore whether she would benefit from any major debulking procedure. At the time of procedure there is also possible to whether I can restore the  bypass if possible    I strongly suggested that she continue nutritional supplementation physical activity.  The plan would be to proceed with surgery the last week of January  Plan:         Continue nutritional supplementation physical activity plan for surgery l    Will talk to dietitian and make sure her nutritional parameters are reasonable                  CAROL Levy MD, FACS   Associate Professor of Surgery, [facility]   Neuroendocrine Surgery, Hepatic/Pancreatic & General Surgery   [address], [address]   [place] [address]   ph. [phone]; [phone]   fax. [phone]   normal

## (undated) DEVICE — DRESSING TELFA N ADH 3X8

## (undated) DEVICE — GUN BIOPSY 18GA MONOPLY

## (undated) DEVICE — SYS CLSR DERMABOND PRINEO 22CM

## (undated) DEVICE — DRAPE INCISE IOBAN 2 23X33IN

## (undated) DEVICE — TUBING INSUFFLATION 10

## (undated) DEVICE — DRAPE C ARM 42 X 120 10/BX

## (undated) DEVICE — CATH ALL PUR URTHL 20FR

## (undated) DEVICE — DRESSING TRANS 4X4 3/4

## (undated) DEVICE — SUT MONOCRYL 3-0 PS-1

## (undated) DEVICE — SUT PROLENE 5-0 36IN C-1

## (undated) DEVICE — DRAPE FLUID WARMER ORS 44X44IN

## (undated) DEVICE — PACK BASIC

## (undated) DEVICE — TRAY FOLEY 16FR INFECTION CONT

## (undated) DEVICE — DRESSING GAUZE 6PLY 4X4

## (undated) DEVICE — IRRIGATOR ENDOSCOPY DISP.

## (undated) DEVICE — CONTAINER PATHOLOGY W/LID 32OZ

## (undated) DEVICE — MANIFOLD 4 PORT

## (undated) DEVICE — SEALER LIGASURE OPEN 5MM 23CM

## (undated) DEVICE — GAUZE SPONGE 4X4 12PLY

## (undated) DEVICE — SEE MEDLINE ITEM 157125

## (undated) DEVICE — COVER OVERHEAD SURG LT BLUE

## (undated) DEVICE — APPLICATOR CHLORAPREP ORN 26ML

## (undated) DEVICE — KIT POWDER ABSORBABLE GELATIN

## (undated) DEVICE — CONTAINER SPECIMEN STR 3 0Z

## (undated) DEVICE — SUT 2 60IN PROLENE BL MONO

## (undated) DEVICE — SEE MEDLINE ITEM 157116

## (undated) DEVICE — HANDPIECE PHOTONSABER Y TIP

## (undated) DEVICE — DRAPE ABDOMINAL TIBURON 14X11

## (undated) DEVICE — CLOSURE SKIN STERI STRIP 1/2X4

## (undated) DEVICE — SUT PROLENE 2-0 36IN MH BLU

## (undated) DEVICE — SEE MEDLINE ITEM 152622

## (undated) DEVICE — SUT ETHILON 4-0 BLK MONO

## (undated) DEVICE — DRESSING AQUACEL SACRAL 9 X 9

## (undated) DEVICE — KIT ANTIFOG

## (undated) DEVICE — ADHESIVE DERMABOND ADVANCED

## (undated) DEVICE — STAPLER ENDO GIA 60MM MED THCK

## (undated) DEVICE — GLOVE PROTEXIS LTX MICRO  7.5

## (undated) DEVICE — SUT 4/0 27IN PDS II VIO MON

## (undated) DEVICE — GLOVE SURGICAL LATEX SZ 7

## (undated) DEVICE — SUT PROLENE 3-0 30SH

## (undated) DEVICE — ELECTRODE REM PLYHSV RETURN 9

## (undated) DEVICE — SPONGE DERMA 8PLY 2X2

## (undated) DEVICE — SUT PROLENE 6-0 C-1 30IN BL

## (undated) DEVICE — SYR 10CC LUER LOCK

## (undated) DEVICE — Device

## (undated) DEVICE — RELOAD ENDO GIA TRISTAPLE 45MM

## (undated) DEVICE — SEE MEDLINE ITEM 157117

## (undated) DEVICE — CONTAINER SPECIMEN STRL 4OZ

## (undated) DEVICE — SUT ETHILON 2-0 FSLX 30 BLK

## (undated) DEVICE — SUT ETHIBOND EXCEL 1 CTX 18

## (undated) DEVICE — SUT MCRYL PLUS 4-0 PS2 27IN

## (undated) DEVICE — SUT 4/0 27IN PDS II VIO MO

## (undated) DEVICE — TEGADERM IV

## (undated) DEVICE — NDL HYPO REG 25G X 1 1/2

## (undated) DEVICE — SET DECANTER MEDICHOICE

## (undated) DEVICE — STAPLER CIRCULAR XL SEAL 33MM

## (undated) DEVICE — SUT 0 VICRYL / UR6 (J603)

## (undated) DEVICE — SUPPORT ULNA NERVE PROTECTOR

## (undated) DEVICE — SEE MEDLINE ITEM 156955

## (undated) DEVICE — TROCAR KII FIOS 5MM X 100MM

## (undated) DEVICE — SUT SILK 2-0 SH 18IN BLACK

## (undated) DEVICE — GLOVE 8 PROTEXIS PI BLUE

## (undated) DEVICE — SUT PROLENE 3-0 36 MHMH

## (undated) DEVICE — DRAPE INCISE IOBAN 2 23X23IN

## (undated) DEVICE — SUT PROLENE 2-0 SH 36IN BLU